# Patient Record
Sex: FEMALE | Race: WHITE | NOT HISPANIC OR LATINO | Employment: OTHER | URBAN - METROPOLITAN AREA
[De-identification: names, ages, dates, MRNs, and addresses within clinical notes are randomized per-mention and may not be internally consistent; named-entity substitution may affect disease eponyms.]

---

## 2017-01-13 ENCOUNTER — ALLSCRIPTS OFFICE VISIT (OUTPATIENT)
Dept: OTHER | Facility: OTHER | Age: 77
End: 2017-01-13

## 2017-01-23 DIAGNOSIS — E55.9 VITAMIN D DEFICIENCY: ICD-10-CM

## 2017-01-23 DIAGNOSIS — N18.30 CHRONIC KIDNEY DISEASE, STAGE III (MODERATE) (HCC): ICD-10-CM

## 2017-01-23 DIAGNOSIS — I12.9 HYPERTENSIVE CHRONIC KIDNEY DISEASE WITH STAGE 1 THROUGH STAGE 4 CHRONIC KIDNEY DISEASE, OR UNSPECIFIED CHRONIC KIDNEY DISEASE: ICD-10-CM

## 2017-01-23 DIAGNOSIS — E78.00 PURE HYPERCHOLESTEROLEMIA: ICD-10-CM

## 2017-01-23 DIAGNOSIS — E87.1 HYPO-OSMOLALITY AND HYPONATREMIA: ICD-10-CM

## 2017-01-23 DIAGNOSIS — D64.9 ANEMIA: ICD-10-CM

## 2017-01-31 ENCOUNTER — TRANSCRIBE ORDERS (OUTPATIENT)
Dept: LAB | Facility: CLINIC | Age: 77
End: 2017-01-31

## 2017-01-31 ENCOUNTER — APPOINTMENT (OUTPATIENT)
Dept: LAB | Facility: CLINIC | Age: 77
End: 2017-01-31
Payer: MEDICARE

## 2017-01-31 DIAGNOSIS — N18.30 CHRONIC KIDNEY DISEASE, STAGE III (MODERATE) (HCC): ICD-10-CM

## 2017-01-31 DIAGNOSIS — E55.9 VITAMIN D DEFICIENCY: ICD-10-CM

## 2017-01-31 DIAGNOSIS — I12.9 HYPERTENSIVE CHRONIC KIDNEY DISEASE WITH STAGE 1 THROUGH STAGE 4 CHRONIC KIDNEY DISEASE, OR UNSPECIFIED CHRONIC KIDNEY DISEASE: ICD-10-CM

## 2017-01-31 DIAGNOSIS — E78.00 PURE HYPERCHOLESTEROLEMIA: ICD-10-CM

## 2017-01-31 DIAGNOSIS — E87.1 HYPO-OSMOLALITY AND HYPONATREMIA: ICD-10-CM

## 2017-01-31 DIAGNOSIS — D64.9 ANEMIA: ICD-10-CM

## 2017-01-31 LAB
ANION GAP SERPL CALCULATED.3IONS-SCNC: 9 MMOL/L (ref 4–13)
BUN SERPL-MCNC: 25 MG/DL (ref 5–25)
CALCIUM SERPL-MCNC: 8.7 MG/DL (ref 8.3–10.1)
CHLORIDE SERPL-SCNC: 103 MMOL/L (ref 100–108)
CO2 SERPL-SCNC: 29 MMOL/L (ref 21–32)
CREAT SERPL-MCNC: 1.78 MG/DL (ref 0.6–1.3)
GFR SERPL CREATININE-BSD FRML MDRD: 27.6 ML/MIN/1.73SQ M
GLUCOSE SERPL-MCNC: 110 MG/DL (ref 65–140)
POTASSIUM SERPL-SCNC: 4 MMOL/L (ref 3.5–5.3)
SODIUM SERPL-SCNC: 141 MMOL/L (ref 136–145)

## 2017-01-31 PROCEDURE — 80048 BASIC METABOLIC PNL TOTAL CA: CPT

## 2017-01-31 PROCEDURE — 36415 COLL VENOUS BLD VENIPUNCTURE: CPT

## 2017-03-21 ENCOUNTER — TRANSCRIBE ORDERS (OUTPATIENT)
Dept: ADMINISTRATIVE | Facility: HOSPITAL | Age: 77
End: 2017-03-21

## 2017-03-21 DIAGNOSIS — M81.0 OSTEOPOROSIS, UNSPECIFIED: ICD-10-CM

## 2017-03-21 DIAGNOSIS — Z12.31 SCREENING MAMMOGRAM FOR HIGH-RISK PATIENT: Primary | ICD-10-CM

## 2017-03-29 ENCOUNTER — GENERIC CONVERSION - ENCOUNTER (OUTPATIENT)
Dept: OTHER | Facility: OTHER | Age: 77
End: 2017-03-29

## 2017-03-29 ENCOUNTER — HOSPITAL ENCOUNTER (OUTPATIENT)
Dept: RADIOLOGY | Facility: HOSPITAL | Age: 77
Discharge: HOME | End: 2017-03-29
Payer: MEDICARE

## 2017-03-29 DIAGNOSIS — Z12.31 SCREENING MAMMOGRAM FOR HIGH-RISK PATIENT: ICD-10-CM

## 2017-03-29 DIAGNOSIS — M81.0 OSTEOPOROSIS, UNSPECIFIED: ICD-10-CM

## 2017-03-29 PROCEDURE — 77080 DXA BONE DENSITY AXIAL: CPT

## 2017-03-29 PROCEDURE — G0202 SCR MAMMO BI INCL CAD: HCPCS

## 2017-05-01 DIAGNOSIS — D64.9 ANEMIA: ICD-10-CM

## 2017-05-01 DIAGNOSIS — E87.1 HYPO-OSMOLALITY AND HYPONATREMIA: ICD-10-CM

## 2017-05-01 DIAGNOSIS — I12.9 HYPERTENSIVE CHRONIC KIDNEY DISEASE WITH STAGE 1 THROUGH STAGE 4 CHRONIC KIDNEY DISEASE, OR UNSPECIFIED CHRONIC KIDNEY DISEASE: ICD-10-CM

## 2017-05-01 DIAGNOSIS — N18.30 CHRONIC KIDNEY DISEASE, STAGE III (MODERATE) (HCC): ICD-10-CM

## 2017-05-01 DIAGNOSIS — E78.00 PURE HYPERCHOLESTEROLEMIA: ICD-10-CM

## 2017-05-01 DIAGNOSIS — E55.9 VITAMIN D DEFICIENCY: ICD-10-CM

## 2017-05-04 ENCOUNTER — TRANSCRIBE ORDERS (OUTPATIENT)
Dept: ADMINISTRATIVE | Facility: HOSPITAL | Age: 77
End: 2017-05-04

## 2017-05-04 ENCOUNTER — APPOINTMENT (OUTPATIENT)
Dept: LAB | Facility: HOSPITAL | Age: 77
End: 2017-05-04
Attending: INTERNAL MEDICINE
Payer: MEDICARE

## 2017-05-04 DIAGNOSIS — E87.1 HYPO-OSMOLALITY AND HYPONATREMIA: ICD-10-CM

## 2017-05-04 DIAGNOSIS — M06.09 RHEUMATOID ARTHRITIS OF MULTIPLE SITES WITHOUT RHEUMATOID FACTOR (HCC): Primary | ICD-10-CM

## 2017-05-04 DIAGNOSIS — Z79.899 ENCOUNTER FOR LONG-TERM (CURRENT) USE OF OTHER MEDICATIONS: ICD-10-CM

## 2017-05-04 DIAGNOSIS — I12.9 HYPERTENSIVE CHRONIC KIDNEY DISEASE WITH STAGE 1 THROUGH STAGE 4 CHRONIC KIDNEY DISEASE, OR UNSPECIFIED CHRONIC KIDNEY DISEASE: ICD-10-CM

## 2017-05-04 DIAGNOSIS — E78.00 PURE HYPERCHOLESTEROLEMIA: ICD-10-CM

## 2017-05-04 DIAGNOSIS — E55.9 VITAMIN D DEFICIENCY: ICD-10-CM

## 2017-05-04 DIAGNOSIS — N18.30 CHRONIC KIDNEY DISEASE, STAGE III (MODERATE) (HCC): ICD-10-CM

## 2017-05-04 DIAGNOSIS — D64.9 ANEMIA: ICD-10-CM

## 2017-05-04 DIAGNOSIS — M06.09 RHEUMATOID ARTHRITIS OF MULTIPLE SITES WITHOUT RHEUMATOID FACTOR (HCC): ICD-10-CM

## 2017-05-04 LAB
25(OH)D3 SERPL-MCNC: 32.8 NG/ML (ref 30–100)
ALBUMIN SERPL BCP-MCNC: 3.5 G/DL (ref 3.5–5)
ALP SERPL-CCNC: 69 U/L (ref 46–116)
ALT SERPL W P-5'-P-CCNC: 29 U/L (ref 12–78)
ANION GAP SERPL CALCULATED.3IONS-SCNC: 10 MMOL/L (ref 4–13)
AST SERPL W P-5'-P-CCNC: 25 U/L (ref 5–45)
BASOPHILS # BLD AUTO: 0 THOUSANDS/ΜL (ref 0–0.1)
BASOPHILS NFR BLD AUTO: 0 % (ref 0–1)
BILIRUB DIRECT SERPL-MCNC: 0.2 MG/DL (ref 0–0.2)
BILIRUB SERPL-MCNC: 0.6 MG/DL (ref 0.2–1)
BUN SERPL-MCNC: 17 MG/DL (ref 5–25)
CALCIUM SERPL-MCNC: 8.9 MG/DL (ref 8.3–10.1)
CHLORIDE SERPL-SCNC: 105 MMOL/L (ref 100–108)
CHOLEST SERPL-MCNC: 225 MG/DL (ref 50–200)
CO2 SERPL-SCNC: 27 MMOL/L (ref 21–32)
CREAT SERPL-MCNC: 1.48 MG/DL (ref 0.6–1.3)
CREAT UR-MCNC: 102 MG/DL
CRP SERPL QL: 7.2 MG/L
EOSINOPHIL # BLD AUTO: 0 THOUSAND/ΜL (ref 0–0.61)
EOSINOPHIL NFR BLD AUTO: 1 % (ref 0–6)
ERYTHROCYTE [DISTWIDTH] IN BLOOD BY AUTOMATED COUNT: 14.2 % (ref 11.6–15.1)
ERYTHROCYTE [SEDIMENTATION RATE] IN BLOOD: 16 MM/HOUR (ref 2–25)
GFR SERPL CREATININE-BSD FRML MDRD: 34.2 ML/MIN/1.73SQ M
GLUCOSE P FAST SERPL-MCNC: 93 MG/DL (ref 65–99)
HCT VFR BLD AUTO: 36.3 % (ref 37–47)
HDLC SERPL-MCNC: 49 MG/DL (ref 40–60)
HGB BLD-MCNC: 11.8 G/DL (ref 12–16)
LDLC SERPL CALC-MCNC: 141 MG/DL (ref 0–100)
LYMPHOCYTES # BLD AUTO: 1.1 THOUSANDS/ΜL (ref 0.6–4.47)
LYMPHOCYTES NFR BLD AUTO: 17 % (ref 14–44)
MAGNESIUM SERPL-MCNC: 2.4 MG/DL (ref 1.6–2.6)
MCH RBC QN AUTO: 26.6 PG (ref 27–31)
MCHC RBC AUTO-ENTMCNC: 32.4 G/DL (ref 31.4–37.4)
MCV RBC AUTO: 82 FL (ref 82–98)
MONOCYTES # BLD AUTO: 0.5 THOUSAND/ΜL (ref 0.17–1.22)
MONOCYTES NFR BLD AUTO: 8 % (ref 4–12)
NEUTROPHILS # BLD AUTO: 4.8 THOUSANDS/ΜL (ref 1.85–7.62)
NEUTS SEG NFR BLD AUTO: 75 % (ref 43–75)
NRBC BLD AUTO-RTO: 0 /100 WBCS
PHOSPHATE SERPL-MCNC: 3.1 MG/DL (ref 2.3–4.1)
PLATELET # BLD AUTO: 210 THOUSANDS/UL (ref 130–400)
PMV BLD AUTO: 8.6 FL (ref 8.9–12.7)
POTASSIUM SERPL-SCNC: 3.8 MMOL/L (ref 3.5–5.3)
PROT SERPL-MCNC: 6.7 G/DL (ref 6.4–8.2)
PROT UR-MCNC: 12 MG/DL
PROT/CREAT UR: 0.12 MG/G{CREAT} (ref 0–0.1)
PTH-INTACT SERPL-MCNC: 64.9 PG/ML (ref 14–72)
RBC # BLD AUTO: 4.43 MILLION/UL (ref 4.2–5.4)
SODIUM SERPL-SCNC: 142 MMOL/L (ref 136–145)
TRIGL SERPL-MCNC: 177 MG/DL
WBC # BLD AUTO: 6.4 THOUSAND/UL (ref 4.8–10.8)

## 2017-05-04 PROCEDURE — 83970 ASSAY OF PARATHORMONE: CPT

## 2017-05-04 PROCEDURE — 85025 COMPLETE CBC W/AUTO DIFF WBC: CPT | Performed by: INTERNAL MEDICINE

## 2017-05-04 PROCEDURE — 82306 VITAMIN D 25 HYDROXY: CPT

## 2017-05-04 PROCEDURE — 84156 ASSAY OF PROTEIN URINE: CPT

## 2017-05-04 PROCEDURE — 86140 C-REACTIVE PROTEIN: CPT

## 2017-05-04 PROCEDURE — 84100 ASSAY OF PHOSPHORUS: CPT

## 2017-05-04 PROCEDURE — 85652 RBC SED RATE AUTOMATED: CPT

## 2017-05-04 PROCEDURE — 36415 COLL VENOUS BLD VENIPUNCTURE: CPT | Performed by: INTERNAL MEDICINE

## 2017-05-04 PROCEDURE — 80053 COMPREHEN METABOLIC PANEL: CPT

## 2017-05-04 PROCEDURE — 80061 LIPID PANEL: CPT

## 2017-05-04 PROCEDURE — 82570 ASSAY OF URINE CREATININE: CPT

## 2017-05-04 PROCEDURE — 83735 ASSAY OF MAGNESIUM: CPT

## 2017-05-04 PROCEDURE — 82248 BILIRUBIN DIRECT: CPT

## 2017-05-17 ENCOUNTER — ALLSCRIPTS OFFICE VISIT (OUTPATIENT)
Dept: OTHER | Facility: OTHER | Age: 77
End: 2017-05-17

## 2017-06-15 ENCOUNTER — TRANSCRIBE ORDERS (OUTPATIENT)
Dept: LAB | Facility: CLINIC | Age: 77
End: 2017-06-15

## 2017-06-15 ENCOUNTER — APPOINTMENT (OUTPATIENT)
Dept: LAB | Facility: CLINIC | Age: 77
End: 2017-06-15
Payer: MEDICARE

## 2017-06-15 DIAGNOSIS — E04.2 NONTOXIC MULTINODULAR GOITER: Primary | ICD-10-CM

## 2017-06-15 LAB — TSH SERPL DL<=0.05 MIU/L-ACNC: 2.29 UIU/ML (ref 0.36–3.74)

## 2017-06-15 PROCEDURE — 84443 ASSAY THYROID STIM HORMONE: CPT

## 2017-06-15 PROCEDURE — 36415 COLL VENOUS BLD VENIPUNCTURE: CPT

## 2017-07-30 ENCOUNTER — HOSPITAL ENCOUNTER (EMERGENCY)
Facility: HOSPITAL | Age: 77
Discharge: ANOTHER HOSPITAL | End: 2017-07-30
Attending: EMERGENCY MEDICINE | Admitting: EMERGENCY MEDICINE
Payer: MEDICARE

## 2017-07-30 ENCOUNTER — HOSPITAL ENCOUNTER (INPATIENT)
Facility: HOSPITAL | Age: 77
LOS: 3 days | Discharge: HOME | DRG: 246 | End: 2017-08-02
Attending: INTERNAL MEDICINE | Admitting: INTERNAL MEDICINE
Payer: MEDICARE

## 2017-07-30 ENCOUNTER — HOSPITAL ENCOUNTER (OUTPATIENT)
Dept: NON INVASIVE DIAGNOSTICS | Facility: HOSPITAL | Age: 77
Discharge: HOME | End: 2017-07-30
Attending: INTERNAL MEDICINE

## 2017-07-30 ENCOUNTER — APPOINTMENT (EMERGENCY)
Dept: RADIOLOGY | Facility: HOSPITAL | Age: 77
End: 2017-07-30
Payer: MEDICARE

## 2017-07-30 VITALS
BODY MASS INDEX: 28.77 KG/M2 | RESPIRATION RATE: 24 BRPM | OXYGEN SATURATION: 100 % | DIASTOLIC BLOOD PRESSURE: 55 MMHG | WEIGHT: 165 LBS | SYSTOLIC BLOOD PRESSURE: 96 MMHG | TEMPERATURE: 97.4 F | HEART RATE: 80 BPM

## 2017-07-30 DIAGNOSIS — I21.3 ST ELEVATION MYOCARDIAL INFARCTION (STEMI), UNSPECIFIED ARTERY (HCC): ICD-10-CM

## 2017-07-30 DIAGNOSIS — I50.43 HEART FAILURE, ACUTE ON CHRONIC, SYSTOLIC AND DIASTOLIC (HCC): Primary | ICD-10-CM

## 2017-07-30 DIAGNOSIS — I21.19 ACUTE MI, INFEROPOSTERIOR WALL (HCC): Primary | ICD-10-CM

## 2017-07-30 PROBLEM — Z95.1 S/P CABG X 4: Status: ACTIVE | Noted: 2017-07-30

## 2017-07-30 PROBLEM — I10 HYPERTENSION: Status: ACTIVE | Noted: 2017-07-30

## 2017-07-30 PROBLEM — N18.9 CHRONIC KIDNEY DISEASE (CKD): Status: ACTIVE | Noted: 2017-07-30

## 2017-07-30 PROBLEM — E78.5 HYPERLIPIDEMIA: Status: ACTIVE | Noted: 2017-07-30

## 2017-07-30 PROBLEM — I21.29 ACUTE MI, TRUE POSTERIOR WALL (HCC): Status: ACTIVE | Noted: 2017-07-30

## 2017-07-30 LAB
ALBUMIN SERPL BCP-MCNC: 3.6 G/DL (ref 3.5–5)
ALP SERPL-CCNC: 84 U/L (ref 46–116)
ALT SERPL W P-5'-P-CCNC: 31 U/L (ref 12–78)
ANION GAP SERPL CALCULATED.3IONS-SCNC: 14 MMOL/L (ref 4–13)
APTT PPP: 23 SECONDS (ref 23–35)
AST SERPL W P-5'-P-CCNC: 36 U/L (ref 5–45)
BASOPHILS # BLD AUTO: 0 THOUSANDS/ΜL (ref 0–0.1)
BASOPHILS NFR BLD AUTO: 0 % (ref 0–1)
BILIRUB SERPL-MCNC: 0.6 MG/DL (ref 0.2–1)
BUN SERPL-MCNC: 25 MG/DL (ref 5–25)
CALCIUM SERPL-MCNC: 9.2 MG/DL (ref 8.3–10.1)
CHLORIDE SERPL-SCNC: 102 MMOL/L (ref 100–108)
CO2 SERPL-SCNC: 24 MMOL/L (ref 21–32)
CREAT SERPL-MCNC: 1.84 MG/DL (ref 0.6–1.3)
EOSINOPHIL # BLD AUTO: 0 THOUSAND/ΜL (ref 0–0.61)
EOSINOPHIL NFR BLD AUTO: 0 % (ref 0–6)
ERYTHROCYTE [DISTWIDTH] IN BLOOD BY AUTOMATED COUNT: 13.9 % (ref 11.6–15.1)
GFR SERPL CREATININE-BSD FRML MDRD: 26 ML/MIN/1.73SQ M
GLUCOSE SERPL-MCNC: 153 MG/DL (ref 65–140)
HCT VFR BLD AUTO: 38.8 % (ref 37–47)
HGB BLD-MCNC: 12.6 G/DL (ref 12–16)
LIPASE SERPL-CCNC: 299 U/L (ref 73–393)
LYMPHOCYTES # BLD AUTO: 2.5 THOUSANDS/ΜL (ref 0.6–4.47)
LYMPHOCYTES NFR BLD AUTO: 23 % (ref 14–44)
MAGNESIUM SERPL-MCNC: 2.3 MG/DL (ref 1.6–2.6)
MCH RBC QN AUTO: 26.4 PG (ref 27–31)
MCHC RBC AUTO-ENTMCNC: 32.5 G/DL (ref 31.4–37.4)
MCV RBC AUTO: 81 FL (ref 82–98)
MONOCYTES # BLD AUTO: 0.8 THOUSAND/ΜL (ref 0.17–1.22)
MONOCYTES NFR BLD AUTO: 8 % (ref 4–12)
NEUTROPHILS # BLD AUTO: 7.6 THOUSANDS/ΜL (ref 1.85–7.62)
NEUTS SEG NFR BLD AUTO: 69 % (ref 43–75)
NRBC BLD AUTO-RTO: 0 /100 WBCS
NT-PROBNP SERPL-MCNC: 555 PG/ML
PHOSPHATE SERPL-MCNC: 2.8 MG/DL (ref 2.3–4.1)
PLATELET # BLD AUTO: 247 THOUSANDS/UL (ref 130–400)
PMV BLD AUTO: 8.3 FL (ref 8.9–12.7)
POTASSIUM SERPL-SCNC: 4.2 MMOL/L (ref 3.5–5.3)
PROT SERPL-MCNC: 7 G/DL (ref 6.4–8.2)
RBC # BLD AUTO: 4.77 MILLION/UL (ref 4.2–5.4)
SODIUM SERPL-SCNC: 140 MMOL/L (ref 136–145)
TROPONIN I SERPL-MCNC: <0.02 NG/ML
WBC # BLD AUTO: 11 THOUSAND/UL (ref 4.8–10.8)

## 2017-07-30 PROCEDURE — 4A023N7 MEASUREMENT OF CARDIAC SAMPLING AND PRESSURE, LEFT HEART, PERCUTANEOUS APPROACH: ICD-10-PCS | Performed by: INTERNAL MEDICINE

## 2017-07-30 PROCEDURE — C1884 EMBOLIZATION PROTECT SYST: HCPCS | Performed by: INTERNAL MEDICINE

## 2017-07-30 PROCEDURE — B2111ZZ FLUOROSCOPY OF MULTIPLE CORONARY ARTERIES USING LOW OSMOLAR CONTRAST: ICD-10-PCS | Performed by: INTERNAL MEDICINE

## 2017-07-30 PROCEDURE — C1894 INTRO/SHEATH, NON-LASER: HCPCS | Performed by: INTERNAL MEDICINE

## 2017-07-30 PROCEDURE — 027034Z DILATION OF CORONARY ARTERY, ONE ARTERY WITH DRUG-ELUTING INTRALUMINAL DEVICE, PERCUTANEOUS APPROACH: ICD-10-PCS | Performed by: INTERNAL MEDICINE

## 2017-07-30 PROCEDURE — 36415 COLL VENOUS BLD VENIPUNCTURE: CPT | Performed by: EMERGENCY MEDICINE

## 2017-07-30 PROCEDURE — B2131ZZ FLUOROSCOPY OF MULTIPLE CORONARY ARTERY BYPASS GRAFTS USING LOW OSMOLAR CONTRAST: ICD-10-PCS | Performed by: INTERNAL MEDICINE

## 2017-07-30 PROCEDURE — 83690 ASSAY OF LIPASE: CPT | Performed by: EMERGENCY MEDICINE

## 2017-07-30 PROCEDURE — 83735 ASSAY OF MAGNESIUM: CPT | Performed by: EMERGENCY MEDICINE

## 2017-07-30 PROCEDURE — 92973 PRQ TRLUML C MCHN ASP THRMBC: CPT | Performed by: INTERNAL MEDICINE

## 2017-07-30 PROCEDURE — 84484 ASSAY OF TROPONIN QUANT: CPT | Performed by: EMERGENCY MEDICINE

## 2017-07-30 PROCEDURE — 85025 COMPLETE CBC W/AUTO DIFF WBC: CPT | Performed by: EMERGENCY MEDICINE

## 2017-07-30 PROCEDURE — 93005 ELECTROCARDIOGRAM TRACING: CPT

## 2017-07-30 PROCEDURE — 84100 ASSAY OF PHOSPHORUS: CPT | Performed by: EMERGENCY MEDICINE

## 2017-07-30 PROCEDURE — 93459 L HRT ART/GRFT ANGIO: CPT | Performed by: INTERNAL MEDICINE

## 2017-07-30 PROCEDURE — C9606 PERC D-E COR REVASC W AMI S: HCPCS | Performed by: INTERNAL MEDICINE

## 2017-07-30 PROCEDURE — 71010 HB CHEST X-RAY 1 VIEW FRONTAL (PORTABLE): CPT

## 2017-07-30 PROCEDURE — C1760 CLOSURE DEV, VASC: HCPCS | Performed by: INTERNAL MEDICINE

## 2017-07-30 PROCEDURE — 99291 CRITICAL CARE FIRST HOUR: CPT

## 2017-07-30 PROCEDURE — 96375 TX/PRO/DX INJ NEW DRUG ADDON: CPT

## 2017-07-30 PROCEDURE — B2181ZZ FLUOROSCOPY OF LEFT INTERNAL MAMMARY BYPASS GRAFT USING LOW OSMOLAR CONTRAST: ICD-10-PCS | Performed by: INTERNAL MEDICINE

## 2017-07-30 PROCEDURE — 99152 MOD SED SAME PHYS/QHP 5/>YRS: CPT | Performed by: INTERNAL MEDICINE

## 2017-07-30 PROCEDURE — C1725 CATH, TRANSLUMIN NON-LASER: HCPCS | Performed by: INTERNAL MEDICINE

## 2017-07-30 PROCEDURE — C1757 CATH, THROMBECTOMY/EMBOLECT: HCPCS | Performed by: INTERNAL MEDICINE

## 2017-07-30 PROCEDURE — 96374 THER/PROPH/DIAG INJ IV PUSH: CPT

## 2017-07-30 PROCEDURE — 80053 COMPREHEN METABOLIC PANEL: CPT | Performed by: EMERGENCY MEDICINE

## 2017-07-30 PROCEDURE — C1769 GUIDE WIRE: HCPCS | Performed by: INTERNAL MEDICINE

## 2017-07-30 PROCEDURE — 83880 ASSAY OF NATRIURETIC PEPTIDE: CPT | Performed by: EMERGENCY MEDICINE

## 2017-07-30 PROCEDURE — 99153 MOD SED SAME PHYS/QHP EA: CPT | Performed by: INTERNAL MEDICINE

## 2017-07-30 PROCEDURE — C1887 CATHETER, GUIDING: HCPCS | Performed by: INTERNAL MEDICINE

## 2017-07-30 PROCEDURE — 85730 THROMBOPLASTIN TIME PARTIAL: CPT | Performed by: EMERGENCY MEDICINE

## 2017-07-30 PROCEDURE — 96361 HYDRATE IV INFUSION ADD-ON: CPT

## 2017-07-30 RX ORDER — EZETIMIBE 10 MG/1
10 TABLET ORAL
Status: DISCONTINUED | OUTPATIENT
Start: 2017-07-30 | End: 2017-08-02 | Stop reason: HOSPADM

## 2017-07-30 RX ORDER — HEPARIN SODIUM 10000 [USP'U]/100ML
3-20 INJECTION, SOLUTION INTRAVENOUS
Status: DISCONTINUED | OUTPATIENT
Start: 2017-07-30 | End: 2017-07-30 | Stop reason: HOSPADM

## 2017-07-30 RX ORDER — ASPIRIN 81 MG/1
81 TABLET, CHEWABLE ORAL DAILY
Status: DISCONTINUED | OUTPATIENT
Start: 2017-07-31 | End: 2017-07-31

## 2017-07-30 RX ORDER — SODIUM CHLORIDE 9 MG/ML
100 INJECTION, SOLUTION INTRAVENOUS CONTINUOUS
Status: CANCELLED | OUTPATIENT
Start: 2017-07-30 | End: 2017-07-31

## 2017-07-30 RX ORDER — FENTANYL CITRATE 50 UG/ML
50 INJECTION, SOLUTION INTRAMUSCULAR; INTRAVENOUS ONCE
Status: COMPLETED | OUTPATIENT
Start: 2017-07-30 | End: 2017-07-30

## 2017-07-30 RX ORDER — HEPARIN SODIUM 1000 [USP'U]/ML
2000 INJECTION, SOLUTION INTRAVENOUS; SUBCUTANEOUS AS NEEDED
Status: DISCONTINUED | OUTPATIENT
Start: 2017-07-30 | End: 2017-07-30 | Stop reason: HOSPADM

## 2017-07-30 RX ORDER — FAMOTIDINE 20 MG
2000 TABLET ORAL DAILY
COMMUNITY
End: 2020-07-23 | Stop reason: HOSPADM

## 2017-07-30 RX ORDER — MIDAZOLAM HYDROCHLORIDE 1 MG/ML
INJECTION INTRAMUSCULAR; INTRAVENOUS CODE/TRAUMA/SEDATION MEDICATION
Status: COMPLETED | OUTPATIENT
Start: 2017-07-30 | End: 2017-07-30

## 2017-07-30 RX ORDER — ASPIRIN 81 MG/1
81 TABLET ORAL DAILY
Status: DISCONTINUED | OUTPATIENT
Start: 2017-07-30 | End: 2017-08-02 | Stop reason: HOSPADM

## 2017-07-30 RX ORDER — NITROGLYCERIN 400 UG/1
1 SPRAY ORAL
COMMUNITY
End: 2020-05-13 | Stop reason: SDUPTHER

## 2017-07-30 RX ORDER — HEPARIN SODIUM 1000 [USP'U]/ML
4000 INJECTION, SOLUTION INTRAVENOUS; SUBCUTANEOUS ONCE
Status: COMPLETED | OUTPATIENT
Start: 2017-07-30 | End: 2017-07-30

## 2017-07-30 RX ORDER — MULTIVIT WITH MINERALS/LUTEIN
1000 TABLET ORAL DAILY
COMMUNITY
End: 2020-07-23 | Stop reason: HOSPADM

## 2017-07-30 RX ORDER — SODIUM CHLORIDE 9 MG/ML
100 INJECTION, SOLUTION INTRAVENOUS CONTINUOUS
Status: DISCONTINUED | OUTPATIENT
Start: 2017-07-30 | End: 2017-07-31

## 2017-07-30 RX ORDER — SOLIFENACIN SUCCINATE 10 MG/1
5 TABLET, FILM COATED ORAL DAILY
COMMUNITY
End: 2018-02-05 | Stop reason: HOSPADM

## 2017-07-30 RX ORDER — EZETIMIBE 10 MG/1
10 TABLET ORAL
Status: CANCELLED | OUTPATIENT
Start: 2017-07-30

## 2017-07-30 RX ORDER — ONDANSETRON 2 MG/ML
4 INJECTION INTRAMUSCULAR; INTRAVENOUS ONCE
Status: DISCONTINUED | OUTPATIENT
Start: 2017-07-30 | End: 2017-08-02 | Stop reason: HOSPADM

## 2017-07-30 RX ORDER — HYDROXYCHLOROQUINE SULFATE 200 MG/1
200 TABLET, FILM COATED ORAL DAILY
Status: DISCONTINUED | OUTPATIENT
Start: 2017-07-30 | End: 2017-08-02 | Stop reason: HOSPADM

## 2017-07-30 RX ORDER — UBIDECARENONE 10 MG
10 CAPSULE ORAL DAILY
COMMUNITY
End: 2020-07-23 | Stop reason: HOSPADM

## 2017-07-30 RX ORDER — LEVOTHYROXINE SODIUM 0.05 MG/1
50 TABLET ORAL DAILY
Status: DISCONTINUED | OUTPATIENT
Start: 2017-07-30 | End: 2017-08-02 | Stop reason: HOSPADM

## 2017-07-30 RX ORDER — SODIUM NITROPRUSSIDE 25 MG/ML
INJECTION INTRAVENOUS CODE/TRAUMA/SEDATION MEDICATION
Status: COMPLETED | OUTPATIENT
Start: 2017-07-30 | End: 2017-07-30

## 2017-07-30 RX ORDER — ASPIRIN 325 MG
TABLET, DELAYED RELEASE (ENTERIC COATED) ORAL CODE/TRAUMA/SEDATION MEDICATION
Status: COMPLETED | OUTPATIENT
Start: 2017-07-30 | End: 2017-07-30

## 2017-07-30 RX ORDER — ONDANSETRON 2 MG/ML
4 INJECTION INTRAMUSCULAR; INTRAVENOUS ONCE
Status: COMPLETED | OUTPATIENT
Start: 2017-07-30 | End: 2017-07-30

## 2017-07-30 RX ORDER — ONDANSETRON 2 MG/ML
INJECTION INTRAMUSCULAR; INTRAVENOUS CODE/TRAUMA/SEDATION MEDICATION
Status: COMPLETED | OUTPATIENT
Start: 2017-07-30 | End: 2017-07-30

## 2017-07-30 RX ORDER — HEPARIN SODIUM 1000 [USP'U]/ML
4000 INJECTION, SOLUTION INTRAVENOUS; SUBCUTANEOUS AS NEEDED
Status: DISCONTINUED | OUTPATIENT
Start: 2017-07-30 | End: 2017-07-30 | Stop reason: HOSPADM

## 2017-07-30 RX ORDER — METOPROLOL TARTRATE 50 MG/1
25 TABLET, FILM COATED ORAL EVERY 12 HOURS SCHEDULED
Status: CANCELLED | OUTPATIENT
Start: 2017-07-30

## 2017-07-30 RX ORDER — LIDOCAINE HYDROCHLORIDE 10 MG/ML
INJECTION, SOLUTION INFILTRATION; PERINEURAL CODE/TRAUMA/SEDATION MEDICATION
Status: COMPLETED | OUTPATIENT
Start: 2017-07-30 | End: 2017-07-30

## 2017-07-30 RX ORDER — FENTANYL CITRATE 50 UG/ML
INJECTION, SOLUTION INTRAMUSCULAR; INTRAVENOUS CODE/TRAUMA/SEDATION MEDICATION
Status: COMPLETED | OUTPATIENT
Start: 2017-07-30 | End: 2017-07-30

## 2017-07-30 RX ORDER — SODIUM CHLORIDE 9 MG/ML
150 INJECTION, SOLUTION INTRAVENOUS CONTINUOUS
Status: DISCONTINUED | OUTPATIENT
Start: 2017-07-30 | End: 2017-07-30 | Stop reason: HOSPADM

## 2017-07-30 RX ORDER — RALOXIFENE HYDROCHLORIDE 60 MG/1
60 TABLET, FILM COATED ORAL DAILY
Status: DISCONTINUED | OUTPATIENT
Start: 2017-07-30 | End: 2017-07-31

## 2017-07-30 RX ORDER — MORPHINE SULFATE 4 MG/ML
4 INJECTION, SOLUTION INTRAMUSCULAR; INTRAVENOUS ONCE
Status: COMPLETED | OUTPATIENT
Start: 2017-07-30 | End: 2017-07-30

## 2017-07-30 RX ORDER — ASPIRIN 81 MG/1
81 TABLET, CHEWABLE ORAL DAILY
Status: CANCELLED | OUTPATIENT
Start: 2017-07-30

## 2017-07-30 RX ADMIN — Medication 30 MG: at 16:13

## 2017-07-30 RX ADMIN — ONDANSETRON 4 MG: 2 INJECTION INTRAMUSCULAR; INTRAVENOUS at 13:05

## 2017-07-30 RX ADMIN — MORPHINE SULFATE 4 MG: 4 INJECTION, SOLUTION INTRAMUSCULAR; INTRAVENOUS at 13:11

## 2017-07-30 RX ADMIN — SODIUM NITROPRUSSIDE 100 MCG: 50 INJECTION, SOLUTION, CONCENTRATE INTRAVENOUS at 14:58

## 2017-07-30 RX ADMIN — SODIUM CHLORIDE 150 ML/HR: 0.9 INJECTION, SOLUTION INTRAVENOUS at 13:45

## 2017-07-30 RX ADMIN — LIDOCAINE HYDROCHLORIDE 10 ML: 10 INJECTION, SOLUTION INFILTRATION; PERINEURAL at 14:33

## 2017-07-30 RX ADMIN — TICAGRELOR 180 MG: 90 TABLET ORAL at 13:51

## 2017-07-30 RX ADMIN — HEPARIN SODIUM 4000 UNITS: 1000 INJECTION, SOLUTION INTRAVENOUS; SUBCUTANEOUS at 13:46

## 2017-07-30 RX ADMIN — METOPROLOL TARTRATE 25 MG: 25 TABLET ORAL at 21:15

## 2017-07-30 RX ADMIN — SODIUM CHLORIDE 500 ML: 0.9 INJECTION, SOLUTION INTRAVENOUS at 12:45

## 2017-07-30 RX ADMIN — TICAGRELOR 90 MG: 90 TABLET ORAL at 17:33

## 2017-07-30 RX ADMIN — FENTANYL CITRATE 50 MCG: 50 INJECTION, SOLUTION INTRAMUSCULAR; INTRAVENOUS at 14:32

## 2017-07-30 RX ADMIN — BIVALIRUDIN 1.54 MG/KG/HR: 250 INJECTION, POWDER, LYOPHILIZED, FOR SOLUTION INTRAVENOUS at 14:44

## 2017-07-30 RX ADMIN — FENTANYL CITRATE 50 MCG: 50 INJECTION INTRAMUSCULAR; INTRAVENOUS at 13:50

## 2017-07-30 RX ADMIN — EZETIMIBE 10 MG: 10 TABLET ORAL at 21:15

## 2017-07-30 RX ADMIN — MIDAZOLAM 2 MG: 1 INJECTION INTRAMUSCULAR; INTRAVENOUS at 14:32

## 2017-07-30 RX ADMIN — HEPARIN SODIUM AND DEXTROSE 12 UNITS/KG/HR: 10000; 5 INJECTION INTRAVENOUS at 13:47

## 2017-07-30 RX ADMIN — ASPIRIN 81 MG: 81 TABLET, COATED ORAL at 16:13

## 2017-07-30 RX ADMIN — SODIUM CHLORIDE 100 ML/HR: 0.9 INJECTION, SOLUTION INTRAVENOUS at 17:36

## 2017-07-30 RX ADMIN — ONDANSETRON 4 MG: 2 INJECTION INTRAMUSCULAR; INTRAVENOUS at 14:25

## 2017-07-30 RX ADMIN — ASPIRIN 325 MG: 325 TABLET, DELAYED RELEASE ORAL at 14:32

## 2017-07-30 RX ADMIN — IOHEXOL 150 ML: 350 INJECTION, SOLUTION INTRAVENOUS at 15:04

## 2017-07-31 ENCOUNTER — APPOINTMENT (INPATIENT)
Dept: NON INVASIVE DIAGNOSTICS | Facility: HOSPITAL | Age: 77
DRG: 246 | End: 2017-07-31
Attending: INTERNAL MEDICINE
Payer: MEDICARE

## 2017-07-31 ENCOUNTER — APPOINTMENT (INPATIENT)
Dept: RADIOLOGY | Facility: HOSPITAL | Age: 77
DRG: 246 | End: 2017-07-31
Attending: INTERNAL MEDICINE
Payer: MEDICARE

## 2017-07-31 PROBLEM — E03.9 HYPOTHYROID: Status: ACTIVE | Noted: 2017-07-31

## 2017-07-31 LAB
ANION GAP SERPL CALCULATED.3IONS-SCNC: 10 MMOL/L (ref 4–13)
BUN SERPL-MCNC: 23 MG/DL (ref 5–25)
CALCIUM SERPL-MCNC: 8.3 MG/DL (ref 8.3–10.1)
CHLORIDE SERPL-SCNC: 104 MMOL/L (ref 100–108)
CHOLEST SERPL-MCNC: 223 MG/DL (ref 50–200)
CO2 SERPL-SCNC: 23 MMOL/L (ref 21–32)
CREAT SERPL-MCNC: 1.25 MG/DL (ref 0.6–1.3)
ERYTHROCYTE [DISTWIDTH] IN BLOOD BY AUTOMATED COUNT: 13.8 % (ref 11.6–15.1)
GFR SERPL CREATININE-BSD FRML MDRD: 41 ML/MIN/1.73SQ M
GLUCOSE SERPL-MCNC: 120 MG/DL (ref 65–140)
HCT VFR BLD AUTO: 37.6 % (ref 34.8–46.1)
HDLC SERPL-MCNC: 53 MG/DL (ref 40–60)
HGB BLD-MCNC: 12.1 G/DL (ref 11.5–15.4)
LDLC SERPL CALC-MCNC: 138 MG/DL (ref 0–100)
MCH RBC QN AUTO: 26.2 PG (ref 26.8–34.3)
MCHC RBC AUTO-ENTMCNC: 32.2 G/DL (ref 31.4–37.4)
MCV RBC AUTO: 82 FL (ref 82–98)
PLATELET # BLD AUTO: 216 THOUSANDS/UL (ref 149–390)
PMV BLD AUTO: 10.5 FL (ref 8.9–12.7)
POTASSIUM SERPL-SCNC: 3.6 MMOL/L (ref 3.5–5.3)
RBC # BLD AUTO: 4.61 MILLION/UL (ref 3.81–5.12)
SODIUM SERPL-SCNC: 137 MMOL/L (ref 136–145)
TRIGL SERPL-MCNC: 159 MG/DL
TROPONIN I SERPL-MCNC: >40 NG/ML
WBC # BLD AUTO: 15.33 THOUSAND/UL (ref 4.31–10.16)

## 2017-07-31 PROCEDURE — 80048 BASIC METABOLIC PNL TOTAL CA: CPT | Performed by: INTERNAL MEDICINE

## 2017-07-31 PROCEDURE — 84484 ASSAY OF TROPONIN QUANT: CPT | Performed by: INTERNAL MEDICINE

## 2017-07-31 PROCEDURE — 80061 LIPID PANEL: CPT | Performed by: INTERNAL MEDICINE

## 2017-07-31 PROCEDURE — 85027 COMPLETE CBC AUTOMATED: CPT | Performed by: INTERNAL MEDICINE

## 2017-07-31 PROCEDURE — 71010 HB CHEST X-RAY 1 VIEW FRONTAL: CPT

## 2017-07-31 PROCEDURE — 93306 TTE W/DOPPLER COMPLETE: CPT

## 2017-07-31 RX ORDER — ONDANSETRON 4 MG/1
4 TABLET, ORALLY DISINTEGRATING ORAL EVERY 6 HOURS PRN
Status: DISCONTINUED | OUTPATIENT
Start: 2017-07-31 | End: 2017-08-02 | Stop reason: HOSPADM

## 2017-07-31 RX ORDER — TORSEMIDE 20 MG/1
10 TABLET ORAL DAILY
Status: DISCONTINUED | OUTPATIENT
Start: 2017-07-31 | End: 2017-08-02 | Stop reason: HOSPADM

## 2017-07-31 RX ORDER — RANOLAZINE 500 MG/1
500 TABLET, EXTENDED RELEASE ORAL EVERY 12 HOURS SCHEDULED
Status: DISCONTINUED | OUTPATIENT
Start: 2017-07-31 | End: 2017-08-02 | Stop reason: HOSPADM

## 2017-07-31 RX ORDER — SPIRONOLACTONE 25 MG/1
25 TABLET ORAL
Status: DISCONTINUED | OUTPATIENT
Start: 2017-07-31 | End: 2017-08-02 | Stop reason: HOSPADM

## 2017-07-31 RX ORDER — METOPROLOL SUCCINATE 100 MG/1
100 TABLET, EXTENDED RELEASE ORAL EVERY 24 HOURS
Status: DISCONTINUED | OUTPATIENT
Start: 2017-07-31 | End: 2017-08-02 | Stop reason: HOSPADM

## 2017-07-31 RX ORDER — FUROSEMIDE 10 MG/ML
20 INJECTION INTRAMUSCULAR; INTRAVENOUS ONCE
Status: COMPLETED | OUTPATIENT
Start: 2017-07-31 | End: 2017-07-31

## 2017-07-31 RX ORDER — POTASSIUM CHLORIDE 20 MEQ/1
20 TABLET, EXTENDED RELEASE ORAL ONCE
Status: COMPLETED | OUTPATIENT
Start: 2017-07-31 | End: 2017-07-31

## 2017-07-31 RX ORDER — METOPROLOL SUCCINATE 50 MG/1
50 TABLET, EXTENDED RELEASE ORAL EVERY 24 HOURS
Status: DISCONTINUED | OUTPATIENT
Start: 2017-07-31 | End: 2017-08-02 | Stop reason: HOSPADM

## 2017-07-31 RX ORDER — AMLODIPINE BESYLATE 2.5 MG/1
2.5 TABLET ORAL DAILY
Status: DISCONTINUED | OUTPATIENT
Start: 2017-07-31 | End: 2017-08-02 | Stop reason: HOSPADM

## 2017-07-31 RX ORDER — RALOXIFENE HYDROCHLORIDE 60 MG/1
60 TABLET, FILM COATED ORAL DAILY
Status: DISCONTINUED | OUTPATIENT
Start: 2017-07-31 | End: 2017-08-02 | Stop reason: HOSPADM

## 2017-07-31 RX ADMIN — METOPROLOL SUCCINATE 50 MG: 50 TABLET, FILM COATED, EXTENDED RELEASE ORAL at 09:22

## 2017-07-31 RX ADMIN — AMLODIPINE BESYLATE 2.5 MG: 2.5 TABLET ORAL at 21:02

## 2017-07-31 RX ADMIN — TORSEMIDE 10 MG: 20 TABLET ORAL at 09:23

## 2017-07-31 RX ADMIN — METOPROLOL SUCCINATE 100 MG: 100 TABLET, EXTENDED RELEASE ORAL at 21:04

## 2017-07-31 RX ADMIN — Medication 30 MG: at 09:33

## 2017-07-31 RX ADMIN — HYDROXYCHLOROQUINE SULFATE 200 MG: 200 TABLET, FILM COATED ORAL at 09:25

## 2017-07-31 RX ADMIN — ASPIRIN 81 MG: 81 TABLET, COATED ORAL at 09:06

## 2017-07-31 RX ADMIN — ONDANSETRON 4 MG: 4 TABLET, ORALLY DISINTEGRATING ORAL at 01:53

## 2017-07-31 RX ADMIN — FUROSEMIDE 20 MG: 10 INJECTION, SOLUTION INTRAMUSCULAR; INTRAVENOUS at 13:58

## 2017-07-31 RX ADMIN — POTASSIUM CHLORIDE 20 MEQ: 1500 TABLET, EXTENDED RELEASE ORAL at 09:23

## 2017-07-31 RX ADMIN — RANOLAZINE 500 MG: 500 TABLET, FILM COATED, EXTENDED RELEASE ORAL at 09:22

## 2017-07-31 RX ADMIN — TICAGRELOR 90 MG: 90 TABLET ORAL at 17:13

## 2017-07-31 RX ADMIN — ONDANSETRON 4 MG: 4 TABLET, ORALLY DISINTEGRATING ORAL at 09:38

## 2017-07-31 RX ADMIN — TICAGRELOR 90 MG: 90 TABLET ORAL at 09:24

## 2017-07-31 RX ADMIN — RANOLAZINE 500 MG: 500 TABLET, FILM COATED, EXTENDED RELEASE ORAL at 21:02

## 2017-07-31 RX ADMIN — SPIRONOLACTONE 25 MG: 25 TABLET, FILM COATED ORAL at 09:23

## 2017-07-31 RX ADMIN — EZETIMIBE 10 MG: 10 TABLET ORAL at 21:02

## 2017-07-31 RX ADMIN — LEVOTHYROXINE SODIUM 50 MCG: 50 TABLET ORAL at 09:06

## 2017-07-31 RX ADMIN — RALOXIFENE HYDROCHLORIDE 60 MG: 60 TABLET, FILM COATED ORAL at 17:13

## 2017-08-01 LAB
ANION GAP SERPL CALCULATED.3IONS-SCNC: 9 MMOL/L (ref 4–13)
ATRIAL RATE: 64 BPM
ATRIAL RATE: 74 BPM
ATRIAL RATE: 78 BPM
ATRIAL RATE: 82 BPM
BUN SERPL-MCNC: 21 MG/DL (ref 5–25)
CALCIUM SERPL-MCNC: 8.6 MG/DL (ref 8.3–10.1)
CHLORIDE SERPL-SCNC: 103 MMOL/L (ref 100–108)
CO2 SERPL-SCNC: 25 MMOL/L (ref 21–32)
CREAT SERPL-MCNC: 1.31 MG/DL (ref 0.6–1.3)
ERYTHROCYTE [DISTWIDTH] IN BLOOD BY AUTOMATED COUNT: 14 % (ref 11.6–15.1)
GFR SERPL CREATININE-BSD FRML MDRD: 39 ML/MIN/1.73SQ M
GLUCOSE SERPL-MCNC: 118 MG/DL (ref 65–140)
GLUCOSE SERPL-MCNC: 143 MG/DL (ref 65–140)
HCT VFR BLD AUTO: 37.1 % (ref 34.8–46.1)
HGB BLD-MCNC: 12.7 G/DL (ref 11.5–15.4)
MCH RBC QN AUTO: 27.3 PG (ref 26.8–34.3)
MCHC RBC AUTO-ENTMCNC: 34.2 G/DL (ref 31.4–37.4)
MCV RBC AUTO: 80 FL (ref 82–98)
P AXIS: 57 DEGREES
P AXIS: 62 DEGREES
P AXIS: 62 DEGREES
P AXIS: 65 DEGREES
PLATELET # BLD AUTO: 196 THOUSANDS/UL (ref 149–390)
PMV BLD AUTO: 11 FL (ref 8.9–12.7)
POTASSIUM SERPL-SCNC: 3.9 MMOL/L (ref 3.5–5.3)
PR INTERVAL: 240 MS
PR INTERVAL: 242 MS
PR INTERVAL: 244 MS
PR INTERVAL: 267 MS
QRS AXIS: 74 DEGREES
QRS AXIS: 76 DEGREES
QRS AXIS: 78 DEGREES
QRS AXIS: 87 DEGREES
QRSD INTERVAL: 108 MS
QRSD INTERVAL: 110 MS
QRSD INTERVAL: 114 MS
QRSD INTERVAL: 118 MS
QT INTERVAL: 392 MS
QT INTERVAL: 416 MS
QT INTERVAL: 428 MS
QT INTERVAL: 479 MS
QTC INTERVAL: 457 MS
QTC INTERVAL: 474 MS
QTC INTERVAL: 475 MS
QTC INTERVAL: 494 MS
RBC # BLD AUTO: 4.65 MILLION/UL (ref 3.81–5.12)
SODIUM SERPL-SCNC: 137 MMOL/L (ref 136–145)
T WAVE AXIS: 63 DEGREES
T WAVE AXIS: 81 DEGREES
T WAVE AXIS: 86 DEGREES
T WAVE AXIS: 97 DEGREES
VENTRICULAR RATE: 64 BPM
VENTRICULAR RATE: 74 BPM
VENTRICULAR RATE: 78 BPM
VENTRICULAR RATE: 82 BPM
WBC # BLD AUTO: 17.2 THOUSAND/UL (ref 4.31–10.16)

## 2017-08-01 PROCEDURE — 82948 REAGENT STRIP/BLOOD GLUCOSE: CPT

## 2017-08-01 PROCEDURE — 85027 COMPLETE CBC AUTOMATED: CPT | Performed by: INTERNAL MEDICINE

## 2017-08-01 PROCEDURE — 80048 BASIC METABOLIC PNL TOTAL CA: CPT | Performed by: INTERNAL MEDICINE

## 2017-08-01 RX ORDER — FUROSEMIDE 10 MG/ML
40 INJECTION INTRAMUSCULAR; INTRAVENOUS ONCE
Status: COMPLETED | OUTPATIENT
Start: 2017-08-01 | End: 2017-08-01

## 2017-08-01 RX ADMIN — LEVOTHYROXINE SODIUM 50 MCG: 50 TABLET ORAL at 08:46

## 2017-08-01 RX ADMIN — METOPROLOL SUCCINATE 100 MG: 100 TABLET, EXTENDED RELEASE ORAL at 23:06

## 2017-08-01 RX ADMIN — HYDROXYCHLOROQUINE SULFATE 200 MG: 200 TABLET, FILM COATED ORAL at 08:49

## 2017-08-01 RX ADMIN — RANOLAZINE 500 MG: 500 TABLET, FILM COATED, EXTENDED RELEASE ORAL at 23:06

## 2017-08-01 RX ADMIN — ASPIRIN 81 MG: 81 TABLET, COATED ORAL at 08:48

## 2017-08-01 RX ADMIN — EZETIMIBE 10 MG: 10 TABLET ORAL at 23:07

## 2017-08-01 RX ADMIN — TICAGRELOR 90 MG: 90 TABLET ORAL at 17:41

## 2017-08-01 RX ADMIN — AMLODIPINE BESYLATE 2.5 MG: 2.5 TABLET ORAL at 23:06

## 2017-08-01 RX ADMIN — FUROSEMIDE 40 MG: 10 INJECTION, SOLUTION INTRAMUSCULAR; INTRAVENOUS at 09:42

## 2017-08-01 RX ADMIN — RALOXIFENE HYDROCHLORIDE 60 MG: 60 TABLET, FILM COATED ORAL at 08:49

## 2017-08-01 RX ADMIN — RANOLAZINE 500 MG: 500 TABLET, FILM COATED, EXTENDED RELEASE ORAL at 08:47

## 2017-08-01 RX ADMIN — TICAGRELOR 90 MG: 90 TABLET ORAL at 08:47

## 2017-08-01 RX ADMIN — Medication 30 MG: at 08:47

## 2017-08-01 RX ADMIN — METOPROLOL SUCCINATE 50 MG: 50 TABLET, FILM COATED, EXTENDED RELEASE ORAL at 08:50

## 2017-08-01 RX ADMIN — TORSEMIDE 10 MG: 20 TABLET ORAL at 08:47

## 2017-08-02 VITALS
SYSTOLIC BLOOD PRESSURE: 116 MMHG | RESPIRATION RATE: 17 BRPM | OXYGEN SATURATION: 90 % | DIASTOLIC BLOOD PRESSURE: 73 MMHG | HEIGHT: 64 IN | TEMPERATURE: 98 F | BODY MASS INDEX: 28.53 KG/M2 | HEART RATE: 80 BPM | WEIGHT: 167.11 LBS

## 2017-08-02 PROBLEM — I50.43 HEART FAILURE, ACUTE ON CHRONIC, SYSTOLIC AND DIASTOLIC (HCC): Status: ACTIVE | Noted: 2017-08-02

## 2017-08-02 LAB
ANION GAP SERPL CALCULATED.3IONS-SCNC: 6 MMOL/L (ref 4–13)
BUN SERPL-MCNC: 22 MG/DL (ref 5–25)
CALCIUM SERPL-MCNC: 8.3 MG/DL (ref 8.3–10.1)
CHLORIDE SERPL-SCNC: 100 MMOL/L (ref 100–108)
CO2 SERPL-SCNC: 28 MMOL/L (ref 21–32)
CREAT SERPL-MCNC: 1.32 MG/DL (ref 0.6–1.3)
GFR SERPL CREATININE-BSD FRML MDRD: 39 ML/MIN/1.73SQ M
GLUCOSE SERPL-MCNC: 108 MG/DL (ref 65–140)
POTASSIUM SERPL-SCNC: 3.2 MMOL/L (ref 3.5–5.3)
SODIUM SERPL-SCNC: 134 MMOL/L (ref 136–145)

## 2017-08-02 PROCEDURE — 80048 BASIC METABOLIC PNL TOTAL CA: CPT | Performed by: INTERNAL MEDICINE

## 2017-08-02 RX ORDER — AMLODIPINE BESYLATE 2.5 MG/1
2.5 TABLET ORAL DAILY
Qty: 30 TABLET | Refills: 0 | Status: SHIPPED | OUTPATIENT
Start: 2017-08-02 | End: 2018-07-23 | Stop reason: SDUPTHER

## 2017-08-02 RX ORDER — METOPROLOL SUCCINATE 100 MG/1
100 TABLET, EXTENDED RELEASE ORAL EVERY 24 HOURS
Qty: 30 TABLET | Refills: 0 | Status: SHIPPED | OUTPATIENT
Start: 2017-08-02 | End: 2018-07-23 | Stop reason: SDUPTHER

## 2017-08-02 RX ORDER — METOPROLOL SUCCINATE 50 MG/1
50 TABLET, EXTENDED RELEASE ORAL DAILY
Qty: 30 TABLET | Refills: 0 | Status: SHIPPED | OUTPATIENT
Start: 2017-08-02 | End: 2019-03-01

## 2017-08-02 RX ORDER — POTASSIUM CHLORIDE 14.9 MG/ML
20 INJECTION INTRAVENOUS ONCE
Status: DISCONTINUED | OUTPATIENT
Start: 2017-08-02 | End: 2017-08-02

## 2017-08-02 RX ORDER — POTASSIUM CHLORIDE 20 MEQ/1
40 TABLET, EXTENDED RELEASE ORAL ONCE
Status: COMPLETED | OUTPATIENT
Start: 2017-08-02 | End: 2017-08-02

## 2017-08-02 RX ORDER — EZETIMIBE 10 MG/1
10 TABLET ORAL
Qty: 30 TABLET | Refills: 0 | Status: SHIPPED | OUTPATIENT
Start: 2017-08-02 | End: 2018-01-27

## 2017-08-02 RX ADMIN — HYDROXYCHLOROQUINE SULFATE 200 MG: 200 TABLET, FILM COATED ORAL at 07:55

## 2017-08-02 RX ADMIN — LEVOTHYROXINE SODIUM 50 MCG: 50 TABLET ORAL at 07:51

## 2017-08-02 RX ADMIN — TORSEMIDE 10 MG: 20 TABLET ORAL at 07:51

## 2017-08-02 RX ADMIN — TICAGRELOR 90 MG: 90 TABLET ORAL at 07:53

## 2017-08-02 RX ADMIN — SPIRONOLACTONE 25 MG: 25 TABLET, FILM COATED ORAL at 09:11

## 2017-08-02 RX ADMIN — Medication 30 MG: at 07:51

## 2017-08-02 RX ADMIN — RALOXIFENE HYDROCHLORIDE 60 MG: 60 TABLET, FILM COATED ORAL at 08:50

## 2017-08-02 RX ADMIN — ASPIRIN 81 MG: 81 TABLET, COATED ORAL at 07:54

## 2017-08-02 RX ADMIN — RANOLAZINE 500 MG: 500 TABLET, FILM COATED, EXTENDED RELEASE ORAL at 07:53

## 2017-08-02 RX ADMIN — POTASSIUM CHLORIDE 40 MEQ: 1500 TABLET, EXTENDED RELEASE ORAL at 09:12

## 2017-08-02 RX ADMIN — METOPROLOL SUCCINATE 50 MG: 50 TABLET, FILM COATED, EXTENDED RELEASE ORAL at 11:23

## 2017-08-14 ENCOUNTER — HOSPITAL ENCOUNTER (OUTPATIENT)
Dept: RADIOLOGY | Facility: HOSPITAL | Age: 77
Discharge: HOME | End: 2017-08-14
Attending: INTERNAL MEDICINE
Payer: MEDICARE

## 2017-08-14 ENCOUNTER — APPOINTMENT (OUTPATIENT)
Dept: LAB | Facility: HOSPITAL | Age: 77
End: 2017-08-14
Payer: MEDICARE

## 2017-08-14 ENCOUNTER — TRANSCRIBE ORDERS (OUTPATIENT)
Dept: ADMINISTRATIVE | Facility: HOSPITAL | Age: 77
End: 2017-08-14

## 2017-08-14 DIAGNOSIS — I50.43 HEART FAILURE, ACUTE ON CHRONIC, SYSTOLIC AND DIASTOLIC (HCC): ICD-10-CM

## 2017-08-14 DIAGNOSIS — N18.9 CKD (CHRONIC KIDNEY DISEASE), UNSPECIFIED STAGE: Primary | ICD-10-CM

## 2017-08-14 DIAGNOSIS — N18.9 CKD (CHRONIC KIDNEY DISEASE), UNSPECIFIED STAGE: ICD-10-CM

## 2017-08-14 LAB
ANION GAP SERPL CALCULATED.3IONS-SCNC: 9 MMOL/L (ref 4–13)
BUN SERPL-MCNC: 28 MG/DL (ref 5–25)
CALCIUM SERPL-MCNC: 8.9 MG/DL (ref 8.3–10.1)
CHLORIDE SERPL-SCNC: 101 MMOL/L (ref 100–108)
CO2 SERPL-SCNC: 28 MMOL/L (ref 21–32)
CREAT SERPL-MCNC: 1.8 MG/DL (ref 0.6–1.3)
ERYTHROCYTE [DISTWIDTH] IN BLOOD BY AUTOMATED COUNT: 14.2 % (ref 11.6–15.1)
GFR SERPL CREATININE-BSD FRML MDRD: 27 ML/MIN/1.73SQ M
GLUCOSE SERPL-MCNC: 135 MG/DL (ref 65–140)
HCT VFR BLD AUTO: 38.5 % (ref 37–47)
HGB BLD-MCNC: 12.4 G/DL (ref 12–16)
MCH RBC QN AUTO: 26.3 PG (ref 27–31)
MCHC RBC AUTO-ENTMCNC: 32.1 G/DL (ref 31.4–37.4)
MCV RBC AUTO: 82 FL (ref 82–98)
PLATELET # BLD AUTO: 279 THOUSANDS/UL (ref 130–400)
PMV BLD AUTO: 8.3 FL (ref 8.9–12.7)
POTASSIUM SERPL-SCNC: 3.9 MMOL/L (ref 3.5–5.3)
RBC # BLD AUTO: 4.71 MILLION/UL (ref 4.2–5.4)
SODIUM SERPL-SCNC: 138 MMOL/L (ref 136–145)
WBC # BLD AUTO: 7.7 THOUSAND/UL (ref 4.8–10.8)

## 2017-08-14 PROCEDURE — 71020 HB CHEST X-RAY 2VW FRONTAL&LATL: CPT

## 2017-08-14 PROCEDURE — 85027 COMPLETE CBC AUTOMATED: CPT

## 2017-08-14 PROCEDURE — 80048 BASIC METABOLIC PNL TOTAL CA: CPT

## 2017-08-14 PROCEDURE — 36415 COLL VENOUS BLD VENIPUNCTURE: CPT

## 2017-08-25 ENCOUNTER — ALLSCRIPTS OFFICE VISIT (OUTPATIENT)
Dept: OTHER | Facility: OTHER | Age: 77
End: 2017-08-25

## 2017-08-25 DIAGNOSIS — D64.9 ANEMIA: ICD-10-CM

## 2017-09-01 DIAGNOSIS — I12.9 HYPERTENSIVE CHRONIC KIDNEY DISEASE WITH STAGE 1 THROUGH STAGE 4 CHRONIC KIDNEY DISEASE, OR UNSPECIFIED CHRONIC KIDNEY DISEASE: ICD-10-CM

## 2017-09-01 DIAGNOSIS — E78.00 PURE HYPERCHOLESTEROLEMIA: ICD-10-CM

## 2017-09-01 DIAGNOSIS — E55.9 VITAMIN D DEFICIENCY: ICD-10-CM

## 2017-09-01 DIAGNOSIS — E83.51 HYPOCALCEMIA: ICD-10-CM

## 2017-09-01 DIAGNOSIS — D64.9 ANEMIA: ICD-10-CM

## 2017-09-01 DIAGNOSIS — N18.30 CHRONIC KIDNEY DISEASE, STAGE III (MODERATE) (HCC): ICD-10-CM

## 2017-09-01 DIAGNOSIS — E87.1 HYPO-OSMOLALITY AND HYPONATREMIA: ICD-10-CM

## 2017-09-07 ENCOUNTER — APPOINTMENT (OUTPATIENT)
Dept: CARDIAC REHAB | Facility: CLINIC | Age: 77
End: 2017-09-07
Payer: MEDICARE

## 2017-09-07 DIAGNOSIS — Z98.61 POSTSURGICAL PERCUTANEOUS TRANSLUMINAL CORONARY ANGIOPLASTY STATUS: ICD-10-CM

## 2017-09-07 PROCEDURE — 93797 PHYS/QHP OP CAR RHAB WO ECG: CPT

## 2017-09-11 ENCOUNTER — APPOINTMENT (OUTPATIENT)
Dept: CARDIAC REHAB | Facility: CLINIC | Age: 77
End: 2017-09-11
Payer: MEDICARE

## 2017-09-11 DIAGNOSIS — Z98.61 POSTSURGICAL PERCUTANEOUS TRANSLUMINAL CORONARY ANGIOPLASTY STATUS: ICD-10-CM

## 2017-09-11 PROCEDURE — 93798 PHYS/QHP OP CAR RHAB W/ECG: CPT

## 2017-09-13 ENCOUNTER — APPOINTMENT (OUTPATIENT)
Dept: CARDIAC REHAB | Facility: CLINIC | Age: 77
End: 2017-09-13
Payer: MEDICARE

## 2017-09-13 DIAGNOSIS — Z98.61 POSTSURGICAL PERCUTANEOUS TRANSLUMINAL CORONARY ANGIOPLASTY STATUS: ICD-10-CM

## 2017-09-13 PROCEDURE — 93798 PHYS/QHP OP CAR RHAB W/ECG: CPT

## 2017-09-15 ENCOUNTER — APPOINTMENT (OUTPATIENT)
Dept: CARDIAC REHAB | Facility: CLINIC | Age: 77
End: 2017-09-15
Payer: MEDICARE

## 2017-09-15 DIAGNOSIS — Z98.61 POSTSURGICAL PERCUTANEOUS TRANSLUMINAL CORONARY ANGIOPLASTY STATUS: ICD-10-CM

## 2017-09-15 PROCEDURE — 93798 PHYS/QHP OP CAR RHAB W/ECG: CPT

## 2017-09-18 ENCOUNTER — APPOINTMENT (OUTPATIENT)
Dept: CARDIAC REHAB | Facility: CLINIC | Age: 77
End: 2017-09-18
Payer: MEDICARE

## 2017-09-18 DIAGNOSIS — Z98.61 POSTSURGICAL PERCUTANEOUS TRANSLUMINAL CORONARY ANGIOPLASTY STATUS: ICD-10-CM

## 2017-09-18 PROCEDURE — 93798 PHYS/QHP OP CAR RHAB W/ECG: CPT

## 2017-09-20 ENCOUNTER — APPOINTMENT (OUTPATIENT)
Dept: CARDIAC REHAB | Facility: CLINIC | Age: 77
End: 2017-09-20
Payer: MEDICARE

## 2017-09-20 DIAGNOSIS — Z98.61 PERCUTANEOUS TRANSLUMINAL CORONARY ANGIOPLASTY STATUS: ICD-10-CM

## 2017-09-20 PROCEDURE — 93798 PHYS/QHP OP CAR RHAB W/ECG: CPT

## 2017-09-22 ENCOUNTER — APPOINTMENT (OUTPATIENT)
Dept: CARDIAC REHAB | Facility: CLINIC | Age: 77
End: 2017-09-22
Payer: MEDICARE

## 2017-09-22 DIAGNOSIS — Z98.61 POSTSURGICAL PERCUTANEOUS TRANSLUMINAL CORONARY ANGIOPLASTY STATUS: ICD-10-CM

## 2017-09-22 PROCEDURE — 93798 PHYS/QHP OP CAR RHAB W/ECG: CPT

## 2017-09-25 ENCOUNTER — APPOINTMENT (OUTPATIENT)
Dept: CARDIAC REHAB | Facility: CLINIC | Age: 77
End: 2017-09-25
Payer: MEDICARE

## 2017-09-25 DIAGNOSIS — Z98.61 POSTSURGICAL PERCUTANEOUS TRANSLUMINAL CORONARY ANGIOPLASTY STATUS: ICD-10-CM

## 2017-09-25 PROCEDURE — 93798 PHYS/QHP OP CAR RHAB W/ECG: CPT

## 2017-09-27 ENCOUNTER — APPOINTMENT (OUTPATIENT)
Dept: CARDIAC REHAB | Facility: CLINIC | Age: 77
End: 2017-09-27
Payer: MEDICARE

## 2017-09-27 DIAGNOSIS — Z98.61 POSTSURGICAL PERCUTANEOUS TRANSLUMINAL CORONARY ANGIOPLASTY STATUS: ICD-10-CM

## 2017-09-27 PROCEDURE — 93798 PHYS/QHP OP CAR RHAB W/ECG: CPT

## 2017-09-29 ENCOUNTER — APPOINTMENT (OUTPATIENT)
Dept: CARDIAC REHAB | Facility: CLINIC | Age: 77
End: 2017-09-29
Payer: MEDICARE

## 2017-10-02 ENCOUNTER — APPOINTMENT (OUTPATIENT)
Dept: CARDIAC REHAB | Facility: CLINIC | Age: 77
End: 2017-10-02
Payer: MEDICARE

## 2017-10-02 DIAGNOSIS — Z98.61 POSTSURGICAL PERCUTANEOUS TRANSLUMINAL CORONARY ANGIOPLASTY STATUS: ICD-10-CM

## 2017-10-02 PROCEDURE — 93798 PHYS/QHP OP CAR RHAB W/ECG: CPT

## 2017-10-04 ENCOUNTER — APPOINTMENT (OUTPATIENT)
Dept: CARDIAC REHAB | Facility: CLINIC | Age: 77
End: 2017-10-04
Payer: MEDICARE

## 2017-10-04 DIAGNOSIS — Z98.61 POSTSURGICAL PERCUTANEOUS TRANSLUMINAL CORONARY ANGIOPLASTY STATUS: ICD-10-CM

## 2017-10-04 PROCEDURE — 93798 PHYS/QHP OP CAR RHAB W/ECG: CPT

## 2017-10-06 ENCOUNTER — APPOINTMENT (OUTPATIENT)
Dept: CARDIAC REHAB | Facility: CLINIC | Age: 77
End: 2017-10-06
Payer: MEDICARE

## 2017-10-06 DIAGNOSIS — Z98.61 POSTSURGICAL PERCUTANEOUS TRANSLUMINAL CORONARY ANGIOPLASTY STATUS: ICD-10-CM

## 2017-10-06 PROCEDURE — 93798 PHYS/QHP OP CAR RHAB W/ECG: CPT

## 2017-10-09 ENCOUNTER — APPOINTMENT (OUTPATIENT)
Dept: LAB | Facility: CLINIC | Age: 77
End: 2017-10-09
Payer: MEDICARE

## 2017-10-09 ENCOUNTER — APPOINTMENT (OUTPATIENT)
Dept: CARDIAC REHAB | Facility: CLINIC | Age: 77
End: 2017-10-09
Payer: MEDICARE

## 2017-10-09 DIAGNOSIS — E83.51 HYPOCALCEMIA: ICD-10-CM

## 2017-10-09 DIAGNOSIS — I12.9 HYPERTENSIVE CHRONIC KIDNEY DISEASE WITH STAGE 1 THROUGH STAGE 4 CHRONIC KIDNEY DISEASE, OR UNSPECIFIED CHRONIC KIDNEY DISEASE: ICD-10-CM

## 2017-10-09 DIAGNOSIS — E55.9 VITAMIN D DEFICIENCY: ICD-10-CM

## 2017-10-09 DIAGNOSIS — E87.1 HYPO-OSMOLALITY AND HYPONATREMIA: ICD-10-CM

## 2017-10-09 DIAGNOSIS — D64.9 ANEMIA: ICD-10-CM

## 2017-10-09 DIAGNOSIS — Z98.61 POSTSURGICAL PERCUTANEOUS TRANSLUMINAL CORONARY ANGIOPLASTY STATUS: ICD-10-CM

## 2017-10-09 DIAGNOSIS — N18.30 CHRONIC KIDNEY DISEASE, STAGE III (MODERATE) (HCC): ICD-10-CM

## 2017-10-09 DIAGNOSIS — E78.00 PURE HYPERCHOLESTEROLEMIA: ICD-10-CM

## 2017-10-09 LAB
ALBUMIN SERPL BCP-MCNC: 3.6 G/DL (ref 3.5–5)
ALP SERPL-CCNC: 65 U/L (ref 46–116)
ALT SERPL W P-5'-P-CCNC: 26 U/L (ref 12–78)
ANION GAP SERPL CALCULATED.3IONS-SCNC: 9 MMOL/L (ref 4–13)
AST SERPL W P-5'-P-CCNC: 25 U/L (ref 5–45)
BASOPHILS # BLD AUTO: 0.02 THOUSANDS/ΜL (ref 0–0.1)
BASOPHILS NFR BLD AUTO: 0 % (ref 0–1)
BILIRUB SERPL-MCNC: 0.58 MG/DL (ref 0.2–1)
BUN SERPL-MCNC: 21 MG/DL (ref 5–25)
CALCIUM SERPL-MCNC: 8.8 MG/DL (ref 8.3–10.1)
CHLORIDE SERPL-SCNC: 104 MMOL/L (ref 100–108)
CHOLEST SERPL-MCNC: 182 MG/DL (ref 50–200)
CO2 SERPL-SCNC: 27 MMOL/L (ref 21–32)
CREAT SERPL-MCNC: 1.54 MG/DL (ref 0.6–1.3)
CREAT UR-MCNC: 36.9 MG/DL
EOSINOPHIL # BLD AUTO: 0.1 THOUSAND/ΜL (ref 0–0.61)
EOSINOPHIL NFR BLD AUTO: 1 % (ref 0–6)
ERYTHROCYTE [DISTWIDTH] IN BLOOD BY AUTOMATED COUNT: 14.4 % (ref 11.6–15.1)
FERRITIN SERPL-MCNC: 209 NG/ML (ref 8–388)
GFR SERPL CREATININE-BSD FRML MDRD: 32 ML/MIN/1.73SQ M
GLUCOSE P FAST SERPL-MCNC: 97 MG/DL (ref 65–99)
HCT VFR BLD AUTO: 35.3 % (ref 34.8–46.1)
HDLC SERPL-MCNC: 55 MG/DL (ref 40–60)
HGB BLD-MCNC: 11.4 G/DL (ref 11.5–15.4)
IRON SATN MFR SERPL: 22 %
IRON SERPL-MCNC: 84 UG/DL (ref 50–170)
LDLC SERPL CALC-MCNC: 92 MG/DL (ref 0–100)
LYMPHOCYTES # BLD AUTO: 1.57 THOUSANDS/ΜL (ref 0.6–4.47)
LYMPHOCYTES NFR BLD AUTO: 20 % (ref 14–44)
MAGNESIUM SERPL-MCNC: 2.4 MG/DL (ref 1.6–2.6)
MCH RBC QN AUTO: 26.8 PG (ref 26.8–34.3)
MCHC RBC AUTO-ENTMCNC: 32.3 G/DL (ref 31.4–37.4)
MCV RBC AUTO: 83 FL (ref 82–98)
MONOCYTES # BLD AUTO: 0.57 THOUSAND/ΜL (ref 0.17–1.22)
MONOCYTES NFR BLD AUTO: 7 % (ref 4–12)
NEUTROPHILS # BLD AUTO: 5.74 THOUSANDS/ΜL (ref 1.85–7.62)
NEUTS SEG NFR BLD AUTO: 72 % (ref 43–75)
NRBC BLD AUTO-RTO: 0 /100 WBCS
PHOSPHATE SERPL-MCNC: 3.2 MG/DL (ref 2.3–4.1)
PLATELET # BLD AUTO: 232 THOUSANDS/UL (ref 149–390)
PMV BLD AUTO: 10.8 FL (ref 8.9–12.7)
POTASSIUM SERPL-SCNC: 3.7 MMOL/L (ref 3.5–5.3)
PROT SERPL-MCNC: 7.2 G/DL (ref 6.4–8.2)
PROT UR-MCNC: <6 MG/DL
PROT/CREAT UR: <0.16 MG/G{CREAT} (ref 0–0.1)
PTH-INTACT SERPL-MCNC: 109 PG/ML (ref 14–72)
RBC # BLD AUTO: 4.26 MILLION/UL (ref 3.81–5.12)
SODIUM SERPL-SCNC: 140 MMOL/L (ref 136–145)
TIBC SERPL-MCNC: 379 UG/DL (ref 250–450)
TRIGL SERPL-MCNC: 177 MG/DL
WBC # BLD AUTO: 8.03 THOUSAND/UL (ref 4.31–10.16)

## 2017-10-09 PROCEDURE — 82728 ASSAY OF FERRITIN: CPT

## 2017-10-09 PROCEDURE — 83540 ASSAY OF IRON: CPT

## 2017-10-09 PROCEDURE — 93798 PHYS/QHP OP CAR RHAB W/ECG: CPT

## 2017-10-09 PROCEDURE — 80061 LIPID PANEL: CPT

## 2017-10-09 PROCEDURE — 83550 IRON BINDING TEST: CPT

## 2017-10-09 PROCEDURE — 83970 ASSAY OF PARATHORMONE: CPT

## 2017-10-09 PROCEDURE — 85025 COMPLETE CBC W/AUTO DIFF WBC: CPT

## 2017-10-09 PROCEDURE — 84156 ASSAY OF PROTEIN URINE: CPT

## 2017-10-09 PROCEDURE — 82570 ASSAY OF URINE CREATININE: CPT

## 2017-10-09 PROCEDURE — 84100 ASSAY OF PHOSPHORUS: CPT

## 2017-10-09 PROCEDURE — 80053 COMPREHEN METABOLIC PANEL: CPT

## 2017-10-09 PROCEDURE — 36415 COLL VENOUS BLD VENIPUNCTURE: CPT

## 2017-10-09 PROCEDURE — 83735 ASSAY OF MAGNESIUM: CPT

## 2017-10-11 ENCOUNTER — APPOINTMENT (OUTPATIENT)
Dept: CARDIAC REHAB | Facility: CLINIC | Age: 77
End: 2017-10-11
Payer: MEDICARE

## 2017-10-11 DIAGNOSIS — Z98.61 POSTSURGICAL PERCUTANEOUS TRANSLUMINAL CORONARY ANGIOPLASTY STATUS: ICD-10-CM

## 2017-10-11 PROCEDURE — 93798 PHYS/QHP OP CAR RHAB W/ECG: CPT

## 2017-10-13 ENCOUNTER — APPOINTMENT (OUTPATIENT)
Dept: CARDIAC REHAB | Facility: CLINIC | Age: 77
End: 2017-10-13
Payer: MEDICARE

## 2017-10-16 ENCOUNTER — APPOINTMENT (OUTPATIENT)
Dept: CARDIAC REHAB | Facility: CLINIC | Age: 77
End: 2017-10-16
Payer: MEDICARE

## 2017-10-16 DIAGNOSIS — Z98.61 POSTSURGICAL PERCUTANEOUS TRANSLUMINAL CORONARY ANGIOPLASTY STATUS: ICD-10-CM

## 2017-10-16 PROCEDURE — 93798 PHYS/QHP OP CAR RHAB W/ECG: CPT

## 2017-10-18 ENCOUNTER — APPOINTMENT (OUTPATIENT)
Dept: CARDIAC REHAB | Facility: CLINIC | Age: 77
End: 2017-10-18
Payer: MEDICARE

## 2017-10-18 DIAGNOSIS — Z98.61 POSTSURGICAL PERCUTANEOUS TRANSLUMINAL CORONARY ANGIOPLASTY STATUS: ICD-10-CM

## 2017-10-18 PROCEDURE — 93798 PHYS/QHP OP CAR RHAB W/ECG: CPT

## 2017-10-20 ENCOUNTER — APPOINTMENT (OUTPATIENT)
Dept: CARDIAC REHAB | Facility: CLINIC | Age: 77
End: 2017-10-20
Payer: MEDICARE

## 2017-10-20 DIAGNOSIS — Z98.61 POSTSURGICAL PERCUTANEOUS TRANSLUMINAL CORONARY ANGIOPLASTY STATUS: ICD-10-CM

## 2017-10-20 PROCEDURE — 93798 PHYS/QHP OP CAR RHAB W/ECG: CPT

## 2017-10-23 ENCOUNTER — APPOINTMENT (OUTPATIENT)
Dept: CARDIAC REHAB | Facility: CLINIC | Age: 77
End: 2017-10-23
Payer: MEDICARE

## 2017-10-23 DIAGNOSIS — Z98.61 POSTSURGICAL PERCUTANEOUS TRANSLUMINAL CORONARY ANGIOPLASTY STATUS: ICD-10-CM

## 2017-10-23 PROCEDURE — 93798 PHYS/QHP OP CAR RHAB W/ECG: CPT

## 2017-10-24 NOTE — PROGRESS NOTES
Assessment  1  Chronic kidney disease, stage 3 (585 3) (N18 3)   2  Hypertension (401 9) (I10)   3  Hyponatremia (276 1) (E87 1)   4  Anemia (285 9) (D64 9)   5  Hypercholesterolemia (272 0) (E78 00)    Plan  Anemia    · (1) BASIC METABOLIC PROFILE; Status:Active; Requested SHA:[id number];    Perform:[facility]; Due:99Nuk5199; Ordered; Xena Rioszer; Ordered By:Elizabeth Funez;   · (1) CBC/ PLT (NO DIFF); Status:Active; Requested SFR:[id number];    Perform:[facility]; Due:90Aeu8509; Ordered; Xena Bolaños; Ordered By:Deven Funez;   · (1) FERRITIN; Status:Active; Requested [id number];    Perform:[facility]; Due:79Naa4332; Last Updated By:Ramesh Saleem; 2017 9:14:17 AM;Ordered; Xena Bolaños; Ordered By:Elizabeth Funez;   · (1) IRON SATURATION %, TIBC; Status:Active; Requested TCW:[id number];    Perform:[facility]; Due:88Adu9510; Ordered; Xena Rioszer; Ordered By:Deven Funez;  Hypertension    · Ezetimibe 10 MG Oral Tablet; TAKE 1 TABLET AT BEDTIME   Rx By: Cony So; Dispense: 90 Days ; #:90 Tablet; Refill: 0;For: Hypertension; TOBY = N; Verified Transmission to [address] [place] [address]; Last Updated By: System, [employer]; 2017 8:44:06 PM   · From  Metoprolol Succinate ER 50 MG Oral Tablet Extended Release 24 Hour  Take one tablet in the am and two tablets in the pm To Metoprolol Succinate ER  50 MG Oral Tablet Extended Release 24 Hour take two tablets in the AM and one tablet in  the PM   Rx By: Cony So; Dispense: 90 Days ; #:270 Tablet Extended Release 24 Hour; Refill: 0;For: Hypertension; TOBY = N; Record      1  Current medications: Amlodipine 2 5 mg daily, metoprolol 100 mg in the a m  and 50 mg in the p m , Lisinopril 5 mg daily, Aldactone three days a week, torsemide 10 mg daily  2   Please call if any new symptoms such as dizziness upon standing  3   Please have BMP done/blood work in approximately 3-4 weeks for follow-up  4    Please call if blood pressure declines any further  You will have frequent monitoring during cardiac rehabilitation  Discussion/Summary    #1  Chronic kidney disease stage III  Baseline creatinine has been between 1 5-1 8 this year  In August creatinine was up to 1 8 this was following hospitalization for acute MI which she had on July 30  Following that she was hospitalized at [facility] for acute CHF and creatinine was 1 4 on discharge August 5, 2017to closely monitor renal functionlabs in mid September and before next appointment in November  Hypertension  The pressure well controlled  Several medications have been changed following recent hospitalization/discharge  Dr Anca Lala also closely monitoring patient's cardiorenal statuschange in medications at this time: On amlodipine 2 5 mg daily, Lisinopril 5 mg daily, Metoprolol /50, Aldactone 25 mg every Monday, Wednesday, Friday  Torsemide 10 mg daily? recently reduced from 20 mg dailyto call if she has any new symptoms will be closely monitored at cardiac rehabilitation which she will be starting in a few weeks  Anemia : Microcytic we will check ferritin and iron saturation with next labs  Volume status: Euvolemic  Electrolytes: Check labs in two weeks  Bone mineral disease: Check PTH, phosphorus before next appointment in November  CAD/recent MI/CABG: Recent placement of XOCHILT, PCIstress test  Patient cleared to start cardiac rehabilitation next month  CHF: Ejection fraction 02% grade 2 diastolic dysfunction  Compensated  Continue diuretics  Hyperlipidemia: Continue statin  Hypothyroidism, rheumatoid arthritis, osteoporosis, recent biopsy of pancreatic mass?benign lesion, mild hyponatremia? resolved  Labs next month and before next appointment with Dr Sherita Gabriel in November      The patient, patient's family was counseled regarding diagnostic results,-- instructions for management,-- risk factor reductions,-- prognosis,-- patient and family education,-- impressions,-- importance of compliance with treatment  total time of encounter was 40 minutes-- and-- 15 minutes was spent counseling  The patient has the current Goals: Maintained stable renal function  Patient is able to Self-Care  Possible side effects of new medications were reviewed with the patient/guardian today  The treatment plan was reviewed with the patient/guardian  The patient/guardian understands and agrees with the treatment plan   CKD Teaching includes hypertension management, Avoid nephrotoxic medication, Lipid Management, sodium restriction and fluid management  Current Patient Status: no worsening of renal function  Reason For Visit  Follow-up following hospitalization      History of Present Illness  The patient is a very does not 65-year-old woman with a history of CKD, hypertension and coronary artery disease with prior MI and CABG  She also has a history of hypothyroidism and pancreatic cysts  She recently sustained myocardial infarction on July 30  It was noted that she had occlusion of vein graft to the obtuse marginal, and she is now status post thrombectomy/drug-eluting stent to OM1  LVEDP was normal on cardiac cath  Following hospital discharge she was readmitted to [facility] within several days for acute CHF  She recently followed up with her cardiologist Dr Rose Care  She had a stress test last Wednesday and is now cleared to start cardiac rehabilitation on September 6 presents today with her   She is doing well  She is beginning to resume activities of daily living  She has no chest pain or unusual shortness of breath  Significant lower extremity edema  No nausea or vomiting  No fevers or chills      Review of Systems    Constitutional: fatigue, but-- no fever,-- no chills,-- as noted in HPI-- and-- no anorexia  Integumentary: No complaints of skin rash  Gastrointestinal: No complains of abdominal pain, no constipation or diarrhea, no nausea or vomiting     Respiratory: No complaints of shortness of breath, no cough, no productive sputum  Cardiovascular: no orthopnea,-- no chest pain,-- no palpitations-- and-- no lower extremity edema  Musculoskeletal: No complaints of joint pain or swelling  Neurological: No complaints of headache, no lightheadedness or dizziness  Genitourinary: No dysuria, no hematuria, no nocturia, no urinary frequency, no incomplete emptying of bladder, no foamy urine  Eyes: No complaints of eyesight problems or dryness of eyes  ENT: no complaints of hearing loss, no nasal discharge  Psychiatric: Not suicidal, no sleep disturbance, no anxiety or depression, no change in personality, no emotional problems  Past Medical History    The active problems and past medical history were reviewed and updated today  Surgical History    The surgical history was reviewed and updated today  Family History    The family history was reviewed and updated today  Social History  The social history was reviewed and updated today  The social history was reviewed and is unchanged  Current Meds   1  Acidophilus Oral Tablet; 1/2 tablet daily; Therapy: 58MRZ0111 to Recorded   2  AmLODIPine Besylate 5 MG Oral Tablet; TAKE 1 TAB IN THE AM, AND 1TAB IN THE PM;   Therapy: 24WGH8162 to (Evaluate:03Jan2018)  Requested for: 99RZC8408; Last   Rx:11Kio2421 Ordered   3  Co Q 10 100 MG Oral Capsule; Take 1 tablet daily; Therapy: 55GBJ0550 to Recorded   4  Ecotrin Low Strength 81 MG Oral Tablet Delayed Release; Therapy: (Lenor Duverney) to Recorded   5  Evista 60 MG Oral Tablet; TAKE 1 TABLET DAILY AS DIRECTED; Therapy: (Lenor Duverney) to Recorded   6  Hydroxychloroquine Sulfate 200 MG Oral Tablet; TAKE 1 TABLET DAILY; Therapy: (Recorded:13Jan2015) to Recorded   7  Isosorbide Mononitrate ER 60 MG Oral Tablet Extended Release 24 Hour; TAKE 1   TABLET DAILY AS DIRECTED; Therapy: (Lenor Duverney) to Recorded   8   Levoxyl 50 MCG Oral Tablet; TAKE 1 TABLET DAILY AS DIRECTED; Therapy: (Tarun Cantu) to Recorded   9  Metoprolol Succinate ER 50 MG Oral Tablet Extended Release 24 Hour; Take one tablet   in the am and two tablets in the pm;   Therapy: (Tarun Cantu) to Recorded   10  Multi-Vitamin TABS; TAKE 1 TABLET DAILY; Therapy: (Recorded:13Jan2015) to Recorded   11  Nitrostat 0 4 MG Sublingual Tablet Sublingual; DISSOLVE 1 TABLET UNDER THE    TONGUE AS NEEDED FOR CHEST PAIN;    Therapy: 21JTF5432 to Recorded   12  Ranexa 500 MG Oral Tablet Extended Release 12 Hour; take one tablet once daily; Therapy: (Tarun Cantu) to Recorded   13  Red Yeast Rice 600 MG Oral Tablet; Take 1 tablet twice daily; Therapy: 95LUU9456 to (Evaluate:37Gju3542); Last Rx:13Jan2017 Ordered   14  Red Yeast Rice 600 MG Oral Tablet; Take 1 tablet twice daily; Therapy: 03PNK3642 to (Evaluate:16Jun2017); Last Rx:07Ejn0964 Ordered   15  Spironolactone 25 MG Oral Tablet; TAKE 1 TABLET  EVERY OTHER DAY; Therapy: 64TFJ6117 to (Evaluate:52Tpe7372)  Requested for: 72OYO4887; Last    Rx:43Dly8014 Ordered   16  Torsemide 10 MG Oral Tablet; TAKE 1 TABLET DAILY; Therapy: 95Gbw5297 to (Evaluate:75Qlz5293)  Requested for: 16Lwo2741; Last    Rx:55Ory6115 Ordered   17  Vitamin C 100 MG Oral Tablet; TAKE 1 TABLET DAILY AS DIRECTED; Therapy: 15PNU5750 to Recorded   18  Vitamin D3 2000 UNIT Oral Tablet; Take 2 tablets daily; Therapy: (Recorded:15Jan2014) to Recorded    The medication list was reviewed and updated today  Allergies  1  Boniva TABS   2  Lipitor TABS   3  Codeine Derivatives    Vitals  Vital Signs    ** Printed in Appendix #1 below  Physical Exam    Constitutional: General appearance: No acute distress, well appearing and well nourished  ENT: External ears and nose appear normal      Eyes: Anicteric sclerae  Neck: No bruit heard over either carotid  JVD:  No JVD present     Pulmonary: Respiratory effort: No increased work of breathing or signs of respiratory distress  -- Auscultation of lungs: Clear to auscultation  Cardiovascular: Auscultation of heart: Normal rate and rhythm, normal S1 and S2, without murmurs  Abdomen: Non-tender, no masses  Extremities: No cyanosis, clubbing or edema  Pulses: Dorsalis Pedis and Posterior Tibial pulses normal    Rash: No rash present  Neurologic: Non Focal      Psychiatric: Orientation to person, place, and time: Normal  -- and-- Mood and affect: Normal     Back: No CVA tenderness  Results/Data  (1) CBC/ PLT (NO DIFF) 05ADT4577 11:05AM EPIC, Provider   Test ordered by: [employer]     Test Name Result Flag Reference   HEMATOCRIT 38 5 %  37 0-47 0   HEMOGLOBIN 12 4 g/dL  12 0-16 0   MCHC 32 1 g/dL  31 4-37 4   MCH 26 3 pg L 27 0-31 0   MCV 82 fL  82-98   PLATELET COUNT 007 Thousands/uL  130-400   RBC COUNT 4 71 Million/uL  4 20-5  40   RDW 14 2 %  11 6-15 1   WBC COUNT 7 70 Thousand/uL  4 80-10 80   MPV 8 3 fL L 8 9-12 7     (1) BASIC METABOLIC PROFILE 75DYF8389 11:05AM EPIC, Provider   Test ordered by: [employer]     Test Name Result Flag Reference   GLUCOSE,RANDM 135 mg/dL     If the patient is fasting, the ADA then defines impaired fasting glucose as > 100 mg/dL and diabetes as > or equal to 123 mg/dL  Specimen collection should occur prior to Sulfasalazine administration due to the potential for falsely depressed results  Specimen collection should occur prior to Sulfapyridine administration due to the potential for falsely elevated results     SODIUM 138 mmol/L  136-145   POTASSIUM 3 9 mmol/L  3 5-5 3   CHLORIDE 101 mmol/L  100-108   CARBON DIOXIDE 28 mmol/L  21-32   ANION GAP (CALC) 9 mmol/L  4-13   BLOOD UREA NITROGEN 28 mg/dL H 5-25   CREATININE 1 80 mg/dL H 0 60-1 30   Standardized to IDMS reference method   CALCIUM 8 9 mg/dL  8 3-10 1   eGFR 27 ml/min/1 73sq m     National Kidney Disease Education Program recommendations are as follows:  GFR calculation is accurate only with a steady state creatinine  Chronic Kidney disease less than 60 ml/min/1 73 sq  meters  Kidney failure less than 15 ml/min/1 73 sq  meters  * XR CHEST PA & LATERAL [id number] 11:04AM Jesusita Rincon     Test Name Result Flag Reference   XR CHEST PA & LATERAL (Report)     CHEST      INDICATION: Chronic kidney disease  COMPARISON: 2017     VIEWS: Frontal and lateral projections     IMAGES: 2     FINDINGS:        Cardiomediastinal silhouette appears unremarkable  A median sternotomy has been performed  The lungs are clear  No pneumothorax or pleural effusion  No evidence of heart failure  Visualized osseous structures appear within normal limits for the patient's age  IMPRESSION:     No active pulmonary disease  Previously seen heart failure has cleared  Workstation performed: [id number]     Signed by:   Stephanie Ochoa MD   8/15/17       Future Appointments    Date/Time Provider Specialty Site   2017 03:00 PM RAMANA Hamilton   Nephrology [facility] [address]     Signatures   Electronically signed by : Curtis Shi NPAug 25 2017  8:54PM EST                       (Author)    Electronically signed by : RAMANA Dickens ; Aug 28 2017  4:01PM EST                          Appendix #1     Vital Signs   Patient: Kathryn West ; : 1940; MRN: [id number]      Recorded: 06Bor1121 08:54AM Recorded: 91Jyu3355 08:50AM Recorded: 44Rsj7166 08:42AM   Heart Rate  60, Apical     Pulse Quality  Regular, Apical     Systolic 769, LUE, Standing 128, LUE, Sitting 120, RUE, Sitting    Diastolic 64, LUE, Standing 64, LUE, Sitting 68, RUE, Sitting    Height    5 ft 4 in   Weight    160 lb    BMI Calculated    27 46   BSA Calculated    1 78

## 2017-10-25 ENCOUNTER — APPOINTMENT (OUTPATIENT)
Dept: CARDIAC REHAB | Facility: CLINIC | Age: 77
End: 2017-10-25
Payer: MEDICARE

## 2017-10-25 DIAGNOSIS — Z98.61 POSTSURGICAL PERCUTANEOUS TRANSLUMINAL CORONARY ANGIOPLASTY STATUS: ICD-10-CM

## 2017-10-25 PROCEDURE — 93798 PHYS/QHP OP CAR RHAB W/ECG: CPT

## 2017-10-27 ENCOUNTER — APPOINTMENT (OUTPATIENT)
Dept: CARDIAC REHAB | Facility: CLINIC | Age: 77
End: 2017-10-27
Payer: MEDICARE

## 2017-10-27 DIAGNOSIS — Z98.61 POSTSURGICAL PERCUTANEOUS TRANSLUMINAL CORONARY ANGIOPLASTY STATUS: ICD-10-CM

## 2017-10-27 PROCEDURE — 93798 PHYS/QHP OP CAR RHAB W/ECG: CPT

## 2017-10-30 ENCOUNTER — APPOINTMENT (OUTPATIENT)
Dept: CARDIAC REHAB | Facility: CLINIC | Age: 77
End: 2017-10-30
Payer: MEDICARE

## 2017-10-30 DIAGNOSIS — Z98.61 POSTSURGICAL PERCUTANEOUS TRANSLUMINAL CORONARY ANGIOPLASTY STATUS: ICD-10-CM

## 2017-10-30 PROCEDURE — 93798 PHYS/QHP OP CAR RHAB W/ECG: CPT

## 2017-11-01 ENCOUNTER — APPOINTMENT (OUTPATIENT)
Dept: CARDIAC REHAB | Facility: CLINIC | Age: 77
End: 2017-11-01
Payer: MEDICARE

## 2017-11-01 DIAGNOSIS — Z98.61 POSTSURGICAL PERCUTANEOUS TRANSLUMINAL CORONARY ANGIOPLASTY STATUS: ICD-10-CM

## 2017-11-01 PROCEDURE — 93798 PHYS/QHP OP CAR RHAB W/ECG: CPT

## 2017-11-03 ENCOUNTER — APPOINTMENT (OUTPATIENT)
Dept: CARDIAC REHAB | Facility: CLINIC | Age: 77
End: 2017-11-03
Payer: MEDICARE

## 2017-11-03 DIAGNOSIS — Z98.61 POSTSURGICAL PERCUTANEOUS TRANSLUMINAL CORONARY ANGIOPLASTY STATUS: ICD-10-CM

## 2017-11-03 PROCEDURE — 93798 PHYS/QHP OP CAR RHAB W/ECG: CPT

## 2017-11-06 ENCOUNTER — APPOINTMENT (OUTPATIENT)
Dept: CARDIAC REHAB | Facility: CLINIC | Age: 77
End: 2017-11-06
Payer: MEDICARE

## 2017-11-06 DIAGNOSIS — Z98.61 POSTSURGICAL PERCUTANEOUS TRANSLUMINAL CORONARY ANGIOPLASTY STATUS: ICD-10-CM

## 2017-11-06 PROCEDURE — 93798 PHYS/QHP OP CAR RHAB W/ECG: CPT

## 2017-11-08 ENCOUNTER — APPOINTMENT (OUTPATIENT)
Dept: CARDIAC REHAB | Facility: CLINIC | Age: 77
End: 2017-11-08
Payer: MEDICARE

## 2017-11-08 ENCOUNTER — ALLSCRIPTS OFFICE VISIT (OUTPATIENT)
Dept: OTHER | Facility: OTHER | Age: 77
End: 2017-11-08

## 2017-11-08 DIAGNOSIS — Z98.61 POSTSURGICAL PERCUTANEOUS TRANSLUMINAL CORONARY ANGIOPLASTY STATUS: ICD-10-CM

## 2017-11-08 PROCEDURE — 93798 PHYS/QHP OP CAR RHAB W/ECG: CPT

## 2017-11-10 ENCOUNTER — APPOINTMENT (OUTPATIENT)
Dept: CARDIAC REHAB | Facility: CLINIC | Age: 77
End: 2017-11-10
Payer: MEDICARE

## 2017-11-10 DIAGNOSIS — Z98.61 POSTSURGICAL PERCUTANEOUS TRANSLUMINAL CORONARY ANGIOPLASTY STATUS: ICD-10-CM

## 2017-11-10 PROCEDURE — 93798 PHYS/QHP OP CAR RHAB W/ECG: CPT

## 2017-11-10 NOTE — PROGRESS NOTES
Assessment    1  Chronic kidney disease, stage 3 (585 3) (N18 3)   2  Benign hypertension with chronic kidney disease, stage III (403 10,585 3) (I12 9,N18 3)   3  Anemia (285 9) (D64 9)   4  Hyponatremia (276 1) (E87 1)   5  Hypercholesterolemia (272 0) (E78 00)   6  Vitamin D deficiency (268 9) (E55 9)    Plan  Anemia, Benign hypertension with chronic kidney disease, stage III, Chronic kidneydisease, stage 3, Hypercholesterolemia, Hyponatremia, Vitamin D deficiency    · (1) CBC/PLT/DIFF; Status:Active; Requested ALICIA:[id number]; Perform:[facility]; BZM:[id number];PKRWDWP; For:Anemia, Benign hypertension with chronic kidney disease, stage III, Chronic kidney disease, stage 3, Hypercholesterolemia, Hyponatremia, Vitamin D deficiency; Ordered By:Amira Marquez;   · (1) COMPREHENSIVE METABOLIC PANEL; Status:Active; Requested TFI:[id number]; Perform:[facility]; XIR:[id number];PCNVPJG; For:Anemia, Benign hypertension with chronic kidney disease, stage III, Chronic kidney disease, stage 3, Hypercholesterolemia, Hyponatremia, Vitamin D deficiency; Ordered By:Amira Marquez;   · (1) FERRITIN; Status:Active; Requested NJU:[id number]; Perform:[facility]; YXV:[id number];MWSUPHT; For:Anemia, Benign hypertension with chronic kidney disease, stage III, Chronic kidney disease, stage 3, Hypercholesterolemia, Hyponatremia, Vitamin D deficiency; Ordered By:Rob Marquez;   · (1) IRON SATURATION %, TIBC; Status:Active; Requested KRISTA:[id number]; Perform:[facility]; MVV:[id number];FMKFOZQ;JIBJLK hypertension with chronic kidney disease, stage III, Chronic kidney disease, stage 3, Hypercholesterolemia, Hyponatremia, Vitamin D deficiency; Ordered By:Rob Marquez;   · (1) LIPID PANEL FASTING W DIRECT LDL REFLEX; Status:Active; Requestedfor:01Mar2018; Perform:[facility]; PZS:[id number];HKEQT; For:Anemia, Benign hypertension with chronic kidney disease, stage III, Chronic kidney disease, stage 3, Hypercholesterolemia, Hyponatremia, Vitamin D deficiency; Ordered By:Jacobs, Satira Schaumann;   · (1) MAGNESIUM; Status:Active; Requested NBW:[id number]; Perform:[facility]; HWY:[id number];KURBPTF; For:Anemia, Benign hypertension with chronic kidney disease, stage III, Chronic kidney disease, stage 3, Hypercholesterolemia, Hyponatremia, Vitamin D deficiency; Ordered By:Jacobs, Satira Schaumann;   · (1) PHOSPHORUS; Status:Active; Requested USN:[id number]; Perform:[facility]; BML:[id number];HDFJJPZ; For:Anemia, Benign hypertension with chronic kidney disease, stage III, Chronic kidney disease, stage 3, Hypercholesterolemia, Hyponatremia, Vitamin D deficiency; Ordered By:Rob Marquez;   · (1) PTH N-TERMINAL (INTACT); Status:Active; Requested BWC:[id number]; Perform:[facility]; DKH:40PXF1293;XVLDYEP; For:Anemia, Benign hypertension with chronic kidney disease, stage III, Chronic kidney disease, stage 3, Hypercholesterolemia, Hyponatremia, Vitamin D deficiency; Ordered By:Rob Marquez;   · (1) URINE PROTEIN CREATININE RATIO; Status:Active; Requested VKL:[id number]; Perform:[facility]; GWH:[id number];HGJPMST; For:Anemia, Benign hypertension with chronic kidney disease, stage III, Chronic kidney disease, stage 3, Hypercholesterolemia, Hyponatremia, Vitamin D deficiency; Ordered By:Jacobs, Satira Schaumann;   · (1) VITAMIN D 25-HYDROXY; Status:Active; Requested PQI:[id number]; Perform:[facility]; MYT:23KAK8523;NDTSEDI;YJMXWC hypertension with chronic kidney disease, stage III, Chronic kidney disease, stage 3, Hypercholesterolemia, Hyponatremia, Vitamin D deficiency; Ordered By:Jacobs, Satira Schaumann;   · Follow-up visit in 4 Months Evaluation and Treatment  Follow-up  Status: Hold For -Scheduling  Requested for: 13MHL5103   Ordered; Anemia, Benign hypertension with chronic kidney disease, stage III, Chronic kidney disease, stage 3, Hypercholesterolemia, Hyponatremia, Vitamin D deficiency; Ordered By: Ab Bennett Performed:  Due: 44STL4315  1  No medication changes today  2   Follow up in 4 months with the lab work is ordered  3   At your next appointment bring in 1 week a blood pressure readings morning and evening, sitting and standing  Also bring in your blood pressure machine for correlation with the office machine  4   General recommendations: -avoid salt -avoid medications such as Motrin, Naprosyn, Aleve or Advil or ibuprofen  You can use Tylenol as needed  -please call if he developed pending worsening leg swelling or shortness of Breath, or dizziness or lightheadedness   Discussion/Summary    #1  Chronic kidney disease stage III  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  Renal function remains stable despite the intervention at 1 54 mg/dL; she has insignificant proteinuria  The mainstay of therapy remains good hypertensive control /good dyslipidemic control  Hypertension: Blood pressure averages at home are doing well  She slightly low normal today but tolerating it  Given severe cardiac disease it would appear she requires all of her medications  I will therefore not make any adjustments  Continue to monitor at home  Electrolytes  Potassium good at 3  7  Volume status  Euvolemic on current torsemide  Anemia  Stable at 11 4, along with adequate iron studies   Bone mineral disease  Acceptable  Dyslipidemia  Intolerant of statins and WelChol because of constipation  Her lipid profile is actually markedly better  Continue diet  CAD status post CABG/MI Ã2: With recent intervention as outlined above  On medical therapy per Dr Osmar Cheek  Other issues:status post partial thyroidectomy for benign lesionsymptoms per rheumatologycyst follow closely by GI  The patient was counseled regarding diagnostic results,-- instructions for management,-- risk factor reductions,-- impressions,-- importance of compliance with treatment     The patient has the current Goals: Monitor blood pressure at homesalt and stay active  Patient is able to Self-Care  Educational resources provided:   Possible side effects of new medications were reviewed with the patient/guardian today  The treatment plan was reviewed with the patient/guardian  The patient/guardian understands and agrees with the treatment plan   CKD Teaching includes hypertension management and Avoid nephrotoxic medication  Reason For Visit  Follow-up following hospitalization      History of Present Illness  Patient had a acute MI true posterior wall associated with congestive heart failure  She did have a cardiac catheterization status post thrombectomy/XOCHILT to OM 1  Creatinine upon discharge July 31st was 1 25 mg/dL  She apparently then was admitted to [facility] with congestive heart failure and her medications were adjusted had a pancreatic the lesion but apparently she had a negative endoscopic evaluation and biopsy performed by Dr Yelitza Vazquez group  Apparently this will be observed for now  fevers chills cough or colds  She has lost about 24 lb in over 5 months  Her weight is stabilizing  She does have a good appetite at this time  No nausea vomiting diarrhea or abdominal painactive urinary symptoms including foamy urinecardiovascular symptoms  headaches dizziness or lightheadedness  Occasional falling possibly losing balance if she turns too quickly  pressure medicationssuccinate 50 mg twice a day25 mg every other day10 mg daily60 mg dailypressure at home:123/71 without orthostatic [id number]/70s      Review of Systems  Please see HPI, otherwise review of systems is completely reviewed with patient were negative      ROS reviewed  Past Medical History    The active problems and past medical history were reviewed and updated today  Surgical History    The surgical history was reviewed and updated today  Family History    The family history was reviewed and updated today  Social History  The social history was reviewed and updated today  The social history was reviewed and is unchanged  Current Meds   1  Acidophilus Oral Tablet; 1/2 tablet daily; Therapy: [id number] to Recorded   2  Co Q 10 100 MG Oral Capsule; Take 1 tablet daily; Therapy: 64UYR4216 to Recorded   3  Ecotrin Low Strength 81 MG Oral Tablet Delayed Release; Therapy: (Agnes Tamara) to Recorded   4  Evista 60 MG Oral Tablet; TAKE 1 TABLET DAILY AS DIRECTED; Therapy: (Agnes Tamara) to Recorded   5  Ezetimibe 10 MG Oral Tablet; TAKE 1 TABLET AT BEDTIME; Therapy: 93Awf5069 to (Yoli Islas)  Requested for: 88Vfx4109; Last Rx:[id number] Ordered   6  Hydroxychloroquine Sulfate 200 MG Oral Tablet; TAKE 1 TABLET DAILY; Therapy: (Recorded:13Jan2015) to Recorded   7  Isosorbide Mononitrate ER 60 MG Oral Tablet Extended Release 24 Hour; TAKE 1 TABLET DAILY AS DIRECTED; Therapy: (Agnes Tamara) to Recorded   8  Levoxyl 50 MCG Oral Tablet; TAKE 1 TABLET DAILY AS DIRECTED; Therapy: (Agnes Tamara) to Recorded   9  Metoprolol Succinate ER 50 MG Oral Tablet Extended Release 24 Hour; take two tablets in the AM and one tablet in the PM; Last Rx:30Lyi3999 Ordered   10  Multi-Vitamin TABS; TAKE 1 TABLET DAILY; Therapy: (Recorded:13Jan2015) to Recorded   11  Nitrostat 0 4 MG Sublingual Tablet Sublingual; DISSOLVE 1 TABLET UNDER THE  TONGUE AS NEEDED FOR CHEST PAIN;  Therapy: 94DGT9898 to Recorded   12  Ranexa 500 MG Oral Tablet Extended Release 12 Hour; take one tablet once daily; Therapy: (Agnes Tamara) to Recorded   13  Spironolactone 25 MG Oral Tablet; TAKE 1 TABLET  EVERY OTHER DAY; Therapy: 41RBN2737 to (Evaluate:06Jun2018)  Requested for: 65CGB8359 Recorded   14  Torsemide 10 MG Oral Tablet; TAKE 1 TABLET DAILY; Therapy: 68Akk7442 to (Evaluate:22Jqe8688)  Requested for: 39Lsv6996; Last  Rx:28Ayi1831 Ordered   15  Vitamin C 100 MG Oral Tablet; TAKE 1 TABLET DAILY AS DIRECTED;   Therapy: 76YNP2857 to Recorded   16  Vitamin D3 2000 UNIT Oral Tablet; Take 2 tablets daily; Therapy: (Recorded:85Zha2653) to Recorded    The medication list was reviewed and updated today  Allergies  1  Boniva TABS   2  Lipitor TABS   3  Codeine Derivatives    Vitals  Vital Signs    Recorded: 67PKB3898 03:31PM Recorded: 12RAV2649 03:11PM   Heart Rate 72    Pulse Quality Regular    Systolic 719, LUE, Sitting    Diastolic 64, LUE, Sitting    BP Comments Blood pressure standin/60 hr 72    Height  5 ft 4 in   Weight  164 lb 4 oz   BMI Calculated  28 19   BSA Calculated  1 8       Physical Exam   Constitutional: General appearance: No acute distress, well appearing and well nourished  ENT: External ears and nose appear normal     Eyes: Anicteric sclerae  Neck: No bruit heard over either carotid  JVD:  No JVD present  Pulmonary: Respiratory effort: No increased work of breathing or signs of respiratory distress  -- Auscultation of lungs: Clear to auscultation  Cardiovascular: Auscultation of heart: Abnormal  -- Grade 1/6 systolic ejection type murmur heard best in the right upper sternal border no rub or gallops  Abdomen: Non-tender, no masses  -- no masses normal bowel sounds, soft and nontender  Extremities: No cyanosis, clubbing or edema  -- no edema  Rash: No rash present  Neurologic: Non Focal     Psychiatric: Orientation to person, place, and time: Normal  -- and-- Mood and affect: Normal    Back: No CVA tenderness  Results/Data  (1) COMPREHENSIVE METABOLIC PANEL 49KIR2685 81:57MP [facility] Order Number: [id number]     Test Name Result Flag Reference   POTASSIUM 3 7 mmol/L  3 5-5 3       Thank you very much for allowing me to participate in the care of this patient  If you have any questions, please do not hesitate to contact me        Signatures   Electronically signed by : RAMANA Ortiz ; 2017  3:47PM EST                       (Author)

## 2017-11-13 ENCOUNTER — APPOINTMENT (OUTPATIENT)
Dept: CARDIAC REHAB | Facility: CLINIC | Age: 77
End: 2017-11-13
Payer: MEDICARE

## 2017-11-13 DIAGNOSIS — Z98.61 POSTSURGICAL PERCUTANEOUS TRANSLUMINAL CORONARY ANGIOPLASTY STATUS: ICD-10-CM

## 2017-11-13 PROCEDURE — 93798 PHYS/QHP OP CAR RHAB W/ECG: CPT

## 2017-11-15 ENCOUNTER — APPOINTMENT (OUTPATIENT)
Dept: CARDIAC REHAB | Facility: CLINIC | Age: 77
End: 2017-11-15
Payer: MEDICARE

## 2017-11-17 ENCOUNTER — APPOINTMENT (OUTPATIENT)
Dept: CARDIAC REHAB | Facility: CLINIC | Age: 77
End: 2017-11-17
Payer: MEDICARE

## 2017-11-17 DIAGNOSIS — Z98.61 POSTSURGICAL PERCUTANEOUS TRANSLUMINAL CORONARY ANGIOPLASTY STATUS: ICD-10-CM

## 2017-11-17 PROCEDURE — 93798 PHYS/QHP OP CAR RHAB W/ECG: CPT

## 2017-11-20 ENCOUNTER — APPOINTMENT (OUTPATIENT)
Dept: CARDIAC REHAB | Facility: CLINIC | Age: 77
End: 2017-11-20
Payer: MEDICARE

## 2017-11-20 DIAGNOSIS — Z98.61 POSTSURGICAL PERCUTANEOUS TRANSLUMINAL CORONARY ANGIOPLASTY STATUS: ICD-10-CM

## 2017-11-20 PROCEDURE — 93798 PHYS/QHP OP CAR RHAB W/ECG: CPT

## 2017-11-22 ENCOUNTER — APPOINTMENT (OUTPATIENT)
Dept: CARDIAC REHAB | Facility: CLINIC | Age: 77
End: 2017-11-22
Payer: MEDICARE

## 2017-11-24 ENCOUNTER — APPOINTMENT (OUTPATIENT)
Dept: CARDIAC REHAB | Facility: CLINIC | Age: 77
End: 2017-11-24
Payer: MEDICARE

## 2017-11-27 ENCOUNTER — APPOINTMENT (OUTPATIENT)
Dept: CARDIAC REHAB | Facility: CLINIC | Age: 77
End: 2017-11-27
Payer: MEDICARE

## 2017-11-27 DIAGNOSIS — Z98.61 POSTSURGICAL PERCUTANEOUS TRANSLUMINAL CORONARY ANGIOPLASTY STATUS: ICD-10-CM

## 2017-11-27 PROCEDURE — 93798 PHYS/QHP OP CAR RHAB W/ECG: CPT

## 2017-11-29 ENCOUNTER — APPOINTMENT (OUTPATIENT)
Dept: CARDIAC REHAB | Facility: CLINIC | Age: 77
End: 2017-11-29
Payer: MEDICARE

## 2017-11-29 DIAGNOSIS — Z98.61 POSTSURGICAL PERCUTANEOUS TRANSLUMINAL CORONARY ANGIOPLASTY STATUS: ICD-10-CM

## 2017-11-29 PROCEDURE — 93798 PHYS/QHP OP CAR RHAB W/ECG: CPT

## 2017-12-01 ENCOUNTER — APPOINTMENT (OUTPATIENT)
Dept: CARDIAC REHAB | Facility: CLINIC | Age: 77
End: 2017-12-01
Payer: MEDICARE

## 2017-12-01 DIAGNOSIS — Z98.61 POSTSURGICAL PERCUTANEOUS TRANSLUMINAL CORONARY ANGIOPLASTY STATUS: ICD-10-CM

## 2017-12-01 PROCEDURE — 93798 PHYS/QHP OP CAR RHAB W/ECG: CPT

## 2017-12-04 ENCOUNTER — APPOINTMENT (OUTPATIENT)
Dept: CARDIAC REHAB | Facility: CLINIC | Age: 77
End: 2017-12-04
Payer: MEDICARE

## 2017-12-04 DIAGNOSIS — Z98.61 PERCUTANEOUS TRANSLUMINAL CORONARY ANGIOPLASTY STATUS: ICD-10-CM

## 2017-12-04 PROCEDURE — 93798 PHYS/QHP OP CAR RHAB W/ECG: CPT

## 2017-12-06 ENCOUNTER — APPOINTMENT (OUTPATIENT)
Dept: CARDIAC REHAB | Facility: CLINIC | Age: 77
End: 2017-12-06
Payer: MEDICARE

## 2017-12-06 DIAGNOSIS — Z98.61 PERCUTANEOUS TRANSLUMINAL CORONARY ANGIOPLASTY STATUS: ICD-10-CM

## 2017-12-06 PROCEDURE — 93798 PHYS/QHP OP CAR RHAB W/ECG: CPT

## 2017-12-08 ENCOUNTER — APPOINTMENT (OUTPATIENT)
Dept: CARDIAC REHAB | Facility: CLINIC | Age: 77
End: 2017-12-08
Payer: MEDICARE

## 2017-12-08 DIAGNOSIS — Z98.61 PERCUTANEOUS TRANSLUMINAL CORONARY ANGIOPLASTY STATUS: ICD-10-CM

## 2017-12-08 PROCEDURE — 93798 PHYS/QHP OP CAR RHAB W/ECG: CPT

## 2017-12-11 ENCOUNTER — APPOINTMENT (OUTPATIENT)
Dept: CARDIAC REHAB | Facility: CLINIC | Age: 77
End: 2017-12-11
Payer: MEDICARE

## 2017-12-11 DIAGNOSIS — Z98.61 POSTSURGICAL PERCUTANEOUS TRANSLUMINAL CORONARY ANGIOPLASTY STATUS: ICD-10-CM

## 2017-12-11 PROCEDURE — 93798 PHYS/QHP OP CAR RHAB W/ECG: CPT

## 2018-01-13 VITALS
WEIGHT: 164.25 LBS | SYSTOLIC BLOOD PRESSURE: 120 MMHG | HEIGHT: 64 IN | DIASTOLIC BLOOD PRESSURE: 64 MMHG | HEART RATE: 72 BPM | BODY MASS INDEX: 28.04 KG/M2

## 2018-01-13 VITALS
WEIGHT: 160 LBS | BODY MASS INDEX: 27.31 KG/M2 | HEIGHT: 64 IN | SYSTOLIC BLOOD PRESSURE: 116 MMHG | DIASTOLIC BLOOD PRESSURE: 64 MMHG | HEART RATE: 60 BPM

## 2018-01-14 VITALS
WEIGHT: 172.25 LBS | HEART RATE: 72 BPM | DIASTOLIC BLOOD PRESSURE: 80 MMHG | HEIGHT: 64 IN | BODY MASS INDEX: 29.41 KG/M2 | SYSTOLIC BLOOD PRESSURE: 170 MMHG

## 2018-01-14 VITALS
HEART RATE: 64 BPM | BODY MASS INDEX: 28.85 KG/M2 | HEIGHT: 64 IN | SYSTOLIC BLOOD PRESSURE: 122 MMHG | DIASTOLIC BLOOD PRESSURE: 72 MMHG | WEIGHT: 169 LBS

## 2018-01-15 NOTE — MISCELLANEOUS
Message   Recorded as Task   Date: 01/20/2016 11:01 AM, Created By: Bashir Mi   Task Name: Follow Up   Assigned To: Bashir Mi   Regarding Patient: Eros Nieves, Status: Active   Comment:    Bashir Mi - 20 Jan 2016 11:01 AM     TASK CREATED  got a notice from pt's insurance that they will only cover benicar quantity of 30 per 30 days  pt is supposed to take it 3 times a day which goves over allowed amount  can pt go to once a day or can we prescribe another alternative? Bashir Mi - 20 Jan 2016 11:02 AM     TASK REASSIGNED: Previously Assigned To Kevin Russo - 20 Jan 2016 4:31 PM     TASK REPLIED TO: Previously Assigned To Esvin Cabral  have her get 10mg tabs and take 1+1/2 a day for 15mg a day   Shavonne Kong - 20 Jan 2016 4:32 PM     TASK REPLIED TO: Previously Assigned To Bashir Mi  only comes in 5mg, 20mg, and 40mg   Rob Marquez - 20 Jan 2016 4:42 PM     TASK REPLIED TO: Previously Assigned To Esvin Cabral  place on 1/2 of a 20mg tablet twice a day  and do a bmp in 1 week and send in 1 week of bp readings per protocol in 2-3 weeks   Shavonne Kong - 20 Jan 2016 4:54 PM     TASK REPLIED TO: Previously Assigned To Mahamed SPOKE TO PT  PT STATES SHE THINKS SHE HAS REACTION  [place]  SHE STATES SHE HAS BEEN HAVING GASTRO PROBLEMS FOR A WHILE NOW  SHE STATES THAT SHE FORGOT TO TAKE BENICAR MONDAY AND TUESDAY AND SHE WAS FINE WITH NO GASTRO PROBLEMS  THEN TODAY SHE TOOK BENICAR AGAIN AND SHE HAD GASTROINTESTINAL PROBLEMS AGAIN  Esvin Cabral - 21 Jan 2016 5:48 PM     TASK REPLIED TO: Previously Assigned To Rob Marquez  switch to losartan 25mg bid     check a bmp 1 week later, she needs a f/u appt soon and check bps'   SPOKE TO PT AND INFORMED ABOUT THE ABOVE  MEDICATION SENT TO PHARMACY  LAB SLIP MAILED TO PT  APPT WITH [facility] FOR BP CHECK SCHEDULED ON 2/10 AND WITH DR Ervin Morton ON 4/6  PL      Active Problems    1  Anemia (285 9) (D64 9)   2  Benign hypertension with chronic kidney disease, stage III (403 10,585 3) (I12 9,N18 3)   3  Chronic kidney disease, stage 3 (585 3) (N18 3)   4  Hypercholesterolemia (272 0) (E78 0)   5  Hypertension (401 9) (I10)   6  Hypocalcemia (275 41) (E83 51)   7  Hyponatremia (276 1) (E87 1)   8  Thyroid disorder (246 9) (E07 9)   9  Vitamin D deficiency (268 9) (E55 9)    Current Meds   1  AmLODIPine Besylate 5 MG Oral Tablet; TAKE ONE TABLET IN AM, 1/2 TABLET IN PM;   Therapy: 43Zqf0870 to (Evaluate:24Apr2016)  Requested for: 17GCI8018; Last   Rx:27Oct2015 Ordered   2  Co Q 10 100 MG Oral Capsule; Take 1 tablet daily; Therapy: 31KLZ4753 to Recorded   3  Ecotrin Low Strength 81 MG Oral Tablet Delayed Release; Therapy: (Young Safer) to Recorded   4  Evista 60 MG Oral Tablet (Raloxifene HCl); TAKE 1 TABLET DAILY AS DIRECTED; Therapy: (Young Safer) to Recorded   5  Hydroxychloroquine Sulfate 200 MG Oral Tablet; TAKE 1 TABLET DAILY; Therapy: (Recorded:13Jan2015) to Recorded   6  Isosorbide Mononitrate ER 60 MG Oral Tablet Extended Release 24 Hour; TAKE 1   TABLET DAILY AS DIRECTED; Therapy: (Young Safer) to Recorded   7  Levoxyl 50 MCG Oral Tablet; TAKE 1 TABLET DAILY AS DIRECTED; Therapy: (Young Safer) to Recorded   8  Metoprolol Succinate ER 50 MG Oral Tablet Extended Release 24 Hour; Take one tablet   in the am and two tablets in the pm;   Therapy: (Young Safer) to Recorded   9  Multi-Vitamin TABS; TAKE 1 TABLET DAILY; Therapy: (Recorded:13Jan2015) to Recorded   10  Nitrostat 0 4 MG Sublingual Tablet Sublingual; DISSOLVE 1 TABLET UNDER THE    TONGUE AS NEEDED FOR CHEST PAIN;    Therapy: 72GGX3795 to Recorded   11  Ranexa 500 MG Oral Tablet Extended Release 12 Hour; take one tablet once daily; Therapy: (Young Safer) to Recorded   12  Spironolactone 25 MG Oral Tablet (Aldactone); TAKE 1 TABLET  EVERY OTHER DAY;     Therapy: 23TKK5293 to (Evaluate:46Fbe0516)  Requested for: 85DSG4943; Last    Rx:11Jan2016 Ordered   13  Torsemide 10 MG Oral Tablet; TAKE 1 TABLET DAILY; Therapy: 73Crz8330 to (Raymundo Patricio)  Requested for: 46FHZ6395; Last    Rx:01Akj1254 Ordered   14  VESIcare 5 MG Oral Tablet; TAKE 1 TABLET DAILY; Therapy: (Emelia Lloyd) to Recorded   15  Vitamin C 100 MG Oral Tablet; TAKE 1 TABLET DAILY AS DIRECTED; Therapy: 05TSH8227 to Recorded   16  Vitamin D3 2000 UNIT Oral Tablet; Take 2 tablets daily; Therapy: (Recorded:15Jan2014) to Recorded    Allergies    1  Boniva TABS   2  Lipitor TABS   3  Codeine Derivatives    Plan  Benign hypertension with chronic kidney disease, stage III    · (1) BASIC METABOLIC PROFILE; Status:Active - Retrospective By Protocol  Authorization;  Requested EVJ:46NPX6335;   Benign hypertension with chronic kidney disease, stage III, Hypertension    · Losartan Potassium 25 MG Oral Tablet; TAKE 1 TABLET TWICE DAILY    Signatures   Electronically signed by : RAMANA Mcclellan ; Jan 22 2016  3:10PM EST

## 2018-01-27 ENCOUNTER — APPOINTMENT (EMERGENCY)
Dept: RADIOLOGY | Facility: HOSPITAL | Age: 78
End: 2018-01-27
Payer: MEDICARE

## 2018-01-27 ENCOUNTER — HOSPITAL ENCOUNTER (EMERGENCY)
Facility: HOSPITAL | Age: 78
Discharge: HOME | End: 2018-01-27
Attending: EMERGENCY MEDICINE
Payer: MEDICARE

## 2018-01-27 VITALS
HEART RATE: 62 BPM | WEIGHT: 152 LBS | SYSTOLIC BLOOD PRESSURE: 170 MMHG | BODY MASS INDEX: 26.09 KG/M2 | RESPIRATION RATE: 18 BRPM | DIASTOLIC BLOOD PRESSURE: 78 MMHG | TEMPERATURE: 97.1 F | OXYGEN SATURATION: 96 %

## 2018-01-27 DIAGNOSIS — M25.551 RIGHT HIP PAIN: Primary | ICD-10-CM
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
## 2018-01-27 PROCEDURE — 73700 CT LOWER EXTREMITY W/O DYE: CPT

## 2018-01-27 PROCEDURE — 99284 EMERGENCY DEPT VISIT MOD MDM: CPT

## 2018-01-27 PROCEDURE — 73502 X-RAY EXAM HIP UNI 2-3 VIEWS: CPT

## 2018-01-27 RX ORDER — METHOCARBAMOL 500 MG/1
500 TABLET, FILM COATED ORAL ONCE
Status: COMPLETED | OUTPATIENT
Start: 2018-01-27 | End: 2018-01-27

## 2018-01-27 RX ORDER — METHOCARBAMOL 500 MG/1
500 TABLET, FILM COATED ORAL 4 TIMES DAILY
Qty: 30 TABLET | Refills: 0 | Status: SHIPPED | OUTPATIENT
Start: 2018-01-27 | End: 2018-02-05 | Stop reason: HOSPADM

## 2018-01-27 RX ORDER — IBUPROFEN 600 MG/1
600 TABLET ORAL ONCE
Status: COMPLETED | OUTPATIENT
Start: 2018-01-27 | End: 2018-01-27

## 2018-01-27 RX ORDER — ACETAMINOPHEN 325 MG/1
650 TABLET ORAL EVERY 6 HOURS PRN
Status: DISCONTINUED | OUTPATIENT
Start: 2018-01-27 | End: 2018-01-27 | Stop reason: HOSPADM

## 2018-01-27 RX ADMIN — METHOCARBAMOL 500 MG: 500 TABLET ORAL at 15:54

## 2018-01-27 RX ADMIN — ACETAMINOPHEN 650 MG: 325 TABLET, FILM COATED ORAL at 15:54

## 2018-01-27 RX ADMIN — IBUPROFEN 600 MG: 600 TABLET, FILM COATED ORAL at 15:57

## 2018-01-27 NOTE — ED PROVIDER NOTES
History  Chief Complaint   Patient presents with    Fall     misstepped today and fell C/O R groin pain       History provided by:  Patient  Hip Pain   Location:  Right hip pain after fall   Severity:  Moderate  Onset quality:  Gradual  Timing:  Intermittent  Progression:  Waxing and waning  Chronicity:  New  Context:  Worse with movement and abulation   Worsened by: Movement  Associated symptoms: no abdominal pain, no chest pain, no cough, no diarrhea, no fever, no headaches, no myalgias, no nausea, no rash, no rhinorrhea, no shortness of breath, no sore throat and no wheezing    Associated symptoms comment:  Patient was able to bear weight after the initial incident but then sent down for some point time and had stiffness and pain after getting up  Prior to Admission Medications   Prescriptions Last Dose Informant Patient Reported? Taking? Ascorbic Acid (VITAMIN C) 1000 MG tablet   Yes No   Sig: Take 1,000 mg by mouth daily   Coenzyme Q10 10 MG capsule   Yes No   Sig: Take 10 mg by mouth daily   Multiple Vitamins-Minerals (CENTRUM SILVER PO)   Yes No   Sig: Take 1 tablet by mouth daily   Vitamin D, Cholecalciferol, 1000 units CAPS   Yes No   Sig: Take 2,000 Units by mouth 2 (two) times a day   amLODIPine (NORVASC) 2 5 mg tablet   No No   Sig: Take 1 tablet by mouth daily   aspirin (ECOTRIN LOW STRENGTH) 81 mg EC tablet   Yes No   Sig: Take 81 mg by mouth daily Indications: Heart Attack  hydroxychloroquine (PLAQUENIL) 200 mg tablet   Yes No   Sig: Take 200 mg by mouth daily     levothyroxine 50 mcg tablet   Yes No   Sig: Take 50 mcg by mouth daily     metoprolol succinate (TOPROL-XL) 100 mg 24 hr tablet   No No   Sig: Take 1 tablet by mouth every 24 hours   metoprolol succinate (TOPROL-XL) 50 mg 24 hr tablet   No No   Sig: Take 1 tablet by mouth daily   nitroglycerin (NITROLINGUAL) 0 4 mg/spray spray   Yes No   Sig: Place 1 spray under the tongue every 5 (five) minutes as needed for chest pain raloxifene (EVISTA) 60 mg tablet   Yes No   Sig: Take 60 mg by mouth daily   ranolazine (RANEXA) 500 mg 12 hr tablet   Yes No   Sig: Take 500 mg by mouth daily   solifenacin (VESICARE) 10 MG tablet   Yes No   Sig: Take 5 mg by mouth daily   spironolactone (ALDACTONE) 25 mg tablet   Yes No   Sig: Take 25 mg by mouth every other day   ticagrelor (BRILINTA) 90 MG   No No   Sig: Take 1 tablet by mouth 2 (two) times a day   torsemide (DEMADEX) 10 mg tablet   Yes No   Sig: Take 10 mg by mouth daily      Facility-Administered Medications: None       Past Medical History:   Diagnosis Date    Bladder calculi     Cardiac disease     CKD (chronic kidney disease), stage III     Disease of thyroid gland     Hypertension     Uterine prolapse        Past Surgical History:   Procedure Laterality Date    BLADDER SURGERY      CARDIAC SURGERY      valve replacement       History reviewed  No pertinent family history  I have reviewed and agree with the history as documented  Social History   Substance Use Topics    Smoking status: Never Smoker    Smokeless tobacco: Never Used    Alcohol use No        Review of Systems   Constitutional: Negative for chills and fever  HENT: Negative for rhinorrhea, sore throat and trouble swallowing  Eyes: Negative for pain  Respiratory: Negative for cough, shortness of breath, wheezing and stridor  Cardiovascular: Negative for chest pain and leg swelling  Gastrointestinal: Negative for abdominal pain, diarrhea and nausea  Endocrine: Negative for polyuria  Genitourinary: Negative for dysuria, flank pain and urgency  Musculoskeletal: Negative for joint swelling, myalgias and neck stiffness  Skin: Negative for rash  Allergic/Immunologic: Negative for immunocompromised state  Neurological: Negative for dizziness, syncope, weakness, numbness and headaches  Psychiatric/Behavioral: Negative for confusion and suicidal ideas     All other systems reviewed and are negative  Physical Exam  ED Triage Vitals [01/27/18 1313]   Temperature Pulse Respirations Blood Pressure SpO2   (!) 97 1 °F (36 2 °C) 60 20 159/70 95 %      Temp Source Heart Rate Source Patient Position - Orthostatic VS BP Location FiO2 (%)   Tympanic Monitor Sitting Right arm --      Pain Score       5           Orthostatic Vital Signs  Vitals:    01/27/18 1313 01/27/18 1630   BP: 159/70 170/78   Pulse: 60 62   Patient Position - Orthostatic VS: Sitting Lying       Physical Exam   Constitutional: She is oriented to person, place, and time  She appears well-developed and well-nourished  HENT:   Head: Normocephalic and atraumatic  Eyes: EOM are normal  Pupils are equal, round, and reactive to light  Neck: Normal range of motion  Neck supple  Cardiovascular: Normal rate and regular rhythm  Exam reveals no friction rub  No murmur heard  Pulmonary/Chest: Breath sounds normal  No respiratory distress  She has no wheezes  She has no rales  Abdominal: Soft  Bowel sounds are normal  She exhibits no distension  There is no tenderness  Musculoskeletal: Normal range of motion  She exhibits tenderness  She exhibits no edema  Right hip tenderness, decreased rom noted with pain   NV intact      Neurological: She is alert and oriented to person, place, and time  Skin: Skin is warm  No rash noted  Psychiatric: She has a normal mood and affect  Nursing note and vitals reviewed  ED Medications  Medications   ibuprofen (MOTRIN) tablet 600 mg (600 mg Oral Given 1/27/18 1557)   methocarbamol (ROBAXIN) tablet 500 mg (500 mg Oral Given 1/27/18 1554)       Diagnostic Studies  Results Reviewed     None                 XR hip/pelv 2-3 vws right   Final Result by Nirav Mendez MD (01/28 1116)      No acute osseous abnormality  Degenerative arthritic changes           Workstation performed: [id number]         CT hip right without contrast   Final Result by Bharat Pacheco MD (01/27 1528)   Mild degenerative arthritis right hip  No evidence of hip fracture      Consistent with x-rays               Workstation performed: [id number]                    Procedures  Procedures       Phone Contacts  ED Phone Contact    ED Course  ED Course                                MDM  Number of Diagnoses or Management Options  Fall, initial encounter: new and requires workup  Right hip pain: new and requires workup  Diagnosis management comments: 79-year-old female presents emergency department after a fall  The patient had tenderness palpation as well as stiffness afterwards  A CT scan as well as x-rays performed in the emergency department shows significant arthritis of the right hip but no evidence of acute fracture  Patient is able to ambulate in the emergency department with pain medication muscle relaxers  Suspect possible groin sprain or strain  Patient on no blood thinners do not suspect internal injury or other bleeding issues at this time  No evidence of hypotension  Patient denies striking head no evidence of other traumatic injury  Pt re-examined and evaluated after testing and treatment  Spoke with the patient and feeling improved and sxs have resolved  Will discharge home with close f/u with pcp and instructed to return to the ED if sxs worsen or continue  Pt agrees with the plan for discharge and feels comfortable to go home with proper f/u  Advised to return for worsening or additional problems  Diagnostic tests were reviewed and questions answered  Diagnosis, care plan and treatment options were discussed  The patient understand instructions and will follow up as directed           Amount and/or Complexity of Data Reviewed  Tests in the radiology section of CPT®: ordered and reviewed      CritCare Time    Disposition  Final diagnoses:   Right hip pain   Fall, initial encounter     Time reflects when diagnosis was documented in both MDM as applicable and the Disposition within this note Time User Action Codes Description Comment    1/27/2018  3:43 PM Ami Brock Add [B87 [id number]] Right hip pain     1/27/2018  3:43 PM Ami Brock Add [U94  Sheela Murray, initial encounter       ED Disposition     ED Disposition Condition Comment    Discharge  Zoila Alicea discharge to home/self care  Condition at discharge: Stable        Follow-up Information     Follow up With Specialties Details Why   Sharad Taylor MD Family Medicine Call in 2 days If symptoms worsen [address] [place]  [phone]          Discharge Medication List as of 1/27/2018  3:43 PM      CONTINUE these medications which have NOT CHANGED    Details   amLODIPine (NORVASC) 2 5 mg tablet Take 1 tablet by mouth daily, Starting Wed 8/2/2017, Normal      Ascorbic Acid (VITAMIN C) 1000 MG tablet Take 1,000 mg by mouth daily, Historical Med      aspirin (ECOTRIN LOW STRENGTH) 81 mg EC tablet Take 81 mg by mouth daily Indications: Heart Attack  , Until Discontinued, Historical Med      Coenzyme Q10 10 MG capsule Take 10 mg by mouth daily, Historical Med      hydroxychloroquine (PLAQUENIL) 200 mg tablet Take 200 mg by mouth daily  , Historical Med      levothyroxine 50 mcg tablet Take 50 mcg by mouth daily  , Until Discontinued, Historical Med      !! metoprolol succinate (TOPROL-XL) 100 mg 24 hr tablet Take 1 tablet by mouth every 24 hours, Starting Wed 8/2/2017, Normal      !! metoprolol succinate (TOPROL-XL) 50 mg 24 hr tablet Take 1 tablet by mouth daily, Starting Wed 8/2/2017, Normal      Multiple Vitamins-Minerals (CENTRUM SILVER PO) Take 1 tablet by mouth daily, Historical Med      nitroglycerin (NITROLINGUAL) 0 4 mg/spray spray Place 1 spray under the tongue every 5 (five) minutes as needed for chest pain, Historical Med      raloxifene (EVISTA) 60 mg tablet Take 60 mg by mouth daily, Until Discontinued, Historical Med      ranolazine (RANEXA) 500 mg 12 hr tablet Take 500 mg by mouth daily, Until Discontinued, Historical Med      solifenacin (VESICARE) 10 MG tablet Take 5 mg by mouth daily, Historical Med      spironolactone (ALDACTONE) 25 mg tablet Take 25 mg by mouth every other day, Until Discontinued, Historical Med      ticagrelor (BRILINTA) 90 MG Take 1 tablet by mouth 2 (two) times a day, Starting Wed 8/2/2017, Print      torsemide (DEMADEX) 10 mg tablet Take 10 mg by mouth daily, Until Discontinued, Historical Med      Vitamin D, Cholecalciferol, 1000 units CAPS Take 2,000 Units by mouth 2 (two) times a day, Historical Med       !! - Potential duplicate medications found  Please discuss with provider  No discharge procedures on file      ED Provider  Electronically Signed by           Prakash Posadas DO  01/28/18 4931

## 2018-01-27 NOTE — DISCHARGE INSTRUCTIONS
Arthralgia   WHAT YOU NEED TO KNOW:   Arthralgia is pain in one or more joints, with no inflammation  It may be short-term and get better within 6 to 8 weeks  Arthralgia can be an early sign of arthritis  Arthralgia may be caused by a medical condition, such as a hormone disorder or a tumor  It may also be caused by an infection or injury  DISCHARGE INSTRUCTIONS:   Medicines: The following medicines may  be ordered for you:  · Acetaminophen  decreases pain  Ask how much to take and how often to take it  Follow directions  Acetaminophen can cause liver damage if not taken correctly  · NSAIDs  decrease pain and prevent swelling  Ask your healthcare provider which medicine is right for you  Ask how much to take and when to take it  Take as directed  NSAIDs can cause stomach bleeding and kidney problems if not taken correctly  · Pain relief cream  decreases pain  Use this cream as directed  · Take your medicine as directed  Contact your healthcare provider if you think your medicine is not helping or if you have side effects  Tell him of her if you are allergic to any medicine  Keep a list of the medicines, vitamins, and herbs you take  Include the amounts, and when and why you take them  Bring the list or the pill bottles to follow-up visits  Carry your medicine list with you in case of an emergency  Follow up with your healthcare provider or specialist as directed:  Write down your questions so you remember to ask them during your visits  Self-care:   · Apply heat  to help decrease pain  Use a heating pad or heat wrap  Apply heat for 20 to 30 minutes every 2 hours for as many days as directed  · Rest  as much as possible  Avoid activities that cause joint pain  · Apply ice  to help decrease swelling and pain  Ice may also help prevent tissue damage  Use an ice pack, or put crushed ice in a plastic bag   Cover it with a towel and place it on your painful joint for 15 to 20 minutes every hour or as directed  · Support  the joint with a brace or elastic wrap as directed  · Elevate  your joint above the level of your heart as often as you can to help decrease swelling and pain  Prop your painful joint on pillows or blankets to keep it elevated comfortably  · Lose weight  if you are overweight  Extra weight can put pressure on your joints and cause more pain  Ask your healthcare provider how much you should weigh  Ask him to help you create a weight loss plan  · Exercise  regularly to help improve joint movement and to decrease pain  Ask about the best exercise plan for you  Low-impact exercises can help take the pressure off your joints  Examples are walking, swimming, and water aerobics  Physical therapy:  A physical therapist teaches you exercises to help improve movement and strength, and to decrease pain  Ask your healthcare provider if physical therapy is right for you  Contact your healthcare provider or specialist if:   · You have a fever  · You continue to have joint pain that cannot be relieved with heat, ice, or medicine  · You have pain and inflammation around your joint  · You have questions or concerns about your condition or care  Return to the emergency department if:   · You have sudden, severe pain when you move your joint  · You have a fever and shaking chills  · You cannot move your joint  · You lose feeling on the side of your body where you have the painful joint  © 2017 [facility] Information is for End User's use only and may not be sold, redistributed or otherwise used for commercial purposes  All illustrations and images included in CareNotes® are the copyrighted property of [employer]  or [employer]ise [employer]  The above information is an  only  It is not intended as medical advice for individual conditions or treatments   Talk to your doctor, nurse or pharmacist before following any medical regimen to see if it is safe and effective for you  Hip Pain   WHAT YOU NEED TO KNOW:   Hip pain can be caused by a number of conditions, such as bursitis, arthritis, or muscle or tendon strain  X-rays do not show broken bones  You may have swelling in the fluid-filled sacs that protect your muscles and tendons  Hip pain can also be caused by a lower back problem  Hip pain may be caused by trauma, playing sports, or running  Your pain may start in your hip and go to your thigh, buttock, or groin  DISCHARGE INSTRUCTIONS:   Medicines:   · NSAIDs , such as ibuprofen, help decrease swelling, pain, and fever  This medicine is available with or without a doctor's order  NSAIDs can cause stomach bleeding or kidney problems in certain people  If you take blood thinner medicine, always ask your healthcare provider if NSAIDs are safe for you  Always read the medicine label and follow directions  · Take your medicine as directed  Contact your healthcare provider if you think your medicine is not helping or if you have side effects  Tell him of her if you are allergic to any medicine  Keep a list of the medicines, vitamins, and herbs you take  Include the amounts, and when and why you take them  Bring the list or the pill bottles to follow-up visits  Carry your medicine list with you in case of an emergency  Return to the emergency department if:   · Your pain gets worse  · You have numbness in your leg or toes  · You cannot put any weight on or move your hip  Contact your healthcare provider if:   · You have a fever  · Your pain does not decrease, even after treatment  · You have questions or concerns about your condition or care  Follow up with your healthcare provider as directed: You may need physical therapy, an injection, or more testing  You may need to see an orthopedic specialist  Write down your questions so you remember to ask them during your visits    Manage your hip pain:   · Rest  your injured hip so that it can heal  You may need to avoid putting any weight on your hip for at least 48 hours  Return to normal activities as directed  · Ice  the injury for 20 minutes every 4 hours, or as directed  Use an ice pack, or put crushed ice in a plastic bag  Cover it with a towel to protect your skin  Ice helps prevent tissue damage and decreases swelling and pain  · Elevate  your injured hip above the level of your heart as often as you can  This will help decrease swelling and pain  If possible, prop your hip and leg on pillows or blankets to keep the area elevated comfortably  · Maintain a healthy weight  Extra body weight can cause pressure and pain in your hip, knee, and ankle joints  Ask your healthcare provider how much you should weigh  Ask him to help you create a weight loss plan if you are overweight  · Use assistive devices as directed  You may need to use a cane or crutches  Assistive devices help decrease pain and pressure on your hip when you walk  Ask your healthcare provider for more information about assistive devices and how to use them correctly  © 2017 [facility] Information is for End User's use only and may not be sold, redistributed or otherwise used for commercial purposes  All illustrations and images included in CareNotes® are the copyrighted property of A D A M [employer] Inc  or [employer]ise Austin  The above information is an  only  It is not intended as medical advice for individual conditions or treatments  Talk to your doctor, nurse or pharmacist before following any medical regimen to see if it is safe and effective for you

## 2018-01-27 NOTE — ED NOTES
Patient states that she only has pain upon movement and is worried when she tries to walk that the pain will come back  As per Dr Fanny Badillo we will monitor the patient until she feels a little better  Patient is currently sitting on the side of the bed dangling her legs  States she will call for assistance when she is ready to try and stand  Will continue to monitor        Yuki Carranza RN  01/27/18 7647

## 2018-01-30 ENCOUNTER — HOSPITAL ENCOUNTER (INPATIENT)
Facility: HOSPITAL | Age: 78
LOS: 5 days | Discharge: NURSING, REHAB OR LONG-TERM CARE | DRG: 470 | End: 2018-02-05
Attending: EMERGENCY MEDICINE | Admitting: FAMILY MEDICINE
Payer: MEDICARE

## 2018-01-30 DIAGNOSIS — M25.559 HIP PAIN: Primary | ICD-10-CM

## 2018-01-30 DIAGNOSIS — I21.29: ICD-10-CM

## 2018-01-30 DIAGNOSIS — I10 HYPERTENSION, UNSPECIFIED TYPE: ICD-10-CM

## 2018-01-30 DIAGNOSIS — E03.9 HYPOTHYROIDISM, UNSPECIFIED TYPE: ICD-10-CM

## 2018-01-30 DIAGNOSIS — R33.9 URINARY RETENTION: ICD-10-CM

## 2018-01-30 DIAGNOSIS — F32.A DEPRESSION: ICD-10-CM

## 2018-01-30 DIAGNOSIS — R63.4 WEIGHT LOSS: Chronic | ICD-10-CM

## 2018-01-30 DIAGNOSIS — S72.001A CLOSED DISPLACED FRACTURE OF RIGHT FEMORAL NECK (HCC): ICD-10-CM

## 2018-01-30 DIAGNOSIS — K59.00 CONSTIPATION: ICD-10-CM

## 2018-01-30 DIAGNOSIS — M25.551 RIGHT HIP PAIN: ICD-10-CM

## 2018-01-30 DIAGNOSIS — Z95.1 S/P CABG X 4: ICD-10-CM

## 2018-01-30 DIAGNOSIS — I50.43 HEART FAILURE, ACUTE ON CHRONIC, SYSTOLIC AND DIASTOLIC (HCC): ICD-10-CM

## 2018-01-30 PROBLEM — I25.10 CAD (CORONARY ARTERY DISEASE): Status: ACTIVE | Noted: 2018-01-30

## 2018-01-30 PROBLEM — I50.9 CHRONIC CONGESTIVE HEART FAILURE (HCC): Status: ACTIVE | Noted: 2018-01-30

## 2018-01-30 PROBLEM — I16.0 HYPERTENSIVE URGENCY: Status: ACTIVE | Noted: 2018-01-30

## 2018-01-30 LAB
ALBUMIN SERPL BCP-MCNC: 3.7 G/DL (ref 3.5–5)
ALP SERPL-CCNC: 68 U/L (ref 46–116)
ALT SERPL W P-5'-P-CCNC: 27 U/L (ref 12–78)
ANION GAP SERPL CALCULATED.3IONS-SCNC: 14 MMOL/L (ref 4–13)
AST SERPL W P-5'-P-CCNC: 24 U/L (ref 5–45)
BASOPHILS # BLD AUTO: 0.1 THOUSANDS/ΜL (ref 0–0.1)
BASOPHILS NFR BLD AUTO: 1 % (ref 0–1)
BILIRUB SERPL-MCNC: 0.6 MG/DL (ref 0.2–1)
BUN SERPL-MCNC: 31 MG/DL (ref 5–25)
CALCIUM SERPL-MCNC: 9.2 MG/DL (ref 8.3–10.1)
CHLORIDE SERPL-SCNC: 102 MMOL/L (ref 100–108)
CO2 SERPL-SCNC: 24 MMOL/L (ref 21–32)
CREAT SERPL-MCNC: 1.69 MG/DL (ref 0.6–1.3)
EOSINOPHIL # BLD AUTO: 0.1 THOUSAND/ΜL (ref 0–0.61)
EOSINOPHIL NFR BLD AUTO: 1 % (ref 0–6)
ERYTHROCYTE [DISTWIDTH] IN BLOOD BY AUTOMATED COUNT: 14.8 % (ref 11.6–15.1)
GFR SERPL CREATININE-BSD FRML MDRD: 29 ML/MIN/1.73SQ M
GLUCOSE SERPL-MCNC: 127 MG/DL (ref 65–140)
HCT VFR BLD AUTO: 37.8 % (ref 37–47)
HGB BLD-MCNC: 12.2 G/DL (ref 12–16)
LYMPHOCYTES # BLD AUTO: 1.3 THOUSANDS/ΜL (ref 0.6–4.47)
LYMPHOCYTES NFR BLD AUTO: 12 % (ref 14–44)
MCH RBC QN AUTO: 26.8 PG (ref 27–31)
MCHC RBC AUTO-ENTMCNC: 32.4 G/DL (ref 31.4–37.4)
MCV RBC AUTO: 83 FL (ref 82–98)
MONOCYTES # BLD AUTO: 1 THOUSAND/ΜL (ref 0.17–1.22)
MONOCYTES NFR BLD AUTO: 9 % (ref 4–12)
NEUTROPHILS # BLD AUTO: 8.3 THOUSANDS/ΜL (ref 1.85–7.62)
NEUTS SEG NFR BLD AUTO: 77 % (ref 43–75)
NRBC BLD AUTO-RTO: 0 /100 WBCS
PLATELET # BLD AUTO: 215 THOUSANDS/UL (ref 130–400)
PMV BLD AUTO: 8.6 FL (ref 8.9–12.7)
POTASSIUM SERPL-SCNC: 3.4 MMOL/L (ref 3.5–5.3)
PROT SERPL-MCNC: 7.2 G/DL (ref 6.4–8.2)
RBC # BLD AUTO: 4.57 MILLION/UL (ref 4.2–5.4)
SODIUM SERPL-SCNC: 140 MMOL/L (ref 136–145)
WBC # BLD AUTO: 10.7 THOUSAND/UL (ref 4.8–10.8)

## 2018-01-30 PROCEDURE — 80053 COMPREHEN METABOLIC PANEL: CPT | Performed by: EMERGENCY MEDICINE

## 2018-01-30 PROCEDURE — 96375 TX/PRO/DX INJ NEW DRUG ADDON: CPT

## 2018-01-30 PROCEDURE — 99220 PR INITIAL OBSERVATION CARE/DAY 70 MINUTES: CPT | Performed by: NURSE PRACTITIONER

## 2018-01-30 PROCEDURE — 99284 EMERGENCY DEPT VISIT MOD MDM: CPT

## 2018-01-30 PROCEDURE — 36415 COLL VENOUS BLD VENIPUNCTURE: CPT | Performed by: EMERGENCY MEDICINE

## 2018-01-30 PROCEDURE — 85025 COMPLETE CBC W/AUTO DIFF WBC: CPT | Performed by: EMERGENCY MEDICINE

## 2018-01-30 PROCEDURE — 87081 CULTURE SCREEN ONLY: CPT | Performed by: INTERNAL MEDICINE

## 2018-01-30 PROCEDURE — 96374 THER/PROPH/DIAG INJ IV PUSH: CPT

## 2018-01-30 RX ORDER — MELATONIN
2000 DAILY
Status: DISCONTINUED | OUTPATIENT
Start: 2018-01-31 | End: 2018-02-05 | Stop reason: HOSPADM

## 2018-01-30 RX ORDER — ACETAMINOPHEN 325 MG/1
650 TABLET ORAL EVERY 6 HOURS PRN
Status: DISCONTINUED | OUTPATIENT
Start: 2018-01-30 | End: 2018-02-03

## 2018-01-30 RX ORDER — AMLODIPINE BESYLATE 2.5 MG/1
2.5 TABLET ORAL DAILY
Status: DISCONTINUED | OUTPATIENT
Start: 2018-01-31 | End: 2018-02-05 | Stop reason: HOSPADM

## 2018-01-30 RX ORDER — TOLTERODINE 2 MG/1
2 CAPSULE, EXTENDED RELEASE ORAL DAILY
Status: DISCONTINUED | OUTPATIENT
Start: 2018-01-31 | End: 2018-02-03

## 2018-01-30 RX ORDER — SPIRONOLACTONE 25 MG/1
25 TABLET ORAL EVERY OTHER DAY
Status: DISCONTINUED | OUTPATIENT
Start: 2018-01-31 | End: 2018-01-31

## 2018-01-30 RX ORDER — LEVOTHYROXINE SODIUM 0.05 MG/1
50 TABLET ORAL
Status: DISCONTINUED | OUTPATIENT
Start: 2018-01-31 | End: 2018-02-05 | Stop reason: HOSPADM

## 2018-01-30 RX ORDER — TORSEMIDE 10 MG/1
10 TABLET ORAL DAILY
Status: DISCONTINUED | OUTPATIENT
Start: 2018-01-31 | End: 2018-01-31

## 2018-01-30 RX ORDER — ACETAMINOPHEN 325 MG/1
650 TABLET ORAL EVERY 6 HOURS PRN
Status: DISCONTINUED | OUTPATIENT
Start: 2018-01-30 | End: 2018-01-30

## 2018-01-30 RX ORDER — METOPROLOL SUCCINATE 100 MG/1
100 TABLET, EXTENDED RELEASE ORAL EVERY MORNING
Status: DISCONTINUED | OUTPATIENT
Start: 2018-01-31 | End: 2018-02-05 | Stop reason: HOSPADM

## 2018-01-30 RX ORDER — ONDANSETRON 2 MG/ML
4 INJECTION INTRAMUSCULAR; INTRAVENOUS ONCE
Status: COMPLETED | OUTPATIENT
Start: 2018-01-30 | End: 2018-01-30

## 2018-01-30 RX ORDER — RALOXIFENE HYDROCHLORIDE 60 MG/1
60 TABLET, FILM COATED ORAL DAILY
Status: DISCONTINUED | OUTPATIENT
Start: 2018-01-31 | End: 2018-02-05 | Stop reason: HOSPADM

## 2018-01-30 RX ORDER — METHOCARBAMOL 500 MG/1
500 TABLET, FILM COATED ORAL 4 TIMES DAILY
Status: DISCONTINUED | OUTPATIENT
Start: 2018-01-30 | End: 2018-02-03

## 2018-01-30 RX ORDER — IBUPROFEN 200 MG
TABLET ORAL EVERY 6 HOURS PRN
COMMUNITY
End: 2018-02-05 | Stop reason: HOSPADM

## 2018-01-30 RX ORDER — RANOLAZINE 500 MG/1
500 TABLET, EXTENDED RELEASE ORAL DAILY
Status: DISCONTINUED | OUTPATIENT
Start: 2018-01-31 | End: 2018-01-30

## 2018-01-30 RX ORDER — NITROGLYCERIN 0.4 MG/1
0.4 TABLET SUBLINGUAL
Status: DISCONTINUED | OUTPATIENT
Start: 2018-01-30 | End: 2018-02-05 | Stop reason: HOSPADM

## 2018-01-30 RX ORDER — METOPROLOL SUCCINATE 50 MG/1
50 TABLET, EXTENDED RELEASE ORAL EVERY EVENING
Status: DISCONTINUED | OUTPATIENT
Start: 2018-01-30 | End: 2018-02-05 | Stop reason: HOSPADM

## 2018-01-30 RX ORDER — RANOLAZINE 500 MG/1
500 TABLET, EXTENDED RELEASE ORAL EVERY 12 HOURS SCHEDULED
Status: DISCONTINUED | OUTPATIENT
Start: 2018-01-30 | End: 2018-02-05 | Stop reason: HOSPADM

## 2018-01-30 RX ORDER — TRAMADOL HYDROCHLORIDE 50 MG/1
50 TABLET ORAL EVERY 6 HOURS PRN
Status: DISCONTINUED | OUTPATIENT
Start: 2018-01-30 | End: 2018-02-02

## 2018-01-30 RX ORDER — ASCORBIC ACID 500 MG
1000 TABLET ORAL DAILY
Status: DISCONTINUED | OUTPATIENT
Start: 2018-01-31 | End: 2018-02-05 | Stop reason: HOSPADM

## 2018-01-30 RX ORDER — DOCUSATE SODIUM 100 MG/1
100 CAPSULE, LIQUID FILLED ORAL 2 TIMES DAILY
Status: DISCONTINUED | OUTPATIENT
Start: 2018-01-30 | End: 2018-02-05 | Stop reason: HOSPADM

## 2018-01-30 RX ORDER — ASPIRIN 81 MG/1
81 TABLET ORAL DAILY
Status: DISCONTINUED | OUTPATIENT
Start: 2018-01-31 | End: 2018-01-31

## 2018-01-30 RX ORDER — HYDROXYCHLOROQUINE SULFATE 200 MG/1
200 TABLET, FILM COATED ORAL DAILY
Status: DISCONTINUED | OUTPATIENT
Start: 2018-01-31 | End: 2018-02-05 | Stop reason: HOSPADM

## 2018-01-30 RX ORDER — MELATONIN
2000 2 TIMES DAILY
Status: DISCONTINUED | OUTPATIENT
Start: 2018-01-30 | End: 2018-01-30

## 2018-01-30 RX ORDER — EZETIMIBE 10 MG/1
10 TABLET ORAL
Status: DISCONTINUED | OUTPATIENT
Start: 2018-01-30 | End: 2018-02-05 | Stop reason: HOSPADM

## 2018-01-30 RX ADMIN — METOPROLOL SUCCINATE 50 MG: 50 TABLET, FILM COATED, EXTENDED RELEASE ORAL at 20:30

## 2018-01-30 RX ADMIN — ONDANSETRON 4 MG: 2 INJECTION INTRAMUSCULAR; INTRAVENOUS at 16:55

## 2018-01-30 RX ADMIN — HYDROMORPHONE HYDROCHLORIDE 0.5 MG: 1 INJECTION, SOLUTION INTRAMUSCULAR; INTRAVENOUS; SUBCUTANEOUS at 16:55

## 2018-01-30 RX ADMIN — METHOCARBAMOL 500 MG: 500 TABLET ORAL at 21:46

## 2018-01-30 RX ADMIN — TICAGRELOR 90 MG: 90 TABLET ORAL at 20:30

## 2018-01-30 RX ADMIN — EZETIMIBE 10 MG: 10 TABLET ORAL at 21:46

## 2018-01-30 RX ADMIN — DOCUSATE SODIUM 100 MG: 100 CAPSULE, LIQUID FILLED ORAL at 21:06

## 2018-01-30 RX ADMIN — RANOLAZINE 500 MG: 500 TABLET, FILM COATED, EXTENDED RELEASE ORAL at 21:05

## 2018-01-30 NOTE — ASSESSMENT & PLAN NOTE
· She is intolerant to statins    Continue Zetia and Co Q10  · 10/9/2017 lipid panel showed cholesterol 187, triglycerides 137, HDL 55 and LDL 92

## 2018-01-30 NOTE — ASSESSMENT & PLAN NOTE
· CKD stage 3 with baseline creatinine 1 5-1 6  · Current creatinine 1 69  · She follows with outpatient nephrologist, Dr Dallin Rivera

## 2018-01-30 NOTE — ED PROVIDER NOTES
History  Chief Complaint   Patient presents with    Groin Pain     Patient states that she was here on saturday for a fall and she had a CT scan which was negative  She states that she has had worsening groin pain that "won't stop spasming today " Pt repots that pain radiates down her R leg      62-year-old female presents with a right-sided groin pain and bruising ongoing for few days  2 days ago patient became in after a fall with groin pain seen in the ED had negative hip x-ray and a negative hip CT for any fracture  She persists with the pain and has difficulty ambulating  Denies any neurological complaints leg numbness weakness paresthesias  No radiation of the pain  No abdominal pain lightheadedness syncope or any other symptoms  She is on anticoagulation at this time  No new trauma  History provided by:  Patient   used: No        Prior to Admission Medications   Prescriptions Last Dose Informant Patient Reported? Taking? Ascorbic Acid (VITAMIN C) 1000 MG tablet 1/29/2018 at Unknown time  Yes Yes   Sig: Take 1,000 mg by mouth daily   Coenzyme Q10 10 MG capsule 1/30/2018 at Unknown time  Yes Yes   Sig: Take 10 mg by mouth daily   Multiple Vitamins-Minerals (CENTRUM SILVER PO) 1/30/2018 at Unknown time  Yes Yes   Sig: Take 1 tablet by mouth daily   UNKNOWN TO PATIENT 1/30/2018 at 1500  Yes Yes   Vitamin D, Cholecalciferol, 1000 units CAPS 1/29/2018 at Unknown time  Yes Yes   Sig: Take 2,000 Units by mouth 2 (two) times a day   amLODIPine (NORVASC) 2 5 mg tablet 1/30/2018 at Unknown time  No Yes   Sig: Take 1 tablet by mouth daily   aspirin (ECOTRIN LOW STRENGTH) 81 mg EC tablet 1/30/2018 at Unknown time  Yes Yes   Sig: Take 81 mg by mouth daily Indications: Heart Attack     hydroxychloroquine (PLAQUENIL) 200 mg tablet 1/30/2018 at Unknown time  Yes Yes   Sig: Take 200 mg by mouth daily     ibuprofen (MOTRIN) 200 mg tablet 1/30/2018 at 1230  Yes Yes   Sig: Take by mouth every 6 (six) hours as needed for mild pain   levothyroxine 50 mcg tablet 1/30/2018 at Unknown time  Yes Yes   Sig: Take 50 mcg by mouth daily     methocarbamol (ROBAXIN) 500 mg tablet 1/30/2018 at Unknown time  No Yes   Sig: Take 1 tablet (500 mg total) by mouth 4 (four) times a day   metoprolol succinate (TOPROL-XL) 100 mg 24 hr tablet 1/30/2018 at Unknown time  No Yes   Sig: Take 1 tablet by mouth every 24 hours   Patient taking differently: Take 100 mg by mouth every 24 hours In morning    metoprolol succinate (TOPROL-XL) 50 mg 24 hr tablet 1/29/2018 at Unknown time  No Yes   Sig: Take 1 tablet by mouth daily   Patient taking differently: Take 50 mg by mouth daily In evening    nitroglycerin (NITROLINGUAL) 0 4 mg/spray spray   Yes No   Sig: Place 1 spray under the tongue every 5 (five) minutes as needed for chest pain   raloxifene (EVISTA) 60 mg tablet 1/29/2018 at Unknown time  Yes Yes   Sig: Take 60 mg by mouth daily   ranolazine (RANEXA) 500 mg 12 hr tablet 1/30/2018 at Unknown time  Yes Yes   Sig: Take 500 mg by mouth daily   solifenacin (VESICARE) 10 MG tablet 1/30/2018 at Unknown time  Yes Yes   Sig: Take 5 mg by mouth daily   spironolactone (ALDACTONE) 25 mg tablet 1/29/2018 at Unknown time  Yes Yes   Sig: Take 25 mg by mouth every other day   ticagrelor (BRILINTA) 90 MG 1/30/2018 at Unknown time  No Yes   Sig: Take 1 tablet by mouth 2 (two) times a day   torsemide (DEMADEX) 10 mg tablet 1/30/2018 at Unknown time  Yes Yes   Sig: Take 10 mg by mouth daily      Facility-Administered Medications: None       Past Medical History:   Diagnosis Date    Anemia     Arthritis     Bladder calculi     CAD (coronary artery disease)     Status post CABG and PTCA to OM1    [facility]est[facility] failure[facility]     CKD (chronic kidney disease) stage 3, GFR 30-59 ml/min     CKD (chronic kidney disease), stage III     Hypertension     Hypothyroidism     Uterine prolapse        Past Surgical History:   Procedure Laterality Date    BACK SURGERY      BLADDER SURGERY      CARDIAC SURGERY      valve replacement    CORONARY ANGIOPLASTY WITH STENT PLACEMENT      CORONARY ARTERY BYPASS GRAFT      OOPHORECTOMY Left        History reviewed  No pertinent family history  I have reviewed and agree with the history as documented  Social History   Substance Use Topics    Smoking status: Never Smoker    Smokeless tobacco: Never Used    Alcohol use No        Review of Systems   All other systems reviewed and are negative  Physical Exam  ED Triage Vitals [01/30/18 1636]   Temperature Pulse Respirations Blood Pressure SpO2   98 2 °F (36 8 °C) 78 22 (!) 221/102 100 %      Temp Source Heart Rate Source Patient Position - Orthostatic VS BP Location FiO2 (%)   Oral Monitor Lying Left arm --      Pain Score       Worst Possible Pain           Orthostatic Vital Signs  Vitals:    01/30/18 1730 01/30/18 1745 01/30/18 1800 01/30/18 2022   BP: (!) 201/88 (!) 176/81 (!) 171/71 153/70   Pulse: 66 68 64 70   Patient Position - Orthostatic VS:    Lying       Physical Exam   Constitutional: She is oriented to person, place, and time  She appears well-developed and well-nourished  HENT:   Head: Normocephalic and atraumatic  Eyes: EOM are normal  Pupils are equal, round, and reactive to light  Neck: Normal range of motion  Neck supple  Cardiovascular: Normal rate and regular rhythm  Pulmonary/Chest: Effort normal and breath sounds normal    Abdominal: Soft  Bowel sounds are normal    Musculoskeletal: Normal range of motion  Right-sided hip and groin tenderness noted with minor bruising  Range of motion restricted because of pain  No abdominal tenderness noted  Positive femoral and popliteal pulses intact  Neurological: She is alert and oriented to person, place, and time  Skin: Skin is warm and dry  Psychiatric: She has a normal mood and affect  Nursing note and vitals reviewed        ED Medications  Medications amLODIPine (NORVASC) tablet 2 5 mg (not administered)   ascorbic acid (VITAMIN C) tablet 1,000 mg (not administered)   aspirin (ECOTRIN LOW STRENGTH) EC tablet 81 mg (not administered)   co-enzyme Q-10 capsule 30 mg (not administered)   hydroxychloroquine (PLAQUENIL) tablet 200 mg (not administered)   levothyroxine tablet 50 mcg (not administered)   methocarbamol (ROBAXIN) tablet 500 mg (not administered)   metoprolol succinate (TOPROL-XL) 24 hr tablet 100 mg (not administered)   metoprolol succinate (TOPROL-XL) 24 hr tablet 50 mg (not administered)   nitroglycerin (NITROSTAT) SL tablet 0 4 mg (not administered)   raloxifene (EVISTA) tablet 60 mg (not administered)   tolterodine (DETROL LA) 24 hr capsule 2 mg (not administered)   spironolactone (ALDACTONE) tablet 25 mg (not administered)   ticagrelor (BRILINTA) tablet 90 mg (not administered)   torsemide (DEMADEX) tablet 10 mg (not administered)   docusate sodium (COLACE) capsule 100 mg (not administered)   cholecalciferol (VITAMIN D3) tablet 2,000 Units (not administered)   ranolazine (RANEXA) 12 hr tablet 500 mg (not administered)   ezetimibe (ZETIA) tablet 10 mg (not administered)   acetaminophen (TYLENOL) tablet 650 mg (not administered)   traMADol (ULTRAM) tablet 50 mg (not administered)   HYDROmorphone (DILAUDID) injection 0 5 mg (not administered)   enoxaparin (LOVENOX) subcutaneous injection 30 mg (not administered)   HYDROmorphone (DILAUDID) injection 0 5 mg (0 5 mg Intravenous Given 1/30/18 1655)   ondansetron (ZOFRAN) injection 4 mg (4 mg Intravenous Given 1/30/18 1655)       Diagnostic Studies  Results Reviewed     Procedure Component Value Units Date/Time    Comprehensive metabolic panel [[id number]]  (Abnormal) Collected:  01/30/18 1700    Lab Status:  Final result Specimen:  Blood from Arm, Left Updated:  01/30/18 1722     Sodium 140 mmol/L      Potassium 3 4 (L) mmol/L      Chloride 102 mmol/L      CO2 24 mmol/L      Anion Gap 14 (H) mmol/L      BUN 31 (H) mg/dL      Creatinine 1 69 (H) mg/dL      Glucose 127 mg/dL      Calcium 9 2 mg/dL      AST 24 U/L      ALT 27 U/L      Alkaline Phosphatase 68 U/L      Total Protein 7 2 g/dL      Albumin 3 7 g/dL      Total Bilirubin 0 60 mg/dL      eGFR 29 ml/min/1 73sq m     Narrative:         National Kid[employer] Disease Education Program recommendations are as follows:  GFR calculation is accurate only with a steady state creatinine  Chronic Kidney disease less than 60 ml/min/1 73 sq  meters  Kidney failure less than 15 ml/min/1 73 sq  meters  CBC and differential [[id number]]  (Abnormal) Collected:  01/30/18 1700    Lab Status:  Final result Specimen:  Blood from Arm, Left Updated:  01/30/18 1704     WBC 10 70 Thousand/uL      RBC 4 57 Million/uL      Hemoglobin 12 2 g/dL      Hematocrit 37 8 %      MCV 83 fL      MCH 26 8 (L) pg      MCHC 32 4 g/dL      RDW 14 8 %      MPV 8 6 (L) fL      Platelets 528 Thousands/uL      nRBC 0 /100 WBCs      Neutrophils Relative 77 (H) %      Lymphocytes Relative 12 (L) %      Monocytes Relative 9 %      Eosinophils Relative 1 %      Basophils Relative 1 %      Neutrophils Absolute 8 30 (H) Thousands/µL      Lymphocytes Absolute 1 30 Thousands/µL      Monocytes Absolute 1 00 Thousand/µL      Eosinophils Absolute 0 10 Thousand/µL      Basophils Absolute 0 10 Thousands/µL                  No orders to display              Procedures  Procedures       Phone Contacts  ED Phone Contact    ED Course  ED Course                                MDM  Number of Diagnoses or Management Options  Hip pain:   Diagnosis management comments: Patient given pain medications in the ED she improved with the pain however had difficulty ambulating  So admitted to the hospitalist for further evaluation and management         Amount and/or Complexity of Data Reviewed  Clinical lab tests: ordered and reviewed  Tests in the medicine section of CPT®: ordered and reviewed    Patient Progress  Patient progress: stable    CritCare Time    Disposition  Final diagnoses:   Hip pain     Time reflects when diagnosis was documented in both MDM as applicable and the Disposition within this note     Time User Action Codes Description Comment    1/30/2018  6:12 PM Licia Kehr Add [M25 559] Hip pain     1/30/2018  7:07 PM [place] BOLES Add [M25 551] Right hip pain     1/30/2018  7:07 PM Ricardo Tidwell Modify [M25 551] Right hip pain       ED Disposition     ED Disposition Condition Comment    Admit           Follow-up Information    None       Current Discharge Medication List      CONTINUE these medications which have NOT CHANGED    Details   amLODIPine (NORVASC) 2 5 mg tablet Take 1 tablet by mouth daily  Qty: 30 tablet, Refills: 0      Ascorbic Acid (VITAMIN C) 1000 MG tablet Take 1,000 mg by mouth daily      aspirin (ECOTRIN LOW STRENGTH) 81 mg EC tablet Take 81 mg by mouth daily Indications: Heart Attack  Coenzyme Q10 10 MG capsule Take 10 mg by mouth daily      hydroxychloroquine (PLAQUENIL) 200 mg tablet Take 200 mg by mouth daily        ibuprofen (MOTRIN) 200 mg tablet Take by mouth every 6 (six) hours as needed for mild pain      levothyroxine 50 mcg tablet Take 50 mcg by mouth daily        methocarbamol (ROBAXIN) 500 mg tablet Take 1 tablet (500 mg total) by mouth 4 (four) times a day  Qty: 30 tablet, Refills: 0    Associated Diagnoses: Right hip pain; Fall, initial encounter      !! metoprolol succinate (TOPROL-XL) 100 mg 24 hr tablet Take 1 tablet by mouth every 24 hours  Qty: 30 tablet, Refills: 0      !! metoprolol succinate (TOPROL-XL) 50 mg 24 hr tablet Take 1 tablet by mouth daily  Qty: 30 tablet, Refills: 0      Multiple Vitamins-Minerals (CENTRUM SILVER PO) Take 1 tablet by mouth daily      raloxifene (EVISTA) 60 mg tablet Take 60 mg by mouth daily      ranolazine (RANEXA) 500 mg 12 hr tablet Take 500 mg by mouth daily      solifenacin (VESICARE) 10 MG tablet Take 5 mg by mouth daily spironolactone (ALDACTONE) 25 mg tablet Take 25 mg by mouth every other day      ticagrelor (BRILINTA) 90 MG Take 1 tablet by mouth 2 (two) times a day  Qty: 60 tablet, Refills: 6      torsemide (DEMADEX) 10 mg tablet Take 10 mg by mouth daily      UNKNOWN TO PATIENT       Vitamin D, Cholecalciferol, 1000 units CAPS Take 2,000 Units by mouth 2 (two) times a day      nitroglycerin (NITROLINGUAL) 0 4 mg/spray spray Place 1 spray under the tongue every 5 (five) minutes as needed for chest pain       !! - Potential duplicate medications found  Please discuss with provider  No discharge procedures on file      ED Provider  Electronically Signed by           Lindy Alcazar DO  01/30/18 9908

## 2018-01-30 NOTE — ASSESSMENT & PLAN NOTE
· She states she fell on Saturday due to uneven ground  Denies LOC  She c/o of right groin pain that radiates into right thigh  · 1/27/2018  Right hip x-ray, No acute osseous abnormality  Degenerative arthritic changes  · 1/27/2018 CT scan of right hip, Mild degenerative arthritis right hip  No evidence of hip fracture  · Consult orthopedics    Can consider MRI of right hip  · Consult PT/OT after orthopedics evaluation  · Continue Robaxin 500mg PO 4 times daily  · Tyenol, prn for mild pain  · Tramadol, prn moderate pain  · Dilaudid IV, prn severe pain

## 2018-01-31 ENCOUNTER — APPOINTMENT (OUTPATIENT)
Dept: RADIOLOGY | Facility: HOSPITAL | Age: 78
DRG: 470 | End: 2018-01-31
Payer: MEDICARE

## 2018-01-31 ENCOUNTER — APPOINTMENT (INPATIENT)
Dept: NON INVASIVE DIAGNOSTICS | Facility: HOSPITAL | Age: 78
DRG: 470 | End: 2018-01-31
Payer: MEDICARE

## 2018-01-31 PROBLEM — S72.001A CLOSED DISPLACED FRACTURE OF RIGHT FEMORAL NECK (HCC): Status: ACTIVE | Noted: 2018-01-31

## 2018-01-31 PROBLEM — M16.9 OSTEOARTHRITIS OF HIP: Status: ACTIVE | Noted: 2018-01-31

## 2018-01-31 PROBLEM — I50.22 CHRONIC SYSTOLIC CONGESTIVE HEART FAILURE (HCC): Status: ACTIVE | Noted: 2018-01-30

## 2018-01-31 PROBLEM — R63.4 WEIGHT LOSS: Status: ACTIVE | Noted: 2018-01-31

## 2018-01-31 LAB
ABO GROUP BLD: NORMAL
ABO GROUP BLD: NORMAL
ANION GAP SERPL CALCULATED.3IONS-SCNC: 7 MMOL/L (ref 4–13)
ATRIAL RATE: 76 BPM
BLD GP AB SCN SERPL QL: NEGATIVE
BUN SERPL-MCNC: 23 MG/DL (ref 5–25)
CALCIUM SERPL-MCNC: 8.3 MG/DL (ref 8.3–10.1)
CHLORIDE SERPL-SCNC: 109 MMOL/L (ref 100–108)
CO2 SERPL-SCNC: 28 MMOL/L (ref 21–32)
CREAT SERPL-MCNC: 1.21 MG/DL (ref 0.6–1.3)
ERYTHROCYTE [DISTWIDTH] IN BLOOD BY AUTOMATED COUNT: 15 % (ref 11.6–15.1)
GFR SERPL CREATININE-BSD FRML MDRD: 43 ML/MIN/1.73SQ M
GLUCOSE SERPL-MCNC: 103 MG/DL (ref 65–140)
HCT VFR BLD AUTO: 31.6 % (ref 37–47)
HGB BLD-MCNC: 10.2 G/DL (ref 12–16)
MAGNESIUM SERPL-MCNC: 2.2 MG/DL (ref 1.6–2.6)
MCH RBC QN AUTO: 26.6 PG (ref 27–31)
MCHC RBC AUTO-ENTMCNC: 32.3 G/DL (ref 31.4–37.4)
MCV RBC AUTO: 83 FL (ref 82–98)
P AXIS: 52 DEGREES
PHOSPHATE SERPL-MCNC: 3.4 MG/DL (ref 2.3–4.1)
PLATELET # BLD AUTO: 171 THOUSANDS/UL (ref 130–400)
PMV BLD AUTO: 8.1 FL (ref 8.9–12.7)
POTASSIUM SERPL-SCNC: 3.6 MMOL/L (ref 3.5–5.3)
PR INTERVAL: 206 MS
QRS AXIS: 68 DEGREES
QRSD INTERVAL: 102 MS
QT INTERVAL: 426 MS
QTC INTERVAL: 479 MS
RBC # BLD AUTO: 3.82 MILLION/UL (ref 4.2–5.4)
RH BLD: POSITIVE
RH BLD: POSITIVE
SODIUM SERPL-SCNC: 144 MMOL/L (ref 136–145)
SPECIMEN EXPIRATION DATE: NORMAL
T WAVE AXIS: 36 DEGREES
VENTRICULAR RATE: 76 BPM
WBC # BLD AUTO: 7.2 THOUSAND/UL (ref 4.8–10.8)

## 2018-01-31 PROCEDURE — 99232 SBSQ HOSP IP/OBS MODERATE 35: CPT | Performed by: NURSE PRACTITIONER

## 2018-01-31 PROCEDURE — 0T9B70Z DRAINAGE OF BLADDER WITH DRAINAGE DEVICE, VIA NATURAL OR ARTIFICIAL OPENING: ICD-10-PCS | Performed by: FAMILY MEDICINE

## 2018-01-31 PROCEDURE — 93005 ELECTROCARDIOGRAM TRACING: CPT

## 2018-01-31 PROCEDURE — 99221 1ST HOSP IP/OBS SF/LOW 40: CPT | Performed by: INTERNAL MEDICINE

## 2018-01-31 PROCEDURE — 83735 ASSAY OF MAGNESIUM: CPT | Performed by: NURSE PRACTITIONER

## 2018-01-31 PROCEDURE — 93306 TTE W/DOPPLER COMPLETE: CPT

## 2018-01-31 PROCEDURE — 86920 COMPATIBILITY TEST SPIN: CPT

## 2018-01-31 PROCEDURE — 86850 RBC ANTIBODY SCREEN: CPT | Performed by: ORTHOPAEDIC SURGERY

## 2018-01-31 PROCEDURE — 99222 1ST HOSP IP/OBS MODERATE 55: CPT | Performed by: ORTHOPAEDIC SURGERY

## 2018-01-31 PROCEDURE — 80048 BASIC METABOLIC PNL TOTAL CA: CPT | Performed by: NURSE PRACTITIONER

## 2018-01-31 PROCEDURE — 73502 X-RAY EXAM HIP UNI 2-3 VIEWS: CPT

## 2018-01-31 PROCEDURE — 93306 TTE W/DOPPLER COMPLETE: CPT | Performed by: INTERNAL MEDICINE

## 2018-01-31 PROCEDURE — 84100 ASSAY OF PHOSPHORUS: CPT | Performed by: NURSE PRACTITIONER

## 2018-01-31 PROCEDURE — 85027 COMPLETE CBC AUTOMATED: CPT | Performed by: NURSE PRACTITIONER

## 2018-01-31 PROCEDURE — 86901 BLOOD TYPING SEROLOGIC RH(D): CPT | Performed by: ORTHOPAEDIC SURGERY

## 2018-01-31 PROCEDURE — 86900 BLOOD TYPING SEROLOGIC ABO: CPT | Performed by: ORTHOPAEDIC SURGERY

## 2018-01-31 PROCEDURE — 93010 ELECTROCARDIOGRAM REPORT: CPT | Performed by: INTERNAL MEDICINE

## 2018-01-31 RX ORDER — POTASSIUM CHLORIDE 20 MEQ/1
40 TABLET, EXTENDED RELEASE ORAL ONCE
Status: COMPLETED | OUTPATIENT
Start: 2018-01-31 | End: 2018-01-31

## 2018-01-31 RX ADMIN — Medication 30 MG: at 08:41

## 2018-01-31 RX ADMIN — METOPROLOL SUCCINATE 100 MG: 100 TABLET, FILM COATED, EXTENDED RELEASE ORAL at 08:41

## 2018-01-31 RX ADMIN — AMLODIPINE BESYLATE 2.5 MG: 2.5 TABLET ORAL at 08:41

## 2018-01-31 RX ADMIN — METHOCARBAMOL 500 MG: 500 TABLET ORAL at 08:41

## 2018-01-31 RX ADMIN — RANOLAZINE 500 MG: 500 TABLET, FILM COATED, EXTENDED RELEASE ORAL at 08:41

## 2018-01-31 RX ADMIN — HYDROMORPHONE HYDROCHLORIDE 0.5 MG: 1 INJECTION, SOLUTION INTRAMUSCULAR; INTRAVENOUS; SUBCUTANEOUS at 10:47

## 2018-01-31 RX ADMIN — HYDROMORPHONE HYDROCHLORIDE 0.5 MG: 1 INJECTION, SOLUTION INTRAMUSCULAR; INTRAVENOUS; SUBCUTANEOUS at 00:01

## 2018-01-31 RX ADMIN — TRAMADOL HYDROCHLORIDE 50 MG: 50 TABLET, FILM COATED ORAL at 15:16

## 2018-01-31 RX ADMIN — EZETIMIBE 10 MG: 10 TABLET ORAL at 22:06

## 2018-01-31 RX ADMIN — SPIRONOLACTONE 25 MG: 25 TABLET, FILM COATED ORAL at 08:41

## 2018-01-31 RX ADMIN — METHOCARBAMOL 500 MG: 500 TABLET ORAL at 11:40

## 2018-01-31 RX ADMIN — OXYCODONE HYDROCHLORIDE AND ACETAMINOPHEN 1000 MG: 500 TABLET ORAL at 08:41

## 2018-01-31 RX ADMIN — LEVOTHYROXINE SODIUM 50 MCG: 50 TABLET ORAL at 06:10

## 2018-01-31 RX ADMIN — POTASSIUM CHLORIDE 40 MEQ: 1500 TABLET, EXTENDED RELEASE ORAL at 08:55

## 2018-01-31 RX ADMIN — TOLTERODINE TARTRATE 2 MG: 2 CAPSULE, EXTENDED RELEASE ORAL at 08:41

## 2018-01-31 RX ADMIN — ENOXAPARIN SODIUM 30 MG: 30 INJECTION SUBCUTANEOUS at 08:42

## 2018-01-31 RX ADMIN — TICAGRELOR 90 MG: 90 TABLET ORAL at 08:42

## 2018-01-31 RX ADMIN — METHOCARBAMOL 500 MG: 500 TABLET ORAL at 17:23

## 2018-01-31 RX ADMIN — TRAMADOL HYDROCHLORIDE 50 MG: 50 TABLET, FILM COATED ORAL at 08:49

## 2018-01-31 RX ADMIN — HYDROXYCHLOROQUINE SULFATE 200 MG: 200 TABLET, FILM COATED ORAL at 08:42

## 2018-01-31 RX ADMIN — ASPIRIN 81 MG: 81 TABLET, COATED ORAL at 08:41

## 2018-01-31 RX ADMIN — METOPROLOL SUCCINATE 50 MG: 50 TABLET, FILM COATED, EXTENDED RELEASE ORAL at 17:23

## 2018-01-31 RX ADMIN — TORSEMIDE 10 MG: 10 TABLET ORAL at 08:41

## 2018-01-31 RX ADMIN — METHOCARBAMOL 500 MG: 500 TABLET ORAL at 22:06

## 2018-01-31 RX ADMIN — CHOLECALCIFEROL TAB 25 MCG (1000 UNIT) 2000 UNITS: 25 TAB at 08:41

## 2018-01-31 RX ADMIN — RANOLAZINE 500 MG: 500 TABLET, FILM COATED, EXTENDED RELEASE ORAL at 22:06

## 2018-01-31 RX ADMIN — DOCUSATE SODIUM 100 MG: 100 CAPSULE, LIQUID FILLED ORAL at 08:41

## 2018-01-31 RX ADMIN — DOCUSATE SODIUM 100 MG: 100 CAPSULE, LIQUID FILLED ORAL at 17:23

## 2018-01-31 NOTE — ASSESSMENT & PLAN NOTE
· Current blood pressure 171/71  · Continue home medications of Norvasc, metoprolol, Spirolactone and torsemide

## 2018-01-31 NOTE — ASSESSMENT & PLAN NOTE
Seen on imaging  Orthopedics following  Planned for hemiarthroplasty tomorrow am   Pain control: Tylenol, Tramadol, and Dilaudid IV prn plus Robaxin QID and ice packs

## 2018-01-31 NOTE — ASSESSMENT & PLAN NOTE
· Appears euvolemic  · Continue metoprolol  · Will hold Torsemide and Spirolactone tomorrow am for anticipated OR procedure   · Pending cardiac consultation   · Monitor on continuous telemetry monitoring  · Ensure electrolytes are stable

## 2018-01-31 NOTE — PROGRESS NOTES
Progress Note - Marv Ground 1940, 65 y o  female MRN: [id number]    Unit/Bed#: [address] [place] [address] Encounter: [id number]    Primary Care Provider: David Fox MD   Date and time admitted to hospital: 1/30/2018  4:31 PM        * Right hip pain   Assessment & Plan    · She states she fell on Saturday due to uneven ground  Denies LOC  She c/o of right groin pain that radiates into right thigh  · 1/27/2018  Right hip x-ray, No acute osseous abnormality  Degenerative arthritic changes  · 1/27/2018 CT scan of right hip, Mild degenerative arthritis right hip  No evidence of hip fracture  · Consult orthopedics  Can consider MRI of right hip  · Consult PT/OT after orthopedics evaluation  · Continue Robaxin 500mg PO 4 times daily  · Tyenol, prn for mild pain  · Tramadol, prn moderate pain  · Dilaudid IV, prn severe pain        Hypertensive urgency   Assessment & Plan    · Current /71 could be compounded 2/2 to her pain  · Will give evening HTN medications  · Ordered hydralazine, prn for a SBP>180mmHg        CAD (coronary artery disease)   Assessment & Plan    · With prior CABg with some occluded grafts  · Continue aspirin, Brilinta, metoprolol, zetia, and Co Q10        Chronic congestive heart failure ([address] )   Assessment & Plan    · She looks euvolemic  · Continue metoprolol, torsemide and Spirolactone        Heart failure, acute on chronic, systolic and diastolic (HCC)   Assessment & Plan    · She looks euvolemic  · Continue torsemide and spironolactone        Hypothyroid   Assessment & Plan    · S/p partial thyroidectomy  · 6/5/2017 TSH 2 290  · Continue levothyroxine         Hypertension   Assessment & Plan    · Current blood pressure 171/71  · Continue home medications of Norvasc, metoprolol, Spirolactone and torsemide        Hyperlipidemia   Assessment & Plan    · She is intolerant to statins    Continue Zetia and Co Q10  · 10/9/2017 lipid panel showed cholesterol 187, triglycerides 137, HDL 55 and LDL 92        Chronic kidney disease (CKD)   Assessment & Plan    · CKD stage 3 with baseline creatinine 1 5-1 6  · Current creatinine 1 69  · She follows with outpatient nephrologist, Dr Jarad Soto        Acute MI, true posterior wall [facility])   Assessment & Plan    · 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  S/p thrombectomy with PCI, XOCHILT to OM          VTE Prophylaxis: Enoxaparin (Lovenox)  / sequential compression device   Code Status: full  POLST: POLST form is not discussed and not completed at this time  Discussion with family: spoke with patient and family member at bedside    Anticipated Length of Stay:  Patient will be admitted on an Observation basis with an anticipated length of stay of  <2 midnights  Justification for Hospital Stay:  Right hip/groin pain requiring further investigation    Total Time for Visit, including Counseling / Coordination of Care: 30 minutes  Greater than 50% of this total time spent on direct patient counseling and coordination of care  Chief Complaint:   Right hip/groin pain    History of Present Illness:    Anastasia Quiroga is a 65 y o  female with a PMH of systolic/diastolic CHF, CAD with prior CABG with some occluded grafts, posterior MI, hypothyroidism s/p partial thyroidectomy, CKD stage 3, hypertension, anemia, HLD, vitamin-D deficiency, history of pancreatic lesion that had a negative endoscopy who presents with right hip/groin pain s/p fall on Saturday  She states she fell on Saturday due to uneven ground and landed on her right side  She denies LOC, dizziness or lightheadedness  She states she was driven to [facility] on Saturday where she had a negative x-ray of the right hip and a negative CT scan of the right hip  Today she states she was able to get up and shower and do some morning chores with minimal to mild pain but as the day progressed the pain increased and she was unable to ambulate      Review of Systems:    Review of Systems   Constitutional: Positive for activity change  Negative for appetite change, chills, diaphoresis, fatigue and fever  HENT: Negative for congestion, drooling, facial swelling, hearing loss, mouth sores, postnasal drip, rhinorrhea, sinus pain, sinus pressure, sore throat, trouble swallowing and voice change  Respiratory: Negative for cough, choking, chest tightness, shortness of breath and wheezing  Cardiovascular: Negative for chest pain, palpitations and leg swelling  Gastrointestinal: Positive for constipation  Negative for abdominal distention, abdominal pain, diarrhea, nausea and vomiting  Genitourinary: Positive for frequency  Negative for dysuria, flank pain, hematuria and urgency  Musculoskeletal: Positive for arthralgias and gait problem  Negative for neck pain and neck stiffness  Skin: Negative for color change, pallor, rash and wound  Neurological: Negative for dizziness, tremors, facial asymmetry, weakness, light-headedness and headaches  Psychiatric/Behavioral: Negative for agitation, behavioral problems, sleep disturbance and suicidal ideas  Past Medical and Surgical History:     Past Medical History:   Diagnosis Date    Anemia     Bladder calculi     CAD (coronary artery disease)     Status post CABG and PTCA to OM1    CH[facility]congestive heart failure) [facility])     CKD (chronic kidney disease), stage III     Hypertension     Hypothyroidism     Uterine prolapse        Past Surgical History:   Procedure Laterality Date    BLADDER SURGERY      CARDIAC SURGERY      valve replacement       Meds/Allergies:    Prior to Admission medications    Medication Sig Start Date End Date Taking?  Authorizing Provider   ibuprofen (MOTRIN) 200 mg tablet Take by mouth every 6 (six) hours as needed for mild pain   Yes Historical Provider, MD   UNKNOWN TO PATIENT    Yes Historical Provider, MD   amLODIPine (NORVASC) 2 5 mg tablet Take 1 tablet by mouth daily 8/2/17 MAYUR Christensen   Ascorbic Acid (VITAMIN C) 1000 MG tablet Take 1,000 mg by mouth daily    Historical Provider, MD   aspirin (ECOTRIN LOW STRENGTH) 81 mg EC tablet Take 81 mg by mouth daily Indications: Heart Attack  Historical Provider, MD   Coenzyme Q10 10 MG capsule Take 10 mg by mouth daily    Historical Provider, MD   hydroxychloroquine (PLAQUENIL) 200 mg tablet Take 200 mg by mouth daily      Historical Provider, MD   levothyroxine 50 mcg tablet Take 50 mcg by mouth daily  Historical Provider, MD   methocarbamol (ROBAXIN) 500 mg tablet Take 1 tablet (500 mg total) by mouth 4 (four) times a day 1/27/18   Samara Ferrer,    metoprolol succinate (TOPROL-XL) 100 mg 24 hr tablet Take 1 tablet by mouth every 24 hours 8/2/17   MAYUR Christensen   metoprolol succinate (TOPROL-XL) 50 mg 24 hr tablet Take 1 tablet by mouth daily 8/2/17   MAYUR Christensen   Multiple Vitamins-Minerals (CENTRUM SILVER PO) Take 1 tablet by mouth daily    Historical Provider, MD   nitroglycerin (NITROLINGUAL) 0 4 mg/spray spray Place 1 spray under the tongue every 5 (five) minutes as needed for chest pain    Historical Provider, MD   raloxifene (EVISTA) 60 mg tablet Take 60 mg by mouth daily    Historical Provider, MD   ranolazine (RANEXA) 500 mg 12 hr tablet Take 500 mg by mouth daily    Historical Provider, MD   solifenacin (VESICARE) 10 MG tablet Take 5 mg by mouth daily    Historical Provider, MD   spironolactone (ALDACTONE) 25 mg tablet Take 25 mg by mouth every other day    Historical Provider, MD   ticagrelor (BRILINTA) 90 MG Take 1 tablet by mouth 2 (two) times a day 8/2/17   MAYUR Christensen   torsemide (DEMADEX) 10 mg tablet Take 10 mg by mouth daily    Historical Provider, MD   Vitamin D, Cholecalciferol, 1000 units CAPS Take 2,000 Units by mouth 2 (two) times a day    Historical Provider, MD LADD have reviewed home medications with patient personally  Allergies:    Allergies   Allergen Reactions    Atorvastatin Hives    Boniva [Ibandronic Acid] Hives    Codeine      codeine derivatives     Statins Hives       Social History:     Marital Status: /Civil Union   Occupation:  Retired  Patient Pre-hospital Living Situation:  Lives alone  Patient Pre-hospital Level of Mobility:  Was ambulatory before fall now uses walker  Patient Pre-hospital Diet Restrictions:  None  Substance Use History:   History   Alcohol Use No     History   Smoking Status    Never Smoker   Smokeless Tobacco    Never Used     History   Drug Use No       Family History:    History reviewed  No pertinent family history  Physical Exam:     Vitals:   Blood Pressure: (!) 171/71 (01/30/18 1800)  Pulse: 64 (01/30/18 1800)  Temperature: 98 2 °F (36 8 °C) (01/30/18 1636)  Temp Source: Oral (01/30/18 1636)  Respirations: 22 (01/30/18 1636)  Height: 5' 4" (162 6 cm) (01/30/18 1636)  Weight - Scale: 68 9 kg (152 lb) (01/30/18 1636)  SpO2: 100 % (01/30/18 1800)    Physical Exam   Constitutional: She is oriented to person, place, and time  She appears well-developed and well-nourished  No distress  HENT:   Head: Normocephalic and atraumatic  Neck: Normal range of motion  Neck supple  Cardiovascular: Normal rate and regular rhythm  Exam reveals no gallop and no friction rub  Murmur heard  Pulmonary/Chest: Effort normal and breath sounds normal  No respiratory distress  She has no wheezes  She has no rales  She exhibits no tenderness  Abdominal: Soft  Bowel sounds are normal  She exhibits no distension and no mass  There is no tenderness  There is no rebound and no guarding  Musculoskeletal: She exhibits tenderness  She exhibits no edema or deformity  Tenderness noted in right groin that radiates into right thigh with any right leg movement  Neurological: She is alert and oriented to person, place, and time  Skin: Skin is warm and dry  No rash noted  She is not diaphoretic  No erythema  No pallor     Ecchymotic areas noted on right thigh/ right buttock   Psychiatric: She has a normal mood and affect  Her behavior is normal  Judgment and thought content normal          Additional Data:     Lab Results: I have personally reviewed pertinent reports  Results from last 7 days  Lab Units 01/30/18  1700   WBC Thousand/uL 10 70   HEMOGLOBIN g/dL 12 2   HEMATOCRIT % 37 8   PLATELETS Thousands/uL 215   NEUTROS PCT % 77*   LYMPHS PCT % 12*   MONOS PCT % 9   EOS PCT % 1       Results from last 7 days  Lab Units 01/30/18  1700   SODIUM mmol/L 140   POTASSIUM mmol/L 3 4*   CHLORIDE mmol/L 102   CO2 mmol/L 24   BUN mg/dL 31*   CREATININE mg/dL 1 69*   CALCIUM mg/dL 9 2   TOTAL PROTEIN g/dL 7 2   BILIRUBIN TOTAL mg/dL 0 60   ALK PHOS U/L 68   ALT U/L 27   AST U/L 24   GLUCOSE RANDOM mg/dL 127           Imaging: I have personally reviewed pertinent reports  No orders to display       EKG, Pathology, and Other Studies Reviewed on Admission:   · EKG: na    Allscripts / Epic Records Reviewed: Yes     ** Please Note: This note has been constructed using a voice recognition system   **

## 2018-01-31 NOTE — ASSESSMENT & PLAN NOTE
· She states she fell on Saturday due to uneven ground  · Repeat right hip xray reveals an impacted subcapital right femoral neck fracture of right hip  · Consult PT/OT after orthopedics evaluation  · Continue Robaxin 500mg PO 4 times daily  · Tyenol, prn for mild pain  · Tramadol, prn moderate pain  · Dilaudid IV, prn severe pain

## 2018-01-31 NOTE — ASSESSMENT & PLAN NOTE
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

## 2018-01-31 NOTE — ASSESSMENT & PLAN NOTE
Suspect this is due to acute right hip pain, improving  · Continue pain control   · Continue Norvasc, Toprol  mg in am and 50 mg in pm  · Ordered hydralazine, prn for a SBP>180mmHg

## 2018-01-31 NOTE — CONSULTS
Consultation - Deepthi Kaylah 65 y o  female MRN: [id number]  Unit/Bed#: 2 [place] [address] Encounter: [id number]      Assessment/Plan     Assessment:  1) Right Groin Pain - s/p fall, ecchymosis present, mild anemia, no overt fracture on CT or x-ray, pain not well controlled with movement and weightbearing activity, neurovascularly in tact, pain controlled at rest   2) Unexpected weight loss - 172 to 156, some increase in activity due to cardiac rehab but otherwise no intent to lose weight or dietary changes, PCP is aware, colonoscopy was performed approximately 4 years ago was not found have any colonic abnormalities, many years ago left oophorectomy took place in order to remove some sort of lesion/growth per patient, had an MRI of abdomen because they thought something was on her pancreas approximately 1 year ago but found nothing conclusive or poor in on MRI, gets repeated mammograms last 1 was March of last year and was found to be BI-RADS 2 negative for any high risk malignancy, patient denies any dysuria or hematuria, patient denies any bright red blood per rectum with bowel movements, patient denies any abnormalities that she notes on her scan  Plan:  1)   - no acute surgical intervention  - no hip fracture based on CT or x-ray imaging,  - in light of the fact that she has no palpable bony tenderness anywhere around the hip with the exception of the inferior pubic rami would not suspect fracture  - if there were fracture noted on any other imaging in the future it would likely be non operative will not change the management course currently  - physical therapy  - activity as tolerated  - ice to the area of tenderness  - p r n   Analgesia  - discussed possibility of skilled nursing facility temporarily in order to get more adequate physical therapy and have adequate social structure rather than going straight to home, patient does not pose patient is not for either at this point would like to talk with family members  2)   - must follow up with Family Practice for routine screening to rule out malignancy  - likely should have repeat colonoscopy since uncertain of exact time frame if any polyps had been found with positive suspicious pathology should be following up within 5 years  - repeat mammogram, almost coming up on 1 year repeat any way  - possible need for cancer marker CEA,  CA 19 9, and  as an outpatient  - management per slim     History of Present Illness   Physician Requesting Consult: Christ Simpson DO  Reason for Consult / Principal Problem: right hip pain    HPI: Anthony Montelongo is a 65y o  year old female past medical history pertinent for hypertension, CKD, CHF, coronary artery disease, bladder surgery, oophorectomy, status post fall Saturday 1/27/18 presenting to the orthopedic surgery service with right hip pain  Patient reports that on Saturday she was headed up to [facility] with her daughter in order to get some dental work done for her daughter  When patient went to exit the car her foot was on the curb but there must have been some grass and/or foliage growing out of the sidewalk which caused her to trip towards her right-sided and land on her right hip  Patient reports that shortly after fall she was able to get up and ambulate and despite some soreness she had felt relatively fine  Patient went to bed on Saturday and Sunday and was able to sleep through the night without much pain  Abelardo morning into Monday when the pain started did exacerbate  Patient tried remedies with hot pad but with no relief and, in fact patient feels that with hot pad her pain began to worsen  Patient reports that since Sunday afternoon she has been experiencing a sharp, stabbing, severe, radiating pain in her right groin that radiates down the anterior thigh to the superior portion of her knee when he gets very severe    Patient denies being able to ambulate or flex her hip secondary to the pain  Patient is noted prior has tried hot pad and p r n  analgesic medications with no success the only thing that makes her hip pain resolved his rest   Patient otherwise denies hitting her head, losing consciousness, seizure-like activity, syncopal like episode pertaining to her fall  Patient denies any neurovascular status changes such as numbness, tingling, lack of motor movement, lack of sensation from her knee down  Patient as of this point also reports that she has had a tough year with her  passing and heart problems she currently experience with need for CABG in July  Patient reports that she has lost almost 30 lb over the course of the year which was unintended weight loss  Patient reports normal mammogram last year, normal colonoscopy approximately 4 years ago, an MRI of her abdomen which showed negative for any pancreas issues this last year, and denies any other history of cancer  Inpatient consult to Orthopedic Surgery  Consult performed by: Mulugeta Myles ordered by: Ruth Bagley          Review of Systems   Constitutional: Positive for activity change (little increase with cardiac rehab) and unexpected weight change  Negative for appetite change, chills and fever  Respiratory: Negative for apnea, chest tightness, shortness of breath and wheezing  Cardiovascular: Negative for chest pain and palpitations  Gastrointestinal: Negative for abdominal distention, abdominal pain, blood in stool, diarrhea, nausea, rectal pain and vomiting  Genitourinary: Negative for difficulty urinating, dysuria, flank pain and hematuria  Musculoskeletal: Positive for arthralgias and gait problem  Skin: Positive for color change (ecchymosis)  Neurological: Negative for seizures, syncope, weakness and numbness         Historical Information   Past Medical History:   Diagnosis Date    Anemia     Arthritis     Bladder calculi     CAD (coronary artery disease)     Status post CABG and PTCA to OM1    CHF (congestive heart failure) (HCC)     CKD (chronic kidney disease) stage 3, GFR 30-59 ml/min     CKD (chronic kidney disease), stage III     Hypertension     Hypothyroidism     Uterine prolapse      Past Surgical History:   Procedure Laterality Date    BACK SURGERY      BLADDER SURGERY      CARDIAC SURGERY      valve replacement    CORONARY ANGIOPLASTY WITH STENT PLACEMENT      CORONARY ARTERY BYPASS GRAFT      OOPHORECTOMY Left      Social History   History   Alcohol Use No     History   Drug Use No     History   Smoking Status    Never Smoker   Smokeless Tobacco    Never Used     Family History: non-contributory    Meds/Allergies   all current active meds have been reviewed and current meds:   Current Facility-Administered Medications   Medication Dose Route Frequency    acetaminophen (TYLENOL) tablet 650 mg  650 mg Oral Q6H PRN    amLODIPine (NORVASC) tablet 2 5 mg  2 5 mg Oral Daily    ascorbic acid (VITAMIN C) tablet 1,000 mg  1,000 mg Oral Daily    aspirin (ECOTRIN LOW STRENGTH) EC tablet 81 mg  81 mg Oral Daily    cholecalciferol (VITAMIN D3) tablet 2,000 Units  2,000 Units Oral Daily    co-enzyme Q-10 capsule 30 mg  30 mg Oral Daily    docusate sodium (COLACE) capsule 100 mg  100 mg Oral BID    enoxaparin (LOVENOX) subcutaneous injection 30 mg  30 mg Subcutaneous Daily    ezetimibe (ZETIA) tablet 10 mg  10 mg Oral HS    HYDROmorphone (DILAUDID) injection 0 5 mg  0 5 mg Intravenous Q6H PRN    hydroxychloroquine (PLAQUENIL) tablet 200 mg  200 mg Oral Daily    levothyroxine tablet 50 mcg  50 mcg Oral Early Morning    methocarbamol (ROBAXIN) tablet 500 mg  500 mg Oral 4x Daily    metoprolol succinate (TOPROL-XL) 24 hr tablet 100 mg  100 mg Oral QAM    metoprolol succinate (TOPROL-XL) 24 hr tablet 50 mg  50 mg Oral QPM    nitroglycerin (NITROSTAT) SL tablet 0 4 mg  0 4 mg Sublingual Q5 Min PRN    raloxifene (EVISTA) tablet 60 mg  60 mg Oral Daily    ranolazine (RANEXA) 12 hr tablet 500 mg  500 mg Oral Q12H [address]    spironolactone (ALDACTONE) tablet 25 mg  25 mg Oral Every Other Day    ticagrelor (BRILINTA) tablet 90 mg  90 mg Oral BID    tolterodine (DETROL LA) 24 hr capsule 2 mg  2 mg Oral Daily    torsemide (DEMADEX) tablet 10 mg  10 mg Oral Daily    traMADol (ULTRAM) tablet 50 mg  50 mg Oral Q6H PRN     Allergies   Allergen Reactions    Atorvastatin Hives    Boniva [Ibandronic Acid] Hives    Codeine      codeine derivatives     Statins Hives       Objective   Vitals: Blood pressure 154/78, pulse 68, temperature 98 2 °F (36 8 °C), temperature source Oral, resp  rate 16, height 5' 4" (1 626 m), weight 68 9 kg (152 lb), SpO2 94 %, not currently breastfeeding  ,Body mass index is 26 09 kg/m²  Intake/Output Summary (Last 24 hours) at 01/31/18 0950  Last data filed at 01/31/18 0709   Gross per 24 hour   Intake              360 ml   Output              200 ml   Net              160 ml     I/O last 24 hours: In: 360 [P O :360]  Out: 200 [Urine:200]    Invasive Devices     Peripheral Intravenous Line            Peripheral IV 01/30/18 Left Antecubital less than 1 day                Physical Exam   Constitutional: She is oriented to person, place, and time  Vital signs are normal  She appears well-developed and well-nourished  She is cooperative  She does not have a sickly appearance  She does not appear ill  No distress  She is not intubated  HENT:   Head: Atraumatic  Right Ear: Hearing and external ear normal    Left Ear: Hearing and external ear normal    Nose: Nose normal    Cardiovascular: Normal rate  Pulses:       Radial pulses are 2+ on the right side, and 2+ on the left side  Dorsalis pedis pulses are 2+ on the right side, and 2+ on the left side  Pulmonary/Chest: Effort normal  No accessory muscle usage  No apnea, no tachypnea and no bradypnea  She is not intubated  No respiratory distress     Musculoskeletal:        Right knee: She exhibits no effusion  Left knee: She exhibits no effusion  Neurological: She is alert and oriented to person, place, and time  She has normal strength  No sensory deficit  Right Ankle Exam   Right ankle exam is normal   Swelling: none    Tenderness   The patient is experiencing no tenderness  Range of Motion   The patient has normal right ankle ROM  Muscle Strength   The patient has normal right ankle strength  Other   Erythema: absent  Sensation: normal  Pulse: present       Left Ankle Exam   Left ankle exam is normal   Swelling: none    Tenderness   The patient is experiencing no tenderness  Range of Motion   The patient has normal left ankle ROM  Muscle Strength   The patient has normal left ankle strength  Other   Erythema: absent  Sensation: normal  Pulse: present      Right Knee Exam     Tenderness   The patient is experiencing no tenderness  Range of Motion   Extension: abnormal Right knee extension: limited due to tenderness in hip  Flexion: abnormal     Other   Erythema: absent  Sensation: normal  Swelling: none  Other tests: no effusion present      Left Knee Exam   Left knee exam is normal     Range of Motion   The patient has normal left knee ROM  Muscle Strength     The patient has normal left knee strength  Other   Erythema: absent  Sensation: normal  Swelling: none  Effusion: no effusion present      Right Hip Exam     Tenderness   Right hip tenderness location: tenderness in right groin by inferior pubic rami  Range of Motion   Extension: abnormal   Flexion: abnormal   Internal Rotation: abnormal   External Rotation: abnormal   Abduction: abnormal Right hip abduction: abnormal secondary to pain  Adduction: abnormal     Muscle Strength   Right hip normal muscle strength: deferred due pain    Abduction: 0/5   Adduction: 0/5   Flexion: 0/5     Other   Erythema: absent  Sensation: normal    Comments:  - log roll test, externally rotated but not shortened  - no palpable bone tenderness   - ecchymosis present in groin and gluteal regions      Left Hip Exam   Left hip exam is normal     Tenderness   The patient is experiencing no tenderness  Range of Motion   The patient has normal left hip ROM  Muscle Strength   The patient has normal left hip strength  Other   Erythema: absent  Sensation: normal            Lab Results:   I have personally reviewed pertinent lab results  CBC:   Lab Results   Component Value Date    WBC 7 20 01/31/2018    HGB 10 2 (L) 01/31/2018    HCT 31 6 (L) 01/31/2018    MCV 83 01/31/2018     01/31/2018    MCH 26 6 (L) 01/31/2018    MCHC 32 3 01/31/2018    RDW 15 0 01/31/2018    MPV 8 1 (L) 01/31/2018    NRBC 0 01/30/2018     CMP:   Lab Results   Component Value Date     01/31/2018     (H) 01/31/2018    CO2 28 01/31/2018    ANIONGAP 7 01/31/2018    BUN 23 01/31/2018    CREATININE 1 21 01/31/2018    GLUCOSE 103 01/31/2018    CALCIUM 8 3 01/31/2018    AST 24 01/30/2018    ALT 27 01/30/2018    ALKPHOS 68 01/30/2018    PROT 7 2 01/30/2018    BILITOT 0 60 01/30/2018    EGFR 43 01/31/2018     Imaging Studies: I have personally reviewed pertinent reports  EKG, Pathology, and Other Studies: I have personally reviewed pertinent reports  VTE Prophylaxis: Enoxaparin (Lovenox)    Code Status: Level 1 - Full Code    Counseling / Coordination of Care  Total floor / unit time spent today 45 minutes  Greater than 50% of total time was spent with the patient and / or family counseling and / or coordination of care  A description of the counseling / coordination of care:  Reviewing chart, reviewing imaging, coordinating with SLIM, developing plan, reviewing with attending, patient education, evaluating patient

## 2018-01-31 NOTE — PROGRESS NOTES
Progress Note - Laray Route 1940, 65 y o  female MRN: [id number]    Unit/Bed#: [facility] Encounter: [id number]    Primary Care Provider: Dang Cesar MD   Date and time admitted to hospital: 1/30/2018  4:31 PM        * Closed displaced fracture of right femoral neck [facility])   Assessment & Plan    Patient presented to ED on 1/27 s/p fall resulting in right hip injury and pain   Workup in the ED included right hip xray and CT which were negative for any acute osseous injury and patient sent home   However, patient's pain and ambulatory dysfunction worsened at home and she presented back in the ED on 1/30   · Repeat x-rays of the right hip revealed an impacted subcapital fracture of the right hip  · Orthopedics following, appreciate input  · Plan for right hip hemiarthroplasty on 2/1/18 by Dr Alix Potts   · Discontinued Aspirin  · Holding Lovenox   · Heller catheter inserted  · NPO after midnight   · Cardiology consultation for preoperative clearance in the setting of prior quadruple bypass surgery, hypertension, CAD, and recent stent placement in August of 2017  · Type and screen  · PT/OT eval and treat  · Pain control with Tylenol, Tramadol, and Dilaudid IV plus Robaxin         Osteoarthritis of hip   Assessment & Plan    Seen on imaging  Orthopedics following  Planned for hemiarthroplasty tomorrow am   Pain control: Tylenol, Tramadol, and Dilaudid IV prn plus Robaxin QID and ice packs         Right hip pain   Assessment & Plan    · She states she fell on Saturday due to uneven ground  · Repeat right hip xray reveals an impacted subcapital right femoral neck fracture of right hip  · Consult PT/OT after orthopedics evaluation  · Continue Robaxin 500mg PO 4 times daily  · Tyenol, prn for mild pain  · Tramadol, prn moderate pain  · Dilaudid IV, prn severe pain        Hypertensive urgency   Assessment & Plan    Suspect this is due to acute right hip pain, improving  · Continue pain control   · Continue Norvasc, Toprol  mg in am and 50 mg in pm  · Ordered hydralazine, prn for a SBP>180mmHg        CAD (coronary artery disease)   Assessment & Plan    With prior CABG with some occluded grafts  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  S/p thrombectomy with PCI, XOCHILT to OM  · Continue aspirin, Brilinta, metoprolol, zetia, and Co Q10  · Pending cardiac consultation for preoperative clearance         Chronic systolic congestive heart failure (HCC)   Assessment & Plan    · Appears euvolemic  · Continue metoprolol  · Will hold Torsemide and Spirolactone tomorrow am for anticipated OR procedure   · Pending cardiac consultation   · Monitor on continuous telemetry monitoring  · Ensure electrolytes are stable         Chronic kidney disease (CKD)   Assessment & Plan    · CKD stage 3 with baseline creatinine 1 5-1 6  · Creatinine improving, 1 69 -> 1 21  · She follows with outpatient nephrologist, Dr Javed Thorne    · S/p partial thyroidectomy  · 6/5/2017 TSH 2 290  · Continue levothyroxine         Hyperlipidemia   Assessment & Plan    · She is intolerant to statins  Continue Zetia and Co Q10  · 10/9/2017 lipid panel showed cholesterol 187, triglycerides 137, HDL 55 and LDL 92            VTE Pharmacologic Prophylaxis:   Pharmacologic: Enoxaparin (Lovenox) - given today  Holding for tomorrow in anticipation of OR   Mechanical VTE Prophylaxis in Place: Yes    Patient Centered Rounds: I have performed bedside rounds with nursing staff today  Discussions with Specialists or Other Care Team Provider: Nursing, CM, orthopedics note appreciated     Education and Discussions with Family / Patient: I have answered all questions to the best of my ability  Family at bedside  Time Spent for Care: 20 minutes  More than 50% of total time spent on counseling and coordination of care as described above      Current Length of Stay: 0 day(s)    Current Patient Status: Inpatient   Certification Statement: The patient will continue to require additional inpatient hospital stay due to displaced right femoral neck fracture     Discharge Plan: Patient is not medically stable for discharge today due to OR in am      Code Status: Level 1 - Full Code      Subjective:   Continues with severe right hip pain, worse with movement  Very limited ROM  Unable to bear weight  Right groin bruising  Appetite is fair  Denies HA, CP, SOB, abd pain, N/V/D  Objective:     Vitals:   Temp (24hrs), Av 3 °F (32 9 °C), Min:60 8 °F (16 °C), Max:99 8 °F (37 7 °C)    HR:  [64-70] 67  Resp:  [16-18] 18  BP: (149-171)/(68-78) 154/70  SpO2:  [93 %-100 %] 93 %  Body mass index is 26 09 kg/m²  Input and Output Summary (last 24 hours): Intake/Output Summary (Last 24 hours) at 18 1756  Last data filed at 18 1627   Gross per 24 hour   Intake              360 ml   Output             3700 ml   Net            -3340 ml       Physical Exam:     Physical Exam   Constitutional: She is oriented to person, place, and time  She appears well-developed  No distress  HENT:   Head: Normocephalic  Neck: Normal range of motion  Cardiovascular: Normal rate and regular rhythm  Pulmonary/Chest: Effort normal and breath sounds normal  No respiratory distress  She has no wheezes  She has no rhonchi  She has no rales  Abdominal: Soft  Bowel sounds are normal  She exhibits no distension  There is no tenderness  Musculoskeletal: She exhibits tenderness (severe pain with right leg ROM, unable to perform straight leg raise due to pain )  She exhibits no edema  Neurological: She is alert and oriented to person, place, and time  Skin: Skin is warm and dry  No rash noted  She is not diaphoretic  Right groin bruising   Psychiatric: She has a normal mood and affect  Judgment normal    Nursing note and vitals reviewed        Additional Data:     Labs:      Results from last 7 days  Lab Units 01/31/18  0444 01/30/18  1700   WBC Thousand/uL 7 20 10 70   HEMOGLOBIN g/dL 10 2* 12 2   HEMATOCRIT % 31 6* 37 8   PLATELETS Thousands/uL 171 215   NEUTROS PCT %  --  77*   LYMPHS PCT %  --  12*   MONOS PCT %  --  9   EOS PCT %  --  1       Results from last 7 days  Lab Units 01/31/18  0444 01/30/18  1700   SODIUM mmol/L 144 140   POTASSIUM mmol/L 3 6 3 4*   CHLORIDE mmol/L 109* 102   CO2 mmol/L 28 24   BUN mg/dL 23 31*   CREATININE mg/dL 1 21 1 69*   CALCIUM mg/dL 8 3 9 2   TOTAL PROTEIN g/dL  --  7 2   BILIRUBIN TOTAL mg/dL  --  0 60   ALK PHOS U/L  --  68   ALT U/L  --  27   AST U/L  --  24   GLUCOSE RANDOM mg/dL 103 127           * I Have Reviewed All Lab Data Listed Above  * Additional Pertinent Lab Tests Reviewed:  All Labs Within Last 24 Hours Reviewed    Imaging:    Imaging Reports Reviewed Today Include: Right hip xray  Imaging Personally Reviewed by Myself Includes:  None    Recent Cultures (last 7 days):           Last 24 Hours Medication List:     Current Facility-Administered Medications:  acetaminophen 650 mg Oral Q6H PRN Ardell Everts, CRNP   amLODIPine 2 5 mg Oral Daily Ardell Everts, CRNP   vitamin C 1,000 mg Oral Daily Ardell Everts, CRNP   cholecalciferol 2,000 Units Oral Daily Ardell Everts, CRNP   co-enzyme Q-10 30 mg Oral Daily [facility], CRNP   docusate sodium 100 mg Oral BID Ardell Everts, CRNP   ezetimibe 10 mg Oral HS Ardell Everts, CRNP   HYDROmorphone 0 5 mg Intravenous Q6H PRN Ardell Everts, CRNP   hydroxychloroquine 200 mg Oral Daily Ardell Everts, CRNP   levothyroxine 50 mcg Oral Early Morning Ardell Everts, CRNP   methocarbamol 500 mg Oral 4x Daily [facility], CRNP   metoprolol succinate 100 mg Oral QAM Ardell Everts, CRNP   metoprolol succinate 50 mg Oral QPM Ardell Everts, CRNP   nitroglycerin 0 4 mg Sublingual Q5 Min PRN Ardell Everts, CRNP   raloxifene 60 mg Oral Daily MAYUR Taylor ranolazine 500 mg Oral Q12H [facility] MAYUR Jane   spironolactone 25 mg Oral Every Other Day MAYUR Jane   tolterodine 2 mg Oral Daily MAYUR Jane   torsemide 10 mg Oral Daily Ute Rayo, MAYUR   traMADol 50 mg Oral Q6H PRN MAYUR Jane        Today, Patient Was Seen By: MAYUR Garrett    ** Please Note: Dictation voice to text software may have been used in the creation of this document   **

## 2018-01-31 NOTE — ASSESSMENT & PLAN NOTE
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

## 2018-01-31 NOTE — CONSULTS
Consultation - Lisa Job 65 y o  female MRN: [id number]  Unit/Bed#: [address] [place] [address] Encounter: [id number]      Assessment/Plan     Assessment:  1) closed displaced subcapital femoral neck fracture right hip   2) moderate- severe osteoarthritis right hip    Plan:  1) initial imaging studies of the right hip in the form of x-ray and CT did not demonstrate any acute osseous injury  However the patient's pain on examination today and posturing of the right lower extremity and inability to straight leg raise and intolerance of passive range of motion testing could suggest interval displacement of a occult hip fracture since her initial evaluation  Repeat x-rays of the right hip at bedside today on 1/31/2018 revealed a displaced subcapital right femoral neck fracture of the right hip  I suspect that there was an occult fracture undetected by initial imaging studies that progressively propagated over the next 3 days with continued weight-bearing until finally fracture became complete and ultimately resulted in a displaced fracture of the right femoral neck  This was discussed with the patient and was recommended that the patient undergo surgical intervention  Patient's last p o  intake was 1:30 p m  this afternoon with lunch  Patient will be evaluated by our cardiovascular service for preoperative clearance in preparation for surgical intervention in the form of right hip hemiarthroplasty on 2/1/18 by my partner Dr Sarah Jones  Surgical plan discussed with patient and patient's daughter at bedside today  Aspirin discontinued and Lovenox on hold after today's dose    Do not administer Lovenox tomorrow in preparation for tomorrow's surgery-orders adjusted accordingly   Bed rest  Heller insertion  NPO after midnight  Preoperative labs ordered  Cardiology consultation for preoperative clearance in the setting of prior quadruple bypass surgery, hypertension, CAD, and recent stent placement in August of 2017   Type and screen  Consent to be obtained by surgeon  Plan for right hip hemiarthroplasty on 2/1/18 with Dr Lilibeth Boggs    2) patient with moderate to severe osteoarthritis of the right hip  Based off the patient's age medical comorbidities overall activity level, I would recommend that she undergo right hip hemiarthroplasty to address her displaced subcapital femoral neck fracture  We did briefly discuss total hip arthroplasty as an option however based off of her advanced age, relatively decreased activity level, and significant medical comorbidities mostly in the cardiovascular realm,  I recommended against this as a right hip hemiarthroplasty carries much less risk both intraoperatively and postoperatively for the patient while still allowing a timely return to ambulatory function  Pt and the patient's daughter both in agreement with the intended surgical plan  History of Present Illness   Physician Requesting Consult: Darlin Otero DO  Reason for Consult / Principal Problem: right hip pain  HPI: Ema Clemens is a 65y o  year old female who presents with hypertension, CKD, CHF, coronary artery disease, bladder surgery, oophorectomy, status post fall Saturday 1/27/18 presents with progressively worsening right hip pain  Patient sustained a ground level fall while getting out of her car on 7/20/2018 after which she had significant right hip pain and was brought to emergency department for evaluation  Initial imaging studies the form of x-ray and CT scan on 1/27/18 did not reveal any acute hip fracture  She states that over the next 3 days her right hip and groin pain became progressively worse  The pain was associated with weight-bearing  Ambulation became very difficult for her due to the progressively worsening right groin pain  She states that the pain did not really relieve itself at rest and even sitting in a chair was uncomfortable for her    She does know that she has arthritis in her hip but states that this feels slightly different  She quantifies the pain as 9/10 on the pain scale  She denies any paresthesias down her right lower extremity  She states that intermittently the pain does radiate down the inner thigh and radiate to the medial portion of her knee  She denies any head contact or loss of consciousness during her initial fall  She denies any other extremity complaints of pain other than her right groin pain  Consults    Review of Systems   Constitutional: Positive for unexpected weight change  HENT: Negative  Eyes: Negative  Respiratory: Negative  Gastrointestinal: Negative  Musculoskeletal: Positive for arthralgias and gait problem  Right hip pain   Skin: Negative  Allergic/Immunologic:        Medication allergies appreciated   Psychiatric/Behavioral: Negative          Historical Information   Past Medical History:   Diagnosis Date    Anemia     Arthritis     Bladder calculi     CAD (coronary artery disease)     Status post CABG and PTCA to OM1    [facility]     CKD (chronic kidney disease) stage 3, GFR 30-59 ml/min     CKD (chronic kidney disease), stage III     Hypertension     Hypothyroidism     Uterine prolapse      Past Surgical History:   Procedure Laterality Date    BACK SURGERY      BLADDER SURGERY      CARDIAC SURGERY      valve replacement    CORONARY ANGIOPLASTY WITH STENT PLACEMENT      CORONARY ARTERY BYPASS GRAFT      OOPHORECTOMY Left      Social History   History   Alcohol Use No     History   Drug Use No     History   Smoking Status    Never Smoker   Smokeless Tobacco    Never Used     Family History: non-contributory    Meds/Allergies   all current active meds have been reviewed  Allergies   Allergen Reactions    Atorvastatin Hives    Boniva [Ibandronic Acid] Hives    Codeine      codeine derivatives     Statins Hives       Objective   Vitals: Blood pressure 154/70, pulse 67, temperature 99 8 °F (37 7 °C), temperature source Tympanic, resp  rate 18, height 5' 4" (1 626 m), weight 68 9 kg (152 lb), SpO2 93 %, not currently breastfeeding  ,Body mass index is 26 09 kg/m²  Intake/Output Summary (Last 24 hours) at 01/31/18 1356  Last data filed at 01/31/18 1346   Gross per 24 hour   Intake              360 ml   Output             1000 ml   Net             -640 ml     I/O last 24 hours: In: 360 [P O :360]  Out: 1000 [Urine:1000]    Invasive Devices     Peripheral Intravenous Line            Peripheral IV 01/30/18 Left Antecubital less than 1 day                Physical Exam   Constitutional: She is oriented to person, place, and time  She appears well-developed and well-nourished  HENT:   Head: Normocephalic and atraumatic  Eyes: EOM are normal    Neck: Neck supple  Cardiovascular: Normal rate  Pulmonary/Chest: Effort normal    Abdominal: Soft  Musculoskeletal:        Right knee: She exhibits no effusion  See ortho exam   Neurological: She is alert and oriented to person, place, and time  Skin: Skin is warm and dry  Psychiatric: She has a normal mood and affect  Right Knee Exam     Other   Other tests: no effusion present      Right Hip Exam     Tenderness   The patient is experiencing tenderness in the anterior, greater trochanter and lateral     Range of Motion   Extension: abnormal   Flexion: abnormal   Internal Rotation: abnormal   External Rotation: abnormal   Abduction: abnormal   Adduction: abnormal     Muscle Strength   Right hip normal muscle strength: Cannot straight leg raise, cannot participate muscle strength testing  Other   Erythema: absent  Scars: absent  Sensation: normal  Pulse: present    Comments:  Secondary survey:  All other extremities negative for acute signs of trauma and patient participates in active and passive range of motionOf all other extremities without pain   There are scattered ecchymotic areas on the right upper extremity that are in various stages of resolution  Right lower extremity:  Skin diffusely intact, there is ecchymosis in the proximal portion of the medial thigh  Patient cannot straight leg raise, right lower extremity approximately 1 cm shorter than left lower extremity with mild external rotation, + logroll, no tenderness over anterior pubic rim, mild tenderness palpation lateral hip and anterior hip  Attempted passive range of motion of the right hip with significant pain to groin, neurovascular intact, all compartments soft and compressible  Lab Results:   CBC:   Lab Results   Component Value Date    WBC 7 20 01/31/2018    HGB 10 2 (L) 01/31/2018    HCT 31 6 (L) 01/31/2018    MCV 83 01/31/2018     01/31/2018    MCH 26 6 (L) 01/31/2018    MCHC 32 3 01/31/2018    RDW 15 0 01/31/2018    MPV 8 1 (L) 01/31/2018    NRBC 0 01/30/2018     CMP:   Lab Results   Component Value Date     01/31/2018     (H) 01/31/2018    CO2 28 01/31/2018    ANIONGAP 7 01/31/2018    BUN 23 01/31/2018    CREATININE 1 21 01/31/2018    GLUCOSE 103 01/31/2018    CALCIUM 8 3 01/31/2018    AST 24 01/30/2018    ALT 27 01/30/2018    ALKPHOS 68 01/30/2018    PROT 7 2 01/30/2018    BILITOT 0 60 01/30/2018    EGFR 43 01/31/2018     PT/INR: No results found for: PT, INR  Imaging Studies: I have personally reviewed pertinent films in PACS x-ray and CT scan of the right hip and pelvis from 1/27/2018 reviewed by me  They demonstrate significant degenerative change with peripheral osteophyte formation and joint space narrowing  There is subchondral cyst appreciated the femoral head  X-ray and CT scan do not identify any acute fracture of the femoral head or neck or intertrochanteric region  The bony pelvis is also intact without sign of acute fracture  Impression:  X-ray of the right hip and pelvis and CT scan demonstrate no acute osseous injury of the right hip or pelvis      Repeat x-ray of the right hip and pelvis at bedside on 1/31/2018 reviewed by me  It demonstrates a displaced subcapital right femoral neck fracture  VTE Prophylaxis: Enoxaparin (Lovenox)    Code Status: Level 1 - Full Code  Advance Directive and Living Will:      Power of :    POLST:      Counseling / Coordination of Care  Total floor / unit time spent today 20 minutes  Greater than 50% of total time was spent with the patient and / or family counseling and / or coordination of care  A description of the counseling / coordination of care:  Chart review, history, physical, review of imaging, delineation of plan of care    Esther THOMPSON    Division of Adult Reconstruction  Department of [facility]

## 2018-01-31 NOTE — PROGRESS NOTES
Called Dr Priti Emerson to report that pt takes Brilinta at home and that she was also prescribes lovenox for DVT prophylaxis  MD stated that the two medications worked differently and that it was safe to give the two together  Tawny Thapa

## 2018-01-31 NOTE — SOCIAL WORK
DASH discussion completed  Discussed goals of making sure pt's  needs are met upon discharge, pt's preferences are taken into account, pt understands health condition, medications and symptoms to watch for after returning home and pt is aware of any follow up appointments recommended by hospital physician  PT LIVES ALONE INDEPENDENTLY, HAS SUPPORTIVE FAMILY AND FRIENDS, HAS A WALKER, USES [facility] FOR RX NEEDS  PT DCP IS HOME WITH SERVICES VS STR PENDING PROGRESS

## 2018-01-31 NOTE — ASSESSMENT & PLAN NOTE
· CKD stage 3 with baseline creatinine 1 5-1 6  · Current creatinine 1 69  · She follows with outpatient nephrologist, Dr Dequan Barrera

## 2018-01-31 NOTE — PLAN OF CARE
Problem: DISCHARGE PLANNING - CARE MANAGEMENT  Goal: Discharge to post-acute care or home with appropriate resources  INTERVENTIONS:  - Conduct assessment to determine patient/family and health care team treatment goals, and need for post-acute services based on payer coverage, community resources, and patient preferences, and barriers to discharge  - Address psychosocial, clinical, and financial barriers to discharge as identified in assessment in conjunction with the patient/family and health care team  - Arrange appropriate level of post-acute services according to patient's   needs and preference and payer coverage in collaboration with the physician and health care team  - Communicate with and update the patient/family, physician, and health care team regarding progress on the discharge plan  - Arrange appropriate transportation to post-acute venues  dcp is to home vs str pending progress  Outcome: Progressing

## 2018-01-31 NOTE — ASSESSMENT & PLAN NOTE
· Current /71 could be compounded 2/2 to her pain  · Will give evening HTN medications  · Ordered hydralazine, prn for a SBP>180mmHg

## 2018-01-31 NOTE — ASSESSMENT & PLAN NOTE
· CKD stage 3 with baseline creatinine 1 5-1 6  · Creatinine improving, 1 69 -> 1 21  · She follows with outpatient nephrologist, Dr Rachana Monroe

## 2018-01-31 NOTE — ASSESSMENT & PLAN NOTE
Reports a 16-pound weight loss over the past 7-8 months which she contributes to cardiac rehab and recent death of her   - Follow-up FOBT  - Outpatient follow-up with PCP  Can consider Psychiatry consult

## 2018-01-31 NOTE — H&P
H&P- Cleo Profit 1940, 65 y o  female MRN: [id number]    Unit/Bed#: [address] [facility], [facility]  Encounter: [id number]    Primary Care Provider: Can Weller MD   Date and time admitted to hospital: 1/30/2018  4:31 PM        Right hip pain   Assessment & Plan    · She states she fell on Saturday due to uneven ground  Denies LOC  She c/o of right groin pain that radiates into right thigh  · 1/27/2018  Right hip x-ray, No acute osseous abnormality  Degenerative arthritic changes  · 1/27/2018 CT scan of right hip, Mild degenerative arthritis right hip  No evidence of hip fracture  · Consult orthopedics  Can consider MRI of right hip  · Consult PT/OT after orthopedics evaluation  · Continue Robaxin 500mg PO 4 times daily  · Tyenol, prn for mild pain  · Tramadol, prn moderate pain  · Dilaudid IV, prn severe pain        Hypertensive urgency   Assessment & Plan    · Current /71 could be compounded 2/2 to her pain  · Will give evening HTN medications  · Ordered hydralazine, prn for a SBP>180mmHg        CAD (coronary artery disease)   Assessment & Plan    · With prior CABg with some occluded grafts  · Continue aspirin, Brilinta, metoprolol, zetia, and Co Q10        Chronic congestive heart failure ([address] )   Assessment & Plan    · She looks euvolemic  · Continue metoprolol, torsemide and Spirolactone        Heart failure, acute on chronic, systolic and diastolic (HCC)   Assessment & Plan    · She looks euvolemic  · Continue torsemide and spironolactone        Hypothyroid   Assessment & Plan    · S/p partial thyroidectomy  · 6/5/2017 TSH 2 290  · Continue levothyroxine         Hypertension   Assessment & Plan    · Current blood pressure 171/71  · Continue home medications of Norvasc, metoprolol, Spirolactone and torsemide        Hyperlipidemia   Assessment & Plan    · She is intolerant to statins    Continue Zetia and Co Q10  · 10/9/2017 lipid panel showed cholesterol 187, triglycerides 137, HDL 55 and LDL 92 Chronic kidney disease (CKD)   Assessment & Plan    · CKD stage 3 with baseline creatinine 1 5-1 6  · Current creatinine 1 69  · She follows with outpatient nephrologist, Dr Tejal Mcclure        Acute MI, true posterior wall [facility])   Assessment & Plan    · 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  S/p thrombectomy with PCI, XOCHILT to OM          VTE Prophylaxis: Enoxaparin (Lovenox)  / sequential compression device   Code Status: full  POLST: POLST form is not discussed and not completed at this time  Discussion with family: spoke with patient and family member at bedside     Anticipated Length of Stay:  Patient will be admitted on an Observation basis with an anticipated length of stay of  <2 midnights  Justification for Hospital Stay:  Right hip/groin pain requiring further investigation     Total Time for Visit, including Counseling / Coordination of Care: 30 minutes  Greater than 50% of this total time spent on direct patient counseling and coordination of care      Chief Complaint:   Right hip/groin pain     History of Present Illness:     Susie Bai is a 65 y o  female with a PMH of systolic/diastolic CHF, CAD with prior CABG with some occluded grafts, posterior MI, hypothyroidism s/p partial thyroidectomy, CKD stage 3, hypertension, anemia, HLD, vitamin-D deficiency, history of pancreatic lesion that had a negative endoscopy who presents with right hip/groin pain s/p fall on Saturday  She states she fell on Saturday due to uneven ground and landed on her right side  She denies LOC, dizziness or lightheadedness  She states she was driven to [facility] on Saturday where she had a negative x-ray of the right hip and a negative CT scan of the right hip    Today she states she was able to get up and shower and do some morning chores with minimal to mild pain but as the day progressed the pain increased and she was unable to ambulate      Review of Systems:     Review of Systems   Constitutional: Positive for activity change  Negative for appetite change, chills, diaphoresis, fatigue and fever  HENT: Negative for congestion, drooling, facial swelling, hearing loss, mouth sores, postnasal drip, rhinorrhea, sinus pain, sinus pressure, sore throat, trouble swallowing and voice change  Respiratory: Negative for cough, choking, chest tightness, shortness of breath and wheezing  Cardiovascular: Negative for chest pain, palpitations and leg swelling  Gastrointestinal: Positive for constipation  Negative for abdominal distention, abdominal pain, diarrhea, nausea and vomiting  Genitourinary: Positive for frequency  Negative for dysuria, flank pain, hematuria and urgency  Musculoskeletal: Positive for arthralgias and gait problem  Negative for neck pain and neck stiffness  Skin: Negative for color change, pallor, rash and wound  Neurological: Negative for dizziness, tremors, facial asymmetry, weakness, light-headedness and headaches  Psychiatric/Behavioral: Negative for agitation, behavioral problems, sleep disturbance and suicidal ideas          Past Medical and Surgical History:      Medical History        Past Medical History:   Diagnosis Date    Anemia      Bladder calculi      CAD (coronary artery disease)       Status post CABG and PTCA to OM1    CHF (congestive heart failure) (HCC)      CKD (chronic kidney disease), stage III      Hypertension      Hypothyroidism      Uterine prolapse              Surgical History         Past Surgical History:   Procedure Laterality Date    BLADDER SURGERY        CARDIAC SURGERY         valve replacement            Meds/Allergies:             Prior to Admission medications    Medication Sig Start Date End Date Taking?  Authorizing Provider   ibuprofen (MOTRIN) 200 mg tablet Take by mouth every 6 (six) hours as needed for mild pain     Yes Historical Provider, MD   UNKNOWN TO PATIENT       Yes Historical Provider, MD   amLODIPine (NORVASC) 2 5 mg tablet Take 1 tablet by mouth daily 8/2/17     MAYUR Galeana   Ascorbic Acid (VITAMIN C) 1000 MG tablet Take 1,000 mg by mouth daily       Historical Provider, MD   aspirin (ECOTRIN LOW STRENGTH) 81 mg EC tablet Take 81 mg by mouth daily Indications: Heart Attack        Historical Provider, MD   Coenzyme Q10 10 MG capsule Take 10 mg by mouth daily       Historical Provider, MD   hydroxychloroquine (PLAQUENIL) 200 mg tablet Take 200 mg by mouth daily         Historical Provider, MD   levothyroxine 50 mcg tablet Take 50 mcg by mouth daily        Historical Provider, MD   methocarbamol (ROBAXIN) 500 mg tablet Take 1 tablet (500 mg total) by mouth 4 (four) times a day 1/27/18     Mary Carmen Kohler DO   metoprolol succinate (TOPROL-XL) 100 mg 24 hr tablet Take 1 tablet by mouth every 24 hours 8/2/17     MAYUR Mosqueda   metoprolol succinate (TOPROL-XL) 50 mg 24 hr tablet Take 1 tablet by mouth daily 8/2/17     MAYUR Mosqueda   Multiple Vitamins-Minerals (CENTRUM SILVER PO) Take 1 tablet by mouth daily       Historical Provider, MD   nitroglycerin (NITROLINGUAL) 0 4 mg/spray spray Place 1 spray under the tongue every 5 (five) minutes as needed for chest pain       Historical Provider, MD   raloxifene (EVISTA) 60 mg tablet Take 60 mg by mouth daily       Historical Provider, MD   ranolazine (RANEXA) 500 mg 12 hr tablet Take 500 mg by mouth daily       Historical Provider, MD   solifenacin (VESICARE) 10 MG tablet Take 5 mg by mouth daily       Historical Provider, MD   spironolactone (ALDACTONE) 25 mg tablet Take 25 mg by mouth every other day       Historical Provider, MD   ticagrelor (BRILINTA) 90 MG Take 1 tablet by mouth 2 (two) times a day 8/2/17     MAYUR Galeana   torsemide (DEMADEX) 10 mg tablet Take 10 mg by mouth daily       Historical Provider, MD   Vitamin D, Cholecalciferol, 1000 units CAPS Take 2,000 Units by mouth 2 (two) times a day       Historical Provider, MD      I have reviewed home medications with patient personally      Allergies: Allergies   Allergen Reactions    Atorvastatin Hives    Boniva [Ibandronic Acid] Hives    Codeine         codeine derivatives     Statins Hives         Social History:     Marital Status: /Civil Union   Occupation:  Retired  Patient Pre-hospital Living Situation:  Lives alone  Patient Pre-hospital Level of Mobility:  Was ambulatory before fall now uses walker  Patient Pre-hospital Diet Restrictions:  None  Substance Use History:       History   Alcohol Use No          History   Smoking Status    Never Smoker   Smokeless Tobacco    Never Used          History   Drug Use No         Family History:     History reviewed  No pertinent family history      Physical Exam:      Vitals:   Blood Pressure: (!) 171/71 (01/30/18 1800)  Pulse: 64 (01/30/18 1800)  Temperature: 98 2 °F (36 8 °C) (01/30/18 1636)  Temp Source: Oral (01/30/18 1636)  Respirations: 22 (01/30/18 1636)  Height: 5' 4" (162 6 cm) (01/30/18 1636)  Weight - Scale: 68 9 kg (152 lb) (01/30/18 1636)  SpO2: 100 % (01/30/18 1800)     Physical Exam   Constitutional: She is oriented to person, place, and time  She appears well-developed and well-nourished  No distress  HENT:   Head: Normocephalic and atraumatic  Neck: Normal range of motion  Neck supple  Cardiovascular: Normal rate and regular rhythm  Exam reveals no gallop and no friction rub  Murmur heard  Pulmonary/Chest: Effort normal and breath sounds normal  No respiratory distress  She has no wheezes  She has no rales  She exhibits no tenderness  Abdominal: Soft  Bowel sounds are normal  She exhibits no distension and no mass  There is no tenderness  There is no rebound and no guarding  Musculoskeletal: She exhibits tenderness  She exhibits no edema or deformity  Tenderness noted in right groin that radiates into right thigh with any right leg movement     Neurological: She is alert and oriented to person, place, and time  Skin: Skin is warm and dry  No rash noted  She is not diaphoretic  No erythema  No pallor  Ecchymotic areas noted on right thigh/ right buttock   Psychiatric: She has a normal mood and affect  Her behavior is normal  Judgment and thought content normal             Additional Data:      Lab Results: I have personally reviewed pertinent reports           Results from last 7 days  Lab Units 01/30/18  1700   WBC Thousand/uL 10 70   HEMOGLOBIN g/dL 12 2   HEMATOCRIT % 37 8   PLATELETS Thousands/uL 215   NEUTROS PCT % 77*   LYMPHS PCT % 12*   MONOS PCT % 9   EOS PCT % 1         Results from last 7 days  Lab Units 01/30/18  1700   SODIUM mmol/L 140   POTASSIUM mmol/L 3 4*   CHLORIDE mmol/L 102   CO2 mmol/L 24   BUN mg/dL 31*   CREATININE mg/dL 1 69*   CALCIUM mg/dL 9 2   TOTAL PROTEIN g/dL 7 2   BILIRUBIN TOTAL mg/dL 0 60   ALK PHOS U/L 68   ALT U/L 27   AST U/L 24   GLUCOSE RANDOM mg/dL 127             Imaging: I have personally reviewed pertinent reports        No orders to display         EKG, Pathology, and Other Studies Reviewed on Admission:   · EKG: na     Allscripts / Epic Records Reviewed: Yes      ** Please Note: This note has been constructed using a voice recognition system   **

## 2018-01-31 NOTE — CONSULTS
Consultation - Cardiology   Wiliam Hoyt 65 y o  female MRN: [id number]  Unit/Bed#: 2 [place] [address] Encounter: [id number]    Assessment & Plan   1  Preoperative clearance for right hip surgery  2  History of coronary artery disease status post CABG x4 in 1998 status post lost of 2 grafts in 1999 and had recent in July of 2017 MI involving SVG vein graft to obtuse marginal status post successful stenting  No active signs of angina clinically  3  History of previous infarction and ischemic cardiomyopathy EF around 40-45 percent in last echo  Will repeat echo  4  History of chronic systolic and diastolic heart failure currently compensated     Patient on Aldactone and diuretics and beta-blockers  5  Hypertension  Blood pressure is well controlled  6  Dyslipidemia on statin  7  Chronic stable angina on Ranexa  Patient known to have 100% occluded RCA and LAD and circumflex with RCA area supplied by collaterals from left side  8  History of severe DJD  9  Recently finished cardiac rehabilitation with good baseline activity level  10  Patient on dual platelet therapy since she had angioplasty in July of 2017  Need to stop it and watch for bleeding    Summary of Recommendations:        Monitor on tele  Continue cardiac medications except for aspirin and Brilinta  Echo Doppler  There is no clinical evidence of any heart failure or active ischemia  However will get12 lead EKG  I believe patient is in optimal condition for the procedure as planned and she is at intermediate to high risk for the surgery due to multiple comorbidities, underlying ischemic cardiomyopathy, and relatively emergent nature of the surgery  This was discussed with patient's family  Avoid fluid overload  Can use intraoperative beta-blockers if needed  Discussed with Orthopedics, and patient's family      History of Present Illness    Physician Requesting Consult: Jessica Jewell DO    Reason for Consult / Principal Problem:  Preop clearance    Inpatient consult to Cardiology  Consult performed by: Jaden Su ordered by: Jovany Kwong        HPI: Victor Hugo Ayala is a 65y o  year old female who presents with right hip pain  Patient has a fall a week ago and was in the ED few days ago at that time no fracture was found  Yesterday she has another episode of intense pain and patient came to the hospital and found to have right hip fracture  She does have extensive cardiac history including history of coronary artery disease status post CABG x4 in 1998 and she lost 2 grafts in 1999 and had recent cardiac catheterization in July of 2017 when she had posterior wall MI and she had successful stenting done of her SVG to obtuse marginal   She has ischemic cardiomyopathy EF last was around 40-45%  She has no evidence of any AVR as mentioned in the chart  She just finished cardiac rehab she was very active before the surgery  But she does have history of congestive heart failure in July of 2017  She denies any fever or any chills any PND any orthopnea  She was very active she could easily climb 2-3 flights of stair  She has no nausea no vomiting no fever no other significant complaint  Review of Systems   Cardiovascular:        Extensive cardiac history and ischemic cardiomyopathy as mentioned above  History of heart failure  History of old infarction   Musculoskeletal: Positive for arthralgias and gait problem  Psychiatric/Behavioral: The patient is nervous/anxious          Historical Information   Past Medical History:   Diagnosis Date    Anemia     Arthritis     Bladder calculi     CAD (coronary artery disease)     Status post CABG and PTCA to OM1    [facility]estive [facility]     CKD (chronic kidney disease) stage 3, GFR 30-59 ml/min     CKD (chronic kidney disease), stage III     Hypertension     Hypothyroidism     Uterine prolapse      Past Surgical History:   Procedure Laterality Date    BACK SURGERY      BLADDER SURGERY      CARDIAC SURGERY      valve replacement    CORONARY ANGIOPLASTY WITH STENT PLACEMENT      CORONARY ARTERY BYPASS GRAFT      OOPHORECTOMY Left      History   Alcohol Use No     History   Drug Use No     History   Smoking Status    Never Smoker   Smokeless Tobacco    Never Used     Family History: History reviewed  No pertinent family history      Meds/Allergies    PTA meds:    Prescriptions Prior to Admission   Medication    amLODIPine (NORVASC) 2 5 mg tablet    Ascorbic Acid (VITAMIN C) 1000 MG tablet    aspirin (ECOTRIN LOW STRENGTH) 81 mg EC tablet    Coenzyme Q10 10 MG capsule    hydroxychloroquine (PLAQUENIL) 200 mg tablet    ibuprofen (MOTRIN) 200 mg tablet    levothyroxine 50 mcg tablet    methocarbamol (ROBAXIN) 500 mg tablet    metoprolol succinate (TOPROL-XL) 100 mg 24 hr tablet    metoprolol succinate (TOPROL-XL) 50 mg 24 hr tablet    Multiple Vitamins-Minerals (CENTRUM SILVER PO)    raloxifene (EVISTA) 60 mg tablet    ranolazine (RANEXA) 500 mg 12 hr tablet    solifenacin (VESICARE) 10 MG tablet    spironolactone (ALDACTONE) 25 mg tablet    ticagrelor (BRILINTA) 90 MG    torsemide (DEMADEX) 10 mg tablet    UNKNOWN TO PATIENT    Vitamin D, Cholecalciferol, 1000 units CAPS    nitroglycerin (NITROLINGUAL) 0 4 mg/spray spray      Allergies   Allergen Reactions    Atorvastatin Hives    Boniva [Ibandronic Acid] Hives    Codeine      codeine derivatives     Statins Hives       Current Facility-Administered Medications:     acetaminophen (TYLENOL) tablet 650 mg, 650 mg, Oral, Q6H PRN, Bernice M Rey, CRNP    amLODIPine (NORVASC) tablet 2 5 mg, 2 5 mg, Oral, Daily, Bernice M Rey, CRNP, 2 5 mg at 01/31/18 2212    ascorbic acid (VITAMIN C) tablet 1,000 mg, 1,000 mg, Oral, Daily, Bernice M Rey, CRNP, 1,000 mg at 01/31/18 0841    aspirin (ECOTRIN LOW STRENGTH) EC tablet 81 mg, 81 mg, Oral, Daily, Bernice M Rey, CRNP, 81 mg at 01/31/18 0841    cholecalciferol (VITAMIN D3) tablet 2,000 Units, 2,000 Units, Oral, Daily, MAYUR Gorman, 2,000 Units at 01/31/18 0841    co-enzyme Q-10 capsule 30 mg, 30 mg, Oral, Daily, Bernice Rey, DAVIDNP, 30 mg at 01/31/18 0841    docusate sodium (COLACE) capsule 100 mg, 100 mg, Oral, BID, DAVID MorrisNP, 100 mg at 01/31/18 0841    enoxaparin (LOVENOX) subcutaneous injection 30 mg, 30 mg, Subcutaneous, Daily, MAYUR Morris, 30 mg at 01/31/18 5430    ezetimibe (ZETIA) tablet 10 mg, 10 mg, Oral, HS, Cory Goddard, MAYUR, 10 mg at 01/30/18 2146    HYDROmorphone (DILAUDID) injection 0 5 mg, 0 5 mg, Intravenous, Q6H PRN, MAYUR Gorman, 0 5 mg at 01/31/18 1047    hydroxychloroquine (PLAQUENIL) tablet 200 mg, 200 mg, Oral, Daily, MAYUR Morris, 200 mg at 01/31/18 7525    levothyroxine tablet 50 mcg, 50 mcg, Oral, Early Morning, MAYUR Gorman, 50 mcg at 01/31/18 0610    methocarbamol (ROBAXIN) tablet 500 mg, 500 mg, Oral, 4x Daily, MAYUR Morris, 500 mg at 01/31/18 1140    metoprolol succinate (TOPROL-XL) 24 hr tablet 100 mg, 100 mg, Oral, QAM, MAYUR Morris, 100 mg at 01/31/18 0841    metoprolol succinate (TOPROL-XL) 24 hr tablet 50 mg, 50 mg, Oral, QPM, MAYUR Morris, 50 mg at 01/30/18 2030    nitroglycerin (NITROSTAT) SL tablet 0 4 mg, 0 4 mg, Sublingual, Q5 Min PRN, MAYUR Morris    raloxifene (EVISTA) tablet 60 mg, 60 mg, Oral, Daily, MAYUR Morris    ranolazine (RANEXA) 12 hr tablet 500 mg, 500 mg, Oral, Q12H [address], MAYUR Gorman, 500 mg at 01/31/18 8194    spironolactone (ALDACTONE) tablet 25 mg, 25 mg, Oral, Every Other Day, MAYUR Morris, 25 mg at 01/31/18 0841    ticagrelor (BRILINTA) tablet 90 mg, 90 mg, Oral, BID, MAYUR Gorman, 90 mg at 01/31/18 8596    tolterodine (DETROL LA) 24 hr capsule 2 mg, 2 mg, Oral, Daily, May Bates Mary Jane Barragan, 2 mg at 01/31/18 0841    torsemide (DEMADEX) tablet 10 mg, 10 mg, Oral, Daily, MAYUR Haynes, 10 mg at 01/31/18 0841    traMADol (ULTRAM) tablet 50 mg, 50 mg, Oral, Q6H PRN, MAYUR Haynes, 50 mg at 01/31/18 0849    VTE Pharmacologic Prophylaxis:   Lovenox    Objective:   Vitals: Blood pressure 154/70, pulse 67, temperature 99 8 °F (37 7 °C), temperature source Tympanic, resp  rate 18, height 5' 4" (1 626 m), weight 68 9 kg (152 lb), SpO2 93 %, not currently breastfeeding  Orthostatic Blood Pressures    Flowsheet Row Most Recent Value   Blood Pressure  154/70 filed at 01/31/2018 1300   Patient Position - Orthostatic VS  Lying filed at 01/31/2018 0709          Intake/Output Summary (Last 24 hours) at 01/31/18 1438  Last data filed at 01/31/18 1346   Gross per 24 hour   Intake              360 ml   Output             1000 ml   Net             -640 ml     Body mass index is 26 09 kg/m²  Invasive Devices     Peripheral Intravenous Line            Peripheral IV 01/30/18 Left Antecubital less than 1 day                  Physical Exam:   Physical Exam    Neurologic:  Alert & oriented x 3, no new focal deficits, complaining about right groin pain  Constitutional:  Well developed, well nourished, non-toxic appearance   Eyes:  Pupil equal and reacting to light, conjunctiva normal   HENT:  Atraumatic, oropharynx moist, Neck- normal range of motion, no tenderness, supple   Respiratory:  Bilateral air entry, mostly clear to auscultation  Cardiovascular: S1-S2 regular with a 2/6 ejection systolic murmur and  GI:  Soft, nondistended, normal bowel sounds, nontender, no hepatosplenomegaly appreciated  Musculoskeletal:  No edema, no tenderness, no deformities     Skin:  Well hydrated, no rash   Lymphatic:  No lymphadenopathy noted   Extremities:  No edema and distal pulses are present    Labs:   Troponins:       CBC with diff:   Results from last 7 days  Lab Units 01/31/18  9595 18  1700   WBC Thousand/uL 7 20 10 70   HEMOGLOBIN g/dL 10 2* 12 2   HEMATOCRIT % 31 6* 37 8   MCV fL 83 83   PLATELETS Thousands/uL 171 215   MCH pg 26 6* 26 8*   MCHC g/dL 32 3 32 4   RDW % 15 0 14 8   MPV fL 8 1* 8 6*   NRBC AUTO /100 WBCs  --  0       CMP:   Results from last 7 days  Lab Units 18  0444 18  1700   SODIUM mmol/L 144 140   POTASSIUM mmol/L 3 6 3 4*   CHLORIDE mmol/L 109* 102   CO2 mmol/L 28 24   ANION GAP mmol/L 7 14*   BUN mg/dL 23 31*   CREATININE mg/dL 1 21 1 69*   GLUCOSE RANDOM mg/dL 103 127   CALCIUM mg/dL 8 3 9 2   AST U/L  --  24   ALT U/L  --  27   ALK PHOS U/L  --  68   TOTAL PROTEIN g/dL  --  7 2   BILIRUBIN TOTAL mg/dL  --  0 60   EGFR ml/min/1 73sq m 43 29       Magnesium:   Results from last 7 days  Lab Units 18  0444   MAGNESIUM mg/dL 2 2         Imaging & Testing   Cardiac testing:   Cardiac catheterization done 2017 shows  Proximal %  Proximal circumflex 100%  Proximal %  Graft to obtuse marginal 1 was occluded with a 99% stenosis and patient underwent successful stenting with a LEE 2 flow  LIMA to LAD was patent, and RPDA had collaterals coming from the left  Results for orders placed during the hospital encounter of 17   Echo Complete with Contrast if Indicated    Narrative [address]  [address] [facility], [address] [facility]  [phone]    Transthoracic Echocardiogram  2D, M-mode, Doppler, and Color Doppler    Study date:  2017    Patient: Ally Garcia  MR number: [id number]  Account number: [de-identified]  : 1940  Age: 65 years  Gender: Female  Status: Inpatient  Location: Bedside  Height: 64 in  Weight: 167 lb  BP: 119/ 63 mmHg    Indications: Acute myocardial infarction      Diagnoses: I25 83 - Coronary atherosclerosis due to lipid rich plaque    Sonographer:  Yelitza Castillo RDCS  Primary Physician:  Gerson Otero MD  Referring Physician:  Raiza Barajas DO  Group: [id number]  Interpreting Physician:  Roberto Johnson MD    SUMMARY    LEFT VENTRICLE:  Systolic function was moderately reduced  Ejection fraction was estimated to be 43 %  There was akinesis of the basal-mid inferoseptal, entire inferior, and basal-mid inferolateral wall(s)  Features were consistent with a pseudonormal left ventricular filling pattern, with concomitant abnormal relaxation and increased filling pressure (grade 2 diastolic dysfunction)  LEFT ATRIUM:  The atrium was mildly dilated  MITRAL VALVE:  There was mild to moderate regurgitation  TRICUSPID VALVE:  There was mild to moderate regurgitation  Estimated peak PA pressure was 48 mmHg  The findings suggest mild to moderate pulmonary hypertension  PULMONIC VALVE:  There was trace regurgitation  HISTORY: PRIOR HISTORY: Chronic kidney disease, Coronary artery disease s/p CABG, Hypertension, Acute myocardial infarction  PROCEDURE: The procedure was performed at the bedside  This was a routine study  The transthoracic approach was used  The study included complete 2D imaging, M-mode, complete spectral Doppler, and color Doppler  The heart rate was 76 bpm,  at the start of the study  Images were obtained from the parasternal, apical, subcostal, and suprasternal notch acoustic windows  Echocardiographic views were limited due to lung interference  Image quality was adequate  LEFT VENTRICLE: Size was normal  Systolic function was moderately reduced  Ejection fraction was estimated to be 43 %  There was akinesis of the basal-mid inferoseptal, entire inferior, and basal-mid inferolateral wall(s)  Wall thickness  was normal  There was no evidence of concentric hypertrophy  DOPPLER: Features were consistent with a pseudonormal left ventricular filling pattern, with concomitant abnormal relaxation and increased filling pressure (grade 2 diastolic  dysfunction)      RIGHT VENTRICLE: The size was normal  Systolic function was normal  Wall thickness was normal     LEFT ATRIUM: The atrium was mildly dilated  RIGHT ATRIUM: Size was normal     MITRAL VALVE: Valve structure was normal  There was normal leaflet separation  DOPPLER: The transmitral velocity was within the normal range  There was no evidence for stenosis  There was mild to moderate regurgitation  AORTIC VALVE: The valve was trileaflet  Leaflets exhibited normal cuspal separation and sclerosis  DOPPLER: Transaortic velocity was within the normal range  There was no evidence for stenosis  There was no regurgitation  TRICUSPID VALVE: The valve structure was normal  There was normal leaflet separation  DOPPLER: The transtricuspid velocity was within the normal range  There was no evidence for stenosis  There was mild to moderate regurgitation  Estimated  peak PA pressure was 48 mmHg  The findings suggest mild to moderate pulmonary hypertension  PULMONIC VALVE: Leaflets exhibited normal thickness, no calcification, and normal cuspal separation  DOPPLER: The transpulmonic velocity was within the normal range  There was trace regurgitation  PERICARDIUM: There was no pericardial effusion  The pericardium was normal in appearance  AORTA: The root exhibited normal size  LLV MOD Diam; Recent value; End Diastole (A4C): 4 71 cm  LV MOD Diam; Recent value; End Diastole (A4C): 4 6 cm  LV M  Left Ventricle diastolic major axis; Most recent value chosen; Method of Disks, Single Plane; 2D mode; Apical four chamber;: 7 09 cm  Left Ventricle systolic major axis; Most recent value chosen; Method of Disks, Single Plane; 2D mode; Apical four chamber;: 5 99 cm  Left Ventricular Diastolic Area;  Most recent value chosen; Method of Disks,     Tissue Doppler Imaging  LV Peak Early Dueñas Tissue Jonah; Medial MA (TDI): 114 mm/s  Left Ventricular Peak Early Diastolic Tissue Velocity; Mean; Mean value c    [facility] Accredited Echocardiography Laboratory    Prepared and electronically signed by    Courtenay Gowers, MD  Signed 31-Jul-2017 09:31:45           Imaging: I have personally reviewed pertinent reports  Xr Hip/pelv 2-3 Vws Right If Performed    Result Date: 1/31/2018  Narrative: RIGHT HIP INDICATION:  Right hip pain  COMPARISON: None VIEWS: AP pelvis and 2 coned down views of the hip IMAGES:  4 FINDINGS: Impacted subcapital fracture of the right hip  Degenerative changes in both hip joints  No lytic or blastic lesions are seen  Soft tissues are unremarkable  Impression: Impacted subcapital fracture of the right hip  Findings discussed with Dr Brendan Johnston at [phone] hours on 01/31/2018  Workstation performed: [id number]     Xr Hip/pelv 2-3 Vws Right    Result Date: 1/28/2018  Narrative: RIGHT HIP INDICATION:  Right hip pain  COMPARISON: Right hip series of 2/25/2015 VIEWS: AP pelvis and 2 coned down views of the hip IMAGES:  3 FINDINGS: There is no acute fracture or dislocation  Mild narrowing noted of the bilateral hip joints with prominent marginal osteophyte formation similar to the prior exam   Bones appear demineralized  No lytic or blastic lesions are seen  Dense vascular calcifications are seen  There are surgical clips in the left groin  Impression: No acute osseous abnormality  Degenerative arthritic changes  Workstation performed: [id number]     Ct Hip Right Without Contrast    Result Date: 1/27/2018  Narrative: CT right hip without IV contrast INDICATION: 19-year-old female, post fall right hip pain COMPARISON: 1/27/2018 x-rays TECHNIQUE: CT examination of the right hip was performed  This examination, like all CT scans performed in the [facility], was performed utilizing techniques to minimize radiation dose exposure, including the use of iterative reconstruction and automated exposure control software  Sagittal and coronal two dimensional reconstructed images were also submitted for interpretation   IV Contrast: IV contrast was not given  Rad dose  383 44 mGy-cm FINDINGS: Mild degenerative arthritis right hip OSSEOUS STRUCTURES:  No fracture, dislocation or destructive osseous lesion  VISUALIZED MUSCULATURE:  Unremarkable  SOFT TISSUES:  Unremarkable  OTHER PERTINENT FINDINGS:  None  Impression: Mild degenerative arthritis right hip  No evidence of hip fracture Consistent with x-rays    Workstation performed: [id number]     EKG/ Monitor: Personally reviewed  Twelve lead EKG done July of 2017 shows normal sinus rhythm  First-degree AV block  Nonspecific ST changes  No recent EKG available  It will be requested     Code Status: Level 1 - Full Code  Advance Directive and Living Will:      POLST:      Dr Rica Stein MD [facility]      "This note has been constructed using a voice recognition system  Therefore there may be syntax, spelling, and/or grammatical errors   Please call if you have any questions  "

## 2018-01-31 NOTE — CASE MANAGEMENT
Initial Clinical Review    Admission: Date/Time/Statement: 1/30/18 1811    Orders Placed This Encounter   Procedures    Place in Observation (expected length of stay for this patient is less than two midnights)     Standing Status:   Standing     Number of Occurrences:   1     Order Specific Question:   Admitting Physician     Answer:   Vargas Deng     Order Specific Question:   Level of Care     Answer:   Med Surg [16]         ED: Date/Time/Mode of Arrival:   ED Arrival Information     Expected Arrival Acuity Means of Arrival Escorted By Service Admission Type    - 1/30/2018 16:30 [facility] General Medicine Urgent    Arrival Complaint    groin pain          Chief Complaint:   Chief Complaint   Patient presents with    Groin Pain     Patient states that she was here on saturday for a fall and she had a CT scan which was negative  She states that she has had worsening groin pain that "won't stop spasming today " Pt repots that pain radiates down her R leg  History of Illness: Teresa Jaquez is a 65 y o  female with a PMH of systolic/diastolic CHF, CAD with prior CABG with some occluded grafts, posterior MI, hypothyroidism s/p partial thyroidectomy, CKD stage 3, hypertension, anemia, HLD, vitamin-D deficiency, history of pancreatic lesion that had a negative endoscopy who presents with right hip/groin pain s/p fall on Saturday  She states she fell on Saturday due to uneven ground and landed on her right side  She denies LOC, dizziness or lightheadedness  She states she was driven to [facility] on Saturday where she had a negative x-ray of the right hip and a negative CT scan of the right hip  Today she states she was able to get up and shower and do some morning chores with minimal to mild pain but as the day progressed the pain increased and she was unable to ambulate  Right-sided hip and groin tenderness noted with minor bruising    Range of motion restricted because of pain  No abdominal tenderness noted  Positive femoral and popliteal pulses intact  ED Vital Signs:   ED Triage Vitals [01/30/18 1636]   Temperature Pulse Respirations Blood Pressure SpO2   98 2 °F (36 8 °C) 78 22 (!) 221/102 100 %      Temp Source Heart Rate Source Patient Position - Orthostatic VS BP Location FiO2 (%)   Oral Monitor Lying Left arm --      Pain Score       Worst Possible Pain        Wt Readings from Last 1 Encounters:   01/30/18 68 9 kg (152 lb)        Abnormal Labs/Diagnostic Test Results:   Potassium 3 4 (L) mmol/L     Anion Gap 14 (H) mmol/L           BUN 31 (H) mg/dL         Creatinine 1 69 (H) mg/dL        MCH 26 8 (L) pg           ED Treatment:   Medication Administration from 01/30/2018 1630 to 01/30/2018 1927       Date/Time Order Dose Route Action Action by Comments     01/30/2018 1655 HYDROmorphone (DILAUDID) injection 0 5 mg 0 5 mg Intravenous Given Benjamin Avery RN      01/30/2018 1655 ondansetron (ZOFRAN) injection 4 mg 4 mg Intravenous Given Benjamin Avery RN           Past Medical/Surgical History:    Active Ambulatory Problems     Diagnosis Date Noted    Acute MI, true posterior wall ([facility] [address] ) 07/30/2017    Chronic kidney disease (CKD) 07/30/2017    S/P CABG x 4 07/30/2017    Hyperlipidemia 07/30/2017    Hypertension 07/30/2017    Hypothyroid 07/31/2017    Heart failure, acute on chronic, systolic and diastolic ([facility] [address] ) 45/27/7129     Resolved Ambulatory Problems     Diagnosis Date Noted    No Resolved Ambulatory Problems     Past Medical History:   Diagnosis Date    Anemia     Arthritis     Bladder calculi     CAD (coronary artery disease)     [facility]     CKD (chronic kidney disease) stage 3, GFR 30-59 ml/min     CKD (chronic kidney disease), stage III     Hypertension     Hypothyroidism     Uterine prolapse        Admitting Diagnosis: Hip pain [M25 559]  Groin pain [R10 30]  Right hip pain [M25 551]    Age/Sex: 65 y o  female    Assessment/Plan:   Right hip pain   Assessment & Plan     · She states she fell on Saturday due to uneven ground  Denies LOC  She c/o of right groin pain that radiates into right thigh  · 1/27/2018  Right hip x-ray, No acute osseous abnormality  Degenerative arthritic changes  · 1/27/2018 CT scan of right hip, Mild degenerative arthritis right hip  No evidence of hip fracture  · Consult orthopedics  Can consider MRI of right hip  · Consult PT/OT after orthopedics evaluation  · Continue Robaxin 500mg PO 4 times daily  · Tyenol, prn for mild pain  · Tramadol, prn moderate pain  · Dilaudid IV, prn severe pain          Hypertensive urgency   Assessment & Plan     · Current /71 could be compounded 2/2 to her pain  · Will give evening HTN medications  · Ordered hydralazine, prn for a SBP>180mmHg          CAD (coronary artery disease)   Assessment & Plan     · With prior CABg with some occluded grafts  · Continue aspirin, Brilinta, metoprolol, zetia, and Co Q10          Chronic congestive heart failure (HCC)   Assessment & Plan     · She looks euvolemic  · Continue metoprolol, torsemide and Spirolactone          Heart failure, acute on chronic, systolic and diastolic (HCC)   Assessment & Plan     · She looks euvolemic  · Continue torsemide and spironolactone          Hypothyroid   Assessment & Plan     · S/p partial thyroidectomy  · 6/5/2017 TSH 2 290  · Continue levothyroxine           Hypertension   Assessment & Plan     · Current blood pressure 171/71  · Continue home medications of Norvasc, metoprolol, Spirolactone and torsemide          Hyperlipidemia   Assessment & Plan     · She is intolerant to statins    Continue Zetia and Co Q10  · 10/9/2017 lipid panel showed cholesterol 187, triglycerides 137, HDL 55 and LDL 92          Chronic kidney disease (CKD)   Assessment & Plan     · CKD stage 3 with baseline creatinine 1 5-1 6  · Current creatinine 1 69  · She follows with outpatient nephrologist, Dr Yasir Gaming          Acute MI, true posterior wall [facility])   Assessment & Plan     · 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  S/p thrombectomy with PCI, XOCHILT to OM             VTE Prophylaxis: Enoxaparin (Lovenox)  / sequential compression device   Code Status: full  POLST: POLST form is not discussed and not completed at this time  Discussion with family: spoke with patient and family member at bedside  Anticipated Length of Stay:  Patient will be admitted on an Observation basis with an anticipated length of stay of  <2 midnights     Justification for Hospital Stay:  Right hip/groin pain requiring further investigation     1/31/18    ORTHO CONSULT  Assessment:  1) Right Groin Pain - s/p fall, ecchymosis present, mild anemia, no overt fracture on CT or x-ray, pain not well controlled with movement and weightbearing activity, neurovascularly in tact, pain controlled at rest   2) Unexpected weight loss - 172 to 156, some increase in activity due to cardiac rehab but otherwise no intent to lose weight or dietary changes, PCP is aware, colonoscopy was performed approximately 4 years ago was not found have any colonic abnormalities, many years ago left oophorectomy took place in order to remove some sort of lesion/growth per patient, had an MRI of abdomen because they thought something was on her pancreas approximately 1 year ago but found nothing conclusive or poor in on MRI, gets repeated mammograms last 1 was March of last year and was found to be BI-RADS 2 negative for any high risk malignancy, patient denies any dysuria or hematuria, patient denies any bright red blood per rectum with bowel movements, patient denies any abnormalities that she notes on her scan  Plan:  1)   - no acute surgical intervention  - no hip fracture based on CT or x-ray imaging,  - in light of the fact that she has no palpable bony tenderness anywhere around the hip with the exception of the inferior pubic rami would not suspect fracture  - if there were fracture noted on any other imaging in the future it would likely be non operative will not change the management course currently  - physical therapy  - activity as tolerated  - ice to the area of tenderness  - p r n  Analgesia  - discussed possibility of skilled nursing facility temporarily in order to get more adequate physical therapy and have adequate social structure rather than going straight to home, patient does not pose patient is not for either at this point would like to talk with family members  2)   - must follow up with Family Practice for routine screening to rule out malignancy  - likely should have repeat colonoscopy since uncertain of exact time frame if any polyps had been found with positive suspicious pathology should be following up within 5 years  - repeat mammogram, almost coming up on 1 year repeat any way  - possible need for cancer marker CEA,  CA 19 9, and  as an outpatient  - management per slim        XRAY HIP Impacted subcapital fracture of the right hip    Findings discussed with Dr Bang Fox at 1415 hours on 01/31/2018         Admission Orders:    CONSULT ORTHOPEDICS  PTOT  Scheduled Meds:   Current Facility-Administered Medications:  acetaminophen 650 mg Oral Q6H PRN Lea Caprice, CRNP   amLODIPine 2 5 mg Oral Daily Lea Caprice, CRNP   vitamin C 1,000 mg Oral Daily Bernice RAMANA Rey, CRNP   aspirin 81 mg Oral Daily Lea Caprice, CRNP   cholecalciferol 2,000 Units Oral Daily Lea Caprice, CRNP   co-enzyme Q-10 30 mg Oral Daily Bernice M Krissy, CRNP   docusate sodium 100 mg Oral BID Lea Caprice, CRNP   enoxaparin 30 mg Subcutaneous Daily Bernice Rey, CRNP   ezetimibe 10 mg Oral HS Lea Caprice, CRNP   HYDROmorphone 0 5 mg Intravenous Q6H PRN Lea Caprice, CRNP   hydroxychloroquine 200 mg Oral Daily Bernice RAMANA Rey, CRNP   levothyroxine 50 mcg Oral Early Morning Teja Decker, MAYUR   methocarbamol 500 mg Oral 4x Daily Teja Decker, DAVIDNP   metoprolol succinate 100 mg Oral QAM Teja Decker, DAVIDNP   metoprolol succinate 50 mg Oral QPM Teja Decker, MAYUR   nitroglycerin 0 4 mg Sublingual Q5 Min PRN Teja Decker, DAVIDNP   raloxifene 60 mg Oral Daily Teja Decker, DAVIDNP   ranolazine 500 mg Oral Q12H [facility] Teja Decker, MAYUR   spironolactone 25 mg Oral Every Other Day Teja Decker, DAVIDNP   ticagrelor 90 mg Oral BID Teja Decker, DAVIDNP   tolterodine 2 mg Oral Daily Teja Decker, DAVIDNP   torsemide 10 mg Oral Daily Teja Decker, DAVIDNP   traMADol 50 mg Oral Q6H PRN Teja Decker, MAYUR     Continuous Infusions:    PRN Meds:   acetaminophen    HYDROmorphone    nitroglycerin    traMADol

## 2018-01-31 NOTE — ASSESSMENT & PLAN NOTE
· With prior CABg with some occluded grafts  · Continue aspirin, Brilinta, metoprolol, zetia, and Co Q10

## 2018-02-01 ENCOUNTER — ANESTHESIA (INPATIENT)
Dept: PERIOP | Facility: HOSPITAL | Age: 78
DRG: 470 | End: 2018-02-01
Payer: MEDICARE

## 2018-02-01 ENCOUNTER — ANESTHESIA EVENT (INPATIENT)
Dept: PERIOP | Facility: HOSPITAL | Age: 78
DRG: 470 | End: 2018-02-01
Payer: MEDICARE

## 2018-02-01 ENCOUNTER — APPOINTMENT (INPATIENT)
Dept: RADIOLOGY | Facility: HOSPITAL | Age: 78
DRG: 470 | End: 2018-02-01
Payer: MEDICARE

## 2018-02-01 LAB
ANION GAP SERPL CALCULATED.3IONS-SCNC: 8 MMOL/L (ref 4–13)
APTT PPP: 26 SECONDS (ref 24–33)
BASE EXCESS BLDA CALC-SCNC: -7.5 MMOL/L
BASOPHILS # BLD AUTO: 0 THOUSANDS/ΜL (ref 0–0.1)
BASOPHILS NFR BLD AUTO: 0 % (ref 0–1)
BODY TEMPERATURE: 99.2 DEGREES FEHRENHEIT
BUN SERPL-MCNC: 23 MG/DL (ref 5–25)
CALCIUM SERPL-MCNC: 8.5 MG/DL (ref 8.3–10.1)
CHLORIDE SERPL-SCNC: 106 MMOL/L (ref 100–108)
CO2 SERPL-SCNC: 26 MMOL/L (ref 21–32)
CREAT SERPL-MCNC: 1.15 MG/DL (ref 0.6–1.3)
EOSINOPHIL # BLD AUTO: 0.1 THOUSAND/ΜL (ref 0–0.61)
EOSINOPHIL NFR BLD AUTO: 1 % (ref 0–6)
ERYTHROCYTE [DISTWIDTH] IN BLOOD BY AUTOMATED COUNT: 14.8 % (ref 11.6–15.1)
GFR SERPL CREATININE-BSD FRML MDRD: 46 ML/MIN/1.73SQ M
GLUCOSE SERPL-MCNC: 107 MG/DL (ref 65–140)
HCO3 BLDA-SCNC: 19.8 MMOL/L (ref 22–28)
HCT VFR BLD AUTO: 33.5 % (ref 37–47)
HCT VFR BLD AUTO: 36.7 % (ref 37–47)
HGB BLD-MCNC: 10.9 G/DL (ref 12–16)
HGB BLD-MCNC: 11.8 G/DL (ref 12–16)
INR PPP: 0.95 (ref 0.86–1.16)
LYMPHOCYTES # BLD AUTO: 1.2 THOUSANDS/ΜL (ref 0.6–4.47)
LYMPHOCYTES NFR BLD AUTO: 13 % (ref 14–44)
MAGNESIUM SERPL-MCNC: 2.2 MG/DL (ref 1.6–2.6)
MCH RBC QN AUTO: 27 PG (ref 27–31)
MCHC RBC AUTO-ENTMCNC: 32.5 G/DL (ref 31.4–37.4)
MCV RBC AUTO: 83 FL (ref 82–98)
MONOCYTES # BLD AUTO: 1 THOUSAND/ΜL (ref 0.17–1.22)
MONOCYTES NFR BLD AUTO: 11 % (ref 4–12)
MRSA NOSE QL CULT: NORMAL
NEUTROPHILS # BLD AUTO: 7 THOUSANDS/ΜL (ref 1.85–7.62)
NEUTS SEG NFR BLD AUTO: 75 % (ref 43–75)
NRBC BLD AUTO-RTO: 0 /100 WBCS
O2 CT BLDA-SCNC: 16.4 ML/DL (ref 16–23)
OXYHGB MFR BLDA: 92.8 % (ref 94–97)
PCO2 BLDA: 47.3 MM HG (ref 36–44)
PCO2 TEMP ADJ BLDA: 47.9 MM HG (ref 36–44)
PH BLD: 7.24 [PH] (ref 7.35–7.45)
PH BLDA: 7.24 [PH] (ref 7.35–7.45)
PHOSPHATE SERPL-MCNC: 3.6 MG/DL (ref 2.3–4.1)
PLATELET # BLD AUTO: 178 THOUSANDS/UL (ref 130–400)
PMV BLD AUTO: 8.1 FL (ref 8.9–12.7)
PO2 BLD: 84 MM HG (ref 75–129)
PO2 BLDA: 82.4 MM HG (ref 75–129)
POTASSIUM SERPL-SCNC: 4.1 MMOL/L (ref 3.5–5.3)
PROTHROMBIN TIME: 10 SECONDS (ref 9.4–11.7)
RBC # BLD AUTO: 4.05 MILLION/UL (ref 4.2–5.4)
SODIUM SERPL-SCNC: 140 MMOL/L (ref 136–145)
SPECIMEN SOURCE: ABNORMAL
WBC # BLD AUTO: 9.4 THOUSAND/UL (ref 4.8–10.8)

## 2018-02-01 PROCEDURE — P9016 RBC LEUKOCYTES REDUCED: HCPCS

## 2018-02-01 PROCEDURE — C1776 JOINT DEVICE (IMPLANTABLE): HCPCS | Performed by: ORTHOPAEDIC SURGERY

## 2018-02-01 PROCEDURE — 0SRR0J9 REPLACEMENT OF RIGHT HIP JOINT, FEMORAL SURFACE WITH SYNTHETIC SUBSTITUTE, CEMENTED, OPEN APPROACH: ICD-10-PCS | Performed by: ORTHOPAEDIC SURGERY

## 2018-02-01 PROCEDURE — 27236 TREAT THIGH FRACTURE: CPT | Performed by: PHYSICIAN ASSISTANT

## 2018-02-01 PROCEDURE — 30233N1 TRANSFUSION OF NONAUTOLOGOUS RED BLOOD CELLS INTO PERIPHERAL VEIN, PERCUTANEOUS APPROACH: ICD-10-PCS | Performed by: FAMILY MEDICINE

## 2018-02-01 PROCEDURE — 85025 COMPLETE CBC W/AUTO DIFF WBC: CPT | Performed by: ORTHOPAEDIC SURGERY

## 2018-02-01 PROCEDURE — 84100 ASSAY OF PHOSPHORUS: CPT | Performed by: NURSE PRACTITIONER

## 2018-02-01 PROCEDURE — P9021 RED BLOOD CELLS UNIT: HCPCS

## 2018-02-01 PROCEDURE — 99233 SBSQ HOSP IP/OBS HIGH 50: CPT | Performed by: PHYSICIAN ASSISTANT

## 2018-02-01 PROCEDURE — 73502 X-RAY EXAM HIP UNI 2-3 VIEWS: CPT

## 2018-02-01 PROCEDURE — 27236 TREAT THIGH FRACTURE: CPT | Performed by: ORTHOPAEDIC SURGERY

## 2018-02-01 PROCEDURE — 93306 TTE W/DOPPLER COMPLETE: CPT | Performed by: INTERNAL MEDICINE

## 2018-02-01 PROCEDURE — 85014 HEMATOCRIT: CPT | Performed by: PHYSICIAN ASSISTANT

## 2018-02-01 PROCEDURE — 85018 HEMOGLOBIN: CPT | Performed by: PHYSICIAN ASSISTANT

## 2018-02-01 PROCEDURE — 82805 BLOOD GASES W/O2 SATURATION: CPT | Performed by: PHYSICIAN ASSISTANT

## 2018-02-01 PROCEDURE — 85610 PROTHROMBIN TIME: CPT | Performed by: ORTHOPAEDIC SURGERY

## 2018-02-01 PROCEDURE — 83735 ASSAY OF MAGNESIUM: CPT | Performed by: NURSE PRACTITIONER

## 2018-02-01 PROCEDURE — 80048 BASIC METABOLIC PNL TOTAL CA: CPT | Performed by: ORTHOPAEDIC SURGERY

## 2018-02-01 PROCEDURE — 99232 SBSQ HOSP IP/OBS MODERATE 35: CPT | Performed by: FAMILY MEDICINE

## 2018-02-01 PROCEDURE — 85730 THROMBOPLASTIN TIME PARTIAL: CPT | Performed by: ORTHOPAEDIC SURGERY

## 2018-02-01 DEVICE — ARTICUL/EZE FEMORAL HEAD DIAMETER 28MM +1.5 12/14 TAPER
Type: IMPLANTABLE DEVICE | Site: HIP | Status: FUNCTIONAL
Brand: ARTICUL/EZE

## 2018-02-01 DEVICE — CORAIL HIP SYSTEM CEMENTLESS FEMORAL STEM 12/14 AMT 135 DEGREES KA SIZE 15 HA COATED STANDARD COLLAR
Type: IMPLANTABLE DEVICE | Site: HIP | Status: FUNCTIONAL
Brand: CORAIL

## 2018-02-01 DEVICE — SELF CENTERING BI-POLAR HEAD 28MM ID 50MM OD
Type: IMPLANTABLE DEVICE | Site: HIP | Status: FUNCTIONAL
Brand: SELF CENTERING

## 2018-02-01 RX ORDER — ONDANSETRON 2 MG/ML
4 INJECTION INTRAMUSCULAR; INTRAVENOUS ONCE
Status: DISCONTINUED | OUTPATIENT
Start: 2018-02-01 | End: 2018-02-02 | Stop reason: HOSPADM

## 2018-02-01 RX ORDER — OXYCODONE HYDROCHLORIDE 5 MG/1
5 TABLET ORAL EVERY 4 HOURS PRN
Status: DISCONTINUED | OUTPATIENT
Start: 2018-02-01 | End: 2018-02-03

## 2018-02-01 RX ORDER — PROPOFOL 10 MG/ML
INJECTION, EMULSION INTRAVENOUS AS NEEDED
Status: DISCONTINUED | OUTPATIENT
Start: 2018-02-01 | End: 2018-02-01 | Stop reason: SURG

## 2018-02-01 RX ORDER — HYDROMORPHONE HYDROCHLORIDE 2 MG/ML
INJECTION, SOLUTION INTRAMUSCULAR; INTRAVENOUS; SUBCUTANEOUS AS NEEDED
Status: DISCONTINUED | OUTPATIENT
Start: 2018-02-01 | End: 2018-02-01 | Stop reason: SURG

## 2018-02-01 RX ORDER — MIDAZOLAM HYDROCHLORIDE 1 MG/ML
INJECTION INTRAMUSCULAR; INTRAVENOUS AS NEEDED
Status: DISCONTINUED | OUTPATIENT
Start: 2018-02-01 | End: 2018-02-01 | Stop reason: SURG

## 2018-02-01 RX ORDER — SODIUM CHLORIDE, SODIUM LACTATE, POTASSIUM CHLORIDE, CALCIUM CHLORIDE 600; 310; 30; 20 MG/100ML; MG/100ML; MG/100ML; MG/100ML
100 INJECTION, SOLUTION INTRAVENOUS CONTINUOUS
Status: DISCONTINUED | OUTPATIENT
Start: 2018-02-01 | End: 2018-02-02

## 2018-02-01 RX ORDER — FENTANYL CITRATE/PF 50 MCG/ML
25 SYRINGE (ML) INJECTION AS NEEDED
Status: DISCONTINUED | OUTPATIENT
Start: 2018-02-01 | End: 2018-02-02 | Stop reason: HOSPADM

## 2018-02-01 RX ORDER — GLYCOPYRROLATE 0.2 MG/ML
0.2 INJECTION INTRAMUSCULAR; INTRAVENOUS ONCE
Status: DISCONTINUED | OUTPATIENT
Start: 2018-02-01 | End: 2018-02-02

## 2018-02-01 RX ORDER — HYDROMORPHONE HCL 110MG/55ML
0.2 PATIENT CONTROLLED ANALGESIA SYRINGE INTRAVENOUS
Status: DISCONTINUED | OUTPATIENT
Start: 2018-02-01 | End: 2018-02-02

## 2018-02-01 RX ORDER — ROCURONIUM BROMIDE 10 MG/ML
INJECTION, SOLUTION INTRAVENOUS AS NEEDED
Status: DISCONTINUED | OUTPATIENT
Start: 2018-02-01 | End: 2018-02-01 | Stop reason: SURG

## 2018-02-01 RX ORDER — FENTANYL CITRATE 50 UG/ML
INJECTION, SOLUTION INTRAMUSCULAR; INTRAVENOUS AS NEEDED
Status: DISCONTINUED | OUTPATIENT
Start: 2018-02-01 | End: 2018-02-01 | Stop reason: SURG

## 2018-02-01 RX ORDER — HYDROMORPHONE HCL 110MG/55ML
0.5 PATIENT CONTROLLED ANALGESIA SYRINGE INTRAVENOUS
Status: DISCONTINUED | OUTPATIENT
Start: 2018-02-01 | End: 2018-02-02 | Stop reason: HOSPADM

## 2018-02-01 RX ORDER — HYDROMORPHONE HCL 110MG/55ML
0.2 PATIENT CONTROLLED ANALGESIA SYRINGE INTRAVENOUS EVERY 6 HOURS PRN
Status: DISCONTINUED | OUTPATIENT
Start: 2018-02-01 | End: 2018-02-02

## 2018-02-01 RX ORDER — CEFAZOLIN SODIUM 1 G/3ML
INJECTION, POWDER, FOR SOLUTION INTRAMUSCULAR; INTRAVENOUS AS NEEDED
Status: DISCONTINUED | OUTPATIENT
Start: 2018-02-01 | End: 2018-02-01 | Stop reason: SURG

## 2018-02-01 RX ORDER — EPHEDRINE SULFATE 50 MG/ML
INJECTION, SOLUTION INTRAVENOUS AS NEEDED
Status: DISCONTINUED | OUTPATIENT
Start: 2018-02-01 | End: 2018-02-01 | Stop reason: SURG

## 2018-02-01 RX ORDER — SODIUM CHLORIDE 9 MG/ML
100 INJECTION, SOLUTION INTRAVENOUS CONTINUOUS
Status: DISCONTINUED | OUTPATIENT
Start: 2018-02-02 | End: 2018-02-02

## 2018-02-01 RX ORDER — SODIUM CHLORIDE, SODIUM LACTATE, POTASSIUM CHLORIDE, CALCIUM CHLORIDE 600; 310; 30; 20 MG/100ML; MG/100ML; MG/100ML; MG/100ML
INJECTION, SOLUTION INTRAVENOUS CONTINUOUS PRN
Status: DISCONTINUED | OUTPATIENT
Start: 2018-02-01 | End: 2018-02-01 | Stop reason: SURG

## 2018-02-01 RX ORDER — BUPIVACAINE HYDROCHLORIDE AND EPINEPHRINE 5; 5 MG/ML; UG/ML
INJECTION, SOLUTION PERINEURAL AS NEEDED
Status: DISCONTINUED | OUTPATIENT
Start: 2018-02-01 | End: 2018-02-01 | Stop reason: HOSPADM

## 2018-02-01 RX ORDER — SODIUM CHLORIDE 9 MG/ML
125 INJECTION, SOLUTION INTRAVENOUS CONTINUOUS
Status: DISCONTINUED | OUTPATIENT
Start: 2018-02-01 | End: 2018-02-02

## 2018-02-01 RX ORDER — ONDANSETRON 2 MG/ML
4 INJECTION INTRAMUSCULAR; INTRAVENOUS EVERY 6 HOURS PRN
Status: DISCONTINUED | OUTPATIENT
Start: 2018-02-01 | End: 2018-02-05 | Stop reason: HOSPADM

## 2018-02-01 RX ORDER — MAGNESIUM HYDROXIDE 1200 MG/15ML
LIQUID ORAL AS NEEDED
Status: DISCONTINUED | OUTPATIENT
Start: 2018-02-01 | End: 2018-02-01 | Stop reason: HOSPADM

## 2018-02-01 RX ORDER — LIDOCAINE HYDROCHLORIDE 10 MG/ML
INJECTION, SOLUTION INFILTRATION; PERINEURAL AS NEEDED
Status: DISCONTINUED | OUTPATIENT
Start: 2018-02-01 | End: 2018-02-01 | Stop reason: SURG

## 2018-02-01 RX ADMIN — HYDROMORPHONE HYDROCHLORIDE 0.5 MG: 2 INJECTION, SOLUTION INTRAMUSCULAR; INTRAVENOUS; SUBCUTANEOUS at 20:16

## 2018-02-01 RX ADMIN — OXYCODONE HYDROCHLORIDE AND ACETAMINOPHEN 1000 MG: 500 TABLET ORAL at 08:53

## 2018-02-01 RX ADMIN — MIDAZOLAM HYDROCHLORIDE 1 MG: 1 INJECTION, SOLUTION INTRAMUSCULAR; INTRAVENOUS at 19:12

## 2018-02-01 RX ADMIN — RANOLAZINE 500 MG: 500 TABLET, FILM COATED, EXTENDED RELEASE ORAL at 08:52

## 2018-02-01 RX ADMIN — SODIUM CHLORIDE, SODIUM LACTATE, POTASSIUM CHLORIDE, AND CALCIUM CHLORIDE: .6; .31; .03; .02 INJECTION, SOLUTION INTRAVENOUS at 19:09

## 2018-02-01 RX ADMIN — DOCUSATE SODIUM 100 MG: 100 CAPSULE, LIQUID FILLED ORAL at 17:31

## 2018-02-01 RX ADMIN — SODIUM CHLORIDE: 0.9 INJECTION, SOLUTION INTRAVENOUS at 19:10

## 2018-02-01 RX ADMIN — METHOCARBAMOL 500 MG: 500 TABLET ORAL at 08:52

## 2018-02-01 RX ADMIN — HYDROMORPHONE HYDROCHLORIDE 0.5 MG: 2 INJECTION, SOLUTION INTRAMUSCULAR; INTRAVENOUS; SUBCUTANEOUS at 19:39

## 2018-02-01 RX ADMIN — Medication 30 MG: at 08:52

## 2018-02-01 RX ADMIN — HYDROMORPHONE HYDROCHLORIDE 0.5 MG: 2 INJECTION, SOLUTION INTRAMUSCULAR; INTRAVENOUS; SUBCUTANEOUS at 19:49

## 2018-02-01 RX ADMIN — HYDROMORPHONE HYDROCHLORIDE 0.5 MG: 2 INJECTION, SOLUTION INTRAMUSCULAR; INTRAVENOUS; SUBCUTANEOUS at 19:23

## 2018-02-01 RX ADMIN — SODIUM CHLORIDE: 0.9 INJECTION, SOLUTION INTRAVENOUS at 20:17

## 2018-02-01 RX ADMIN — AMLODIPINE BESYLATE 2.5 MG: 2.5 TABLET ORAL at 08:53

## 2018-02-01 RX ADMIN — HYDROMORPHONE HYDROCHLORIDE 0.5 MG: 1 INJECTION, SOLUTION INTRAMUSCULAR; INTRAVENOUS; SUBCUTANEOUS at 09:49

## 2018-02-01 RX ADMIN — ROCURONIUM BROMIDE 30 MG: 10 INJECTION INTRAVENOUS at 19:20

## 2018-02-01 RX ADMIN — SODIUM CHLORIDE, SODIUM LACTATE, POTASSIUM CHLORIDE, AND CALCIUM CHLORIDE: .6; .31; .03; .02 INJECTION, SOLUTION INTRAVENOUS at 20:16

## 2018-02-01 RX ADMIN — EPHEDRINE SULFATE 10 MG: 50 INJECTION, SOLUTION INTRAMUSCULAR; INTRAVENOUS; SUBCUTANEOUS at 21:13

## 2018-02-01 RX ADMIN — HYDROMORPHONE HYDROCHLORIDE 0.5 MG: 1 INJECTION, SOLUTION INTRAMUSCULAR; INTRAVENOUS; SUBCUTANEOUS at 15:20

## 2018-02-01 RX ADMIN — METHOCARBAMOL 500 MG: 500 TABLET ORAL at 17:31

## 2018-02-01 RX ADMIN — HYDROMORPHONE HYDROCHLORIDE 0.5 MG: 2 INJECTION, SOLUTION INTRAMUSCULAR; INTRAVENOUS; SUBCUTANEOUS at 19:57

## 2018-02-01 RX ADMIN — EPHEDRINE SULFATE 10 MG: 50 INJECTION, SOLUTION INTRAMUSCULAR; INTRAVENOUS; SUBCUTANEOUS at 20:45

## 2018-02-01 RX ADMIN — CHOLECALCIFEROL TAB 25 MCG (1000 UNIT) 2000 UNITS: 25 TAB at 08:53

## 2018-02-01 RX ADMIN — EPHEDRINE SULFATE 10 MG: 50 INJECTION, SOLUTION INTRAMUSCULAR; INTRAVENOUS; SUBCUTANEOUS at 21:09

## 2018-02-01 RX ADMIN — CEFAZOLIN 2000 MG: 1 INJECTION, POWDER, FOR SOLUTION INTRAVENOUS at 19:14

## 2018-02-01 RX ADMIN — METOPROLOL SUCCINATE 50 MG: 50 TABLET, FILM COATED, EXTENDED RELEASE ORAL at 17:31

## 2018-02-01 RX ADMIN — DOCUSATE SODIUM 100 MG: 100 CAPSULE, LIQUID FILLED ORAL at 08:52

## 2018-02-01 RX ADMIN — METHOCARBAMOL 500 MG: 500 TABLET ORAL at 12:05

## 2018-02-01 RX ADMIN — MIDAZOLAM HYDROCHLORIDE 1 MG: 1 INJECTION, SOLUTION INTRAMUSCULAR; INTRAVENOUS at 19:10

## 2018-02-01 RX ADMIN — FENTANYL CITRATE 50 MCG: 50 INJECTION, SOLUTION INTRAMUSCULAR; INTRAVENOUS at 19:15

## 2018-02-01 RX ADMIN — TOLTERODINE TARTRATE 2 MG: 2 CAPSULE, EXTENDED RELEASE ORAL at 08:52

## 2018-02-01 RX ADMIN — PROPOFOL 100 MG: 10 INJECTION, EMULSION INTRAVENOUS at 19:20

## 2018-02-01 RX ADMIN — LEVOTHYROXINE SODIUM 50 MCG: 50 TABLET ORAL at 05:15

## 2018-02-01 RX ADMIN — HYDROXYCHLOROQUINE SULFATE 200 MG: 200 TABLET, FILM COATED ORAL at 08:53

## 2018-02-01 RX ADMIN — HYDROMORPHONE HYDROCHLORIDE 0.5 MG: 2 INJECTION, SOLUTION INTRAMUSCULAR; INTRAVENOUS; SUBCUTANEOUS at 19:30

## 2018-02-01 RX ADMIN — LIDOCAINE HYDROCHLORIDE 40 MG: 10 INJECTION, SOLUTION INFILTRATION; PERINEURAL at 19:19

## 2018-02-01 RX ADMIN — HYDROMORPHONE HYDROCHLORIDE 0.5 MG: 2 INJECTION, SOLUTION INTRAMUSCULAR; INTRAVENOUS; SUBCUTANEOUS at 20:03

## 2018-02-01 RX ADMIN — FENTANYL CITRATE 50 MCG: 50 INJECTION, SOLUTION INTRAMUSCULAR; INTRAVENOUS at 19:17

## 2018-02-01 RX ADMIN — METOPROLOL SUCCINATE 100 MG: 100 TABLET, FILM COATED, EXTENDED RELEASE ORAL at 08:52

## 2018-02-01 RX ADMIN — HYDROMORPHONE HYDROCHLORIDE 0.5 MG: 2 INJECTION, SOLUTION INTRAMUSCULAR; INTRAVENOUS; SUBCUTANEOUS at 20:22

## 2018-02-01 NOTE — ASSESSMENT & PLAN NOTE
Due to imacted subcapital right femoral neck fracture of right hip requiring hemiarthroplasty by orthopedic surgery today   · PT/OT following  · Continue Robaxin 500mg PO 4 times daily, Tyenol prn for mild pain, Tramadol prn moderate pain, and Dilaudid IV prn severe pain

## 2018-02-01 NOTE — PROGRESS NOTES
Progress Note - Cardiology   F[place] Chivo 65 y o  female MRN: [id number]  Unit/Bed#: 2 [place] [address] Encounter: [id number]    Assessment and Plan:   1  Preoperative clearance for right hip surgery  Repeat echo shows normal LV systolic function  2  History of coronary artery disease status post CABG x4 in 1998 status post lost of 2 grafts in 1999 and had recent in July of 2017 MI involving SVG vein graft to obtuse marginal status post successful stenting  No active signs of angina clinically  3  History of previous infarction and ischemic cardiomyopathy EF around 40-45 percent in last echo  Will repeat echo  4  History of chronic systolic and diastolic heart failure currently compensated     Patient on Aldactone and diuretics and beta-blockers  5  Hypertension  Blood pressure is well controlled  6  Dyslipidemia on statin  7  Chronic stable angina on Ranexa  Patient known to have 100% occluded RCA and LAD and circumflex with RCA area supplied by collaterals from left side  8  History of severe DJD  9  Recently finished cardiac rehabilitation with good baseline activity level  10  Patient on dual platelet therapy since she had angioplasty in July of 2017  Need to stop it and watch for bleeding  11  Mild-to-moderate aortic stenosis on recent echo with with EF around 45-50%  Plan  There is no clinical evidence of any heart failure or active ischemia  I believe patient is in optimal condition for the procedure as planned and she is at intermediate to high risk for the surgery due to multiple comorbidities  Patient's echo reviewed  This was discussed with patient's family  Avoid fluid overload  Can use intraoperative beta-blockers if needed  Discussed with Orthopedics, and patient's family  Monitor electrolytes    Subjective / Objective:   Patient seen and evaluated  No new complaints  Lying comfortably in the bed  Denies any chest pain or any shortness of breath  Echo was reviewed    No PND no orthopnea no other significant complaint  Vitals:    02/01/18 0705   BP: 140/63   Pulse: 70   Resp: 16   Temp: 98 1 °F (36 7 °C)   SpO2: 94%     Vitals:    01/30/18 1636   Weight: 68 9 kg (152 lb)     Orthostatic Blood Pressures    Flowsheet Row Most Recent Value   Blood Pressure  140/63 filed at 02/01/2018 0705   Patient Position - Orthostatic VS  Lying filed at 02/01/2018 0705        I/O       01/30 0701 - 01/31 0700 01/31 0701 - 02/01 0700 02/01 0701 - 02/02 0700    P  O  120 240     Total Intake(mL/kg) 120 (1 7) 240 (3 5)     Urine (mL/kg/hr) 200 3750 (2 3)     Total Output 200 3750      Net -80 -3510                 Invasive Devices     Peripheral Intravenous Line            Peripheral IV 01/30/18 Left Antecubital 1 day          Drain            Urethral Catheter 18 Fr  less than 1 day              Body mass index is 26 09 kg/m²  Physical Exam:   Physical Exam    Neurologic:  Alert & oriented x 3,  no focal deficits noted   Constitutional:  Well developed, well nourished,  With no acute distress  Eyes:  PERRL, conjunctiva normal   HENT:  Atraumatic, external ears normal, nose normal,   NECK: Normal range of motion, no tenderness, neck is supple , No JVP  Respiratory:  Bilateral air entry mostly clear to auscultation  Cardiovascular: S1-S2 regular with a 3/6 ejection systolic murmur  GI:  Soft, nondistended, normal bowel sounds, nontender, no hepatosplenomegaly appreciated  Musculoskeletal:  No tenderness, no deformities      Extremities:  No edema and distal pulses are present  Psychiatric:  Speech and behavior appropriate             Medications/ Allergies:     Current Facility-Administered Medications:  acetaminophen 650 mg Oral Q6H PRN Ashlyn Chancy, CRNP   amLODIPine 2 5 mg Oral Daily Ashlyn Chancy, CRNP   vitamin C 1,000 mg Oral Daily Ashlyn Chancy, CRNP   cholecalciferol 2,000 Units Oral Daily Ashlyn Chancy, CRNP   co-enzyme Q-10 30 mg Oral Daily Ashlyn Chancy, CRNP docusate sodium 100 mg Oral BID Twyla Garter, CRNP   ezetimibe 10 mg Oral HS Twyla Garter, CRNP   HYDROmorphone 0 5 mg Intravenous Q6H PRN Twyla Garter, CRNP   hydroxychloroquine 200 mg Oral Daily Twyla Garter, CRNP   levothyroxine 50 mcg Oral Early Morning Twyla Garter, CRNP   methocarbamol 500 mg Oral 4x Daily Twyla Garter, CRNP   metoprolol succinate 100 mg Oral QAM Twyla Garter, CRNP   metoprolol succinate 50 mg Oral QPM Twyla Garter, CRNP   nitroglycerin 0 4 mg Sublingual Q5 Min PRN Twyla Garter, CRNP   raloxifene 60 mg Oral Daily Twyla Garter, CRNP   ranolazine 500 mg Oral Q12H [address] Twyla Garter, CRNP   tolterodine 2 mg Oral Daily Twyla Garter, CRNP   traMADol 50 mg Oral Q6H PRN Twyla Garter, CRNP       acetaminophen 650 mg Q6H PRN   HYDROmorphone 0 5 mg Q6H PRN   nitroglycerin 0 4 mg Q5 Min PRN   traMADol 50 mg Q6H PRN     Allergies   Allergen Reactions    Atorvastatin Hives    Boniva [Ibandronic Acid] Hives    Codeine      codeine derivatives     Statins Hives       Labs:   Troponins:      CBC with diff:  Results from last 7 days  Lab Units 02/01/18  0514 01/31/18  0444 01/30/18  1700   WBC Thousand/uL 9 40 7 20 10 70   HEMOGLOBIN g/dL 10 9* 10 2* 12 2   HEMATOCRIT % 33 5* 31 6* 37 8   MCV fL 83 83 83   PLATELETS Thousands/uL 178 171 215   MCH pg 27 0 26 6* 26 8*   MCHC g/dL 32 5 32 3 32 4   RDW % 14 8 15 0 14 8   MPV fL 8 1* 8 1* 8 6*   NRBC AUTO /100 WBCs 0  --  0       CMP:  Results from last 7 days  Lab Units 02/01/18  0514 01/31/18  0444 01/30/18  1700   SODIUM mmol/L 140 144 140   POTASSIUM mmol/L 4 1 3 6 3 4*   CHLORIDE mmol/L 106 109* 102   CO2 mmol/L 26 28 24   ANION GAP mmol/L 8 7 14*   BUN mg/dL 23 23 31*   CREATININE mg/dL 1 15 1 21 1 69*   GLUCOSE RANDOM mg/dL 107 103 127   CALCIUM mg/dL 8 5 8 3 9 2   AST U/L  --   --  24   ALT U/L  --   --  27   ALK PHOS U/L  --   --  68   TOTAL PROTEIN g/dL  --   --  7 2 BILIRUBIN TOTAL mg/dL  --   --  0 60   EGFR ml/min/1 73sq m 46 43 29       Magnesium:  Results from last 7 days  Lab Units 02/01/18  0514 01/31/18  0444   MAGNESIUM mg/dL 2 2 2 2     Coags:  Results from last 7 days  Lab Units 02/01/18  0514   PTT seconds 26   INR  0 95       Imaging & Testing   I have personally reviewed pertinent reports  Xr Hip/pelv 2-3 Vws Right If Performed    Result Date: 1/31/2018  Narrative: RIGHT HIP INDICATION:  Right hip pain  COMPARISON: None VIEWS: AP pelvis and 2 coned down views of the hip IMAGES:  4 FINDINGS: Impacted subcapital fracture of the right hip  Degenerative changes in both hip joints  No lytic or blastic lesions are seen  Soft tissues are unremarkable  Impression: Impacted subcapital fracture of the right hip  Findings discussed with Dr Claribel Dickens at [phone] hours on 01/31/2018  Workstation performed: [id number]     Xr Hip/pelv 2-3 Vws Right    Result Date: 1/28/2018  Narrative: RIGHT HIP INDICATION:  Right hip pain  COMPARISON: Right hip series of 2/25/2015 VIEWS: AP pelvis and 2 coned down views of the hip IMAGES:  3 FINDINGS: There is no acute fracture or dislocation  Mild narrowing noted of the bilateral hip joints with prominent marginal osteophyte formation similar to the prior exam   Bones appear demineralized  No lytic or blastic lesions are seen  Dense vascular calcifications are seen  There are surgical clips in the left groin  Impression: No acute osseous abnormality  Degenerative arthritic changes  Workstation performed: [id number]     Ct Hip Right Without Contrast    Result Date: 1/27/2018  Narrative: CT right hip without IV contrast INDICATION: 71-year-old female, post fall right hip pain COMPARISON: 1/27/2018 x-rays TECHNIQUE: CT examination of the right hip was performed    This examination, like all CT scans performed in the [facility], was performed utilizing techniques to minimize radiation dose exposure, including the use of iterative reconstruction and automated exposure control software  Sagittal and coronal two dimensional reconstructed images were also submitted for interpretation  IV Contrast: IV contrast was not given  Rad dose  383 44 mGy-cm FINDINGS: Mild degenerative arthritis right hip OSSEOUS STRUCTURES:  No fracture, dislocation or destructive osseous lesion  VISUALIZED MUSCULATURE:  Unremarkable  SOFT TISSUES:  Unremarkable  OTHER PERTINENT FINDINGS:  None  Impression: Mild degenerative arthritis right hip  No evidence of hip fracture Consistent with x-rays    Workstation performed: [id number]        EKG / Monitor: Personally reviewed  Monitor shows no evidence of any significant tachy-marcial arrhythmias  Initial EKG shows normal sinus rhythm with first-degree block and nonspecific ST changes  Incomplete RBBB  Repeat EKG no change    Cardiac testing:   Results for orders placed during the hospital encounter of 18   Echo complete with contrast if indicated    Narrative Laura [address]  [address] [facility] [address]  [phone]    Transthoracic Echocardiogram  2D, M-mode, Doppler, and Color Doppler    Study date:  2018    Patient: Lolly Ramos  MR number: [id number]  Account number: [de-identified]  : 1940  Age: 65 years  Gender: Female  Status: Routine  Location: Bedside  Height: 61 in  Weight: 151 6 lb  BP: 132/ 80 mmHg    Indications: CAD    Diagnoses: I25 83 - Coronary atherosclerosis due to lipid rich plaque    Sonographer:  OCTAVIO Alba  Primary Physician:  Leslie López MD  Referring Physician:  Judith Page MD  Group[facility]  [facility]  Interpreting Physician:  uJdith Page MD    SUMMARY    LEFT VENTRICLE:  Systolic function was mildly reduced  Ejection fraction was estimated in the range of 45 % to 50 % to be 50 %  There was mild diffuse hypokinesis    Wall thickness was at the upper limits of normal   Doppler parameters were consistent with abnormal left ventricular relaxation (grade 1 diastolic dysfunction)  LEFT ATRIUM:  The atrium was mildly to moderately dilated  MITRAL VALVE:  There was mild annular calcification  There was mild regurgitation  AORTIC VALVE:  There was mild to moderate stenosis  ANTONIO 1 4 cm2, peak velocity 2 5 m/se, peak gradient 25 and mean 11 mm of hg     TRICUSPID VALVE:  There was mild regurgitation  Estimated peak PA pressure was 35 mmHg  PULMONIC VALVE:  There was no significant regurgitation  HISTORY: PRIOR HISTORY: HTN, CAD, CABG, CHF, CKD, Hypothyroidism    PROCEDURE: The procedure was performed at the bedside  This was a routine study  The transthoracic approach was used  The study included complete 2D imaging, M-mode, complete spectral Doppler, and color Doppler  The heart rate was 72 bpm,  at the start of the study  Image quality was adequate  LEFT VENTRICLE: Size was normal  Systolic function was mildly reduced  Ejection fraction was estimated in the range of 45 % to 50 % to be 50 %  There was mild diffuse hypokinesis  Wall thickness was at the upper limits of normal  DOPPLER:  Doppler parameters were consistent with abnormal left ventricular relaxation (grade 1 diastolic dysfunction)  RIGHT VENTRICLE: The size was normal  Systolic function was normal     LEFT ATRIUM: The atrium was mildly to moderately dilated  RIGHT ATRIUM: Size was at the upper limits of normal     MITRAL VALVE: There was mild annular calcification  There was mild thickening  DOPPLER: There was no evidence for stenosis  There was mild regurgitation  AORTIC VALVE: The valve was trileaflet  Leaflets exhibited mildly to moderately increased thickness and mild calcification  DOPPLER: There was mild to moderate stenosis  ANTONIO 1 4 cm2, peak velocity 2 5 m/se, peak gradient 25 and mean 11 mm  of hg  There was no significant regurgitation  TRICUSPID VALVE: DOPPLER: There was mild regurgitation   Estimated peak PA pressure was 35 mmHg  PULMONIC VALVE: DOPPLER: There was no significant regurgitation  PERICARDIUM: There was no thickening or calcification  There was no pericardial effusion  AORTA: The root exhibited normal size  SYSTEMIC VEINS: IVC: The inferior vena cava was normal in size  Respirophasic changes were normal     SYSTEM MEASUREMENT TABLES    2D mode  AoR Diam 2D: 3 cm  LA Diam (2D): 3 9 cm  LA/Ao (2D): 1 3  FS (2D Teich): 22 9 %  IVSd (2D): 1 15 cm  LVDEV: 115 cm³  LVESV: 62 3 cm³  LVIDd(2D): 4 94 cm  LVISd (2D): 3 81 cm  LVOT Area 2D: 2 84 cm squared  LVPWd (2D): 1 08 cm  SV (Teich): 52 7 cm³    Apical four chamber  LVEF A4C: 47 %    Unspecified Scan Mode  ANTONIO Cont Eq (Peak Jonah): 1 28 cm squared  ANTONIO Cont Eq (VTI): 1 37 cm squared  LVOT (VTI): 24 cm  LVOT Diam : 1 9 cm  LVOT Vmax: 1130 mm/s  LVOT Vmax; Mean: 1130 mm/s  Peak Grad ; Mean: 5 mm[Hg]  SV (LVOT): 68 cm³  VTI;Mean: 3 mm[Hg]  MV Peak A Jonah: 918 mm/s  MV Peak E Jonah  Mean: 681 mm/s  MVA (PHT): 3 93 cm squared  PHT: 56 ms  Max P mm[Hg]  V Max: 2790 mm/s  Vmax: 2790 mm/s  RA Area: 14 3 cm squared  RA Volume: 30 5 cm³  TAPSE: 1 9 cm    [facility]    Prepared and electronically signed by    Danae Arellano MD  Signed 2018 90:41:59         Dr Danae Arellano MD [facility]      "This note has been constructed using a voice recognition system  Therefore there may be syntax, spelling, and/or grammatical errors   Please call if you have any questions  "

## 2018-02-01 NOTE — ASSESSMENT & PLAN NOTE
· CKD stage 3 with baseline creatinine 1 5-1 6  · Creatinine improving, 1 19  · She follows with outpatient nephrologist, Dr Elma Roy

## 2018-02-01 NOTE — ASSESSMENT & PLAN NOTE
Patient presented to ED on 1/27 s/p fall resulting in right hip injury and pain   Workup in the ED included right hip xray and CT which were negative for any acute osseous injury and patient sent home  Presented back in the ED on 1/30 with continued severe pain and ambulatory dysfunction   · Repeat x-rays of the right hip revealed an impacted subcapital fracture of the right hip  · Orthopedics following, appreciate input  · POD #2 right hip hemiarthroplasty  by Dr Deepthi Harmon  Ambulation with assistance  Weightbearing as tolerated  Posterior hip precautions    Continue Mepilex until postoperative day 7  · PT/OT  · Hold Aspirin and Brilinta   · Continue Lovenox   · Reinserted Heller catheter 2/2 to urinary retention  · PT/OT eval and treat  · Pain control with Tylenol, Tramadol, and Dilaudid IV plus Robaxin

## 2018-02-01 NOTE — OCCUPATIONAL THERAPY NOTE
Occupational Therapy    OT/PT evaluations held this morning due to pt scheduled for right hip hemiarthroplasty later today  Will resume after surgery when deemed appropriate per MD orders

## 2018-02-01 NOTE — ASSESSMENT & PLAN NOTE
Seen on imaging  Orthopedics following  Planned for hemiarthroplasty today  Pain control: Tylenol, Tramadol, and Dilaudid IV prn plus Robaxin QID and ice packs

## 2018-02-01 NOTE — PLAN OF CARE
Problem: DISCHARGE PLANNING - CARE MANAGEMENT  Goal: Discharge to post-acute care or home with appropriate resources  INTERVENTIONS:  - Conduct assessment to determine patient/family and health care team treatment goals, and need for post-acute services based on payer coverage, community resources, and patient preferences, and barriers to discharge  - Address psychosocial, clinical, and financial barriers to discharge as identified in assessment in conjunction with the patient/family and health care team  - Arrange appropriate level of post-acute services according to patient's   needs and preference and payer coverage in collaboration with the physician and health care team  - Communicate with and update the patient/family, physician, and health care team regarding progress on the discharge plan  - Arrange appropriate transportation to post-acute venues  dcp is to home vs str pending progress     2/1 Arrange short term rehab placement   Outcome: Progressing  STR placement anticipated  Referral will be made to [facility] post op

## 2018-02-01 NOTE — ASSESSMENT & PLAN NOTE
Suspect this is due to acute right hip pain, improved  · Continue pain control as above   · Continue Norvasc, Toprol  mg in am and 50 mg in pm  · Ordered hydralazine, prn for a SBP>180mmHg

## 2018-02-01 NOTE — ASSESSMENT & PLAN NOTE
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

## 2018-02-01 NOTE — ANESTHESIA PREPROCEDURE EVALUATION
Acute MI, true posterior wall (HCC)   Chronic kidney disease (CKD)   S/P CABG x 4   Hyperlipidemia   Hypertension   Hypothyroid   Heart failure, acute on chronic, systolic and diastolic (HCC)   Right hip pain   Hypertensive urgency   Chronic systolic congestive heart failure (HCC)   CAD (coronary artery disease)   Closed displaced fracture of right femoral neck (HCC)   Osteoarthritis of hip   Weight loss       Review of Systems/Medical History  Patient summary reviewed  Chart reviewed      Cardiovascular  Hyperlipidemia, Hypertension , Past MI , CAD, , CHF ,   Comment: Echo Complete with Contrast if Indicated   Status: Final result  PACS Images     Show images for Echo Complete with Contrast if Indicated  Order Report      Order Details   Study Result     [address]  [facility], [address] [facility]  [phone]     Transthoracic Echocardiogram  2D, M-mode, Doppler, and Color Doppler     Study date:  2017     Patient: Anne Kothari  MR number: [id number]  Account number: [de-identified]  : 1940  Age: 65 years  Gender: Female  Status: Inpatient  Location: Bedside  Height: 64 in  Weight: 167 lb  BP: 119/ 63 mmHg     Indications: Acute myocardial infarction      Diagnoses: I25 83 - Coronary atherosclerosis due to lipid rich plaque     Sonographer:  William Perez RDCS  Primary Physician:  Radha Mueller MD  Referring Physician:  Bobbi Briseno DO  Group:  [facility]  Interpreting Physician:  Kaitlyn Gómez MD     SUMMARY     LEFT VENTRICLE:  Systolic function was moderately reduced  Ejection fraction was estimated to be 43 %  There was akinesis of the basal-mid inferoseptal, entire inferior, and basal-mid inferolateral wall(s)    Features were consistent with a pseudonormal left ventricular filling pattern, with concomitant abnormal relaxation and increased filling pressure (grade 2 diastolic dysfunction)      LEFT ATRIUM:  The atrium was mildly dilated      MITRAL VALVE:  There was mild to moderate regurgitation      TRICUSPID VALVE:  There was mild to moderate regurgitation  Estimated peak PA pressure was 48 mmHg  The findings suggest mild to moderate pulmonary hypertension      PULMONIC VALVE:  There was trace regurgitation      HISTORY: PRIOR HISTORY: Chronic kidney disease, Coronary artery disease s/p CABG, Hypertension, Acute myocardial infarction      PROCEDURE: The procedure was performed at the bedside  This was a routine study  The transthoracic approach was used  The study included complete 2D imaging, M-mode, complete spectral Doppler, and color Doppler  The heart rate was 76 bpm,  at the start of the study  Images were obtained from the parasternal, apical, subcostal, and suprasternal notch acoustic windows  Echocardiographic views were limited due to lung interference  Image quality was adequate      LEFT VENTRICLE: Size was normal  Systolic function was moderately reduced  Ejection fraction was estimated to be 43 %  There was akinesis of the basal-mid inferoseptal, entire inferior, and basal-mid inferolateral wall(s)  Wall thickness  was normal  There was no evidence of concentric hypertrophy  DOPPLER: Features were consistent with a pseudonormal left ventricular filling pattern, with concomitant abnormal relaxation and increased filling pressure (grade 2 diastolic  dysfunction)      RIGHT VENTRICLE: The size was normal  Systolic function was normal  Wall thickness was normal      LEFT ATRIUM: The atrium was mildly dilated      RIGHT ATRIUM: Size was normal      MITRAL VALVE: Valve structure was normal  There was normal leaflet separation  DOPPLER: The transmitral velocity was within the normal range  There was no evidence for stenosis  There was mild to moderate regurgitation      AORTIC VALVE: The valve was trileaflet  Leaflets exhibited normal cuspal separation and sclerosis   DOPPLER: Transaortic velocity was within the normal range  There was no evidence for stenosis  There was no regurgitation      TRICUSPID VALVE: The valve structure was normal  There was normal leaflet separation  DOPPLER: The transtricuspid velocity was within the normal range  There was no evidence for stenosis  There was mild to moderate regurgitation  Estimated  peak PA pressure was 48 mmHg  The findings suggest mild to moderate pulmonary hypertension      PULMONIC VALVE: Leaflets exhibited normal thickness, no calcification, and normal cuspal separation  DOPPLER: The transpulmonic velocity was within the normal range  There was trace regurgitation      PERICARDIUM: There was no pericardial effusion  The pericardium was normal in appearance      AORTA: The root exhibited normal size      Cardiac catheterization   Status: Final result  PACS Images     Show images for Cardiac catheterization  Order Report      Order Details   Study Result     [facility]  [address]  [phone]     [facility]     Invasive Cardiovascular Lab Complete Report     Patient: Elen Suazo  MR number: [id number]  Account number: [de-identified]  Study date: 2017  Gender: Female  : 1940  Height:  Weight: 163 7 lb  BSA:     Allergies: ATORVASTATIN, IBANDRONIC ACID, CODEINE, STATINS     Diagnostic Cardiologist:  Macie Velazco DO  Interventional Cardiologist:  Macie Velazco DO  Primary Physician:  Gosia Brown MD     SUMMARY     CORONARY CIRCULATION:  Proximal LAD: There was a 100 % stenosis  This lesion is a chronic total occlusion  Ostial circumflex: There was a 100 % stenosis  This lesion is a chronic total occlusion  Proximal RCA: There was a 100 % stenosis  This lesion is a chronic total occlusion  Graft to the 1st obtuse marginal: There was a 99 % stenosis at the proximal anastomosis   There was a large filling defect consistent with thrombus which spans the entire graft and LEE grade 2 flow through the graft (partial perfusion)  This is a likely culprit for the patient's clinical presentation  It appears amenable to percutaneous intervention  Graft to the RPDA: There was a 100 % stenosis at the proximal anastomosis      1ST LESION INTERVENTIONS:  A successful balloon angioplasty with thrombectomy and stent procedure was performed on the 99 % lesion in the proximal anastomosis of the saphenous vein graft from the aorta to the 1st obtuse marginal  Following intervention there was a 0  % residual stenosis  A Xience Alpine Rx 3 5 x 18mm drug-eluting stent at a maximum inflation pressure of 14 ludivina      INDICATIONS:  --  Possible CAD: unstable angina, probable posterior MI     PROCEDURES PERFORMED     --  Left heart catheterization without ventriculogram   --  Left coronary angiography  --  Right coronary angiography  --  LIMA graft angiography  --  Saphenous vein graft angiography  --  Saphenous vein graft angiography  --  Mod Sedation Same Physician Initial 15min  --  Mod Sedation Same Physician Add 15min  --  Coronary Catheterization (w/ LHC, w/ Grafts)  --  Mod Sedation Same Physician Add 15min  --  AMI PCI (XOCHILT, PTCRA, PTCA) Single  --  Intervention on SVG (proximal anastomosis) from the aorta to OM1: balloon angioplasty, thrombectomy, stent      PROCEDURE: The risks and alternatives of the procedures and conscious sedation were explained to the patient and informed consent was obtained  The patient was brought to the cath lab and placed on the table  The planned puncture sites  were prepped and draped in the usual sterile fashion      --  Right femoral artery access  The puncture site was infiltrated with local anesthetic   The vessel was accessed using the modified Seldinger technique, a wire was advanced into the vessel, and a sheath was advanced over the wire into the  vessel      --  Left heart catheterization without ventriculogram  A catheter was advanced over a guidewire into the ascending aorta  After recording ascending aortic pressure, the catheter was advanced across the aortic valve and left ventricular  pressure was recorded  The catheter was pulled back across the aortic valve and into the ascending aorta and pullback pressures were obtained      --  Left coronary artery angiography  A catheter was advanced over a guidewire into the aorta and positioned in the left coronary artery ostium under fluoroscopic guidance  Angiography was performed      --  Right coronary artery angiography  A catheter was advanced over a guidewire into the aorta and positioned in the right coronary artery ostium under fluoroscopic guidance  Angiography was performed      --  Left internal mammary graft angiography  A catheter was advanced to the aorta and positioned in the left subclavian artery over a guidewire under fluoroscopic guidance  Angiography was performed      --  Saphenous vein graft angiography  A catheter was advanced to the aorta and positioned at the aortic anastomosis of the graft over a guidewire under fluoroscopic guidance  Angiography was performed      --  Saphenous vein graft angiography  A catheter was advanced to the aorta and positioned at the aortic anastomosis of the graft over a guidewire under fluoroscopic guidance  Angiography was performed      --  Mod Sedation Same Physician Initial 15min      --  Mod Sedation Same Physician Add 15min      --  Coronary Catheterization (w/ LHC, w/ Grafts)      --  Mod Sedation Same Physician Add 15min      LESION INTERVENTION: A successful balloon angioplasty with thrombectomy and stent procedure was performed on the 99 % lesion in the proximal anastomosis of the saphenous vein graft from the aorta to the 1st obtuse marginal  Following  intervention there was a 0 % residual stenosis  This was an ACC/AHA type C "high risk" lesion for intervention  The residual lesion demonstrated no evidence of thrombus   There was LEE 2 flow before the procedure and LEE 3 flow after the  procedure      --  Vessel setup was performed  A 6Fr  Launcher Lcb guiding catheter was used to cannulate the vessel      --  Vessel setup was performed  A Filterwire EZ 2 25-3 5 x 190cm wire was used to cross the lesion      --  , using a Trek Rx 2 5 x 15mm balloon, with 1 inflations and a maximum inflation pressure of 12 ludivina      --  Mechanical ( Angiojet Spiroflex Rx 4f x 135cm), with max duration 30 sec and 2 attempt(s)      --  A Xience Alpine Rx 3 5 x 18mm drug-eluting stent at a maximum inflation pressure of 14 ludivina      --  , with 1 inflations and a maximum inflation pressure of 16 ludivina      INTERVENTIONS:  --  AMI PCI (XOCHILT, PTCRA, PTCA) Single      PROCEDURE COMPLETION: The patient tolerated the procedure well and was discharged from the cath lab  TIMING: Test started at 14:31  Test concluded at 15:15  HEMOSTASIS: The sheath was removed  The access site was closed using the Perclose  suture device  Hemostasis was obtained  MEDICATIONS GIVEN: Nitroprusside, 100 mcg, intracoronary, at 14:58  Zofran (Ondansetron), 4 mg, IV, at 14:24  Fentanyl (1OOmcg/2 ml), 50 mcg, IV, at 14:30  Versed (2mg/2ml), 2 mg, IV, at 14:30  Aspirin 325 mg, 325 mg, PO, at 14:31  1% Lidocaine, 10 ml, subcutaneously, at 14:31  Angiomax Bolus(250mg/50ml), 11 3 ml, IV, at 14:43  Angiomax Drip (250mg/50ml), infusion rate of 26 ml/hr, IV, at 14:43  CONTRAST GIVEN: 150 ml Omnipaque  (350 mg I /ml)  RADIATION EXPOSURE: Fluoroscopy time: 13 78 min      HEMODYNAMICS: Hemodynamic assessment demonstrated normal LVEDP      CORONARY VESSELS:   --  The coronary circulation is right dominant  --  Left main: Angiography showed moderate atherosclerosis  --  Proximal LAD: There was a 100 % stenosis  This lesion is a chronic total occlusion  --  Ostial circumflex: There was a 100 % stenosis  This lesion is a chronic total occlusion  --  Proximal RCA: There was a 100 % stenosis   This lesion is a chronic total occlusion  --  Right PDA: The distal vessel was supplied by collaterals from septal branches of LAD  --  Right posterolateral segment: The distal vessel was supplied by collaterals from the proximal circumflex  --  Graft to the mid LAD: The graft was a LIMA  Graft angiography showed no evidence of disease  --  Graft to the 1st obtuse marginal: The graft was a saphenous vein graft from the aorta  There was a 99 % stenosis at the proximal anastomosis  There was a large filling defect consistent with thrombus which spans the entire graft and  LEE grade 2 flow through the graft (partial perfusion)  This is a likely culprit for the patient's clinical presentation  It appears amenable to percutaneous intervention  --  Graft to the RPDA: The graft was a saphenous vein graft from the aorta  There was a 100 % stenosis at the proximal anastomosis      IMPRESSIONS:  PCI SVG to OM branch, culprit for presentation and posterior MI  Successful export thrombectomy and placement of proximal XOCHILT in SVG to OM  Patent LIMA to LAD  Remaining 2 grafts (h/o cabg x 4) known to be occluded and remain occluded  RCA  is collateralized via left to right collaterals      RECOMMENDATIONS  dapt x 1 year minimum, asa indefinitely     Prepared and signed by  Naheed Johnson DO  Signed 07/30/2017 15:44:20     Study diagram     Angiographic findings  Native coronary lesions:  ·Proximal LAD: Lesion 1: 100 % stenosis  ·Ostial circumflex: Lesion 1: 100 % stenosis  ·Proximal RCA: Lesion 1: 100 % stenosis      Coronary graft lesions:  ·Graft to OM1: SVG  · 99 % stenosis at proximal anastomosis, large thrombus, culprit lesion      ·Graft to RPDA: SVG  · 100 % stenosis at proximal anastomosis      Intervention results  Coronary graft lesions:  ·Successful balloon angioplasty, thrombectomy, and stent of the 99 % stenosis in SVG (proximal anastomosis) from the aorta to OM1  0 % residual stenosis   Stent: Xience Alpine Rx 3 5 x 18mm drug-eluting      Hemodynamic tables     Pressures:  Baseline  Pressures:  - HR: 86  Pressures:  - Rhythm:  Pressures:  -- Aortic Pressure (S/D/M): 107/46/56  Pressures:  -- Left Ventricle (s/edp): 107/19/--     Outputs:  Baseline  Outputs:  -- CALCULATIONS: [age]  Outputs:  -- CALCULATIONS: Sex: Female  Outputs:  -- CALCULATIONS: Weight in k 40     Linked Documents     View Image  Imaging     Cardiac catheterization (Order #[id number]) on 2017 - Imaging Information   Result History     Cardiac catheterization (Order #[id number]) on 2017 - Order Result History Report  Signed by     Signed Date/Time  Phone Pager  Jenna Nixonchandler 2017 15:47 [phone]       ,  Pulmonary       GI/Hepatic            Endo/Other  History of thyroid disease , hypothyroidism,      GYN       Hematology  Anemia ,     Musculoskeletal    Arthritis     Neurology   Psychology                Anesthesia Plan  ASA Score- 4     Anesthesia Type- general with ASA Monitors  Additional Monitors:   Airway Plan:     Comment: I discuss with the patienrt, Dr Lazarus Riis and the ICU physician the need for ICU monitoring on this pt post op  The pt have extensive heart disease with chronic angina, CABGx4, MI last summer due to 100% occlusion of one of the graft, s/p PTCA with stent, anticoagulated, chronic CHF, angina, advance age, potential for post op hematoma secondary to anticoagulation  My recommendation is that pt is monitored in the ICU for at least 24 hours  The plan is to extubate the patient post op        Plan Factors-    Induction- intravenous  Postoperative Plan- Plan for postoperative opioid use  Planned trial extubation    Informed Consent- Anesthetic plan and risks discussed with patient

## 2018-02-01 NOTE — SOCIAL WORK
SW referral received to assist with DCP  STR placement need is anticipated following surgery for fracture  Surgery planned for this afternoon  SW met with pt and daughters prior to surgery to discuss option of short term rehab placement for discharge  Discussed enrollment in Medicare Bundle Program   Provided Bundle Program Notification Letter  Provided information on Medicare Bundle Program, shared list of preferred rehab providers and discussed ELOS  Pt and daughters know that [facility] is their main facility preference  Family will consider additional choices from list   Referral will be made to [facility] after surgery  SW will continue to follow to monitor needs and assist with planning when appropriate

## 2018-02-01 NOTE — PROGRESS NOTES
Progress Note - Orthopedics   Toribio Feldman 65 y o  female MRN: [id number]  Unit/Bed#: OR POOL Encounter: [id number]    Assessment:  1) Right displaced femoral neck fracture - displaced, patient stable currently, hgb 10 8, neurovascularly in tact, medically/cardiac cleared, pain controlled at rest    Plan:  1) Right displaced femoral neck fracture -  - type and screen  - contacted blood bank X3 and have 2 units on hold  - EKG, CXR, ECHO performed and cardiology cleared  - ortho plans for procedure tonight 2/1/18 Right hemiarthroplasty  - hold antiplatelet  - NPO  - PRN analgesia    Weight bearing: Non weight bearing on lower right extremity    VTE Pharmacologic Prophylaxis: Enoxaparin (Lovenox)  VTE Mechanical Prophylaxis: sequential compression device    Subjective:  Patient was seen examined at bedside  Patient denies any acute events overnight  Patient denies any numbness, tingling, lack of bone removed, like station distal lower extremity  Patient's pain is well controlled at rest   Patient has not been a up and out of bed and ambulating as directed  Patient said she spoke with Cardiology and she has been medically cleared/cardiac cleared for this procedure  Vitals: Blood pressure 162/70, pulse 71, temperature 100 5 °F (38 1 °C), temperature source Tympanic, resp  rate 18, height 5' 4" (1 626 m), weight 68 9 kg (152 lb), SpO2 96 %, not currently breastfeeding  ,Body mass index is 26 09 kg/m²        Intake/Output Summary (Last 24 hours) at 02/01/18 1828  Last data filed at 02/01/18 6894   Gross per 24 hour   Intake                0 ml   Output              250 ml   Net             -250 ml       Invasive Devices     Peripheral Intravenous Line            Peripheral IV 01/30/18 Left Antecubital 2 days          Drain            Urethral Catheter 18 Fr  1 day                Physical Exam: /70   Pulse 71   Temp 100 5 °F (38 1 °C) (Tympanic)   Resp 18   Ht 5' 4" (1 626 m)   Wt 68 9 kg (152 lb) SpO2 96%   Breastfeeding? No   BMI 26 09 kg/m²   General appearance: alert and oriented, in no acute distress  Head: Normocephalic, without obvious abnormality, atraumatic  Skin: Ecchymosis at groin and posterior aspect of gluteus  Ortho Exam:   Knee: deferred due to pain  Hip: deferred due to pain   right Lower Extremity: Thigh and calf compartments are soft and nontender to palpation  + L3-S1 SILT  + DF/PF + EHL  No pain with passive stretch/extension of toes  DP 2+, capillary refill less than 2 seconds, and foot is warm B/L  Lab, Imaging and other studies:   I have personally reviewed pertinent lab results    CBC:   Lab Results   Component Value Date    WBC 9 40 02/01/2018    HGB 10 9 (L) 02/01/2018    HCT 33 5 (L) 02/01/2018    MCV 83 02/01/2018     02/01/2018    MCH 27 0 02/01/2018    MCHC 32 5 02/01/2018    RDW 14 8 02/01/2018    MPV 8 1 (L) 02/01/2018    NRBC 0 02/01/2018     CMP:   Lab Results   Component Value Date     02/01/2018     02/01/2018    CO2 26 02/01/2018    ANIONGAP 8 02/01/2018    BUN 23 02/01/2018    CREATININE 1 15 02/01/2018    GLUCOSE 107 02/01/2018    CALCIUM 8 5 02/01/2018    EGFR 46 02/01/2018

## 2018-02-01 NOTE — ASSESSMENT & PLAN NOTE
Appears euvolemic  · Continue metoprolol  · Continue Torsemide and Spirolactone   · Monitor on telemetry  · Ensure electrolytes are stable

## 2018-02-01 NOTE — PROGRESS NOTES
Progress Note - Anthony Montelongo 1940, 65 y o  female MRN: [id number]    Unit/Bed#: Jesus Encounter: [id number]    Primary Care Provider: Emmanuel López MD   Date and time admitted to hospital: 1/30/2018  4:31 PM        * Closed displaced fracture of right femoral neck [facility])   Assessment & Plan    Patient presented to ED on 1/27 s/p fall resulting in right hip injury and pain   Workup in the ED included right hip xray and CT which were negative for any acute osseous injury and patient sent home     Presented back in the ED on 1/30 with continued severe pain and ambulatory dysfunction   · Repeat x-rays of the right hip revealed an impacted subcapital fracture of the right hip  · Orthopedics following, appreciate input  · Plan for right hip hemiarthroplasty today by Dr Serrano Salvage   · Cardiac deemed her medium risk for surgery  · Discontinued Aspirin and Brilinta   · Holding Lovenox   · Heller catheter in place  · NPO   · PT/OT eval and treat  · Pain control with Tylenol, Tramadol, and Dilaudid IV plus Robaxin         Osteoarthritis of hip   Assessment & Plan    Seen on imaging  Orthopedics following  Planned for hemiarthroplasty today  Pain control: Tylenol, Tramadol, and Dilaudid IV prn plus Robaxin QID and ice packs         Right hip pain   Assessment & Plan    Due to imacted subcapital right femoral neck fracture of right hip requiring hemiarthroplasty by orthopedic surgery today   · PT/OT following  · Continue Robaxin 500mg PO 4 times daily, Tyenol prn for mild pain, Tramadol prn moderate pain, and Dilaudid IV prn severe pain        Hypertensive urgency   Assessment & Plan    Suspect this is due to acute right hip pain, improved  · Continue pain control as above   · Continue Norvasc, Toprol  mg in am and 50 mg in pm  · Ordered hydralazine, prn for a SBP>180mmHg        Weight loss   Assessment & Plan    Reports a 16-pound weight loss over the past 7-8 months which she contributes to cardiac rehab and recent death of her   - Follow-up FOBT  - Outpatient follow-up with PCP        CAD (coronary artery disease)   Assessment & Plan    With prior CABG with some occluded grafts  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  S/p thrombectomy with PCI, XOCHILT to OM  · Continue metoprolol, zetia, and Co Q10  · Holding ASA and Brilinta in prep for OR        Chronic systolic congestive heart failure (HCC)   Assessment & Plan    Appears euvolemic  · Continue metoprolol  · Will hold Torsemide and Spirolactone in anticipation of OR procedure   · Monitor on continuous telemetry monitoring  · Ensure electrolytes are stable         Chronic kidney disease (CKD)   Assessment & Plan    · CKD stage 3 with baseline creatinine 1 5-1 6  · Creatinine improving, 1 69 -> 1 21-> 1 15  · Heller catheter in place in prep for OR today  · She follows with outpatient nephrologist, Dr Ruba Joyce    · S/p partial thyroidectomy  · 6/5/2017 TSH 2 290  · Continue levothyroxine         Hyperlipidemia   Assessment & Plan    · She is intolerant to statins  Continue Zetia and Co Q10  · 10/9/2017 lipid panel showed cholesterol 187, triglycerides 137, HDL 55 and LDL 92            VTE Pharmacologic Prophylaxis:   Pharmacologic: Enoxaparin (Lovenox) Holding for tomorrow in anticipation of OR   Mechanical VTE Prophylaxis in Place: Yes    Patient Centered Rounds: I have performed bedside rounds with nursing staff today  Discussions with Specialists or Other Care Team Provider: Nursing, CM, orthopedic and cardiology note appreciated     Education and Discussions with Family / Patient: I have answered all questions to the best of my ability  Family at bedside  Time Spent for Care: 20 minutes  More than 50% of total time spent on counseling and coordination of care as described above      Current Length of Stay: 1 day(s)    Current Patient Status: Inpatient   Certification Statement: The patient will continue to require additional inpatient hospital stay due to displaced right femoral neck fracture     Discharge Plan: Patient is not medically stable for discharge today due to OR today for displaced right femoral fracture  Code Status: Level 1 - Full Code      Subjective:   Observed resting in bed  Anticipating OR today  Right hip pain controlled at rest but severe with movement  Tolerating blanchard catheter  Denies HA, lightheadedness, CP, SOB  Objective:     Vitals:   Temp (24hrs), Av 7 °F (33 2 °C), Min:60 8 °F (16 °C), Max:100 5 °F (38 1 °C)    HR:  [70-77] 71  Resp:  [16-18] 18  BP: (121-162)/(61-70) 162/70  SpO2:  [93 %-96 %] 96 %  Body mass index is 26 09 kg/m²  Input and Output Summary (last 24 hours): Intake/Output Summary (Last 24 hours) at 18 1520  Last data filed at 18 4224   Gross per 24 hour   Intake                0 ml   Output             2950 ml   Net            -2950 ml       Physical Exam:     Physical Exam   Constitutional: She is oriented to person, place, and time  She appears well-developed  No distress  HENT:   Head: Normocephalic  Neck: Normal range of motion  Cardiovascular: Normal rate and regular rhythm  Pulmonary/Chest: Effort normal and breath sounds normal  No respiratory distress  She has no wheezes  She has no rhonchi  She has no rales  Abdominal: Soft  Bowel sounds are normal  She exhibits no distension  There is no tenderness  Musculoskeletal: She exhibits tenderness (right hip) and deformity (right leg shortened and externally rotated )  She exhibits no edema  Neurological: She is alert and oriented to person, place, and time  Skin: Skin is warm and dry  No rash noted  She is not diaphoretic  Bruising to right groin   Psychiatric: She has a normal mood and affect  Her behavior is normal  Judgment normal    Nursing note and vitals reviewed        Additional Data:     Labs:      Results from last 7 days  Lab Units 02/01/18  0514   WBC Thousand/uL 9 40   HEMOGLOBIN g/dL 10 9*   HEMATOCRIT % 33 5*   PLATELETS Thousands/uL 178   NEUTROS PCT % 75   LYMPHS PCT % 13*   MONOS PCT % 11   EOS PCT % 1       Results from last 7 days  Lab Units 02/01/18  0514  01/30/18  1700   SODIUM mmol/L 140  < > 140   POTASSIUM mmol/L 4 1  < > 3 4*   CHLORIDE mmol/L 106  < > 102   CO2 mmol/L 26  < > 24   BUN mg/dL 23  < > 31*   CREATININE mg/dL 1 15  < > 1 69*   CALCIUM mg/dL 8 5  < > 9 2   TOTAL PROTEIN g/dL  --   --  7 2   BILIRUBIN TOTAL mg/dL  --   --  0 60   ALK PHOS U/L  --   --  68   ALT U/L  --   --  27   AST U/L  --   --  24   GLUCOSE RANDOM mg/dL 107  < > 127   < > = values in this interval not displayed  Results from last 7 days  Lab Units 02/01/18  0514   INR  0 95       * I Have Reviewed All Lab Data Listed Above  * Additional Pertinent Lab Tests Reviewed:  All Labs Within Last 24 Hours Reviewed    Imaging:    Imaging Reports Reviewed Today Include: Right hip xray  Imaging Personally Reviewed by Myself Includes:  None    Recent Cultures (last 7 days):           Last 24 Hours Medication List:     Current Facility-Administered Medications:  acetaminophen 650 mg Oral Q6H PRN MAYUR Harding   amLODIPine 2 5 mg Oral Daily MAYUR Harding   vitamin C 1,000 mg Oral Daily Lore Courtney, MAYUR   cholecalciferol 2,000 Units Oral Daily MAYUR Harding   co-enzyme Q-10 30 mg Oral Daily Bernice Rey, MAYUR   docusate sodium 100 mg Oral BID Lore Courtney, MAYUR   ezetimibe 10 mg Oral HS MAYUR Harding   HYDROmorphone 0 5 mg Intravenous Q6H PRN MAYUR Harding   hydroxychloroquine 200 mg Oral Daily MAYUR Harding   levothyroxine 50 mcg Oral Early Morning MAYUR Harding   methocarbamol 500 mg Oral 4x Daily Bernicetony Rey, MAYUR   metoprolol succinate 100 mg Oral QAM Lore Courtney, MAYUR   metoprolol succinate 50 mg Oral QPM MAYUR Harding nitroglycerin 0 4 mg Sublingual Q5 Min PRN MAYUR Loaiza   raloxifene 60 mg Oral Daily MAYUR Loaiza   ranolazine 500 mg Oral Q12H [address] MAYUR Loaiza   sodium chloride 125 mL/hr Intravenous Continuous Sylwia Sanford MD   tolterodine 2 mg Oral Daily MAYUR Loaiza   traMADol 50 mg Oral Q6H PRN MAYUR Loaiza        Today, Patient Was Seen By: MAYUR Mansfield    ** Please Note: Dictation voice to text software may have been used in the creation of this document   **

## 2018-02-02 PROBLEM — M25.551 RIGHT HIP PAIN: Status: RESOLVED | Noted: 2018-01-30 | Resolved: 2018-02-02

## 2018-02-02 PROBLEM — I16.0 HYPERTENSIVE URGENCY: Status: RESOLVED | Noted: 2018-01-30 | Resolved: 2018-02-02

## 2018-02-02 PROBLEM — M16.9 OSTEOARTHRITIS OF HIP: Status: RESOLVED | Noted: 2018-01-31 | Resolved: 2018-02-02

## 2018-02-02 LAB
ABO GROUP BLD BPU: NORMAL
ABO GROUP BLD BPU: NORMAL
ANION GAP SERPL CALCULATED.3IONS-SCNC: 9 MMOL/L (ref 4–13)
BPU ID: NORMAL
BPU ID: NORMAL
BUN SERPL-MCNC: 19 MG/DL (ref 5–25)
CALCIUM SERPL-MCNC: 7.4 MG/DL (ref 8.3–10.1)
CHLORIDE SERPL-SCNC: 111 MMOL/L (ref 100–108)
CO2 SERPL-SCNC: 21 MMOL/L (ref 21–32)
CREAT SERPL-MCNC: 0.77 MG/DL (ref 0.6–1.3)
CROSSMATCH: NORMAL
CROSSMATCH: NORMAL
ERYTHROCYTE [DISTWIDTH] IN BLOOD BY AUTOMATED COUNT: 14.4 % (ref 11.6–15.1)
GFR SERPL CREATININE-BSD FRML MDRD: 74 ML/MIN/1.73SQ M
GLUCOSE SERPL-MCNC: 127 MG/DL (ref 65–140)
HCT VFR BLD AUTO: 32.2 % (ref 37–47)
HGB BLD-MCNC: 10.5 G/DL (ref 12–16)
LYMPHOCYTES # BLD AUTO: 0.39 THOUSAND/UL (ref 0.6–4.47)
LYMPHOCYTES # BLD AUTO: 3 %
MAGNESIUM SERPL-MCNC: 1.8 MG/DL (ref 1.6–2.6)
MCH RBC QN AUTO: 27.2 PG (ref 27–31)
MCHC RBC AUTO-ENTMCNC: 32.6 G/DL (ref 31.4–37.4)
MCV RBC AUTO: 84 FL (ref 82–98)
MONOCYTES # BLD AUTO: 0.66 THOUSAND/UL (ref 0–1.22)
MONOCYTES NFR BLD AUTO: 5 % (ref 4–12)
NEUTS BAND NFR BLD MANUAL: 12 % (ref 0–8)
NEUTS SEG # BLD: 12.05 THOUSAND/UL (ref 1.81–6.82)
NEUTS SEG NFR BLD AUTO: 80 %
NRBC BLD AUTO-RTO: 0 /100 WBCS
PLATELET # BLD AUTO: 152 THOUSANDS/UL (ref 130–400)
PLATELET BLD QL SMEAR: ADEQUATE
PMV BLD AUTO: 7.5 FL (ref 8.9–12.7)
POTASSIUM SERPL-SCNC: 3.9 MMOL/L (ref 3.5–5.3)
RBC # BLD AUTO: 3.85 MILLION/UL (ref 4.2–5.4)
SODIUM SERPL-SCNC: 141 MMOL/L (ref 136–145)
TOTAL CELLS COUNTED SPEC: 100
UNIT DISPENSE STATUS: NORMAL
UNIT DISPENSE STATUS: NORMAL
UNIT PRODUCT CODE: NORMAL
UNIT PRODUCT CODE: NORMAL
UNIT RH: NORMAL
UNIT RH: NORMAL
WBC # BLD AUTO: 13.1 THOUSAND/UL (ref 4.8–10.8)

## 2018-02-02 PROCEDURE — G8978 MOBILITY CURRENT STATUS: HCPCS

## 2018-02-02 PROCEDURE — G8979 MOBILITY GOAL STATUS: HCPCS

## 2018-02-02 PROCEDURE — 85027 COMPLETE CBC AUTOMATED: CPT | Performed by: PHYSICIAN ASSISTANT

## 2018-02-02 PROCEDURE — 83735 ASSAY OF MAGNESIUM: CPT | Performed by: PHYSICIAN ASSISTANT

## 2018-02-02 PROCEDURE — G8987 SELF CARE CURRENT STATUS: HCPCS

## 2018-02-02 PROCEDURE — 85007 BL SMEAR W/DIFF WBC COUNT: CPT | Performed by: PHYSICIAN ASSISTANT

## 2018-02-02 PROCEDURE — 99232 SBSQ HOSP IP/OBS MODERATE 35: CPT | Performed by: INTERNAL MEDICINE

## 2018-02-02 PROCEDURE — G8988 SELF CARE GOAL STATUS: HCPCS

## 2018-02-02 PROCEDURE — 80048 BASIC METABOLIC PNL TOTAL CA: CPT | Performed by: PHYSICIAN ASSISTANT

## 2018-02-02 PROCEDURE — 99233 SBSQ HOSP IP/OBS HIGH 50: CPT | Performed by: ANESTHESIOLOGY

## 2018-02-02 PROCEDURE — 97163 PT EVAL HIGH COMPLEX 45 MIN: CPT

## 2018-02-02 PROCEDURE — 97167 OT EVAL HIGH COMPLEX 60 MIN: CPT

## 2018-02-02 PROCEDURE — 99024 POSTOP FOLLOW-UP VISIT: CPT | Performed by: PHYSICIAN ASSISTANT

## 2018-02-02 PROCEDURE — 94760 N-INVAS EAR/PLS OXIMETRY 1: CPT

## 2018-02-02 RX ORDER — TORSEMIDE 10 MG/1
10 TABLET ORAL DAILY
Status: DISCONTINUED | OUTPATIENT
Start: 2018-02-02 | End: 2018-02-02

## 2018-02-02 RX ORDER — FUROSEMIDE 10 MG/ML
40 INJECTION INTRAMUSCULAR; INTRAVENOUS ONCE
Status: COMPLETED | OUTPATIENT
Start: 2018-02-02 | End: 2018-02-02

## 2018-02-02 RX ORDER — POTASSIUM CHLORIDE 20 MEQ/1
20 TABLET, EXTENDED RELEASE ORAL ONCE
Status: COMPLETED | OUTPATIENT
Start: 2018-02-02 | End: 2018-02-02

## 2018-02-02 RX ORDER — TRAMADOL HYDROCHLORIDE 50 MG/1
50 TABLET ORAL EVERY 6 HOURS PRN
Status: DISCONTINUED | OUTPATIENT
Start: 2018-02-02 | End: 2018-02-02

## 2018-02-02 RX ORDER — TORSEMIDE 10 MG/1
10 TABLET ORAL DAILY
Status: DISCONTINUED | OUTPATIENT
Start: 2018-02-03 | End: 2018-02-05 | Stop reason: HOSPADM

## 2018-02-02 RX ORDER — MAGNESIUM SULFATE HEPTAHYDRATE 40 MG/ML
2 INJECTION, SOLUTION INTRAVENOUS ONCE
Status: COMPLETED | OUTPATIENT
Start: 2018-02-02 | End: 2018-02-02

## 2018-02-02 RX ORDER — HYDROMORPHONE HCL 110MG/55ML
0.2 PATIENT CONTROLLED ANALGESIA SYRINGE INTRAVENOUS
Status: DISCONTINUED | OUTPATIENT
Start: 2018-02-02 | End: 2018-02-03

## 2018-02-02 RX ORDER — SENNOSIDES 8.6 MG
1 TABLET ORAL
Status: DISCONTINUED | OUTPATIENT
Start: 2018-02-02 | End: 2018-02-05 | Stop reason: HOSPADM

## 2018-02-02 RX ADMIN — DOCUSATE SODIUM 100 MG: 100 CAPSULE, LIQUID FILLED ORAL at 17:10

## 2018-02-02 RX ADMIN — ONDANSETRON 4 MG: 2 INJECTION INTRAMUSCULAR; INTRAVENOUS at 07:07

## 2018-02-02 RX ADMIN — METHOCARBAMOL 500 MG: 500 TABLET ORAL at 22:15

## 2018-02-02 RX ADMIN — SENNOSIDES 8.6 MG: 8.6 TABLET, FILM COATED ORAL at 22:16

## 2018-02-02 RX ADMIN — METOPROLOL SUCCINATE 50 MG: 50 TABLET, FILM COATED, EXTENDED RELEASE ORAL at 17:10

## 2018-02-02 RX ADMIN — METOPROLOL SUCCINATE 100 MG: 100 TABLET, FILM COATED, EXTENDED RELEASE ORAL at 08:41

## 2018-02-02 RX ADMIN — POTASSIUM CHLORIDE 20 MEQ: 1500 TABLET, EXTENDED RELEASE ORAL at 10:16

## 2018-02-02 RX ADMIN — OXYCODONE HYDROCHLORIDE 5 MG: 5 TABLET ORAL at 08:44

## 2018-02-02 RX ADMIN — CHOLECALCIFEROL TAB 25 MCG (1000 UNIT) 2000 UNITS: 25 TAB at 08:26

## 2018-02-02 RX ADMIN — TOLTERODINE TARTRATE 2 MG: 2 CAPSULE, EXTENDED RELEASE ORAL at 08:22

## 2018-02-02 RX ADMIN — METHOCARBAMOL 500 MG: 500 TABLET ORAL at 12:18

## 2018-02-02 RX ADMIN — CEFAZOLIN SODIUM 2000 MG: 2 SOLUTION INTRAVENOUS at 12:18

## 2018-02-02 RX ADMIN — FUROSEMIDE 40 MG: 10 INJECTION, SOLUTION INTRAMUSCULAR; INTRAVENOUS at 06:09

## 2018-02-02 RX ADMIN — OXYCODONE HYDROCHLORIDE 5 MG: 5 TABLET ORAL at 13:21

## 2018-02-02 RX ADMIN — HYDROMORPHONE HYDROCHLORIDE 0.5 MG: 1 INJECTION, SOLUTION INTRAMUSCULAR; INTRAVENOUS; SUBCUTANEOUS at 08:53

## 2018-02-02 RX ADMIN — AMLODIPINE BESYLATE 2.5 MG: 2.5 TABLET ORAL at 08:22

## 2018-02-02 RX ADMIN — LEVOTHYROXINE SODIUM 50 MCG: 50 TABLET ORAL at 05:36

## 2018-02-02 RX ADMIN — HYDROXYCHLOROQUINE SULFATE 200 MG: 200 TABLET, FILM COATED ORAL at 11:00

## 2018-02-02 RX ADMIN — CEFAZOLIN SODIUM 2000 MG: 2 SOLUTION INTRAVENOUS at 22:10

## 2018-02-02 RX ADMIN — MAGNESIUM SULFATE HEPTAHYDRATE 2 G: 40 INJECTION, SOLUTION INTRAVENOUS at 10:21

## 2018-02-02 RX ADMIN — METHOCARBAMOL 500 MG: 500 TABLET ORAL at 08:22

## 2018-02-02 RX ADMIN — HYDROMORPHONE HYDROCHLORIDE 0.2 MG: 2 INJECTION, SOLUTION INTRAMUSCULAR; INTRAVENOUS; SUBCUTANEOUS at 22:26

## 2018-02-02 RX ADMIN — RANOLAZINE 500 MG: 500 TABLET, FILM COATED, EXTENDED RELEASE ORAL at 08:22

## 2018-02-02 RX ADMIN — SODIUM CHLORIDE 1000 ML: 0.9 INJECTION, SOLUTION INTRAVENOUS at 00:49

## 2018-02-02 RX ADMIN — ENOXAPARIN SODIUM 40 MG: 40 INJECTION SUBCUTANEOUS at 08:21

## 2018-02-02 RX ADMIN — RANOLAZINE 500 MG: 500 TABLET, FILM COATED, EXTENDED RELEASE ORAL at 22:15

## 2018-02-02 RX ADMIN — RALOXIFENE HYDROCHLORIDE 60 MG: 60 TABLET, FILM COATED ORAL at 16:00

## 2018-02-02 RX ADMIN — SODIUM CHLORIDE 100 ML/HR: 0.9 INJECTION, SOLUTION INTRAVENOUS at 00:35

## 2018-02-02 RX ADMIN — METHOCARBAMOL 500 MG: 500 TABLET ORAL at 17:10

## 2018-02-02 RX ADMIN — OXYCODONE HYDROCHLORIDE AND ACETAMINOPHEN 1000 MG: 500 TABLET ORAL at 08:21

## 2018-02-02 RX ADMIN — Medication 30 MG: at 11:00

## 2018-02-02 RX ADMIN — DOCUSATE SODIUM 100 MG: 100 CAPSULE, LIQUID FILLED ORAL at 08:21

## 2018-02-02 NOTE — SOCIAL WORK
SW following to assist with DCP  STR placement is planned  FLORIDALMA met with pt and daughters yesterday to discuss plans and facility options  STR referral made to [facility] today  Family considering additional options  SW will continue to follow to monitor progress and assist with planning/transfer when ready

## 2018-02-02 NOTE — PLAN OF CARE
Problem: Potential for Falls  Goal: Patient will remain free of falls  INTERVENTIONS:  - Assess patient frequently for physical needs  -  Identify cognitive and physical deficits and behaviors that affect risk of falls  -  [place] fall precautions as indicated by assessment   - Educate patient/family on patient safety including physical limitations  - Instruct patient to call for assistance with activity based on assessment  - Modify environment to reduce risk of injury  - Consider OT/PT consult to assist with strengthening/mobility    Outcome: Progressing      Problem: Prexisting or High Potential for Compromised Skin Integrity  Goal: Skin integrity is maintained or improved  INTERVENTIONS:  - Identify patients at risk for skin breakdown  - Assess and monitor skin integrity  - Assess and monitor nutrition and hydration status  - Monitor labs (i e  albumin)  - Assess for incontinence   - Turn and reposition patient  - Assist with mobility/ambulation  - Relieve pressure over bony prominences  - Avoid friction and shearing  - Provide appropriate hygiene as needed including keeping skin clean and dry  - Evaluate need for skin moisturizer/barrier cream  - Collaborate with interdisciplinary team (i e  Nutrition, Rehabilitation, etc )   - Patient/family teaching   Outcome: Progressing      Problem: Nutrition/Hydration-ADULT  Goal: Nutrient/Hydration intake appropriate for improving, restoring or maintaining nutritional needs  Monitor and assess patient's nutrition/hydration status for malnutrition (ex- brittle hair, bruises, dry skin, pale skin and conjunctiva, muscle wasting, smooth red tongue, and disorientation)  Collaborate with interdisciplinary team and initiate plan and interventions as ordered  Monitor patient's weight and dietary intake as ordered or per policy  Utilize nutrition screening tool and intervene per policy   Determine patient's food preferences and provide high-protein, high-caloric foods as appropriate       INTERVENTIONS:  - Monitor oral intake, urinary output, labs, and treatment plans  - Assess nutrition and hydration status and recommend course of action  - Evaluate amount of meals eaten  - Assist patient with eating if necessary   - Allow adequate time for meals  - Recommend/ encourage appropriate diets, oral nutritional supplements, and vitamin/mineral supplements  - Order, calculate, and assess calorie counts as needed  - Assess need for intravenous fluids  - Provide specific nutrition/hydration education as appropriate  - Include patient/family/caregiver in decisions related to nutrition    Outcome: Progressing      Problem: PAIN - ADULT  Goal: Verbalizes/displays adequate comfort level or baseline comfort level  Interventions:  - Encourage patient to monitor pain and request assistance  - Assess pain using appropriate pain scale  - Administer analgesics based on type and severity of pain and evaluate response  - Implement non-pharmacological measures as appropriate and evaluate response  - Consider cultural and social influences on pain and pain management  - Notify physician/advanced practitioner if interventions unsuccessful or patient reports new pain   Outcome: Progressing      Problem: INFECTION - ADULT  Goal: Absence or prevention of progression during hospitalization  INTERVENTIONS:  - Assess and monitor for signs and symptoms of infection  - Monitor lab/diagnostic results  - Monitor all insertion sites, i e  indwelling lines, tubes, and drains  - [place] appropriate cooling/warming therapies per order  - Administer medications as ordered  - Instruct and encourage patient and family to use good hand hygiene technique  - Identify and instruct in appropriate isolation precautions for identified infection/condition   Outcome: Progressing    Goal: Absence of fever/infection during neutropenic period  INTERVENTIONS:  - Monitor WBC  - Implement neutropenic guidelines   Outcome: Completed Date Met: 02/02/18      Problem: SAFETY ADULT  Goal: Maintain or return to baseline ADL function  INTERVENTIONS:  -  Assess patient's ability to carry out ADLs; assess patient's baseline for ADL function and identify physical deficits which impact ability to perform ADLs (bathing, care of mouth/teeth, toileting, grooming, dressing, etc )  - Assess/evaluate cause of self-care deficits   - Assess range of motion  - Assess patient's mobility; develop plan if impaired  - Assess patient's need for assistive devices and provide as appropriate  - Encourage maximum independence but intervene and supervise when necessary  ¯ Involve family in performance of ADLs  ¯ Assess for home care needs following discharge   ¯ Request OT consult to assist with ADL evaluation and planning for discharge  ¯ Provide patient education as appropriate   Outcome: Progressing    Goal: Maintain or return mobility status to optimal level  INTERVENTIONS:  - Assess patient's baseline mobility status (ambulation, transfers, stairs, etc )    - Identify cognitive and physical deficits and behaviors that affect mobility  - Identify mobility aids required to assist with transfers and/or ambulation (gait belt, sit-to-stand, lift, walker, cane, etc )  - [place] fall precautions as indicated by assessment  - Record patient progress and toleration of activity level on Mobility SBAR; progress patient to next Phase/Stage  - Instruct patient to call for assistance with activity based on assessment  - Request Rehabilitation consult to assist with strengthening/weightbearing, etc    Outcome: Progressing    Goal: Patient will remain free of falls  INTERVENTIONS:  - Assess patient frequently for physical needs  -  Identify cognitive and physical deficits and behaviors that affect risk of falls    -  [place] fall precautions as indicated by assessment   - Educate patient/family on patient safety including physical limitations  - Instruct patient to call for assistance with activity based on assessment  - Modify environment to reduce risk of injury  - Consider OT/PT consult to assist with strengthening/mobility    Outcome: Progressing      Problem: DISCHARGE PLANNING  Goal: Discharge to home or other facility with appropriate resources  INTERVENTIONS:  - Identify barriers to discharge w/patient and caregiver  - Arrange for needed discharge resources and transportation as appropriate  - Identify discharge learning needs (meds, wound care, etc )  - Arrange for interpretive services to assist at discharge as needed  - Refer to Case Management Department for coordinating discharge planning if the patient needs post-hospital services based on physician/advanced practitioner order or complex needs related to functional status, cognitive ability, or social support system   Outcome: Progressing      Problem: Knowledge Deficit  Goal: Patient/family/caregiver demonstrates understanding of disease process, treatment plan, medications, and discharge instructions  Complete learning assessment and assess knowledge base    Interventions:  - Provide teaching at level of understanding  - Provide teaching via preferred learning methods   Outcome: Progressing      Problem: DISCHARGE PLANNING - CARE MANAGEMENT  Goal: Discharge to post-acute care or home with appropriate resources  INTERVENTIONS:  - Conduct assessment to determine patient/family and health care team treatment goals, and need for post-acute services based on payer coverage, community resources, and patient preferences, and barriers to discharge  - Address psychosocial, clinical, and financial barriers to discharge as identified in assessment in conjunction with the patient/family and health care team  - Arrange appropriate level of post-acute services according to patient's   needs and preference and payer coverage in collaboration with the physician and health care team  - Communicate with and update the patient/family, physician, and health care team regarding progress on the discharge plan  - Arrange appropriate transportation to post-acute venues  dcp is to home vs str pending progress     2/1 Arrange short term rehab placement   Outcome: Progressing

## 2018-02-02 NOTE — PROGRESS NOTES
Transfer Note - ICU/Stepdown Transfer to [facility]/MS zoe Quiroga 65 y o  female MRN: [id number]  [facility]   Unit/Bed#: ICU 03 Encounter: [id number]    Code Status: Level 1 - Full Code    Reason for ICU/Stepdown admission: Closed displaced fracture of right femoral neck ([facility] [address] )    Active problems: Principal Problem:    Closed displaced fracture of right femoral neck (HCC)  Active Problems:    Chronic kidney disease (CKD)    S/P CABG x 4    Hyperlipidemia    Hypertension    Hypothyroid    Chronic systolic congestive heart failure (HCC)    CAD (coronary artery disease)    Weight loss  Resolved Problems:    Right hip pain    Hypertensive urgency    Osteoarthritis of hip      CAD (coronary artery disease)   Assessment & Plan    -continue aspirin  -continue Brilinta resume on 02/02        Ch[facility] )   Assessment & Plan    -continue diuretics  -continue beta-blocker  -continue antihypertensives        Hypothyroid   Assessment & Plan    -resume Synthroid        Hypertension   Assessment & Plan    -resume home antihypertensives        Hyperlipidemia   Assessment & Plan    -resume home anti-lipid therapy        S/P CABG x 4   Assessment & Plan    -continue daily Brilinta  -continue daily aspirin        Chronic kidney disease (CKD)   Assessment & Plan    Restart home torsemide on 02/02        * Closed displaced fracture of right femoral neck (HCC)   Assessment & Plan    -status post right hemiarthroplasty on 02/01            Consultants:   · Orthopedic: Dr Juvencio Marrero    History of Present Illness/Summary of clinical course: This is a 66 yo female POD#1 s/p right hemiarthroplasty doing well  Pt was transferred to ICU for stepdown care with close monitoring after surgery  Pt tolerated procedure well, she was medicated with narcotics and was drowsy in the PACU, she was placed on NC O2 and weaned to 2L, plan to wean as tolerated   Pt slept comfortably through the night with no complaints  This morning pt denied pain at operative site, noted right knee discomfort, but tolerable  PT/OT saw pt, she was out of bed to chair and remained hemodynamically stable  Pt's blanchard and a-line was discontinued  She resumed a cardiac diet this morning  Pt's vitals remained hemodynamically stable, she was OOB to chair, tolerating PO diet, continues on NC O2 plan to wean as tolerated  Please refer to today's progress note for further clinical details  Recent or scheduled procedures: POD #1 s/p right hemiarthroplasty    Outstanding/pending diagnostics: none       Mobilization Plan: Ambulation with assistance, weight-bearing as tolerated, posterior hip precautions, PT/OT  Ambulation with assistance, weight-bearing as tolerated, posterior hip precautions, PT/OT     Nutrition Plan: cardiac diet    Discharge Plan: per PT/OT recs STR       [  ] Family aware of transfer out of critical care: yes       Spoke with Dr Emeka Parada regarding transfer @ [phone]  Patient accepted to their service      Varney Scheuermann, MD

## 2018-02-02 NOTE — OP NOTE
OPERATIVE REPORT  PATIENT NAME: Jenifer Hinton    :  1940  MRN: [id number]  Pt Location: [facility] OR ROOM 03    SURGERY DATE: 2018    Surgeon(s) and Role:     * Maddy Briones MD - Primary     * Vince Gibson PA-C - Assisting necessary for the procedure for assistance with retraction of vital structures as well as assistance in preparation and placement of the implant    Preop Diagnosis:  Closed displaced fracture of right femoral neck ([address] ) [S72 001A]    Post-Op Diagnosis Codes:     * Closed displaced fracture of right femoral neck ([address] ) [S72 001A]    Procedure(s) (LRB):  HEMIARTHROPLASTY HIP (BIPOLAR) (RIGHT) (Right) using eyefactive corail cement was femoral stem size 15 standard collar and a self-centering bipolar head size 28 mm inner diameter and 50 mm outer diameter with the size 28 by +1 5 by 12/14 neck taper  Specimen(s):  * No specimens in log *    Estimated Blood Loss:   900 mL    Drains:  Urethral Catheter 18 Fr  (Active)   Amt returned on insertion(mL) 1350 mL 2018  4:27 PM   Reasons to continue Urinary Catheter  Post-operative urological requirements 2018  5:32 PM   Site Assessment Clean;Skin intact 2018  4:27 PM   Collection Container Standard drainage bag 2018  4:27 PM   Securement Method Leg strap 2018  4:27 PM   Output (mL) 250 mL 2018  6:35 AM   Number of days: 1       Anesthesia Type:   General    Operative Indications:  Closed displaced fracture of right femoral neck ([address] ) Ruma Goldberg is a 70-year-old female who suffered a fall and initially was thought to not have a hip fracture however upon repeat evaluation yesterday was found to have a femoral neck displaced fracture  She was having increasing pain although initially she was able to ambulate  I did discuss with her and her family at great length the risks and benefits of a right hip hemiarthroplasty and she wished to go ahead    The risks are inclusive of but not limited to infection, stiffness, fracture of the femur, nerve injury causing numbness pain and weakness particular the sciatic nerve, blood clots, dislocation, persistent pain, failure to walk appropriately, medical problems perioperatively, death, and need for further surgery  Operative Findings:  Right hip with a severely displaced femoral neck fracture  We did however her very stable appearing prosthesis at the end of the operation  We did have her in the position of sleep and she internally rotated easily to 75°  She was quite stable and her leg lengths were approximately equal at the end of the procedure  She did bleed more than usual however she has been on anticoagulation because of her considerable heart issue  We did however transfuse her 2 units of blood intraoperatively and she remained very stable throughout the procedure  We did have good control of bleeding by the end of the operation as we felt that we were pleased with our field at the end of the operation  Complications:   None    Procedure and Technique:  Antonio Kong was taken to the operating room and placed supine on the OR table  This was after general anesthesia was induced on the hospital bed  We then took her into the lateral decubitus position with the right hip up  She was well padded with an axillary roll  The down leg was well padded including the peroneal nerve  We then prepped and draped the right lower extremity in the usual sterile fashion  A surgical time-out was taken  A 10 blade was utilized to create a posterior lateral approach to the right hip  We did utilize cautery extensively throughout our dissection to limit bleeding  She did slowly loose throughout the procedure however due to the anticoagulation  We did control however any significant bleeding  We then came through the subcutaneous adipose and down to the fascia which was incised along with its fibers    We did readily exposed the short external rotators and took them down along with the capsule  We did tag the capsule with Ethibond suture to later repair that back through drill holes in the trochanter  We did expose the femoral neck very nicely and did make our saw cut in appropriate fashion  We then removed the femoral head  It measured a 50 mm head  We did trialed that and found it to be nicely stable  We then went to preparation of the proximal femur  We used a femoral neck retractor  We were able to place the  followed by the rasp to lateralize the prosthesis  We did begin our broaching and did broach up to a size 15  We did have a bit too much anteversion at 1st and thus we did broach to be close to her natural anteversion of 20°  This made it a much better fit  We were then able to place our trial femoral head which was a bipolar  We had excellent stability in the position of sleep and extension  Internal rotation was easily to 75° in the position of sleep  We felt that our leg lengths were approximately equal  We then removed the broach and trial   We irrigated very thoroughly  We then placed our real prosthesis  We placed and impacted the real femoral head which was a bipolar as listed above     We did reduce it and took this through range of motion  It was very readily stable  We did test the stability of the stem as well to make certain that we were happy  We were quite pleased at the end of the operation  We did dislocated and relocated the hip and felt that it was very stable in the femoral canal   We did not observe any obvious signs of fracture  We irrigated very thoroughly once again and did close the short external rotators through drill holes in the greater trochanter  We also closed the fascia with interrupted 1  Vicryl sutures  2-0 Vicryl and strata fix were utilized for skin as well as skin glue  Dry sterile dressings were applied after local anesthetic was injected   She tolerated the procedure well and transferred to recovery room in stable condition  She will have posterior hip precautions  We will monitor her closely medically as she does have a significant cardiac condition  She will have posterior hip precautions  She will remain on her anticoagulation per Cardiology  We will need to monitor her hemoglobin to see if she requires further transfusion     I was present for the entire procedure and A qualified resident physician was not available    Patient Disposition:  PACU     SIGNATURE: Yu Coppola MD  DATE: February 1, 2018  TIME: 10:11 PM

## 2018-02-02 NOTE — OCCUPATIONAL THERAPY NOTE
OT EVALUATION     02/02/18 0915   Restrictions/Precautions   RLE Weight Bearing Per Order WBAT   Other Precautions Chair Alarm; Bed Alarm; Fall Risk;Multiple lines;THR  (posterior hip precautions)   Pain Assessment   Pain Assessment 0-10   Pain Score Worst Possible Pain   Pain Location Hip   Pain Orientation Right   Home Living   Type of [address] [place] [address] One level  (7 steps to enter from outside, 7 steps on inside )   Bathroom Shower/Tub Walk-in shower   Bathroom Equipment Grab bars in [facility]   Additional Comments patient s/p fall and went to ER, pt sent home with walker    Patient now s/p right hip hemiarthroplasty with hip precautions   Prior Function   Level of [place] Independent with ADLs and functional mobility   Lives With Alone   Receives Help From Family   ADL Assistance Independent   IADLs Independent   Falls in the last 6 months 1 to 4   ADL   Eating Assistance 5  Supervision/Setup   Grooming Assistance 4  Minimal Assistance   UB Bathing Assistance 3  Moderate Assistance   LB Bathing Assistance 2  Maximal [address] 3  Moderate Assistance   LB Dressing Assistance 2  Maximal [address]  2  Maximal Assistance   Bed Mobility   Supine to Sit 2  Maximal assistance   Additional items Assist x 2   Transfers   Sit to Stand 2  Maximal assistance   Additional items Assist x 2   Stand to Sit 2  Maximal assistance   Additional items Assist x 2   Functional Mobility   Functional Mobility 2  Maximal assistance   Additional Comments assist of 2, few steps bed to chair with RW and verbal cues; +right knee buckling   Additional items Rolling walker   Balance   Static Sitting Fair -   Dynamic Sitting Poor   Static Standing Poor   Dynamic Standing Poor   Activity Tolerance   Activity Tolerance Patient limited by fatigue;Patient limited by pain  (pt is very anxious )   Nurse Made Aware yes   RUE Assessment   RUE Assessment WFL  (4-/5)   KEKE Assessment   LUE Assessment WFL  (4-/5)   Cognition   Overall Cognitive Status WFL   Arousal/Participation Cooperative   Attention Attends with cues to redirect   Orientation Level Oriented to person;Oriented to place;Oriented to situation   Following Commands Follows one step commands with increased time or repetition   Comments pt is very anxious   Assessment   Limitation Decreased ADL status; Decreased UE strength;Decreased Safe judgement during ADL;Decreased endurance;Decreased self-care trans;Decreased high-level ADLs  (decreased balance and mobility )   Prognosis Good   Assessment Patient evaluated by Occupational Therapy  Patient admitted with Closed displaced fracture of right femoral neck ([facility] [address] )  Patient is now s/p right hip hemiarthroplasty with hip precautions  The patients occupational profile, medical and therapy history includes a extensive additional review of physical, cognitive, or psychosocial history related to current functional performance  Comorbidities affecting functional mobility and ADLS include: anemia, arthritis, CAD, CHF, CKD and hypertension  Prior to admission, patient was independent with functional mobility without assistive device, independent with ADLS and independent with IADLS  The evaluation identifies the following performance deficits: weakness, decreased ROM, impaired balance, decreased endurance, increased fall risk, new onset of impairment of functional mobility, decreased ADLS, decreased IADLS, pain, decreased activity tolerance, decreased safety awareness, impaired judgement and decreased strength and orthopedic restrictions, that result in activity limitations and/or participation restrictions  This evaluation requires clinical decision making of high complexity, because the patient presents with comorbidites that affect occupational performance and required significant modification of tasks or assistance with consideration of multiple treatment options    The Barthel Index was used as a functional outcome tool presenting with a score of 25, indicating marked limitations of functional mobility and ADLS  Patient will benefit from skilled Occupational Therapy services to address above deficits and facilitate a safe return to prior level of function  Goals   Patient Goals decrease pain, be independent    STG Time Frame (1-7 days)   Short Term Goal  Patient will increase standing tolerance to 3 minutes during functional activity; Patient will increase bed mobility to mod assist x 2; Patient will increase functional mobility to and from bathroom with rolling walker with mod assist of 2 to increase performance with ADLS; Patient will tolerate 8 minutes of UE ROM/strengthening to increase general activity tolerance and performance in ADLS/IADLS; Patient will improve functional activity tolerance to 10 minutes of sustained functional tasks to increase participation in basic self-care and decrease assistance level  LTG Time Frame (8-14 days)   Long Term Goal Patient will increase standing tolerance to 6 minutes during functional activity; Patient will increase bed mobility to mod assist; Patient will increase functional mobility to and from bathroom with rolling walker with mod assist to increase performance with ADLS; Patient will tolerate 12 minutes of UE ROM/strengthening to increase general activity tolerance and performance in ADLS/IADLS; Patient will improve functional activity tolerance to 20 minutes of sustained functional tasks to increase participation in basic self-care and decrease assistance level     Functional Transfer Goals   Pt Will Perform All Functional Transfers (STG mod assist of 2 LTG mod assist )   ADL Goals   Pt Will Perform Eating (STG independent )   Pt Will Perform Grooming (STG supervision LTG independent )   Pt Will Perform Bathing (STG mod assist LTG min assist )   Pt Will Perform UE Dressing (STG min assist LTG supervision )   Pt Will Perform LE Dressing (STG mod assist LTG min assist )   Pt Will Perform Toileting (STG mod assist LTG min assist )   Plan   Treatment Interventions ADL retraining;UE strengthening/ROM; Functional transfer training; Endurance training;Patient/family training;Equipment evaluation/education; Activityengagement; Energy conservation   OT Frequency 3-5x/wk   Recommendation   OT Discharge Recommendation Short Term Rehab   Barthel Index   Feeding 5   Bathing 0   Grooming Score 0   Dressing Score 0   Bladder Score 0   Bowels Score 10   Toilet Use Score 5   Transfers (Bed/Chair) Score 5   Mobility (Level Surface) Score 0   Stairs Score 0   Barthel Index Score 25

## 2018-02-02 NOTE — CONSULTS
[id number] [address]ir 65 y o  female MRN: [id number]  Unit/Bed#: OR [place] Encounter: [id number]      Physician Requesting Consult: Isamar Rosales MD    Reason for Consult / Principal Problem: Closed displaced fracture of right femoral neck [facility])    HPI: Anastasia Quiroga is a 65 y o  female who presents to ICU status post hemiarthroplasty of the right hip  Patient was initially admitted to the hospital on 01/30 status post fall on 01/27 with the patient was evaluated for fall and right hip contusion  This admission the patient reports that she had progressive pain that progressed into ambulatory dysfunction  This admission the patient was found to have progression of an occult hip fracture into a displaced subcapital right femoral neck fracture for which the patient was sent to the operating room today on 02/01  Pre operatively the patient was evaluated for cardiac clearance and determined to be moderate to high risk for the procedure given a significant past medical history for acute myocardial infarction and history of CABG for remotely with coronary artery disease  Notably the patient also had a history of chronic systolic and diastolic CHF in the past with an EF of 40% and grade 2 diastolic dysfunction, however, the patient's cardiac status has improved this admission to an ejection fraction of approximately 50% with lessening diastolic dysfunction  Past medical history also includes HTN hyperlipidemia, hypothyroidism and osteoarthritis of the right hip, and chronic kidney disease  Intraoperatively the patient had approximately 900 mL EBL with some periods of hypotension throughout the case  However, the case was successfully completed and the patient was extubated and recovered in PACU  Urine output for the case was approximately 130 mL for 2 hours    In PACU the patient was slightly somnolent from narcotics given intraoperatively    History obtained from chart review and the patient  PMH:   Past Medical History:   Diagnosis Date    Anemia     Arthritis     Bladder calculi     CAD (coronary artery disease)     Status post CABG and PTCA to OM1    CHF (congestive heart failure) (HCC)     CKD (chronic kidney disease) stage 3, GFR 30-59 ml/min     CKD (chronic kidney disease), stage III     Hypertension     Hypothyroidism     Uterine prolapse        PSH:   Past Surgical History:   Procedure Laterality Date    BACK SURGERY      BLADDER SURGERY      CORONARY ANGIOPLASTY WITH STENT PLACEMENT      CORONARY ARTERY BYPASS GRAFT      OOPHORECTOMY Left        Family History: History reviewed  No pertinent family history  Social History:   History   Smoking Status    Never Smoker   Smokeless Tobacco    Never Used    History   Alcohol Use No      Marital Status: /Civil Union    ROS: 14 point ROS is negative and/or as stated in the HPI  Allergies: Allergies   Allergen Reactions    Atorvastatin Hives    Boniva [Ibandronic Acid] Hives    Codeine      codeine derivatives     Statins Hives       Home Medications:   Prior to Admission medications    Medication Sig Start Date End Date Taking? Authorizing Provider   amLODIPine (NORVASC) 2 5 mg tablet Take 1 tablet by mouth daily 8/2/17  Yes MAYUR Galeana   Ascorbic Acid (VITAMIN C) 1000 MG tablet Take 1,000 mg by mouth daily   Yes Historical Provider, MD   aspirin (ECOTRIN LOW STRENGTH) 81 mg EC tablet Take 81 mg by mouth daily Indications: Heart Attack  Yes Historical Provider, MD   Coenzyme Q10 10 MG capsule Take 10 mg by mouth daily   Yes Historical Provider, MD   hydroxychloroquine (PLAQUENIL) 200 mg tablet Take 200 mg by mouth daily     Yes Historical Provider, MD   ibuprofen (MOTRIN) 200 mg tablet Take by mouth every 6 (six) hours as needed for mild pain   Yes Historical Provider, MD   levothyroxine 50 mcg tablet Take 50 mcg by mouth daily     Yes Historical Provider, MD   methocarbamol (ROBAXIN) 500 mg tablet Take 1 tablet (500 mg total) by mouth 4 (four) times a day 1/27/18  Yes Mary Carmen Kohler DO   metoprolol succinate (TOPROL-XL) 100 mg 24 hr tablet Take 1 tablet by mouth every 24 hours  Patient taking differently: Take 100 mg by mouth every 24 hours In morning  8/2/17  Yes MAYUR Mosqueda   metoprolol succinate (TOPROL-XL) 50 mg 24 hr tablet Take 1 tablet by mouth daily  Patient taking differently: Take 50 mg by mouth daily In evening  8/2/17  Yes MAYUR Mosqueda   Multiple Vitamins-Minerals (CENTRUM SILVER PO) Take 1 tablet by mouth daily   Yes Historical Provider, MD   raloxifene (EVISTA) 60 mg tablet Take 60 mg by mouth daily   Yes Historical Provider, MD   ranolazine (RANEXA) 500 mg 12 hr tablet Take 500 mg by mouth daily   Yes Historical Provider, MD   solifenacin (VESICARE) 10 MG tablet Take 5 mg by mouth daily   Yes Historical Provider, MD   spironolactone (ALDACTONE) 25 mg tablet Take 25 mg by mouth every other day   Yes Historical Provider, MD   ticagrelor (BRILINTA) 90 MG Take 1 tablet by mouth 2 (two) times a day 8/2/17  Yes MAYUR Mosqueda   torsemide (DEMADEX) 10 mg tablet Take 10 mg by mouth daily   Yes Historical Provider, MD   UNKNOWN TO PATIENT    Yes Historical Provider, MD   Vitamin D, Cholecalciferol, 1000 units CAPS Take 2,000 Units by mouth 2 (two) times a day   Yes Historical Provider, MD   nitroglycerin (NITROLINGUAL) 0 4 mg/spray spray Place 1 spray under the tongue every 5 (five) minutes as needed for chest pain    Historical Provider, MD       Inpatient Medications:  Scheduled Meds:  Current Facility-Administered Medications:  [MAR Hold] acetaminophen 650 mg Oral Q6H PRN MAYUR Lubin    [facility] Hold] amLODIPine 2 5 mg Oral Daily MAYUR Lubin    [facility] Hold] vitamin C 1,000 mg Oral Daily MAYUR Lubin    cefazolin 2,000 mg Intravenous Once Luis Gandara PA-C    [facility] Hold] cholecalciferol 2,000 Units Oral Daily MAYRU Lubin [MAR Hold] co-enzyme Q-10 30 mg Oral Daily MAYUR Glover    [facility] Hold] docusate sodium 100 mg Oral BID MAYUR Glover    enoxaparin 40 mg Subcutaneous BID Duong Slipper, [facility] Hold] ezetimibe 10 mg Oral HS MAYUR Glover    fentaNYL 25 mcg Intravenous PRN Adan Hoffmann MD    glycopyrrolate 0 2 mg Intravenous Once Neymar Joy PA-C    HYDROmorphone 0 2 mg Intravenous Q3H PRN Duong Slipper, ARCADIO    HYDROmorphone 0 2 mg Intravenous Q6H PRN Duong Slipper, [facility] Hold] HYDROmorphone 0 5 mg Intravenous Q6H PRN MAYUR Glover    HYDROmorphone 0 5 mg Intravenous Q5 Min PRN Adan Hoffmann MD    [facility] Hold] hydroxychloroquine 200 mg Oral Daily MAYUR Glover    lactated ringers 100 mL/hr Intravenous Continuous Duong Slipper, [facility] Hold] levothyroxine 50 mcg Oral Early Morning MAYUR Glover    [facility] Hold] methocarbamol 500 mg Oral 4x Daily MAYUR Glover    [facility] Hold] metoprolol succinate 100 mg Oral QAM MAYUR Glover    [facility] Hold] metoprolol succinate 50 mg Oral QPM MAYUR Glover    neostigmine 2 5 mg Intravenous Once Neymar Joy PA-ARMAAN    [facility] Hold] nitroglycerin 0 4 mg Sublingual Q5 Min PRN MAYUR Glover    ondansetron 4 mg Intravenous Once Adan Hoffmann MD    ondansetron 4 mg Intravenous Q6H PRN Duong Slipper, ARCADIO    oxyCODONE 5 mg Oral Q4H PRN Duong Slipper, [facility] Hold] raloxifene 60 mg Oral Daily MAYUR Glover    [facility] Hold] ranolazine 500 mg Oral Q12H [address] MAYUR Glover    [START ON 2/2/2018] sodium chloride 1,000 mL Intravenous Once Neymar Joy PA-C    sodium chloride 125 mL/hr Intravenous Continuous Adan Hoffmann MD Last Rate: Stopped (02/01/18 2139)   [START ON 2/2/2018] sodium chloride 100 mL/hr Intravenous Continuous Neymar Joy PA-C    [facility] Hold] tolterodine 2 mg Oral Daily MAYUR Glover    [facility] Hold] traMADol 50 mg Oral Q6H PRN Emily Chalet, CRNP        VTE Pharmacologic Prophylaxis: Enoxaparin (Lovenox)  VTE Mechanical Prophylaxis: sequential compression device    Continuous Infusions:  lactated ringers 100 mL/hr    sodium chloride 125 mL/hr Last Rate: Stopped (18)   [START ON 2018] sodium chloride 100 mL/hr      PRN Meds:    [MAR Hold] acetaminophen 650 mg Q6H PRN   fentaNYL 25 mcg PRN   HYDROmorphone 0 2 mg Q3H PRN   HYDROmorphone 0 2 mg Q6H PRN   [MAR Hold] HYDROmorphone 0 5 mg Q6H PRN   HYDROmorphone 0 5 mg Q5 Min PRN   [MAR Hold] nitroglycerin 0 4 mg Q5 Min PRN   ondansetron 4 mg Q6H PRN   oxyCODONE 5 mg Q4H PRN   [MAR Hold] traMADol 50 mg Q6H PRN       Vitals:   Vitals:    18 2230 18 2245 18 2300 18 2314   BP: (!) 172/95 158/78 (!) 151/47 136/70   BP Location:       Pulse: 87 76 78 72   Resp: 16 16 16 16   Temp:       TempSrc:       SpO2: 100% 98% 95% 97%   Weight:       Height:         Arterial Line BP: 136/52       Tele Rhythm: NSR This was personally reviewed by myself  Temperature: Temp (24hrs), Av 3 °F (37 4 °C), Min:98 1 °F (36 7 °C), Max:100 5 °F (38 1 °C)  Current: Temperature: 99 2 °F (37 3 °C)    Weights: IBW: 54 7 kg  Body mass index is 26 09 kg/m²  Hemodynamic Monitoring:  PAP:  , PAP mean:       Respiratory:  O2 Flow Rate (L/min): 3 L/min    Physical Exam:  General Appearance:  Elderly, normally developed, no acute distress    HENT: Normocephalic, without obvious abnormality, atraumatic    Eyes: No icterus, PERRL, EOMI    Cardiac: RRR 0 MRG    Pulmonary: CTAB 0 WRR / no secretions    Gastrointestinal: Soft, NT/ND + BS / steffi PO /     : Heller present: yes            Musculoskeletal: No edema bilaterally  +  DP and RAD pulses bilaterally   Dressing CDI on R hip    Neuro/Psych:  normal without focal findings, mental status, speech normal, alert and oriented x3 and GINI    Skin: no rashes or jaundice    Labs:     Results from last 7 days  Lab Units 02/01/18  2328 02/01/18  0514 01/31/18  0444 01/30/18  1700   WBC Thousand/uL  --  9 40 7 20 10 70   HEMOGLOBIN g/dL 11 8* 10 9* 10 2* 12 2   HEMATOCRIT % 36 7* 33 5* 31 6* 37 8   PLATELETS Thousands/uL  --  178 171 215   NEUTROS PCT %  --  75  --  77*   MONOS PCT %  --  11  --  9       Results from last 7 days  Lab Units 02/01/18  0514 01/31/18  0444 01/30/18  1700   SODIUM mmol/L 140 144 140   POTASSIUM mmol/L 4 1 3 6 3 4*   CHLORIDE mmol/L 106 109* 102   CO2 mmol/L 26 28 24   BUN mg/dL 23 23 31*   CREATININE mg/dL 1 15 1 21 1 69*   CALCIUM mg/dL 8 5 8 3 9 2   TOTAL PROTEIN g/dL  --   --  7 2   BILIRUBIN TOTAL mg/dL  --   --  0 60   ALK PHOS U/L  --   --  68   ALT U/L  --   --  27   AST U/L  --   --  24   GLUCOSE RANDOM mg/dL 107 103 127       Results from last 7 days  Lab Units 02/01/18  0514 01/31/18  0444   MAGNESIUM mg/dL 2 2 2 2       Results from last 7 days  Lab Units 02/01/18  0514 01/31/18  0444   PHOSPHORUS mg/dL 3 6 3 4        Results from last 7 days  Lab Units 02/01/18  0514   INR  0 95   PTT seconds 26               Results from last 7 days  Lab Units 02/01/18  2327   PH ART  7 239*   PCO2 ART mm Hg 47 3*   PO2 ART mm Hg 82 4   HCO3 ART mmol/L 19 8*   BASE EXC ART mmol/L -7 5   ABG SOURCE  Line, Arterial         Micro:  Blood Culture: No results found for: BLOODCX  Urine Culture:   Lab Results   Component Value Date    URINECX 10,000-19,000 cfu/ml Mixed Contaminants X3 06/01/2016     Sputum Culture: No components found for: SPUTUMCX  Wound Culture: No results found for: WOUNDCULT    Results from last 7 days  Lab Units 01/30/18  2100   MRSA CULTURE ONLY  No Methicillin Resistant Staphlyococcus aureus (MRSA) isolated       Impression:  Principal Problem:    Closed displaced fracture of right femoral neck ([address] )  Active Problems:    Chronic kidney disease (CKD)    Hyperlipidemia    Hypothyroid    Right hip pain    Hypertensive urgency    Chronic systolic congestive heart failure ([address] ) CAD (coronary artery disease)    Osteoarthritis of hip    Weight loss    CAD (coronary artery disease)   Assessment & Plan    -continue aspirin  -continue Brilinta resume on 02/02        Chronic systolic congestive heart failure (HCC)   Assessment & Plan    -continue diuretics  -continue beta-blocker  -continue antihypertensives        Hypothyroid   Assessment & Plan    -resume Synthroid        Hypertension   Assessment & Plan    -resume home antihypertensives        Hyperlipidemia   Assessment & Plan    -resume home anti-lipid therapy        S/P CABG x 4   Assessment & Plan    -continue daily Brilinta  -continue daily aspirin        Chronic kidney disease (CKD)   Assessment & Plan    Restart home torsemide on 02/02        * Closed displaced fracture of right femoral neck ([facility] [address] )   Assessment & Plan    -status post right hemiarthroplasty on 02/01              Assessment / Plan :  Neuro:  -CAM ICU neg:  Delirium Precautions  -Pain /Sedation:  Prn dilaudid postop > transition to PO when able    -Neuro Checks:  Routine  CV/Heme/VTE PPx:  -Hemoglobin :  11 9 /  Platelets:  Stable  / Coagulation:  Stable  - Chemical VTE PPX :  Lovenox  / SCDs  Pulm:  -I/S v   Flutter Valve / Pulm Toilet    GI/Endo:    -Glycemic Control:  Accuchecks :  SSI Alg  If  needed   -Bowel Reg:  Senna  / PRN Laxatives if required  -Stress Ulcer PPx :  None required  -Nutrition:  Advance diet as tolerated on 02/02   /FEN:  -IVF :  Net Bal:  +3 / Goal :  +1  -Electrolytes:  goal K/M/PH :  4, 2, 3 :  Replete as necessary   -Heller:  Yes /  day 1  ID:  -Trend Fever and WBC curve  -Current role for abx:   Perioperative antibiotics prophylaxis /  Abx Day 1  /  -Cultures:  None  MSK/Mobility/ PT OT/ Lines:  -MSK:  Routine Turning, Offloading, Skin care  -OOB / Ambulatory  as early as possible  -PTOT / CM and Rehab Assessment   -Access:  PIV:  Yes /  Central Access:  No /  day 0  /  arterial catheter:  Day 0  Consultants:  Ortho, cardiology, nutrition  Dispo/Family:   ICU care / Patient and Family Updated:  Patient family updated at bedside in PACU    Disposition: Given critical illness, patient length of stay will require greater than two midnights  Code Status: Level 1 - Full Code    Invasive lines and devices: Invasive Devices     Peripheral Intravenous Line            Peripheral IV 01/30/18 Left Antecubital 2 days    Peripheral IV 02/01/18 Left Hand less than 1 day          Arterial Line            Arterial Line 02/01/18 Right Radial less than 1 day          Drain            Urethral Catheter 18 Fr  1 day                Counseling / Coordination of Care  Total Critical Care time spent 30 minutes excluding procedures, teaching and family updates  Portions of the record may have been created with voice recognition software  Occasional wrong word or "sound a like" substitutions may have occurred due to the inherent limitations of voice recognition software  Read the chart carefully and recognize, using context, where substitutions have occurred      SIGNATURE: Jigar Neil PA-C  DATE: February 1, 2018  TIME: 11:59 PM

## 2018-02-02 NOTE — PLAN OF CARE
Problem: DISCHARGE PLANNING - CARE MANAGEMENT  Goal: Discharge to post-acute care or home with appropriate resources  INTERVENTIONS:  - Conduct assessment to determine patient/family and health care team treatment goals, and need for post-acute services based on payer coverage, community resources, and patient preferences, and barriers to discharge  - Address psychosocial, clinical, and financial barriers to discharge as identified in assessment in conjunction with the patient/family and health care team  - Arrange appropriate level of post-acute services according to patient's   needs and preference and payer coverage in collaboration with the physician and health care team  - Communicate with and update the patient/family, physician, and health care team regarding progress on the discharge plan  - Arrange appropriate transportation to post-acute venues  dcp is to home vs str pending progress     2/1 Arrange short term rehab placement   Outcome: Progressing   STR referral made to [address] today  Family considering additional options

## 2018-02-02 NOTE — PLAN OF CARE
Problem: GENITOURINARY - ADULT  Goal: Maintains or returns to baseline urinary function  INTERVENTIONS:  - Assess urinary function  - Encourage oral fluids to ensure adequate hydration  - Administer IV fluids as ordered to ensure adequate hydration  - Administer ordered medications as needed  - Offer frequent toileting  - Follow urinary retention protocol if ordered  Outcome: Progressing    Goal: Urinary catheter remains patent  INTERVENTIONS:  - Assess patency of urinary catheter  - If patient has a chronic blanchard, consider changing catheter if non-functioning  - Follow guidelines for intermittent irrigation of non-functioning urinary catheter  Outcome: Progressing      Problem: HEMATOLOGIC - ADULT  Goal: Maintains hematologic stability  INTERVENTIONS  - Assess for signs and symptoms of bleeding or hemorrhage  - Monitor labs  - Administer supportive blood products/factors as ordered and appropriate  Outcome: Progressing

## 2018-02-02 NOTE — ANESTHESIA PROCEDURE NOTES
Arterial Line Insertion  Date/Time: 2/1/2018 6:00 PM  Performed by: Bimal Mcelroy  Authorized by: Bimal Mcelroy   Consent: Verbal consent obtained  Written consent obtained  Risks and benefits: risks, benefits and alternatives were discussed  Consent given by: patient  Patient understanding: patient states understanding of the procedure being performed  Patient consent: the patient's understanding of the procedure matches consent given  Procedure consent: procedure consent matches procedure scheduled  Relevant documents: relevant documents present and verified  Test results: test results available and properly labeled  Site marked: the operative site was marked  Imaging studies: imaging studies available  Required items: required blood products, implants, devices, and special equipment available  Patient identity confirmed: verbally with patient and arm band  Time out: Immediately prior to procedure a "time out" was called to verify the correct patient, procedure, equipment, support staff and site/side marked as required  Preparation: Patient was prepped and draped in the usual sterile fashion    Indications: hemodynamic monitoring  Orientation:  RightLocation: radial artery  Anesthesia: local infiltration    Anesthesia:  Local Anesthetic: lidocaine 1% without epinephrine  Anesthetic total: 2 mL    Sedation:  Patient sedated: yes  Sedation type: anxiolysis  Sedatives: midazolam  Sedation start date/time: 2/1/2018 6:00 PM  Sedation end date/time: 2/1/2018 6:14 PM  Poncho's test normal: yes  Needle gauge: 20  Seldinger technique: Seldinger technique used  Number of attempts: 4  Post-procedure: chlorhexidine patch applied  Patient tolerance: Patient tolerated the procedure well with no immediate complications

## 2018-02-02 NOTE — POST OP PROGRESS NOTES
Progress Note - Orthopedics   Wiliam Hoyt 65 y o  female MRN: [id number]  Unit/Bed#: ICU 03 Encounter: [id number]    Assessment:  POD #1 s/p right hemiarthroplasty - stable, neurovascularly intact, hemoglobin 10 5 today after 2 units, pain well controlled at rest    Plan:  POD #1 s/p right hemiarthroplasty -  - Antibiotics:  2 more doses of Ancef q 8h our 2 g then perioperative antibiotics will been completed  - Anticoagulation:  Currently Lovenox on hold until at least 12 hours after procedure, SCDs, Lovenox 40 mg subcutaneous once daily to ensue 2/2/18 approximately 4 o'clock in the afternoon then once daily can reinstitute anti-platelet therapy tomorrow if hemoglobin is stable  - Activity:  Ambulation with assistance, weight-bearing as tolerated, posterior hip precautions, PT/OT    - AM lab draw:  Repeat CBC, BMP, Mag, phos with a m  lab draw  - Analgesia: Continue p r n  medication  - Dressing: keep clean, dry, and in tact, observe for drainage, minimal drainage noted, continue Mepilex until postoperative day 7  - Disposition:  Patient is doing well and appears to be stable clinically, if critical care team agrees patient is likely ready for transfer to [facility] under [facility] service  Orthopedic will remain as consult and continue to monitor throughout the weekend  Aggressive PT/OT is necessary  Approval for skilled nursing facility from case management we will need to be discussed has been year discharge in the future        Weight bearing: WBAT with posterior hip precaution    VTE Pharmacologic Prophylaxis: Enoxaparin (Lovenox)  VTE Mechanical Prophylaxis: sequential compression device    Subjective:  Patient was seen examined at bedside  Patient denies any acute events overnight  Patient reports that she feels pretty good and denies any pain at rest   Patient does report some discomfort with knee and abductor pillow but otherwise has no hip pain    Patient denies any numbness, tingling, lack of motor movement, lack of sensation in her distal lower right extremity  Patient denies any symptoms of anemia from acute blood loss such as tachycardia, palpitations, shortness of breath, chest tightness, dizziness, fatigue, lightheadedness  Vitals: Blood pressure 140/63, pulse 84, temperature 100 °F (37 8 °C), temperature source Temporal, resp  rate (!) 28, height 5' 4" (1 626 m), weight 73 kg (160 lb 15 oz), SpO2 96 %, not currently breastfeeding  ,Body mass index is 27 62 kg/m²  Intake/Output Summary (Last 24 hours) at 02/02/18 1201  Last data filed at 02/02/18 0901   Gross per 24 hour   Intake          5243 33 ml   Output             3030 ml   Net          2213 33 ml       Invasive Devices     Peripheral Intravenous Line            Peripheral IV 01/30/18 Left Antecubital 2 days    Peripheral IV 02/01/18 Left Hand 1 day          Arterial Line            Arterial Line 02/01/18 Right Radial less than 1 day          Drain            Urethral Catheter 18 Fr  1 day                Physical Exam: /63   Pulse 84   Temp 100 °F (37 8 °C) (Temporal)   Resp (!) 28   Ht 5' 4" (1 626 m)   Wt 73 kg (160 lb 15 oz)   SpO2 96%   Breastfeeding? No   BMI 27 62 kg/m²   General appearance: alert and oriented, in no acute distress  Head: Normocephalic, without obvious abnormality, atraumatic  Skin: Skin color, texture, turgor normal  No rashes or lesions or Minimal ecchymoses around groin and posterior gluteal region  Ortho Exam: right Lower Extremity: Thigh and calf compartments are soft and nontender to palpation  + L3-S1 SILT  + DF/PF + EHL  No pain with passive stretch/extension of toes  DP 1+, capillary refill less than 2 seconds, and foot is warm B/L  Incision is c/d/i    Lab, Imaging and other studies:   I have personally reviewed pertinent lab results    CBC:   Lab Results   Component Value Date    WBC 13 10 (H) 02/02/2018    HGB 10 5 (L) 02/02/2018    HCT 32 2 (L) 02/02/2018    MCV 84 02/02/2018     02/02/2018    MCH 27 2 02/02/2018    MCHC 32 6 02/02/2018    RDW 14 4 02/02/2018    MPV 7 5 (L) 02/02/2018    NRBC 0 02/02/2018     CMP:   Lab Results   Component Value Date     02/02/2018     (H) 02/02/2018    CO2 21 02/02/2018    ANIONGAP 9 02/02/2018    BUN 19 02/02/2018    CREATININE 0 77 02/02/2018    GLUCOSE 127 02/02/2018    CALCIUM 7 4 (L) 02/02/2018    EGFR 74 02/02/2018

## 2018-02-02 NOTE — PROGRESS NOTES
Post op anesthesia note:    S/P hip arthroplasty day 1  Patient seen Ralph Campos in bed comfortably  on NC @3l/min  No complaints  Pain well comtrolled  No N/V  Vitals reviewed/stable  Labs reviewed

## 2018-02-03 ENCOUNTER — APPOINTMENT (INPATIENT)
Dept: RADIOLOGY | Facility: HOSPITAL | Age: 78
DRG: 470 | End: 2018-02-03
Payer: MEDICARE

## 2018-02-03 PROBLEM — N18.30 CHRONIC KIDNEY DISEASE, STAGE 3 (HCC): Chronic | Status: ACTIVE | Noted: 2017-07-30

## 2018-02-03 PROBLEM — E78.5 HYPERLIPIDEMIA: Chronic | Status: ACTIVE | Noted: 2017-07-30

## 2018-02-03 PROBLEM — N18.30 BENIGN HYPERTENSION WITH CHRONIC KIDNEY DISEASE, STAGE III (HCC): Status: ACTIVE | Noted: 2017-07-30

## 2018-02-03 PROBLEM — I50.22 CHRONIC SYSTOLIC CONGESTIVE HEART FAILURE (HCC): Chronic | Status: ACTIVE | Noted: 2018-01-30

## 2018-02-03 PROBLEM — R63.4 WEIGHT LOSS: Chronic | Status: ACTIVE | Noted: 2018-01-31

## 2018-02-03 PROBLEM — I25.10 CAD (CORONARY ARTERY DISEASE): Chronic | Status: ACTIVE | Noted: 2018-01-30

## 2018-02-03 PROBLEM — N18.30 BENIGN HYPERTENSION WITH CHRONIC KIDNEY DISEASE, STAGE III (HCC): Chronic | Status: ACTIVE | Noted: 2017-07-30

## 2018-02-03 PROBLEM — R33.9 URINARY RETENTION: Status: ACTIVE | Noted: 2018-02-03

## 2018-02-03 PROBLEM — I12.9 BENIGN HYPERTENSION WITH CHRONIC KIDNEY DISEASE, STAGE III (HCC): Status: ACTIVE | Noted: 2017-07-30

## 2018-02-03 PROBLEM — Z95.1 S/P CABG X 4: Chronic | Status: ACTIVE | Noted: 2017-07-30

## 2018-02-03 PROBLEM — E03.9 HYPOTHYROID: Chronic | Status: ACTIVE | Noted: 2017-07-31

## 2018-02-03 PROBLEM — D72.829 LEUKOCYTOSIS: Status: ACTIVE | Noted: 2018-02-03

## 2018-02-03 PROBLEM — D62 ACUTE BLOOD LOSS ANEMIA: Status: ACTIVE | Noted: 2018-02-03

## 2018-02-03 PROBLEM — N18.30 CHRONIC KIDNEY DISEASE, STAGE 3 (HCC): Status: ACTIVE | Noted: 2017-07-30

## 2018-02-03 PROBLEM — I12.9 BENIGN HYPERTENSION WITH CHRONIC KIDNEY DISEASE, STAGE III (HCC): Chronic | Status: ACTIVE | Noted: 2017-07-30

## 2018-02-03 LAB
ANION GAP SERPL CALCULATED.3IONS-SCNC: 8 MMOL/L (ref 4–13)
BACTERIA UR QL AUTO: ABNORMAL /HPF
BASOPHILS # BLD AUTO: 0.1 THOUSANDS/ΜL (ref 0–0.1)
BASOPHILS NFR BLD AUTO: 1 % (ref 0–1)
BILIRUB UR QL STRIP: NEGATIVE
BUN SERPL-MCNC: 27 MG/DL (ref 5–25)
CALCIUM SERPL-MCNC: 7.6 MG/DL (ref 8.3–10.1)
CHLORIDE SERPL-SCNC: 101 MMOL/L (ref 100–108)
CLARITY UR: CLEAR
CO2 SERPL-SCNC: 24 MMOL/L (ref 21–32)
COLOR UR: YELLOW
CREAT SERPL-MCNC: 1.19 MG/DL (ref 0.6–1.3)
EOSINOPHIL # BLD AUTO: 0.1 THOUSAND/ΜL (ref 0–0.61)
EOSINOPHIL NFR BLD AUTO: 1 % (ref 0–6)
ERYTHROCYTE [DISTWIDTH] IN BLOOD BY AUTOMATED COUNT: 13.7 % (ref 11.6–15.1)
GFR SERPL CREATININE-BSD FRML MDRD: 44 ML/MIN/1.73SQ M
GLUCOSE SERPL-MCNC: 117 MG/DL (ref 65–140)
GLUCOSE UR STRIP-MCNC: NEGATIVE MG/DL
HCT VFR BLD AUTO: 27 % (ref 37–47)
HGB BLD-MCNC: 8.9 G/DL (ref 12–16)
HGB UR QL STRIP.AUTO: ABNORMAL
HYALINE CASTS #/AREA URNS LPF: ABNORMAL /LPF
KETONES UR STRIP-MCNC: NEGATIVE MG/DL
LEUKOCYTE ESTERASE UR QL STRIP: ABNORMAL
LG PLATELETS BLD QL SMEAR: PRESENT
LYMPHOCYTES # BLD AUTO: 0.7 THOUSANDS/ΜL (ref 0.6–4.47)
LYMPHOCYTES NFR BLD AUTO: 5 % (ref 14–44)
MAGNESIUM SERPL-MCNC: 2.5 MG/DL (ref 1.6–2.6)
MCH RBC QN AUTO: 27.3 PG (ref 27–31)
MCHC RBC AUTO-ENTMCNC: 33.1 G/DL (ref 31.4–37.4)
MCV RBC AUTO: 82 FL (ref 82–98)
MONOCYTES # BLD AUTO: 1.3 THOUSAND/ΜL (ref 0.17–1.22)
MONOCYTES NFR BLD AUTO: 10 % (ref 4–12)
NEUTROPHILS # BLD AUTO: 10.8 THOUSANDS/ΜL (ref 1.85–7.62)
NEUTS SEG NFR BLD AUTO: 83 % (ref 43–75)
NITRITE UR QL STRIP: NEGATIVE
NON-SQ EPI CELLS URNS QL MICRO: ABNORMAL /HPF
PH UR STRIP.AUTO: 5.5 [PH] (ref 5–9)
PHOSPHATE SERPL-MCNC: 2.2 MG/DL (ref 2.3–4.1)
PLATELET # BLD AUTO: 156 THOUSANDS/UL (ref 130–400)
PLATELET BLD QL SMEAR: ADEQUATE
PMV BLD AUTO: 8.1 FL (ref 8.9–12.7)
POTASSIUM SERPL-SCNC: 3.7 MMOL/L (ref 3.5–5.3)
PROT UR STRIP-MCNC: NEGATIVE MG/DL
RBC # BLD AUTO: 3.27 MILLION/UL (ref 4.2–5.4)
RBC #/AREA URNS AUTO: ABNORMAL /HPF
SODIUM SERPL-SCNC: 133 MMOL/L (ref 136–145)
SP GR UR STRIP.AUTO: 1.02 (ref 1–1.03)
URATE CRY URNS QL MICRO: ABNORMAL /HPF
UROBILINOGEN UR QL STRIP.AUTO: 0.2 E.U./DL
WBC # BLD AUTO: 13 THOUSAND/UL (ref 4.8–10.8)
WBC #/AREA URNS AUTO: ABNORMAL /HPF

## 2018-02-03 PROCEDURE — 81001 URINALYSIS AUTO W/SCOPE: CPT | Performed by: NURSE PRACTITIONER

## 2018-02-03 PROCEDURE — 80048 BASIC METABOLIC PNL TOTAL CA: CPT | Performed by: FAMILY MEDICINE

## 2018-02-03 PROCEDURE — 97116 GAIT TRAINING THERAPY: CPT

## 2018-02-03 PROCEDURE — 99232 SBSQ HOSP IP/OBS MODERATE 35: CPT | Performed by: FAMILY MEDICINE

## 2018-02-03 PROCEDURE — 87086 URINE CULTURE/COLONY COUNT: CPT | Performed by: NURSE PRACTITIONER

## 2018-02-03 PROCEDURE — 85025 COMPLETE CBC W/AUTO DIFF WBC: CPT | Performed by: FAMILY MEDICINE

## 2018-02-03 PROCEDURE — 97110 THERAPEUTIC EXERCISES: CPT

## 2018-02-03 PROCEDURE — 83735 ASSAY OF MAGNESIUM: CPT | Performed by: FAMILY MEDICINE

## 2018-02-03 PROCEDURE — 71045 X-RAY EXAM CHEST 1 VIEW: CPT

## 2018-02-03 PROCEDURE — 84100 ASSAY OF PHOSPHORUS: CPT | Performed by: FAMILY MEDICINE

## 2018-02-03 RX ORDER — ACETAMINOPHEN 325 MG/1
650 TABLET ORAL EVERY 6 HOURS PRN
Status: DISCONTINUED | OUTPATIENT
Start: 2018-02-03 | End: 2018-02-05 | Stop reason: HOSPADM

## 2018-02-03 RX ORDER — HYDROMORPHONE HCL 110MG/55ML
0.2 PATIENT CONTROLLED ANALGESIA SYRINGE INTRAVENOUS
Status: DISCONTINUED | OUTPATIENT
Start: 2018-02-03 | End: 2018-02-03

## 2018-02-03 RX ORDER — SPIRONOLACTONE 25 MG/1
25 TABLET ORAL EVERY OTHER DAY
Status: DISCONTINUED | OUTPATIENT
Start: 2018-02-04 | End: 2018-02-05 | Stop reason: HOSPADM

## 2018-02-03 RX ORDER — POLYETHYLENE GLYCOL 3350 17 G/17G
17 POWDER, FOR SOLUTION ORAL DAILY
Status: DISCONTINUED | OUTPATIENT
Start: 2018-02-03 | End: 2018-02-05 | Stop reason: HOSPADM

## 2018-02-03 RX ORDER — OXYCODONE HYDROCHLORIDE 5 MG/1
5 TABLET ORAL EVERY 4 HOURS PRN
Status: DISCONTINUED | OUTPATIENT
Start: 2018-02-03 | End: 2018-02-05 | Stop reason: HOSPADM

## 2018-02-03 RX ORDER — BISACODYL 10 MG
10 SUPPOSITORY, RECTAL RECTAL DAILY PRN
Status: DISCONTINUED | OUTPATIENT
Start: 2018-02-03 | End: 2018-02-05 | Stop reason: HOSPADM

## 2018-02-03 RX ORDER — ACETAMINOPHEN 325 MG/1
650 TABLET ORAL 4 TIMES DAILY
Status: DISCONTINUED | OUTPATIENT
Start: 2018-02-03 | End: 2018-02-03

## 2018-02-03 RX ORDER — OXYCODONE HYDROCHLORIDE 5 MG/1
2.5 TABLET ORAL EVERY 4 HOURS PRN
Status: DISCONTINUED | OUTPATIENT
Start: 2018-02-03 | End: 2018-02-05 | Stop reason: HOSPADM

## 2018-02-03 RX ADMIN — TOLTERODINE TARTRATE 2 MG: 2 CAPSULE, EXTENDED RELEASE ORAL at 09:42

## 2018-02-03 RX ADMIN — ENOXAPARIN SODIUM 40 MG: 40 INJECTION SUBCUTANEOUS at 09:40

## 2018-02-03 RX ADMIN — METHOCARBAMOL 500 MG: 500 TABLET ORAL at 09:41

## 2018-02-03 RX ADMIN — METOPROLOL SUCCINATE 100 MG: 100 TABLET, FILM COATED, EXTENDED RELEASE ORAL at 09:42

## 2018-02-03 RX ADMIN — ACETAMINOPHEN 650 MG: 325 TABLET, FILM COATED ORAL at 12:12

## 2018-02-03 RX ADMIN — DOCUSATE SODIUM 100 MG: 100 CAPSULE, LIQUID FILLED ORAL at 09:40

## 2018-02-03 RX ADMIN — METHOCARBAMOL 500 MG: 500 TABLET ORAL at 12:12

## 2018-02-03 RX ADMIN — DIBASIC SODIUM PHOSPHATE, MONOBASIC POTASSIUM PHOSPHATE AND MONOBASIC SODIUM PHOSPHATE 2 TABLET: 852; 155; 130 TABLET ORAL at 21:07

## 2018-02-03 RX ADMIN — CHOLECALCIFEROL TAB 25 MCG (1000 UNIT) 2000 UNITS: 25 TAB at 09:41

## 2018-02-03 RX ADMIN — SENNOSIDES 8.6 MG: 8.6 TABLET, FILM COATED ORAL at 21:08

## 2018-02-03 RX ADMIN — RANOLAZINE 500 MG: 500 TABLET, FILM COATED, EXTENDED RELEASE ORAL at 09:41

## 2018-02-03 RX ADMIN — RALOXIFENE HYDROCHLORIDE 60 MG: 60 TABLET, FILM COATED ORAL at 09:43

## 2018-02-03 RX ADMIN — EZETIMIBE 10 MG: 10 TABLET ORAL at 00:16

## 2018-02-03 RX ADMIN — POLYETHYLENE GLYCOL 3350 17 G: 17 POWDER, FOR SOLUTION ORAL at 15:45

## 2018-02-03 RX ADMIN — RANOLAZINE 500 MG: 500 TABLET, FILM COATED, EXTENDED RELEASE ORAL at 21:08

## 2018-02-03 RX ADMIN — HYDROXYCHLOROQUINE SULFATE 200 MG: 200 TABLET, FILM COATED ORAL at 09:45

## 2018-02-03 RX ADMIN — DOCUSATE SODIUM 100 MG: 100 CAPSULE, LIQUID FILLED ORAL at 17:28

## 2018-02-03 RX ADMIN — AMLODIPINE BESYLATE 2.5 MG: 2.5 TABLET ORAL at 09:43

## 2018-02-03 RX ADMIN — EZETIMIBE 10 MG: 10 TABLET ORAL at 21:08

## 2018-02-03 RX ADMIN — METOPROLOL SUCCINATE 50 MG: 50 TABLET, FILM COATED, EXTENDED RELEASE ORAL at 17:28

## 2018-02-03 RX ADMIN — OXYCODONE HYDROCHLORIDE 5 MG: 5 TABLET ORAL at 14:28

## 2018-02-03 RX ADMIN — LEVOTHYROXINE SODIUM 50 MCG: 50 TABLET ORAL at 07:39

## 2018-02-03 RX ADMIN — TORSEMIDE 10 MG: 10 TABLET ORAL at 09:41

## 2018-02-03 RX ADMIN — Medication 30 MG: at 09:44

## 2018-02-03 RX ADMIN — OXYCODONE HYDROCHLORIDE 5 MG: 5 TABLET ORAL at 10:15

## 2018-02-03 RX ADMIN — OXYCODONE HYDROCHLORIDE AND ACETAMINOPHEN 1000 MG: 500 TABLET ORAL at 09:42

## 2018-02-03 NOTE — PLAN OF CARE
Problem: OCCUPATIONAL THERAPY ADULT  Goal: Performs self-care activities at highest level of function for planned discharge setting  See evaluation for individualized goals  Outcome: Progressing  Limitation: Decreased ADL status, Decreased UE strength, Decreased Safe judgement during ADL, Decreased endurance, Decreased self-care trans, Decreased high-level ADLs (decreased balance and mobility )  Prognosis: Good  Assessment: Patient is confused but cooperative  Patient requires verbal cues to answer orientation questions appropriately  Patient is generally weak and deconditioned and requires max assist of 2 for transfers  Patients aide present for session         OT Discharge Recommendation: Short Term Rehab

## 2018-02-03 NOTE — ASSESSMENT & PLAN NOTE
Continues with urinary retention which was present before initial blanchard was inserted, reinsert Blanchard catheter    Musa Singh Cera, Urology

## 2018-02-03 NOTE — OCCUPATIONAL THERAPY NOTE
OT TREATMENT       02/03/18 0912   Restrictions/Precautions   RLE Weight Bearing Per Order WBAT   Other Precautions Chair Alarm; Bed Alarm;Cognitive; Fall Risk  (posterior hip precautions)   Pain Assessment   Pain Assessment 0-10   Pain Score 5   Pain Location Hip   Pain Orientation Right   Bed Mobility   Supine to Sit 2  Maximal assistance   Additional items Assist x 2   Sit to Supine 2  Maximal assistance   Additional items Assist x 2   Additional Comments sitting balance on edge of bed poor + static   Transfers   Sit to Stand 2  Maximal assistance   Additional items Assist x 2   Stand to Sit 2  Maximal assistance   Additional items Assist x 2   Stand pivot 2  Maximal assistance  (bed to chair with RW +right leg buckling)   Additional items Assist x 2;Verbal cues; Increased time required   ROM- Right Upper Extremities   R Shoulder AROM; Flexion; Horizontal ABduction  (chest press)   R Elbow AROM;Elbow flexion;Elbow extension   R Hand AROM; Thumb; Index finger; Long finger;Ring finger;Little finger   R Weight/Reps/Sets 10 times each    ROM - Left Upper Extremities    L Shoulder AROM; Flexion; Horizontal ABduction  (chest press)   L Elbow AROM;Elbow flexion;Elbow extension   L Hand AROM; Thumb; Index finger; Long finger;Ring finger;Little finger   L Weight/Reps/Sets 10 times each    Additional Activities   Additional Activities Comments Reviewed posterior hip precautions  Patient reports understanding but unable to state herself  Assessment   Assessment Patient is confused but cooperative  Patient requires verbal cues to answer orientation questions appropriately  Patient is generally weak and deconditioned and requires max assist of 2 for transfers  Patients aide present for session  Plan   Treatment Interventions ADL retraining;Functional transfer training;UE strengthening/ROM; Endurance training;Patient/family training;Equipment evaluation/education; Activityengagement; Energy conservation   Recommendation   OT Discharge Recommendation Short Term Rehab

## 2018-02-03 NOTE — ASSESSMENT & PLAN NOTE
· Current blood pressure 129/53  · Continue home medications of Norvasc, metoprolol, Spirolactone and torsemide

## 2018-02-03 NOTE — PHYSICAL THERAPY NOTE
PT TREATMENT     02/03/18 0925   Pain Assessment   Pain Assessment 0-10   Pain Score 5   Pain Location Hip   Pain Orientation Right   Precautions   Total Hip Replacement ADduction; Internal rotation; Flexion   Restrictions/Precautions   RLE Weight Bearing Per Order WBAT   Other Precautions Chair Alarm; Bed Alarm; Fall Risk  (posterior hip prec )   General   Chart Reviewed Yes   Family/Caregiver Present No   Cognition   Overall Cognitive Status Impaired  (confused: needed verbal cues to orient)   Arousal/Participation Cooperative   Following Commands Follows one step commands with increased time or repetition   Bed Mobility   Supine to Sit 2  Maximal assistance   Additional items Assist x 2   Transfers   Sit to Stand 2  Maximal assistance   Additional items Assist x 2; Increased time required;Verbal cues  (bed raised slightly)   Stand to Sit 2  Maximal assistance   Additional items Assist x 2;Verbal cues   Stand pivot 2  Maximal assistance   Additional items Assist x 2;Verbal cues; Increased time required  (right knee antonia when moving LLE)   Ambulation/Elevation   Gait pattern (Right knee antonia when advancing LLE)   Gait Assistance 2  Maximal assist   Additional items Assist x 2;Verbal cues   Assistive Device Rolling walker  (assist of 3rd person to move RW while pt  held to step)   Distance few steps to chair from bed   Balance   Ambulatory Poor -   Activity Tolerance   Activity Tolerance Patient limited by pain; Patient limited by fatigue   Nurse Made Aware CNA: on transfers with Max A of 2; turn chair for strong leg pivot   Exercises   Quad Sets Sitting;10 reps;Right   Hip Abduction Sitting;10 reps;PROM; Right   Knee AROM Long Arc Quad Sitting;10 reps;Right   Ankle Pumps Sitting;15 reps;Bilateral   Equipment Use   Comments Instructed in use of [employer]  kayece  and completed 10 r eps   Assessment   Prognosis Good   Problem List Decreased strength;Decreased endurance; Impaired balance;Decreased mobility; Decreased coordination;Decreased safety awareness;Pain;Orthopedic restrictions   Assessment Pt  is Max A for mobility; in wgt bearing, pt needs cues to use UEs to assist with wgt bearing when advancing LLE; right knee antonia today when attempting to walk  Pt  in chair with all needs in reach and seat alarm on at end of session  CNA present  to assist with breakfast tray  Goals   Patient Goals To get better   Plan   Treatment/Interventions Functional transfer training;LE strengthening/ROM; Therapeutic exercise; Endurance training;Patient/family training;Equipment eval/education; Bed mobility;Gait training   Progress Progressing toward goals   Recommendation   Recommendation (STR)

## 2018-02-03 NOTE — PROGRESS NOTES
Progress Note - Wma Flow 1940, 65 y o  female MRN: [id number]    Unit/Bed#: [address]  Encounter: [id number]    Primary Care Provider: Eliecer Velazco MD   Date and time admitted to hospital: 1/30/2018  4:31 PM        * Closed displaced fracture of right femoral neck [facility])   Assessment & Plan    Patient presented to ED on 1/27 s/p fall resulting in right hip injury and pain   Workup in the ED included right hip xray and CT which were negative for any acute osseous injury and patient sent home  Presented back in the ED on 1/30 with continued severe pain and ambulatory dysfunction   · Repeat x-rays of the right hip revealed an impacted subcapital fracture of the right hip  · Orthopedics following, appreciate input  · POD #2 right hip hemiarthroplasty  by Dr Shahid Mac  Ambulation with assistance  Weightbearing as tolerated  Posterior hip precautions  Continue Mepilex until postoperative day 7  · PT/OT  · Hold Aspirin and Brilinta   · Continue Lovenox   · Reinserted Blanchard catheter 2/2 to urinary retention  · PT/OT eval and treat  · Pain control with Tylenol, Tramadol, and Dilaudid IV plus Robaxin         Right hip painresolved as of 2/2/2018   Assessment & Plan    Due to imacted subcapital right femoral neck fracture of right hip requiring hemiarthroplasty by orthopedic surgery today   · PT/OT following  · Continue Robaxin 500mg PO 4 times daily, Tyenol prn for mild pain, Tramadol prn moderate pain, and Dilaudid IV prn severe pain        Leukocytosis   Assessment & Plan    · May be secondary to trauma of surgery  · Tmax 100 0  · Ordered PCXR  · Urinalysis, showing small leukocytes and moderate blood  Urine culture ordered        Urinary retention   Assessment & Plan    Continues with urinary retention which was present before initial blanchard was inserted, reinsert Blanchard catheter    Estephania Harden, Urology          Acute blood loss anemia   Assessment & Plan    · EBL during surgery was 900ml, she received 2 PRBC  · Hemoglobin 10 5-->8 9  · Denies any active bleeding  · Ordered stool occult x3  · Will continue to hold aspirin and Brilinta        Hypertensive urgencyresolved as of 2/2/2018   Assessment & Plan    Suspect this is due to acute right hip pain, improved  · Continue pain control as above   · Continue Norvasc, Toprol  mg in am and 50 mg in pm  · Ordered hydralazine, prn for a SBP>180mmHg        Weight loss   Assessment & Plan    Reports a 16-pound weight loss over the past 7-8 months which she contributes to cardiac rehab and recent death of her   - Follow-up FOBT  - Outpatient follow-up with PCP  Can consider Psychiatry consult        CAD (coronary artery disease)   Assessment & Plan    With prior CABG with some occluded grafts  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  S/p thrombectomy with PCI, XOCHILT to OM  · Continue metoprolol, zetia, and Co Q10  · Holding ASA and Brilinta         Chronic systolic congestive heart failure (HCC)   Assessment & Plan    Appears euvolemic  · Continue metoprolol  · Continue Torsemide and Spirolactone   · Monitor on telemetry  · Ensure electrolytes are stable         Hypothyroid   Assessment & Plan    · S/p partial thyroidectomy  · 6/5/2017 TSH 2 290  · Continue levothyroxine         Benign hypertension with chronic kidney disease, stage III   Assessment & Plan    · Current blood pressure 129/53  · Continue home medications of Norvasc, metoprolol, Spirolactone and torsemide        Hyperlipidemia   Assessment & Plan    · She is intolerant to statins   Continue Zetia and Co Q10  · 10/9/2017 lipid panel showed cholesterol 187, triglycerides 137, HDL 55 and LDL 92        Chronic kidney disease, stage 3   Assessment & Plan    · CKD stage 3 with baseline creatinine 1 5-1 6  · Creatinine improving, 1 19  · She follows with outpatient nephrologist, Dr Barb Levin of hipresolved as of 2018   Assessment & Plan    Seen on imaging  Orthopedics following  Planned for hemiarthroplasty today  Pain control: Tylenol, Tramadol, and Dilaudid IV prn plus Robaxin QID and ice packs             VTE Pharmacologic Prophylaxis:   Pharmacologic: Enoxaparin (Lovenox)  Mechanical VTE Prophylaxis in Place: Yes    Patient Centered Rounds: I have performed bedside rounds with nursing staff today  Discussions with Specialists or Other Care Team Provider:  Reviewed Orthopedics, Cardiology, and Critical Care progress notes  Education and Discussions with Family / Patient:  Spoke with patient and patient's daughters concerning plan of care    Time Spent for Care: 30 minutes  More than 50% of total time spent on counseling and coordination of care as described above  Current Length of Stay: 3 day(s)    Current Patient Status: Inpatient   Certification Statement: The patient will continue to require additional inpatient hospital stay due to Continue to monitor hemoglobin and adjusting medications  Discharge Plan:  Not anticipated today but will be discharged to a STR     Code Status: Level 1 - Full Code      Subjective:   She is observed lying in bed offering no complaints of pain or discomfort at present  She states she ate a good breakfast and an "ok" lunch  Denies chest pain, abdominal pain, or headache  Denies nausea, vomiting, or diarrhea  Denies shortness of breath or dizziness  Objective:     Vitals:   Temp (24hrs), Av °F (37 2 °C), Min:98 3 °F (36 8 °C), Max:99 6 °F (37 6 °C)    HR:  [69-81] 73  Resp:  [18-30] 18  BP: ()/(46-65) 110/55  SpO2:  [94 %-100 %] 100 %  Body mass index is 28 91 kg/m²  Input and Output Summary (last 24 hours):        Intake/Output Summary (Last 24 hours) at 18 1722  Last data filed at 18 1401   Gross per 24 hour   Intake              250 ml   Output             1075 ml   Net             -825 ml       Physical Exam:     Physical Exam Constitutional: She is oriented to person, place, and time  She appears well-developed and well-nourished  No distress  HENT:   Head: Normocephalic and atraumatic  Neck: Normal range of motion  Neck supple  Cardiovascular: Normal rate and regular rhythm  Exam reveals friction rub  Exam reveals no gallop  Murmur heard  Pulmonary/Chest: Effort normal and breath sounds normal  No respiratory distress  She has no wheezes  She has no rales  She exhibits no tenderness  Abdominal: Soft  Bowel sounds are normal  She exhibits distension  She exhibits no mass  There is no tenderness  There is no rebound and no guarding  Musculoskeletal: Normal range of motion  She exhibits edema and tenderness  Neurological: She is alert and oriented to person, place, and time  Skin: Skin is warm and dry  No rash noted  She is not diaphoretic  No erythema  No pallor  Psychiatric: She has a normal mood and affect  Her behavior is normal  Judgment and thought content normal        Additional Data:     Labs:      Results from last 7 days  Lab Units 02/03/18  0458   WBC Thousand/uL 13 00*   HEMOGLOBIN g/dL 8 9*   HEMATOCRIT % 27 0*   PLATELETS Thousands/uL 156   NEUTROS PCT % 83*   LYMPHS PCT % 5*   MONOS PCT % 10   EOS PCT % 1       Results from last 7 days  Lab Units 02/03/18  0459  01/30/18  1700   SODIUM mmol/L 133*  < > 140   POTASSIUM mmol/L 3 7  < > 3 4*   CHLORIDE mmol/L 101  < > 102   CO2 mmol/L 24  < > 24   BUN mg/dL 27*  < > 31*   CREATININE mg/dL 1 19  < > 1 69*   CALCIUM mg/dL 7 6*  < > 9 2   TOTAL PROTEIN g/dL  --   --  7 2   BILIRUBIN TOTAL mg/dL  --   --  0 60   ALK PHOS U/L  --   --  68   ALT U/L  --   --  27   AST U/L  --   --  24   GLUCOSE RANDOM mg/dL 117  < > 127   < > = values in this interval not displayed  Results from last 7 days  Lab Units 02/01/18  0514   INR  0 95       * I Have Reviewed All Lab Data Listed Above  * Additional Pertinent Lab Tests Reviewed:  All Labs Within Last 24 Hours Reviewed    Imaging:    Imaging Reports Reviewed Today Include: none  Imaging Personally Reviewed by Myself Includes:  none    Recent Cultures (last 7 days):           Last 24 Hours Medication List:     Current Facility-Administered Medications:  acetaminophen 650 mg Oral Q6H PRN Tamsen Beecham, CRNP   amLODIPine 2 5 mg Oral Daily Tamsen Beecham, CRNP   vitamin C 1,000 mg Oral Daily Bernice M [place], CRNP   bisacodyl 10 mg Rectal Daily PRN Tamsen Beecham, CRNP   cholecalciferol 2,000 Units Oral Daily Tamsen Beecham, CRNP   co-enzyme Q-10 30 mg Oral Daily Bernice M [place], CRNP   docusate sodium 100 mg Oral BID Bernice [facility], MAYUR   enoxaparin 40 mg Subcutaneous Daily Chino Levine PA-C   ezetimibe 10 mg Oral HS Tamsen Beecham, CRVINNY   HYDROmorphone 0 2 mg Intravenous Q3H PRN Dajuan Kyle, CRNP   hydroxychloroquine 200 mg Oral Daily Tamsen Beecham, CRNP   levothyroxine 50 mcg Oral Early Morning Tamsen Beecham, CRNP   metoprolol succinate 100 mg Oral QAM Tamsen Beecham, CRNP   metoprolol succinate 50 mg Oral QPM Tamsen Beecham, CRVINNY   nitroglycerin 0 4 mg Sublingual Q5 Min PRN Tamsen Beecham, MAYUR   ondansetron 4 mg Intravenous Q6H PRN Marva Hayden PA-C   oxyCODONE 2 5 mg Oral Q4H PRN Dajuan Bi, CRNP   oxyCODONE 5 mg Oral Q4H PRN Dajuan Kyle, CRVINNY   polyethylene glycol 17 g Oral Daily Tamsen Beecham, CRNP   raloxifene 60 mg Oral Daily Bernice M [place], CRNP   ranolazine 500 mg Oral Q12H [address] Tamsen Beecham, MAYUR   senna 1 tablet Oral HS Chino Levine PA-C   [START ON 2/4/2018] spironolactone 25 mg Oral Every Other Day Tamsen Beejameem, MAYUR   torsemide 10 mg Oral Daily Lyndon Ogden MD        Today, Patient Was Seen By: MAYUR Carrington    ** Please Note: Dictation voice to text software may have been used in the creation of this document   **

## 2018-02-03 NOTE — ASSESSMENT & PLAN NOTE
· EBL during surgery was 900ml, she received 2 PRBC  · Hemoglobin 10 5-->8 9  · Denies any active bleeding  · Ordered stool occult x3  · Will continue to hold aspirin and Brilinta

## 2018-02-03 NOTE — ASSESSMENT & PLAN NOTE
· May be secondary to trauma of surgery  · Tmax 100 0  · Ordered PCXR  · Urinalysis, showing small leukocytes and moderate blood    Urine culture ordered

## 2018-02-03 NOTE — PROGRESS NOTES
Patient transferred to 2 S via bed, patient awake alert oriented x 3, report given to [address] [place] [address] 2 S  Patient endorsed, not in respiratory distress on room air, family at bedside aware of transfer

## 2018-02-04 PROBLEM — K59.00 CONSTIPATION: Status: ACTIVE | Noted: 2018-02-04

## 2018-02-04 LAB
ANION GAP SERPL CALCULATED.3IONS-SCNC: 9 MMOL/L (ref 4–13)
BACTERIA UR CULT: NORMAL
BUN SERPL-MCNC: 24 MG/DL (ref 5–25)
CALCIUM SERPL-MCNC: 7.9 MG/DL (ref 8.3–10.1)
CHLORIDE SERPL-SCNC: 100 MMOL/L (ref 100–108)
CO2 SERPL-SCNC: 24 MMOL/L (ref 21–32)
CREAT SERPL-MCNC: 1.1 MG/DL (ref 0.6–1.3)
ERYTHROCYTE [DISTWIDTH] IN BLOOD BY AUTOMATED COUNT: 14.6 % (ref 11.6–15.1)
GFR SERPL CREATININE-BSD FRML MDRD: 48 ML/MIN/1.73SQ M
GLUCOSE SERPL-MCNC: 121 MG/DL (ref 65–140)
HCT VFR BLD AUTO: 25.9 % (ref 37–47)
HGB BLD-MCNC: 8.6 G/DL (ref 12–16)
MCH RBC QN AUTO: 27.3 PG (ref 27–31)
MCHC RBC AUTO-ENTMCNC: 33 G/DL (ref 31.4–37.4)
MCV RBC AUTO: 83 FL (ref 82–98)
PHOSPHATE SERPL-MCNC: 2.5 MG/DL (ref 2.3–4.1)
PLATELET # BLD AUTO: 154 THOUSANDS/UL (ref 130–400)
PMV BLD AUTO: 8.5 FL (ref 8.9–12.7)
POTASSIUM SERPL-SCNC: 3.5 MMOL/L (ref 3.5–5.3)
RBC # BLD AUTO: 3.13 MILLION/UL (ref 4.2–5.4)
SODIUM SERPL-SCNC: 133 MMOL/L (ref 136–145)
WBC # BLD AUTO: 11.7 THOUSAND/UL (ref 4.8–10.8)

## 2018-02-04 PROCEDURE — 99232 SBSQ HOSP IP/OBS MODERATE 35: CPT | Performed by: FAMILY MEDICINE

## 2018-02-04 PROCEDURE — 80048 BASIC METABOLIC PNL TOTAL CA: CPT | Performed by: NURSE PRACTITIONER

## 2018-02-04 PROCEDURE — 84100 ASSAY OF PHOSPHORUS: CPT | Performed by: FAMILY MEDICINE

## 2018-02-04 PROCEDURE — 85027 COMPLETE CBC AUTOMATED: CPT | Performed by: NURSE PRACTITIONER

## 2018-02-04 RX ORDER — POTASSIUM CHLORIDE 20 MEQ/1
20 TABLET, EXTENDED RELEASE ORAL ONCE
Status: COMPLETED | OUTPATIENT
Start: 2018-02-04 | End: 2018-02-04

## 2018-02-04 RX ORDER — TAMSULOSIN HYDROCHLORIDE 0.4 MG/1
0.4 CAPSULE ORAL
Status: DISCONTINUED | OUTPATIENT
Start: 2018-02-04 | End: 2018-02-05 | Stop reason: HOSPADM

## 2018-02-04 RX ORDER — ASPIRIN 81 MG/1
81 TABLET ORAL DAILY
Status: DISCONTINUED | OUTPATIENT
Start: 2018-02-04 | End: 2018-02-05 | Stop reason: HOSPADM

## 2018-02-04 RX ADMIN — OXYCODONE HYDROCHLORIDE 5 MG: 5 TABLET ORAL at 21:46

## 2018-02-04 RX ADMIN — BISACODYL 10 MG: 10 SUPPOSITORY RECTAL at 17:09

## 2018-02-04 RX ADMIN — RANOLAZINE 500 MG: 500 TABLET, FILM COATED, EXTENDED RELEASE ORAL at 09:25

## 2018-02-04 RX ADMIN — SENNOSIDES 8.6 MG: 8.6 TABLET, FILM COATED ORAL at 21:44

## 2018-02-04 RX ADMIN — EZETIMIBE 10 MG: 10 TABLET ORAL at 21:44

## 2018-02-04 RX ADMIN — RANOLAZINE 500 MG: 500 TABLET, FILM COATED, EXTENDED RELEASE ORAL at 21:44

## 2018-02-04 RX ADMIN — POLYETHYLENE GLYCOL 3350 17 G: 17 POWDER, FOR SOLUTION ORAL at 09:25

## 2018-02-04 RX ADMIN — AMLODIPINE BESYLATE 2.5 MG: 2.5 TABLET ORAL at 09:28

## 2018-02-04 RX ADMIN — POTASSIUM CHLORIDE 20 MEQ: 1500 TABLET, EXTENDED RELEASE ORAL at 09:25

## 2018-02-04 RX ADMIN — TORSEMIDE 10 MG: 10 TABLET ORAL at 09:25

## 2018-02-04 RX ADMIN — Medication 30 MG: at 11:29

## 2018-02-04 RX ADMIN — SPIRONOLACTONE 25 MG: 25 TABLET ORAL at 09:59

## 2018-02-04 RX ADMIN — OXYCODONE HYDROCHLORIDE AND ACETAMINOPHEN 1000 MG: 500 TABLET ORAL at 09:25

## 2018-02-04 RX ADMIN — LEVOTHYROXINE SODIUM 50 MCG: 50 TABLET ORAL at 05:56

## 2018-02-04 RX ADMIN — ASPIRIN 81 MG: 81 TABLET, COATED ORAL at 14:34

## 2018-02-04 RX ADMIN — HYDROXYCHLOROQUINE SULFATE 200 MG: 200 TABLET, FILM COATED ORAL at 11:28

## 2018-02-04 RX ADMIN — OXYCODONE HYDROCHLORIDE 5 MG: 5 TABLET ORAL at 11:28

## 2018-02-04 RX ADMIN — METOPROLOL SUCCINATE 100 MG: 100 TABLET, FILM COATED, EXTENDED RELEASE ORAL at 09:24

## 2018-02-04 RX ADMIN — ENOXAPARIN SODIUM 40 MG: 40 INJECTION SUBCUTANEOUS at 09:25

## 2018-02-04 RX ADMIN — MAGNESIUM HYDROXIDE 30 ML: 400 SUSPENSION ORAL at 14:33

## 2018-02-04 RX ADMIN — METOPROLOL SUCCINATE 50 MG: 50 TABLET, FILM COATED, EXTENDED RELEASE ORAL at 17:09

## 2018-02-04 RX ADMIN — RALOXIFENE HYDROCHLORIDE 60 MG: 60 TABLET, FILM COATED ORAL at 09:26

## 2018-02-04 RX ADMIN — DOCUSATE SODIUM 100 MG: 100 CAPSULE, LIQUID FILLED ORAL at 17:09

## 2018-02-04 RX ADMIN — CHOLECALCIFEROL TAB 25 MCG (1000 UNIT) 2000 UNITS: 25 TAB at 09:24

## 2018-02-04 RX ADMIN — TICAGRELOR 90 MG: 90 TABLET ORAL at 17:18

## 2018-02-04 RX ADMIN — TAMSULOSIN HYDROCHLORIDE 0.4 MG: 0.4 CAPSULE ORAL at 17:09

## 2018-02-04 RX ADMIN — DOCUSATE SODIUM 100 MG: 100 CAPSULE, LIQUID FILLED ORAL at 09:25

## 2018-02-04 NOTE — ASSESSMENT & PLAN NOTE
Reports a 16-pound weight loss over the past 7-8 months which she contributes to cardiac rehab and recent death of her   - Follow-up FOBT  - Outpatient follow-up with PCP  -Psychiatry consult

## 2018-02-04 NOTE — CONSULTS
H&P Exam - Urology       Patient: Toribio Feldman   : 1940 Sex: female   MRN: [id number]     CSN: [id number]      History of Present Illness   HPI:  Torbiio Feldman is a 65 y o  female who presents with right hip fracture status post right martín arthroplasty Thursday night transferred to the floor from ICU yesterday patient underwent voiding trial failed had Heller catheter reinserted  patient has been constipated for the last 6 days bed ridden and does on analgesics patient has a long extensive urologic history seeing another urologist back in  for stress incontinence sling insertion removal and sling reinsertion 3 separate surgeries within the same year patient states since that time she has had worsening frequency going 12 times a day with some intermittency and apparently now has a bladder stone        Review of Systems:   Constitutional:  Negative for activity change, fever, chills and diaphoresis  HENT: Negative for hearing loss and trouble swallowing  Eyes: Negative for itching and visual disturbance  Respiratory: Negative for chest tightness and shortness of breath  Cardiovascular: Negative for chest pain, edema  Gastrointestinal: Negative for abdominal distention, na abdominal pain, constipation, diarrhea, Nausea and vomiting  Genitourinary: Negative for decreased urine volume, difficulty urinating, dysuria, enuresis, frequency, hematuria and urgency  Musculoskeletal: Negative for gait problem and myalgias  Neurological: Negative for dizziness and headaches  Hematological: Does not bruise/bleed easily         Historical Information   Past Medical History:   Diagnosis Date    Anemia     Arthritis     Bladder calculi     CAD (coronary artery disease)     Status post CABG and PTCA to OM1    [facility]est[facility]     CKD (chronic kidney disease) stage 3, GFR 30-59 ml/min     CKD (chronic kidney disease), stage III     Hypertension     Hypothyroidism     Uterine prolapse      Past Surgical History:   Procedure Laterality Date    BACK SURGERY      BLADDER SURGERY      CORONARY ANGIOPLASTY WITH STENT PLACEMENT      CORONARY ARTERY BYPASS GRAFT      OOPHORECTOMY Left     SC PARTIAL HIP REPLACEMENT Right 2/1/2018    Procedure: HEMIARTHROPLASTY HIP (BIPOLAR) (RIGHT); Surgeon: Farideh Pollock MD;  Location: [facility];  Service: Orthopedics     Social History   History   Alcohol Use No     History   Drug Use No     History   Smoking Status    Never Smoker   Smokeless Tobacco    Never Used     Family History: History reviewed  No pertinent family history  Meds/Allergies   Prescriptions Prior to Admission   Medication    amLODIPine (NORVASC) 2 5 mg tablet    Ascorbic Acid (VITAMIN C) 1000 MG tablet    aspirin (ECOTRIN LOW STRENGTH) 81 mg EC tablet    Coenzyme Q10 10 MG capsule    hydroxychloroquine (PLAQUENIL) 200 mg tablet    ibuprofen (MOTRIN) 200 mg tablet    levothyroxine 50 mcg tablet    methocarbamol (ROBAXIN) 500 mg tablet    metoprolol succinate (TOPROL-XL) 100 mg 24 hr tablet    metoprolol succinate (TOPROL-XL) 50 mg 24 hr tablet    Multiple Vitamins-Minerals (CENTRUM SILVER PO)    raloxifene (EVISTA) 60 mg tablet    ranolazine (RANEXA) 500 mg 12 hr tablet    solifenacin (VESICARE) 10 MG tablet    spironolactone (ALDACTONE) 25 mg tablet    ticagrelor (BRILINTA) 90 MG    torsemide (DEMADEX) 10 mg tablet    UNKNOWN TO PATIENT    Vitamin D, Cholecalciferol, 1000 units CAPS    nitroglycerin (NITROLINGUAL) 0 4 mg/spray spray     Allergies   Allergen Reactions    Atorvastatin Hives    Boniva [Ibandronic Acid] Hives    Codeine      codeine derivatives - tolerates IV Dilaudid    Statins Hives       Objective   Vitals: /62 (BP Location: Right arm)   Pulse 77   Temp 98 5 °F (36 9 °C) (Tympanic)   Resp 18   Ht 5' 4" (1 626 m)   Wt 76 4 kg (168 lb 6 9 oz)   SpO2 95%   Breastfeeding?  No   BMI 28 91 kg/m²     Physical Exam:  General Alert awake   Normocephalic atraumatic PERRLA  Lungs clear bilaterally  Cardiac normal S1 normal S2  Abdomen soft, no flank pain  Bladder not distended indwelling Heller catheter draining clear urine  Extremities no edema    I/O last 24 hours:   In: 480 [P O :480]  Out: 1975 [Urine:1975]    Invasive Devices     Peripheral Intravenous Line            Peripheral IV 02/01/18 Left Hand 3 days          Drain            Urethral Catheter Non-latex 16 Fr  less than 1 day                    Lab Results: CBC:   Lab Results   Component Value Date    WBC 11 70 (H) 02/04/2018    HGB 8 6 (L) 02/04/2018    HCT 25 9 (L) 02/04/2018    MCV 83 02/04/2018     02/04/2018    ADJUSTEDWBC 6 60 06/01/2016    MCH 27 3 02/04/2018    MCHC 33 0 02/04/2018    RDW 14 6 02/04/2018    MPV 8 5 (L) 02/04/2018    NRBC 0 02/02/2018     CMP:   Lab Results   Component Value Date     (L) 02/04/2018     02/04/2018    CO2 24 02/04/2018    ANIONGAP 9 02/04/2018    BUN 24 02/04/2018    CREATININE 1 10 02/04/2018    GLUCOSE 121 02/04/2018    CALCIUM 7 9 (L) 02/04/2018    AST 24 01/30/2018    ALT 27 01/30/2018    ALKPHOS 68 01/30/2018    PROT 7 2 01/30/2018    BILITOT 0 60 01/30/2018    EGFR 48 02/04/2018     Urinalysis:   Lab Results   Component Value Date    COLORU Yellow 02/03/2018    CLARITYU Clear 02/03/2018    SPECGRAV 1 020 02/03/2018    PHUR 5 5 02/03/2018    LEUKOCYTESUR Small (A) 02/03/2018    NITRITE Negative 02/03/2018    PROTEINUA Negative 02/03/2018    GLUCOSEU Negative 02/03/2018    KETONESU Negative 02/03/2018    BILIRUBINUR Negative 02/03/2018    BLOODU Moderate (A) 02/03/2018     Urine Culture:   Lab Results   Component Value Date    URINECX 10,000-19,000 cfu/ml Mixed Contaminants X3 06/01/2016     PSA: No results found for: PSA        Assessment/ Plan:  Acute urinary retention secondary to constipation, pain meds, and inability to ambulate out of bed status post hip surgery as well as longstanding history of incomplete emptying bladder stones and possible sling obstruction  Plan start tamsulosin 0 4 mg  Patient to continue with indwelling Heller catheter till ambulating appropriately can be discharged to short-term rehab voiding trial at short-term rehab  Milk of magnesia for constipation do not remove Heller until patient has daily bowel movements and ambulating  Patient will need formal workup for sling obstruction causing underlying bladder instability high residual urines and bladder stones once discharged from short-term rehab and will be seen in my office for follow-up in a few weeks    Solomon Palacios MD

## 2018-02-04 NOTE — ASSESSMENT & PLAN NOTE
Patient presented to ED on 1/27 s/p fall resulting in right hip injury and pain   Workup in the ED included right hip xray and CT which were negative for any acute osseous injury and patient sent home  Presented back in the ED on 1/30 with continued severe pain and ambulatory dysfunction   · Repeat x-rays of the right hip revealed an impacted subcapital fracture of the right hip  · Orthopedics following, appreciate input  · POD #3 right hip hemiarthroplasty  by Dr Balbir Maldonado  Ambulation with assistance  Weightbearing as tolerated  Posterior hip precautions  Continue Mepilex until postoperative day 7  · PT/OT  · Restarted Aspirin and Brilinta   · Continue Lovenox   · Continue Heller catheter 2/2 to urinary retention  · PT/OT eval and treat  · Pain control with Tylenol, Tramadol, and Dilaudid IV  Discontinue Robaxin   · If hemoglobin remains stable, she may be discharged to [facility]

## 2018-02-04 NOTE — ASSESSMENT & PLAN NOTE
Continues with urinary retention which was present before initial blanchard was inserted, reinsert Blanchard catheter  No VESIcare upon discharge   Dr Az Denise, Urology following and thinks urinary retention is 2/2 to constipation, pain medications, and inability to ambulate s/p hip surgery, as well as longstanding history of incomplete emptying bladder stones and possible sling obstruction  Ordered Flomax 0 4 mg p o  Daily  Continue with indwelling Blanchard catheter until ambulating, can be discharged to short-term rehab with voiding trial taking place there

## 2018-02-04 NOTE — ASSESSMENT & PLAN NOTE
· Current blood pressure 132/62  · Continue home medications of Norvasc, metoprolol, Spirolactone and torsemide

## 2018-02-04 NOTE — ASSESSMENT & PLAN NOTE
· CKD stage 3 with baseline creatinine 1 5-1 6  · Creatinine improving, 1 19  · She follows with outpatient nephrologist, Dr Lino Huffman

## 2018-02-04 NOTE — ASSESSMENT & PLAN NOTE
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

## 2018-02-04 NOTE — ASSESSMENT & PLAN NOTE
· Likely secondary to trauma of surgery  · Tmax 99 8  · PCXR, not active pulmonary disease  · Urinalysis, showing small leukocytes and moderate blood    Urine culture ordered

## 2018-02-04 NOTE — ASSESSMENT & PLAN NOTE
· EBL during surgery was 900ml, she received 2 PRBC  · Hemoglobin 10 5-->8 9-->8 6  · Denies any active bleeding  · Ordered stool occult x3  · Restarted  aspirin and Brilinta   Continue to Monitor

## 2018-02-04 NOTE — PROGRESS NOTES
Progress Note - Freida Kumar 1940, 65 y o  female MRN: [id number]    Unit/Bed#: [address]  Encounter: [id number]    Primary Care Provider: Gerson Otero MD   Date and time admitted to hospital: 1/30/2018  4:31 PM        * Closed displaced fracture of right femoral neck [facility])   Assessment & Plan    Patient presented to ED on 1/27 s/p fall resulting in right hip injury and pain   Workup in the ED included right hip xray and CT which were negative for any acute osseous injury and patient sent home  Presented back in the ED on 1/30 with continued severe pain and ambulatory dysfunction   · Repeat x-rays of the right hip revealed an impacted subcapital fracture of the right hip  · Orthopedics following, appreciate input  · POD #3 right hip hemiarthroplasty  by Dr Mcclellan Coffee  Ambulation with assistance  Weightbearing as tolerated  Posterior hip precautions  Continue Mepilex until postoperative day 7  · PT/OT  · Restarted Aspirin and Brilinta   · Continue Lovenox   · Continue Blanchard catheter 2/2 to urinary retention  · PT/OT eval and treat  · Pain control with Tylenol, Tramadol, and Dilaudid IV  Discontinue Robaxin   · If hemoglobin remains stable, she may be discharged to [address] [facility]   Leukocytosis   Assessment & Plan    · Likely secondary to trauma of surgery  · Tmax 99 8  · PCXR, not active pulmonary disease  · Urinalysis, showing small leukocytes and moderate blood  Urine culture ordered        Urinary retention   Assessment & Plan    Continues with urinary retention which was present before initial blanchard was inserted, reinsert Blanchard catheter  No VESIcare upon discharge   Dr Liliane Barros, Urology following and thinks urinary retention is 2/2 to constipation, pain medications, and inability to ambulate s/p hip surgery, as well as longstanding history of incomplete emptying bladder stones and possible sling obstruction  Ordered Flomax 0 4 mg p o   Daily  Continue with indwelling Blanchard catheter until ambulating, can be discharged to short-term rehab with voiding trial taking place there  Acute blood loss anemia   Assessment & Plan    · EBL during surgery was 900ml, she received 2 PRBC  · Hemoglobin 10 5-->8 9-->8 6  · Denies any active bleeding  · Ordered stool occult x3  · Restarted  aspirin and Brilinta  Continue to Monitor        Constipation   Assessment & Plan    · Continue Colace and MiraLax  · Ordered MOM x1 dose        Weight loss   Assessment & Plan    Reports a 16-pound weight loss over the past 7-8 months which she contributes to cardiac rehab and recent death of her   - Follow-up FOBT  - Outpatient follow-up with PCP  -Psychiatry consult        CAD (coronary artery disease)   Assessment & Plan    With prior CABG with some occluded grafts  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  S/p thrombectomy with PCI, XOCHILT to OM  · Continue metoprolol, zetia, and Co Q10  · Restarted ASA and Brilinta, Hgb remained stable  Continue to monitor        Chronic systolic congestive heart failure (HCC)   Assessment & Plan    Appears euvolemic  · Continue metoprolol  · Continue Torsemide and Spirolactone   · Monitor on telemetry  · Ensure electrolytes are stable         Hypothyroid   Assessment & Plan    · S/p partial thyroidectomy  · 6/5/2017 TSH 2 290  · Continue levothyroxine         Benign hypertension with chronic kidney disease, stage III   Assessment & Plan    · Current blood pressure 132/62  · Continue home medications of Norvasc, metoprolol, Spirolactone and torsemide        Hyperlipidemia   Assessment & Plan    · She is intolerant to statins   Continue Zetia and Co Q10  · 10/9/2017 lipid panel showed cholesterol 187, triglycerides 137, HDL 55 and LDL 92        Chronic kidney disease, stage 3   Assessment & Plan    · CKD stage 3 with baseline creatinine 1 5-1 6  · Creatinine improving, 1 19  · She follows with outpatient nephrologist, Dr Davide Lund VTE Pharmacologic Prophylaxis:   Pharmacologic: Enoxaparin (Lovenox)  Mechanical VTE Prophylaxis in Place: Yes    Patient Centered Rounds: I have performed bedside rounds with nursing staff today  Discussions with Specialists or Other Care Team Provider: spoke with Dr Johan Pierce concerning plan of care    Education and Discussions with Family / Patient:  Spoke with patient concerning plan of care    Time Spent for Care: 30 minutes  More than 50% of total time spent on counseling and coordination of care as described above  Current Length of Stay: 4 day(s)    Current Patient Status: Inpatient   Certification Statement: The patient will continue to require additional inpatient hospital stay due to Monitoring overnight after adding back antiplatelets medication    Discharge Plan:  Not anticipated today but potentially tomorrow if remains medically stable to the [facility]    Code Status: Level 1 - Full Code      Subjective:   She is observed lying in bed conversing with ease to friend at bedside  She offers no complaints of pain or discomfort at present  Spoke in great detail with her about her weight loss and the sadness that she is feeling due to loss of her  and she is willing to speak to Psychiatry  Denies chest pain, abdominal pain, or headache  Denies nausea, vomiting, or diarrhea  Objective:     Vitals:   Temp (24hrs), Av 8 °F (37 1 °C), Min:98 3 °F (36 8 °C), Max:99 8 °F (37 7 °C)    HR:  [73-83] 83  Resp:  [18] 18  BP: (110-141)/(54-65) 141/65  SpO2:  [95 %-100 %] 95 %  Body mass index is 28 91 kg/m²  Input and Output Summary (last 24 hours): Intake/Output Summary (Last 24 hours) at 18 1425  Last data filed at 18 1309   Gross per 24 hour   Intake              240 ml   Output             2600 ml   Net            -2360 ml       Physical Exam:     Physical Exam   Constitutional: She is oriented to person, place, and time   She appears well-developed and well-nourished  No distress  HENT:   Head: Normocephalic and atraumatic  Neck: Normal range of motion  Neck supple  Cardiovascular: Normal rate and regular rhythm  Exam reveals no gallop and no friction rub  Murmur heard  Pulmonary/Chest: Effort normal and breath sounds normal  No respiratory distress  She has no wheezes  She has no rales  She exhibits no tenderness  Abdominal: Soft  Bowel sounds are normal  She exhibits no distension and no mass  There is no tenderness  There is no rebound and no guarding  Musculoskeletal: Normal range of motion  She exhibits edema and tenderness  She exhibits no deformity  Trace RLE edema  Right hip dressing C/D/I  +2 Pedal pulses   Neurological: She is alert and oriented to person, place, and time  Skin: Skin is warm and dry  No rash noted  She is not diaphoretic  No erythema  No pallor  Psychiatric: She has a normal mood and affect  Her behavior is normal  Judgment and thought content normal        Additional Data:     Labs:      Results from last 7 days  Lab Units 02/04/18  0500 02/03/18  0458   WBC Thousand/uL 11 70* 13 00*   HEMOGLOBIN g/dL 8 6* 8 9*   HEMATOCRIT % 25 9* 27 0*   PLATELETS Thousands/uL 154 156   NEUTROS PCT %  --  83*   LYMPHS PCT %  --  5*   MONOS PCT %  --  10   EOS PCT %  --  1       Results from last 7 days  Lab Units 02/04/18  0500  01/30/18  1700   SODIUM mmol/L 133*  < > 140   POTASSIUM mmol/L 3 5  < > 3 4*   CHLORIDE mmol/L 100  < > 102   CO2 mmol/L 24  < > 24   BUN mg/dL 24  < > 31*   CREATININE mg/dL 1 10  < > 1 69*   CALCIUM mg/dL 7 9*  < > 9 2   TOTAL PROTEIN g/dL  --   --  7 2   BILIRUBIN TOTAL mg/dL  --   --  0 60   ALK PHOS U/L  --   --  68   ALT U/L  --   --  27   AST U/L  --   --  24   GLUCOSE RANDOM mg/dL 121  < > 127   < > = values in this interval not displayed  Results from last 7 days  Lab Units 02/01/18  0514   INR  0 95       * I Have Reviewed All Lab Data Listed Above    * Additional Pertinent Lab Tests Reviewed:  All Labs Within Last 24 Hours Reviewed    Imaging:    Imaging Reports Reviewed Today Include: PCXR  Imaging Personally Reviewed by Myself Includes:  none    Recent Cultures (last 7 days):           Last 24 Hours Medication List:     Current Facility-Administered Medications:  acetaminophen 650 mg Oral Q6H PRN Bethany Fonder, CRNP   amLODIPine 2 5 mg Oral Daily Bethany Fonder, CRNP   vitamin C 1,000 mg Oral Daily Bern[facility], CRNP   aspirin 81 mg Oral Daily Bern[facility], CRNP   bisacodyl 10 mg Rectal Daily PRN Bethany Fonder, CRNP   cholecalciferol 2,000 Units Oral Daily Bethany Fonder, CRNP   co-enzyme Q-10 30 mg Oral Daily [facility], CRNP   docusate sodium 100 mg Oral BID [facility], CRNP   enoxaparin 40 mg Subcutaneous Daily Jaci End, ARCADIO   ezetimibe 10 mg Oral HS Bethany Fonder, CRNP   HYDROmorphone 0 2 mg Intravenous Q3H PRN Sara Needles, CRNP   hydroxychloroquine 200 mg Oral Daily Bethany Fonder, CRNP   levothyroxine 50 mcg Oral Early Morning Bethany Fonder, CRNP   magnesium hydroxide 30 mL Oral Once Bethany Fonder, CRNP   metoprolol succinate 100 mg Oral QAM Bethany Fonder, CRNP   metoprolol succinate 50 mg Oral QPM Bethany Fonder, CRNP   nitroglycerin 0 4 mg Sublingual Q5 Min PRN Bethany Fonder, CRNP   ondansetron 4 mg Intravenous Q6H PRN Daylin Ramos PA-C   oxyCODONE 2 5 mg Oral Q4H PRN Sara Needles, CRNP   oxyCODONE 5 mg Oral Q4H PRN Sara Needles, CRNP   polyethylene glycol 17 g Oral Daily Bethany Fonder, CRNP   raloxifene 60 mg Oral Daily [facility], CRNP   ranolazine 500 mg Oral Q12H [facility] Bethany Fonder, CRNP   senna 1 tablet Oral HS Jaci End, ARCADIO   spironolactone 25 mg Oral Every Other Day Bethany Fonder, CRNP   tamsulosin 0 4 mg Oral Daily With Prabha Andrade MD   ticagrelor 90 mg Oral BID Bethany Fonder, CRNP   torsemide 10 mg Oral Daily Gael Mendieta MD Today, Patient Was Seen By: MAYUR Taylor    ** Please Note: Dictation voice to text software may have been used in the creation of this document   **

## 2018-02-05 VITALS
HEART RATE: 53 BPM | BODY MASS INDEX: 28.19 KG/M2 | SYSTOLIC BLOOD PRESSURE: 160 MMHG | DIASTOLIC BLOOD PRESSURE: 70 MMHG | WEIGHT: 165.12 LBS | OXYGEN SATURATION: 97 % | RESPIRATION RATE: 18 BRPM | TEMPERATURE: 96.4 F | HEIGHT: 64 IN

## 2018-02-05 PROBLEM — K59.00 CONSTIPATION: Status: RESOLVED | Noted: 2018-02-04 | Resolved: 2018-02-05

## 2018-02-05 PROBLEM — S72.001A CLOSED DISPLACED FRACTURE OF RIGHT FEMORAL NECK (HCC): Status: RESOLVED | Noted: 2018-01-31 | Resolved: 2018-02-05

## 2018-02-05 PROBLEM — D62 ACUTE BLOOD LOSS ANEMIA: Status: RESOLVED | Noted: 2018-02-03 | Resolved: 2018-02-05

## 2018-02-05 LAB
ANION GAP SERPL CALCULATED.3IONS-SCNC: 7 MMOL/L (ref 4–13)
BUN SERPL-MCNC: 19 MG/DL (ref 5–25)
CALCIUM SERPL-MCNC: 8.1 MG/DL (ref 8.3–10.1)
CHLORIDE SERPL-SCNC: 101 MMOL/L (ref 100–108)
CO2 SERPL-SCNC: 26 MMOL/L (ref 21–32)
CREAT SERPL-MCNC: 0.91 MG/DL (ref 0.6–1.3)
ERYTHROCYTE [DISTWIDTH] IN BLOOD BY AUTOMATED COUNT: 14.4 % (ref 11.6–15.1)
GFR SERPL CREATININE-BSD FRML MDRD: 61 ML/MIN/1.73SQ M
GLUCOSE SERPL-MCNC: 124 MG/DL (ref 65–140)
HCT VFR BLD AUTO: 25.8 % (ref 37–47)
HGB BLD-MCNC: 8.5 G/DL (ref 12–16)
MCH RBC QN AUTO: 27.2 PG (ref 27–31)
MCHC RBC AUTO-ENTMCNC: 33 G/DL (ref 31.4–37.4)
MCV RBC AUTO: 83 FL (ref 82–98)
PLATELET # BLD AUTO: 203 THOUSANDS/UL (ref 130–400)
PMV BLD AUTO: 7.9 FL (ref 8.9–12.7)
POTASSIUM SERPL-SCNC: 3.5 MMOL/L (ref 3.5–5.3)
RBC # BLD AUTO: 3.13 MILLION/UL (ref 4.2–5.4)
SODIUM SERPL-SCNC: 134 MMOL/L (ref 136–145)
WBC # BLD AUTO: 11.4 THOUSAND/UL (ref 4.8–10.8)

## 2018-02-05 PROCEDURE — 85027 COMPLETE CBC AUTOMATED: CPT | Performed by: NURSE PRACTITIONER

## 2018-02-05 PROCEDURE — 80048 BASIC METABOLIC PNL TOTAL CA: CPT | Performed by: NURSE PRACTITIONER

## 2018-02-05 PROCEDURE — 97535 SELF CARE MNGMENT TRAINING: CPT

## 2018-02-05 PROCEDURE — 97110 THERAPEUTIC EXERCISES: CPT

## 2018-02-05 PROCEDURE — 99239 HOSP IP/OBS DSCHRG MGMT >30: CPT | Performed by: FAMILY MEDICINE

## 2018-02-05 PROCEDURE — 99024 POSTOP FOLLOW-UP VISIT: CPT | Performed by: ORTHOPAEDIC SURGERY

## 2018-02-05 RX ORDER — SENNOSIDES 8.6 MG
1 TABLET ORAL
Qty: 120 EACH | Refills: 0
Start: 2018-02-05 | End: 2019-03-14 | Stop reason: CLARIF

## 2018-02-05 RX ORDER — OXYCODONE HYDROCHLORIDE 5 MG/1
5 TABLET ORAL EVERY 6 HOURS PRN
Qty: 10 TABLET | Refills: 0 | Status: SHIPPED | OUTPATIENT
Start: 2018-02-05 | End: 2018-07-23 | Stop reason: ALTCHOICE

## 2018-02-05 RX ORDER — POLYETHYLENE GLYCOL 3350 17 G/17G
17 POWDER, FOR SOLUTION ORAL DAILY
Qty: 14 EACH | Refills: 0
Start: 2018-02-06 | End: 2019-03-14 | Stop reason: CLARIF

## 2018-02-05 RX ORDER — OXYCODONE HYDROCHLORIDE AND ACETAMINOPHEN 5; 325 MG/1; MG/1
1 TABLET ORAL EVERY 6 HOURS PRN
Qty: 20 TABLET | Refills: 0 | Status: SHIPPED | OUTPATIENT
Start: 2018-02-05 | End: 2018-07-23 | Stop reason: ALTCHOICE

## 2018-02-05 RX ORDER — TAMSULOSIN HYDROCHLORIDE 0.4 MG/1
0.4 CAPSULE ORAL
Qty: 30 CAPSULE | Refills: 0
Start: 2018-02-05 | End: 2018-07-23 | Stop reason: ALTCHOICE

## 2018-02-05 RX ORDER — MIRTAZAPINE 15 MG/1
7.5 TABLET, FILM COATED ORAL
Status: DISCONTINUED | OUTPATIENT
Start: 2018-02-05 | End: 2018-02-05 | Stop reason: HOSPADM

## 2018-02-05 RX ORDER — EZETIMIBE 10 MG/1
10 TABLET ORAL
Qty: 30 TABLET | Refills: 0
Start: 2018-02-05 | End: 2018-08-12 | Stop reason: SDUPTHER

## 2018-02-05 RX ORDER — ACETAMINOPHEN 325 MG/1
650 TABLET ORAL EVERY 6 HOURS PRN
Qty: 30 TABLET | Refills: 0
Start: 2018-02-05 | End: 2019-03-14 | Stop reason: CLARIF

## 2018-02-05 RX ORDER — DOCUSATE SODIUM 100 MG/1
100 CAPSULE, LIQUID FILLED ORAL 2 TIMES DAILY
Qty: 60 CAPSULE | Refills: 0
Start: 2018-02-05 | End: 2019-03-14 | Stop reason: CLARIF

## 2018-02-05 RX ORDER — BISACODYL 10 MG
10 SUPPOSITORY, RECTAL RECTAL DAILY PRN
Qty: 12 SUPPOSITORY | Refills: 0
Start: 2018-02-05 | End: 2019-03-14 | Stop reason: CLARIF

## 2018-02-05 RX ORDER — POTASSIUM CHLORIDE 20 MEQ/1
40 TABLET, EXTENDED RELEASE ORAL ONCE
Status: COMPLETED | OUTPATIENT
Start: 2018-02-05 | End: 2018-02-05

## 2018-02-05 RX ADMIN — OXYCODONE HYDROCHLORIDE AND ACETAMINOPHEN 1000 MG: 500 TABLET ORAL at 08:13

## 2018-02-05 RX ADMIN — CHOLECALCIFEROL TAB 25 MCG (1000 UNIT) 2000 UNITS: 25 TAB at 08:14

## 2018-02-05 RX ADMIN — AMLODIPINE BESYLATE 2.5 MG: 2.5 TABLET ORAL at 08:14

## 2018-02-05 RX ADMIN — ENOXAPARIN SODIUM 40 MG: 40 INJECTION SUBCUTANEOUS at 08:14

## 2018-02-05 RX ADMIN — POTASSIUM CHLORIDE 40 MEQ: 1500 TABLET, EXTENDED RELEASE ORAL at 10:08

## 2018-02-05 RX ADMIN — ASPIRIN 81 MG: 81 TABLET, COATED ORAL at 08:14

## 2018-02-05 RX ADMIN — POLYETHYLENE GLYCOL 3350 17 G: 17 POWDER, FOR SOLUTION ORAL at 08:14

## 2018-02-05 RX ADMIN — RANOLAZINE 500 MG: 500 TABLET, FILM COATED, EXTENDED RELEASE ORAL at 08:14

## 2018-02-05 RX ADMIN — METOPROLOL SUCCINATE 100 MG: 100 TABLET, FILM COATED, EXTENDED RELEASE ORAL at 08:14

## 2018-02-05 RX ADMIN — LEVOTHYROXINE SODIUM 50 MCG: 50 TABLET ORAL at 06:20

## 2018-02-05 RX ADMIN — DOCUSATE SODIUM 100 MG: 100 CAPSULE, LIQUID FILLED ORAL at 08:14

## 2018-02-05 RX ADMIN — Medication 30 MG: at 08:15

## 2018-02-05 RX ADMIN — TICAGRELOR 90 MG: 90 TABLET ORAL at 08:15

## 2018-02-05 RX ADMIN — HYDROXYCHLOROQUINE SULFATE 200 MG: 200 TABLET, FILM COATED ORAL at 08:15

## 2018-02-05 RX ADMIN — TORSEMIDE 10 MG: 10 TABLET ORAL at 08:14

## 2018-02-05 RX ADMIN — OXYCODONE HYDROCHLORIDE 5 MG: 5 TABLET ORAL at 16:01

## 2018-02-05 RX ADMIN — RALOXIFENE HYDROCHLORIDE 60 MG: 60 TABLET, FILM COATED ORAL at 08:16

## 2018-02-05 RX ADMIN — OXYCODONE HYDROCHLORIDE 5 MG: 5 TABLET ORAL at 06:21

## 2018-02-05 NOTE — ASSESSMENT & PLAN NOTE
· Likely secondary to trauma of surgery, trending downwards   · Afebrile   · PCXR, not active pulmonary disease  · Urinalysis, showing small leukocytes and moderate blood    Urine culture showed no growth

## 2018-02-05 NOTE — ASSESSMENT & PLAN NOTE
Reports a 16-pound weight loss over the past 7-8 months which she contributes to cardiac rehab and recent death of her   - Outpatient follow-up with PCP  - Psychiatry consult

## 2018-02-05 NOTE — OCCUPATIONAL THERAPY NOTE
OT TREATMENT       02/05/18 1130   Restrictions/Precautions   Weight Bearing Precautions Per Order Yes   RLE Weight Bearing Per Order WBAT   Other Precautions THR; Fall Risk;Bed Alarm; Chair Alarm   Pain Assessment   Pain Assessment 0-10   Pain Score 6   Pain Type Acute pain   Pain Location Hip;Leg   Pain Orientation Right   ADL   Toileting Assistance  2  Maximal Assistance   Toileting Deficit Bedside commode;Supervison/safety; Clothing management up;Perineal hygiene   Functional Standing Tolerance   Time 2 minutes   Activity static standing during toileting hygiene   Transfers   Sit to Stand (Mod/Max A of 1)   Additional items Verbal cues   Stand to Sit 2  Maximal assistance   Additional items Assist x 1   Stand pivot 3  Moderate assistance   Additional items Assist x 2   Toilet Transfers   Toilet Transfer From (chair)   Toilet Transfer Type To and from   Toilet Transfer to Standard bedside commode   Toilet Transfer Technique Stand pivot   Toilet Transfers Moderate assistance  (of 2 people with verbal and physical cues)   Therapeutic Excerise-Strength   UE Strength Yes   Right Upper Extremity- Strength   R Shoulder Flexion; Extension;ABduction; External rotation; Internal rotation   R Elbow Elbow flexion;Elbow extension   R Wrist Wrist flexion;Wrist extension   R Position Seated   Equipment Dowel   R Weight/Reps/Sets 10 reps with 2#   Left Upper Extremity-Strength   L Shoulder Flexion; Extension;ABduction; External rotation; Internal rotation   L Elbow Elbow flexion;Elbow extension   L Wrist Wrist flexion;Wrist extension   L Position Seated   Equipment Dowel   L Weights/Reps/Sets 10 reps with 2#   Cognition   Arousal/Participation Alert; Cooperative   Attention Attends with cues to redirect   Orientation Level Oriented X4   Memory Decreased recall of precautions   Following Commands Follows multistep commands with increased time or repetition   Additional Activities   Additional Activities Comments Reviewed posterior hip precautions  Patient reports understanding but only able to state 1/3 herself  Activity Tolerance   Activity Tolerance Patient limited by fatigue;Patient limited by pain   Assessment   Assessment Pt demonstrating improved strength, balance and functional activity tolerance allowing for increased active participation with toileting and transfers than previously  Repeated cues needed to improve decrease flexed posture when standing and cues for correct limb placement during transfers  Good rehab candidate  Cont OT per POC  Plan   Treatment Interventions ADL retraining;UE strengthening/ROM; Functional transfer training; Endurance training;Patient/family training;Equipment evaluation/education; Activityengagement; Energy conservation   OT Frequency 3-5x/wk   Recommendation   OT Discharge Recommendation Short Term Rehab     Patient left OOB in chair with all needs within reach, tab alarm in place

## 2018-02-05 NOTE — ASSESSMENT & PLAN NOTE
· She is intolerant to statins     · Continue Zetia and Co Q10  · 10/9/2017 lipid panel showed cholesterol 187, triglycerides 137, HDL 55 and LDL 92

## 2018-02-05 NOTE — ASSESSMENT & PLAN NOTE
· EBL during surgery was 900ml, she received 2 PRBC  · Hemoglobin 10 5-->8 9-->8 6->8 5  · Denies any active bleeding  · Restarted Aspirin and Brilinta  · Check CBC in 1 week

## 2018-02-05 NOTE — DISCHARGE SUMMARY
Discharge- Zen Hall 1940, 65 y o  female MRN: [id number]    Unit/Bed#: [address] [place] [address] Encounter: [id number]    Primary Care Provider: Radha Mueller MD   Date and time admitted to hospital: 1/30/2018  4:31 PM        * Closed displaced fracture of right femoral neck [facility])   Assessment & Plan    Patient presented to ED on 1/27 s/p fall resulting in right hip injury and pain  Workup in the ED included right hip xray and CT which were negative for any acute osseous injury and patient sent home  She resented back in the ED on 1/30 with continued severe pain and ambulatory dysfunction   · Repeat x-rays of the right hip revealed an impacted subcapital fracture of the right hip  · Orthopedics following, appreciate input  · POD #4 right hip hemiarthroplasty by Dr Sarah Jones  HGB stable  · Encourage ambulation with assistance  · Weightbearing as tolerated  · Posterior hip precautions  · Continue Mepilex until postoperative day 7  · Follow-up with Dr Sarah Jones in 1 week   · PT/OT   · Restarted Aspirin and Brilinta   · Continue Lovenox   · Continue Blanchard catheter 2/2 to urinary retention and attempt voiding trial in STR once having daily BMs and increased mobility   · Pain control with Tylenol, Tramadol, and Oxycodone prn    Discontinue Robaxin         Urinary retention   Assessment & Plan    Acute urinary retention secondary to constipation, pain meds, and inability to ambulate out of bed status post hip surgery as well as longstanding history of incomplete emptying bladder stones and possible sling obstruction - Dr Dilcia Judd following   · Started tamsulosin 0 4 mg daily  · Continue with indwelling blanchard catheter till ambulating appropriately   · May be discharged to short-term rehab with a voiding trial at short-term rehab  · Maintain blanchard until patient has daily bowel movements and ambulating  · Will need formal workup for sling obstruction causing underlying bladder instability high residual urines and bladder stones once discharged from short-term rehab   · Follow-up with urologist, Dr Zan Phoenix    · Continue scheduled Colace, MiraLax, and Senokot   · Dulcolax supp as needed  · MOM x1 yesterday with small BM today         Leukocytosis   Assessment & Plan    · Likely secondary to trauma of surgery, trending downwards   · Afebrile   · PCXR, not active pulmonary disease  · Urinalysis, showing small leukocytes and moderate blood  Urine culture showed no growth         Acute blood loss anemia   Assessment & Plan    · EBL during surgery was 900ml, she received 2 PRBC  · Hemoglobin 10 5-->8 9-->8 6->8 5  · Denies any active bleeding  · Restarted Aspirin and Brilinta  · Check CBC in 1 week         Benign hypertension with chronic kidney disease, stage III   Assessment & Plan    · BP stable  · Continue medications, Norvasc, Metoprolol, Spirolactone and Torsemide        Acute MI, true posterior wall (HCC)   Assessment & Plan    · 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  S/p thrombectomy with PCI, XOCHILT to OM        Weight loss   Assessment & Plan    Reports a 16-pound weight loss over the past 7-8 months which she contributes to cardiac rehab and recent death of her   - Outpatient follow-up with PCP  - Psychiatry consult        CAD (coronary artery disease)   Assessment & Plan    With prior CABG with some occluded grafts  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  S/p thrombectomy with PCI, XOCHILT to OM  · Continue metoprolol, zetia, and Co Q10  · Restarted ASA and Brilinta, Hgb remained stable  Monitor CBC in 1 week           Chronic systolic congestive heart failure (HCC)   Assessment & Plan    Appears euvolemic  · Continue metoprolol, Torsemide and Spirolactone with potassium supplementation         Chronic kidney disease, stage 3   Assessment & Plan    · Creatinine stable with baseline creatinine 1 5-1 6  · She follows with outpatient nephrologist, Dr Aldridge Earing  · Outpatient follow-up as needed         Hypothyroid   Assessment & Plan    · S/p partial thyroidectomy  · 6/5/2017 TSH 2 290  · Continue levothyroxine         Hyperlipidemia   Assessment & Plan    · She is intolerant to statins  · Continue Zetia and Co Q10  · 10/9/2017 lipid panel showed cholesterol 187, triglycerides 137, HDL 55 and LDL 92            Consultations During Hospital Stay:  · Urology - Dr Duran Flores  · Kristina Smiling - Dr Hafsa Chao  · Cardiology - Dr Brooke Ferrer     Procedures Performed:     · Hemiarthroplasty of right hip on 2/1/18   · Xray right hip: Impacted subcapital fracture of the right hip  · Xray right hip/pelvis: Right hip prothesis in good position   · CXR: No active pulmonary disease     Significant Findings / Test Results:     · Impacted subcapital fracture of the right hip   · Acute urinary retention     Incidental Findings:   · 16-pound weight loss over 7 months    Test Results Pending at Discharge (will require follow up): · None     Outpatient Tests Requested:  · CBC in 1 week to monitor HGB    Complications:  None    Reason for Admission: Right hip pain and ambulatory dysfunction     Hospital Course:     Ema Clemens is a 65 y o  female patient who originally presented to the hospital on 1/30/2018 due to persistent right hip pain  Patient presented to [address] [place] [address] Emergency Department on 1/27/2018 after a fall resulting in right hip pain  Workup in the ED included a right hip x-ray and right hip CT scan which were negative for any acute osseous injury, therefore, the patient was sent home  She presented in the emergency department again on 1/30/2018 due to persistent severe right hip pain and ambulatory dysfunction  She was admitted for observation and an orthopedic evaluation was obtained  Patient's physical exam revealed a shortened right leg with external rotation    A repeat x-ray of the right hip revealed an impacted subcapital fracture of the right hip  Orthopedic surgeon, Dr Deepthi Harmon, performed a right hip hemiarthroplasty on 2/1/2018  Patient tolerated procedure well  She received 2 units of PRBCs postoperatively for acute blood loss anemia  Her hemoglobin has remained stable and she was resumed on Aspirin and Brilinta  She will be discharged to [facility]  for short-term rehabilitation  She is to ambulate with assistance, weightbear as tolerated, maintain posterior hip precautions, and follow up with Dr Deepthi Harmon in 1 week  Postoperatively, patient developed acute urinary retention secondary to constipation, pain medication, and reduced ambulation  Urologist, Dr Will Gamboa, was consulted, and an indwelling blanchard catheter was inserted  Patient is to maintain blanchard catheter upon discharge to short-term rehab and attempt a voiding trial once ambulation improves and  having daily bowel movements  She will follow up with Dr Will Gamboa in 1 month for further evaluation  Please see above list of diagnoses and related plan for additional information  Condition at Discharge: stable     Discharge Day Visit / Exam:     Subjective:  OOB in chair  Tolerated breakfast  Denies HA, CP, SOB, abdominal pain, N/V/D  Right hip pain controlled at rest, 6/10 with movement for which pain medication relieves pain  Small BM this morning  Tolerating blanchard catheter without any pain or discomfort  Vitals: Blood Pressure: 148/70 (02/05/18 0811)  Pulse: 88 (02/05/18 0811)  Temperature: 98 °F (36 7 °C) (02/05/18 0811)  Temp Source: Tympanic (02/05/18 0811)  Respirations: 18 (02/05/18 0811)  Height: 5' 4" (162 6 cm) (01/30/18 1636)  Weight - Scale: 74 9 kg (165 lb 2 oz) (02/05/18 0357)  SpO2: 96 % (02/05/18 0811)  Exam:   Physical Exam   Constitutional: She is oriented to person, place, and time  She appears well-developed  No distress  HENT:   Head: Normocephalic  Neck: Normal range of motion     Cardiovascular: Normal rate and regular rhythm  Pulmonary/Chest: Effort normal  No respiratory distress  She has decreased breath sounds in the right lower field and the left lower field  She has no wheezes  She has no rhonchi  She has no rales  Abdominal: Soft  Bowel sounds are normal  She exhibits no distension  There is no tenderness  Genitourinary:   Genitourinary Comments: Indwelling urinary catheter draining clear, keke urine     Musculoskeletal: She exhibits tenderness (right hip)  She exhibits no edema  Neurological: She is alert and oriented to person, place, and time  Skin: Skin is warm and dry  No rash noted  She is not diaphoretic  Bruising to right groin and right hip  Post operative dressing on right hip C, D, and I     Psychiatric: She has a normal mood and affect  Her behavior is normal  Judgment normal    Nursing note and vitals reviewed  Discussion with Family: daughter    Discharge instructions/Information to patient and family:   See after visit summary for information provided to patient and family  Provisions for Follow-Up Care:  See after visit summary for information related to follow-up care and any pertinent home health orders  Disposition:     Carlos [id number] (see below) - [address] [place] [address] to [facility]:   · Not Applicable to this Patient - Not Applicable to this Patient    Planned Readmission: None     Discharge Statement:  I spent 40 minutes discharging the patient  This time was spent on the day of discharge  I had direct contact with the patient on the day of discharge  Greater than 50% of the total time was spent examining patient, answering all patient questions, arranging and discussing plan of care with patient as well as directly providing post-discharge instructions  Additional time then spent on discharge activities  Appreciate orthopedic surgery input       Discharge Medications:  See after visit summary for reconciled discharge medications provided to patient and family        ** Please Note: This note has been constructed using a voice recognition system **

## 2018-02-05 NOTE — ASSESSMENT & PLAN NOTE
Acute urinary retention secondary to constipation, pain meds, and inability to ambulate out of bed status post hip surgery as well as longstanding history of incomplete emptying bladder stones and possible sling obstruction - Dr Delgado Stands following   · Started tamsulosin 0 4 mg daily  · Continue with indwelling blanchard catheter till ambulating appropriately   · May be discharged to short-term rehab with a voiding trial at short-term rehab  · Maintain blanchard until patient has daily bowel movements and ambulating  · Will need formal workup for sling obstruction causing underlying bladder instability high residual urines and bladder stones once discharged from short-term rehab   · Follow-up with urologist, Dr Delgado Stands

## 2018-02-05 NOTE — DISCHARGE INSTRUCTIONS
Right hip hemiarthroplasty:   STR for physical therapy  Encourage ambulation  Continue posterior hip precautions  Maintain initial dressing until 2/9  Continue blanchard catheter until having daily bowel movements and ambulation increases, likely 1 week  Follow-up with Dr Jolynn Christensen in 1 week for wound check and repeat x-ray    Acute urinary retention: secondary to constipation, pain meds, and inability to ambulate out of bed status post hip surgery as well as longstanding history of incomplete emptying bladder stones and possible sling obstruction  Continue tamsulosin 0 4 mg  Aggressive bowel regimen   Continue blanchard catheter until ambulating appropriately and having daily bowel movements then may attempt a voiding trial   Patient will need formal workup for sling obstruction causing underlying bladder instability high residual urines and bladder stones once discharged from short-term rehab and will be seen in Dr Clark Ledezma office for follow-up in a few weeks           Hip Abduction Pillow   WHAT YOU NEED TO KNOW:   A hip abduction pillow is a device used to prevent your hip from moving out of your joint  The pillow is placed between your thighs and attached to your legs with straps  DISCHARGE INSTRUCTIONS:   Call 911 for any of the following:   · You feel lightheaded, short of breath, and have chest pain  · You cough up blood  · You have trouble breathing  Seek care immediately if:   · Your leg becomes larger than usual and painful  · You have severe pain  · You cannot move your leg  · Your hip or knee is twisted in or out  · Your foot, ankle, or leg is numb  Contact your healthcare provider if:   · You have a fever or chills  · The area of your skin that is touching the pillow is red, swollen, or painful       · The skin on your buttocks or tailbone is red, swollen, or painful  · You have pain that does not go away after you take pain medicine       · You have questions or concerns about your condition or care  How to place a hip abduction pillow properly:  Your healthcare provider will teach you how to place a hip abduction pillow  He may give you the following instructions:  · Lie flat on the bed  · Place the pillow between your thighs  The pillow should touch each leg from your thigh to your ankles  Your legs will fit inside spaces on the sides of the foam pillow  · You will need a caregiver to attach the pillow to your thighs and ankles  A caregiver can be a family member or a friend that helps with your care  He will need to follow these steps:     ¨ Wrap the straps around your thighs and ankles and attach to the pillow  ¨ Tighten the straps to prevent your legs from moving outward  He should be able to fit one finger between your legs and the straps  Self care: Your healthcare provider will instruct you and your caregiver on how to do the following:  · Care for your skin as directed  Your healthcare provider will show how to loosen the straps every 2 to 4 hours and check your skin for sore or red areas  Your skin can be washed around the pillow with soap and water  He may prescribe powders or lotions that will prevent skin damage  · Do exercises as directed  Your healthcare provider may instruct you to remove your pillow for exercise  He may instruct you to wiggle your toes once every hour  This will help prevent blood clots and improve blood circulation in your legs  Exercise may prevent you from losing movement and strength in your hip  · Change your position every 2 hours  Your healthcare provider will show you how to safely roll from side to side  This may prevent you from getting a bed sore on your buttocks or tailbone  It may also help you heal by moving blood through your legs  Before you move, check that your pillow is strapped to your thighs and ankles  · Wear your hip abduction pillow while in bed as directed    Your healthcare provider will tell you when it is safe to remove your hip abduction pillow  Other self care tips:   · Drink more liquids  Liquids help keep your body hydrated and prevent a blood clot in your leg  Ask how much liquid you should drink and which liquids are best for you  · Avoid sitting upright  with your legs out in front of you  If you sit upright, you may place too much stress on your hip or cause your hip to dislocate  · Do not cross your legs or ankles  You may move your hip out of its joint  Follow up with your healthcare provider as directed:  Write down your questions so you remember to ask them during your visits  © 2017 [employer] Information is for End User's use only and may not be sold, redistributed or otherwise used for commercial purposes  All illustrations and images included in CareNotes® are the copyrighted property of A [employer] Inc  or Moise Austin  The above information is an  only  It is not intended as medical advice for individual conditions or treatments  Talk to your doctor, nurse or pharmacist before following any medical regimen to see if it is safe and effective for you

## 2018-02-05 NOTE — ASSESSMENT & PLAN NOTE
· Creatinine stable with baseline creatinine 1 5-1 6  · She follows with outpatient nephrologist, Dr Hope Romero  · Outpatient follow-up as needed

## 2018-02-05 NOTE — ASSESSMENT & PLAN NOTE
Patient presented to ED on 1/27 s/p fall resulting in right hip injury and pain  Workup in the ED included right hip xray and CT which were negative for any acute osseous injury and patient sent home  She resented back in the ED on 1/30 with continued severe pain and ambulatory dysfunction   · Repeat x-rays of the right hip revealed an impacted subcapital fracture of the right hip  · Orthopedics following, appreciate input  · POD #4 right hip hemiarthroplasty by Dr Omkar Lainez  HGB stable  · Encourage ambulation with assistance  · Weightbearing as tolerated  · Posterior hip precautions  · Continue Mepilex until postoperative day 7  · Follow-up with Dr Omkar Lainez in 1 week   · PT/OT   · Restarted Aspirin and Brilinta   · Continue Lovenox   · Continue Heller catheter 2/2 to urinary retention and attempt voiding trial in STR once having daily BMs and increased mobility   · Pain control with Tylenol, Tramadol, and Oxycodone prn    Discontinue Robaxin

## 2018-02-05 NOTE — CONSULTS
Consultation - Isaiah Pace 65 y o  female MRN: [id number]    Unit/Bed#: [address] [place] [address] Encounter: [id number]      Identifying Data:  65years old white female admitted at [facility] on 2018 with complaining of right hip pain status post fall on Saturday  Psychiatric consultation is asked for depression and weight loss  Patient examined spoke with the nurse history physical medications labs reviewed and noted  Patient is cooperative she reports that her   about 6 weeks ago and now she lives alone they were  for 47 years she has trouble in sleeping she lost appetite she lost weight she cannot relax she feels depressed  After the discussion she agreed to take antidepressant medication Remeron which helped her with the depression insomnia anxiety and hopefully will help her to improve her appetite to regain her weight  Patient is able to give out basic background information  Patient is suffering from CHF coronary artery disease history of CABG status post MI hypothyroidism thyroid surgery CKD stage 3 hypertension anemia high cholesterol vitamin-D deficiency history of pancreatic lesion  Now patient lives by herself she drives locally and takes care of herself  Patient denies smoking denies abusing alcohol or drugs she denies history of alcohol and drug abuse  Patient has 12th grade education she was able keep   She is retired  Patient reports that she [facility] and talks to Marely More for psychotherapy but she is not under psychiatric care or she has not seen a professional therapist     Chief Complaints:  Right hip pain    Family History: History reviewed  No pertinent family history  Patient denies  Legal History:  Patient denies      Mental Status Exam:  65years old white female is sitting in a chair alert awake cooperative happy to talk to me communicates her feelings well patient admits being depressed anxious said missed her  they were  for 47 years poor appetite insomnia and she lost some weight  Memory intact  Patient denies auditory or visual hallucinations  Patient denies suicidal or homicidal ideations  No paranoia and no delusion elucidated  Poor concentration  Insight and judgments are adequate  History of Present Illness     HPI: Zoila Og is a 65y o  year old female who presents with right hip pain    Consults      Historical Information   Past Psychiatric History:  Patient denies history of depression she never seen a psychiatrist or therapist she is not under psychiatric care and she is not on psychotropic medications  But after her   she goes to [facility] and talks to Marely More for therapy and she is going through the grieving process of her bereavement  Otherwise patient has no psych history  Patient has no psychiatric admissions no suicide attempts in the past she does not remember taking any depression medications in the past   Patient has no history of drug and alcohol abuse no legal problems in the past   Patient is willing to take antidepressant medication Remeron after the discussion  Past Medical History:   Diagnosis Date    Anemia     Arthritis     Bladder calculi     CAD (coronary artery disease)     Status post CABG and PTCA to OM1    CHF (congestive heart failure) (HCC)     CKD (chronic kidney disease) stage 3, GFR 30-59 ml/min     CKD (chronic kidney disease), stage III     Hypertension     Hypothyroidism     Uterine prolapse      Past Surgical History:   Procedure Laterality Date    BACK SURGERY      BLADDER SURGERY      CORONARY ANGIOPLASTY WITH STENT PLACEMENT      CORONARY ARTERY BYPASS GRAFT      OOPHORECTOMY Left     SC PARTIAL HIP REPLACEMENT Right 2018    Procedure: HEMIARTHROPLASTY HIP (BIPOLAR) (RIGHT);   Surgeon: Mervin Gonzalez MD;  Location: [facility];  Service: Orthopedics     Social History   History   Alcohol Use No     History   Drug Use No     History   Smoking Status    Never Smoker   Smokeless Tobacco    Never Used       Meds/Allergies   current meds:   Current Facility-Administered Medications   Medication Dose Route Frequency    acetaminophen (TYLENOL) tablet 650 mg  650 mg Oral Q6H PRN    amLODIPine (NORVASC) tablet 2 5 mg  2 5 mg Oral Daily    ascorbic acid (VITAMIN C) tablet 1,000 mg  1,000 mg Oral Daily    aspirin (ECOTRIN LOW STRENGTH) EC tablet 81 mg  81 mg Oral Daily    bisacodyl (DULCOLAX) rectal suppository 10 mg  10 mg Rectal Daily PRN    cholecalciferol (VITAMIN D3) tablet 2,000 Units  2,000 Units Oral Daily    co-enzyme Q-10 capsule 30 mg  30 mg Oral Daily    docusate sodium (COLACE) capsule 100 mg  100 mg Oral BID    enoxaparin (LOVENOX) subcutaneous injection 40 mg  40 mg Subcutaneous Daily    ezetimibe (ZETIA) tablet 10 mg  10 mg Oral HS    HYDROmorphone (DILAUDID) injection 0 2 mg  0 2 mg Intravenous Q3H PRN    hydroxychloroquine (PLAQUENIL) tablet 200 mg  200 mg Oral Daily    levothyroxine tablet 50 mcg  50 mcg Oral Early Morning    metoprolol succinate (TOPROL-XL) 24 hr tablet 100 mg  100 mg Oral QAM    metoprolol succinate (TOPROL-XL) 24 hr tablet 50 mg  50 mg Oral QPM    nitroglycerin (NITROSTAT) SL tablet 0 4 mg  0 4 mg Sublingual Q5 Min PRN    ondansetron (ZOFRAN) injection 4 mg  4 mg Intravenous Q6H PRN    oxyCODONE (ROXICODONE) IR tablet 2 5 mg  2 5 mg Oral Q4H PRN    oxyCODONE (ROXICODONE) IR tablet 5 mg  5 mg Oral Q4H PRN    polyethylene glycol (MIRALAX) packet 17 g  17 g Oral Daily    raloxifene (EVISTA) tablet 60 mg  60 mg Oral Daily    ranolazine (RANEXA) 12 hr tablet 500 mg  500 mg Oral Q12H ANDREW    senna (SENOKOT) tablet 8 6 mg  1 tablet Oral HS    spironolactone (ALDACTONE) tablet 25 mg  25 mg Oral Every Other Day    tamsulosin (FLOMAX) capsule 0 4 mg  0 4 mg Oral Daily With Dinner    ticagrelor (BRILINTA) tablet 90 mg  90 mg Oral BID    torsemide (DEMADEX) tablet 10 mg  10 mg Oral Daily    and PTA meds:    Prescriptions Prior to Admission   Medication    amLODIPine (NORVASC) 2 5 mg tablet    Ascorbic Acid (VITAMIN C) 1000 MG tablet    aspirin (ECOTRIN LOW STRENGTH) 81 mg EC tablet    Coenzyme Q10 10 MG capsule    hydroxychloroquine (PLAQUENIL) 200 mg tablet    ibuprofen (MOTRIN) 200 mg tablet    levothyroxine 50 mcg tablet    methocarbamol (ROBAXIN) 500 mg tablet    metoprolol succinate (TOPROL-XL) 100 mg 24 hr tablet    metoprolol succinate (TOPROL-XL) 50 mg 24 hr tablet    Multiple Vitamins-Minerals (CENTRUM SILVER PO)    raloxifene (EVISTA) 60 mg tablet    ranolazine (RANEXA) 500 mg 12 hr tablet    solifenacin (VESICARE) 10 MG tablet    spironolactone (ALDACTONE) 25 mg tablet    ticagrelor (BRILINTA) 90 MG    torsemide (DEMADEX) 10 mg tablet    UNKNOWN TO PATIENT    Vitamin D, Cholecalciferol, 1000 units CAPS    nitroglycerin (NITROLINGUAL) 0 4 mg/spray spray     Allergies   Allergen Reactions    Atorvastatin Hives    Boniva [Ibandronic Acid] Hives    Codeine      codeine derivatives - tolerates IV Dilaudid    Statins Hives       Objective   Vitals: Blood pressure 148/70, pulse 88, temperature 98 °F (36 7 °C), temperature source Tympanic, resp  rate 18, height 5' 4" (1 626 m), weight 74 9 kg (165 lb 2 oz), SpO2 96 %, not currently breastfeeding  Routine Lab Results:   Admission on 2018   No results displayed because visit has over 200 results  Diagnosis:  Major depression single episode  Anxiety  Bereavement   6 weeks ago  Insomnia  Plan:  Remeron 7 5 mg HS  Psychotherapy  Psychiatric follow-up recommended after the discharge  Patient is advised to [facility] and talking to the  for therapy  Use the family support from the children  Patient has 1 son and 2 daughters  I will follow up during the hospital stay      Michelet Vo MD

## 2018-02-05 NOTE — ASSESSMENT & PLAN NOTE
· Continue scheduled Colace, MiraLax, and Senokot   · Dulcolax supp as needed  · MOM x1 yesterday with small BM today

## 2018-02-05 NOTE — PROGRESS NOTES
Progress Note - Orthopedics   Kim Beebe 65 y o  female MRN: [id number]  Unit/Bed#: [address] [place] [address] Encounter: [id number]    Assessment:  Status post right hip hemiarthroplasty postoperative day 4  Plan:  I would like to have her get up with physical therapy  It is important for her to ambulate  We can hopefully get the Heller catheter out as well soon  I believe she will do well with mobilization  She will continue to have posterior hip precautions  I will see her back in 1 week for wound check and repeat x-ray  The initial dressing stays on for 1 week postoperatively  Weight bearing:  As tolerated    VTE Pharmacologic Prophylaxis: Sequential compression device (Venodyne)   VTE Mechanical Prophylaxis: sequential compression device    Subjective:  Nicko Berg states that she has a mild discomfort into the hip that this is significantly better than preoperatively  She did transfer from the ICU on Saturday but unfortunately was not able to do therapy then and no therapy yesterday as she missed therapy on Saturday due to the ICU transfer  Vitals: Blood pressure 143/64, pulse 91, temperature 98 8 °F (37 1 °C), temperature source Oral, resp  rate 18, height 5' 4" (1 626 m), weight 74 9 kg (165 lb 2 oz), SpO2 96 %, not currently breastfeeding  ,Body mass index is 28 34 kg/m²  Intake/Output Summary (Last 24 hours) at 02/05/18 0738  Last data filed at 02/04/18 1900   Gross per 24 hour   Intake              240 ml   Output             1901 ml   Net            -1661 ml       Invasive Devices     Peripheral Intravenous Line            Peripheral IV 02/01/18 Left Hand 4 days          Drain            Urethral Catheter Non-latex 16 Fr  1 day                Physical Exam:   Ortho Exam: Hip  negatives: no tenderness the incision on the right side is clean and dry and intact with decreasing swelling into the thigh and sensation and motor function are intact in the right lower extremity as per preoperatively    She does have decreased sensation that is stable from preop into the right foot  She has excellent strength however into the right foot and ankle region      Lab, Imaging and other studies:   CBC:   Lab Results   Component Value Date    WBC 11 40 (H) 02/05/2018    HGB 8 5 (L) 02/05/2018    HCT 25 8 (L) 02/05/2018    MCV 83 02/05/2018     02/05/2018    MCH 27 2 02/05/2018    MCHC 33 0 02/05/2018    RDW 14 4 02/05/2018    MPV 7 9 (L) 02/05/2018

## 2018-02-05 NOTE — ASSESSMENT & PLAN NOTE
With prior CABG with some occluded grafts  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  S/p thrombectomy with PCI, XOCHILT to OM  · Continue metoprolol, zetia, and Co Q10  · Restarted ASA and Brilinta, Hgb remained stable  Monitor CBC in 1 week

## 2018-02-05 NOTE — [place] UNIVERSAL TRANSFER FORM
[place] UNIVERSAL TRANSFER FORM  (ALL ITEMS MUST BE COMPLETED)    1  [facility]      [facility]    2  DATE OF TRANSFER: 2/5/2018                        TIME OF TRANSFER: 1300    3  PATIENT NAME: DUSTY Muñoz      YOB: 1940                             GENDER: female    4  LANGUAGE:   English    5  PHYSICIAN NAME:  Harry Herbert DO                   PHONE: [phone]    6  CODE STATUS: Level 1 - Full Code        Out of Hospital DNR Attached: No    7  :                                      :  Extended Emergency Contact Information  Primary Emergency Contact: Kimi Phone: [phone]  Relation: Spouse  Secondary Emergency Contact: Lorie Clark   [place]  Mobile Phone: [phone]  Relation: Daughter           David Calixto Representative/Proxy:  No           Legal Guardian:  No             NAME OF:           HEALTH CARE REPRESENTATIVE/PROXY:                                         OR           LEGAL GUARDIAN, IF NOT :                                               PHONE:  (Day)           (Night)                        (Cell)    8  REASON FOR TRANSFER: (Must include brief medical history and recent changes in physical function or cognition ) STR s/p fall at home with Fx and s/p R hip arthoplasty             V/S: /70 (BP Location: Right arm)   Pulse 88   Temp 98 °F (36 7 °C) (Tympanic)   Resp 18   Ht 5' 4" (1 626 m)   Wt 74 9 kg (165 lb 2 oz)   SpO2 96%   Breastfeeding? No   BMI 28 34 kg/m²           PAIN: None    9  PRIMARY DIAGNOSIS: Closed displaced fracture of right femoral neck (HCC)      Secondary Diagnosis:         Pacemaker: No      Internal Defib: No          Mental Health Diagnosis (if Applicable):    10  RESTRAINTS: No     11  RESPIRATORY NEEDS: None    12  ISOLATION/PRECAUTION: None    13   ALLERGY: Atorvastatin; Boniva [ibandronic acid]; Codeine; and Statins    14  SENSORY:       Vision Glasses    15  SKIN CONDITION: Yes:  Surgical R hip with silver dressing    16  DIET: Special (describe) Cardiac    17  IV ACCESS: None    18  PERSONAL ITEMS SENT WITH PATIENT: Glasses and Walker    23  ATTACHED DOCUMENTS: MUST ATTACH CURRENT MEDICATION INFORMATION Face Sheet, MAR, Medication Reconciliation, TAR, POS, Diagnostic Studies, Labs, Operative Report, Respiratory Care, Discharge Summary, PT Note, OT Note, ST Note and HX/PE    20  AT RISK ALERTS:Falls and Pressure Ulcer        HARM TO: N/A    21  WEIGHT BEARING STATUS:         Left Leg: Full        Right Leg: Full    22  MENTAL STATUS:Alert, Oriented and Forgetful    23  FUNCTION:        Walk: With Help        Transfer: With Help        Toilet: With Help        Feed: Self    24  IMMUNIZATIONS/SCREENING:     There is no immunization history on file for this patient  25  BOWEL: Incontinent  and Date Last BM 2/5/18    26  BLADDER: Heller Catheter and Comments placed 2/3/18 for retention    27   SENDING FACILITY CONTACT: Alicia Chan RN                   Title: RN        [facility]        Phone: [phone] [address] (if known):        Title:        Unit:         Phone:         FORM PREFILLED BY (if applicable)       Title:       Unit:        Phone:         FORM COMPLETED BY Alicia Chan RN      Title: WILLIAM      Phone: [phone]

## 2018-02-05 NOTE — PHYSICAL THERAPY NOTE
PT TREATMENT     02/05/18 1110   Pain Assessment   Pain Assessment 0-10   Pain Score 6   Pain Type Acute pain;Surgical pain   Pain Location Hip;Leg   Pain Orientation Right   Restrictions/Precautions   RLE Weight Bearing Per Order WBAT   Other Precautions Fall Risk;Bed Alarm; Chair Alarm;THR;Pain  (reviewed THP with pt;pt able to recall 1/3)   General   Chart Reviewed Yes   Family/Caregiver Present No   Cognition   Arousal/Participation Cooperative   Subjective   Subjective "I have to use the bathroom"   Transfers   Sit to Stand (mod/max A + 1)   Additional items Verbal cues  (repeated for hand placement)   Stand to Sit 3  Moderate assistance   Additional items Verbal cues  (repeated for hand placement)   Ambulation/Elevation   Gait pattern Forward Flexion;R Knee Dia;Decreased R stance   Gait Assistance 3  Moderate assist   Additional items Assist x 2;Verbal cues; Tactile cues  (repeated for hand placement)   Assistive Device Rolling walker   Distance Pt took a 4-5 steps b/s chair to commode and return with PT/OT  Pt stood a 2nd time with PT x 1 minute  Balance   Static Sitting Good   Static Standing Fair -  (with RW)   Dynamic Standing Poor +  (with RW)   Ambulatory Poor +  (with RW)   Activity Tolerance   Activity Tolerance Patient limited by fatigue;Patient limited by pain   Exercises   Knee AROM Long Arc Quad Bilateral;10 reps;AAROM; Right   Ankle Pumps Bilateral;10 reps   Assessment   Assessment Pt is making progress with trans, amb and mobility with the RW needing less assist than previous  Pt will cont to benefit from skilled PT services  Plan   Treatment/Interventions ADL retraining;Functional transfer training;LE strengthening/ROM; Therapeutic exercise; Endurance training;Patient/family training;Bed mobility;Gait training   PT Frequency Once a day   Recommendation   Recommendation (STR)   Equipment Recommended (cont use of RW)

## 2018-02-05 NOTE — ANESTHESIA POSTPROCEDURE EVALUATION
Post-Op Assessment Note      CV Status:  Stable    Mental Status:  Alert and awake    Hydration Status:  Euvolemic    PONV Controlled:  Controlled    Airway Patency:  Patent    Post Op Vitals Reviewed: Yes          Staff: Anesthesiologist           BP      Temp      Pulse     Resp      SpO2      Pt appear comfortable  Off the O2 per nasal cannula  Past few days even noted  No further complications or problems

## 2018-02-05 NOTE — PLAN OF CARE
DISCHARGE PLANNING     Discharge to home or other facility with appropriate resources Progressing        DISCHARGE PLANNING - CARE MANAGEMENT     Discharge to post-acute care or home with appropriate resources Progressing        GENITOURINARY - ADULT     Maintains or returns to baseline urinary function Progressing     Urinary catheter remains patent Progressing        HEMATOLOGIC - ADULT     Maintains hematologic stability Progressing        INFECTION - ADULT     Absence or prevention of progression during hospitalization Progressing        Knowledge Deficit     Patient/family/caregiver demonstrates understanding of disease process, treatment plan, medications, and discharge instructions Progressing        Nutrition/Hydration-ADULT     Nutrient/Hydration intake appropriate for improving, restoring or maintaining nutritional needs Progressing        PAIN - ADULT     Verbalizes/displays adequate comfort level or baseline comfort level Progressing        Potential for Falls     Patient will remain free of falls Progressing        Prexisting or High Potential for Compromised Skin Integrity     Skin integrity is maintained or improved Progressing        SAFETY ADULT     Maintain or return to baseline ADL function Progressing     Maintain or return mobility status to optimal level Progressing     Patient will remain free of falls Progressing

## 2018-02-05 NOTE — SOCIAL WORK
Discharge ordered  Pt will be transferred to [facility]  for skilled rehab  Reviewed transportation options with pt's daughter  Daughter agreeable with wheelchair [facility] transport and is aware of charges associated with service  [place] scheduled to transport as wheelchair [facility]  Unit, [facility] and pt/daughter Rodrick Rodríguez) aware of plan

## 2018-02-06 NOTE — PROGRESS NOTES
[facility] to add: Lovenox 40 mg SQ daily for 4 weeks s/p right hip hemiarthroplasty after confirming with orthopedic surgery (ok to give with ASA and Brilinta) and Remeron 7 5 mg at HS per psychiatry

## 2018-02-14 ENCOUNTER — APPOINTMENT (OUTPATIENT)
Dept: RADIOLOGY | Facility: CLINIC | Age: 78
End: 2018-02-14
Payer: COMMERCIAL

## 2018-02-14 ENCOUNTER — OFFICE VISIT (OUTPATIENT)
Dept: OBGYN CLINIC | Facility: CLINIC | Age: 78
End: 2018-02-14

## 2018-02-14 VITALS
HEART RATE: 78 BPM | DIASTOLIC BLOOD PRESSURE: 65 MMHG | WEIGHT: 153.8 LBS | BODY MASS INDEX: 26.26 KG/M2 | HEIGHT: 64 IN | SYSTOLIC BLOOD PRESSURE: 121 MMHG

## 2018-02-14 DIAGNOSIS — Z87.81 S/P RIGHT HIP FRACTURE: ICD-10-CM

## 2018-02-14 DIAGNOSIS — Z87.81 S/P LEFT HIP FRACTURE: Primary | ICD-10-CM

## 2018-02-14 PROCEDURE — 99024 POSTOP FOLLOW-UP VISIT: CPT | Performed by: ORTHOPAEDIC SURGERY

## 2018-02-14 PROCEDURE — 73502 X-RAY EXAM HIP UNI 2-3 VIEWS: CPT

## 2018-02-14 NOTE — PROGRESS NOTES
Assessment/Plan:  1  S/p left hip fracture  XR hip/pelv 2-3 vws right if performed    CANCELED: XR hip/pelv 2-3 vws left if performed     Pt will benefit from continued therapy for right hip stiffness and strengthening post-op from hemiarthroplasty  Pt will go home on Friday from her rehab facility and will continue home therapy  Pt should continue to WBAT  Call if any changes or injuries occur  Return in 6 weeks  Subjective:   Alejo Angelucci is a 65 y o  female who presents today for postoperative f/u of her left hip  She had an initial hip fracture in late January and had operative repair on 2/1/18  Pt is now 2 weeks postoperative and reports doing well  She was living at [facility] center these two weeks and participating in rehab twice daily 6 days a week  She is progressing well, able to walk with her walker for 200 feet, and able to go home on Friday  Pt has not needed pain medication and feels she is only having stiffness  Pt's granddaughter who is an OT is present and reports she is set up with home therapy for 3 times a week  Review of Systems   Constitutional: Negative for chills, fever and unexpected weight change  HENT: Negative for hearing loss, nosebleeds and sore throat  Eyes: Negative for pain, redness and visual disturbance  Respiratory: Negative for cough, shortness of breath and wheezing  Cardiovascular: Positive for leg swelling  Negative for chest pain and palpitations  Gastrointestinal: Negative for abdominal distention, nausea and vomiting  Endocrine: Negative for polydipsia and polyuria  Genitourinary: Negative for dysuria and hematuria  Skin: Negative for rash and wound  Neurological: Negative for dizziness, numbness and headaches  Psychiatric/Behavioral: Negative for decreased concentration and suicidal ideas  The patient is not nervous/anxious        Past Medical History:   Diagnosis Date    Anemia     Arthritis     Bladder calculi     CAD (coronary artery disease)     Status post CABG and PTCA to OM1    CHF (congestive heart failure) (HCC)     CKD (chronic kidney disease) stage 3, GFR 30-59 ml/min     CKD (chronic kidney disease), stage III     Hypertension     Hypothyroidism     Uterine prolapse      Past Surgical History:   Procedure Laterality Date    BACK SURGERY      BLADDER SURGERY      CORONARY ANGIOPLASTY WITH STENT PLACEMENT      CORONARY ARTERY BYPASS GRAFT      OOPHORECTOMY Left     PA PARTIAL HIP REPLACEMENT Right 2/1/2018    Procedure: HEMIARTHROPLASTY HIP (BIPOLAR) (RIGHT); Surgeon: Modesta Graham MD;  Location: [address] [place] [address];  Service: Orthopedics     History reviewed  No pertinent family history  Social History     Occupational History    Not on file  Social History Main Topics    Smoking status: Never Smoker    Smokeless tobacco: Never Used    Alcohol use No    Drug use: No    Sexual activity: No       Current Outpatient Prescriptions:     acetaminophen (TYLENOL) 325 mg tablet, Take 2 tablets (650 mg total) by mouth every 6 (six) hours as needed for mild pain, headaches or fever, Disp: 30 tablet, Rfl: 0    amLODIPine (NORVASC) 2 5 mg tablet, Take 1 tablet by mouth daily, Disp: 30 tablet, Rfl: 0    Ascorbic Acid (VITAMIN C) 1000 MG tablet, Take 1,000 mg by mouth daily, Disp: , Rfl:     aspirin (ECOTRIN LOW STRENGTH) 81 mg EC tablet, Take 81 mg by mouth daily Indications: Heart Attack  , Disp: , Rfl:     bisacodyl (DULCOLAX) 10 mg suppository, Insert 1 suppository (10 mg total) into the rectum daily as needed for constipation, Disp: 12 suppository, Rfl: 0    Coenzyme Q10 10 MG capsule, Take 10 mg by mouth daily, Disp: , Rfl:     docusate sodium (COLACE) 100 mg capsule, Take 1 capsule (100 mg total) by mouth 2 (two) times a day, Disp: 60 capsule, Rfl: 0    ezetimibe (ZETIA) 10 mg tablet, Take 1 tablet (10 mg total) by mouth daily at bedtime, Disp: 30 tablet, Rfl: 0    hydroxychloroquine (PLAQUENIL) 200 mg tablet, Take 200 mg by mouth daily  , Disp: , Rfl:     levothyroxine 50 mcg tablet, Take 50 mcg by mouth daily  , Disp: , Rfl:     metoprolol succinate (TOPROL-XL) 100 mg 24 hr tablet, Take 1 tablet by mouth every 24 hours (Patient taking differently: Take 100 mg by mouth every 24 hours In morning ), Disp: 30 tablet, Rfl: 0    metoprolol succinate (TOPROL-XL) 50 mg 24 hr tablet, Take 1 tablet by mouth daily (Patient taking differently: Take 50 mg by mouth daily In evening ), Disp: 30 tablet, Rfl: 0    Multiple Vitamins-Minerals (CENTRUM SILVER PO), Take 1 tablet by mouth daily, Disp: , Rfl:     nitroglycerin (NITROLINGUAL) 0 4 mg/spray spray, Place 1 spray under the tongue every 5 (five) minutes as needed for chest pain, Disp: , Rfl:     oxyCODONE (ROXICODONE) 5 mg immediate release tablet, Take 1 tablet (5 mg total) by mouth every 6 (six) hours as needed for severe pain for up to 10 doses Max Daily Amount: 20 mg, Disp: 10 tablet, Rfl: 0    oxyCODONE-acetaminophen (PERCOCET) 5-325 mg per tablet, Take 1 tablet by mouth every 6 (six) hours as needed for moderate pain or severe pain for up to 20 doses Max Daily Amount: 4 tablets, Disp: 20 tablet, Rfl: 0    polyethylene glycol (MIRALAX) 17 g packet, Take 17 g by mouth daily, Disp: 14 each, Rfl: 0    raloxifene (EVISTA) 60 mg tablet, Take 60 mg by mouth daily, Disp: , Rfl:     ranolazine (RANEXA) 500 mg 12 hr tablet, Take 500 mg by mouth daily, Disp: , Rfl:     senna (SENOKOT) 8 6 mg, Take 1 tablet (8 6 mg total) by mouth daily at bedtime, Disp: 120 each, Rfl: 0    spironolactone (ALDACTONE) 25 mg tablet, Take 25 mg by mouth every other day, Disp: , Rfl:     tamsulosin (FLOMAX) 0 4 mg, Take 1 capsule (0 4 mg total) by mouth daily with dinner, Disp: 30 capsule, Rfl: 0    ticagrelor (BRILINTA) 90 MG, Take 1 tablet by mouth 2 (two) times a day, Disp: 60 tablet, Rfl: 6    torsemide (DEMADEX) 10 mg tablet, Take 10 mg by mouth daily, Disp: , Rfl:     Vitamin D, Cholecalciferol, 1000 units CAPS, Take 2,000 Units by mouth 2 (two) times a day, Disp: , Rfl:     Allergies   Allergen Reactions    Atorvastatin Hives    Boniva [Ibandronic Acid] Hives    Codeine      codeine derivatives - tolerates IV Dilaudid    Statins Hives     Objective:  Vitals:    02/14/18 1046   BP: 121/65   Pulse: 78     Right Hip Exam     Tenderness   The patient is experiencing no tenderness  Range of Motion   The patient has normal right hip ROM  Other   Erythema: absent  Scars: present  Sensation: normal  Pulse: present    Comments:  WBAT, using walker well to hold body up  Pt was gait tested and did very well  Physical Exam   Constitutional: She is oriented to person, place, and time  She appears well-developed and well-nourished  No distress  HENT:   Head: Normocephalic and atraumatic  Nose: Nose normal    Eyes: Right eye exhibits no discharge  Left eye exhibits no discharge  No scleral icterus  Neck: Normal range of motion  Neck supple  Cardiovascular: Normal rate and intact distal pulses  Pulmonary/Chest: Effort normal and breath sounds normal  No stridor  Abdominal: Soft  She exhibits no distension  Musculoskeletal: Normal range of motion  She exhibits no edema, tenderness or deformity  Neurological: She is alert and oriented to person, place, and time  Skin: Skin is warm and dry  No rash noted  She is not diaphoretic  No erythema  No pallor  Well healed right hip wound, with minimal purple glue present  Psychiatric: She has a normal mood and affect  Her behavior is normal  Judgment and thought content normal    Vitals reviewed  I have personally reviewed pertinent films in PACS and my interpretation is as follows: Well healing right hip hemiarthoplasty

## 2018-03-01 DIAGNOSIS — E87.1 HYPO-OSMOLALITY AND HYPONATREMIA (CODE): ICD-10-CM

## 2018-03-01 DIAGNOSIS — E78.00 PURE HYPERCHOLESTEROLEMIA: ICD-10-CM

## 2018-03-01 DIAGNOSIS — I12.9 HYPERTENSIVE CHRONIC KIDNEY DISEASE WITH STAGE 1 THROUGH STAGE 4 CHRONIC KIDNEY DISEASE, OR UNSPECIFIED CHRONIC KIDNEY DISEASE: ICD-10-CM

## 2018-03-01 DIAGNOSIS — D64.9 ANEMIA: ICD-10-CM

## 2018-03-01 DIAGNOSIS — N18.30 CHRONIC KIDNEY DISEASE, STAGE III (MODERATE) (HCC): ICD-10-CM

## 2018-03-01 DIAGNOSIS — E55.9 VITAMIN D DEFICIENCY: ICD-10-CM

## 2018-03-28 ENCOUNTER — OFFICE VISIT (OUTPATIENT)
Dept: OBGYN CLINIC | Facility: CLINIC | Age: 78
End: 2018-03-28

## 2018-03-28 VITALS
HEIGHT: 64 IN | WEIGHT: 151.2 LBS | DIASTOLIC BLOOD PRESSURE: 81 MMHG | HEART RATE: 65 BPM | BODY MASS INDEX: 25.81 KG/M2 | SYSTOLIC BLOOD PRESSURE: 159 MMHG

## 2018-03-28 DIAGNOSIS — Z87.81 S/P RIGHT HIP FRACTURE: Primary | ICD-10-CM

## 2018-03-28 PROCEDURE — 99024 POSTOP FOLLOW-UP VISIT: CPT | Performed by: ORTHOPAEDIC SURGERY

## 2018-03-28 RX ORDER — AMLODIPINE BESYLATE 5 MG/1
5 TABLET ORAL DAILY
COMMUNITY
End: 2018-10-26

## 2018-03-28 NOTE — PROGRESS NOTES
Denise Ricketts is status post right hip hemiarthroplasty  She is doing well and having a good result  She denies any pain  She is doing therapy at home  Physical examination demonstrates excellent balance and ability to walk  She is not demonstrate any obvious signs of a limp and barely use of the cane  Assessment right hip hemiarthroplasty    Plan Denise Ricketts is doing very well and will follow up with me as needed and can be activities as tolerated but is reminded to use her cane for balance

## 2018-04-23 DIAGNOSIS — I12.9 BENIGN HYPERTENSION WITH CHRONIC KIDNEY DISEASE, STAGE III (HCC): Primary | ICD-10-CM

## 2018-04-23 DIAGNOSIS — N18.30 BENIGN HYPERTENSION WITH CHRONIC KIDNEY DISEASE, STAGE III (HCC): Primary | ICD-10-CM

## 2018-04-24 RX ORDER — SPIRONOLACTONE 25 MG/1
25 TABLET ORAL EVERY OTHER DAY
Qty: 60 TABLET | Refills: 2 | Status: SHIPPED | OUTPATIENT
Start: 2018-04-24 | End: 2019-06-17

## 2018-05-22 DIAGNOSIS — N18.30 CKD (CHRONIC KIDNEY DISEASE), STAGE III (HCC): Primary | ICD-10-CM

## 2018-05-22 RX ORDER — TORSEMIDE 10 MG/1
10 TABLET ORAL DAILY
Qty: 30 TABLET | Refills: 5 | Status: SHIPPED | OUTPATIENT
Start: 2018-05-22 | End: 2018-07-23 | Stop reason: SDUPTHER

## 2018-06-18 ENCOUNTER — OFFICE VISIT (OUTPATIENT)
Dept: NEPHROLOGY | Facility: CLINIC | Age: 78
End: 2018-06-18
Payer: MEDICARE

## 2018-06-18 VITALS — BODY MASS INDEX: 26.95 KG/M2 | WEIGHT: 157 LBS

## 2018-06-18 DIAGNOSIS — E78.00 PURE HYPERCHOLESTEROLEMIA: Chronic | ICD-10-CM

## 2018-06-18 DIAGNOSIS — I12.9 BENIGN HYPERTENSION WITH CHRONIC KIDNEY DISEASE, STAGE III (HCC): Chronic | ICD-10-CM

## 2018-06-18 DIAGNOSIS — N18.30 BENIGN HYPERTENSION WITH CHRONIC KIDNEY DISEASE, STAGE III (HCC): Chronic | ICD-10-CM

## 2018-06-18 DIAGNOSIS — N18.30 ANEMIA OF CHRONIC RENAL FAILURE, STAGE 3 (MODERATE) (HCC): ICD-10-CM

## 2018-06-18 DIAGNOSIS — I12.9 HYPERTENSIVE CHRONIC KIDNEY DISEASE WITH STAGE 1 THROUGH STAGE 4 CHRONIC KIDNEY DISEASE, OR UNSPECIFIED CHRONIC KIDNEY DISEASE: ICD-10-CM

## 2018-06-18 DIAGNOSIS — I12.9 PARENCHYMAL RENAL HYPERTENSION, STAGE 1 THROUGH STAGE 4 OR UNSPECIFIED CHRONIC KIDNEY DISEASE: Primary | ICD-10-CM

## 2018-06-18 DIAGNOSIS — E55.9 VITAMIN D DEFICIENCY: ICD-10-CM

## 2018-06-18 DIAGNOSIS — E78.5 DYSLIPIDEMIA: ICD-10-CM

## 2018-06-18 DIAGNOSIS — D63.1 ANEMIA OF CHRONIC RENAL FAILURE, STAGE 3 (MODERATE) (HCC): ICD-10-CM

## 2018-06-18 DIAGNOSIS — I50.22 CHRONIC SYSTOLIC CONGESTIVE HEART FAILURE (HCC): Chronic | ICD-10-CM

## 2018-06-18 DIAGNOSIS — N18.30 CHRONIC KIDNEY DISEASE, STAGE III (MODERATE) (HCC): ICD-10-CM

## 2018-06-18 PROCEDURE — 99214 OFFICE O/P EST MOD 30 MIN: CPT | Performed by: INTERNAL MEDICINE

## 2018-06-18 RX ORDER — LOSARTAN POTASSIUM 25 MG/1
25 TABLET ORAL DAILY
Qty: 30 TABLET | Refills: 5 | Status: SHIPPED | OUTPATIENT
Start: 2018-06-18 | End: 2018-10-26 | Stop reason: SDUPTHER

## 2018-06-18 NOTE — LETTER
June 18, 2018     Leonor Wyatt MD  [address] [place] [address]    Patient: Jessica Avendano   YOB: 1940   Date of Visit: 6/18/2018       Dear Dr Gianfranco Sow:    Thank you for referring Andrea Vincent to me for evaluation  Below are my notes for this consultation  If you have questions, please do not hesitate to call me  I look forward to following your patient along with you  Sincerely,        Emir Villarreal MD        CC: MD Nitish Hampton MD Dario Richard, MD Evonnie Rei, MD  6/18/2018  9:07 AM  Sign at close encounter  RENAL FOLLOW UP NOTE: td    ASSESSMENT AND PLAN:  1   CKD stage 3:  Secondary to hypertensive nephrosclerosis and arteriolar nephrosclerosis  Baseline creatinine is approximately 1 5 mg/dL  Creatinine as of 3/12/18:  1 20 mg/dL  Recommendations:  -obtain labs now  2  Volume:  She remains euvolemic  3   Hypertension:  Elevated today  She is under lot of stress as her son-in-law is sick in the hospital   Her readings at home average 152/90  Also 155/76  There is some mild orthostatic changes upon standing  Recommendations:  -initiate losartan 25 mg daily  -recheck blood pressures in about 2-3 weeks, and check blood pressure machine with mono my advanced practitioners  -recheck labs 1 week later  4   Electrolytes: Will be Re checked in 1 week  5  Mineral bone disorder:  Will be Re checked in 1 week  6   Anemia:  Status post blood loss  Check iron studies at this time and repeat CBC  7   Dyslipidemia:  Intolerant to statins, and intolerant to Welchol because of constipation  Continue with diet  Other issues:  -status post CABG/MI with recent intervention followed by Dr Cook Plants  -the the hypothyroidism status post partial thyroidectomy for benign lesion  -arthritic symptoms followed by Rheumatology  -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    PATIENT INSTRUCTIONS:    Patient Instructions   1    New medication:  -Please start losartan/Cozaar 25 mg a day in the morning  -take torsemide in the morning  2  Please wait 1 full week after making the above medication changes, and please go for morning lab work does not have to be fasting  3  Please wait 2 weeks after making the medication changes, and take 1 week a blood pressure readings morning and evening, sitting and standing  Please bring in those readings along with your blood pressure machine to see 1 of my advanced practitioner's in about 3-4 weeks  4   Follow-up in 4 months:  -please bring in 1 week a blood pressure readings morning and evening, sitting and standing  -please go for fasting lab work prior to your appointment  5  General instructions:  -avoid salt  -avoid medications such as Motrin, Naprosyn, ibuprofen, Aleve or Advil or Celebrex as they can affect your kidney function; you can use Tylenol as needed for pain or fevers if you have no liver problems  -avoid medications such as Sudafed or other medications with decongestants as they can raise your blood pressure  -try to exercise at least 30 minutes at least 3 days a week with an ultimate goal of 5 days a week            Subjective:   Status post the fall resulting right hip fracture status post the right hip hemiarthroplasty 1/5  Patient then readmitted because of pain  Complicated by acute urinary retention secondary constipation and pain meds  Requiring a Heller catheter  Follows with Urology  Dr Qiana Avendano  Patient also had anemia for which she received 2 units of packed red blood cells  She had lost weight but from a lot of stress over the last year status post MI, hip surgery and loss of her   She is feeling much better with good appetite and energy  Walking well now  No fevers chills cough or colds    No urinary symptoms including foamy urine or blood in the urine  No gastrointestinal symptoms  No cardiovascular symptoms including swelling of the legs  No headaches, dizziness or lightheadedness  Blood pressure medications:  -amlodipine 5 mg in the morning and 2 5 mg in the evening  -Toprol  mg the morning and 50 mg in the evening  -torsemide 10 mg daily  -spironolactone 25 mg 3 times a week  ROS:  See HPI, otherwise review of systems as completely reviewed with the patient are negative    Past Medical History:   Diagnosis Date    Anemia     Arthritis     Bladder calculi     CAD (coronary artery disease)     Status post CABG and PTCA to OM1    CHF (congestive heart failure) (HCC)     CKD (chronic kidney disease) stage 3, GFR 30-59 ml/min     CKD (chronic kidney disease), stage III     Hypertension     Hypothyroidism     Uterine prolapse      Past Surgical History:   Procedure Laterality Date    BACK SURGERY      BLADDER SURGERY      CORONARY ANGIOPLASTY WITH STENT PLACEMENT      CORONARY ARTERY BYPASS GRAFT      OOPHORECTOMY Left     [place] PARTIAL HIP REPLACEMENT Right 2018    Procedure: HEMIARTHROPLASTY HIP (BIPOLAR) (RIGHT); Surgeon: Betsey Block MD;  Location: [address] [place] [address];  Service: Orthopedics      reports that she has never smoked  She has never used smokeless tobacco  She reports that she does not drink alcohol or use drugs  I COMPLETELY REVIEWED THE PAST MEDICAL HISTORY/PAST SURGICAL HISTORY/SOCIAL HISTORY/FAMILY HISTORY/AND MEDICATIONS  AND UPDATED ALL    Objective:     Vitals:   BP on left sittin/80 with a heart rate of 64 regular  BP standing on left:    Weight (last 2 days)     Date/Time   Weight    18 0841  71 2 (157)            Body mass index is 26 95 kg/m²      Physical Exam: General:  No acute distress  Skin:  No acute rash  Eyes:  No scleral icterus, noninjected  ENT:  Moist mucous membranes  Neck:  Supple, no jugular venous distention  Back   No CVAT  Chest:  Clear to auscultation and percussion  CVS:  Regular rate and rhythm without a rub, murmurs or gallops  Abdomen:  Soft and nontender with normal bowel sounds  Extremities:  No cyanosis and no edema  Neuro:  Grossly intact  Psych:  Alert, oriented x3 and appropriate      Medications:    Current Outpatient Prescriptions:     acetaminophen (TYLENOL) 325 mg tablet, Take 2 tablets (650 mg total) by mouth every 6 (six) hours as needed for mild pain, headaches or fever, Disp: 30 tablet, Rfl: 0    amLODIPine (NORVASC) 2 5 mg tablet, Take 1 tablet by mouth daily, Disp: 30 tablet, Rfl: 0    amLODIPine (NORVASC) 5 mg tablet, Take 5 mg by mouth daily, Disp: , Rfl:     Ascorbic Acid (VITAMIN C) 1000 MG tablet, Take 1,000 mg by mouth daily, Disp: , Rfl:     aspirin (ECOTRIN LOW STRENGTH) 81 mg EC tablet, Take 81 mg by mouth daily Indications: Heart Attack  , Disp: , Rfl:     Coenzyme Q10 10 MG capsule, Take 10 mg by mouth daily, Disp: , Rfl:     ezetimibe (ZETIA) 10 mg tablet, Take 1 tablet (10 mg total) by mouth daily at bedtime, Disp: 30 tablet, Rfl: 0    hydroxychloroquine (PLAQUENIL) 200 mg tablet, Take 200 mg by mouth daily  , Disp: , Rfl:     levothyroxine 50 mcg tablet, Take 50 mcg by mouth daily  , Disp: , Rfl:     metoprolol succinate (TOPROL-XL) 100 mg 24 hr tablet, Take 1 tablet by mouth every 24 hours (Patient taking differently: Take 100 mg by mouth every 24 hours In morning ), Disp: 30 tablet, Rfl: 0    metoprolol succinate (TOPROL-XL) 50 mg 24 hr tablet, Take 1 tablet by mouth daily (Patient taking differently: Take 100 mg by mouth 2 (two) times a day In evening ), Disp: 30 tablet, Rfl: 0    Multiple Vitamins-Minerals (CENTRUM SILVER PO), Take 1 tablet by mouth daily, Disp: , Rfl:     nitroglycerin (NITROLINGUAL) 0 4 mg/spray spray, Place 1 spray under the tongue every 5 (five) minutes as needed for chest pain, Disp: , Rfl:     ranolazine (RANEXA) 500 mg 12 hr tablet, Take 500 mg by mouth daily, Disp: , Rfl:     spironolactone (ALDACTONE) 25 mg tablet, Take 1 tablet (25 mg total) by mouth every other day (Patient taking differently: Take 25 mg by mouth 3 (three) times a week  ), Disp: 60 tablet, Rfl: 2    ticagrelor (BRILINTA) 90 MG, Take 1 tablet by mouth 2 (two) times a day, Disp: 60 tablet, Rfl: 6    torsemide (DEMADEX) 10 mg tablet, Take 1 tablet (10 mg total) by mouth daily, Disp: 30 tablet, Rfl: 5    Vitamin D, Cholecalciferol, 1000 units CAPS, Take 2,000 Units by mouth 2 (two) times a day, Disp: , Rfl:     bisacodyl (DULCOLAX) 10 mg suppository, Insert 1 suppository (10 mg total) into the rectum daily as needed for constipation, Disp: 12 suppository, Rfl: 0    docusate sodium (COLACE) 100 mg capsule, Take 1 capsule (100 mg total) by mouth 2 (two) times a day, Disp: 60 capsule, Rfl: 0    losartan (COZAAR) 25 mg tablet, Take 1 tablet (25 mg total) by mouth daily, Disp: 30 tablet, Rfl: 5    oxyCODONE (ROXICODONE) 5 mg immediate release tablet, Take 1 tablet (5 mg total) by mouth every 6 (six) hours as needed for severe pain for up to 10 doses Max Daily Amount: 20 mg, Disp: 10 tablet, Rfl: 0    oxyCODONE-acetaminophen (PERCOCET) 5-325 mg per tablet, Take 1 tablet by mouth every 6 (six) hours as needed for moderate pain or severe pain for up to 20 doses Max Daily Amount: 4 tablets, Disp: 20 tablet, Rfl: 0    polyethylene glycol (MIRALAX) 17 g packet, Take 17 g by mouth daily, Disp: 14 each, Rfl: 0    raloxifene (EVISTA) 60 mg tablet, Take 60 mg by mouth daily, Disp: , Rfl:     senna (SENOKOT) 8 6 mg, Take 1 tablet (8 6 mg total) by mouth daily at bedtime, Disp: 120 each, Rfl: 0    tamsulosin (FLOMAX) 0 4 mg, Take 1 capsule (0 4 mg total) by mouth daily with dinner, Disp: 30 capsule, Rfl: 0    torsemide (DEMADEX) 10 mg tablet, Take 10 mg by mouth daily, Disp: , Rfl:     Lab, Imaging and other studies: I have personally reviewed pertinent labs    Laboratory Results:  Results for orders placed or performed during the hospital encounter of 01/30/18   MRSA culture   Result Value Ref Range    MRSA Culture Only       No Methicillin Resistant Staphlyococcus aureus (MRSA) isolated   Urine culture   Result Value Ref Range    Urine Culture No Growth <1000 cfu/mL    CBC and differential   Result Value Ref Range    WBC 10 70 4 80 - 10 80 Thousand/uL    RBC 4 57 4 20 - 5 40 Million/uL    Hemoglobin 12 2 12 0 - 16 0 g/dL    Hematocrit 37 8 37 0 - 47 0 %    MCV 83 82 - 98 fL    MCH 26 8 (L) 27 0 - 31 0 pg    MCHC 32 4 31 4 - 37 4 g/dL    RDW 14 8 11 6 - 15 1 %    MPV 8 6 (L) 8 9 - 12 7 fL    Platelets 096 792 - 684 Thousands/uL    nRBC 0 /100 WBCs    Neutrophils Relative 77 (H) 43 - 75 %    Lymphocytes Relative 12 (L) 14 - 44 %    Monocytes Relative 9 4 - 12 %    Eosinophils Relative 1 0 - 6 %    Basophils Relative 1 0 - 1 %    Neutrophils Absolute 8 30 (H) 1 85 - 7 62 Thousands/µL    Lymphocytes Absolute 1 30 0 60 - 4 47 Thousands/µL    Monocytes Absolute 1 00 0 17 - 1 22 Thousand/µL    Eosinophils Absolute 0 10 0 00 - 0 61 Thousand/µL    Basophils Absolute 0 10 0 00 - 0 10 Thousands/µL   Comprehensive metabolic panel   Result Value Ref Range    Sodium 140 136 - 145 mmol/L    Potassium 3 4 (L) 3 5 - 5 3 mmol/L    Chloride 102 100 - 108 mmol/L    CO2 24 21 - 32 mmol/L    Anion Gap 14 (H) 4 - 13 mmol/L    BUN 31 (H) 5 - 25 mg/dL    Creatinine 1 69 (H) 0 60 - 1 30 mg/dL    Glucose 127 65 - 140 mg/dL    Calcium 9 2 8 3 - 10 1 mg/dL    AST 24 5 - 45 U/L    ALT 27 12 - 78 U/L    Alkaline Phosphatase 68 46 - 116 U/L    Total Protein 7 2 6 4 - 8 2 g/dL    Albumin 3 7 3 5 - 5 0 g/dL    Total Bilirubin 0 60 0 20 - 1 00 mg/dL    eGFR 29 ml/min/1 73sq m   Basic metabolic panel   Result Value Ref Range    Sodium 144 136 - 145 mmol/L    Potassium 3 6 3 5 - 5 3 mmol/L    Chloride 109 (H) 100 - 108 mmol/L    CO2 28 21 - 32 mmol/L    Anion Gap 7 4 - 13 mmol/L    BUN 23 5 - 25 mg/dL    Creatinine 1 21 0 60 - 1 30 mg/dL    Glucose 103 65 - 140 mg/dL    Calcium 8 3 8 3 - 10 1 mg/dL    eGFR 43 ml/min/1 73sq m   Magnesium   Result Value Ref Range    Magnesium 2 2 1 6 - 2 6 mg/dL   Phosphorus   Result Value Ref Range Phosphorus 3 4 2 3 - 4 1 mg/dL   CBC (With Platelets)   Result Value Ref Range    WBC 7 20 4 80 - 10 80 Thousand/uL    RBC 3 82 (L) 4 20 - 5 40 Million/uL    Hemoglobin 10 2 (L) 12 0 - 16 0 g/dL    Hematocrit 31 6 (L) 37 0 - 47 0 %    MCV 83 82 - 98 fL    MCH 26 6 (L) 27 0 - 31 0 pg    MCHC 32 3 31 4 - 37 4 g/dL    RDW 15 0 11 6 - 15 1 %    Platelets 836 059 - 547 Thousands/uL    MPV 8 1 (L) 8 9 - 12 7 fL   Protime-INR   Result Value Ref Range    Protime 10 0 9 4 - 11 7 seconds    INR 0 95 0 86 - 1 16   APTT   Result Value Ref Range    PTT 26 24 - 33 seconds   CBC and differential   Result Value Ref Range    WBC 9 40 4 80 - 10 80 Thousand/uL    RBC 4 05 (L) 4 20 - 5 40 Million/uL    Hemoglobin 10 9 (L) 12 0 - 16 0 g/dL    Hematocrit 33 5 (L) 37 0 - 47 0 %    MCV 83 82 - 98 fL    MCH 27 0 27 0 - 31 0 pg    MCHC 32 5 31 4 - 37 4 g/dL    RDW 14 8 11 6 - 15 1 %    MPV 8 1 (L) 8 9 - 12 7 fL    Platelets 595 735 - 778 Thousands/uL    nRBC 0 /100 WBCs    Neutrophils Relative 75 43 - 75 %    Lymphocytes Relative 13 (L) 14 - 44 %    Monocytes Relative 11 4 - 12 %    Eosinophils Relative 1 0 - 6 %    Basophils Relative 0 0 - 1 %    Neutrophils Absolute 7 00 1 85 - 7 62 Thousands/µL    Lymphocytes Absolute 1 20 0 60 - 4 47 Thousands/µL    Monocytes Absolute 1 00 0 17 - 1 22 Thousand/µL    Eosinophils Absolute 0 10 0 00 - 0 61 Thousand/µL    Basophils Absolute 0 00 0 00 - 0 10 Thousands/µL   Basic metabolic panel   Result Value Ref Range    Sodium 140 136 - 145 mmol/L    Potassium 4 1 3 5 - 5 3 mmol/L    Chloride 106 100 - 108 mmol/L    CO2 26 21 - 32 mmol/L    Anion Gap 8 4 - 13 mmol/L    BUN 23 5 - 25 mg/dL    Creatinine 1 15 0 60 - 1 30 mg/dL    Glucose 107 65 - 140 mg/dL    Calcium 8 5 8 3 - 10 1 mg/dL    eGFR 46 ml/min/1 73sq m   Magnesium   Result Value Ref Range    Magnesium 2 2 1 6 - 2 6 mg/dL   Phosphorus   Result Value Ref Range    Phosphorus 3 6 2 3 - 4 1 mg/dL   Hemoglobin and hematocrit, blood   Result Value Ref Range    Hemoglobin 11 8 (L) 12 0 - 16 0 g/dL    Hematocrit 36 7 (L) 37 0 - 47 0 %   Blood gas, arterial   Result Value Ref Range    pH, Arterial 7 239 (LL) 7 350 - 7 450    PH ART TC 7 235 (LL) 7 350 - 7 450    pCO2, Arterial 47 3 (H) 36 0 - 44 0 mm Hg    PCO2 (TC) Arterial 47 9 (H) 36 0 - 44 0 mm Hg    pO2, Arterial 82 4 75 0 - 129 0 mm Hg    PO2 (TC) Arterial 84 0 75 0 - 129 0 mm Hg    HCO3, Arterial 19 8 (L) 22 0 - 28 0 mmol/L    Base Excess, Arterial -7 5 mmol/L    O2 Content, Arterial 16 4 16 0 - 23 0 mL/dL    O2 HGB,Arterial  92 8 (L) 94 0 - 97 0 %    SOURCE Line, Arterial     Temperature 99 2 Degrees Fehrenheit   CBC and differential   Result Value Ref Range    WBC 13 10 (H) 4 80 - 10 80 Thousand/uL    RBC 3 85 (L) 4 20 - 5 40 Million/uL    Hemoglobin 10 5 (L) 12 0 - 16 0 g/dL    Hematocrit 32 2 (L) 37 0 - 47 0 %    MCV 84 82 - 98 fL    MCH 27 2 27 0 - 31 0 pg    MCHC 32 6 31 4 - 37 4 g/dL    RDW 14 4 11 6 - 15 1 %    MPV 7 5 (L) 8 9 - 12 7 fL    Platelets 693 178 - 699 Thousands/uL    nRBC 0 /100 WBCs   Magnesium   Result Value Ref Range    Magnesium 1 8 1 6 - 2 6 mg/dL   Basic metabolic panel   Result Value Ref Range    Sodium 141 136 - 145 mmol/L    Potassium 3 9 3 5 - 5 3 mmol/L    Chloride 111 (H) 100 - 108 mmol/L    CO2 21 21 - 32 mmol/L    Anion Gap 9 4 - 13 mmol/L    BUN 19 5 - 25 mg/dL    Creatinine 0 77 0 60 - 1 30 mg/dL    Glucose 127 65 - 140 mg/dL    Calcium 7 4 (L) 8 3 - 10 1 mg/dL    eGFR 74 ml/min/1 73sq m   Basic metabolic panel   Result Value Ref Range    Sodium 133 (L) 136 - 145 mmol/L    Potassium 3 7 3 5 - 5 3 mmol/L    Chloride 101 100 - 108 mmol/L    CO2 24 21 - 32 mmol/L    Anion Gap 8 4 - 13 mmol/L    BUN 27 (H) 5 - 25 mg/dL    Creatinine 1 19 0 60 - 1 30 mg/dL    Glucose 117 65 - 140 mg/dL    Calcium 7 6 (L) 8 3 - 10 1 mg/dL    eGFR 44 ml/min/1 73sq m   CBC and differential   Result Value Ref Range    WBC 13 00 (H) 4 80 - 10 80 Thousand/uL    RBC 3 27 (L) 4 20 - 5 40 Million/uL Hemoglobin 8 9 (L) 12 0 - 16 0 g/dL    Hematocrit 27 0 (L) 37 0 - 47 0 %    MCV 82 82 - 98 fL    MCH 27 3 27 0 - 31 0 pg    MCHC 33 1 31 4 - 37 4 g/dL    RDW 13 7 11 6 - 15 1 %    MPV 8 1 (L) 8 9 - 12 7 fL    Platelets 380 233 - 728 Thousands/uL    Neutrophils Relative 83 (H) 43 - 75 %    Lymphocytes Relative 5 (L) 14 - 44 %    Monocytes Relative 10 4 - 12 %    Eosinophils Relative 1 0 - 6 %    Basophils Relative 1 0 - 1 %    Neutrophils Absolute 10 80 (H) 1 85 - 7 62 Thousands/µL    Lymphocytes Absolute 0 70 0 60 - 4 47 Thousands/µL    Monocytes Absolute 1 30 (H) 0 17 - 1 22 Thousand/µL    Eosinophils Absolute 0 10 0 00 - 0 61 Thousand/µL    Basophils Absolute 0 10 0 00 - 0 10 Thousands/µL   Magnesium   Result Value Ref Range    Magnesium 2 5 1 6 - 2 6 mg/dL   Phosphorus   Result Value Ref Range    Phosphorus 2 2 (L) 2 3 - 4 1 mg/dL   UA w Reflex to Microscopic w Reflex to Culture   Result Value Ref Range    Color, UA Yellow     Clarity, UA Clear     Specific Gravity, UA 1 020 1 000 - 1 030    pH, UA 5 5 5 0 - 9 0    Leukocytes, UA Small (A) Negative    Nitrite, UA Negative Negative    Protein, UA Negative Negative mg/dl    Glucose, UA Negative Negative mg/dl    Ketones, UA Negative Negative mg/dl    Urobilinogen, UA 0 2 0 2, 1 0 E U /dl E U /dl    Bilirubin, UA Negative Negative    Blood, UA Moderate (A) Negative   Urine Microscopic   Result Value Ref Range    RBC, UA 0-1 (A) None Seen, 0-5 /hpf    WBC, UA 4-10 (A) None Seen, 0-5, 5-55, 5-65 /hpf    Epithelial Cells Occasional None Seen, Occasional /hpf    Bacteria, UA Occasional None Seen, Occasional /hpf    Hyaline Casts, UA 4-10 (A) (none) /lpf    Uric Acid [place], UA Occasional /hpf   CBC   Result Value Ref Range    WBC 11 70 (H) 4 80 - 10 80 Thousand/uL    RBC 3 13 (L) 4 20 - 5 40 Million/uL    Hemoglobin 8 6 (L) 12 0 - 16 0 g/dL    Hematocrit 25 9 (L) 37 0 - 47 0 %    MCV 83 82 - 98 fL    MCH 27 3 27 0 - 31 0 pg    MCHC 33 0 31 4 - 37 4 g/dL    RDW 14 6 11 6 - 15 1 %    Platelets 371 847 - 345 Thousands/uL    MPV 8 5 (L) 8 9 - 12 7 fL   Basic metabolic panel   Result Value Ref Range    Sodium 133 (L) 136 - 145 mmol/L    Potassium 3 5 3 5 - 5 3 mmol/L    Chloride 100 100 - 108 mmol/L    CO2 24 21 - 32 mmol/L    Anion Gap 9 4 - 13 mmol/L    BUN 24 5 - 25 mg/dL    Creatinine 1 10 0 60 - 1 30 mg/dL    Glucose 121 65 - 140 mg/dL    Calcium 7 9 (L) 8 3 - 10 1 mg/dL    eGFR 48 ml/min/1 73sq m   Phosphorus   Result Value Ref Range    Phosphorus 2 5 2 3 - 4 1 mg/dL   CBC   Result Value Ref Range    WBC 11 40 (H) 4 80 - 10 80 Thousand/uL    RBC 3 13 (L) 4 20 - 5 40 Million/uL    Hemoglobin 8 5 (L) 12 0 - 16 0 g/dL    Hematocrit 25 8 (L) 37 0 - 47 0 %    MCV 83 82 - 98 fL    MCH 27 2 27 0 - 31 0 pg    MCHC 33 0 31 4 - 37 4 g/dL    RDW 14 4 11 6 - 15 1 %    Platelets 289 939 - 506 Thousands/uL    MPV 7 9 (L) 8 9 - 12 7 fL   Basic metabolic panel   Result Value Ref Range    Sodium 134 (L) 136 - 145 mmol/L    Potassium 3 5 3 5 - 5 3 mmol/L    Chloride 101 100 - 108 mmol/L    CO2 26 21 - 32 mmol/L    Anion Gap 7 4 - 13 mmol/L    BUN 19 5 - 25 mg/dL    Creatinine 0 91 0 60 - 1 30 mg/dL    Glucose 124 65 - 140 mg/dL    Calcium 8 1 (L) 8 3 - 10 1 mg/dL    eGFR 61 ml/min/1 73sq m   ECG 12 lead   Result Value Ref Range    Ventricular Rate 76 BPM    Atrial Rate 76 BPM    MO Interval 206 ms    QRSD Interval 102 ms    QT Interval 426 ms    QTC Interval 479 ms    P Axis 52 degrees    QRS Axis 68 degrees    T Wave Axis 36 degrees   Type and screen   Result Value Ref Range    ABO Grouping B     Rh Factor Positive     Antibody Screen Negative     Specimen Expiration Date [id number]    ABO/Rh   Result Value Ref Range    ABO Grouping B     Rh Factor Positive    Prepare RBC:Has consent been obtained? Yes; Where is the Surgery Scheduled?  [facility], 1 Units   Result Value Ref Range    Unit Product Code V5853I42     Unit Number [id number]     Unit ABO B     Unit RH NEG     Crossmatch Compatible Unit Dispense Status Presumed Trans    Prepare RBC:Has consent been obtained? Yes; Date of Surgery: 2/1/2018; Where is the Surgery Scheduled? [facility], 1 Units   Result Value Ref Range    Unit Product Code J0326E67     Unit Number [id number]     Unit ABO B     Unit RH NEG     Crossmatch Compatible     Unit Dispense Status Presumed Trans    Manual Differential (Non Wam)   Result Value Ref Range    Segmented % 80 %    Bands % 12 (H) 0 - 8 %    Lymphocytes % 3 %    Monocytes % 5 4 - 12 %    Neutrophils Absolute 12 05 (H) 1 81 - 6 82 Thousand/uL    Lymphocytes Absolute 0 39 (L) 0 60 - 4 47 Thousand/uL    Monocytes Absolute 0 66 0 00 - 1 22 Thousand/uL    Total Counted 100     Platelet Estimate Adequate Adequate   Smear Review(Phlebs Do Not Order)   Result Value Ref Range    Platelet Estimate Adequate Adequate    Large Platelet Present                Radiology review:   chest X-ray    Ultrasound      Portions of the record may have been created with voice recognition software   Occasional wrong word or "sound a like" substitutions may have occurred due to the inherent limitations of voice recognition software   Read the chart carefully and recognize, using context, where substitutions have occurred

## 2018-06-18 NOTE — PATIENT INSTRUCTIONS
1   New medication:  -Please start losartan/Cozaar 25 mg a day in the morning  -take torsemide in the morning  2  Please wait 1 full week after making the above medication changes, and please go for morning lab work does not have to be fasting  3  Please wait 2 weeks after making the medication changes, and take 1 week a blood pressure readings morning and evening, sitting and standing  Please bring in those readings along with your blood pressure machine to see 1 of my advanced practitioner's in about 3-4 weeks  4   Follow-up in 4 months:  -please bring in 1 week a blood pressure readings morning and evening, sitting and standing  -please go for fasting lab work prior to your appointment  5  General instructions:  -avoid salt  -avoid medications such as Motrin, Naprosyn, ibuprofen, Aleve or Advil or Celebrex as they can affect your kidney function; you can use Tylenol as needed for pain or fevers if you have no liver problems  -avoid medications such as Sudafed or other medications with decongestants as they can raise your blood pressure  -try to exercise at least 30 minutes at least 3 days a week with an ultimate goal of 5 days a week

## 2018-06-18 NOTE — PROGRESS NOTES
RENAL FOLLOW UP NOTE: td    ASSESSMENT AND PLAN:  1   CKD stage 3:  Secondary to hypertensive nephrosclerosis and arteriolar nephrosclerosis  Baseline creatinine is approximately 1 5 mg/dL  Creatinine as of 3/12/18:  1 20 mg/dL  Recommendations:  -obtain labs now  2  Volume:  She remains euvolemic  3   Hypertension:  Elevated today  She is under lot of stress as her son-in-law is sick in the hospital   Her readings at home average 152/90  Also 155/76  There is some mild orthostatic changes upon standing  Recommendations:  -initiate losartan 25 mg daily  -recheck blood pressures in about 2-3 weeks, and check blood pressure machine with mono my advanced practitioners  -recheck labs 1 week later  4   Electrolytes: Will be Re checked in 1 week  5  Mineral bone disorder:  Will be Re checked in 1 week  6   Anemia:  Status post blood loss  Check iron studies at this time and repeat CBC  7   Dyslipidemia:  Intolerant to statins, and intolerant to Welchol because of constipation  Continue with diet  Other issues:  -status post CABG/MI with recent intervention followed by Dr Merlinda Ports  -the the hypothyroidism status post partial thyroidectomy for benign lesion  -arthritic symptoms followed by Rheumatology  -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    PATIENT INSTRUCTIONS:    Patient Instructions   1  New medication:  -Please start losartan/Cozaar 25 mg a day in the morning  -take torsemide in the morning  2  Please wait 1 full week after making the above medication changes, and please go for morning lab work does not have to be fasting  3  Please wait 2 weeks after making the medication changes, and take 1 week a blood pressure readings morning and evening, sitting and standing  Please bring in those readings along with your blood pressure machine to see 1 of my advanced practitioner's in about 3-4 weeks    4   Follow-up in 4 months:  -please bring in 1 week a blood pressure readings morning and evening, sitting and standing  -please go for fasting lab work prior to your appointment  5  General instructions:  -avoid salt  -avoid medications such as Motrin, Naprosyn, ibuprofen, Aleve or Advil or Celebrex as they can affect your kidney function; you can use Tylenol as needed for pain or fevers if you have no liver problems  -avoid medications such as Sudafed or other medications with decongestants as they can raise your blood pressure  -try to exercise at least 30 minutes at least 3 days a week with an ultimate goal of 5 days a week            Subjective:   Status post the fall resulting right hip fracture status post the right hip hemiarthroplasty 1/5  Patient then readmitted because of pain  Complicated by acute urinary retention secondary constipation and pain meds  Requiring a Heller catheter  Follows with Urology  Dr Jamaal Combs  Patient also had anemia for which she received 2 units of packed red blood cells  She had lost weight but from a lot of stress over the last year status post MI, hip surgery and loss of her   She is feeling much better with good appetite and energy  Walking well now  No fevers chills cough or colds    No urinary symptoms including foamy urine or blood in the urine  No gastrointestinal symptoms  No cardiovascular symptoms including swelling of the legs  No headaches, dizziness or lightheadedness  Blood pressure medications:  -amlodipine 5 mg in the morning and 2 5 mg in the evening  -Toprol  mg the morning and 50 mg in the evening  -torsemide 10 mg daily  -spironolactone 25 mg 3 times a week  ROS:  See HPI, otherwise review of systems as completely reviewed with the patient are negative    Past Medical History:   Diagnosis Date    Anemia     Arthritis     Bladder calculi     CAD (coronary artery disease)     Status post CABG and PTCA to OM1    CHF (congestive heart failure) (HCC)     CKD (chronic kidney disease) stage 3, GFR 30-59 ml/min     CKD (chronic kidney disease), stage III     Hypertension     Hypothyroidism     Uterine prolapse      Past Surgical History:   Procedure Laterality Date    BACK SURGERY      BLADDER SURGERY      CORONARY ANGIOPLASTY WITH STENT PLACEMENT      CORONARY ARTERY BYPASS GRAFT      OOPHORECTOMY Left     FL PARTIAL HIP REPLACEMENT Right 2018    Procedure: HEMIARTHROPLASTY HIP (BIPOLAR) (RIGHT); Surgeon: Andre Valentino MD;  Location: [address] [place] [address];  Service: Orthopedics      reports that she has never smoked  She has never used smokeless tobacco  She reports that she does not drink alcohol or use drugs  I COMPLETELY REVIEWED THE PAST MEDICAL HISTORY/PAST SURGICAL HISTORY/SOCIAL HISTORY/FAMILY HISTORY/AND MEDICATIONS  AND UPDATED ALL    Objective:     Vitals:   BP on left sittin/80 with a heart rate of 64 regular  BP standing on left:    Weight (last 2 days)     Date/Time   Weight    18 0841  71 2 (157)            Body mass index is 26 95 kg/m²      Physical Exam: General:  No acute distress  Skin:  No acute rash  Eyes:  No scleral icterus, noninjected  ENT:  Moist mucous membranes  Neck:  Supple, no jugular venous distention  Back   No CVAT  Chest:  Clear to auscultation and percussion  CVS:  Regular rate and rhythm without a rub, murmurs or gallops  Abdomen:  Soft and nontender with normal bowel sounds  Extremities:  No cyanosis and no edema  Neuro:  Grossly intact  Psych:  Alert, oriented x3 and appropriate      Medications:    Current Outpatient Prescriptions:     acetaminophen (TYLENOL) 325 mg tablet, Take 2 tablets (650 mg total) by mouth every 6 (six) hours as needed for mild pain, headaches or fever, Disp: 30 tablet, Rfl: 0    amLODIPine (NORVASC) 2 5 mg tablet, Take 1 tablet by mouth daily, Disp: 30 tablet, Rfl: 0    amLODIPine (NORVASC) 5 mg tablet, Take 5 mg by mouth daily, Disp: , Rfl:     Ascorbic Acid (VITAMIN C) 1000 MG tablet, Take 1,000 mg by mouth daily, Disp: , Rfl:     aspirin (ECOTRIN LOW STRENGTH) 81 mg EC tablet, Take 81 mg by mouth daily Indications: Heart Attack  , Disp: , Rfl:     Coenzyme Q10 10 MG capsule, Take 10 mg by mouth daily, Disp: , Rfl:     ezetimibe (ZETIA) 10 mg tablet, Take 1 tablet (10 mg total) by mouth daily at bedtime, Disp: 30 tablet, Rfl: 0    hydroxychloroquine (PLAQUENIL) 200 mg tablet, Take 200 mg by mouth daily  , Disp: , Rfl:     levothyroxine 50 mcg tablet, Take 50 mcg by mouth daily  , Disp: , Rfl:     metoprolol succinate (TOPROL-XL) 100 mg 24 hr tablet, Take 1 tablet by mouth every 24 hours (Patient taking differently: Take 100 mg by mouth every 24 hours In morning ), Disp: 30 tablet, Rfl: 0    metoprolol succinate (TOPROL-XL) 50 mg 24 hr tablet, Take 1 tablet by mouth daily (Patient taking differently: Take 100 mg by mouth 2 (two) times a day In evening ), Disp: 30 tablet, Rfl: 0    Multiple Vitamins-Minerals (CENTRUM SILVER PO), Take 1 tablet by mouth daily, Disp: , Rfl:     nitroglycerin (NITROLINGUAL) 0 4 mg/spray spray, Place 1 spray under the tongue every 5 (five) minutes as needed for chest pain, Disp: , Rfl:     ranolazine (RANEXA) 500 mg 12 hr tablet, Take 500 mg by mouth daily, Disp: , Rfl:     spironolactone (ALDACTONE) 25 mg tablet, Take 1 tablet (25 mg total) by mouth every other day (Patient taking differently: Take 25 mg by mouth 3 (three) times a week  ), Disp: 60 tablet, Rfl: 2    ticagrelor (BRILINTA) 90 MG, Take 1 tablet by mouth 2 (two) times a day, Disp: 60 tablet, Rfl: 6    torsemide (DEMADEX) 10 mg tablet, Take 1 tablet (10 mg total) by mouth daily, Disp: 30 tablet, Rfl: 5    Vitamin D, Cholecalciferol, 1000 units CAPS, Take 2,000 Units by mouth 2 (two) times a day, Disp: , Rfl:     bisacodyl (DULCOLAX) 10 mg suppository, Insert 1 suppository (10 mg total) into the rectum daily as needed for constipation, Disp: 12 suppository, Rfl: 0    docusate sodium (COLACE) 100 mg capsule, Take 1 capsule (100 mg total) by mouth 2 (two) times a day, Disp: 60 capsule, Rfl: 0    losartan (COZAAR) 25 mg tablet, Take 1 tablet (25 mg total) by mouth daily, Disp: 30 tablet, Rfl: 5    oxyCODONE (ROXICODONE) 5 mg immediate release tablet, Take 1 tablet (5 mg total) by mouth every 6 (six) hours as needed for severe pain for up to 10 doses Max Daily Amount: 20 mg, Disp: 10 tablet, Rfl: 0    oxyCODONE-acetaminophen (PERCOCET) 5-325 mg per tablet, Take 1 tablet by mouth every 6 (six) hours as needed for moderate pain or severe pain for up to 20 doses Max Daily Amount: 4 tablets, Disp: 20 tablet, Rfl: 0    polyethylene glycol (MIRALAX) 17 g packet, Take 17 g by mouth daily, Disp: 14 each, Rfl: 0    raloxifene (EVISTA) 60 mg tablet, Take 60 mg by mouth daily, Disp: , Rfl:     senna (SENOKOT) 8 6 mg, Take 1 tablet (8 6 mg total) by mouth daily at bedtime, Disp: 120 each, Rfl: 0    tamsulosin (FLOMAX) 0 4 mg, Take 1 capsule (0 4 mg total) by mouth daily with dinner, Disp: 30 capsule, Rfl: 0    torsemide (DEMADEX) 10 mg tablet, Take 10 mg by mouth daily, Disp: , Rfl:     Lab, Imaging and other studies: I have personally reviewed pertinent labs    Laboratory Results:  Results for orders placed or performed during the hospital encounter of 01/30/18   MRSA culture   Result Value Ref Range    MRSA Culture Only       No Methicillin Resistant Staphlyococcus aureus (MRSA) isolated   Urine culture   Result Value Ref Range    Urine Culture No Growth <1000 cfu/mL    CBC and differential   Result Value Ref Range    WBC 10 70 4 80 - 10 80 Thousand/uL    RBC 4 57 4 20 - 5 40 Million/uL    Hemoglobin 12 2 12 0 - 16 0 g/dL    Hematocrit 37 8 37 0 - 47 0 %    MCV 83 82 - 98 fL    MCH 26 8 (L) 27 0 - 31 0 pg    MCHC 32 4 31 4 - 37 4 g/dL    RDW 14 8 11 6 - 15 1 %    MPV 8 6 (L) 8 9 - 12 7 fL    Platelets 163 553 - 771 Thousands/uL    nRBC 0 /100 WBCs    Neutrophils Relative 77 (H) 43 - 75 %    Lymphocytes Relative 12 (L) 14 - 44 %    Monocytes Relative 9 4 - 12 %    Eosinophils Relative 1 0 - 6 % Basophils Relative 1 0 - 1 %    Neutrophils Absolute 8 30 (H) 1 85 - 7 62 Thousands/µL    Lymphocytes Absolute 1 30 0 60 - 4 47 Thousands/µL    Monocytes Absolute 1 00 0 17 - 1 22 Thousand/µL    Eosinophils Absolute 0 10 0 00 - 0 61 Thousand/µL    Basophils Absolute 0 10 0 00 - 0 10 Thousands/µL   Comprehensive metabolic panel   Result Value Ref Range    Sodium 140 136 - 145 mmol/L    Potassium 3 4 (L) 3 5 - 5 3 mmol/L    Chloride 102 100 - 108 mmol/L    CO2 24 21 - 32 mmol/L    Anion Gap 14 (H) 4 - 13 mmol/L    BUN 31 (H) 5 - 25 mg/dL    Creatinine 1 69 (H) 0 60 - 1 30 mg/dL    Glucose 127 65 - 140 mg/dL    Calcium 9 2 8 3 - 10 1 mg/dL    AST 24 5 - 45 U/L    ALT 27 12 - 78 U/L    Alkaline Phosphatase 68 46 - 116 U/L    Total Protein 7 2 6 4 - 8 2 g/dL    Albumin 3 7 3 5 - 5 0 g/dL    Total Bilirubin 0 60 0 20 - 1 00 mg/dL    eGFR 29 ml/min/1 73sq m   Basic metabolic panel   Result Value Ref Range    Sodium 144 136 - 145 mmol/L    Potassium 3 6 3 5 - 5 3 mmol/L    Chloride 109 (H) 100 - 108 mmol/L    CO2 28 21 - 32 mmol/L    Anion Gap 7 4 - 13 mmol/L    BUN 23 5 - 25 mg/dL    Creatinine 1 21 0 60 - 1 30 mg/dL    Glucose 103 65 - 140 mg/dL    Calcium 8 3 8 3 - 10 1 mg/dL    eGFR 43 ml/min/1 73sq m   Magnesium   Result Value Ref Range    Magnesium 2 2 1 6 - 2 6 mg/dL   Phosphorus   Result Value Ref Range    Phosphorus 3 4 2 3 - 4 1 mg/dL   CBC (With Platelets)   Result Value Ref Range    WBC 7 20 4 80 - 10 80 Thousand/uL    RBC 3 82 (L) 4 20 - 5 40 Million/uL    Hemoglobin 10 2 (L) 12 0 - 16 0 g/dL    Hematocrit 31 6 (L) 37 0 - 47 0 %    MCV 83 82 - 98 fL    MCH 26 6 (L) 27 0 - 31 0 pg    MCHC 32 3 31 4 - 37 4 g/dL    RDW 15 0 11 6 - 15 1 %    Platelets 412 601 - 640 Thousands/uL    MPV 8 1 (L) 8 9 - 12 7 fL   Protime-INR   Result Value Ref Range    Protime 10 0 9 4 - 11 7 seconds    INR 0 95 0 86 - 1 16   APTT   Result Value Ref Range    PTT 26 24 - 33 seconds   CBC and differential   Result Value Ref Range WBC 9 40 4 80 - 10 80 Thousand/uL    RBC 4 05 (L) 4 20 - 5 40 Million/uL    Hemoglobin 10 9 (L) 12 0 - 16 0 g/dL    Hematocrit 33 5 (L) 37 0 - 47 0 %    MCV 83 82 - 98 fL    MCH 27 0 27 0 - 31 0 pg    MCHC 32 5 31 4 - 37 4 g/dL    RDW 14 8 11 6 - 15 1 %    MPV 8 1 (L) 8 9 - 12 7 fL    Platelets 509 472 - 629 Thousands/uL    nRBC 0 /100 WBCs    Neutrophils Relative 75 43 - 75 %    Lymphocytes Relative 13 (L) 14 - 44 %    Monocytes Relative 11 4 - 12 %    Eosinophils Relative 1 0 - 6 %    Basophils Relative 0 0 - 1 %    Neutrophils Absolute 7 00 1 85 - 7 62 Thousands/µL    Lymphocytes Absolute 1 20 0 60 - 4 47 Thousands/µL    Monocytes Absolute 1 00 0 17 - 1 22 Thousand/µL    Eosinophils Absolute 0 10 0 00 - 0 61 Thousand/µL    Basophils Absolute 0 00 0 00 - 0 10 Thousands/µL   Basic metabolic panel   Result Value Ref Range    Sodium 140 136 - 145 mmol/L    Potassium 4 1 3 5 - 5 3 mmol/L    Chloride 106 100 - 108 mmol/L    CO2 26 21 - 32 mmol/L    Anion Gap 8 4 - 13 mmol/L    BUN 23 5 - 25 mg/dL    Creatinine 1 15 0 60 - 1 30 mg/dL    Glucose 107 65 - 140 mg/dL    Calcium 8 5 8 3 - 10 1 mg/dL    eGFR 46 ml/min/1 73sq m   Magnesium   Result Value Ref Range    Magnesium 2 2 1 6 - 2 6 mg/dL   Phosphorus   Result Value Ref Range    Phosphorus 3 6 2 3 - 4 1 mg/dL   Hemoglobin and hematocrit, blood   Result Value Ref Range    Hemoglobin 11 8 (L) 12 0 - 16 0 g/dL    Hematocrit 36 7 (L) 37 0 - 47 0 %   Blood gas, arterial   Result Value Ref Range    pH, Arterial 7 239 (LL) 7 350 - 7 450    PH ART TC 7 235 (LL) 7 350 - 7 450    pCO2, Arterial 47 3 (H) 36 0 - 44 0 mm Hg    PCO2 (TC) Arterial 47 9 (H) 36 0 - 44 0 mm Hg    pO2, Arterial 82 4 75 0 - 129 0 mm Hg    PO2 (TC) Arterial 84 0 75 0 - 129 0 mm Hg    HCO3, Arterial 19 8 (L) 22 0 - 28 0 mmol/L    Base Excess, Arterial -7 5 mmol/L    O2 Content, Arterial 16 4 16 0 - 23 0 mL/dL    O2 HGB,Arterial  92 8 (L) 94 0 - 97 0 %    SOURCE Line, Arterial     Temperature 99 2 Degrees Fehrenheit   CBC and differential   Result Value Ref Range    WBC 13 10 (H) 4 80 - 10 80 Thousand/uL    RBC 3 85 (L) 4 20 - 5 40 Million/uL    Hemoglobin 10 5 (L) 12 0 - 16 0 g/dL    Hematocrit 32 2 (L) 37 0 - 47 0 %    MCV 84 82 - 98 fL    MCH 27 2 27 0 - 31 0 pg    MCHC 32 6 31 4 - 37 4 g/dL    RDW 14 4 11 6 - 15 1 %    MPV 7 5 (L) 8 9 - 12 7 fL    Platelets 540 689 - 337 Thousands/uL    nRBC 0 /100 WBCs   Magnesium   Result Value Ref Range    Magnesium 1 8 1 6 - 2 6 mg/dL   Basic metabolic panel   Result Value Ref Range    Sodium 141 136 - 145 mmol/L    Potassium 3 9 3 5 - 5 3 mmol/L    Chloride 111 (H) 100 - 108 mmol/L    CO2 21 21 - 32 mmol/L    Anion Gap 9 4 - 13 mmol/L    BUN 19 5 - 25 mg/dL    Creatinine 0 77 0 60 - 1 30 mg/dL    Glucose 127 65 - 140 mg/dL    Calcium 7 4 (L) 8 3 - 10 1 mg/dL    eGFR 74 ml/min/1 73sq m   Basic metabolic panel   Result Value Ref Range    Sodium 133 (L) 136 - 145 mmol/L    Potassium 3 7 3 5 - 5 3 mmol/L    Chloride 101 100 - 108 mmol/L    CO2 24 21 - 32 mmol/L    Anion Gap 8 4 - 13 mmol/L    BUN 27 (H) 5 - 25 mg/dL    Creatinine 1 19 0 60 - 1 30 mg/dL    Glucose 117 65 - 140 mg/dL    Calcium 7 6 (L) 8 3 - 10 1 mg/dL    eGFR 44 ml/min/1 73sq m   CBC and differential   Result Value Ref Range    WBC 13 00 (H) 4 80 - 10 80 Thousand/uL    RBC 3 27 (L) 4 20 - 5 40 Million/uL    Hemoglobin 8 9 (L) 12 0 - 16 0 g/dL    Hematocrit 27 0 (L) 37 0 - 47 0 %    MCV 82 82 - 98 fL    MCH 27 3 27 0 - 31 0 pg    MCHC 33 1 31 4 - 37 4 g/dL    RDW 13 7 11 6 - 15 1 %    MPV 8 1 (L) 8 9 - 12 7 fL    Platelets 752 075 - 790 Thousands/uL    Neutrophils Relative 83 (H) 43 - 75 %    Lymphocytes Relative 5 (L) 14 - 44 %    Monocytes Relative 10 4 - 12 %    Eosinophils Relative 1 0 - 6 %    Basophils Relative 1 0 - 1 %    Neutrophils Absolute 10 80 (H) 1 85 - 7 62 Thousands/µL    Lymphocytes Absolute 0 70 0 60 - 4 47 Thousands/µL    Monocytes Absolute 1 30 (H) 0 17 - 1 22 Thousand/µL    Eosinophils Absolute 0 10 0 00 - 0 61 Thousand/µL    Basophils Absolute 0 10 0 00 - 0 10 Thousands/µL   Magnesium   Result Value Ref Range    Magnesium 2 5 1 6 - 2 6 mg/dL   Phosphorus   Result Value Ref Range    Phosphorus 2 2 (L) 2 3 - 4 1 mg/dL   UA w Reflex to Microscopic w Reflex to Culture   Result Value Ref Range    Color, UA Yellow     Clarity, UA Clear     Specific Gravity, UA 1 020 1 000 - 1 030    pH, UA 5 5 5 0 - 9 0    Leukocytes, UA Small (A) Negative    Nitrite, UA Negative Negative    Protein, UA Negative Negative mg/dl    Glucose, UA Negative Negative mg/dl    Ketones, UA Negative Negative mg/dl    Urobilinogen, UA 0 2 0 2, 1 0 E U /dl E U /dl    Bilirubin, UA Negative Negative    Blood, UA Moderate (A) Negative   Urine Microscopic   Result Value Ref Range    RBC, UA 0-1 (A) None Seen, 0-5 /hpf    WBC, UA 4-10 (A) None Seen, 0-5, 5-55, 5-65 /hpf    Epithelial Cells Occasional None Seen, Occasional /hpf    Bacteria, UA Occasional None Seen, Occasional /hpf    Hyaline Casts, UA 4-10 (A) (none) /lpf    Uric Acid [place], UA Occasional /hpf   CBC   Result Value Ref Range    WBC 11 70 (H) 4 80 - 10 80 Thousand/uL    RBC 3 13 (L) 4 20 - 5 40 Million/uL    Hemoglobin 8 6 (L) 12 0 - 16 0 g/dL    Hematocrit 25 9 (L) 37 0 - 47 0 %    MCV 83 82 - 98 fL    MCH 27 3 27 0 - 31 0 pg    MCHC 33 0 31 4 - 37 4 g/dL    RDW 14 6 11 6 - 15 1 %    Platelets 051 291 - 844 Thousands/uL    MPV 8 5 (L) 8 9 - 12 7 fL   Basic metabolic panel   Result Value Ref Range    Sodium 133 (L) 136 - 145 mmol/L    Potassium 3 5 3 5 - 5 3 mmol/L    Chloride 100 100 - 108 mmol/L    CO2 24 21 - 32 mmol/L    Anion Gap 9 4 - 13 mmol/L    BUN 24 5 - 25 mg/dL    Creatinine 1 10 0 60 - 1 30 mg/dL    Glucose 121 65 - 140 mg/dL    Calcium 7 9 (L) 8 3 - 10 1 mg/dL    eGFR 48 ml/min/1 73sq m   Phosphorus   Result Value Ref Range    Phosphorus 2 5 2 3 - 4 1 mg/dL   CBC   Result Value Ref Range    WBC 11 40 (H) 4 80 - 10 80 Thousand/uL    RBC 3 13 (L) 4 20 - 5 40 Million/uL    Hemoglobin 8 5 (L) 12 0 - 16 0 g/dL    Hematocrit 25 8 (L) 37 0 - 47 0 %    MCV 83 82 - 98 fL    MCH 27 2 27 0 - 31 0 pg    MCHC 33 0 31 4 - 37 4 g/dL    RDW 14 4 11 6 - 15 1 %    Platelets 683 759 - 558 Thousands/uL    MPV 7 9 (L) 8 9 - 12 7 fL   Basic metabolic panel   Result Value Ref Range    Sodium 134 (L) 136 - 145 mmol/L    Potassium 3 5 3 5 - 5 3 mmol/L    Chloride 101 100 - 108 mmol/L    CO2 26 21 - 32 mmol/L    Anion Gap 7 4 - 13 mmol/L    BUN 19 5 - 25 mg/dL    Creatinine 0 91 0 60 - 1 30 mg/dL    Glucose 124 65 - 140 mg/dL    Calcium 8 1 (L) 8 3 - 10 1 mg/dL    eGFR 61 ml/min/1 73sq m   ECG 12 lead   Result Value Ref Range    Ventricular Rate 76 BPM    Atrial Rate 76 BPM    OH Interval 206 ms    QRSD Interval 102 ms    QT Interval 426 ms    QTC Interval 479 ms    P Axis 52 degrees    QRS Axis 68 degrees    T Wave Axis 36 degrees   Type and screen   Result Value Ref Range    ABO Grouping B     Rh Factor Positive     Antibody Screen Negative     Specimen Expiration Date [id number]    ABO/Rh   Result Value Ref Range    ABO Grouping B     Rh Factor Positive    Prepare RBC:Has consent been obtained? Yes; Where is the Surgery Scheduled? [facility], 1 Units   Result Value Ref Range    Unit Product Code D6747R02     Unit Number [id number]     Unit ABO B     Unit RH NEG     Crossmatch Compatible     Unit Dispense Status Presumed Trans    Prepare RBC:Has consent been obtained? Yes; Date of Surgery: 2/1/2018; Where is the Surgery Scheduled?  [facility], 1 Units   Result Value Ref Range    Unit Product Code R5026N76     Unit Number [id number]     Unit ABO B     Unit RH NEG     Crossmatch Compatible     Unit Dispense Status Presumed Trans    Manual Differential (Non Wam)   Result Value Ref Range    Segmented % 80 %    Bands % 12 (H) 0 - 8 %    Lymphocytes % 3 %    Monocytes % 5 4 - 12 %    Neutrophils Absolute 12 05 (H) 1 81 - 6 82 Thousand/uL    Lymphocytes Absolute 0 39 (L) 0 60 - 4 47 Thousand/uL    Monocytes Absolute 0 66 0 00 - 1 22 Thousand/uL    Total Counted 100     Platelet Estimate Adequate Adequate   Smear Review(Phlebs Do Not Order)   Result Value Ref Range    Platelet Estimate Adequate Adequate    Large Platelet Present                Radiology review:   chest X-ray    Ultrasound      Portions of the record may have been created with voice recognition software   Occasional wrong word or "sound a like" substitutions may have occurred due to the inherent limitations of voice recognition software   Read the chart carefully and recognize, using context, where substitutions have occurred

## 2018-06-26 ENCOUNTER — APPOINTMENT (OUTPATIENT)
Dept: LAB | Facility: CLINIC | Age: 78
End: 2018-06-26
Payer: MEDICARE

## 2018-06-26 ENCOUNTER — TELEPHONE (OUTPATIENT)
Dept: NEPHROLOGY | Facility: CLINIC | Age: 78
End: 2018-06-26

## 2018-06-26 DIAGNOSIS — E78.5 DYSLIPIDEMIA: ICD-10-CM

## 2018-06-26 DIAGNOSIS — I50.22 CHRONIC SYSTOLIC CONGESTIVE HEART FAILURE (HCC): Chronic | ICD-10-CM

## 2018-06-26 DIAGNOSIS — I12.9 BENIGN HYPERTENSION WITH CHRONIC KIDNEY DISEASE, STAGE III (HCC): Chronic | ICD-10-CM

## 2018-06-26 DIAGNOSIS — I12.9 HYPERTENSIVE CHRONIC KIDNEY DISEASE WITH STAGE 1 THROUGH STAGE 4 CHRONIC KIDNEY DISEASE, OR UNSPECIFIED CHRONIC KIDNEY DISEASE: ICD-10-CM

## 2018-06-26 DIAGNOSIS — N18.30 BENIGN HYPERTENSION WITH CHRONIC KIDNEY DISEASE, STAGE III (HCC): Chronic | ICD-10-CM

## 2018-06-26 DIAGNOSIS — N18.30 CHRONIC KIDNEY DISEASE, STAGE III (MODERATE) (HCC): ICD-10-CM

## 2018-06-26 DIAGNOSIS — R79.89 ELEVATED SERUM CREATININE: Primary | ICD-10-CM

## 2018-06-26 DIAGNOSIS — E78.00 PURE HYPERCHOLESTEROLEMIA: Chronic | ICD-10-CM

## 2018-06-26 DIAGNOSIS — D63.1 ANEMIA OF CHRONIC RENAL FAILURE, STAGE 3 (MODERATE) (HCC): ICD-10-CM

## 2018-06-26 DIAGNOSIS — E55.9 VITAMIN D DEFICIENCY: ICD-10-CM

## 2018-06-26 DIAGNOSIS — N18.30 ANEMIA OF CHRONIC RENAL FAILURE, STAGE 3 (MODERATE) (HCC): ICD-10-CM

## 2018-06-26 DIAGNOSIS — I12.9 PARENCHYMAL RENAL HYPERTENSION, STAGE 1 THROUGH STAGE 4 OR UNSPECIFIED CHRONIC KIDNEY DISEASE: ICD-10-CM

## 2018-06-26 LAB
25(OH)D3 SERPL-MCNC: 36.2 NG/ML (ref 30–100)
ALBUMIN SERPL BCP-MCNC: 4.1 G/DL (ref 3.5–5)
ALP SERPL-CCNC: 95 U/L (ref 46–116)
ALT SERPL W P-5'-P-CCNC: 28 U/L (ref 12–78)
ANION GAP SERPL CALCULATED.3IONS-SCNC: 7 MMOL/L (ref 4–13)
AST SERPL W P-5'-P-CCNC: 22 U/L (ref 5–45)
BILIRUB SERPL-MCNC: 0.71 MG/DL (ref 0.2–1)
BUN SERPL-MCNC: 32 MG/DL (ref 5–25)
CALCIUM SERPL-MCNC: 9.3 MG/DL (ref 8.3–10.1)
CHLORIDE SERPL-SCNC: 103 MMOL/L (ref 100–108)
CO2 SERPL-SCNC: 27 MMOL/L (ref 21–32)
CREAT SERPL-MCNC: 1.49 MG/DL (ref 0.6–1.3)
CREAT UR-MCNC: 98 MG/DL
ERYTHROCYTE [DISTWIDTH] IN BLOOD BY AUTOMATED COUNT: 14.5 % (ref 11.6–15.1)
FERRITIN SERPL-MCNC: 173 NG/ML (ref 8–388)
GFR SERPL CREATININE-BSD FRML MDRD: 33 ML/MIN/1.73SQ M
GLUCOSE P FAST SERPL-MCNC: 100 MG/DL (ref 65–99)
HCT VFR BLD AUTO: 39.9 % (ref 34.8–46.1)
HGB BLD-MCNC: 12.5 G/DL (ref 11.5–15.4)
IRON SATN MFR SERPL: 21 %
IRON SERPL-MCNC: 88 UG/DL (ref 50–170)
MAGNESIUM SERPL-MCNC: 2.9 MG/DL (ref 1.6–2.6)
MCH RBC QN AUTO: 26.2 PG (ref 26.8–34.3)
MCHC RBC AUTO-ENTMCNC: 31.3 G/DL (ref 31.4–37.4)
MCV RBC AUTO: 84 FL (ref 82–98)
PHOSPHATE SERPL-MCNC: 3.6 MG/DL (ref 2.3–4.1)
PLATELET # BLD AUTO: 258 THOUSANDS/UL (ref 149–390)
PMV BLD AUTO: 10.4 FL (ref 8.9–12.7)
POTASSIUM SERPL-SCNC: 4 MMOL/L (ref 3.5–5.3)
PROT SERPL-MCNC: 7.8 G/DL (ref 6.4–8.2)
PROT UR-MCNC: 17 MG/DL
PROT/CREAT UR: 0.17 MG/G{CREAT} (ref 0–0.1)
PTH-INTACT SERPL-MCNC: 74.7 PG/ML (ref 18.4–80.1)
RBC # BLD AUTO: 4.78 MILLION/UL (ref 3.81–5.12)
SODIUM SERPL-SCNC: 137 MMOL/L (ref 136–145)
TIBC SERPL-MCNC: 417 UG/DL (ref 250–450)
WBC # BLD AUTO: 7.34 THOUSAND/UL (ref 4.31–10.16)

## 2018-06-26 PROCEDURE — 82570 ASSAY OF URINE CREATININE: CPT | Performed by: INTERNAL MEDICINE

## 2018-06-26 PROCEDURE — 80053 COMPREHEN METABOLIC PANEL: CPT

## 2018-06-26 PROCEDURE — 83540 ASSAY OF IRON: CPT

## 2018-06-26 PROCEDURE — 83970 ASSAY OF PARATHORMONE: CPT

## 2018-06-26 PROCEDURE — 82728 ASSAY OF FERRITIN: CPT

## 2018-06-26 PROCEDURE — 82306 VITAMIN D 25 HYDROXY: CPT

## 2018-06-26 PROCEDURE — 36415 COLL VENOUS BLD VENIPUNCTURE: CPT

## 2018-06-26 PROCEDURE — 85027 COMPLETE CBC AUTOMATED: CPT

## 2018-06-26 PROCEDURE — 83550 IRON BINDING TEST: CPT

## 2018-06-26 PROCEDURE — 84156 ASSAY OF PROTEIN URINE: CPT | Performed by: INTERNAL MEDICINE

## 2018-06-26 PROCEDURE — 83735 ASSAY OF MAGNESIUM: CPT

## 2018-06-26 PROCEDURE — 84100 ASSAY OF PHOSPHORUS: CPT

## 2018-06-26 NOTE — TELEPHONE ENCOUNTER
Called patient and let her know her creatinine has increased  She feels fine with no complaints  Patient will have repeat labs on 7 17 18  Labs ordered  ----- Message from Priya Mcgraw MD sent at 6/26/2018  3:35 PM EDT -----  Creatinine is slightly increased   repeat a bmp in 2-3 weeks for stability

## 2018-07-22 NOTE — PROGRESS NOTES
NEPHROLOGY OFFICE VISIT   Shauna Martin 77 y o  female MRN: [id number]  7/22/2018    Reason for Visit:  Follow-up, blood pressure Check, correlation of home device    Interval history, subjective:     I had the pleasure of seeing Adarsh Jamil today in the renal clinic for the continued management of CKD and HTN  She was last seen in the nephrology clinic by Dr Darcy Aguila on June 16, 2018  Yohanneskathy Grider She currently has no complaints at this time and is feeling well  She has had significant life stress orders including the death of her  and prolonged hospitalization after falling and fracturing her hip  She has lost 30 lb  Patient denies any chest pain, shortness of breath and swelling  The last blood work was done on 6/16/18, which we have reviewed together  ASSESSMENT and PLAN:  1  Chronic kidney disease, stage III:  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  Baseline creatinine ~1 5 mg/dL  More recently creatinine has been between 0 9-1 2  Decreasing creatinine may have been related to patient being off ACE-inhibitor  She was previously on lisinopril  She believes it was discontinued while she was hospitalized with fractured hip late January 2018  · Creatinine 1 49 on 06/16/2018  · Recent start of angiotensin 2 receptor blocker  · Protein excretion minimal- urine protein creatinine ratio 0 17 on 06/16/18  · Repeat labs expected on 07/17-patient did not receive the orders therefore I am giving her the request for the BMP today  2  Hypertension, volume status: At last office visit pressure was uncontrolled therefore losartan 25 mg was started  Chronic asymptomatic orthostasis  No edema/euvolemic  · Goal blood pressure less than 130/80 taking into consideration orthostatic changes will likely viviana to be higher  · 14 mm systolic decrease in blood pressure upon standing    No symptoms  · Home blood pressure readings show intermittent elevation of blood pressure- average readings AM sitting 137/74, standing 122/70  Only one PM reading recorded at home 153/94  · Correlation: -2/+6  · Current antihypertensive regime:  Amlodipine 5 mg in the AM, 2 5 mg HS,66 Toprol 50 mg daily, losartan 25 mg daily, Aldactone 25 mg every Monday, Wednesday, Friday, torsemide 10 mg daily   3  Anemia:  Iron saturation 21%, ferritin level 173,  last hemoglobin 12 5  · No longer on iron supplement  4  Bone mineral:  Phosphorus within normal limits, PTH acceptable 74 7  Vitamin-D level acceptable ~36  5  Dyslipidemia:  Intolerant to statins and Welchol  Continue dietary control  6  Hoarseness:  Status post partial thyroidectomy- Patient has follow-up appointment    Other:  CAD status post CABG/MI recent intervention by Dr Joey Reece, partial thyroidectomy for benign lesion-follow-up appointment coming up soon, 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, seen by Rheumatology for management of arthritis    Plan:  No change in current medications in light of low diastolic readings in orthostasis Follow-up in 3-4 months with Dr Kash Kruse  Repeat blood work was due 7/17-patient will have it done now  PATIENT INSTRUCTIONS:    There are no Patient Instructions on file for this visit  No orders of the defined types were placed in this encounter  OBJECTIVE:  Current Weight:    There were no vitals filed for this visit  There is no height or weight on file to calculate BMI        REVIEW OF SYSTEMS:    Review of Systems    PHYSICAL EXAM:      Physical Exam    Medications:    Current Outpatient Prescriptions:     acetaminophen (TYLENOL) 325 mg tablet, Take 2 tablets (650 mg total) by mouth every 6 (six) hours as needed for mild pain, headaches or fever, Disp: 30 tablet, Rfl: 0    amLODIPine (NORVASC) 2 5 mg tablet, Take 1 tablet by mouth daily, Disp: 30 tablet, Rfl: 0    amLODIPine (NORVASC) 5 mg tablet, Take 5 mg by mouth daily, Disp: , Rfl:     Ascorbic Acid (VITAMIN C) 1000 MG tablet, Take 1,000 mg by mouth daily, Disp: , Rfl:     aspirin (ECOTRIN LOW STRENGTH) 81 mg EC tablet, Take 81 mg by mouth daily Indications: Heart Attack  , Disp: , Rfl:     bisacodyl (DULCOLAX) 10 mg suppository, Insert 1 suppository (10 mg total) into the rectum daily as needed for constipation, Disp: 12 suppository, Rfl: 0    Coenzyme Q10 10 MG capsule, Take 10 mg by mouth daily, Disp: , Rfl:     docusate sodium (COLACE) 100 mg capsule, Take 1 capsule (100 mg total) by mouth 2 (two) times a day, Disp: 60 capsule, Rfl: 0    ezetimibe (ZETIA) 10 mg tablet, Take 1 tablet (10 mg total) by mouth daily at bedtime, Disp: 30 tablet, Rfl: 0    hydroxychloroquine (PLAQUENIL) 200 mg tablet, Take 200 mg by mouth daily  , Disp: , Rfl:     levothyroxine 50 mcg tablet, Take 50 mcg by mouth daily  , Disp: , Rfl:     losartan (COZAAR) 25 mg tablet, Take 1 tablet (25 mg total) by mouth daily, Disp: 30 tablet, Rfl: 5    metoprolol succinate (TOPROL-XL) 100 mg 24 hr tablet, Take 1 tablet by mouth every 24 hours (Patient taking differently: Take 100 mg by mouth every 24 hours In morning ), Disp: 30 tablet, Rfl: 0    metoprolol succinate (TOPROL-XL) 50 mg 24 hr tablet, Take 1 tablet by mouth daily (Patient taking differently: Take 100 mg by mouth 2 (two) times a day In evening ), Disp: 30 tablet, Rfl: 0    Multiple Vitamins-Minerals (CENTRUM SILVER PO), Take 1 tablet by mouth daily, Disp: , Rfl:     nitroglycerin (NITROLINGUAL) 0 4 mg/spray spray, Place 1 spray under the tongue every 5 (five) minutes as needed for chest pain, Disp: , Rfl:     oxyCODONE (ROXICODONE) 5 mg immediate release tablet, Take 1 tablet (5 mg total) by mouth every 6 (six) hours as needed for severe pain for up to 10 doses Max Daily Amount: 20 mg, Disp: 10 tablet, Rfl: 0    oxyCODONE-acetaminophen (PERCOCET) 5-325 mg per tablet, Take 1 tablet by mouth every 6 (six) hours as needed for moderate pain or severe pain for up to 20 doses Max Daily Amount: 4 tablets, Disp: 20 tablet, Rfl: 0    polyethylene glycol (MIRALAX) 17 g packet, Take 17 g by mouth daily, Disp: 14 each, Rfl: 0    raloxifene (EVISTA) 60 mg tablet, Take 60 mg by mouth daily, Disp: , Rfl:     ranolazine (RANEXA) 500 mg 12 hr tablet, Take 500 mg by mouth daily, Disp: , Rfl:     senna (SENOKOT) 8 6 mg, Take 1 tablet (8 6 mg total) by mouth daily at bedtime, Disp: 120 each, Rfl: 0    spironolactone (ALDACTONE) 25 mg tablet, Take 1 tablet (25 mg total) by mouth every other day (Patient taking differently: Take 25 mg by mouth 3 (three) times a week  ), Disp: 60 tablet, Rfl: 2    tamsulosin (FLOMAX) 0 4 mg, Take 1 capsule (0 4 mg total) by mouth daily with dinner, Disp: 30 capsule, Rfl: 0    ticagrelor (BRILINTA) 90 MG, Take 1 tablet by mouth 2 (two) times a day, Disp: 60 tablet, Rfl: 6    torsemide (DEMADEX) 10 mg tablet, Take 10 mg by mouth daily, Disp: , Rfl:     torsemide (DEMADEX) 10 mg tablet, Take 1 tablet (10 mg total) by mouth daily, Disp: 30 tablet, Rfl: 5    Vitamin D, Cholecalciferol, 1000 units CAPS, Take 2,000 Units by mouth 2 (two) times a day, Disp: , Rfl:     Laboratory Results:        Results for orders placed or performed in visit on 06/26/18   Comprehensive metabolic panel   Result Value Ref Range    Sodium 137 136 - 145 mmol/L    Potassium 4 0 3 5 - 5 3 mmol/L    Chloride 103 100 - 108 mmol/L    CO2 27 21 - 32 mmol/L    Anion Gap 7 4 - 13 mmol/L    BUN 32 (H) 5 - 25 mg/dL    Creatinine 1 49 (H) 0 60 - 1 30 mg/dL    Glucose, Fasting 100 (H) 65 - 99 mg/dL    Calcium 9 3 8 3 - 10 1 mg/dL    AST 22 5 - 45 U/L    ALT 28 12 - 78 U/L    Alkaline Phosphatase 95 46 - 116 U/L    Total Protein 7 8 6 4 - 8 2 g/dL    Albumin 4 1 3 5 - 5 0 g/dL    Total Bilirubin 0 71 0 20 - 1 00 mg/dL    eGFR 33 ml/min/1 73sq m   CBC   Result Value Ref Range    WBC 7 34 4 31 - 10 16 Thousand/uL    RBC 4 78 3 81 - 5 12 Million/uL    Hemoglobin 12 5 11 5 - 15 4 g/dL    Hematocrit 39 9 34 8 - 46 1 %    MCV 84 82 - 98 fL    MCH 26 2 (L) 26 8 - 34 3 pg    MCHC 31 3 (L) 31 4 - 37 4 g/dL    RDW 14 5 11 6 - 15 1 %    Platelets 772 691 - 261 Thousands/uL    MPV 10 4 8 9 - 12 7 fL   Ferritin   Result Value Ref Range    Ferritin 173 8 - 388 ng/mL   Iron Saturation %   Result Value Ref Range    Iron Saturation 21 %    TIBC 417 250 - 450 ug/dL    Iron 88 50 - 170 ug/dL   Magnesium   Result Value Ref Range    Magnesium 2 9 (H) 1 6 - 2 6 mg/dL   Phosphorus   Result Value Ref Range    Phosphorus 3 6 2 3 - 4 1 mg/dL   PTH, intact   Result Value Ref Range    PTH 74 7 18 4 - 80 1 pg/mL   Vitamin D 25 hydroxy   Result Value Ref Range    Vit D, 25-Hydroxy 36 2 30 0 - 100 0 ng/mL

## 2018-07-23 ENCOUNTER — OFFICE VISIT (OUTPATIENT)
Dept: NEPHROLOGY | Facility: CLINIC | Age: 78
End: 2018-07-23
Payer: MEDICARE

## 2018-07-23 VITALS
DIASTOLIC BLOOD PRESSURE: 62 MMHG | HEART RATE: 66 BPM | SYSTOLIC BLOOD PRESSURE: 128 MMHG | BODY MASS INDEX: 25.78 KG/M2 | WEIGHT: 151 LBS | HEIGHT: 64 IN

## 2018-07-23 DIAGNOSIS — N18.30 BENIGN HYPERTENSION WITH CHRONIC KIDNEY DISEASE, STAGE III (HCC): Primary | Chronic | ICD-10-CM

## 2018-07-23 DIAGNOSIS — I12.9 BENIGN HYPERTENSION WITH CHRONIC KIDNEY DISEASE, STAGE III (HCC): Primary | Chronic | ICD-10-CM

## 2018-07-23 PROCEDURE — 99213 OFFICE O/P EST LOW 20 MIN: CPT | Performed by: NURSE PRACTITIONER

## 2018-07-23 RX ORDER — SOLIFENACIN SUCCINATE 10 MG/1
10 TABLET, FILM COATED ORAL DAILY
COMMUNITY
End: 2019-03-14 | Stop reason: CLARIF

## 2018-07-23 RX ORDER — AMLODIPINE BESYLATE 2.5 MG/1
2.5 TABLET ORAL 2 TIMES DAILY
COMMUNITY
End: 2019-03-14 | Stop reason: CLARIF

## 2018-07-23 NOTE — PATIENT INSTRUCTIONS
1   Continue home blood pressure readings as discussed  2  No change in medications  3 , exercise as tolerated Continue to avoid salt  4  Repeat blood work  5  Follow up in October with Dr Rachna Cisneros  Chronic Hypertension   WHAT YOU NEED TO KNOW:   Hypertension is high blood pressure (BP)  Your BP is the force of your blood moving against the walls of your arteries  Normal BP is less than 120/80  Prehypertension is between 120/80 and 139/89  Hypertension is 140/90 or higher  Hypertension causes your BP to get so high that your heart has to work much harder than normal  This can damage your heart  Chronic hypertension is a long-term condition that you can control with a healthy lifestyle or medicines  A controlled blood pressure helps protect your organs, such as your heart, lungs, brain, and kidneys  DISCHARGE INSTRUCTIONS:   Call 911 for any of the following:   · You have discomfort in your chest that feels like squeezing, pressure, fullness, or pain  · You become confused or have difficulty speaking  · You suddenly feel lightheaded or have trouble breathing  · You have pain or discomfort in your back, neck, jaw, stomach, or arm  Return to the emergency department if:   · You have a severe headache or vision loss  · You have weakness in an arm or leg  Contact your healthcare provider if:   · You feel faint, dizzy, confused, or drowsy  · You have been taking your BP medicine and your BP is still higher than your healthcare provider says it should be  · You have questions or concerns about your condition or care  Medicines: You may need any of the following:  · Medicine  may be used to help lower your BP  You may need more than one type of medicine  Take the medicine exactly as directed  · Diuretics  help decrease extra fluid that collects in your body  This will help lower your BP  You may urinate more often while you take this medicine      · Cholesterol medicine  helps lower your cholesterol level  A low cholesterol level helps prevent heart disease and makes it easier to control your blood pressure  · Take your medicine as directed  Contact your healthcare provider if you think your medicine is not helping or if you have side effects  Tell him or her if you are allergic to any medicine  Keep a list of the medicines, vitamins, and herbs you take  Include the amounts, and when and why you take them  Bring the list or the pill bottles to follow-up visits  Carry your medicine list with you in case of an emergency  Follow up with your healthcare provider as directed: You will need to return to have your blood pressure checked and to have other lab tests done  Write down your questions so you remember to ask them during your visits  Manage chronic hypertension:  Talk with your healthcare provider about these and other ways to manage hypertension:  · Take your BP at home  Sit and rest for 5 minutes before you take your BP  Extend your arm and support it on a flat surface  Your arm should be at the same level as your heart  Follow the directions that came with your BP monitor  If possible, take at least 2 BP readings each time  Take your BP at least twice a day at the same times each day, such as morning and evening  Keep a record of your BP readings and bring it to your follow-up visits  Ask your healthcare provider what your blood pressure should be  · Limit sodium (salt) as directed  Too much sodium can affect your fluid balance  Check labels to find low-sodium or no-salt-added foods  Some low-sodium foods use potassium salts for flavor  Too much potassium can also cause health problems  Your healthcare provider will tell you how much sodium and potassium are safe for you to have in a day  He or she may recommend that you limit sodium to 2,300 mg a day  · Follow the meal plan recommended by your healthcare provider    A dietitian or your provider can give you more information on low-sodium plans or the DASH (Dietary Approaches to Stop Hypertension) eating plan  The DASH plan is low in sodium, unhealthy fats, and total fat  It is high in potassium, calcium, and fiber  · Exercise to maintain a healthy weight  Exercise at least 30 minutes per day, on most days of the week  This will help decrease your blood pressure  Ask about the best exercise plan for you  · Decrease stress  This may help lower your BP  Learn ways to relax, such as deep breathing or listening to music  · Limit alcohol  Women should limit alcohol to 1 drink a day  Men should limit alcohol to 2 drinks a day  A drink of alcohol is 12 ounces of beer, 5 ounces of wine, or 1½ ounces of liquor  · Do not smoke  Nicotine and other chemicals in cigarettes and cigars can increase your BP and also cause lung damage  Ask your healthcare provider for information if you currently smoke and need help to quit  E-cigarettes or smokeless tobacco still contain nicotine  Talk to your healthcare provider before you use these products  © 2017 [address] [facility] Information is for End User's use only and may not be sold, redistributed or otherwise used for commercial purposes  All illustrations and images included in CareNotes® are the copyrighted property of [employer] , [employer]  or [facility]  The above information is an  only  It is not intended as medical advice for individual conditions or treatments  Talk to your doctor, nurse or pharmacist before following any medical regimen to see if it is safe and effective for you

## 2018-07-23 NOTE — PROGRESS NOTES
Addendum to previous note:  NEPHROLOGY OFFICE VISIT   Irene Torres 77 y o  female MRN: [id number]  7/23/2018    Please see previous note which contains full assessment and plan and has been addended by Dr Dashawn Gonzalez  Elements of physical exam were previously omitted  PATIENT INSTRUCTIONS:    Patient Instructions     1  Continue home blood pressure readings as discussed  2  No change in medications  3 , exercise as tolerated Continue to avoid salt  4  Repeat blood work  5  Follow up in October with Dr Viktoria Chauhan  Chronic Hypertension   WHAT YOU NEED TO KNOW:   Hypertension is high blood pressure (BP)  Your BP is the force of your blood moving against the walls of your arteries  Normal BP is less than 120/80  Prehypertension is between 120/80 and 139/89  Hypertension is 140/90 or higher  Hypertension causes your BP to get so high that your heart has to work much harder than normal  This can damage your heart  Chronic hypertension is a long-term condition that you can control with a healthy lifestyle or medicines  A controlled blood pressure helps protect your organs, such as your heart, lungs, brain, and kidneys  DISCHARGE INSTRUCTIONS:   Call 911 for any of the following:   · You have discomfort in your chest that feels like squeezing, pressure, fullness, or pain  · You become confused or have difficulty speaking  · You suddenly feel lightheaded or have trouble breathing  · You have pain or discomfort in your back, neck, jaw, stomach, or arm  Return to the emergency department if:   · You have a severe headache or vision loss  · You have weakness in an arm or leg  Contact your healthcare provider if:   · You feel faint, dizzy, confused, or drowsy  · You have been taking your BP medicine and your BP is still higher than your healthcare provider says it should be  · You have questions or concerns about your condition or care  Medicines:   You may need any of the following:  · Medicine  may be used to help lower your BP  You may need more than one type of medicine  Take the medicine exactly as directed  · Diuretics  help decrease extra fluid that collects in your body  This will help lower your BP  You may urinate more often while you take this medicine  · Cholesterol medicine  helps lower your cholesterol level  A low cholesterol level helps prevent heart disease and makes it easier to control your blood pressure  · Take your medicine as directed  Contact your healthcare provider if you think your medicine is not helping or if you have side effects  Tell him or her if you are allergic to any medicine  Keep a list of the medicines, vitamins, and herbs you take  Include the amounts, and when and why you take them  Bring the list or the pill bottles to follow-up visits  Carry your medicine list with you in case of an emergency  Follow up with your healthcare provider as directed: You will need to return to have your blood pressure checked and to have other lab tests done  Write down your questions so you remember to ask them during your visits  Manage chronic hypertension:  Talk with your healthcare provider about these and other ways to manage hypertension:  · Take your BP at home  Sit and rest for 5 minutes before you take your BP  Extend your arm and support it on a flat surface  Your arm should be at the same level as your heart  Follow the directions that came with your BP monitor  If possible, take at least 2 BP readings each time  Take your BP at least twice a day at the same times each day, such as morning and evening  Keep a record of your BP readings and bring it to your follow-up visits  Ask your healthcare provider what your blood pressure should be  · Limit sodium (salt) as directed  Too much sodium can affect your fluid balance  Check labels to find low-sodium or no-salt-added foods  Some low-sodium foods use potassium salts for flavor   Too much potassium can also cause health problems  Your healthcare provider will tell you how much sodium and potassium are safe for you to have in a day  He or she may recommend that you limit sodium to 2,300 mg a day  · Follow the meal plan recommended by your healthcare provider  A dietitian or your provider can give you more information on low-sodium plans or the DASH (Dietary Approaches to Stop Hypertension) eating plan  The DASH plan is low in sodium, unhealthy fats, and total fat  It is high in potassium, calcium, and fiber  · Exercise to maintain a healthy weight  Exercise at least 30 minutes per day, on most days of the week  This will help decrease your blood pressure  Ask about the best exercise plan for you  · Decrease stress  This may help lower your BP  Learn ways to relax, such as deep breathing or listening to music  · Limit alcohol  Women should limit alcohol to 1 drink a day  Men should limit alcohol to 2 drinks a day  A drink of alcohol is 12 ounces of beer, 5 ounces of wine, or 1½ ounces of liquor  · Do not smoke  Nicotine and other chemicals in cigarettes and cigars can increase your BP and also cause lung damage  Ask your healthcare provider for information if you currently smoke and need help to quit  E-cigarettes or smokeless tobacco still contain nicotine  Talk to your healthcare provider before you use these products  © 2017 [address] [facility] Information is for End User's use only and may not be sold, redistributed or otherwise used for commercial purposes  All illustrations and images included in CareNotes® are the copyrighted property of A D A M , Inc  or Moise Austin  The above information is an  only  It is not intended as medical advice for individual conditions or treatments  Talk to your doctor, nurse or pharmacist before following any medical regimen to see if it is safe and effective for you            No orders of the defined types were placed in this encounter  OBJECTIVE:  Current Weight: Weight - Scale: 68 5 kg (151 lb)  Vitals:    07/23/18 1016 07/23/18 1036 07/23/18 1043 07/23/18 1044   BP:  142/64 152/70 128/62   BP Location:  Left arm Right arm Left arm   Patient Position:  Sitting Sitting Standing   Cuff Size:  Standard Standard    Pulse:  66     Weight: 68 5 kg (151 lb)      Height: 5' 4" (1 626 m)       Body mass index is 25 92 kg/m²  REVIEW OF SYSTEMS:    Review of Systems   Constitutional:        Patient has had a rough year emotionally and physically but is rebounding slowly  HENT: Negative  Respiratory: Negative  Cardiovascular: Negative  Gastrointestinal: Negative  Genitourinary: Negative  Musculoskeletal: Negative  Skin: Negative  Neurological: Negative  Psychiatric/Behavioral: Negative  PHYSICAL EXAM:      Physical Exam   Constitutional: She is oriented to person, place, and time  She appears well-developed and well-nourished  No distress  HENT:   Head: Normocephalic and atraumatic  Mouth/Throat: Oropharynx is clear and moist    Eyes: Conjunctivae are normal  Right eye exhibits no discharge  Left eye exhibits no discharge  No scleral icterus  Neck: Neck supple  No JVD present  No carotid bruit   Cardiovascular: Normal rate and regular rhythm  Exam reveals no gallop and no friction rub  Murmur (Systolic murmur noted) heard  Pulmonary/Chest: Effort normal and breath sounds normal  No respiratory distress  She has no wheezes  She has no rales  Abdominal: Soft  Bowel sounds are normal  She exhibits no distension  There is no tenderness  There is no rebound and no guarding  Musculoskeletal: She exhibits no edema  Neurological: She is alert and oriented to person, place, and time  Skin: Skin is warm and dry  No rash noted  No erythema  No pallor  Psychiatric: She has a normal mood and affect   Her behavior is normal  Judgment and thought content normal  Medications:    Current Outpatient Prescriptions:     acetaminophen (TYLENOL) 325 mg tablet, Take 2 tablets (650 mg total) by mouth every 6 (six) hours as needed for mild pain, headaches or fever, Disp: 30 tablet, Rfl: 0    amLODIPine (NORVASC) 2 5 mg tablet, Take 2 5 mg by mouth daily, Disp: , Rfl:     amLODIPine (NORVASC) 5 mg tablet, Take 5 mg by mouth daily, Disp: , Rfl:     Ascorbic Acid (VITAMIN C) 1000 MG tablet, Take 1,000 mg by mouth daily, Disp: , Rfl:     aspirin (ECOTRIN LOW STRENGTH) 81 mg EC tablet, Take 81 mg by mouth daily Indications: Heart Attack  , Disp: , Rfl:     bisacodyl (DULCOLAX) 10 mg suppository, Insert 1 suppository (10 mg total) into the rectum daily as needed for constipation, Disp: 12 suppository, Rfl: 0    Coenzyme Q10 10 MG capsule, Take 10 mg by mouth daily, Disp: , Rfl:     docusate sodium (COLACE) 100 mg capsule, Take 1 capsule (100 mg total) by mouth 2 (two) times a day, Disp: 60 capsule, Rfl: 0    ezetimibe (ZETIA) 10 mg tablet, Take 1 tablet (10 mg total) by mouth daily at bedtime, Disp: 30 tablet, Rfl: 0    hydroxychloroquine (PLAQUENIL) 200 mg tablet, Take 200 mg by mouth daily  , Disp: , Rfl:     levothyroxine 50 mcg tablet, Take 50 mcg by mouth daily  , Disp: , Rfl:     losartan (COZAAR) 25 mg tablet, Take 1 tablet (25 mg total) by mouth daily, Disp: 30 tablet, Rfl: 5    metoprolol succinate (TOPROL-XL) 50 mg 24 hr tablet, Take 1 tablet by mouth daily (Patient taking differently: Take 50 mg by mouth 3 (three) times a day In evening ), Disp: 30 tablet, Rfl: 0    Multiple Vitamins-Minerals (CENTRUM SILVER PO), Take 1 tablet by mouth daily, Disp: , Rfl:     nitroglycerin (NITROLINGUAL) 0 4 mg/spray spray, Place 1 spray under the tongue every 5 (five) minutes as needed for chest pain, Disp: , Rfl:     polyethylene glycol (MIRALAX) 17 g packet, Take 17 g by mouth daily, Disp: 14 each, Rfl: 0    ranolazine (RANEXA) 500 mg 12 hr tablet, Take 500 mg by mouth 2 (two) times a day  , Disp: , Rfl:     senna (SENOKOT) 8 6 mg, Take 1 tablet (8 6 mg total) by mouth daily at bedtime, Disp: 120 each, Rfl: 0    solifenacin (VESICARE) 10 MG tablet, Take 10 mg by mouth daily, Disp: , Rfl:     spironolactone (ALDACTONE) 25 mg tablet, Take 1 tablet (25 mg total) by mouth every other day (Patient taking differently: Take 25 mg by mouth 3 (three) times a week  ), Disp: 60 tablet, Rfl: 2    ticagrelor (BRILINTA) 90 MG, Take 1 tablet by mouth 2 (two) times a day, Disp: 60 tablet, Rfl: 6    torsemide (DEMADEX) 10 mg tablet, Take 10 mg by mouth daily, Disp: , Rfl:     Vitamin D, Cholecalciferol, 1000 units CAPS, Take 2,000 Units by mouth daily  , Disp: , Rfl:     Laboratory Results:        Results for orders placed or performed in visit on 06/26/18   Comprehensive metabolic panel   Result Value Ref Range    Sodium 137 136 - 145 mmol/L    Potassium 4 0 3 5 - 5 3 mmol/L    Chloride 103 100 - 108 mmol/L    CO2 27 21 - 32 mmol/L    Anion Gap 7 4 - 13 mmol/L    BUN 32 (H) 5 - 25 mg/dL    Creatinine 1 49 (H) 0 60 - 1 30 mg/dL    Glucose, Fasting 100 (H) 65 - 99 mg/dL    Calcium 9 3 8 3 - 10 1 mg/dL    AST 22 5 - 45 U/L    ALT 28 12 - 78 U/L    Alkaline Phosphatase 95 46 - 116 U/L    Total Protein 7 8 6 4 - 8 2 g/dL    Albumin 4 1 3 5 - 5 0 g/dL    Total Bilirubin 0 71 0 20 - 1 00 mg/dL    eGFR 33 ml/min/1 73sq m   CBC   Result Value Ref Range    WBC 7 34 4 31 - 10 16 Thousand/uL    RBC 4 78 3 81 - 5 12 Million/uL    Hemoglobin 12 5 11 5 - 15 4 g/dL    Hematocrit 39 9 34 8 - 46 1 %    MCV 84 82 - 98 fL    MCH 26 2 (L) 26 8 - 34 3 pg    MCHC 31 3 (L) 31 4 - 37 4 g/dL    RDW 14 5 11 6 - 15 1 %    Platelets 568 398 - 350 Thousands/uL    MPV 10 4 8 9 - 12 7 fL   Ferritin   Result Value Ref Range    Ferritin 173 8 - 388 ng/mL   Iron Saturation %   Result Value Ref Range    Iron Saturation 21 %    TIBC 417 250 - 450 ug/dL    Iron 88 50 - 170 ug/dL   Magnesium   Result Value Ref Range    Magnesium 2 9 (H) 1 6 - 2 6 mg/dL   Phosphorus   Result Value Ref Range    Phosphorus 3 6 2 3 - 4 1 mg/dL   PTH, intact   Result Value Ref Range    PTH 74 7 18 4 - 80 1 pg/mL   Vitamin D 25 hydroxy   Result Value Ref Range    Vit D, 25-Hydroxy 36 2 30 0 - 100 0 ng/mL

## 2018-07-26 ENCOUNTER — APPOINTMENT (OUTPATIENT)
Dept: LAB | Facility: CLINIC | Age: 78
End: 2018-07-26
Payer: MEDICARE

## 2018-07-26 DIAGNOSIS — R79.89 ELEVATED SERUM CREATININE: ICD-10-CM

## 2018-07-26 LAB
ANION GAP SERPL CALCULATED.3IONS-SCNC: 6 MMOL/L (ref 4–13)
BUN SERPL-MCNC: 27 MG/DL (ref 5–25)
CALCIUM SERPL-MCNC: 8.9 MG/DL (ref 8.3–10.1)
CHLORIDE SERPL-SCNC: 104 MMOL/L (ref 100–108)
CO2 SERPL-SCNC: 27 MMOL/L (ref 21–32)
CREAT SERPL-MCNC: 1.55 MG/DL (ref 0.6–1.3)
GFR SERPL CREATININE-BSD FRML MDRD: 32 ML/MIN/1.73SQ M
GLUCOSE P FAST SERPL-MCNC: 96 MG/DL (ref 65–99)
POTASSIUM SERPL-SCNC: 4 MMOL/L (ref 3.5–5.3)
SODIUM SERPL-SCNC: 137 MMOL/L (ref 136–145)

## 2018-07-26 PROCEDURE — 80048 BASIC METABOLIC PNL TOTAL CA: CPT

## 2018-07-26 PROCEDURE — 36415 COLL VENOUS BLD VENIPUNCTURE: CPT

## 2018-07-30 ENCOUNTER — TRANSCRIBE ORDERS (OUTPATIENT)
Dept: ADMINISTRATIVE | Facility: HOSPITAL | Age: 78
End: 2018-07-30

## 2018-07-30 DIAGNOSIS — E04.9 NON-TOXIC GOITER: Primary | ICD-10-CM

## 2018-07-31 ENCOUNTER — TRANSCRIBE ORDERS (OUTPATIENT)
Dept: ADMINISTRATIVE | Facility: HOSPITAL | Age: 78
End: 2018-07-31

## 2018-07-31 DIAGNOSIS — Z12.31 VISIT FOR SCREENING MAMMOGRAM: Primary | ICD-10-CM

## 2018-08-06 ENCOUNTER — HOSPITAL ENCOUNTER (OUTPATIENT)
Dept: RADIOLOGY | Facility: HOSPITAL | Age: 78
Discharge: HOME | End: 2018-08-06
Payer: MEDICARE

## 2018-08-06 DIAGNOSIS — E04.9 NON-TOXIC GOITER: ICD-10-CM

## 2018-08-06 PROCEDURE — 76536 US EXAM OF HEAD AND NECK: CPT

## 2018-08-07 ENCOUNTER — HOSPITAL ENCOUNTER (OUTPATIENT)
Dept: RADIOLOGY | Facility: HOSPITAL | Age: 78
Discharge: HOME | End: 2018-08-07
Payer: MEDICARE

## 2018-08-07 DIAGNOSIS — Z12.31 VISIT FOR SCREENING MAMMOGRAM: ICD-10-CM

## 2018-08-07 PROCEDURE — 77067 SCR MAMMO BI INCL CAD: CPT

## 2018-08-07 PROCEDURE — 77063 BREAST TOMOSYNTHESIS BI: CPT

## 2018-08-12 DIAGNOSIS — Z95.1 S/P CABG X 4: ICD-10-CM

## 2018-08-15 RX ORDER — EZETIMIBE 10 MG/1
TABLET ORAL
Qty: 90 TABLET | Refills: 0 | Status: SHIPPED | OUTPATIENT
Start: 2018-08-15 | End: 2019-03-14 | Stop reason: CLARIF

## 2018-10-17 ENCOUNTER — APPOINTMENT (OUTPATIENT)
Dept: LAB | Facility: CLINIC | Age: 78
End: 2018-10-17
Payer: MEDICARE

## 2018-10-17 DIAGNOSIS — D63.1 ANEMIA OF CHRONIC RENAL FAILURE, STAGE 3 (MODERATE) (HCC): ICD-10-CM

## 2018-10-17 DIAGNOSIS — N18.30 ANEMIA OF CHRONIC RENAL FAILURE, STAGE 3 (MODERATE) (HCC): ICD-10-CM

## 2018-10-17 DIAGNOSIS — E78.5 DYSLIPIDEMIA: ICD-10-CM

## 2018-10-17 DIAGNOSIS — D64.9 ANEMIA: ICD-10-CM

## 2018-10-17 DIAGNOSIS — I50.22 CHRONIC SYSTOLIC CONGESTIVE HEART FAILURE (HCC): Chronic | ICD-10-CM

## 2018-10-17 DIAGNOSIS — I12.9 BENIGN HYPERTENSION WITH CHRONIC KIDNEY DISEASE, STAGE III (HCC): Chronic | ICD-10-CM

## 2018-10-17 DIAGNOSIS — N18.30 CHRONIC KIDNEY DISEASE, STAGE III (MODERATE) (HCC): ICD-10-CM

## 2018-10-17 DIAGNOSIS — I12.9 HYPERTENSIVE CHRONIC KIDNEY DISEASE WITH STAGE 1 THROUGH STAGE 4 CHRONIC KIDNEY DISEASE, OR UNSPECIFIED CHRONIC KIDNEY DISEASE: ICD-10-CM

## 2018-10-17 DIAGNOSIS — E87.1 HYPO-OSMOLALITY AND HYPONATREMIA (CODE): ICD-10-CM

## 2018-10-17 DIAGNOSIS — E55.9 VITAMIN D DEFICIENCY: ICD-10-CM

## 2018-10-17 DIAGNOSIS — I12.9 PARENCHYMAL RENAL HYPERTENSION, STAGE 1 THROUGH STAGE 4 OR UNSPECIFIED CHRONIC KIDNEY DISEASE: ICD-10-CM

## 2018-10-17 DIAGNOSIS — N18.30 BENIGN HYPERTENSION WITH CHRONIC KIDNEY DISEASE, STAGE III (HCC): Chronic | ICD-10-CM

## 2018-10-17 DIAGNOSIS — E78.00 PURE HYPERCHOLESTEROLEMIA: ICD-10-CM

## 2018-10-17 LAB
25(OH)D3 SERPL-MCNC: 45.4 NG/ML (ref 30–100)
ALBUMIN SERPL BCP-MCNC: 3.5 G/DL (ref 3.5–5)
ALP SERPL-CCNC: 83 U/L (ref 46–116)
ALT SERPL W P-5'-P-CCNC: 34 U/L (ref 12–78)
ANION GAP SERPL CALCULATED.3IONS-SCNC: 5 MMOL/L (ref 4–13)
AST SERPL W P-5'-P-CCNC: 28 U/L (ref 5–45)
BASOPHILS # BLD AUTO: 0.09 THOUSANDS/ΜL (ref 0–0.1)
BASOPHILS NFR BLD AUTO: 1 % (ref 0–1)
BILIRUB SERPL-MCNC: 0.58 MG/DL (ref 0.2–1)
BUN SERPL-MCNC: 22 MG/DL (ref 5–25)
CALCIUM SERPL-MCNC: 8.8 MG/DL (ref 8.3–10.1)
CHLORIDE SERPL-SCNC: 105 MMOL/L (ref 100–108)
CHOLEST SERPL-MCNC: 171 MG/DL (ref 50–200)
CO2 SERPL-SCNC: 25 MMOL/L (ref 21–32)
CREAT SERPL-MCNC: 1.38 MG/DL (ref 0.6–1.3)
CREAT UR-MCNC: 89.2 MG/DL
EOSINOPHIL # BLD AUTO: 0.29 THOUSAND/ΜL (ref 0–0.61)
EOSINOPHIL NFR BLD AUTO: 4 % (ref 0–6)
ERYTHROCYTE [DISTWIDTH] IN BLOOD BY AUTOMATED COUNT: 13.5 % (ref 11.6–15.1)
FERRITIN SERPL-MCNC: 159 NG/ML (ref 8–388)
GFR SERPL CREATININE-BSD FRML MDRD: 36 ML/MIN/1.73SQ M
GLUCOSE P FAST SERPL-MCNC: 98 MG/DL (ref 65–99)
HCT VFR BLD AUTO: 39.1 % (ref 34.8–46.1)
HDLC SERPL-MCNC: 37 MG/DL (ref 40–60)
HGB BLD-MCNC: 12.3 G/DL (ref 11.5–15.4)
IMM GRANULOCYTES # BLD AUTO: 0.04 THOUSAND/UL (ref 0–0.2)
IMM GRANULOCYTES NFR BLD AUTO: 1 % (ref 0–2)
IRON SATN MFR SERPL: 14 %
IRON SERPL-MCNC: 47 UG/DL (ref 50–170)
LDLC SERPL CALC-MCNC: 102 MG/DL (ref 0–100)
LYMPHOCYTES # BLD AUTO: 2.58 THOUSANDS/ΜL (ref 0.6–4.47)
LYMPHOCYTES NFR BLD AUTO: 32 % (ref 14–44)
MAGNESIUM SERPL-MCNC: 2.5 MG/DL (ref 1.6–2.6)
MCH RBC QN AUTO: 26.1 PG (ref 26.8–34.3)
MCHC RBC AUTO-ENTMCNC: 31.5 G/DL (ref 31.4–37.4)
MCV RBC AUTO: 83 FL (ref 82–98)
MONOCYTES # BLD AUTO: 0.63 THOUSAND/ΜL (ref 0.17–1.22)
MONOCYTES NFR BLD AUTO: 8 % (ref 4–12)
NEUTROPHILS # BLD AUTO: 4.53 THOUSANDS/ΜL (ref 1.85–7.62)
NEUTS SEG NFR BLD AUTO: 54 % (ref 43–75)
NRBC BLD AUTO-RTO: 0 /100 WBCS
PHOSPHATE SERPL-MCNC: 3.4 MG/DL (ref 2.3–4.1)
PLATELET # BLD AUTO: 304 THOUSANDS/UL (ref 149–390)
PMV BLD AUTO: 10.1 FL (ref 8.9–12.7)
POTASSIUM SERPL-SCNC: 4 MMOL/L (ref 3.5–5.3)
PROT SERPL-MCNC: 7.4 G/DL (ref 6.4–8.2)
PROT UR-MCNC: 62 MG/DL
PROT/CREAT UR: 0.7 MG/G{CREAT} (ref 0–0.1)
PTH-INTACT SERPL-MCNC: 57.1 PG/ML (ref 18.4–80.1)
RBC # BLD AUTO: 4.71 MILLION/UL (ref 3.81–5.12)
SODIUM SERPL-SCNC: 135 MMOL/L (ref 136–145)
TIBC SERPL-MCNC: 338 UG/DL (ref 250–450)
TRIGL SERPL-MCNC: 159 MG/DL
WBC # BLD AUTO: 8.16 THOUSAND/UL (ref 4.31–10.16)

## 2018-10-17 PROCEDURE — 83970 ASSAY OF PARATHORMONE: CPT

## 2018-10-17 PROCEDURE — 36415 COLL VENOUS BLD VENIPUNCTURE: CPT

## 2018-10-17 PROCEDURE — 80061 LIPID PANEL: CPT

## 2018-10-17 PROCEDURE — 83735 ASSAY OF MAGNESIUM: CPT

## 2018-10-17 PROCEDURE — 84100 ASSAY OF PHOSPHORUS: CPT

## 2018-10-17 PROCEDURE — 82728 ASSAY OF FERRITIN: CPT

## 2018-10-17 PROCEDURE — 82570 ASSAY OF URINE CREATININE: CPT

## 2018-10-17 PROCEDURE — 84156 ASSAY OF PROTEIN URINE: CPT

## 2018-10-17 PROCEDURE — 85025 COMPLETE CBC W/AUTO DIFF WBC: CPT

## 2018-10-17 PROCEDURE — 80053 COMPREHEN METABOLIC PANEL: CPT

## 2018-10-17 PROCEDURE — 82306 VITAMIN D 25 HYDROXY: CPT

## 2018-10-17 PROCEDURE — 83540 ASSAY OF IRON: CPT

## 2018-10-17 PROCEDURE — 83550 IRON BINDING TEST: CPT

## 2018-10-26 ENCOUNTER — OFFICE VISIT (OUTPATIENT)
Dept: NEPHROLOGY | Facility: CLINIC | Age: 78
End: 2018-10-26
Payer: MEDICARE

## 2018-10-26 VITALS
HEART RATE: 58 BPM | WEIGHT: 148 LBS | SYSTOLIC BLOOD PRESSURE: 149 MMHG | HEIGHT: 64 IN | DIASTOLIC BLOOD PRESSURE: 71 MMHG | BODY MASS INDEX: 25.27 KG/M2

## 2018-10-26 DIAGNOSIS — I12.9 HYPERTENSIVE CHRONIC KIDNEY DISEASE WITH STAGE 1 THROUGH STAGE 4 CHRONIC KIDNEY DISEASE, OR UNSPECIFIED CHRONIC KIDNEY DISEASE: ICD-10-CM

## 2018-10-26 DIAGNOSIS — E55.9 VITAMIN D DEFICIENCY: ICD-10-CM

## 2018-10-26 DIAGNOSIS — N18.30 BENIGN HYPERTENSION WITH CHRONIC KIDNEY DISEASE, STAGE III (HCC): Primary | Chronic | ICD-10-CM

## 2018-10-26 DIAGNOSIS — D63.1 ANEMIA OF CHRONIC RENAL FAILURE, STAGE 3 (MODERATE) (HCC): ICD-10-CM

## 2018-10-26 DIAGNOSIS — I12.9 BENIGN HYPERTENSION WITH CHRONIC KIDNEY DISEASE, STAGE III (HCC): Primary | Chronic | ICD-10-CM

## 2018-10-26 DIAGNOSIS — N18.30 CHRONIC KIDNEY DISEASE, STAGE III (MODERATE) (HCC): Chronic | ICD-10-CM

## 2018-10-26 DIAGNOSIS — N18.30 ANEMIA OF CHRONIC RENAL FAILURE, STAGE 3 (MODERATE) (HCC): ICD-10-CM

## 2018-10-26 DIAGNOSIS — E61.1 IRON DEFICIENCY: ICD-10-CM

## 2018-10-26 DIAGNOSIS — E78.5 DYSLIPIDEMIA: ICD-10-CM

## 2018-10-26 DIAGNOSIS — I12.9 PARENCHYMAL RENAL HYPERTENSION, STAGE 1 THROUGH STAGE 4 OR UNSPECIFIED CHRONIC KIDNEY DISEASE: ICD-10-CM

## 2018-10-26 PROCEDURE — 99214 OFFICE O/P EST MOD 30 MIN: CPT | Performed by: INTERNAL MEDICINE

## 2018-10-26 RX ORDER — LOSARTAN POTASSIUM 25 MG/1
50 TABLET ORAL DAILY
Qty: 60 TABLET | Refills: 5 | Status: SHIPPED | OUTPATIENT
Start: 2018-10-26 | End: 2019-06-17

## 2018-10-26 RX ORDER — ISOSORBIDE MONONITRATE 60 MG/1
60 TABLET, EXTENDED RELEASE ORAL DAILY
COMMUNITY
End: 2020-05-06 | Stop reason: ALTCHOICE

## 2018-10-26 NOTE — PROGRESS NOTES
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  · Baseline creatinine:  1 5  · Current creatinine:  1 38 at baseline  · Urine protein creatinine ratio:  At goal at 0 7 g  Recommendations:  · Treat hypertension-please see below  · Treat dyslipidemia-please see below  · Maintain proteinuria less than 1 g or as low as possible  · Avoid nephrotoxic agents such as NSAIDs, patient counseled as such  2   Volume:  Euvolemic  3  Hypertension:       Current blood pressure averages:  AM:  133/71 without orthostatic changes  PM:  152/80 without orthostatic changes  Heart rate 60-70    · Goal blood pressure:  Less than 130/80 given CAD and closer to 120/80 if possible  Recommendations:  ·  Increase losartan to 50 mg in the morning and recheck labs  Next step would be to increase amlodipine to 5 mg in the morning  She will send in another week a readings in 2-3 weeks  4   Electrolytes: All acceptable including a sodium 135 which is stable  5  Mineral bone disorder:  · Calcium/magnesium/phosphorus:  All acceptable  · PTH intact:  Mild secondary hyperparathyroidism normal at 57 1  · Vitamin-D:  A goal of 45 4  6  Dyslipidemia:  Intolerant to statins, and intolerant to Welchol because of constipation  Just diet at this time  · Goal LDL:  Less than 70 given CAD  · Current lipid profile:  /triglycerides 159     Recommendations:  Continue diet as outlined above  7  Anemia:  Essentially normal 12 3, iron saturation slightly low at 14%, normal ferritin at 159  Take over-the-counter iron as tolerated  8   Other problems:  -status post CABG/MI with recent intervention followed by Dr Shae Rico  -the the hypothyroidism status post partial thyroidectomy for benign lesion  -arthritic symptoms followed by Rheumatology  -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          PATIENT INSTRUCTIONS:    Patient Instructions   1    Medication changes today:  -increase losartan/Cozaar to 50 mg in the morning which will be 2-25 mg tablets  2  Please wait 1 full week after making the medication adjustment and then go for nonfasting lab work any time of the day  3  Please wait 2 full weeks after making the medication adjustment and send in 1 week a blood pressure readings morning and evening just sitting  The morning readings before taking any medications  The evening readings are before bedtime  4  Follow-up in 4 months:  -please bring in 1 week a blood pressure readings morning and evening, sitting and standing  Please make sure to keep your arm supported at heart level and not dangling  -please go for fasting lab work prior  To your appointment  5  General instructions:  -avoid salt  -avoid medications such as Motrin, Naprosyn, ibuprofen, Aleve or Advil or Celebrex as they can affect your kidney function; you can use Tylenol as needed for pain or fevers if you have no liver problems  -avoid medications such as Sudafed or other medications with decongestants as they can raise your blood pressure  -try to keep active          Subjective: The patient overall is feeling well  No fevers chills cough or colds    Good appetite and fair energy  No urinary symptoms including foamy urine or blood in the urine  No gastrointestinal symptoms  Rare chest pain which is stable, rare dyspnea on exertion which is stable, no leg swelling  No headaches, dizziness or lightheadedness  Blood pressure medications:  -amlodipine 2 5 mg twice a day  -losartan 25 mg daily  -Toprol XL is 50 mg in the evening  -spironolactone 25 mg Monday Wednesday and Friday  -torsemide 10 mg daily    ROS:  See HPI, otherwise review of systems as completely reviewed with the patient are negative    Past Medical History:   Diagnosis Date    Anemia     Arthritis     Bladder calculi     CAD (coronary artery disease)     Status post CABG and PTCA to OM1    [facility]ive [facility] [address] [facility]     CKD (chronic kidney disease) stage 3, GFR 30-59 ml/min (HCC)     CKD [facility]ch[facility] III [facility])     Hypertension     Hypothyroidism     Uterine prolapse      Past Surgical History:   Procedure Laterality Date    BACK SURGERY      BLADDER SURGERY      CORONARY ANGIOPLASTY WITH STENT PLACEMENT      CORONARY ARTERY BYPASS GRAFT      OOPHORECTOMY Left     RI PARTIAL HIP REPLACEMENT Right 2/1/2018    Procedure: HEMIARTHROPLASTY HIP (BIPOLAR) (RIGHT); Surgeon: Bayron Portillo MD;  Location: [address] [place] [address];  Service: Orthopedics     History reviewed  No pertinent family history  reports that she has never smoked  She has never used smokeless tobacco  She reports that she does not drink alcohol or use drugs  I COMPLETELY REVIEWED THE PAST MEDICAL HISTORY/PAST SURGICAL HISTORY/SOCIAL HISTORY/FAMILY HISTORY/AND MEDICATIONS  AND UPDATED ALL    Objective:     Vitals:   Vitals:    10/26/18 1049   BP: 149/71   Pulse: 58    BP sitting on left:  174/80 with a heart rate of 64 and regular  BP standing on left:  160/70 with a heart rate of 64 and regular    Weight (last 2 days)     Date/Time   Weight    10/26/18 1049  67 1 (148)            Wt Readings from Last 3 Encounters:   10/26/18 67 1 kg (148 lb)   07/23/18 68 5 kg (151 lb)   06/18/18 71 2 kg (157 lb)     Body mass index is 25 4 kg/m²      Physical Exam: General:  No acute distress  Skin:  No acute rash  Eyes:  No scleral icterus, noninjected  ENT:  Moist mucous membranes  Neck:  Supple, no jugular venous distention  Back   No CVAT  Chest:  Clear to auscultation and percussion  CVS:  Regular rate and rhythm without a rub, with a grade 3/6 systolic ejection type murmur heard best at the right upper sternal border; no gallops appreciated  Abdomen:  Soft and nontender with normal bowel sounds  Extremities:  No cyanosis and no edema  Neuro:  Grossly intact  Psych:  Alert, oriented x3 and appropriate      Medications:    Current Outpatient Prescriptions:   acetaminophen (TYLENOL) 325 mg tablet, Take 2 tablets (650 mg total) by mouth every 6 (six) hours as needed for mild pain, headaches or fever, Disp: 30 tablet, Rfl: 0    amLODIPine (NORVASC) 2 5 mg tablet, Take 2 5 mg by mouth 2 (two) times a day , Disp: , Rfl:     Ascorbic Acid (VITAMIN C) 1000 MG tablet, Take 1,000 mg by mouth daily, Disp: , Rfl:     aspirin (ECOTRIN LOW STRENGTH) 81 mg EC tablet, Take 81 mg by mouth daily Indications: Heart Attack  , Disp: , Rfl:     bisacodyl (DULCOLAX) 10 mg suppository, Insert 1 suppository (10 mg total) into the rectum daily as needed for constipation, Disp: 12 suppository, Rfl: 0    Coenzyme Q10 10 MG capsule, Take 10 mg by mouth daily, Disp: , Rfl:     ezetimibe (ZETIA) 10 mg tablet, TAKE 1 TABLET AT BEDTIME , Disp: 90 tablet, Rfl: 0    hydroxychloroquine (PLAQUENIL) 200 mg tablet, Take 200 mg by mouth daily  , Disp: , Rfl:     isosorbide mononitrate (IMDUR) 60 mg 24 hr tablet, Take 60 mg by mouth daily, Disp: , Rfl:     levothyroxine 50 mcg tablet, Take 50 mcg by mouth daily  , Disp: , Rfl:     losartan (COZAAR) 25 mg tablet, Take 2 tablets (50 mg total) by mouth daily, Disp: 60 tablet, Rfl: 5    metoprolol succinate (TOPROL-XL) 50 mg 24 hr tablet, Take 1 tablet by mouth daily (Patient taking differently: Take 50 mg by mouth daily In evening ), Disp: 30 tablet, Rfl: 0    Multiple Vitamins-Minerals (CENTRUM SILVER PO), Take 1 tablet by mouth daily, Disp: , Rfl:     nitroglycerin (NITROLINGUAL) 0 4 mg/spray spray, Place 1 spray under the tongue every 5 (five) minutes as needed for chest pain, Disp: , Rfl:     ranolazine (RANEXA) 500 mg 12 hr tablet, Take 500 mg by mouth 2 (two) times a day  , Disp: , Rfl:     solifenacin (VESICARE) 10 MG tablet, Take 10 mg by mouth daily, Disp: , Rfl:     spironolactone (ALDACTONE) 25 mg tablet, Take 1 tablet (25 mg total) by mouth every other day (Patient taking differently: Take 25 mg by mouth 3 (three) times a week  ), Disp: 60 tablet, Rfl: 2    ticagrelor (BRILINTA) 90 MG, Take 1 tablet by mouth 2 (two) times a day, Disp: 60 tablet, Rfl: 6    torsemide (DEMADEX) 10 mg tablet, Take 10 mg by mouth daily, Disp: , Rfl:     Vitamin D, Cholecalciferol, 1000 units CAPS, Take 2,000 Units by mouth daily  , Disp: , Rfl:     docusate sodium (COLACE) 100 mg capsule, Take 1 capsule (100 mg total) by mouth 2 (two) times a day (Patient not taking: Reported on 10/26/2018 ), Disp: 60 capsule, Rfl: 0    polyethylene glycol (MIRALAX) 17 g packet, Take 17 g by mouth daily (Patient not taking: Reported on 10/26/2018 ), Disp: 14 each, Rfl: 0    senna (SENOKOT) 8 6 mg, Take 1 tablet (8 6 mg total) by mouth daily at bedtime (Patient not taking: Reported on 10/26/2018 ), Disp: 120 each, Rfl: 0    Lab, Imaging and other studies: I have personally reviewed pertinent labs    Laboratory Results:  Results for orders placed or performed in visit on 10/17/18   CBC and differential   Result Value Ref Range    WBC 8 16 4 31 - 10 16 Thousand/uL    RBC 4 71 3 81 - 5 12 Million/uL    Hemoglobin 12 3 11 5 - 15 4 g/dL    Hematocrit 39 1 34 8 - 46 1 %    MCV 83 82 - 98 fL    MCH 26 1 (L) 26 8 - 34 3 pg    MCHC 31 5 31 4 - 37 4 g/dL    RDW 13 5 11 6 - 15 1 %    MPV 10 1 8 9 - 12 7 fL    Platelets 846 510 - 480 Thousands/uL    nRBC 0 /100 WBCs    Neutrophils Relative 54 43 - 75 %    Immat GRANS % 1 0 - 2 %    Lymphocytes Relative 32 14 - 44 %    Monocytes Relative 8 4 - 12 %    Eosinophils Relative 4 0 - 6 %    Basophils Relative 1 0 - 1 %    Neutrophils Absolute 4 53 1 85 - 7 62 Thousands/µL    Immature Grans Absolute 0 04 0 00 - 0 20 Thousand/uL    Lymphocytes Absolute 2 58 0 60 - 4 47 Thousands/µL    Monocytes Absolute 0 63 0 17 - 1 22 Thousand/µL    Eosinophils Absolute 0 29 0 00 - 0 61 Thousand/µL    Basophils Absolute 0 09 0 00 - 0 10 Thousands/µL   Comprehensive metabolic panel   Result Value Ref Range    Sodium 135 (L) 136 - 145 mmol/L    Potassium 4 0 3 5 - 5 3 mmol/L    Chloride 105 100 - 108 mmol/L    CO2 25 21 - 32 mmol/L    ANION GAP 5 4 - 13 mmol/L    BUN 22 5 - 25 mg/dL    Creatinine 1 38 (H) 0 60 - 1 30 mg/dL    Glucose, Fasting 98 65 - 99 mg/dL    Calcium 8 8 8 3 - 10 1 mg/dL    AST 28 5 - 45 U/L    ALT 34 12 - 78 U/L    Alkaline Phosphatase 83 46 - 116 U/L    Total Protein 7 4 6 4 - 8 2 g/dL    Albumin 3 5 3 5 - 5 0 g/dL    Total Bilirubin 0 58 0 20 - 1 00 mg/dL    eGFR 36 ml/min/1 73sq m   Ferritin   Result Value Ref Range    Ferritin 159 8 - 388 ng/mL   Iron Saturation %   Result Value Ref Range    Iron Saturation 14 %    TIBC 338 250 - 450 ug/dL    Iron 47 (L) 50 - 170 ug/dL   Lipid Panel with Direct LDL reflex   Result Value Ref Range    Cholesterol 171 50 - 200 mg/dL    Triglycerides 159 (H) <=150 mg/dL    HDL, Direct 37 (L) 40 - 60 mg/dL    LDL Calculated 102 (H) 0 - 100 mg/dL   Magnesium   Result Value Ref Range    Magnesium 2 5 1 6 - 2 6 mg/dL   Phosphorus   Result Value Ref Range    Phosphorus 3 4 2 3 - 4 1 mg/dL   PTH, intact   Result Value Ref Range    PTH 57 1 18 4 - 80 1 pg/mL   Protein / creatinine ratio, urine   Result Value Ref Range    Creatinine, Ur 89 2 mg/dL    Protein Urine Random 62 mg/dL    Prot/Creat Ratio, Ur 0 70 (H) 0 00 - 0 10   Vitamin D 25 hydroxy   Result Value Ref Range    Vit D, 25-Hydroxy 45 4 30 0 - 100 0 ng/mL               Radiology review:   chest X-ray    Ultrasound      Portions of the record may have been created with voice recognition software   Occasional wrong word or "sound a like" substitutions may have occurred due to the inherent limitations of voice recognition software   Read the chart carefully and recognize, using context, where substitutions have occurred

## 2018-10-26 NOTE — LETTER
October 26, 2018     Gita Silva MD  [facility] [address]    Patient: Larita Severin   YOB: 1940   Date of Visit: 10/26/2018       Dear Dr Ana Maria Diop:    Thank you for referring Alvin Rushing to me for evaluation  Below are my notes for this consultation  If you have questions, please do not hesitate to call me  I look forward to following your patient along with you  Sincerely,        Beatris Hatchet, MD        CC: Bambi Millers, MD Vernadine Sayres, MD Brendia Linen, MD Beatris Hatchet, MD  10/26/2018 11:44 AM  Sign at close encounter  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  · Baseline creatinine:  1 5  · Current creatinine:  1 38 at baseline  · Urine protein creatinine ratio:  At goal at 0 7 g  Recommendations:  · Treat hypertension-please see below  · Treat dyslipidemia-please see below  · Maintain proteinuria less than 1 g or as low as possible  · Avoid nephrotoxic agents such as NSAIDs, patient counseled as such  2   Volume:  Euvolemic  3  Hypertension:       Current blood pressure averages:  AM:  133/71 without orthostatic changes  PM:  152/80 without orthostatic changes  Heart rate 60-70    · Goal blood pressure:  Less than 130/80 given CAD and closer to 120/80 if possible  Recommendations:  ·  Increase losartan to 50 mg in the morning and recheck labs  Next step would be to increase amlodipine to 5 mg in the morning  She will send in another week a readings in 2-3 weeks  4   Electrolytes: All acceptable including a sodium 135 which is stable  5  Mineral bone disorder:  · Calcium/magnesium/phosphorus:  All acceptable  · PTH intact:  Mild secondary hyperparathyroidism normal at 57 1  · Vitamin-D:  A goal of 45 4  6  Dyslipidemia:  Intolerant to statins, and intolerant to Welchol because of constipation  Just diet at this time    · Goal LDL:  Less than 70 given CAD  · Current lipid profile:  /triglycerides 159     Recommendations:  Continue diet as outlined above  7  Anemia:  Essentially normal 12 3, iron saturation slightly low at 14%, normal ferritin at 159  Take over-the-counter iron as tolerated  8   Other problems:  -status post CABG/MI with recent intervention followed by Dr Harsha Andersen  -the the hypothyroidism status post partial thyroidectomy for benign lesion  -arthritic symptoms followed by Rheumatology  -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          PATIENT INSTRUCTIONS:    Patient Instructions   1  Medication changes today:  -increase losartan/Cozaar to 50 mg in the morning which will be 2-25 mg tablets  2  Please wait 1 full week after making the medication adjustment and then go for nonfasting lab work any time of the day  3  Please wait 2 full weeks after making the medication adjustment and send in 1 week a blood pressure readings morning and evening just sitting  The morning readings before taking any medications  The evening readings are before bedtime  4  Follow-up in 4 months:  -please bring in 1 week a blood pressure readings morning and evening, sitting and standing  Please make sure to keep your arm supported at heart level and not dangling  -please go for fasting lab work prior  To your appointment  5  General instructions:  -avoid salt  -avoid medications such as Motrin, Naprosyn, ibuprofen, Aleve or Advil or Celebrex as they can affect your kidney function; you can use Tylenol as needed for pain or fevers if you have no liver problems  -avoid medications such as Sudafed or other medications with decongestants as they can raise your blood pressure  -try to keep active          Subjective: The patient overall is feeling well  No fevers chills cough or colds    Good appetite and fair energy  No urinary symptoms including foamy urine or blood in the urine  No gastrointestinal symptoms  Rare chest pain which is stable, rare dyspnea on exertion which is stable, no leg swelling  No headaches, dizziness or lightheadedness  Blood pressure medications:  -amlodipine 2 5 mg twice a day  -losartan 25 mg daily  -Toprol XL is 50 mg in the evening  -spironolactone 25 mg Monday Wednesday and Friday  -torsemide 10 mg daily    ROS:  See HPI, otherwise review of systems as completely reviewed with the patient are negative    Past Medical History:   Diagnosis Date    Anemia     Arthritis     Bladder calculi     CAD (coronary artery disease)     Status post CABG and PTCA to OM1    [facility]     CKD (chronic kidney disease) stage 3, GFR 30-59 ml/min ([facility])     CKD [facility]chronic kidney disease), [facility] III ([facility] [address] [facility]     Hypertension     Hypothyroidism     Uterine prolapse      Past Surgical History:   Procedure Laterality Date    BACK SURGERY      BLADDER SURGERY      CORONARY ANGIOPLASTY WITH STENT PLACEMENT      CORONARY ARTERY BYPASS GRAFT      OOPHORECTOMY Left     AR PARTIAL HIP REPLACEMENT Right 2/1/2018    Procedure: HEMIARTHROPLASTY HIP (BIPOLAR) (RIGHT); Surgeon: Farideh Pollock MD;  Location: [address] [place] [address];  Service: Orthopedics     History reviewed  No pertinent family history  reports that she has never smoked  She has never used smokeless tobacco  She reports that she does not drink alcohol or use drugs  I COMPLETELY REVIEWED THE PAST MEDICAL HISTORY/PAST SURGICAL HISTORY/SOCIAL HISTORY/FAMILY HISTORY/AND MEDICATIONS  AND UPDATED ALL    Objective:     Vitals:   Vitals:    10/26/18 1049   BP: 149/71   Pulse: 58    BP sitting on left:  174/80 with a heart rate of 64 and regular  BP standing on left:  160/70 with a heart rate of 64 and regular    Weight (last 2 days)     Date/Time   Weight    10/26/18 1049  67 1 (148)            Wt Readings from Last 3 Encounters:   10/26/18 67 1 kg (148 lb)   07/23/18 68 5 kg (151 lb)   06/18/18 71 2 kg (157 lb)     Body mass index is 25 4 kg/m²      Physical Exam: General:  No acute distress  Skin:  No acute rash  Eyes:  No scleral icterus, noninjected  ENT:  Moist mucous membranes  Neck:  Supple, no jugular venous distention  Back   No CVAT  Chest:  Clear to auscultation and percussion  CVS:  Regular rate and rhythm without a rub, with a grade 3/6 systolic ejection type murmur heard best at the right upper sternal border; no gallops appreciated  Abdomen:  Soft and nontender with normal bowel sounds  Extremities:  No cyanosis and no edema  Neuro:  Grossly intact  Psych:  Alert, oriented x3 and appropriate      Medications:    Current Outpatient Prescriptions:     acetaminophen (TYLENOL) 325 mg tablet, Take 2 tablets (650 mg total) by mouth every 6 (six) hours as needed for mild pain, headaches or fever, Disp: 30 tablet, Rfl: 0    amLODIPine (NORVASC) 2 5 mg tablet, Take 2 5 mg by mouth 2 (two) times a day , Disp: , Rfl:     Ascorbic Acid (VITAMIN C) 1000 MG tablet, Take 1,000 mg by mouth daily, Disp: , Rfl:     aspirin (ECOTRIN LOW STRENGTH) 81 mg EC tablet, Take 81 mg by mouth daily Indications: Heart Attack  , Disp: , Rfl:     bisacodyl (DULCOLAX) 10 mg suppository, Insert 1 suppository (10 mg total) into the rectum daily as needed for constipation, Disp: 12 suppository, Rfl: 0    Coenzyme Q10 10 MG capsule, Take 10 mg by mouth daily, Disp: , Rfl:     ezetimibe (ZETIA) 10 mg tablet, TAKE 1 TABLET AT BEDTIME , Disp: 90 tablet, Rfl: 0    hydroxychloroquine (PLAQUENIL) 200 mg tablet, Take 200 mg by mouth daily  , Disp: , Rfl:     isosorbide mononitrate (IMDUR) 60 mg 24 hr tablet, Take 60 mg by mouth daily, Disp: , Rfl:     levothyroxine 50 mcg tablet, Take 50 mcg by mouth daily  , Disp: , Rfl:     losartan (COZAAR) 25 mg tablet, Take 2 tablets (50 mg total) by mouth daily, Disp: 60 tablet, Rfl: 5    metoprolol succinate (TOPROL-XL) 50 mg 24 hr tablet, Take 1 tablet by mouth daily (Patient taking differently: Take 50 mg by mouth daily In evening ), Disp: 30 tablet, Rfl: 0    Multiple Vitamins-Minerals (CENTRUM SILVER PO), Take 1 tablet by mouth daily, Disp: , Rfl:     nitroglycerin (NITROLINGUAL) 0 4 mg/spray spray, Place 1 spray under the tongue every 5 (five) minutes as needed for chest pain, Disp: , Rfl:     ranolazine (RANEXA) 500 mg 12 hr tablet, Take 500 mg by mouth 2 (two) times a day  , Disp: , Rfl:     solifenacin (VESICARE) 10 MG tablet, Take 10 mg by mouth daily, Disp: , Rfl:     spironolactone (ALDACTONE) 25 mg tablet, Take 1 tablet (25 mg total) by mouth every other day (Patient taking differently: Take 25 mg by mouth 3 (three) times a week  ), Disp: 60 tablet, Rfl: 2    ticagrelor (BRILINTA) 90 MG, Take 1 tablet by mouth 2 (two) times a day, Disp: 60 tablet, Rfl: 6    torsemide (DEMADEX) 10 mg tablet, Take 10 mg by mouth daily, Disp: , Rfl:     Vitamin D, Cholecalciferol, 1000 units CAPS, Take 2,000 Units by mouth daily  , Disp: , Rfl:     docusate sodium (COLACE) 100 mg capsule, Take 1 capsule (100 mg total) by mouth 2 (two) times a day (Patient not taking: Reported on 10/26/2018 ), Disp: 60 capsule, Rfl: 0    polyethylene glycol (MIRALAX) 17 g packet, Take 17 g by mouth daily (Patient not taking: Reported on 10/26/2018 ), Disp: 14 each, Rfl: 0    senna (SENOKOT) 8 6 mg, Take 1 tablet (8 6 mg total) by mouth daily at bedtime (Patient not taking: Reported on 10/26/2018 ), Disp: 120 each, Rfl: 0    Lab, Imaging and other studies: I have personally reviewed pertinent labs    Laboratory Results:  Results for orders placed or performed in visit on 10/17/18   CBC and differential   Result Value Ref Range    WBC 8 16 4 31 - 10 16 Thousand/uL    RBC 4 71 3 81 - 5 12 Million/uL    Hemoglobin 12 3 11 5 - 15 4 g/dL    Hematocrit 39 1 34 8 - 46 1 %    MCV 83 82 - 98 fL    MCH 26 1 (L) 26 8 - 34 3 pg    MCHC 31 5 31 4 - 37 4 g/dL    RDW 13 5 11 6 - 15 1 %    MPV 10 1 8 9 - 12 7 fL    Platelets 584 719 - 593 Thousands/uL    nRBC 0 /100 WBCs    Neutrophils Relative 54 43 - 75 %    Immat GRANS % 1 0 - 2 %    Lymphocytes Relative 32 14 - 44 %    Monocytes Relative 8 4 - 12 %    Eosinophils Relative 4 0 - 6 %    Basophils Relative 1 0 - 1 %    Neutrophils Absolute 4 53 1 85 - 7 62 Thousands/µL    Immature Grans Absolute 0 04 0 00 - 0 20 Thousand/uL    Lymphocytes Absolute 2 58 0 60 - 4 47 Thousands/µL    Monocytes Absolute 0 63 0 17 - 1 22 Thousand/µL    Eosinophils Absolute 0 29 0 00 - 0 61 Thousand/µL    Basophils Absolute 0 09 0 00 - 0 10 Thousands/µL   Comprehensive metabolic panel   Result Value Ref Range    Sodium 135 (L) 136 - 145 mmol/L    Potassium 4 0 3 5 - 5 3 mmol/L    Chloride 105 100 - 108 mmol/L    CO2 25 21 - 32 mmol/L    ANION GAP 5 4 - 13 mmol/L    BUN 22 5 - 25 mg/dL    Creatinine 1 38 (H) 0 60 - 1 30 mg/dL    Glucose, Fasting 98 65 - 99 mg/dL    Calcium 8 8 8 3 - 10 1 mg/dL    AST 28 5 - 45 U/L    ALT 34 12 - 78 U/L    Alkaline Phosphatase 83 46 - 116 U/L    Total Protein 7 4 6 4 - 8 2 g/dL    Albumin 3 5 3 5 - 5 0 g/dL    Total Bilirubin 0 58 0 20 - 1 00 mg/dL    eGFR 36 ml/min/1 73sq m   Ferritin   Result Value Ref Range    Ferritin 159 8 - 388 ng/mL   Iron Saturation %   Result Value Ref Range    Iron Saturation 14 %    TIBC 338 250 - 450 ug/dL    Iron 47 (L) 50 - 170 ug/dL   Lipid Panel with Direct LDL reflex   Result Value Ref Range    Cholesterol 171 50 - 200 mg/dL    Triglycerides 159 (H) <=150 mg/dL    HDL, Direct 37 (L) 40 - 60 mg/dL    LDL Calculated 102 (H) 0 - 100 mg/dL   Magnesium   Result Value Ref Range    Magnesium 2 5 1 6 - 2 6 mg/dL   Phosphorus   Result Value Ref Range    Phosphorus 3 4 2 3 - 4 1 mg/dL   PTH, intact   Result Value Ref Range    PTH 57 1 18 4 - 80 1 pg/mL   Protein / creatinine ratio, urine   Result Value Ref Range    Creatinine, Ur 89 2 mg/dL    Protein Urine Random 62 mg/dL    Prot/Creat Ratio, Ur 0 70 (H) 0 00 - 0 10   Vitamin D 25 hydroxy   Result Value Ref Range    Vit D, 25-Hydroxy 45 4 30 0 - 100 0 ng/mL               Radiology review: chest X-ray    Ultrasound      Portions of the record may have been created with voice recognition software   Occasional wrong word or "sound a like" substitutions may have occurred due to the inherent limitations of voice recognition software   Read the chart carefully and recognize, using context, where substitutions have occurred

## 2018-10-26 NOTE — PATIENT INSTRUCTIONS
1   Medication changes today:  -increase losartan/Cozaar to 50 mg in the morning which will be 2-25 mg tablets  2  Please wait 1 full week after making the medication adjustment and then go for nonfasting lab work any time of the day  3  Please wait 2 full weeks after making the medication adjustment and send in 1 week a blood pressure readings morning and evening just sitting  The morning readings before taking any medications  The evening readings are before bedtime  4  Follow-up in 4 months:  -please bring in 1 week a blood pressure readings morning and evening, sitting and standing  Please make sure to keep your arm supported at heart level and not dangling  -please go for fasting lab work prior  To your appointment  5  General instructions:  -avoid salt  -avoid medications such as Motrin, Naprosyn, ibuprofen, Aleve or Advil or Celebrex as they can affect your kidney function; you can use Tylenol as needed for pain or fevers if you have no liver problems  -avoid medications such as Sudafed or other medications with decongestants as they can raise your blood pressure  -try to keep active

## 2018-12-11 ENCOUNTER — APPOINTMENT (EMERGENCY)
Dept: RADIOLOGY | Facility: HOSPITAL | Age: 78
End: 2018-12-11
Payer: MEDICARE

## 2018-12-11 ENCOUNTER — HOSPITAL ENCOUNTER (EMERGENCY)
Facility: HOSPITAL | Age: 78
Discharge: HOME | End: 2018-12-11
Attending: EMERGENCY MEDICINE | Admitting: EMERGENCY MEDICINE
Payer: MEDICARE

## 2018-12-11 VITALS
TEMPERATURE: 97.7 F | HEART RATE: 68 BPM | WEIGHT: 147.93 LBS | RESPIRATION RATE: 22 BRPM | DIASTOLIC BLOOD PRESSURE: 72 MMHG | OXYGEN SATURATION: 99 % | BODY MASS INDEX: 25.39 KG/M2 | SYSTOLIC BLOOD PRESSURE: 165 MMHG

## 2018-12-11 DIAGNOSIS — S09.90XA INJURY OF HEAD, INITIAL ENCOUNTER: ICD-10-CM

## 2018-12-11 DIAGNOSIS — E04.1 THYROID NODULE: ICD-10-CM

## 2018-12-11 DIAGNOSIS — N39.0 UTI (URINARY TRACT INFECTION): ICD-10-CM
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
## 2018-12-11 LAB
ALBUMIN SERPL BCP-MCNC: 3.8 G/DL (ref 3.5–5)
ALP SERPL-CCNC: 80 U/L (ref 46–116)
ALT SERPL W P-5'-P-CCNC: 29 U/L (ref 12–78)
ANION GAP SERPL CALCULATED.3IONS-SCNC: 11 MMOL/L (ref 4–13)
AST SERPL W P-5'-P-CCNC: 22 U/L (ref 5–45)
BACTERIA UR QL AUTO: ABNORMAL /HPF
BASOPHILS # BLD AUTO: 0.05 THOUSANDS/ΜL (ref 0–0.1)
BASOPHILS NFR BLD AUTO: 1 % (ref 0–1)
BILIRUB SERPL-MCNC: 0.5 MG/DL (ref 0.2–1)
BILIRUB UR QL STRIP: NEGATIVE
BUN SERPL-MCNC: 17 MG/DL (ref 5–25)
CALCIUM SERPL-MCNC: 9 MG/DL (ref 8.3–10.1)
CHLORIDE SERPL-SCNC: 102 MMOL/L (ref 100–108)
CLARITY UR: ABNORMAL
CO2 SERPL-SCNC: 24 MMOL/L (ref 21–32)
COLOR UR: YELLOW
CREAT SERPL-MCNC: 1.23 MG/DL (ref 0.6–1.3)
EOSINOPHIL # BLD AUTO: 0.14 THOUSAND/ΜL (ref 0–0.61)
EOSINOPHIL NFR BLD AUTO: 2 % (ref 0–6)
ERYTHROCYTE [DISTWIDTH] IN BLOOD BY AUTOMATED COUNT: 14.6 % (ref 11.6–15.1)
GFR SERPL CREATININE-BSD FRML MDRD: 42 ML/MIN/1.73SQ M
GLUCOSE SERPL-MCNC: 108 MG/DL (ref 65–140)
GLUCOSE UR STRIP-MCNC: NEGATIVE MG/DL
HCT VFR BLD AUTO: 37.6 % (ref 34.8–46.1)
HGB BLD-MCNC: 11.8 G/DL (ref 11.5–15.4)
HGB UR QL STRIP.AUTO: NEGATIVE
IMM GRANULOCYTES # BLD AUTO: 0.02 THOUSAND/UL (ref 0–0.2)
IMM GRANULOCYTES NFR BLD AUTO: 0 % (ref 0–2)
KETONES UR STRIP-MCNC: NEGATIVE MG/DL
LEUKOCYTE ESTERASE UR QL STRIP: ABNORMAL
LYMPHOCYTES # BLD AUTO: 1.75 THOUSANDS/ΜL (ref 0.6–4.47)
LYMPHOCYTES NFR BLD AUTO: 21 % (ref 14–44)
MCH RBC QN AUTO: 25.9 PG (ref 26.8–34.3)
MCHC RBC AUTO-ENTMCNC: 31.4 G/DL (ref 31.4–37.4)
MCV RBC AUTO: 83 FL (ref 82–98)
MONOCYTES # BLD AUTO: 0.81 THOUSAND/ΜL (ref 0.17–1.22)
MONOCYTES NFR BLD AUTO: 10 % (ref 4–12)
NEUTROPHILS # BLD AUTO: 5.69 THOUSANDS/ΜL (ref 1.85–7.62)
NEUTS SEG NFR BLD AUTO: 66 % (ref 43–75)
NITRITE UR QL STRIP: NEGATIVE
NON-SQ EPI CELLS URNS QL MICRO: ABNORMAL /HPF
NRBC BLD AUTO-RTO: 0 /100 WBCS
PH UR STRIP.AUTO: 7 [PH] (ref 5–9)
PLATELET # BLD AUTO: 242 THOUSANDS/UL (ref 149–390)
PMV BLD AUTO: 9.9 FL (ref 8.9–12.7)
POTASSIUM SERPL-SCNC: 3.6 MMOL/L (ref 3.5–5.3)
PROT SERPL-MCNC: 7.2 G/DL (ref 6.4–8.2)
PROT UR STRIP-MCNC: NEGATIVE MG/DL
RBC # BLD AUTO: 4.55 MILLION/UL (ref 3.81–5.12)
RBC #/AREA URNS AUTO: ABNORMAL /HPF
SODIUM SERPL-SCNC: 137 MMOL/L (ref 136–145)
SP GR UR STRIP.AUTO: 1.01 (ref 1–1.03)
UROBILINOGEN UR QL STRIP.AUTO: 0.2 E.U./DL
WBC # BLD AUTO: 8.46 THOUSAND/UL (ref 4.31–10.16)
WBC #/AREA URNS AUTO: ABNORMAL /HPF

## 2018-12-11 PROCEDURE — 81001 URINALYSIS AUTO W/SCOPE: CPT | Performed by: EMERGENCY MEDICINE

## 2018-12-11 PROCEDURE — 87086 URINE CULTURE/COLONY COUNT: CPT | Performed by: EMERGENCY MEDICINE

## 2018-12-11 PROCEDURE — 87186 SC STD MICRODIL/AGAR DIL: CPT | Performed by: EMERGENCY MEDICINE

## 2018-12-11 PROCEDURE — 70450 CT HEAD/BRAIN W/O DYE: CPT

## 2018-12-11 PROCEDURE — 80053 COMPREHEN METABOLIC PANEL: CPT | Performed by: EMERGENCY MEDICINE

## 2018-12-11 PROCEDURE — 87077 CULTURE AEROBIC IDENTIFY: CPT | Performed by: EMERGENCY MEDICINE

## 2018-12-11 PROCEDURE — 72125 CT NECK SPINE W/O DYE: CPT

## 2018-12-11 PROCEDURE — 93005 ELECTROCARDIOGRAM TRACING: CPT

## 2018-12-11 PROCEDURE — 85025 COMPLETE CBC W/AUTO DIFF WBC: CPT | Performed by: EMERGENCY MEDICINE

## 2018-12-11 PROCEDURE — 99284 EMERGENCY DEPT VISIT MOD MDM: CPT

## 2018-12-11 PROCEDURE — 36415 COLL VENOUS BLD VENIPUNCTURE: CPT | Performed by: EMERGENCY MEDICINE

## 2018-12-11 PROCEDURE — 71045 X-RAY EXAM CHEST 1 VIEW: CPT

## 2018-12-11 RX ORDER — CEPHALEXIN 500 MG/1
500 CAPSULE ORAL 2 TIMES DAILY
Qty: 14 CAPSULE | Refills: 0 | Status: SHIPPED | OUTPATIENT
Start: 2018-12-11 | End: 2018-12-18

## 2018-12-11 RX ORDER — CEPHALEXIN 500 MG/1
500 CAPSULE ORAL ONCE
Status: COMPLETED | OUTPATIENT
Start: 2018-12-11 | End: 2018-12-11

## 2018-12-11 RX ADMIN — CEPHALEXIN 500 MG: 500 CAPSULE ORAL at 15:58

## 2018-12-11 NOTE — ED PROVIDER NOTES
History  Chief Complaint   Patient presents with   [facility] Fall     pt presents to the ed for fall and head pain  pt fell on a concrete walkway, and has a injury to the right temple,  pt is unsure of how she fell     Head Injury     Patient presents for evaluation after a fall  Patient fell on concrete outside cardiologist office  States it took 30 minutes for someone to stop and hlep her up  States she did not have the strength to get up  Also called relative who arrived as a man with a cane helped her up  Complains of head pain  Denies any other pain  Denies LOC or syncope  History provided by:  Patient and relative   used: No    Fall   Associated symptoms: headaches        Prior to Admission Medications   Prescriptions Last Dose Informant Patient Reported? Taking? Ascorbic Acid (VITAMIN C) 1000 MG tablet  Self Yes No   Sig: Take 1,000 mg by mouth daily   Coenzyme Q10 10 MG capsule  Self Yes No   Sig: Take 10 mg by mouth daily   Multiple Vitamins-Minerals (CENTRUM SILVER PO)  Self Yes No   Sig: Take 1 tablet by mouth daily   Vitamin D, Cholecalciferol, 1000 units CAPS  Self Yes No   Sig: Take 2,000 Units by mouth daily     acetaminophen (TYLENOL) 325 mg tablet  Self No No   Sig: Take 2 tablets (650 mg total) by mouth every 6 (six) hours as needed for mild pain, headaches or fever   amLODIPine (NORVASC) 2 5 mg tablet  Self Yes No   Sig: Take 2 5 mg by mouth 2 (two) times a day    aspirin (ECOTRIN LOW STRENGTH) 81 mg EC tablet  Self Yes No   Sig: Take 81 mg by mouth daily Indications: Heart Attack  bisacodyl (DULCOLAX) 10 mg suppository  Self No No   Sig: Insert 1 suppository (10 mg total) into the rectum daily as needed for constipation   docusate sodium (COLACE) 100 mg capsule  Self No No   Sig: Take 1 capsule (100 mg total) by mouth 2 (two) times a day   Patient not taking: Reported on 10/26/2018    ezetimibe (ZETIA) 10 mg tablet   No No   Sig: TAKE 1 TABLET AT BEDTIME  hydroxychloroquine (PLAQUENIL) 200 mg tablet  Self Yes No   Sig: Take 200 mg by mouth daily     isosorbide mononitrate (IMDUR) 60 mg 24 hr tablet   Yes No   Sig: Take 60 mg by mouth daily   levothyroxine 50 mcg tablet  Self Yes No   Sig: Take 50 mcg by mouth daily     losartan (COZAAR) 25 mg tablet   No No   Sig: Take 2 tablets (50 mg total) by mouth daily   metoprolol succinate (TOPROL-XL) 50 mg 24 hr tablet  Self No No   Sig: Take 1 tablet by mouth daily   Patient taking differently: Take 50 mg by mouth daily In evening    nitroglycerin (NITROLINGUAL) 0 4 mg/spray spray  Self Yes No   Sig: Place 1 spray under the tongue every 5 (five) minutes as needed for chest pain   polyethylene glycol (MIRALAX) 17 g packet  Self No No   Sig: Take 17 g by mouth daily   Patient not taking: Reported on 10/26/2018    ranolazine (RANEXA) 500 mg 12 hr tablet  Self Yes No   Sig: Take 500 mg by mouth 2 (two) times a day     senna (SENOKOT) 8 6 mg  Self No No   Sig: Take 1 tablet (8 6 mg total) by mouth daily at bedtime   Patient not taking: Reported on 10/26/2018    solifenacin (VESICARE) 10 MG tablet  Self Yes No   Sig: Take 10 mg by mouth daily   spironolactone (ALDACTONE) 25 mg tablet  Self No No   Sig: Take 1 tablet (25 mg total) by mouth every other day   Patient taking differently: Take 25 mg by mouth 3 (three) times a week     ticagrelor (BRILINTA) 90 MG  Self No No   Sig: Take 1 tablet by mouth 2 (two) times a day   torsemide (DEMADEX) 10 mg tablet  Self Yes No   Sig: Take 10 mg by mouth daily      Facility-Administered Medications: None       Past Medical History:   Diagnosis Date    Anemia     Arthritis     Bladder calculi     CAD (coronary artery disease)     Status post CABG and PTCA to OM1    [facility]     CKD (chronic kidney disease) stage 3, GFR 30-59 ml/min ([facility])     CKD (chronic kidney disease), stage III ([facility])     Hypertension     Hypothyroidism     Uterine prolapse        Past Surgical History:   Procedure Laterality Date    BACK SURGERY      BLADDER SURGERY      CORONARY ANGIOPLASTY WITH STENT PLACEMENT      CORONARY ARTERY BYPASS GRAFT      OOPHORECTOMY Left     NM PARTIAL HIP REPLACEMENT Right 2/1/2018    Procedure: HEMIARTHROPLASTY HIP (BIPOLAR) (RIGHT); Surgeon: Marilyn Rodriguez MD;  Location: [address] [place] [address];  Service: Orthopedics       History reviewed  No pertinent family history  I have reviewed and agree with the history as documented  Social History   Substance Use Topics    Smoking status: Never Smoker    Smokeless tobacco: Never Used    Alcohol use No        Review of Systems   Neurological: Positive for headaches  All other systems reviewed and are negative  Physical Exam  Physical Exam   Constitutional: She is oriented to person, place, and time  No distress  HENT:   Head:       Mouth/Throat: Oropharynx is clear and moist    Eyes: Pupils are equal, round, and reactive to light  EOM are normal    Neck: Normal range of motion  Neck supple  No midline tenderness   Cardiovascular: Normal rate, regular rhythm and intact distal pulses  Pulmonary/Chest: Effort normal and breath sounds normal  No respiratory distress  Abdominal: Soft  There is no tenderness  Musculoskeletal: Normal range of motion  She exhibits no tenderness  Neurological: She is alert and oriented to person, place, and time  Skin: Capillary refill takes less than 2 seconds  She is not diaphoretic  Nursing note and vitals reviewed        Vital Signs  ED Triage Vitals [12/11/18 1403]   Temperature Pulse Respirations Blood Pressure SpO2   97 7 °F (36 5 °C) 75 18 164/75 97 %      Temp Source Heart Rate Source Patient Position - Orthostatic VS BP Location FiO2 (%)   Tympanic Monitor -- -- --      Pain Score       --           Vitals:    12/11/18 1403   BP: 164/75   Pulse: 75       Visual Acuity      ED Medications  Medications - No data to display    Diagnostic Studies  Results Reviewed     Procedure Component Value Units Date/Time    CBC and differential [[id number]]     Lab Status:  No result Specimen:  Blood     UA w Reflex to Microscopic [[id number]]     Lab Status:  No result Specimen:  Urine     Comprehensive metabolic panel [[id number]]     Lab Status:  No result Specimen:  Blood                  XR chest 1 view portable    (Results Pending)   CT head without contrast    (Results Pending)   CT cervical spine without contrast    (Results Pending)              Procedures  Procedures       Phone Contacts  ED Phone Contact    ED Course                               MDM  Number of Diagnoses or Management Options  Fall, initial encounter:   Injury of head, initial encounter:   Thyroid nodule:   UTI (urinary tract infection):   Diagnosis management comments: Pulse ox 97% on RA indicating adequate oxygenation  CXR: NAD as read by me    CT and lab results discussed with patient and family  Patient was aware of thyroid nodule and had previous work up  Amount and/or Complexity of Data Reviewed  Clinical lab tests: ordered and reviewed  Tests in the radiology section of CPT®: reviewed and ordered  Decide to obtain previous medical records or to obtain history from someone other than the patient: yes  Review and summarize past medical records: yes  Independent visualization of images, tracings, or specimens: yes    Patient Progress  Patient progress: stable    CritCare Time    Disposition  Final diagnoses:   None     ED Disposition     None      Follow-up Information    None         Patient's Medications   Discharge Prescriptions    No medications on file     No discharge procedures on file      ED Provider  Electronically Signed by           Shankar Bernard DO  12/11/18 2010

## 2018-12-11 NOTE — DISCHARGE INSTRUCTIONS
Urinary Tract Infection in Women   WHAT YOU NEED TO KNOW:   A urinary tract infection (UTI) is caused by bacteria that get inside your urinary tract  Most bacteria that enter your urinary tract come out when you urinate  If the bacteria stay in your urinary tract, you may get an infection  Your urinary tract includes your kidneys, ureters, bladder, and urethra  Urine is made in your kidneys, and it flows from the ureters to the bladder  Urine leaves the bladder through the urethra  A UTI is more common in your lower urinary tract, which includes your bladder and urethra  DISCHARGE INSTRUCTIONS:   Seek care immediately if:   · You are urinating very little or not at all  · You have a high fever with shaking chills  · You have side or back pain that gets worse  Contact your healthcare provider if:   · You have a fever  · You do not feel better after 2 days of taking antibiotics  · You are vomiting  · You have questions or concerns about your condition or care  Medicines:   · Antibiotics  help fight a bacterial infection  · Medicines  may be given to decrease pain and burning when you urinate  They will also help decrease the feeling that you need to urinate often  These medicines will make your urine orange or red  · Take your medicine as directed  Contact your healthcare provider if you think your medicine is not helping or if you have side effects  Tell him or her if you are allergic to any medicine  Keep a list of the medicines, vitamins, and herbs you take  Include the amounts, and when and why you take them  Bring the list or the pill bottles to follow-up visits  Carry your medicine list with you in case of an emergency  Follow up with your healthcare provider as directed:  Write down your questions so you remember to ask them during your visits  Prevent another UTI:   · Empty your bladder often  Urinate and empty your bladder as soon as you feel the need   Do not hold your urine for long periods of time  · Wipe from front to back after you urinate or have a bowel movement  This will help prevent germs from getting into your urinary tract through your urethra  · Drink liquids as directed  Ask how much liquid to drink each day and which liquids are best for you  You may need to drink more liquids than usual to help flush out the bacteria  Do not drink alcohol, caffeine, or citrus juices  These can irritate your bladder and increase your symptoms  Your healthcare provider may recommend cranberry juice to help prevent a UTI  · Urinate after you have sex  This can help flush out bacteria passed during sex  · Do not douche or use feminine deodorants  These can change the chemical balance in your vagina  · Change sanitary pads or tampons often  This will help prevent germs from getting into your urinary tract  · Do pelvic muscle exercises often  Pelvic muscle exercises may help you start and stop urinating  Strong pelvic muscles may help you empty your bladder easier  Squeeze these muscles tightly for 5 seconds like you are trying to hold back urine  Then relax for 5 seconds  Gradually work up to squeezing for 10 seconds  Do 3 sets of 15 repetitions a day, or as directed  © 2017 [employer] [employer] Information is for End User's use only and may not be sold, redistributed or otherwise used for commercial purposes  All illustrations and images included in CareNotes® are the copyrighted property of [employer]  or Moise Austin  The above information is an  only  It is not intended as medical advice for individual conditions or treatments  Talk to your doctor, nurse or pharmacist before following any medical regimen to see if it is safe and effective for you  Fall Prevention   WHAT YOU NEED TO KNOW:   Fall prevention includes ways to make your home and other areas safer   It also includes ways you can move more carefully to prevent a fall  Health conditions that cause changes in your blood pressure, vision, or muscle strength and coordination may increase your risk for falls  Medicines may also increase your risk for falls if they make you dizzy, weak, or sleepy  DISCHARGE INSTRUCTIONS:   Call 911 or have someone else call if:   · You have fallen and are unconscious  · You have fallen and cannot move part of your body  Contact your healthcare provider if:   · You have fallen and have pain or a headache  · You have questions or concerns about your condition or care  Fall prevention tips:   · Stand or sit up slowly  This may help you keep your balance and prevent falls  · Use assistive devices as directed  Your healthcare provider may suggest that you use a cane or walker to help you keep your balance  You may need to have grab bars put in your bathroom near the toilet or in the shower  · Wear shoes that fit well and have soles that   Wear shoes both inside and outside  Use slippers with good   Do not wear shoes with high heels  · Wear a personal alarm  This is a device that allows you to call 911 if you fall and need help  Ask your healthcare provider for more information  · Stay active  Exercise can help strengthen your muscles and improve your balance  Your healthcare provider may recommend water aerobics or walking  He or she may also recommend physical therapy to improve your coordination  Never start an exercise program without talking to your healthcare provider first      · Manage your medical conditions  Keep all appointments with your healthcare providers  Visit your eye doctor as directed  Home safety tips:   · Add items to prevent falls in the bathroom  Put nonslip strips on your bath or shower floor to prevent you from slipping  Use a bath mat if you do not have carpet in the bathroom  This will prevent you from falling when you step out of the bath or shower   Use a shower seat so you do not need to stand while you shower  Sit on the toilet or a chair in your bathroom to dry yourself and put on clothing  This will prevent you from losing your balance from drying or dressing yourself while you are standing  · Keep paths clear  Remove books, shoes, and other objects from walkways and stairs  Place cords for telephones and lamps out of the way so that you do not need to walk over them  Tape them down if you cannot move them  Remove small rugs  If you cannot remove a rug, secure it with double-sided tape  This will prevent you from tripping  · Install bright lights in your home  Use night lights to help light paths to the bathroom or kitchen  Always turn on the light before you start walking  · Keep items you use often on shelves within reach  Do not use a step stool to help you reach an item  · Paint or place reflective tape on the edges of your stairs  This will help you see the stairs better  Follow up with your healthcare provider as directed:  Write down your questions so you remember to ask them during your visits  © 2017 [address] [facility] Information is for End User's use only and may not be sold, redistributed or otherwise used for commercial purposes  All illustrations and images included in CareNotes® are the copyrighted property of [employer]  or Moise Austin  The above information is an  only  It is not intended as medical advice for individual conditions or treatments  Talk to your doctor, nurse or pharmacist before following any medical regimen to see if it is safe and effective for you  Head Injury   WHAT YOU NEED TO KNOW:   A head injury is most often caused by a blow to the head  This may occur from a fall, bicycle injury, sports injury, being struck in the head, or a motor vehicle accident     DISCHARGE INSTRUCTIONS:   Call 911 or have someone else call for any of the following:   · You cannot be woken     · You have a seizure  · You stop responding to others or you faint  · You have blurry or double vision  · Your speech becomes slurred or confused  · You have arm or leg weakness, loss of feeling, or new problems with coordination  · Your pupils are larger than usual or one pupil is a different size than the other  · You have blood or clear fluid coming out of your ears or nose  Seek care immediately if:   · You have repeated or forceful vomiting  · You feel confused  · Your headache gets worse or becomes severe  · You or someone caring for you notices that you are harder to wake than usual   Contact your healthcare provider if:   · Your symptoms last longer than 6 weeks after the injury  · You have questions or concerns about your condition or care  Medicines:   · Acetaminophen  decreases pain  Acetaminophen is available without a doctor's order  Ask how much to take and how often to take it  Follow directions  Acetaminophen can cause liver damage if not taken correctly  · Take your medicine as directed  Contact your healthcare provider if you think your medicine is not helping or if you have side effects  Tell him or her if you are allergic to any medicine  Keep a list of the medicines, vitamins, and herbs you take  Include the amounts, and when and why you take them  Bring the list or the pill bottles to follow-up visits  Carry your medicine list with you in case of an emergency  Self-care:   · Rest  or do quiet activities for 24 to 48 hours  Limit your time watching TV, using the computer, or doing tasks that require a lot of thinking  Slowly return to your normal activities as directed  Do not play sports or do activities that may cause you to get hit in the head  Ask your healthcare provider when you can return to sports  · Apply ice  on your head for 15 to 20 minutes every hour or as directed  Use an ice pack, or put crushed ice in a plastic bag   Cover it with a towel before you apply it to your skin  Ice helps prevent tissue damage and decreases swelling and pain  · Have someone stay with you for 24 hours  or as directed  This person can monitor you for complications and call [phone]  When you are awake the person should ask you a few questions to see if you are thinking clearly  An example would be to ask your name or your address  Prevent another head injury:   · Wear a helmet that fits properly  Do this when you play sports, or ride a bike, scooter, or skateboard  Helmets help decrease your risk of a serious head injury  Talk to your healthcare provider about other ways you can protect yourself if you play sports  · Wear your seat belt every time you are in a car  This helps to decrease your risk for a head injury if you are in a car accident  Follow up with your healthcare provider as directed:  Write down your questions so you remember to ask them during your visits  © 2017 [address] [facility] Information is for End User's use only and may not be sold, redistributed or otherwise used for commercial purposes  All illustrations and images included in CareNotes® are the copyrighted property of [employer]  or Moise Austin  The above information is an  only  It is not intended as medical advice for individual conditions or treatments  Talk to your doctor, nurse or pharmacist before following any medical regimen to see if it is safe and effective for you  Thyroid Nodules   WHAT YOU NEED TO KNOW:   Thyroid nodules are growths on your thyroid gland  Your thyroid makes hormones that help control your body temperature, heart rate, and growth  The hormones also control how fast your body uses food for energy  Some nodules are lumps of tissue, and others are filled with fluid  DISCHARGE INSTRUCTIONS:   Medicines:   · Thyroid medicine  is given to bring your thyroid hormone levels back to a normal range  · Radioactive iodine  is given to damage cells in your thyroid gland and decrease the size of your nodules  · Take your medicine as directed  Contact your healthcare provider if you think your medicine is not helping or if you have side effects  Tell him or her if you are allergic to any medicine  Keep a list of the medicines, vitamins, and herbs you take  Include the amounts, and when and why you take them  Bring the list or the pill bottles to follow-up visits  Carry your medicine list with you in case of an emergency  Follow up with your healthcare provider as directed: You may need frequent blood tests to check your thyroid hormone levels  You may also need tests such as an ultrasound to check if any nodules are growing or have returned  If you had surgery, follow directions about how to care for your wound  Write down your questions so you remember to ask them during your visits  Eat iodine-rich foods:  Examples include fish, seaweed, dairy products, eggs, beans, and lean meat  Iodized salt also contains iodine  You may need to use iodized table salt when you cook and season your food  Iodine may be added to bread or to your drinking water  Ask for a list of foods that contain iodine, and ask how much iodine you need each day  Contact your healthcare provider if:   · You have a new cough that does not improve  · You begin choking or have new or increased trouble swallowing  · Your voice becomes hoarse  · You are losing weight without trying  · You have questions or concerns about your condition or care  Seek immediate care or call 911 if:   · You have redness, swelling, or drainage at your surgery site  · You have sudden chest pain or trouble breathing  · Your symptoms worsen, even after you take your medicines  © 2017 [employer] Information is for End User's use only and may not be sold, redistributed or otherwise used for commercial purposes   All illustrations and images included in CareNotes® are the copyrighted property of [employer]  or Moise Austin  The above information is an  only  It is not intended as medical advice for individual conditions or treatments  Talk to your doctor, nurse or pharmacist before following any medical regimen to see if it is safe and effective for you

## 2018-12-12 NOTE — ED PROCEDURE NOTE
PROCEDURE  ECG 12 Lead Documentation  Date/Time: 12/11/2018 11:03 PM  Performed by: Alexander Cox by: Isi Shelton     ECG reviewed by me, the ED Provider: yes    Patient location:  ED  Interpretation:     Interpretation: abnormal    Rate:     ECG rate:  79    ECG rate assessment: normal    Rhythm:     Rhythm: sinus rhythm    Ectopy:     Ectopy: PVCs    Conduction:     Conduction: abnormal      Abnormal conduction: 1st degree    ST segments:     ST segments:  Normal  T waves:     T waves: normal    Other findings:     Other findings: prolonged qTc interval           Jannie Deleon DO  12/11/18 0232

## 2018-12-13 LAB
ATRIAL RATE: 79 BPM
BACTERIA UR CULT: ABNORMAL
P AXIS: 54 DEGREES
PR INTERVAL: 274 MS
QRS AXIS: 84 DEGREES
QRSD INTERVAL: 108 MS
QT INTERVAL: 428 MS
QTC INTERVAL: 490 MS
T WAVE AXIS: 44 DEGREES
VENTRICULAR RATE: 79 BPM

## 2018-12-13 PROCEDURE — 93010 ELECTROCARDIOGRAM REPORT: CPT | Performed by: INTERNAL MEDICINE

## 2018-12-19 ENCOUNTER — EVALUATION (OUTPATIENT)
Dept: PHYSICAL THERAPY | Facility: CLINIC | Age: 78
End: 2018-12-19
Payer: MEDICARE

## 2018-12-19 DIAGNOSIS — R63.4 WEIGHT LOSS: Chronic | ICD-10-CM

## 2018-12-19 DIAGNOSIS — R26.89 OTHER ABNORMALITIES OF GAIT AND MOBILITY: ICD-10-CM

## 2018-12-19 DIAGNOSIS — R29.6 MULTIPLE FALLS: Primary | ICD-10-CM

## 2018-12-19 DIAGNOSIS — R53.1 WEAKNESS GENERALIZED: ICD-10-CM

## 2018-12-19 PROCEDURE — 97163 PT EVAL HIGH COMPLEX 45 MIN: CPT

## 2018-12-19 PROCEDURE — G8978 MOBILITY CURRENT STATUS: HCPCS | Performed by: PHYSICAL THERAPIST

## 2018-12-19 PROCEDURE — G8979 MOBILITY GOAL STATUS: HCPCS | Performed by: PHYSICAL THERAPIST

## 2018-12-19 PROCEDURE — 97110 THERAPEUTIC EXERCISES: CPT | Performed by: PHYSICAL THERAPIST

## 2018-12-19 NOTE — PROGRESS NOTES
PT Evaluation     Today's date: 2018  Patient name: Lobo Spencer  : 1940  MRN: [id number]  Referring provider: Naye Caruso MD  Dx:   Encounter Diagnosis     ICD-10-CM    1  Multiple falls R29 6    2  Other abnormalities of gait and mobility R26 89    3  Weakness generalized R53 1                   Assessment  Assessment details: Patient is a 77 y o  female presenting to outpatient PT with generalized imbalance, unsteadiness, and gait abnormalities affecting her ability to exercise, ascend/descend stairs, and perform ADLs such as doing laundry  Her static balance scores are as follows: mCTSIB 30 seconds for EO/EC on firm surface and 5 seconds for EO/EC on foam  Her dynamic balance scores are as follows: TUG was 20 seconds, 14 DGI, and 2 36 ft/sec for 10 meter walk test indicating risk for falls  The patient was able to walk 825 feet for the 6 Minute Walk Test and completed the 5x STS in 22 seconds with the use of BUE indicating decreased endurance  All tests were performed without the use of and AD, however pt states she has one and will bring it in next session  During ambulation, pt displayed flexed posture occasionally leading to a festinating gait pattern, increased lateral sway, and decreased toe clearance  Results of MMT to BLE indicate generalized weakness, however with decreased strength in the RLE  The patient will benefit from skilled outpatient PT services to address noted impairments and functional limitation they are causing with overall goal to reduce risk for falls and improve safety with functional mobility  Impairments: abnormal gait, impaired balance, impaired physical strength and poor posture   Understanding of Dx/Px/POC: excellent   Prognosis: good    Goals  ST weeks  1  Patient will increase mCTSIB with EO and EC on foam by 5 seconds  2  Patient will improve 6 MWT from 825 feet to 1015 feet in order to meet MDC and increase her endurance to encourage walking    3  Patient will increase LE strength by a factor of 1 in order to improve gait mechanics and encourage stabilization when ambulating  4  Patient will improve her 10 meter walk test score from 2 36 ft/sec to 2 78 ft/sec to encourage increased safety when ambulating in the community  LT weeks  1  Patient will demonstrate improved balance in order to carry her laundry basket and perform ADLs  2  Patient will be able to reciprocally climb a flight of stairs independently using 1 HR in order to enter her home and access the second floor  3  Patient will be able to tolerate 20 minutes of walking in order to exercise  4  Patient will score low risk for falls for 3/3 fall risk measures  Plan  Patient would benefit from: skilled physical therapy  Planned therapy interventions: abdominal trunk stabilization, ADL retraining, manual therapy, joint mobilization, balance, postural training, patient education, neuromuscular re-education, strengthening, stretching, therapeutic activities, therapeutic exercise and home exercise program  Frequency: 3x week  Duration in weeks: 8  Plan of Care beginning date: 2018  Plan of Care expiration date: 2/15/2019  Treatment plan discussed with: patient        Subjective Evaluation    History of Present Illness  Mechanism of injury: Patient walks into PT today with a bruise around her R eye and without an AD, however notes that she has a quad cane that she has been using off and on since last Tuesday  She reports that she has fallen 5-6 times in the past year and broke her R hip in 2018  She was admitted to the hospital and was d/c from rehab at the end of March  Patient reports that when she fell last Tuesday she hit her R eye on the ground which lead to her bruise  She went to the ED where there performed a CT Scan and the results were negative  The patient's most recent fall was last night when she was walking out of a restaurant and reports no injuries from it  Patient notes that she "feels unstable" and when she falls it is always to her R side  Pain  No pain reported    Social Support  Steps to enter house: yes  Stairs in house: yes   Lives with: alone    Employment status: not working  Treatments  Current treatment: physical therapy  Patient Goals  Patient goals for therapy: improved balance, increased motion, increased strength and independence with ADLs/IADLs          Objective     Static Posture     Comments  Static Balance Testing:    MCTSIB:  Feet together Eyes open Firm surface: 30 seconds   Feet together Eyes closed Firm surface: 30 seconds  Feet together Eyes open Foam surface: 5 seconds  Feet together Eyes closed Foam surface: 5 seconds  Strength/Myotome Testing     Left Hip   Planes of Motion   Flexion: 4-  Extension: 4-  Abduction: 4-  Adduction: 4    Right Hip   Planes of Motion   Flexion: 3+  Extension: 4-  Abduction: 4  Adduction: 4    Left Knee   Flexion: 4+  Extension: 4    Right Knee   Flexion: 4+  Extension: 4-    Left Ankle/Foot   Dorsiflexion: 4+    Right Ankle/Foot   Dorsiflexion: 4+    Additional Strength Details  Sensation:   Normal denies any issues       Coordination  Heel to Shin Test: Normal       Ambulation     Comments   Dynamic Endurance testing    6 minute walk test:  825 feet     Dynamic Balance/Fall risk testing    TUG: 10 feet down and back   20 seconds    10 meter walk test:  14 seconds = 2 36 ft/sec     DGI: 14/24    Gait Deviations: decreased toe clearance, increased lateral sway, flexed posture when walking, decreased stride/step length      Functional Assessment     Comments  Endurance   5 time sit to stand test:   22 seconds with UE use              Precautions: Falls risk      Date/ visit  12/19, Visit 1         STS 10 reps         FTEC Firm/Foam         Standing hip  Flexion  Extension  Abduction  10 reps R/L         Tandem walking          Step taps          Step Ups

## 2018-12-21 ENCOUNTER — OFFICE VISIT (OUTPATIENT)
Dept: PHYSICAL THERAPY | Facility: CLINIC | Age: 78
End: 2018-12-21
Payer: MEDICARE

## 2018-12-21 DIAGNOSIS — R53.1 WEAKNESS GENERALIZED: ICD-10-CM

## 2018-12-21 DIAGNOSIS — R29.6 MULTIPLE FALLS: Primary | ICD-10-CM

## 2018-12-21 DIAGNOSIS — R63.4 WEIGHT LOSS: ICD-10-CM

## 2018-12-21 DIAGNOSIS — R26.89 OTHER ABNORMALITIES OF GAIT AND MOBILITY: ICD-10-CM

## 2018-12-21 PROCEDURE — 97110 THERAPEUTIC EXERCISES: CPT | Performed by: PHYSICAL THERAPIST

## 2018-12-21 PROCEDURE — 97112 NEUROMUSCULAR REEDUCATION: CPT | Performed by: PHYSICAL THERAPIST

## 2018-12-21 NOTE — PROGRESS NOTES
Daily Note     Today's date: 2018  Patient name: Alejo Angelucci  : 1940  MRN: [id number]  Referring provider: Cy Tran MD  Dx:   Encounter Diagnosis     ICD-10-CM    1  Multiple falls R29 6    2  Other abnormalities of gait and mobility R26 89    3  Weakness generalized R53 1    4  Weight loss R63 4                   Subjective: Denies any pain at this time from initial evaluation  Feels pretty good today  Objective: See treatment diary below  Precautions: Falls risk  Date/ visit  , Visit 1         STS 10 reps  2 sets, 10 reps        FTEC Firm/Foam  5 reps 30 sec  On foam       Standing hip  Flexion  Extension  Abduction  10 reps R/L  3 sec iso hold  2 sets, 10 reps        Tandem walking   2 cycles       Step taps   20 reps each  6" step       Step Ups   20 reps each  6" step       Toe raises  20 reps each  1 foot at a time       Heel raises  20 reps each  B/L feet at same time       HEP  Heel/toe raises, STS, Hip 3 way                                                                                                              Assessment:Patient was able to tolerate treatment well today with new exercises  Patient required use of BUE in order to perform STS indicating generalized LE weakness  She demonstrated increased lateral sway with FTEC on foam however with no LOB  Patient required use of 1 UE during tandem walking  Patient with good toe clearance during step taps and step ups indicating good DF strength, however fatigued quickly  She required frequent 1 minute rest breaks in between exercises  Patient will benefit from skilled outpatient PT services in order to improve her balance and decrease her risk for falls  Plan: Continue per plan of care

## 2019-01-02 ENCOUNTER — OFFICE VISIT (OUTPATIENT)
Dept: PHYSICAL THERAPY | Facility: CLINIC | Age: 79
End: 2019-01-02
Payer: MEDICARE

## 2019-01-02 DIAGNOSIS — R26.89 OTHER ABNORMALITIES OF GAIT AND MOBILITY: ICD-10-CM

## 2019-01-02 DIAGNOSIS — R29.6 MULTIPLE FALLS: Primary | ICD-10-CM

## 2019-01-02 DIAGNOSIS — R53.1 WEAKNESS GENERALIZED: ICD-10-CM

## 2019-01-02 PROCEDURE — 97110 THERAPEUTIC EXERCISES: CPT | Performed by: PHYSICAL THERAPIST

## 2019-01-02 PROCEDURE — 97112 NEUROMUSCULAR REEDUCATION: CPT | Performed by: PHYSICAL THERAPIST

## 2019-01-02 NOTE — PROGRESS NOTES
Daily Note     Today's date: 2019  Patient name: Leny Domingo  : 1940  MRN: [id number]  Referring provider: Jayashree Castellano MD  Dx:   Encounter Diagnosis     ICD-10-CM    1  Multiple falls R29 6    2  Other abnormalities of gait and mobility R26 89    3  Weakness generalized R53 1                   Subjective: Pt denies any falls, reports feeling pretty good  Reports she has started using quad cane all the time for ambulation  Reports her brother passed away recently so she has not been adhering to HEP  Objective: See treatment diary below  Precautions: Falls risk  Date/ visit  , Visit 1  19      STS 10 reps  2 sets, 10 reps  2 sets, 10 reps      FTEC Firm/Foam  5 reps 30 sec  On foam 5 reps   30 sec  On foam  LOB: 3      Standing hip  Flexion  Extension  Abduction  10 reps R/L  3 sec iso hold  2 sets, 10 reps  3 sec iso hold  2 sets, 10 reps      Tandem walking   2 cycles 1 UE  6 cycles      Step taps   20 reps each  6" step 8" step  1 UE  20 reps      Step Ups   20 reps each  6" step Fwd, Lat  8" step from foam   1 UE  20 reps each      Toe raises  20 reps each  1 foot at a time 20 reps   B/L feet same time      Heel raises  20 reps each  B/L feet at same time 20 reps   B/L feet same time      HEP  Heel/toe raises, STS, Hip 3 way Reviewed       Side-stepping   1 UE   6 cycles      [place]    1 UE  2 cycles      Gait with head turns    25 ft  2 cycles  H, V                                                                                   Assessment:  Pt tolerated treatment well today with addition of new exercises  Trial pt with STS holding physioball next session to work on increasing LE strength and decrease reliance on UE's  Pt able to perform standing TE with 1 UE support today  Added foam pad for step ups  Pt challenged with FTEC on foam as well as ambulation with head turns   Pt with slow gait speed and pauses when turning head, difficulty maintaining fluid gait pattern  Pt will benefit from skilled outpatient PT services in order to improve her balance and decrease her risk for falls  Plan: Continue per plan of care

## 2019-01-04 ENCOUNTER — OFFICE VISIT (OUTPATIENT)
Dept: PHYSICAL THERAPY | Facility: CLINIC | Age: 79
End: 2019-01-04
Payer: MEDICARE

## 2019-01-04 DIAGNOSIS — R63.4 WEIGHT LOSS: ICD-10-CM

## 2019-01-04 DIAGNOSIS — R26.89 OTHER ABNORMALITIES OF GAIT AND MOBILITY: ICD-10-CM

## 2019-01-04 DIAGNOSIS — R29.6 MULTIPLE FALLS: Primary | ICD-10-CM

## 2019-01-04 DIAGNOSIS — R53.1 WEAKNESS GENERALIZED: ICD-10-CM

## 2019-01-04 PROCEDURE — 97112 NEUROMUSCULAR REEDUCATION: CPT | Performed by: PHYSICAL THERAPIST

## 2019-01-04 PROCEDURE — 97110 THERAPEUTIC EXERCISES: CPT | Performed by: PHYSICAL THERAPIST

## 2019-01-04 NOTE — PROGRESS NOTES
Daily Note     Today's date: 2019  Patient name: Soto Waldron  : 1940  MRN: [id number]  Referring provider: Toñito Sosa MD  Dx:   Encounter Diagnosis     ICD-10-CM    1  Multiple falls R29 6    2  Other abnormalities of gait and mobility R26 89    3  Weakness generalized R53 1    4  Weight loss R63 4                   Subjective: Patient denies any falls and continues to report use of quad cane throughout the day  Objective: See treatment diary below  Precautions: Falls risk  Date/ visit  , Visit 1  12/21 1/2/19 1/3     STS 10 reps  2 sets, 10 reps  2 sets, 10 reps 25 reps     FTEC Firm/Foam  5 reps 30 sec  On foam 5 reps   30 sec  On foam  LOB: 3 5 reps  30 sec  On foam  LOB: 1     Standing hip  Flexion  Extension  Abduction  10 reps R/L  3 sec iso hold  2 sets, 10 reps  3 sec iso hold  2 sets, 10 reps 20 reps  3 sec hold  On foam  1 UE support     Tandem walking   2 cycles 1 UE  6 cycles 1 UE  3 cycles     Step taps   20 reps each  6" step 8" step  1 UE  20 reps      Step Ups   20 reps each  6" step Fwd, Lat  8" step from foam   1 UE  20 reps each 20 reps  8"step  From foam  FWD/LAT  1 UE support     Toe raises  20 reps each  1 foot at a time 20 reps   B/L feet same time      Heel raises  20 reps each  B/L feet at same time 20 reps   B/L feet same time      HEP  Heel/toe raises, STS, Hip 3 way Reviewed       Side-stepping   1 UE   6 cycles      [place]    1 UE  2 cycles 2 cycles  1 UE     Gait with head turns    25 ft  2 cycles  H, V       180 degree turns    20 reps each     Hurdles    4 cycles each  6"  FWD/LAT  1 UE support                                                               Assessment: Patient was able to tolerate treatment session well today with addition of new exercises  Patient with slow [place] during exercises emphasizing motor control through LE  Patient continues to rely on 1 UE support throughout exercises to maintain balance   During laura negotiation she demonstrated hip ER compensation to step over and required verbal cues to "keep her toes pointed straight " Patient with difficulty turning 180 degrees and was cued to increased her base of support and to avoiding "sliding her feet" to help prevent falls  Patient will continue to tolerate skilled outpatient PT services to decrease her risk for falls and maximize her function  Plan: Continue per plan of care

## 2019-01-08 ENCOUNTER — OFFICE VISIT (OUTPATIENT)
Dept: PHYSICAL THERAPY | Facility: CLINIC | Age: 79
End: 2019-01-08
Payer: MEDICARE

## 2019-01-08 DIAGNOSIS — R63.4 WEIGHT LOSS: ICD-10-CM

## 2019-01-08 DIAGNOSIS — R53.1 WEAKNESS GENERALIZED: ICD-10-CM

## 2019-01-08 DIAGNOSIS — R26.89 OTHER ABNORMALITIES OF GAIT AND MOBILITY: ICD-10-CM

## 2019-01-08 DIAGNOSIS — R29.6 MULTIPLE FALLS: Primary | ICD-10-CM

## 2019-01-08 PROCEDURE — 97112 NEUROMUSCULAR REEDUCATION: CPT | Performed by: PHYSICAL THERAPIST

## 2019-01-08 PROCEDURE — 97110 THERAPEUTIC EXERCISES: CPT | Performed by: PHYSICAL THERAPIST

## 2019-01-08 NOTE — PROGRESS NOTES
Daily Note     Today's date: 2019  Patient name: Uriah Guy  : 1940  MRN: [id number]  Referring provider: Mio Sheriff MD  Dx:   Encounter Diagnosis     ICD-10-CM    1  Multiple falls R29 6    2  Other abnormalities of gait and mobility R26 89    3  Weakness generalized R53 1    4  Weight loss R63 4        Start Time: 1115  Stop Time: 1215  Total time in clinic (min): 60 minutes    Subjective: Patient arrives to PT with quad cane today and denies any falls  Patient notes a pain in her stomach a few days ago, but went away and has not had it since  Patient states she has an appointment with her cardiologist next week  Objective: See treatment diary below  Precautions: Falls risk      Date/ visit  , Visit 1  12/21 1/2/19 1/3 1/8   STS 10 reps  2 sets, 10 reps  2 sets, 10 reps 25 reps 2 sets, 10 reps  Onto foam   FTEC Firm/Foam  5 reps 30 sec  On foam 5 reps   30 sec  On foam  LOB: 3 5 reps  30 sec  On foam  LOB: 1 5 reps  30 sec  On foam  LOB: 0   Standing hip  Flexion  Extension  Abduction  10 reps R/L  3 sec iso hold  2 sets, 10 reps  3 sec iso hold  2 sets, 10 reps 20 reps  3 sec hold  On foam  1 UE support 20 reps  3 sec hold  On foam  1 UE support  1# ankle weight   Tandem walking   2 cycles 1 UE  6 cycles 1 UE  3 cycles 4 cycles  1 UE   Step taps   20 reps each  6" step 8" step  1 UE  20 reps     Step Ups   20 reps each  6" step Fwd, Lat  8" step from foam   1 UE  20 reps each 20 reps  8"step  From foam  FWD/LAT  1 UE support 20 reps  8" steps  From foam  FWD/LAT  1 UE support  1# ankle weight   Toe raises  20 reps each  1 foot at a time 20 reps   B/L feet same time     Heel raises  20 reps each  B/L feet at same time 20 reps   B/L feet same time     HEP  Heel/toe raises, STS, Hip 3 way Reviewed      Side-stepping   1 UE   6 cycles     [place]    1 UE  2 cycles 2 cycles  1 UE    Gait with head turns    25 ft  2 cycles  H, V      180 degree turns    20 reps each 20 reps each Hurdles    4 cycles each  6"  FWD/LAT  1 UE support 4 cycles each  6" laura  FWD/LAT  1 UE support                                                       Assessment: Patient was able to tolerate treatment session well today with progression of exercises by increasing resistance with 1 lb ankle weights  Patient continues to rely on 1 UE support throughout exercises to maintain her balance  She continues to have difficulty with 180 degree turns and requires verbal cues to increase her base of support throughout  Patient demonstrated hip ER during lateral laura negotiation and was cues to "keep her toes pointed forward" to avoid compensation  Patient will continue to benefit from skilled outpatient PT services to decrease her risk for falls and maximize her function  Plan: Continue per plan of care

## 2019-01-10 ENCOUNTER — OFFICE VISIT (OUTPATIENT)
Dept: PHYSICAL THERAPY | Facility: CLINIC | Age: 79
End: 2019-01-10
Payer: MEDICARE

## 2019-01-10 ENCOUNTER — APPOINTMENT (OUTPATIENT)
Dept: LAB | Facility: CLINIC | Age: 79
End: 2019-01-10
Payer: MEDICARE

## 2019-01-10 DIAGNOSIS — R26.89 OTHER ABNORMALITIES OF GAIT AND MOBILITY: ICD-10-CM

## 2019-01-10 DIAGNOSIS — R53.1 WEAKNESS GENERALIZED: ICD-10-CM

## 2019-01-10 DIAGNOSIS — E78.5 DYSLIPIDEMIA: ICD-10-CM

## 2019-01-10 DIAGNOSIS — I12.9 PARENCHYMAL RENAL HYPERTENSION, STAGE 1 THROUGH STAGE 4 OR UNSPECIFIED CHRONIC KIDNEY DISEASE: ICD-10-CM

## 2019-01-10 DIAGNOSIS — I12.9 HYPERTENSIVE CHRONIC KIDNEY DISEASE WITH STAGE 1 THROUGH STAGE 4 CHRONIC KIDNEY DISEASE, OR UNSPECIFIED CHRONIC KIDNEY DISEASE: ICD-10-CM

## 2019-01-10 DIAGNOSIS — R63.4 WEIGHT LOSS: ICD-10-CM

## 2019-01-10 DIAGNOSIS — N18.30 ANEMIA OF CHRONIC RENAL FAILURE, STAGE 3 (MODERATE) (HCC): ICD-10-CM

## 2019-01-10 DIAGNOSIS — E55.9 VITAMIN D DEFICIENCY: ICD-10-CM

## 2019-01-10 DIAGNOSIS — N18.30 BENIGN HYPERTENSION WITH CHRONIC KIDNEY DISEASE, STAGE III (HCC): Chronic | ICD-10-CM

## 2019-01-10 DIAGNOSIS — D63.1 ANEMIA OF CHRONIC RENAL FAILURE, STAGE 3 (MODERATE) (HCC): ICD-10-CM

## 2019-01-10 DIAGNOSIS — I12.9 BENIGN HYPERTENSION WITH CHRONIC KIDNEY DISEASE, STAGE III (HCC): Chronic | ICD-10-CM

## 2019-01-10 DIAGNOSIS — R29.6 MULTIPLE FALLS: Primary | ICD-10-CM

## 2019-01-10 DIAGNOSIS — N18.30 CHRONIC KIDNEY DISEASE, STAGE III (MODERATE) (HCC): Chronic | ICD-10-CM

## 2019-01-10 DIAGNOSIS — E61.1 IRON DEFICIENCY: ICD-10-CM

## 2019-01-10 LAB
ANION GAP SERPL CALCULATED.3IONS-SCNC: 6 MMOL/L (ref 4–13)
BUN SERPL-MCNC: 17 MG/DL (ref 5–25)
CALCIUM SERPL-MCNC: 9.1 MG/DL (ref 8.3–10.1)
CHLORIDE SERPL-SCNC: 104 MMOL/L (ref 100–108)
CO2 SERPL-SCNC: 27 MMOL/L (ref 21–32)
CREAT SERPL-MCNC: 1.09 MG/DL (ref 0.6–1.3)
GFR SERPL CREATININE-BSD FRML MDRD: 48 ML/MIN/1.73SQ M
GLUCOSE SERPL-MCNC: 92 MG/DL (ref 65–140)
POTASSIUM SERPL-SCNC: 3.6 MMOL/L (ref 3.5–5.3)
SODIUM SERPL-SCNC: 137 MMOL/L (ref 136–145)

## 2019-01-10 PROCEDURE — 97110 THERAPEUTIC EXERCISES: CPT | Performed by: PHYSICAL THERAPIST

## 2019-01-10 PROCEDURE — 36415 COLL VENOUS BLD VENIPUNCTURE: CPT

## 2019-01-10 PROCEDURE — 97112 NEUROMUSCULAR REEDUCATION: CPT | Performed by: PHYSICAL THERAPIST

## 2019-01-10 PROCEDURE — 80048 BASIC METABOLIC PNL TOTAL CA: CPT

## 2019-01-10 NOTE — PROGRESS NOTES
Daily Note     Today's date: 1/10/2019  Patient name: Greta Mckeon  : 1940  MRN: [id number]  Referring provider: Raúl Loera MD  Dx:   Encounter Diagnosis     ICD-10-CM    1  Multiple falls R29 6    2  Other abnormalities of gait and mobility R26 89    3  Weakness generalized R53 1    4  Weight loss R63 4                   Subjective: Patient arrives to PT with quad cane today and denies any falls  Patient notes a pain in her back that "feels like a knot"  Patient notes that the wind bothered her this morning and "threw me a little off balance "      Objective: See treatment diary below  Precautions: Falls risk      Date/ visit  1/10 12/21 1/2/19 1/3 1/8   STS 2 sets, 10 reps  Onto foam 2 sets, 10 reps  2 sets, 10 reps 25 reps 2 sets, 10 reps  Onto foam   FTEC Firm/Foam 6 reps 30 sec  On foam  LOB: 8 5 reps 30 sec  On foam 5 reps   30 sec  On foam  LOB: 3 5 reps  30 sec  On foam  LOB: 1 5 reps  30 sec  On foam  LOB: 0   Standing hip  Flexion  Extension  Abduction  20 reps  3 sec hold  On foam  1 UE support  1# ankle weight 3 sec iso hold  2 sets, 10 reps  3 sec iso hold  2 sets, 10 reps 20 reps  3 sec hold  On foam  1 UE support 20 reps  3 sec hold  On foam  1 UE support  1# ankle weight   Tandem walking   2 cycles 1 UE  6 cycles 1 UE  3 cycles 4 cycles  1 UE   Step taps   20 reps each  6" step 8" step  1 UE  20 reps     Step Ups   20 reps each  6" step Fwd, Lat  8" step from foam   1 UE  20 reps each 20 reps  8"step  From foam  FWD/LAT  1 UE support 20 reps  8" steps  From foam  FWD/LAT  1 UE support  1# ankle weight   Toe raises  20 reps each  1 foot at a time 20 reps   B/L feet same time     Heel raises  20 reps each  B/L feet at same time 20 reps   B/L feet same time     HEP  Heel/toe raises, STS, Hip 3 way Reviewed      Side-stepping On foam  2 cycles  1 UE   6 cycles     [place]  3 cycles  1 UE  1 UE  2 cycles 2 cycles  1 UE    Gait with head turns    25 ft  2 cycles  H, V      180 degree turns 20 reps each 20 reps each   Hurdles 3 cycles each  6" laura  FWD/LAT/BWD  1 UE support   4 cycles each  6"  FWD/LAT  1 UE support 4 cycles each  6" laura  FWD/LAT  1 UE support                                                       Assessment: Patient was able to tolerate treatment session well today with progression of exercises  Patient demonstrated increased LOB during FTEC on foam with improvement following verbal cues to use a hip strategy  She required verbal cues to encourage hip extension with good carryover  Patient remains with difficulty coordinating [place] exercise, however with improvement when mirroring PT  Patient demonstrated improved self correction to avoid hip ER compensation when sidestepping over hurdles that carried over into sidestepping on the foam beam  Patient will continue to benefit from skilled outpatient PT services to decrease her risk for falls and maximize her function  Plan: Continue per plan of care

## 2019-01-11 ENCOUNTER — TELEPHONE (OUTPATIENT)
Dept: NEPHROLOGY | Facility: CLINIC | Age: 79
End: 2019-01-11

## 2019-01-11 DIAGNOSIS — E78.5 DYSLIPIDEMIA: ICD-10-CM

## 2019-01-11 DIAGNOSIS — N18.30 CKD (CHRONIC KIDNEY DISEASE), STAGE III (HCC): Primary | ICD-10-CM

## 2019-01-11 DIAGNOSIS — E61.1 IRON DEFICIENCY: ICD-10-CM

## 2019-01-11 NOTE — TELEPHONE ENCOUNTER
----- Message from Marleny Jane MD sent at 1/10/2019  4:53 PM EST -----  THE PATIENT'S LABS LOOK GREAT, POTASSIUM IS LOW NORMAL AT 3 6  RECOMMENDATIONS:  1   INCREASE POTASSIUM IN HER DIET INCLUDING FRUITS AND VEGETABLES  2  REPEAT A POTASSIUM IN ABOUT 3 WEEKS NONFASTING

## 2019-01-11 NOTE — TELEPHONE ENCOUNTER
I spoke to patient and made her aware that that potassium is at 3 6, per Dr webb she should increase her potassium in diet fruits, and vegetables  Potassium will be checked in 3 weeks slip mailed out

## 2019-01-16 ENCOUNTER — OFFICE VISIT (OUTPATIENT)
Dept: PHYSICAL THERAPY | Facility: CLINIC | Age: 79
End: 2019-01-16
Payer: MEDICARE

## 2019-01-16 DIAGNOSIS — R26.89 OTHER ABNORMALITIES OF GAIT AND MOBILITY: ICD-10-CM

## 2019-01-16 DIAGNOSIS — R63.4 WEIGHT LOSS: ICD-10-CM

## 2019-01-16 DIAGNOSIS — R29.6 MULTIPLE FALLS: Primary | ICD-10-CM

## 2019-01-16 DIAGNOSIS — R53.1 WEAKNESS GENERALIZED: ICD-10-CM

## 2019-01-16 PROCEDURE — 97112 NEUROMUSCULAR REEDUCATION: CPT

## 2019-01-16 PROCEDURE — 97110 THERAPEUTIC EXERCISES: CPT

## 2019-01-16 NOTE — PROGRESS NOTES
Daily Note     Today's date: 2019  Patient name: Kim Beebe  : 1940  MRN: [id number]  Referring provider: Drew Calero MD  Dx:   Encounter Diagnosis     ICD-10-CM    1  Multiple falls R29 6    2  Weakness generalized R53 1    3  Other abnormalities of gait and mobility R26 89    4  Weight loss R63 4        Start Time: 1005  Stop Time: 1100  Total time in clinic (min): 55 minutes    Subjective: No complaints since last visit  Objective: See treatment diary below  Precautions: Falls risk      Date/ visit  1/10 1/16/19 1/2/19 1/3 1/8   STS 2 sets, 10 reps  Onto foam   2 sets, 10 reps 25 reps 2 sets, 10 reps  Onto foam   FTEC Firm/Foam 6 reps 30 sec  On foam  LOB: 8 6 reps 30 sec  On foam  LOB:0 5 reps   30 sec  On foam  LOB: 3 5 reps  30 sec  On foam  LOB: 1 5 reps  30 sec  On foam  LOB: 0   Standing hip  Flexion  Extension  Abduction  20 reps  3 sec hold  On foam  1 UE support  1# ankle weight 3 sec iso hold  2 setOn foam  1 UE support  1# ankle weights, 10 reps  3 sec iso hold  2 sets, 10 reps 20 reps  3 sec hold  On foam  1 UE support 20 reps  3 sec hold  On foam  1 UE support  1# ankle weight   Tandem walking   4 cycles  1 UE  Forward and back  Foam BB 1 UE  6 cycles 1 UE  3 cycles 4 cycles  1 UE   Step taps    8" step  1 UE  20 reps     Step Ups     20 reps  8" steps  From foam  FWD/LAT  1 UE support  1# ankle weight Fwd, Lat  8" step from foam   1 UE  20 reps each 20 reps  8"step  From foam  FWD/LAT  1 UE support 20 reps  8" steps  From foam  FWD/LAT  1 UE support  1# ankle weight   Toe raises   20 reps   B/L feet same time     Heel raises   20 reps   B/L feet same time     HEP   Reviewed      Side-stepping On foam  2 cycles On foam  6cycles 1 UE   6 cycles     [place]  3 cycles  1 UE  1 UE  2 cycles 2 cycles  1 UE    Gait with head turns    25 ft  2 cycles  H, V      180 degree turns    20 reps each 20 reps each   Hurdles 3 cycles each  6" laura  FWD/LAT/BWD  1 UE support 6 cycles 12" laura  FWD/BWD  2 UE suppor  4 cycles each  6"  FWD/LAT  1 UE support 4 cycles each  6" [place]  FWD/LAT  1 UE support   Tandem Stance  2 x 30" UE support       Balance Board  20 x forward and back 20 x L & R                                             Assessment: Tolerated treatment well  Progressed hurdles to 12" which patient completed but required PT verbal cues to clear hurdles and requiring BL UE support  Progressed tandem walking to BB but patient tended to look down at her feet requiring PT cues to maintain upright posture  Added Balance board which patient completed requiring UE support and showed apprehension and fearfulness but by end of sets showed improve confidence  Patient would benefit from continued PT in order to continue to improve balance and LE strength impairments  Plan: Continue per plan of care

## 2019-01-18 ENCOUNTER — APPOINTMENT (OUTPATIENT)
Dept: PHYSICAL THERAPY | Facility: CLINIC | Age: 79
End: 2019-01-18
Payer: MEDICARE

## 2019-01-21 ENCOUNTER — APPOINTMENT (OUTPATIENT)
Dept: PHYSICAL THERAPY | Facility: CLINIC | Age: 79
End: 2019-01-21
Payer: MEDICARE

## 2019-01-23 ENCOUNTER — OFFICE VISIT (OUTPATIENT)
Dept: PHYSICAL THERAPY | Facility: CLINIC | Age: 79
End: 2019-01-23
Payer: MEDICARE

## 2019-01-23 DIAGNOSIS — R53.1 WEAKNESS GENERALIZED: ICD-10-CM

## 2019-01-23 DIAGNOSIS — R26.89 OTHER ABNORMALITIES OF GAIT AND MOBILITY: ICD-10-CM

## 2019-01-23 DIAGNOSIS — R29.6 MULTIPLE FALLS: Primary | ICD-10-CM

## 2019-01-23 DIAGNOSIS — R63.4 WEIGHT LOSS: ICD-10-CM

## 2019-01-23 PROCEDURE — 97110 THERAPEUTIC EXERCISES: CPT | Performed by: PHYSICAL THERAPIST

## 2019-01-23 PROCEDURE — 97112 NEUROMUSCULAR REEDUCATION: CPT | Performed by: PHYSICAL THERAPIST

## 2019-01-23 PROCEDURE — 97530 THERAPEUTIC ACTIVITIES: CPT | Performed by: PHYSICAL THERAPIST

## 2019-01-23 NOTE — PROGRESS NOTES
Progress Report     Today's date: 2019  Patient name: Greta Mckeon  : 1940  MRN: [id number]  Referring provider: Raúl Loera MD  Dx:   Encounter Diagnosis     ICD-10-CM    1  Multiple falls R29 6    2  Weakness generalized R53 1    3  Other abnormalities of gait and mobility R26 89    4  Weight loss R63 4                   Assessment  Assessment details: Patient is a 77 y o  female presenting to outpatient PT with generalized imbalance, unsteadiness, and gait abnormalities affecting her ability to exercise, ascend/descend stairs, and perform ADLs such as doing laundry  Her static balance scores are as follows: mCTSIB 30 seconds for EO/EC on firm surface and has improved to 30 seconds on foam with EO and 13 seconds for EC  Her dynamic balance scores are as follows: TUG was 10 36 seconds, 18/24 DGI, and 3 35 ft/sec for 10 meter walk test indicating decreased risk for falls  The patient was able to walk 975 feet for the 6 Minute Walk Test and completed the 5x STS in 21 seconds without the use of BUE indicating improved endurance  All tests were performed without the use of a quad cane, and patient notes that she primarily uses it outside  During ambulation, patient continues to display flexed posture occasionally leading to a festinating gait pattern and decreased toe clearance noted as she fatigues and remains with difficulty during turns  Results of MMT to BLE indicate improvement to overall score of 4/5  The patient will continue benefit from skilled outpatient PT services to address noted impairments and functional limitation they are causing with overall goal to reduce risk for falls and improve safety with functional mobility  Impairments: abnormal gait, impaired balance, impaired physical strength and poor posture   Understanding of Dx/Px/POC: excellent   Prognosis: good    Goals  ST weeks  1  Patient will increase mCTSIB with EO and EC on foam by 5 seconds  MET  2   Patient will improve 6 MWT from 825 feet to 1015 feet in order to meet MDC and increase her endurance to encourage walking  NOT MET  3  Patient will increase LE strength by a factor of 1 in order to improve gait mechanics and encourage stabilization when ambulating  MET  4  Patient will improve her 10 meter walk test score from 2 36 ft/sec to 2 78 ft/sec to encourage increased safety when ambulating in the community  MET    LT weeks  1  Patient will demonstrate improved balance in order to carry her laundry basket and perform ADLs  NOT MET  2  Patient will be able to reciprocally climb a flight of stairs independently using 1 HR in order to enter her home and access the second floor  NOT MET  3  Patient will be able to tolerate 20 minutes of walking in order to exercise  NOT MET  4  Patient will score low risk for falls for 3/3 fall risk measures  NOT MET    Plan  Patient would benefit from: skilled physical therapy  Planned therapy interventions: abdominal trunk stabilization, ADL retraining, manual therapy, joint mobilization, balance, postural training, patient education, neuromuscular re-education, strengthening, stretching, therapeutic activities, therapeutic exercise and home exercise program  Frequency: 3x week  Duration in weeks: 8  Plan of Care beginning date: 2018  Plan of Care expiration date: 2/15/2019  Treatment plan discussed with: patient        Subjective Evaluation    History of Present Illness  Mechanism of injury: Patient arrives to PT today using quad cane   She reports that she still feels "a little weary that I am going to fall to my right " She notes that she has been able to do more and "doesn't need the cane all of the time in the house "  Pain  No pain reported    Social Support  Steps to enter house: yes  Stairs in house: yes   Lives with: alone    Employment status: not working  Treatments  Current treatment: physical therapy  Patient Goals  Patient goals for therapy: improved balance, increased motion, increased strength and independence with ADLs/IADLs          Objective     Static Posture     Comments  Static Balance Testing:    MCTSIB:  Feet together Eyes open Firm surface: 30 seconds   Feet together Eyes closed Firm surface: 30 seconds  Feet together Eyes open Foam surface: 30 seconds  Feet together Eyes closed Foam surface: 13 seconds  Strength/Myotome Testing     Left Hip   Planes of Motion   Flexion: 4-  Extension: 4-  Abduction: 4  Adduction: 4    Right Hip   Planes of Motion   Flexion: 4-  Extension: 4-  Abduction: 4  Adduction: 4    Left Knee   Flexion: 4+  Extension: 4+    Right Knee   Flexion: 4+  Extension: 4+    Left Ankle/Foot   Dorsiflexion: 4+  Plantar flexion: 4    Right Ankle/Foot   Dorsiflexion: 4+  Plantar flexion: 4    Additional Strength Details  Sensation:   Normal denies any issues       Coordination  Heel to Shin Test: Normal       Ambulation     Comments   Dynamic Endurance testing    6 minute walk test:  975 feet    Dynamic Balance/Fall risk testing    TUG: 10 feet down and back   10 36 seconds without quad cane    10 meter walk test:  9 84 seconds = 3 35 ft/sec without quad cane    DGI: 18/24    Gait Deviations: decreased toe clearance, increased lateral sway, flexed posture when walking, decreased stride/step length      Functional Assessment     Comments  Endurance   5 time sit to stand test:   21 seconds without UE use              Precautions: Falls risk      Date/ visit  12/19, Visit 1         STS 10 reps         FTEC Firm/Foam         Standing hip  Flexion  Extension  Abduction  10 reps R/L         Tandem walking          Step taps          Step Ups

## 2019-01-28 ENCOUNTER — OFFICE VISIT (OUTPATIENT)
Dept: PHYSICAL THERAPY | Facility: CLINIC | Age: 79
End: 2019-01-28
Payer: MEDICARE

## 2019-01-28 DIAGNOSIS — R26.89 OTHER ABNORMALITIES OF GAIT AND MOBILITY: ICD-10-CM

## 2019-01-28 DIAGNOSIS — R53.1 WEAKNESS GENERALIZED: ICD-10-CM

## 2019-01-28 DIAGNOSIS — R29.6 MULTIPLE FALLS: Primary | ICD-10-CM

## 2019-01-28 DIAGNOSIS — R63.4 WEIGHT LOSS: ICD-10-CM

## 2019-01-28 PROCEDURE — 97110 THERAPEUTIC EXERCISES: CPT | Performed by: PHYSICAL THERAPIST

## 2019-01-28 PROCEDURE — 97530 THERAPEUTIC ACTIVITIES: CPT | Performed by: PHYSICAL THERAPIST

## 2019-01-28 PROCEDURE — 97112 NEUROMUSCULAR REEDUCATION: CPT | Performed by: PHYSICAL THERAPIST

## 2019-01-28 NOTE — PROGRESS NOTES
Daily Note     Today's date: 2019  Patient name: Greta Mckeon  : 1940  MRN: [id number]  Referring provider: Raúl Loera MD  Dx:   Encounter Diagnosis     ICD-10-CM    1  Multiple falls R29 6    2  Weakness generalized R53 1    3  Other abnormalities of gait and mobility R26 89    4  Weight loss R63 4                   Subjective: No issues but notes feeling a little steadier since starting  Objective: See treatment diary below  Precautions: Falls risk      Date/ visit  1/10 1/16/19 1/28/19 1/3 1/8   STS 2 sets, 10 reps  Onto foam   2 sets, 10 reps 25 reps 2 sets, 10 reps  Onto foam   FTEC Firm/Foam 6 reps 30 sec  On foam  LOB: 8 6 reps 30 sec  On foam  LOB:0 5 reps   30 sec  On foam  LOB: 3 5 reps  30 sec  On foam  LOB: 1 5 reps  30 sec  On foam  LOB: 0   Standing hip  Flexion  Extension  Abduction  20 reps  3 sec hold  On foam  1 UE support  1# ankle weight 3 sec iso hold  2 setOn foam  1 UE support  1# ankle weights, 10 reps   20 reps  3 sec hold  On foam  1 UE support 20 reps  3 sec hold  On foam  1 UE support  1# ankle weight   Tandem walking   4 cycles  1 UE  Forward and back  Foam BB  1 UE  3 cycles 4 cycles  1 UE   Step taps         Step Ups     20 reps  8" steps  From foam  FWD/LAT  1 UE support  1# ankle weight Fwd, Lat  8" and 6 inch step conservative from foam   1 UE  10 reps each 20 reps  8"step  From foam  FWD/LAT  1 UE support 20 reps  8" steps  From foam  FWD/LAT  1 UE support  1# ankle weight   Toe raises        Heel raises        HEP        Side-stepping On foam  2 cycles On foam  6cycles Side step over cones SOLO no UE    6 cycles     [place]  3 cycles  1 UE  SOLO no UE support 3 cycles  2 cycles  1 UE    Gait with head turns    25 ft  2 cycles  H, V      180 degree turns    20 reps each 20 reps each   Hurdles 3 cycles each  6" laura  FWD/LAT/BWD  1 UE support 6 cycles   12" laura  FWD/BWD  2 UE suppor  4 cycles each  6"  FWD/LAT  1 UE support 4 cycles each  6" laura  FWD/LAT  1 UE support   Tandem Stance  2 x 30" UE support       Balance Board  20 x forward and back 20 x L & R       Gait with P ball kick    50 feet 2 times no UE support CG     Gait with P ball bounce    25 feet with SOLO step 2 times      Cone weave    Gait with cone weave 4 cones SOLO step FWD and Back      Manual resistance with FWD and Back gait SOLO   25 feet, with manual resistance 2 cycles            Assessment: Patient tolerated session very well this date with focus on gait and balance activities without UE support  Use of SOLO step for safety but no loss of balance noted  Challenged with P ball activity with coordination and balancing  Challenged with cone weave with backwards direction  Resistive walking required decrease force with backwards amb  Patient would benefit from continued PT in order to continue to improve balance and LE strength impairments  Plan: Continue per plan of care

## 2019-01-31 ENCOUNTER — APPOINTMENT (OUTPATIENT)
Dept: PHYSICAL THERAPY | Facility: CLINIC | Age: 79
End: 2019-01-31
Payer: MEDICARE

## 2019-02-04 ENCOUNTER — OFFICE VISIT (OUTPATIENT)
Dept: PHYSICAL THERAPY | Facility: CLINIC | Age: 79
End: 2019-02-04
Payer: MEDICARE

## 2019-02-04 DIAGNOSIS — R26.89 OTHER ABNORMALITIES OF GAIT AND MOBILITY: ICD-10-CM

## 2019-02-04 DIAGNOSIS — R29.6 MULTIPLE FALLS: Primary | ICD-10-CM

## 2019-02-04 DIAGNOSIS — R53.1 WEAKNESS GENERALIZED: ICD-10-CM

## 2019-02-04 PROCEDURE — 97530 THERAPEUTIC ACTIVITIES: CPT

## 2019-02-04 PROCEDURE — 97112 NEUROMUSCULAR REEDUCATION: CPT

## 2019-02-04 NOTE — PROGRESS NOTES
Daily Note     Today's date: 2019  Patient name: Zoila Alicea  : 1940  MRN: [id number]  Referring provider: Jase Colunga MD  Dx:   Encounter Diagnosis     ICD-10-CM    1  Multiple falls R29 6    2  Weakness generalized R53 1    3  Other abnormalities of gait and mobility R26 89        Start Time: 0800  Stop Time: 0845  Total time in clinic (min): 45 minutes    Subjective: Patient stated that she is "feeling good today" reporting no pain  Patient stated that she had some "light-headedness" yesterday, which she attributes to not having one of her medications on hand, states no dizziness today  Objective: See treatment diary below  Precautions: Falls risk      Date/ visit  1/10 1/16/19 1/28/19 2/4 1/8   STS 2 sets, 10 reps  Onto foam   20 reps consecutively no UE  reps 2 sets, 10 reps  Onto foam   FTEC Firm/Foam 6 reps 30 sec  On foam  LOB: 8 6 reps 30 sec  On foam  LOB:0 6 reps 30 sec  On foam  LOB:0 5 reps  30 sec  On foam  LOB: 1 5 reps  30 sec  On foam  LOB: 0   Standing hip  Flexion  Extension  Abduction  20 reps  3 sec hold  On foam  1 UE support  1# ankle weight 3 sec iso hold  2 setOn foam  1 UE support  1# ankle weights, 10 reps  1 UE support for all, SOLO Step, 20 reps, 3 second hold 20 reps  3 sec hold  On foam  1 UE support 20 reps  3 sec hold  On foam  1 UE support  1# ankle weight   Tandem walking   4 cycles  1 UE  Forward and back  Foam BB Foam Beam in SOLO Step, 10 cycles 1 UE  3 cycles 4 cycles  1 UE   Step taps         Step Ups     20 reps  8" steps  From foam  FWD/LAT  1 UE support  1# ankle weight Fwd, Lat, Backwards   6 inch step conservative from foam on each side   20 reps each movement 20 reps  8"step  From foam  FWD/LAT  1 UE support 20 reps  8" steps  From foam  FWD/LAT  1 UE support  1# ankle weight   Toe raises        Heel raises        HEP        Side-stepping On foam  2 cycles On foam  6cycles Foam Beam in SOLO Step, 10 cycles     [place]  3 cycles  1 UE   2 cycles  1 UE    Gait with head turns         180 degree turns    20 reps each 20 reps each   Hurdles 3 cycles each  6" laura  FWD/LAT/BWD  1 UE support 6 cycles   12" laura  FWD/BWD  2 UE suppor 9" hurdles forwards, laterally, backwards, no UE, SOLO step 4 cycles each  6"  FWD/LAT  1 UE support 4 cycles each  6" laura  FWD/LAT  1 UE support   Tandem Stance  2 x 30" UE support       Balance Board  20 x forward and back 20 x L & R       Step over cones    SOLO Step, 10 cones for placement, 10 cycles      Gait with P ball bounce         Cone weave    SOLO Step, 10 cones for placement, 10 cycles     Manual resistance with FWD and Back gait SOLO              Assessment: Patient tolerated session well this date with no dizziness reported during session  Patient asked to reduce UE support today to attempt to reduce haptic touch for support, patient very hesitant to attempt due to her habit of haptic touch for balance  Patient progressed to posterior balance with step ups today, which is a progression in her POC as well as with her balance reactions  Patient improving with confidence via subjective reports  With no UE support, patient presents with bilateral hip circumduction to improve stability as well as due to weakness  Patient corrected by treating therapist to improve posture and step height, verbalized understanding and was able to correct  Patient would benefit from continued PT in order to continue to improve balance and LE strength impairments  Plan: Continue per plan of care

## 2019-02-07 ENCOUNTER — OFFICE VISIT (OUTPATIENT)
Dept: PHYSICAL THERAPY | Facility: CLINIC | Age: 79
End: 2019-02-07
Payer: MEDICARE

## 2019-02-07 DIAGNOSIS — R63.4 WEIGHT LOSS: ICD-10-CM

## 2019-02-07 DIAGNOSIS — R26.89 OTHER ABNORMALITIES OF GAIT AND MOBILITY: ICD-10-CM

## 2019-02-07 DIAGNOSIS — R29.6 MULTIPLE FALLS: Primary | ICD-10-CM

## 2019-02-07 DIAGNOSIS — R53.1 WEAKNESS GENERALIZED: ICD-10-CM

## 2019-02-07 PROCEDURE — 97110 THERAPEUTIC EXERCISES: CPT | Performed by: PHYSICAL THERAPIST

## 2019-02-07 PROCEDURE — 97112 NEUROMUSCULAR REEDUCATION: CPT | Performed by: PHYSICAL THERAPIST

## 2019-02-07 NOTE — PROGRESS NOTES
Daily Note     Today's date: 2019  Patient name: Patience Anderson  : 1940  MRN: [id number]  Referring provider: Sidney Schneider MD  Dx:   Encounter Diagnosis     ICD-10-CM    1  Multiple falls R29 6    2  Weakness generalized R53 1    3  Other abnormalities of gait and mobility R26 89    4  Weight loss R63 4                   Subjective: Patient reports that she is feeling "pretty good" today and notes no changes since last visit  Patient reports notable weight loss over the last year and a half that "is scaring me, because I don't fel sick" and was advised to talk to her physician about it  Objective: See treatment diary below  Precautions: Falls risk      Date/ visit  1/10 1/16/19 1/28/19 2/4 2/7   STS 2 sets, 10 reps  Onto foam   20 reps consecutively no UE  reps 3 sets, 10 reps  2 with ball between knees   FTEC Firm/Foam 6 reps 30 sec  On foam  LOB: 8 6 reps 30 sec  On foam  LOB:0 6 reps 30 sec  On foam  LOB:0 5 reps  30 sec  On foam  LOB: 1 5 reps  30 sec  On foam  LOB: 2   Standing hip  Flexion  Extension  Abduction  20 reps  3 sec hold  On foam  1 UE support  1# ankle weight 3 sec iso hold  2 setOn foam  1 UE support  1# ankle weights, 10 reps  1 UE support for all, SOLO Step, 20 reps, 3 second hold 20 reps  3 sec hold  On foam  1 UE support 20 reps  3 sec hold  On foam  1 UE support  2# ankle weights   Tandem walking   4 cycles  1 UE  Forward and back  Foam BB Foam Beam in SOLO Step, 10 cycles 1 UE  3 cycles    Step taps         Step Ups     20 reps  8" steps  From foam  FWD/LAT  1 UE support  1# ankle weight Fwd, Lat, Backwards   6 inch step conservative from foam on each side   20 reps each movement 20 reps  8"step  From foam  FWD/LAT  1 UE support 8" step from foam  20 reps  Fwd, Lat  1 UE support  Yellow TB around ankles to widen WINIFRED   Toe raises        Heel raises        HEP        Side-stepping On foam  2 cycles On foam  6cycles Foam Beam in SOLO Step, 10 cycles  Foam beam  5 cycles  Red TB around knees  1 UE support   [place]  3 cycles  1 UE   2 cycles  1 UE    Gait with head turns         180 degree turns    20 reps each    Hurdles 3 cycles each  6" laura  FWD/LAT/BWD  1 UE support 6 cycles   12" laura  FWD/BWD  2 UE suppor 9" hurdles forwards, laterally, backwards, no UE, SOLO step 4 cycles each  6"  FWD/LAT  1 UE support 9" hurdles  4 cycles each  PT resisted Fwd with yellow TB  Lat  1 UE support   Tandem Stance  2 x 30" UE support       Balance Board  20 x forward and back 20 x L & R       Step over cones    SOLO Step, 10 cones for placement, 10 cycles      Gait with P ball bounce         Cone weave    SOLO Step, 10 cones for placement, 10 cycles     Manual resistance with FWD and Back gait SOLO              Assessment: Patient able to tolerate treatment session well today with increased focus on wider WINIFRED throughout exercises  During STS, patient continues to demonstrate B/L knee collapse medially to aid in the transfer that was improved after placing a ball between her knees to be a tactile cue  She displayed good carryover with STS upon removal of tactile stimulus  To further increase WINIFRED, with step ups PT added yellow TB around ankles with improvements noted  Patient will continue to benefit from skilled outpatient PT services to improve her balance and strength deficits to maximize her function and decrease her risk for falls  Plan: Continue per plan of care

## 2019-02-11 ENCOUNTER — APPOINTMENT (OUTPATIENT)
Dept: PHYSICAL THERAPY | Facility: CLINIC | Age: 79
End: 2019-02-11
Payer: MEDICARE

## 2019-02-15 ENCOUNTER — OFFICE VISIT (OUTPATIENT)
Dept: PHYSICAL THERAPY | Facility: CLINIC | Age: 79
End: 2019-02-15
Payer: MEDICARE

## 2019-02-15 DIAGNOSIS — R29.6 MULTIPLE FALLS: Primary | ICD-10-CM

## 2019-02-15 DIAGNOSIS — R63.4 WEIGHT LOSS: ICD-10-CM

## 2019-02-15 DIAGNOSIS — R26.89 OTHER ABNORMALITIES OF GAIT AND MOBILITY: ICD-10-CM

## 2019-02-15 DIAGNOSIS — R53.1 WEAKNESS GENERALIZED: ICD-10-CM

## 2019-02-15 PROCEDURE — G8979 MOBILITY GOAL STATUS: HCPCS | Performed by: PHYSICAL THERAPIST

## 2019-02-15 PROCEDURE — 97164 PT RE-EVAL EST PLAN CARE: CPT

## 2019-02-15 PROCEDURE — G8980 MOBILITY D/C STATUS: HCPCS | Performed by: PHYSICAL THERAPIST

## 2019-02-15 NOTE — PROGRESS NOTES
Discharge     Today's date: 2/15/2019  Patient name: Marv Regalado  : 1940  MRN: [id number]  Referring provider: Steffi Shine MD  Dx:   Encounter Diagnosis     ICD-10-CM    1  Multiple falls R29 6    2  Weakness generalized R53 1    3  Other abnormalities of gait and mobility R26 89    4  Weight loss R63 4        Start Time: 1015  Stop Time: 1115  Total time in clinic (min): 60 minutes    Assessment  Assessment details: Patient is a 77 y o  female presenting to outpatient PT with generalized imbalance, unsteadiness, and gait abnormalities affecting her ability to exercise, ascend/descend stairs, and perform ADLs such as doing laundry  Her static balance scores are as follows: mCTSIB has improved to 30 seconds in all areas  Her dynamic balance scores are as follows: TUG was 10 seconds, 22/24 DGI, and 3 35 ft/sec for 10 meter walk test indicating decreased risk for falls  The patient was able to walk 1050 feet for the 6 Minute Walk Test and completed the 5x STS in 16 seconds without the use of BUE indicating improved endurance  All tests were performed without the use of a quad cane, and patient notes that she primarily uses it outside  During ambulation, patient displays minimal flexed posture with improvement noted in toe clearance and decreased festinating gait  Results of MMT to BLE are WFL  The patient has demonstrated marked improvements in her strength and balance and is independent with her HEP  She is in good verbal understanding and is ready to be discharged today  Impairments: abnormal gait, impaired balance, impaired physical strength and poor posture   Understanding of Dx/Px/POC: excellent   Prognosis: good    Goals  ST weeks  1  Patient will increase mCTSIB with EO and EC on foam by 5 seconds  MET  2  Patient will improve 6 MWT from 825 feet to 1015 feet in order to meet MDC and increase her endurance to encourage walking  MET  3   Patient will increase LE strength by a factor of 1 in order to improve gait mechanics and encourage stabilization when ambulating  MET  4  Patient will improve her 10 meter walk test score from 2 36 ft/sec to 2 78 ft/sec to encourage increased safety when ambulating in the community  MET    LT weeks  1  Patient will demonstrate improved balance in order to carry her laundry basket and perform ADLs  NOT MET  2  Patient will be able to reciprocally climb a flight of stairs independently using 1 HR in order to enter her home and access the second floor  MET  3  Patient will be able to tolerate 20 minutes of walking in order to exercise  MET  4  Patient will score low risk for falls for 3/3 fall risk measures  NOT MET    Plan  Patient would benefit from: skilled physical therapy  Planned therapy interventions: abdominal trunk stabilization, ADL retraining, manual therapy, joint mobilization, balance, postural training, patient education, neuromuscular re-education, strengthening, stretching, therapeutic activities, therapeutic exercise and home exercise program  Frequency: 3x week  Duration in weeks: 8  Plan of Care beginning date: 2018  Plan of Care expiration date: 2/15/2019  Treatment plan discussed with: patient        Subjective Evaluation    History of Present Illness  Mechanism of injury: Patient arrives to PT today using quad cane   She reports that she still feels "a little weary that I am going to fall to my right " She notes that she has been able to do more and "doesn't need the cane all of the time in the house "  Pain  No pain reported    Social Support  Steps to enter house: yes  Stairs in house: yes   Lives with: alone    Employment status: not working  Treatments  Current treatment: physical therapy  Patient Goals  Patient goals for therapy: improved balance, increased motion, increased strength and independence with ADLs/IADLs          Objective     Static Posture     Comments  Static Balance Testing:    MCTSIB:  Feet together Eyes open Firm surface: 30 seconds   Feet together Eyes closed Firm surface: 30 seconds  Feet together Eyes open Foam surface: 30 seconds  Feet together Eyes closed Foam surface: 30 seconds  Strength/Myotome Testing     Left Hip   Planes of Motion   Flexion: 4  Extension: 4-  Abduction: 4+  Adduction: 4+    Right Hip   Planes of Motion   Flexion: 4-  Extension: 4-  Abduction: 4  Adduction: 4+    Left Knee   Flexion: 4+  Extension: 4+    Right Knee   Flexion: 4+  Extension: 4+    Left Ankle/Foot   Dorsiflexion: 4+  Plantar flexion: 4    Right Ankle/Foot   Dorsiflexion: 4+  Plantar flexion: 4    Additional Strength Details  Sensation:   Normal denies any issues       Coordination  Heel to Shin Test: Normal       Ambulation     Comments   Dynamic Endurance testing    6 minute walk test:  1050 feet    Dynamic Balance/Fall risk testing    TUG: 10 feet down and back   10 seconds without quad cane    10 meter walk test:  9 80 seconds = 3 35 ft/sec without quad cane    DGI: 22/24    Gait Deviations: slight flexed posture when ambulating, decreased step length      Functional Assessment        Comments  Endurance   5 time sit to stand test:   16 seconds without UE use              Precautions: Falls risk      Date/ visit  12/19, Visit 1  2/15       STS 10 reps         FTEC Firm/Foam         Standing hip  Flexion  Extension  Abduction  10 reps R/L         Tandem walking          Step taps          Step Ups                   HEP  Tandem Walk, Static Tandem, SLS

## 2019-02-26 ENCOUNTER — TRANSCRIBE ORDERS (OUTPATIENT)
Dept: LAB | Facility: CLINIC | Age: 79
End: 2019-02-26

## 2019-02-26 ENCOUNTER — APPOINTMENT (OUTPATIENT)
Dept: LAB | Facility: CLINIC | Age: 79
End: 2019-02-26
Payer: MEDICARE

## 2019-02-26 DIAGNOSIS — N18.30 BENIGN HYPERTENSION WITH CHRONIC KIDNEY DISEASE, STAGE III (HCC): Chronic | ICD-10-CM

## 2019-02-26 DIAGNOSIS — Z79.899 NEED FOR PROPHYLACTIC CHEMOTHERAPY: Primary | ICD-10-CM

## 2019-02-26 DIAGNOSIS — N18.30 CKD (CHRONIC KIDNEY DISEASE), STAGE III (HCC): ICD-10-CM

## 2019-02-26 DIAGNOSIS — E55.9 VITAMIN D DEFICIENCY: ICD-10-CM

## 2019-02-26 DIAGNOSIS — I12.9 BENIGN HYPERTENSION WITH CHRONIC KIDNEY DISEASE, STAGE III (HCC): Chronic | ICD-10-CM

## 2019-02-26 DIAGNOSIS — M06.09 RHEUMATOID ARTHRITIS OF MULTIPLE SITES WITHOUT RHEUMATOID FACTOR (HCC): ICD-10-CM

## 2019-02-26 DIAGNOSIS — I12.9 HYPERTENSIVE CHRONIC KIDNEY DISEASE WITH STAGE 1 THROUGH STAGE 4 CHRONIC KIDNEY DISEASE, OR UNSPECIFIED CHRONIC KIDNEY DISEASE: ICD-10-CM

## 2019-02-26 DIAGNOSIS — I12.9 PARENCHYMAL RENAL HYPERTENSION, STAGE 1 THROUGH STAGE 4 OR UNSPECIFIED CHRONIC KIDNEY DISEASE: ICD-10-CM

## 2019-02-26 DIAGNOSIS — N18.30 CHRONIC KIDNEY DISEASE, STAGE III (MODERATE) (HCC): Chronic | ICD-10-CM

## 2019-02-26 DIAGNOSIS — N18.30 ANEMIA OF CHRONIC RENAL FAILURE, STAGE 3 (MODERATE) (HCC): ICD-10-CM

## 2019-02-26 DIAGNOSIS — E61.1 IRON DEFICIENCY: ICD-10-CM

## 2019-02-26 DIAGNOSIS — Z79.899 NEED FOR PROPHYLACTIC CHEMOTHERAPY: ICD-10-CM

## 2019-02-26 DIAGNOSIS — D63.1 ANEMIA OF CHRONIC RENAL FAILURE, STAGE 3 (MODERATE) (HCC): ICD-10-CM

## 2019-02-26 DIAGNOSIS — E78.5 DYSLIPIDEMIA: ICD-10-CM

## 2019-02-26 LAB
ALBUMIN SERPL BCP-MCNC: 3.8 G/DL (ref 3.5–5)
ALP SERPL-CCNC: 85 U/L (ref 46–116)
ALT SERPL W P-5'-P-CCNC: 24 U/L (ref 12–78)
ANION GAP SERPL CALCULATED.3IONS-SCNC: 7 MMOL/L (ref 4–13)
AST SERPL W P-5'-P-CCNC: 18 U/L (ref 5–45)
BASOPHILS # BLD AUTO: 0.05 THOUSANDS/ΜL (ref 0–0.1)
BASOPHILS NFR BLD AUTO: 1 % (ref 0–1)
BILIRUB DIRECT SERPL-MCNC: 0.14 MG/DL (ref 0–0.2)
BILIRUB SERPL-MCNC: 0.53 MG/DL (ref 0.2–1)
BUN SERPL-MCNC: 19 MG/DL (ref 5–25)
CALCIUM SERPL-MCNC: 9.2 MG/DL (ref 8.3–10.1)
CHLORIDE SERPL-SCNC: 104 MMOL/L (ref 100–108)
CHOLEST SERPL-MCNC: 230 MG/DL (ref 50–200)
CO2 SERPL-SCNC: 28 MMOL/L (ref 21–32)
CREAT SERPL-MCNC: 1.25 MG/DL (ref 0.6–1.3)
CREAT UR-MCNC: 204 MG/DL
CRP SERPL QL: 5.8 MG/L
EOSINOPHIL # BLD AUTO: 0.1 THOUSAND/ΜL (ref 0–0.61)
EOSINOPHIL NFR BLD AUTO: 1 % (ref 0–6)
ERYTHROCYTE [DISTWIDTH] IN BLOOD BY AUTOMATED COUNT: 14.1 % (ref 11.6–15.1)
ERYTHROCYTE [SEDIMENTATION RATE] IN BLOOD: 18 MM/HOUR (ref 0–20)
FERRITIN SERPL-MCNC: 96 NG/ML (ref 8–388)
GFR SERPL CREATININE-BSD FRML MDRD: 41 ML/MIN/1.73SQ M
GLUCOSE P FAST SERPL-MCNC: 97 MG/DL (ref 65–99)
HCT VFR BLD AUTO: 40.8 % (ref 34.8–46.1)
HDLC SERPL-MCNC: 59 MG/DL (ref 40–60)
HGB BLD-MCNC: 12.4 G/DL (ref 11.5–15.4)
IMM GRANULOCYTES # BLD AUTO: 0.03 THOUSAND/UL (ref 0–0.2)
IMM GRANULOCYTES NFR BLD AUTO: 0 % (ref 0–2)
IRON SATN MFR SERPL: 24 %
IRON SERPL-MCNC: 92 UG/DL (ref 50–170)
LDLC SERPL CALC-MCNC: 139 MG/DL (ref 0–100)
LYMPHOCYTES # BLD AUTO: 1.78 THOUSANDS/ΜL (ref 0.6–4.47)
LYMPHOCYTES NFR BLD AUTO: 23 % (ref 14–44)
MAGNESIUM SERPL-MCNC: 2.6 MG/DL (ref 1.6–2.6)
MCH RBC QN AUTO: 26 PG (ref 26.8–34.3)
MCHC RBC AUTO-ENTMCNC: 30.4 G/DL (ref 31.4–37.4)
MCV RBC AUTO: 86 FL (ref 82–98)
MONOCYTES # BLD AUTO: 0.56 THOUSAND/ΜL (ref 0.17–1.22)
MONOCYTES NFR BLD AUTO: 7 % (ref 4–12)
NEUTROPHILS # BLD AUTO: 5.23 THOUSANDS/ΜL (ref 1.85–7.62)
NEUTS SEG NFR BLD AUTO: 68 % (ref 43–75)
NRBC BLD AUTO-RTO: 0 /100 WBCS
PHOSPHATE SERPL-MCNC: 3.8 MG/DL (ref 2.3–4.1)
PLATELET # BLD AUTO: 230 THOUSANDS/UL (ref 149–390)
PMV BLD AUTO: 10.8 FL (ref 8.9–12.7)
POTASSIUM SERPL-SCNC: 4.2 MMOL/L (ref 3.5–5.3)
PROT SERPL-MCNC: 7.3 G/DL (ref 6.4–8.2)
PROT UR-MCNC: 53 MG/DL
PROT/CREAT UR: 0.26 MG/G{CREAT} (ref 0–0.1)
PTH-INTACT SERPL-MCNC: 57.5 PG/ML (ref 18.4–80.1)
RBC # BLD AUTO: 4.77 MILLION/UL (ref 3.81–5.12)
SODIUM SERPL-SCNC: 139 MMOL/L (ref 136–145)
TIBC SERPL-MCNC: 380 UG/DL (ref 250–450)
TRIGL SERPL-MCNC: 162 MG/DL
WBC # BLD AUTO: 7.75 THOUSAND/UL (ref 4.31–10.16)

## 2019-02-26 PROCEDURE — 84156 ASSAY OF PROTEIN URINE: CPT

## 2019-02-26 PROCEDURE — 83550 IRON BINDING TEST: CPT

## 2019-02-26 PROCEDURE — 83540 ASSAY OF IRON: CPT

## 2019-02-26 PROCEDURE — 83970 ASSAY OF PARATHORMONE: CPT

## 2019-02-26 PROCEDURE — 83735 ASSAY OF MAGNESIUM: CPT

## 2019-02-26 PROCEDURE — 84100 ASSAY OF PHOSPHORUS: CPT

## 2019-02-26 PROCEDURE — 82728 ASSAY OF FERRITIN: CPT

## 2019-02-26 PROCEDURE — 85025 COMPLETE CBC W/AUTO DIFF WBC: CPT

## 2019-02-26 PROCEDURE — 86140 C-REACTIVE PROTEIN: CPT

## 2019-02-26 PROCEDURE — 36415 COLL VENOUS BLD VENIPUNCTURE: CPT

## 2019-02-26 PROCEDURE — 82248 BILIRUBIN DIRECT: CPT

## 2019-02-26 PROCEDURE — 80061 LIPID PANEL: CPT

## 2019-02-26 PROCEDURE — 85652 RBC SED RATE AUTOMATED: CPT

## 2019-02-26 PROCEDURE — 82570 ASSAY OF URINE CREATININE: CPT

## 2019-02-26 PROCEDURE — 80053 COMPREHEN METABOLIC PANEL: CPT

## 2019-03-01 ENCOUNTER — OFFICE VISIT (OUTPATIENT)
Dept: NEPHROLOGY | Facility: CLINIC | Age: 79
End: 2019-03-01
Payer: MEDICARE

## 2019-03-01 VITALS — BODY MASS INDEX: 25 KG/M2 | WEIGHT: 146.4 LBS | HEIGHT: 64 IN

## 2019-03-01 DIAGNOSIS — D63.1 ANEMIA OF CHRONIC RENAL FAILURE, STAGE 3 (MODERATE) (HCC): ICD-10-CM

## 2019-03-01 DIAGNOSIS — E55.9 VITAMIN D DEFICIENCY: ICD-10-CM

## 2019-03-01 DIAGNOSIS — I12.9 HYPERTENSIVE CHRONIC KIDNEY DISEASE WITH STAGE 1 THROUGH STAGE 4 CHRONIC KIDNEY DISEASE, OR UNSPECIFIED CHRONIC KIDNEY DISEASE: ICD-10-CM

## 2019-03-01 DIAGNOSIS — E61.1 IRON DEFICIENCY: ICD-10-CM

## 2019-03-01 DIAGNOSIS — E78.1 HYPERTRIGLYCERIDEMIA: ICD-10-CM

## 2019-03-01 DIAGNOSIS — N18.30 CHRONIC KIDNEY DISEASE, STAGE III (MODERATE) (HCC): Chronic | ICD-10-CM

## 2019-03-01 DIAGNOSIS — N18.30 ANEMIA OF CHRONIC RENAL FAILURE, STAGE 3 (MODERATE) (HCC): ICD-10-CM

## 2019-03-01 DIAGNOSIS — E78.5 DYSLIPIDEMIA: ICD-10-CM

## 2019-03-01 DIAGNOSIS — I12.9 HYPERTENSIVE CHRONIC KIDNEY DISEASE WITH STAGE 1 THROUGH STAGE 4 CHRONIC KIDNEY DISEASE, OR UNSPECIFIED CHRONIC KIDNEY DISEASE: Primary | ICD-10-CM

## 2019-03-01 PROCEDURE — 99214 OFFICE O/P EST MOD 30 MIN: CPT | Performed by: INTERNAL MEDICINE

## 2019-03-01 RX ORDER — METOPROLOL SUCCINATE 50 MG/1
50 TABLET, EXTENDED RELEASE ORAL DAILY
Qty: 30 TABLET | Refills: 0 | Status: SHIPPED | OUTPATIENT
Start: 2019-03-01 | End: 2019-03-06 | Stop reason: SDUPTHER

## 2019-03-01 RX ORDER — ICOSAPENT ETHYL 1000 MG/1
1 CAPSULE ORAL 2 TIMES DAILY
Qty: 60 CAPSULE | Refills: 5 | Status: SHIPPED | OUTPATIENT
Start: 2019-03-01 | End: 2019-08-22 | Stop reason: SDUPTHER

## 2019-03-01 NOTE — PROGRESS NOTES
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  · Baseline creatinine:  1 5  · Current creatinine:  1 25 at baseline  · Urine protein creatinine ratio:  At goal at 0 26 g  Recommendations:  · Treat hypertension-please see below  · Treat dyslipidemia-please see below  · Maintain proteinuria less than 1 g or as low as possible  · Avoid nephrotoxic agents such as NSAIDs, patient counseled as such  2   Volume:  Euvolemic  3  Hypertension:  No blood pressure readings at this time  There were a couple readings however ranging 156-171/93-99  Of note, her new incontinent Myrbetriq can cause hypertension and 9-11%  · Goal blood pressure:  Less than 130/80 given CAD and closer to 120/80 if possible  Recommendations:  · Stopping Myrbetriq  · Increase spironolactone to 25 mg daily as it will not increase urination  · Increase amlodipine to 2 5 mg the morning but 5 mg in the evening  · Recheck blood pressures approximately 2 weeks after making the above medication changes  4  Electrolytes: All acceptable including her sodium  5  Mineral bone disorder:  · Calcium/magnesium/phosphorus:  All acceptable  · PTH intact:  Mild secondary hyperparathyroidism normal at 57 5  · Vitamin-D:  has been a goal of 45 4  6  Dyslipidemia:  Intolerant to statins, and intolerant to Welchol because of constipation  Just diet at this time  · Goal LDL:  Less than 70 given CAD  · Current lipid profile:  /HDL 59/triglycerides 162  Recommendations:  Continue diet as outlined above; CONSIDER VASCEPA CONSIDERING HYPERTRIGLYCERIDEMIA NO OTHER MEDICATIONS POSSIBLE  7  Anemia:  Essentially normal 12 4, iron saturation adequate but ferritin slightly low 96  Continue over the counter iron    8   Other problems:  -status post CABG/MI with recent intervention followed by Dr Ct Doll  -the the hypothyroidism status post partial thyroidectomy for benign lesion  -arthritic symptoms followed by Rheumatology  -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  -urinary incontinence monitored by Dr Brittney Neal:    Patient Instructions   1  Medication changes today:  · Begin Vascepa 1 g twice a day  · Increase amlodipine to 2 5 mg in the morning or 1 pill, but 2 pills in the evening or 5 mg  Please watch for swelling, and if he develops any significant swelling call me  · Stop the Myrbetriq now because of high blood pressure which can be a side effect and check with your urologist for an alternative  · Please increase spironolactone to 25 mg daily    2  Wait 1 full week after making the above medication changes and then go for nonfasting lab work any time of the day  3  Please wait 2 weeks after making the above medication changes and send in 1 week a blood pressure readings morning evening   -the morning readings before taking any medications  -the evening readings are before bedtime  Then please send these in    4  Follow-up in 3 months:  -please bring in 1 week a blood pressure readings morning and evening, sitting and standing:  · Take the morning readings before any medications  · Take the evening readings closer to bedtime  · When taking standing readings, keep your arm supported at heart level and not dangling  -PLEASE BRING IN YOUR BLOOD PRESSURE MACHINE FOR CORRELATION WITH THE OFFICE MACHINE  -please go for lab work in the morning but nonfasting prior to your appointment    5  General instructions:  -avoid salt  -avoid medications such as Motrin, Naprosyn, ibuprofen, Aleve or Advil or Celebrex as they can affect your kidney function; you can use Tylenol as needed for pain or fevers if you have no liver problems  -avoid medications such as Sudafed or other medications with decongestants as they can raise your blood pressure  -try to remain as active as possible          Subjective:   Patient with intermittent chest pain but usually relieved with nitroglycerin, this is a chronic symptom  No shortness of breath or leg swelling  No fevers chills cough or colds  Reasonably good appetite and energy  Patient has urinary incontinence, was doing well on Vesicare being switch to an alternative medication because of insurance  No gastrointestinal symptoms  No headaches, dizziness or lightheadedness  Blood pressure medications:  -torsemide 10 mg every other day   -spironolactone the 25 mg Mondays Wednesdays and Fridays  -Toprol  mg in the morning, and 50 mg in the evening  -losartan 50 mg daily in the morning  -amlodipine 2 5 mg twice a day    ROS:  See HPI, otherwise review of systems as completely reviewed with the patient are negative    Past Medical History:   Diagnosis Date    Anemia     Arthritis     Bladder calculi     CAD (coronary artery disease)     Status post CABG and PTCA to OM1    [facility]     CKD (chronic kidney disease) stage 3, GFR 30-59 ml/min [facility]     CKD (chronic kidney disease), stage III ([facility] [address] )     Hypertension     Hypothyroidism     Uterine prolapse      Past Surgical History:   Procedure Laterality Date    BACK SURGERY      BLADDER SURGERY      CORONARY ANGIOPLASTY WITH STENT PLACEMENT      CORONARY ARTERY BYPASS GRAFT      OOPHORECTOMY Left     [place] PARTIAL HIP REPLACEMENT Right 2/1/2018    Procedure: HEMIARTHROPLASTY HIP (BIPOLAR) (RIGHT); Surgeon: Maddy Briones MD;  Location: [address] [place] [address];  Service: Orthopedics     History reviewed  No pertinent family history  reports that she has never smoked  She has never used smokeless tobacco  She reports that she does not drink alcohol or use drugs      I COMPLETELY REVIEWED THE PAST MEDICAL HISTORY/PAST SURGICAL HISTORY/SOCIAL HISTORY/FAMILY HISTORY/AND MEDICATIONS  AND UPDATED ALL    Objective:     Vitals:   BP sitting on left:  176/76 with a heart rate of 60 and regular  BP standing on left:  168/74 with a heart rate of 60 and regular    Weight (last 2 days)     Date/Time   Weight    03/01/19 1008   66 4 (146 4)            Wt Readings from Last 3 Encounters:   03/01/19 66 4 kg (146 lb 6 4 oz)   12/11/18 67 1 kg (147 lb 14 9 oz)   10/26/18 67 1 kg (148 lb)     Body mass index is 25 13 kg/m²  Physical Exam: General:  No acute distress  Skin:  No acute rash  Eyes:  No scleral icterus, noninjected  ENT:  Moist mucous membranes  Neck:  Supple, no jugular venous distention  Back   No CVAT  Chest:  Clear to auscultation and percussion, good respiratory effort  CVS:  Regular rate and rhythm without a rub, murmurs or gallops  Abdomen:  Soft and nontender with normal bowel sounds  Extremities:  No cyanosis and no edema  Neuro:  Grossly intact  Psych:  Alert, oriented x3 and appropriate      Medications:    Current Outpatient Medications:     acetaminophen (TYLENOL) 325 mg tablet, Take 2 tablets (650 mg total) by mouth every 6 (six) hours as needed for mild pain, headaches or fever, Disp: 30 tablet, Rfl: 0    amLODIPine (NORVASC) 2 5 mg tablet, Take 2 5 mg by mouth 2 (two) times a day , Disp: , Rfl:     Ascorbic Acid (VITAMIN C) 1000 MG tablet, Take 1,000 mg by mouth daily, Disp: , Rfl:     aspirin (ECOTRIN LOW STRENGTH) 81 mg EC tablet, Take 81 mg by mouth daily Indications: Heart Attack  , Disp: , Rfl:     Coenzyme Q10 10 MG capsule, Take 10 mg by mouth daily, Disp: , Rfl:     hydroxychloroquine (PLAQUENIL) 200 mg tablet, Take 200 mg by mouth daily  , Disp: , Rfl:     levothyroxine 50 mcg tablet, Take 50 mcg by mouth daily  , Disp: , Rfl:     losartan (COZAAR) 25 mg tablet, Take 2 tablets (50 mg total) by mouth daily, Disp: 60 tablet, Rfl: 5    metoprolol succinate (TOPROL-XL) 50 mg 24 hr tablet, Take 1 tablet (50 mg total) by mouth daily Take 1 tablet in the morning (100mg) Take 1/2  tablet in the evening (50mg), Disp: 30 tablet, Rfl: 0    Mirabegron (MYRBETRIQ PO), Take 25 mg by mouth daily, Disp: , Rfl:     Multiple Vitamins-Minerals (CENTRUM SILVER PO), Take 1 tablet by mouth daily, Disp: , Rfl:     nitroglycerin (NITROLINGUAL) 0 4 mg/spray spray, Place 1 spray under the tongue every 5 (five) minutes as needed for chest pain, Disp: , Rfl:     ranolazine (RANEXA) 500 mg 12 hr tablet, Take 500 mg by mouth 2 (two) times a day  , Disp: , Rfl:     spironolactone (ALDACTONE) 25 mg tablet, Take 1 tablet (25 mg total) by mouth every other day (Patient taking differently: Take 25 mg by mouth 3 (three) times a week  ), Disp: 60 tablet, Rfl: 2    torsemide (DEMADEX) 10 mg tablet, Take 10 mg by mouth daily, Disp: , Rfl:     Vitamin D, Cholecalciferol, 1000 units CAPS, Take 2,000 Units by mouth daily  , Disp: , Rfl:     bisacodyl (DULCOLAX) 10 mg suppository, Insert 1 suppository (10 mg total) into the rectum daily as needed for constipation, Disp: 12 suppository, Rfl: 0    docusate sodium (COLACE) 100 mg capsule, Take 1 capsule (100 mg total) by mouth 2 (two) times a day (Patient not taking: Reported on 10/26/2018 ), Disp: 60 capsule, Rfl: 0    ezetimibe (ZETIA) 10 mg tablet, TAKE 1 TABLET AT BEDTIME  (Patient not taking: Reported on 3/1/2019), Disp: 90 tablet, Rfl: 0    Icosapent Ethyl 1 g CAPS, Take 1 capsule (1 g total) by mouth 2 (two) times a day, Disp: 60 capsule, Rfl: 5    isosorbide mononitrate (IMDUR) 60 mg 24 hr tablet, Take 60 mg by mouth daily, Disp: , Rfl:     polyethylene glycol (MIRALAX) 17 g packet, Take 17 g by mouth daily (Patient not taking: Reported on 10/26/2018 ), Disp: 14 each, Rfl: 0    senna (SENOKOT) 8 6 mg, Take 1 tablet (8 6 mg total) by mouth daily at bedtime (Patient not taking: Reported on 10/26/2018 ), Disp: 120 each, Rfl: 0    solifenacin (VESICARE) 10 MG tablet, Take 10 mg by mouth daily, Disp: , Rfl:     ticagrelor (BRILINTA) 90 MG, Take 1 tablet by mouth 2 (two) times a day (Patient not taking: Reported on 3/1/2019), Disp: 60 tablet, Rfl: 6    Lab, Imaging and other studies: I have personally reviewed pertinent labs    Laboratory Results:  Results for orders placed or performed in visit on 02/26/19   Comprehensive metabolic panel   Result Value Ref Range    Sodium 139 136 - 145 mmol/L    Potassium 4 2 3 5 - 5 3 mmol/L    Chloride 104 100 - 108 mmol/L    CO2 28 21 - 32 mmol/L    ANION GAP 7 4 - 13 mmol/L    BUN 19 5 - 25 mg/dL    Creatinine 1 25 0 60 - 1 30 mg/dL    Glucose, Fasting 97 65 - 99 mg/dL    Calcium 9 2 8 3 - 10 1 mg/dL    AST 18 5 - 45 U/L    ALT 24 12 - 78 U/L    Alkaline Phosphatase 85 46 - 116 U/L    Total Protein 7 3 6 4 - 8 2 g/dL    Albumin 3 8 3 5 - 5 0 g/dL    Total Bilirubin 0 53 0 20 - 1 00 mg/dL    eGFR 41 ml/min/1 73sq m   Ferritin   Result Value Ref Range    Ferritin 96 8 - 388 ng/mL   Iron Saturation %   Result Value Ref Range    Iron Saturation 24 %    TIBC 380 250 - 450 ug/dL    Iron 92 50 - 170 ug/dL   Lipid Panel with Direct LDL reflex   Result Value Ref Range    Cholesterol 230 (H) 50 - 200 mg/dL    Triglycerides 162 (H) <=150 mg/dL    HDL, Direct 59 40 - 60 mg/dL    LDL Calculated 139 (H) 0 - 100 mg/dL   Magnesium   Result Value Ref Range    Magnesium 2 6 1 6 - 2 6 mg/dL   Phosphorus   Result Value Ref Range    Phosphorus 3 8 2 3 - 4 1 mg/dL   Protein / creatinine ratio, urine   Result Value Ref Range    Creatinine, Ur 204 0 mg/dL    Protein Urine Random 53 mg/dL    Prot/Creat Ratio, Ur 0 26 (H) 0 00 - 0 10   PTH, intact   Result Value Ref Range    PTH 57 5 18 4 - 80 1 pg/mL   CBC and differential   Result Value Ref Range    WBC 7 75 4 31 - 10 16 Thousand/uL    RBC 4 77 3 81 - 5 12 Million/uL    Hemoglobin 12 4 11 5 - 15 4 g/dL    Hematocrit 40 8 34 8 - 46 1 %    MCV 86 82 - 98 fL    MCH 26 0 (L) 26 8 - 34 3 pg    MCHC 30 4 (L) 31 4 - 37 4 g/dL    RDW 14 1 11 6 - 15 1 %    MPV 10 8 8 9 - 12 7 fL    Platelets 491 120 - 511 Thousands/uL    nRBC 0 /100 WBCs    Neutrophils Relative 68 43 - 75 %    Immat GRANS % 0 0 - 2 %    Lymphocytes Relative 23 14 - 44 % Monocytes Relative 7 4 - 12 %    Eosinophils Relative 1 0 - 6 %    Basophils Relative 1 0 - 1 %    Neutrophils Absolute 5 23 1 85 - 7 62 Thousands/µL    Immature Grans Absolute 0 03 0 00 - 0 20 Thousand/uL    Lymphocytes Absolute 1 78 0 60 - 4 47 Thousands/µL    Monocytes Absolute 0 56 0 17 - 1 22 Thousand/µL    Eosinophils Absolute 0 10 0 00 - 0 61 Thousand/µL    Basophils Absolute 0 05 0 00 - 0 10 Thousands/µL   C-reactive protein   Result Value Ref Range    CRP 5 8 (H) <3 0 mg/L   Sedimentation rate, automated   Result Value Ref Range    Sed Rate 18 0 - 20 mm/hour   Bilirubin, direct   Result Value Ref Range    Bilirubin, Direct 0 14 0 00 - 0 20 mg/dL       Results from last 7 days   Lab Units 02/26/19  0945   WBC Thousand/uL 7 75   HEMOGLOBIN g/dL 12 4   HEMATOCRIT % 40 8   PLATELETS Thousands/uL 230   POTASSIUM mmol/L 4 2   CHLORIDE mmol/L 104   CO2 mmol/L 28   BUN mg/dL 19   CREATININE mg/dL 1 25   CALCIUM mg/dL 9 2   MAGNESIUM mg/dL 2 6   PHOSPHORUS mg/dL 3 8         Radiology review:   chest X-ray    Ultrasound      Portions of the record may have been created with voice recognition software   Occasional wrong word or "sound a like" substitutions may have occurred due to the inherent limitations of voice recognition software   Read the chart carefully and recognize, using context, where substitutions have occurred

## 2019-03-01 NOTE — PATIENT INSTRUCTIONS
1  Medication changes today:  · Begin Vascepa 1 g twice a day  · Increase amlodipine to 2 5 mg in the morning or 1 pill, but 2 pills in the evening or 5 mg  Please watch for swelling, and if he develops any significant swelling call me  · Stop the Myrbetriq now because of high blood pressure which can be a side effect and check with your urologist for an alternative  · Please increase spironolactone to 25 mg daily    2  Wait 1 full week after making the above medication changes and then go for nonfasting lab work any time of the day  3  Please wait 2 weeks after making the above medication changes and send in 1 week a blood pressure readings morning evening   -the morning readings before taking any medications  -the evening readings are before bedtime  Then please send these in    4  Follow-up in 3 months:  -please bring in 1 week a blood pressure readings morning and evening, sitting and standing:  · Take the morning readings before any medications  · Take the evening readings closer to bedtime  · When taking standing readings, keep your arm supported at heart level and not dangling  -PLEASE BRING IN YOUR BLOOD PRESSURE MACHINE FOR CORRELATION WITH THE OFFICE MACHINE  -please go for lab work in the morning but nonfasting prior to your appointment    5  General instructions:  -avoid salt  -avoid medications such as Motrin, Naprosyn, ibuprofen, Aleve or Advil or Celebrex as they can affect your kidney function; you can use Tylenol as needed for pain or fevers if you have no liver problems  -avoid medications such as Sudafed or other medications with decongestants as they can raise your blood pressure  -try to remain as active as possible

## 2019-03-01 NOTE — LETTER
March 1, 2019     Dang Cesar MD  [facility] [address]    Patient: Jo-Ann Patel   YOB: 1940   Date of Visit: 3/1/2019       Dear Dr Jessika Dwyer:    Thank you for referring Noel Mello to me for evaluation  Below are my notes for this consultation  If you have questions, please do not hesitate to call me  I look forward to following your patient along with you  Sincerely,        Lio Hahn MD        CC: MD Jadon Fowler MD Darlen Boards, MD Dru Gowda, MD  3/1/2019 10:46 AM  Sign at close encounter  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  · Baseline creatinine:  1 5  · Current creatinine:  1  25 at baseline  · Urine protein creatinine ratio:  At goal at 0 26 g  Recommendations:  · Treat hypertension-please see below  · Treat dyslipidemia-please see below  · Maintain proteinuria less than 1 g or as low as possible  · Avoid nephrotoxic agents such as NSAIDs, patient counseled as such  2   Volume:  Euvolemic  3  Hypertension:   No blood pressure readings at this time  There were a couple readings however ranging 156-171/93-99  Of note, her new incontinent Myrbetriq can cause hypertension and 9-11%  · Goal blood pressure:  Less than 130/80 given CAD and closer to 120/80 if possible  Recommendations:  · Stopping Myrbetriq  · Increase spironolactone to 25 mg daily as it will not increase urination  · Increase amlodipine to 2 5 mg the morning but 5 mg in the evening  · Recheck blood pressures approximately 2 weeks after making the above medication changes  4  Electrolytes: All acceptable including her sodium  5  Mineral bone disorder:  · Calcium/magnesium/phosphorus:  All acceptable  · PTH intact:  Mild secondary hyperparathyroidism normal at 57 5  · Vitamin-D:   has been a goal of 45 4  6    Dyslipidemia:  Intolerant to statins, and intolerant to [facility] because of constipation  Just diet at this time  · Goal LDL:  Less than 70 given CAD  · Current lipid profile:  LDL  139/HDL 59/triglycerides 162  Recommendations:  Continue diet as outlined above; CONSIDER VASCEPA CONSIDERING HYPERTRIGLYCERIDEMIA NO OTHER MEDICATIONS POSSIBLE  7  Anemia:  Essentially normal 12 4, iron saturation adequate but ferritin slightly low 96  Continue over the counter iron  8   Other problems:  -status post CABG/MI with recent intervention followed by Dr Hazel Swanson  -the the hypothyroidism status post partial thyroidectomy for benign lesion  -arthritic symptoms followed by Rheumatology  -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  -urinary incontinence monitored by Dr Deidre Runner:    Patient Instructions   1  Medication changes today:  · Begin Vascepa 1 g twice a day  · Increase amlodipine to 2 5 mg in the morning or 1 pill, but 2 pills in the evening or 5 mg  Please watch for swelling, and if he develops any significant swelling call me  · Stop the Myrbetriq now because of high blood pressure which can be a side effect and check with your urologist for an alternative  · Please increase spironolactone to 25 mg daily    2  Wait 1 full week after making the above medication changes and then go for nonfasting lab work any time of the day  3  Please wait 2 weeks after making the above medication changes and send in 1 week a blood pressure readings morning evening   -the morning readings before taking any medications  -the evening readings are before bedtime  Then please send these in    4   Follow-up in 3 months:  -please bring in 1 week a blood pressure readings morning and evening, sitting and standing:  · Take the morning readings before any medications  · Take the evening readings closer to bedtime  · When taking standing readings, keep your arm supported at heart level and not dangling  -PLEASE BRING IN YOUR BLOOD PRESSURE MACHINE FOR CORRELATION WITH THE OFFICE MACHINE  -please go for lab work in the morning but nonfasting prior to your appointment    5  General instructions:  -avoid salt  -avoid medications such as Motrin, Naprosyn, ibuprofen, Aleve or Advil or Celebrex as they can affect your kidney function; you can use Tylenol as needed for pain or fevers if you have no liver problems  -avoid medications such as Sudafed or other medications with decongestants as they can raise your blood pressure  -try to remain as active as possible          Subjective:   Patient with intermittent chest pain but usually relieved with nitroglycerin, this is a chronic symptom  No shortness of breath or leg swelling  No fevers chills cough or colds  Reasonably good appetite and energy  Patient has urinary incontinence, was doing well on Vesicare being switch to an alternative medication because of insurance  No gastrointestinal symptoms    No headaches, dizziness or lightheadedness  Blood pressure medications:  -torsemide 10 mg every other day   -spironolactone the 25 mg Mondays Wednesdays and Fridays  -Toprol  mg in the morning, and 50 mg in the evening  -losartan 50 mg daily in the morning  -amlodipine 2 5 mg twice a day    ROS:  See HPI, otherwise review of systems as completely reviewed with the patient are negative    Past Medical History:   Diagnosis Date    Anemia     Arthritis     Bladder calculi     CAD (coronary artery disease)     Status post CABG and PTCA to OM1    [facility]estive [facility] failure[facility]     CKD (chronic kidney disease) stage 3, GFR 30-59 ml/min ([facility])     CKD (chronic kidney disease), stage III ([address] )     Hypertension     Hypothyroidism     Uterine prolapse      Past Surgical History:   Procedure Laterality Date    BACK SURGERY      BLADDER SURGERY      CORONARY ANGIOPLASTY WITH STENT PLACEMENT      CORONARY ARTERY BYPASS GRAFT      OOPHORECTOMY Left     [place] PARTIAL HIP REPLACEMENT Right 2/1/2018    Procedure: HEMIARTHROPLASTY HIP (BIPOLAR) (RIGHT); Surgeon: Bayron Portillo MD;  Location: [address] [place] [address];  Service: Orthopedics     History reviewed  No pertinent family history  reports that she has never smoked  She has never used smokeless tobacco  She reports that she does not drink alcohol or use drugs  I COMPLETELY REVIEWED THE PAST MEDICAL HISTORY/PAST SURGICAL HISTORY/SOCIAL HISTORY/FAMILY HISTORY/AND MEDICATIONS  AND UPDATED ALL    Objective:     Vitals:   BP sitting on left:  176/76 with a heart rate of 60 and regular  BP standing on left:  168/74 with a heart rate of 60 and regular    Weight (last 2 days)     Date/Time   Weight    03/01/19 1008   66 4 (146 4)            Wt Readings from Last 3 Encounters:   03/01/19 66 4 kg (146 lb 6 4 oz)   12/11/18 67 1 kg (147 lb 14 9 oz)   10/26/18 67 1 kg (148 lb)     Body mass index is 25 13 kg/m²  Physical Exam: General:  No acute distress  Skin:  No acute rash  Eyes:  No scleral icterus, noninjected  ENT:  Moist mucous membranes  Neck:  Supple, no jugular venous distention  Back   No CVAT  Chest:  Clear to auscultation and percussion, good respiratory effort  CVS:  Regular rate and rhythm without a rub, murmurs or gallops  Abdomen:  Soft and nontender with normal bowel sounds  Extremities:  No cyanosis and no edema  Neuro:  Grossly intact  Psych:  Alert, oriented x3 and appropriate      Medications:    Current Outpatient Medications:     acetaminophen (TYLENOL) 325 mg tablet, Take 2 tablets (650 mg total) by mouth every 6 (six) hours as needed for mild pain, headaches or fever, Disp: 30 tablet, Rfl: 0    amLODIPine (NORVASC) 2 5 mg tablet, Take 2 5 mg by mouth 2 (two) times a day , Disp: , Rfl:     Ascorbic Acid (VITAMIN C) 1000 MG tablet, Take 1,000 mg by mouth daily, Disp: , Rfl:     aspirin (ECOTRIN LOW STRENGTH) 81 mg EC tablet, Take 81 mg by mouth daily Indications: Heart Attack  , Disp: , Rfl:     Coenzyme Q10 10 MG capsule, Take 10 mg by mouth daily, Disp: , Rfl:    hydroxychloroquine (PLAQUENIL) 200 mg tablet, Take 200 mg by mouth daily  , Disp: , Rfl:     levothyroxine 50 mcg tablet, Take 50 mcg by mouth daily  , Disp: , Rfl:     losartan (COZAAR) 25 mg tablet, Take 2 tablets (50 mg total) by mouth daily, Disp: 60 tablet, Rfl: 5    metoprolol succinate (TOPROL-XL) 50 mg 24 hr tablet, Take 1 tablet (50 mg total) by mouth daily Take 1 tablet in the morning (100mg) Take 1/2  tablet in the evening (50mg), Disp: 30 tablet, Rfl: 0    Mirabegron (MYRBETRIQ PO), Take 25 mg by mouth daily, Disp: , Rfl:     Multiple Vitamins-Minerals (CENTRUM SILVER PO), Take 1 tablet by mouth daily, Disp: , Rfl:     nitroglycerin (NITROLINGUAL) 0 4 mg/spray spray, Place 1 spray under the tongue every 5 (five) minutes as needed for chest pain, Disp: , Rfl:     ranolazine (RANEXA) 500 mg 12 hr tablet, Take 500 mg by mouth 2 (two) times a day  , Disp: , Rfl:     spironolactone (ALDACTONE) 25 mg tablet, Take 1 tablet (25 mg total) by mouth every other day (Patient taking differently: Take 25 mg by mouth 3 (three) times a week  ), Disp: 60 tablet, Rfl: 2    torsemide (DEMADEX) 10 mg tablet, Take 10 mg by mouth daily, Disp: , Rfl:     Vitamin D, Cholecalciferol, 1000 units CAPS, Take 2,000 Units by mouth daily  , Disp: , Rfl:     bisacodyl (DULCOLAX) 10 mg suppository, Insert 1 suppository (10 mg total) into the rectum daily as needed for constipation, Disp: 12 suppository, Rfl: 0    docusate sodium (COLACE) 100 mg capsule, Take 1 capsule (100 mg total) by mouth 2 (two) times a day (Patient not taking: Reported on 10/26/2018 ), Disp: 60 capsule, Rfl: 0    ezetimibe (ZETIA) 10 mg tablet, TAKE 1 TABLET AT BEDTIME  (Patient not taking: Reported on 3/1/2019), Disp: 90 tablet, Rfl: 0    Icosapent Ethyl 1 g CAPS, Take 1 capsule (1 g total) by mouth 2 (two) times a day, Disp: 60 capsule, Rfl: 5    isosorbide mononitrate (IMDUR) 60 mg 24 hr tablet, Take 60 mg by mouth daily, Disp: , Rfl:    polyethylene glycol (MIRALAX) 17 g packet, Take 17 g by mouth daily (Patient not taking: Reported on 10/26/2018 ), Disp: 14 each, Rfl: 0    senna (SENOKOT) 8 6 mg, Take 1 tablet (8 6 mg total) by mouth daily at bedtime (Patient not taking: Reported on 10/26/2018 ), Disp: 120 each, Rfl: 0    solifenacin (VESICARE) 10 MG tablet, Take 10 mg by mouth daily, Disp: , Rfl:     ticagrelor (BRILINTA) 90 MG, Take 1 tablet by mouth 2 (two) times a day (Patient not taking: Reported on 3/1/2019), Disp: 60 tablet, Rfl: 6    Lab, Imaging and other studies: I have personally reviewed pertinent labs    Laboratory Results:  Results for orders placed or performed in visit on 02/26/19   Comprehensive metabolic panel   Result Value Ref Range    Sodium 139 136 - 145 mmol/L    Potassium 4 2 3 5 - 5 3 mmol/L    Chloride 104 100 - 108 mmol/L    CO2 28 21 - 32 mmol/L    ANION GAP 7 4 - 13 mmol/L    BUN 19 5 - 25 mg/dL    Creatinine 1 25 0 60 - 1 30 mg/dL    Glucose, Fasting 97 65 - 99 mg/dL    Calcium 9 2 8 3 - 10 1 mg/dL    AST 18 5 - 45 U/L    ALT 24 12 - 78 U/L    Alkaline Phosphatase 85 46 - 116 U/L    Total Protein 7 3 6 4 - 8 2 g/dL    Albumin 3 8 3 5 - 5 0 g/dL    Total Bilirubin 0 53 0 20 - 1 00 mg/dL    eGFR 41 ml/min/1 73sq m   Ferritin   Result Value Ref Range    Ferritin 96 8 - 388 ng/mL   Iron Saturation %   Result Value Ref Range    Iron Saturation 24 %    TIBC 380 250 - 450 ug/dL    Iron 92 50 - 170 ug/dL   Lipid Panel with Direct LDL reflex   Result Value Ref Range    Cholesterol 230 (H) 50 - 200 mg/dL    Triglycerides 162 (H) <=150 mg/dL    HDL, Direct 59 40 - 60 mg/dL    LDL Calculated 139 (H) 0 - 100 mg/dL   Magnesium   Result Value Ref Range    Magnesium 2 6 1 6 - 2 6 mg/dL   Phosphorus   Result Value Ref Range    Phosphorus 3 8 2 3 - 4 1 mg/dL   Protein / creatinine ratio, urine   Result Value Ref Range    Creatinine, Ur 204 0 mg/dL    Protein Urine Random 53 mg/dL    Prot/Creat Ratio, Ur 0 26 (H) 0 00 - 0 10   PTH, intact   Result Value Ref Range    PTH 57 5 18 4 - 80 1 pg/mL   CBC and differential   Result Value Ref Range    WBC 7 75 4 31 - 10 16 Thousand/uL    RBC 4 77 3 81 - 5 12 Million/uL    Hemoglobin 12 4 11 5 - 15 4 g/dL    Hematocrit 40 8 34 8 - 46 1 %    MCV 86 82 - 98 fL    MCH 26 0 (L) 26 8 - 34 3 pg    MCHC 30 4 (L) 31 4 - 37 4 g/dL    RDW 14 1 11 6 - 15 1 %    MPV 10 8 8 9 - 12 7 fL    Platelets 644 538 - 257 Thousands/uL    nRBC 0 /100 WBCs    Neutrophils Relative 68 43 - 75 %    Immat GRANS % 0 0 - 2 %    Lymphocytes Relative 23 14 - 44 %    Monocytes Relative 7 4 - 12 %    Eosinophils Relative 1 0 - 6 %    Basophils Relative 1 0 - 1 %    Neutrophils Absolute 5 23 1 85 - 7 62 Thousands/µL    Immature Grans Absolute 0 03 0 00 - 0 20 Thousand/uL    Lymphocytes Absolute 1 78 0 60 - 4 47 Thousands/µL    Monocytes Absolute 0 56 0 17 - 1 22 Thousand/µL    Eosinophils Absolute 0 10 0 00 - 0 61 Thousand/µL    Basophils Absolute 0 05 0 00 - 0 10 Thousands/µL   C-reactive protein   Result Value Ref Range    CRP 5 8 (H) <3 0 mg/L   Sedimentation rate, automated   Result Value Ref Range    Sed Rate 18 0 - 20 mm/hour   Bilirubin, direct   Result Value Ref Range    Bilirubin, Direct 0 14 0 00 - 0 20 mg/dL       Results from last 7 days   Lab Units 02/26/19  0945   WBC Thousand/uL 7 75   HEMOGLOBIN g/dL 12 4   HEMATOCRIT % 40 8   PLATELETS Thousands/uL 230   POTASSIUM mmol/L 4 2   CHLORIDE mmol/L 104   CO2 mmol/L 28   BUN mg/dL 19   CREATININE mg/dL 1 25   CALCIUM mg/dL 9 2   MAGNESIUM mg/dL 2 6   PHOSPHORUS mg/dL 3 8         Radiology review:   chest X-ray    Ultrasound      Portions of the record may have been created with voice recognition software   Occasional wrong word or "sound a like" substitutions may have occurred due to the inherent limitations of voice recognition software   Read the chart carefully and recognize, using context, where substitutions have occurred

## 2019-03-04 RX ORDER — METOPROLOL SUCCINATE 50 MG/1
50 TABLET, EXTENDED RELEASE ORAL 2 TIMES DAILY
Qty: 180 TABLET | Refills: 3 | OUTPATIENT
Start: 2019-03-04

## 2019-03-04 NOTE — TELEPHONE ENCOUNTER
The refill is very confusing  If you read the chart I believe she takes 100 mg in the morning and half a tablet or 50 mg in the evening  This is not with the pharmacy is requesting is a refill as there 50 mg tablets not 100 mg tablets  Please find out the exact dose and then we can refill it appropriately

## 2019-03-06 ENCOUNTER — DOCUMENTATION (OUTPATIENT)
Dept: NEPHROLOGY | Facility: CLINIC | Age: 79
End: 2019-03-06

## 2019-03-06 DIAGNOSIS — N18.30 CHRONIC KIDNEY DISEASE, STAGE III (MODERATE) (HCC): Chronic | ICD-10-CM

## 2019-03-06 DIAGNOSIS — N18.30 ANEMIA OF CHRONIC RENAL FAILURE, STAGE 3 (MODERATE) (HCC): ICD-10-CM

## 2019-03-06 DIAGNOSIS — E78.5 DYSLIPIDEMIA: ICD-10-CM

## 2019-03-06 DIAGNOSIS — I12.9 HYPERTENSIVE CHRONIC KIDNEY DISEASE WITH STAGE 1 THROUGH STAGE 4 CHRONIC KIDNEY DISEASE, OR UNSPECIFIED CHRONIC KIDNEY DISEASE: ICD-10-CM

## 2019-03-06 DIAGNOSIS — E55.9 VITAMIN D DEFICIENCY: ICD-10-CM

## 2019-03-06 DIAGNOSIS — E61.1 IRON DEFICIENCY: ICD-10-CM

## 2019-03-06 DIAGNOSIS — D63.1 ANEMIA OF CHRONIC RENAL FAILURE, STAGE 3 (MODERATE) (HCC): ICD-10-CM

## 2019-03-06 RX ORDER — METOPROLOL SUCCINATE 100 MG/1
100 TABLET, EXTENDED RELEASE ORAL 2 TIMES DAILY
Qty: 180 TABLET | Refills: 3 | Status: SHIPPED | OUTPATIENT
Start: 2019-03-06 | End: 2019-06-04 | Stop reason: SDUPTHER

## 2019-03-14 ENCOUNTER — APPOINTMENT (EMERGENCY)
Dept: RADIOLOGY | Facility: HOSPITAL | Age: 79
DRG: 281 | End: 2019-03-14
Payer: MEDICARE

## 2019-03-14 ENCOUNTER — HOSPITAL ENCOUNTER (INPATIENT)
Facility: HOSPITAL | Age: 79
LOS: 3 days | DRG: 281 | End: 2019-03-17
Attending: EMERGENCY MEDICINE | Admitting: INTERNAL MEDICINE
Payer: MEDICARE

## 2019-03-14 DIAGNOSIS — R77.8 ELEVATED TROPONIN: ICD-10-CM

## 2019-03-14 DIAGNOSIS — N18.30 CKD (CHRONIC KIDNEY DISEASE) STAGE 3, GFR 30-59 ML/MIN (HCC): ICD-10-CM

## 2019-03-14 DIAGNOSIS — R07.9 CHEST PAIN: Primary | ICD-10-CM

## 2019-03-14 DIAGNOSIS — R07.9 CHEST PAIN WITH MODERATE RISK OF ACUTE CORONARY SYNDROME: ICD-10-CM

## 2019-03-14 PROBLEM — R39.198 URINARY DYSFUNCTION: Status: ACTIVE | Noted: 2019-03-14

## 2019-03-14 LAB
ALBUMIN SERPL BCP-MCNC: 3.5 G/DL (ref 3.5–5)
ALP SERPL-CCNC: 82 U/L (ref 46–116)
ALT SERPL W P-5'-P-CCNC: 24 U/L (ref 12–78)
ANION GAP SERPL CALCULATED.3IONS-SCNC: 8 MMOL/L (ref 4–13)
APTT PPP: 25 SECONDS (ref 26–38)
AST SERPL W P-5'-P-CCNC: 31 U/L (ref 5–45)
BASOPHILS # BLD AUTO: 0.05 THOUSANDS/ΜL (ref 0–0.1)
BASOPHILS NFR BLD AUTO: 1 % (ref 0–1)
BILIRUB SERPL-MCNC: 0.5 MG/DL (ref 0.2–1)
BUN SERPL-MCNC: 20 MG/DL (ref 5–25)
CALCIUM SERPL-MCNC: 9.1 MG/DL (ref 8.3–10.1)
CHLORIDE SERPL-SCNC: 104 MMOL/L (ref 100–108)
CO2 SERPL-SCNC: 26 MMOL/L (ref 21–32)
CREAT SERPL-MCNC: 1.06 MG/DL (ref 0.6–1.3)
EOSINOPHIL # BLD AUTO: 0.13 THOUSAND/ΜL (ref 0–0.61)
EOSINOPHIL NFR BLD AUTO: 1 % (ref 0–6)
ERYTHROCYTE [DISTWIDTH] IN BLOOD BY AUTOMATED COUNT: 13.4 % (ref 11.6–15.1)
GFR SERPL CREATININE-BSD FRML MDRD: 50 ML/MIN/1.73SQ M
GLUCOSE SERPL-MCNC: 102 MG/DL (ref 65–140)
HCT VFR BLD AUTO: 40.4 % (ref 34.8–46.1)
HGB BLD-MCNC: 12.4 G/DL (ref 11.5–15.4)
IMM GRANULOCYTES # BLD AUTO: 0.06 THOUSAND/UL (ref 0–0.2)
IMM GRANULOCYTES NFR BLD AUTO: 1 % (ref 0–2)
INR PPP: 0.92 (ref 0.86–1.16)
LYMPHOCYTES # BLD AUTO: 1.88 THOUSANDS/ΜL (ref 0.6–4.47)
LYMPHOCYTES NFR BLD AUTO: 21 % (ref 14–44)
MCH RBC QN AUTO: 26 PG (ref 26.8–34.3)
MCHC RBC AUTO-ENTMCNC: 30.7 G/DL (ref 31.4–37.4)
MCV RBC AUTO: 85 FL (ref 82–98)
MONOCYTES # BLD AUTO: 0.62 THOUSAND/ΜL (ref 0.17–1.22)
MONOCYTES NFR BLD AUTO: 7 % (ref 4–12)
NEUTROPHILS # BLD AUTO: 6.39 THOUSANDS/ΜL (ref 1.85–7.62)
NEUTS SEG NFR BLD AUTO: 69 % (ref 43–75)
NRBC BLD AUTO-RTO: 0 /100 WBCS
PLATELET # BLD AUTO: 252 THOUSANDS/UL (ref 149–390)
PMV BLD AUTO: 10 FL (ref 8.9–12.7)
POTASSIUM SERPL-SCNC: 4.4 MMOL/L (ref 3.5–5.3)
PROT SERPL-MCNC: 7.1 G/DL (ref 6.4–8.2)
PROTHROMBIN TIME: 9.7 SECONDS (ref 9.4–11.7)
RBC # BLD AUTO: 4.77 MILLION/UL (ref 3.81–5.12)
SODIUM SERPL-SCNC: 138 MMOL/L (ref 136–145)
TROPONIN I SERPL-MCNC: 0.09 NG/ML
TROPONIN I SERPL-MCNC: 1 NG/ML
TROPONIN I SERPL-MCNC: 1.3 NG/ML
WBC # BLD AUTO: 9.13 THOUSAND/UL (ref 4.31–10.16)

## 2019-03-14 PROCEDURE — 36415 COLL VENOUS BLD VENIPUNCTURE: CPT | Performed by: EMERGENCY MEDICINE

## 2019-03-14 PROCEDURE — 85025 COMPLETE CBC W/AUTO DIFF WBC: CPT | Performed by: EMERGENCY MEDICINE

## 2019-03-14 PROCEDURE — 93005 ELECTROCARDIOGRAM TRACING: CPT

## 2019-03-14 PROCEDURE — 99285 EMERGENCY DEPT VISIT HI MDM: CPT

## 2019-03-14 PROCEDURE — 84484 ASSAY OF TROPONIN QUANT: CPT | Performed by: EMERGENCY MEDICINE

## 2019-03-14 PROCEDURE — 71045 X-RAY EXAM CHEST 1 VIEW: CPT

## 2019-03-14 PROCEDURE — 84484 ASSAY OF TROPONIN QUANT: CPT | Performed by: INTERNAL MEDICINE

## 2019-03-14 PROCEDURE — 85730 THROMBOPLASTIN TIME PARTIAL: CPT | Performed by: EMERGENCY MEDICINE

## 2019-03-14 PROCEDURE — 80053 COMPREHEN METABOLIC PANEL: CPT | Performed by: EMERGENCY MEDICINE

## 2019-03-14 PROCEDURE — 99223 1ST HOSP IP/OBS HIGH 75: CPT | Performed by: INTERNAL MEDICINE

## 2019-03-14 PROCEDURE — 85610 PROTHROMBIN TIME: CPT | Performed by: EMERGENCY MEDICINE

## 2019-03-14 RX ORDER — SPIRONOLACTONE 25 MG/1
25 TABLET ORAL EVERY OTHER DAY
Status: DISCONTINUED | OUTPATIENT
Start: 2019-03-14 | End: 2019-03-17 | Stop reason: HOSPADM

## 2019-03-14 RX ORDER — METOPROLOL SUCCINATE 100 MG/1
100 TABLET, EXTENDED RELEASE ORAL DAILY
Status: DISCONTINUED | OUTPATIENT
Start: 2019-03-14 | End: 2019-03-17 | Stop reason: HOSPADM

## 2019-03-14 RX ORDER — HEPARIN SODIUM 10000 [USP'U]/100ML
3-20 INJECTION, SOLUTION INTRAVENOUS
Status: DISCONTINUED | OUTPATIENT
Start: 2019-03-14 | End: 2019-03-17 | Stop reason: HOSPADM

## 2019-03-14 RX ORDER — ISOSORBIDE MONONITRATE 60 MG/1
60 TABLET, EXTENDED RELEASE ORAL DAILY
Status: DISCONTINUED | OUTPATIENT
Start: 2019-03-14 | End: 2019-03-17 | Stop reason: HOSPADM

## 2019-03-14 RX ORDER — LEVOTHYROXINE SODIUM 0.05 MG/1
50 TABLET ORAL
Status: DISCONTINUED | OUTPATIENT
Start: 2019-03-15 | End: 2019-03-17 | Stop reason: HOSPADM

## 2019-03-14 RX ORDER — AMLODIPINE BESYLATE 5 MG/1
5 TABLET ORAL EVERY EVENING
Status: DISCONTINUED | OUTPATIENT
Start: 2019-03-14 | End: 2019-03-15

## 2019-03-14 RX ORDER — HYDRALAZINE HYDROCHLORIDE 20 MG/ML
10 INJECTION INTRAMUSCULAR; INTRAVENOUS EVERY 4 HOURS PRN
Status: DISCONTINUED | OUTPATIENT
Start: 2019-03-14 | End: 2019-03-17 | Stop reason: HOSPADM

## 2019-03-14 RX ORDER — AMLODIPINE BESYLATE 2.5 MG/1
5 TABLET ORAL SEE ADMIN INSTRUCTIONS
COMMUNITY
End: 2019-04-01 | Stop reason: SDUPTHER

## 2019-03-14 RX ORDER — TORSEMIDE 10 MG/1
10 TABLET ORAL DAILY
Status: DISCONTINUED | OUTPATIENT
Start: 2019-03-14 | End: 2019-03-16

## 2019-03-14 RX ORDER — RANOLAZINE 500 MG/1
500 TABLET, EXTENDED RELEASE ORAL 2 TIMES DAILY
Status: DISCONTINUED | OUTPATIENT
Start: 2019-03-14 | End: 2019-03-17 | Stop reason: HOSPADM

## 2019-03-14 RX ORDER — ASPIRIN 81 MG/1
81 TABLET ORAL DAILY
Status: DISCONTINUED | OUTPATIENT
Start: 2019-03-14 | End: 2019-03-17 | Stop reason: HOSPADM

## 2019-03-14 RX ORDER — HYDROXYCHLOROQUINE SULFATE 200 MG/1
200 TABLET, FILM COATED ORAL DAILY
Status: DISCONTINUED | OUTPATIENT
Start: 2019-03-14 | End: 2019-03-17 | Stop reason: HOSPADM

## 2019-03-14 RX ORDER — HEPARIN SODIUM 1000 [USP'U]/ML
1950 INJECTION, SOLUTION INTRAVENOUS; SUBCUTANEOUS AS NEEDED
Status: DISCONTINUED | OUTPATIENT
Start: 2019-03-14 | End: 2019-03-17 | Stop reason: HOSPADM

## 2019-03-14 RX ORDER — DOCUSATE SODIUM 100 MG/1
100 CAPSULE, LIQUID FILLED ORAL 2 TIMES DAILY PRN
Status: DISCONTINUED | OUTPATIENT
Start: 2019-03-14 | End: 2019-03-17 | Stop reason: HOSPADM

## 2019-03-14 RX ORDER — ACETAMINOPHEN 325 MG/1
650 TABLET ORAL EVERY 6 HOURS PRN
Status: DISCONTINUED | OUTPATIENT
Start: 2019-03-14 | End: 2019-03-17 | Stop reason: HOSPADM

## 2019-03-14 RX ORDER — MELATONIN
1000 DAILY
Status: DISCONTINUED | OUTPATIENT
Start: 2019-03-14 | End: 2019-03-17 | Stop reason: HOSPADM

## 2019-03-14 RX ORDER — HEPARIN SODIUM 1000 [USP'U]/ML
3900 INJECTION, SOLUTION INTRAVENOUS; SUBCUTANEOUS ONCE
Status: COMPLETED | OUTPATIENT
Start: 2019-03-14 | End: 2019-03-14

## 2019-03-14 RX ORDER — UBIDECARENONE 10 MG
10 CAPSULE ORAL DAILY
Status: DISCONTINUED | OUTPATIENT
Start: 2019-03-14 | End: 2019-03-15

## 2019-03-14 RX ORDER — HEPARIN SODIUM 1000 [USP'U]/ML
3900 INJECTION, SOLUTION INTRAVENOUS; SUBCUTANEOUS AS NEEDED
Status: DISCONTINUED | OUTPATIENT
Start: 2019-03-14 | End: 2019-03-17 | Stop reason: HOSPADM

## 2019-03-14 RX ORDER — METOPROLOL SUCCINATE 50 MG/1
50 TABLET, EXTENDED RELEASE ORAL EVERY EVENING
Status: DISCONTINUED | OUTPATIENT
Start: 2019-03-14 | End: 2019-03-17 | Stop reason: HOSPADM

## 2019-03-14 RX ORDER — HYDROCODONE BITARTRATE AND ACETAMINOPHEN 5; 325 MG/1; MG/1
1 TABLET ORAL EVERY 4 HOURS PRN
Status: DISCONTINUED | OUTPATIENT
Start: 2019-03-14 | End: 2019-03-17 | Stop reason: HOSPADM

## 2019-03-14 RX ORDER — ONDANSETRON 2 MG/ML
4 INJECTION INTRAMUSCULAR; INTRAVENOUS EVERY 4 HOURS PRN
Status: DISCONTINUED | OUTPATIENT
Start: 2019-03-14 | End: 2019-03-17 | Stop reason: HOSPADM

## 2019-03-14 RX ORDER — LOSARTAN POTASSIUM 50 MG/1
50 TABLET ORAL DAILY
Status: DISCONTINUED | OUTPATIENT
Start: 2019-03-14 | End: 2019-03-16

## 2019-03-14 RX ORDER — AMLODIPINE BESYLATE 2.5 MG/1
2.5 TABLET ORAL DAILY
Status: DISCONTINUED | OUTPATIENT
Start: 2019-03-14 | End: 2019-03-15

## 2019-03-14 RX ORDER — CHLORAL HYDRATE 500 MG
1000 CAPSULE ORAL 2 TIMES DAILY
Status: DISCONTINUED | OUTPATIENT
Start: 2019-03-14 | End: 2019-03-17 | Stop reason: HOSPADM

## 2019-03-14 RX ORDER — ASPIRIN 81 MG/1
324 TABLET, CHEWABLE ORAL ONCE
Status: COMPLETED | OUTPATIENT
Start: 2019-03-14 | End: 2019-03-14

## 2019-03-14 RX ADMIN — LOSARTAN POTASSIUM 50 MG: 50 TABLET, FILM COATED ORAL at 17:19

## 2019-03-14 RX ADMIN — SPIRONOLACTONE 25 MG: 25 TABLET ORAL at 17:19

## 2019-03-14 RX ADMIN — ASPIRIN 81 MG 324 MG: 81 TABLET ORAL at 10:12

## 2019-03-14 RX ADMIN — ASPIRIN 81 MG: 81 TABLET, COATED ORAL at 17:29

## 2019-03-14 RX ADMIN — TORSEMIDE 10 MG: 10 TABLET ORAL at 17:19

## 2019-03-14 RX ADMIN — ISOSORBIDE MONONITRATE 60 MG: 60 TABLET, EXTENDED RELEASE ORAL at 17:19

## 2019-03-14 RX ADMIN — HYDROXYCHLOROQUINE SULFATE 200 MG: 200 TABLET, FILM COATED ORAL at 17:18

## 2019-03-14 RX ADMIN — VITAMIN D, TAB 1000IU (100/BT) 1000 UNITS: 25 TAB at 17:20

## 2019-03-14 RX ADMIN — HEPARIN SODIUM AND DEXTROSE 12 UNITS/KG/HR: 10000; 5 INJECTION INTRAVENOUS at 18:55

## 2019-03-14 RX ADMIN — Medication 1 TABLET: at 17:20

## 2019-03-14 RX ADMIN — AMLODIPINE BESYLATE 5 MG: 5 TABLET ORAL at 17:19

## 2019-03-14 RX ADMIN — RANOLAZINE 500 MG: 500 TABLET, FILM COATED, EXTENDED RELEASE ORAL at 17:29

## 2019-03-14 RX ADMIN — Medication 1000 MG: at 17:29

## 2019-03-14 RX ADMIN — METOPROLOL SUCCINATE 50 MG: 50 TABLET, EXTENDED RELEASE ORAL at 17:29

## 2019-03-14 RX ADMIN — NITROGLYCERIN 1 INCH: 20 OINTMENT TOPICAL at 10:53

## 2019-03-14 RX ADMIN — HEPARIN SODIUM 3900 UNITS: 1000 INJECTION, SOLUTION INTRAVENOUS; SUBCUTANEOUS at 18:50

## 2019-03-14 NOTE — ASSESSMENT & PLAN NOTE
CKD 3 follows with Dr Tiffany Self    Lab Results   Component Value Date    CREATININE 1 06 03/14/2019    CREATININE 1 25 02/26/2019    CREATININE 1 09 01/10/2019    CREATININE 1 23 12/11/2018    CREATININE 1 38 (H) 10/17/2018

## 2019-03-14 NOTE — ED PROVIDER NOTES
History  Chief Complaint   Patient presents with    Chest Pain     Patient c/o having chest pain off and on for 1 week, today pain started at [place] went away with NTG then pain returned again about 45 mins ago did not take NTG at that time     Patient presents for evaluation of chest pain for the last week  States she has a history of CAD and often can get chest pain  Occurs at rest and with exertion no apparent modifying factors  States today the pain was worse  Took nitro spray at home and pain now improved  Chest pain is substernal pressure  History provided by:  Patient   used: No    Chest Pain       Prior to Admission Medications   Prescriptions Last Dose Informant Patient Reported? Taking? Ascorbic Acid (VITAMIN C) 1000 MG tablet  Self Yes No   Sig: Take 1,000 mg by mouth daily   Coenzyme Q10 10 MG capsule  Self Yes No   Sig: Take 10 mg by mouth daily   Icosapent Ethyl 1 g CAPS   No No   Sig: Take 1 capsule (1 g total) by mouth 2 (two) times a day   Mirabegron (MYRBETRIQ PO)   Yes No   Sig: Take 25 mg by mouth every other day    Multiple Vitamins-Minerals (CENTRUM SILVER PO)  Self Yes No   Sig: Take 1 tablet by mouth daily   Vitamin D, Cholecalciferol, 1000 units CAPS  Self Yes No   Sig: Take 2,000 Units by mouth daily     amLODIPine (NORVASC) 2 5 mg tablet   Yes Yes   Sig: Take 5 mg by mouth see administration instructions 2 5 mg in morning, 5 mg in evening   aspirin (ECOTRIN LOW STRENGTH) 81 mg EC tablet  Self Yes No   Sig: Take 81 mg by mouth daily Indications: Heart Attack  hydroxychloroquine (PLAQUENIL) 200 mg tablet  Self Yes No   Sig: Take 200 mg by mouth daily     isosorbide mononitrate (IMDUR) 60 mg 24 hr tablet   Yes No   Sig: Take 60 mg by mouth daily   levothyroxine 50 mcg tablet  Self Yes No   Sig: Take 50 mcg by mouth daily     losartan (COZAAR) 25 mg tablet   No No   Sig: Take 2 tablets (50 mg total) by mouth daily   metoprolol succinate (TOPROL-XL) 100 mg 24 hr tablet   No No   Sig: Take 1 tablet (100 mg total) by mouth 2 (two) times a day Take 1 tablet in the morning (100mg),Take 1/2  tablet in the evening (50mg)   Patient taking differently: Take 100 mg by mouth Take 1 tablet in the morning (100mg),Take 1/2  tablet in the evening (50mg)   nitroglycerin (NITROLINGUAL) 0 4 mg/spray spray  Self Yes No   Sig: Place 1 spray under the tongue every 5 (five) minutes as needed for chest pain   ranolazine (RANEXA) 500 mg 12 hr tablet  Self Yes No   Sig: Take 500 mg by mouth 2 (two) times a day     spironolactone (ALDACTONE) 25 mg tablet  Self No No   Sig: Take 1 tablet (25 mg total) by mouth every other day   Patient taking differently: Take 25 mg by mouth 3 (three) times a week     torsemide (DEMADEX) 10 mg tablet  Self Yes No   Sig: Take 10 mg by mouth daily      Facility-Administered Medications: None       Past Medical History:   Diagnosis Date    Anemia     Arthritis     Bladder calculi     CAD (coronary artery disease)     Status post CABG and PTCA to OM1    CKD (chronic kidney disease) stage 3, GFR 30-59 ml/min ([facility])     CKD (chronic kidney disease), stage III ([facility])     Diastolic congestive heart failure ([facility])     Hypertension     Hypothyroidism     Rheumatoid arthritis ([address] )     Uterine prolapse        Past Surgical History:   Procedure Laterality Date    BACK SURGERY      BLADDER SURGERY      CORONARY ANGIOPLASTY WITH STENT PLACEMENT      CORONARY ARTERY BYPASS GRAFT      OOPHORECTOMY Left     OR PARTIAL HIP REPLACEMENT Right 2/1/2018    Procedure: HEMIARTHROPLASTY HIP (BIPOLAR) (RIGHT); Surgeon: Modesta Graham MD;  Location: [address] [place] [address];  Service: Orthopedics    THYROIDECTOMY, PARTIAL         History reviewed  No pertinent family history  I have reviewed and agree with the history as documented      Social History     Tobacco Use    Smoking status: Never Smoker    Smokeless tobacco: Never Used   Substance Use Topics    Alcohol use: No    Drug use: No        Review of Systems   Cardiovascular: Positive for chest pain  All other systems reviewed and are negative  Physical Exam  Physical Exam   Constitutional: She is oriented to person, place, and time  No distress  HENT:   Mouth/Throat: Oropharynx is clear and moist    Eyes: Pupils are equal, round, and reactive to light  Neck: Normal range of motion  Cardiovascular: Normal rate, regular rhythm and intact distal pulses  Pulmonary/Chest: Effort normal and breath sounds normal  No respiratory distress  Abdominal: Soft  There is no tenderness  Musculoskeletal: Normal range of motion  Neurological: She is alert and oriented to person, place, and time  Skin: Capillary refill takes less than 2 seconds  She is not diaphoretic  Nursing note and vitals reviewed        Vital Signs  ED Triage Vitals [03/14/19 0907]   Temperature Pulse Respirations Blood Pressure SpO2   (!) 96 8 °F (36 °C) 81 16 (!) 172/86 100 %      Temp Source Heart Rate Source Patient Position - Orthostatic VS BP Location FiO2 (%)   Tympanic Monitor Lying Left arm --      Pain Score       4           Vitals:    03/14/19 0907 03/14/19 1014   BP: (!) 172/86 170/89   Pulse: 81 70   Patient Position - Orthostatic VS: Lying Lying       qSOFA     Row Name 03/14/19 1014 03/14/19 0907             Altered mental status GCS < 15           Respiratory Rate > / =22  0  0       Systolic BP < / =333  0  0       Q Sofa Score  0  0             Visual Acuity      ED Medications  Medications   aspirin chewable tablet 324 mg (324 mg Oral Given 3/14/19 1012)   nitroglycerin (NITRO-BID) 2 % TD ointment 1 inch (1 inch Topical Given 3/14/19 1053)       Diagnostic Studies  Results Reviewed     Procedure Component Value Units Date/Time    Troponin I [[id number]]  (Abnormal) Collected:  03/14/19 0918    Lab Status:  Final result Specimen:  Blood from Arm, Right Updated:  03/14/19 0951     Troponin I 0 09 ng/mL     Comprehensive metabolic panel [[id number]] Collected:  03/14/19 0918    Lab Status:  Final result Specimen:  Blood from Arm, Right Updated:  03/14/19 0945     Sodium 138 mmol/L      Potassium 4 4 mmol/L      Chloride 104 mmol/L      CO2 26 mmol/L      ANION GAP 8 mmol/L      BUN 20 mg/dL      Creatinine 1 06 mg/dL      Glucose 102 mg/dL      Calcium 9 1 mg/dL      AST 31 U/L      ALT 24 U/L      Alkaline Phosphatase 82 U/L      Total Protein 7 1 g/dL      Albumin 3 5 g/dL      Total Bilirubin 0 50 mg/dL      eGFR 50 ml/min/1 73sq m     Narrative:       National Kidney Disease Education Program recommendations are as follows:  GFR calculation is accurate only with a steady state creatinine  Chronic Kidney disease less than 60 ml/min/1 73 sq  meters  Kidney failure less than 15 ml/min/1 73 sq  meters      Protime-INR [[id number]]  (Normal) Collected:  03/14/19 0918    Lab Status:  Final result Specimen:  Blood from Arm, Right Updated:  03/14/19 0942     Protime 9 7 seconds      INR 0 92    APTT [[id number]]  (Abnormal) Collected:  03/14/19 0918    Lab Status:  Final result Specimen:  Blood from Arm, Right Updated:  03/14/19 0942     PTT 25 seconds     CBC and differential [[id number]]  (Abnormal) Collected:  03/14/19 0918    Lab Status:  Final result Specimen:  Blood from Arm, Right Updated:  03/14/19 0929     WBC 9 13 Thousand/uL      RBC 4 77 Million/uL      Hemoglobin 12 4 g/dL      Hematocrit 40 4 %      MCV 85 fL      MCH 26 0 pg      MCHC 30 7 g/dL      RDW 13 4 %      MPV 10 0 fL      Platelets 351 Thousands/uL      nRBC 0 /100 WBCs      Neutrophils Relative 69 %      Immat GRANS % 1 %      Lymphocytes Relative 21 %      Monocytes Relative 7 %      Eosinophils Relative 1 %      Basophils Relative 1 %      Neutrophils Absolute 6 39 Thousands/µL      Immature Grans Absolute 0 06 Thousand/uL      Lymphocytes Absolute 1 88 Thousands/µL      Monocytes Absolute 0 62 Thousand/µL      Eosinophils Absolute 0 13 Thousand/µL      Basophils Absolute 0 05 Thousands/µL                  XR chest 1 view portable   Final Result by Sofie Feldman MD (03/14 1007)      No acute cardiopulmonary disease  Workstation performed: [id number]                    Procedures  Procedures       Phone Contacts  ED Phone Contact    ED Course                               MDM  Number of Diagnoses or Management Options  Chest pain:   Elevated troponin:   Diagnosis management comments: Pulse ox 96% on RA indicating adequate oxygenation  CXR: NAD as read by me    Chest pain started to come back will give nitro paste  Admit for elevated troponin and new ST depression in lateral leads  Amount and/or Complexity of Data Reviewed  Clinical lab tests: ordered and reviewed  Tests in the radiology section of CPT®: ordered and reviewed  Decide to obtain previous medical records or to obtain history from someone other than the patient: yes  Review and summarize past medical records: yes    Patient Progress  Patient progress: stable      Disposition  Final diagnoses:   Chest pain   Elevated troponin     Time reflects when diagnosis was documented in both MDM as applicable and the Disposition within this note     Time User Action Codes Description Comment    3/14/2019 10:48 AM Katy Monaco Add [R07 9] Chest pain     3/14/2019 10:48 AM Katy Monaco Add [R74 8] Elevated troponin     3/14/2019 12:26 PM Elijah De Anda Add [R07 9] Chest pain with moderate risk of acute coronary syndrome       ED Disposition     ED Disposition Condition Date/Time Comment    Admit Stable Thu Mar 14, 2019 10:49 AM Case was discussed with Dr Margo Begum and the patient's admission status was agreed to be admission to the service of Dr Margo Begum  Follow-up Information    None         Patient's Medications   Discharge Prescriptions    No medications on file     No discharge procedures on file      ED Provider  Electronically Signed by           Annamaria Primrose, DO  03/14/19 0549

## 2019-03-14 NOTE — ASSESSMENT & PLAN NOTE
It was thought patient's mybetriq could be causing elevated blood pressures  She has been instructed by her urologist to take every other day

## 2019-03-14 NOTE — PLAN OF CARE
Problem: Potential for Falls  Goal: Patient will remain free of falls  Description  INTERVENTIONS:  - Assess patient frequently for physical needs  -  Identify cognitive and physical deficits and behaviors that affect risk of falls  -  [place] fall precautions as indicated by assessment   - Educate patient/family on patient safety including physical limitations  - Instruct patient to call for assistance with activity based on assessment  - Modify environment to reduce risk of injury  - Consider OT/PT consult to assist with strengthening/mobility  Outcome: Progressing     Problem: Prexisting or High Potential for Compromised Skin Integrity  Goal: Skin integrity is maintained or improved  Description  INTERVENTIONS:  - Identify patients at risk for skin breakdown  - Assess and monitor skin integrity  - Assess and monitor nutrition and hydration status  - Monitor labs (i e  albumin)  - Assess for incontinence   - Turn and reposition patient  - Assist with mobility/ambulation  - Relieve pressure over bony prominences  - Avoid friction and shearing  - Provide appropriate hygiene as needed including keeping skin clean and dry  - Evaluate need for skin moisturizer/barrier cream  - Collaborate with interdisciplinary team (i e  Nutrition, Rehabilitation, etc )   - Patient/family teaching  Outcome: Progressing     Problem: Nutrition/Hydration-ADULT  Goal: Nutrient/Hydration intake appropriate for improving, restoring or maintaining nutritional needs  Description  Monitor and assess patient's nutrition/hydration status for malnutrition (ex- brittle hair, bruises, dry skin, pale skin and conjunctiva, muscle wasting, smooth red tongue, and disorientation)  Collaborate with interdisciplinary team and initiate plan and interventions as ordered  Monitor patient's weight and dietary intake as ordered or per policy  Utilize nutrition screening tool and intervene per policy   Determine patient's food preferences and provide high-protein, high-caloric foods as appropriate       INTERVENTIONS:  - Monitor oral intake, urinary output, labs, and treatment plans  - Assess nutrition and hydration status and recommend course of action  - Evaluate amount of meals eaten  - Assist patient with eating if necessary   - Allow adequate time for meals  - Recommend/ encourage appropriate diets, oral nutritional supplements, and vitamin/mineral supplements  - Order, calculate, and assess calorie counts as needed  - Recommend, monitor, and adjust tube feedings and TPN/PPN based on assessed needs  - Assess need for intravenous fluids  - Provide specific nutrition/hydration education as appropriate  - Include patient/family/caregiver in decisions related to nutrition  Outcome: Progressing

## 2019-03-14 NOTE — ASSESSMENT & PLAN NOTE
Longstanding history of essential hypertension  Amlodipine increased to 2 5 mg in a m  5 mg in p m  as well as spironolactone to daily    Continue metoprolol succinate 100 mg in a m , 50 mg in p m  and losartan

## 2019-03-14 NOTE — ASSESSMENT & PLAN NOTE
Chest pain with extensive history of coronary artery disease  Was taken off Brilinta by Dr Trinidad Mojica two weeks ago  Cardiology to evaluate  Monitor on telemetry  Trend troponins

## 2019-03-14 NOTE — ASSESSMENT & PLAN NOTE
Coronary artery disease with history of CABG and subsequent stenting 2017  She was recently taken off Brilinta two weeks ago by Dr Christy Maria  Given elevated troponin cannot rule out NSTEMI  Have cardiology evaluate  Monitor on telemetry    Continue isosorbide and ranexa

## 2019-03-14 NOTE — H&P
[facility] INTERNAL MEDICINE  H&P- Uriah Hollow 1940, 77 y o  female MRN: [id number]  Unit/Bed#: ED 04 Encounter: [id number]  Primary Care Provider: Sathya Trevizo MD   Date and time admitted to hospital: 3/14/2019  9:04 AM           Assessment and Plan    * Chest pain with moderate risk of acute coronary syndrome  Assessment & Plan  Chest pain with extensive history of coronary artery disease  Was taken off Brilinta by Dr Candelario Martínez two weeks ago  Cardiology to evaluate  Monitor on telemetry  Trend troponins  Urinary dysfunction  Assessment & Plan  It was thought patient's mybetriq could be causing elevated blood pressures  She has been instructed by her urologist to take every other day  Rheumatoid arthritis (HCC)  Assessment & Plan  Rheumatoid arthritis maintained on hydroxychloroquine    CKD (chronic kidney disease) stage 3, GFR 30-59 ml/min ([facility])  Assessment & Plan  CKD 3 follows with Dr Oliver Arrington  Lab Results   Component Value Date    CREATININE 1 06 03/14/2019    CREATININE 1 25 02/26/2019    CREATININE 1 09 01/10/2019    CREATININE 1 23 12/11/2018    CREATININE 1 38 (H) 10/17/2018       Hypertension  Assessment & Plan  Longstanding history of essential hypertension  Amlodipine increased to 2 5 mg in a m  5 mg in p m  as well as spironolactone to daily  Continue metoprolol succinate 100 mg in a m , 50 mg in p m  and losartan    CAD (coronary artery disease)  Assessment & Plan  Coronary artery disease with history of CABG and subsequent stenting 2017  She was recently taken off Brilinta two weeks ago by Dr Candelario Martínez  Given elevated troponin cannot rule out NSTEMI  Have cardiology evaluate  Monitor on telemetry  Continue isosorbide and ranexa     Chronic systolic congestive heart failure (HCC)  Assessment & Plan  Last LVEF 45%    Currently compensated continue torsemide    Hypothyroid  Assessment & Plan  Continue levothyroxine    Hyperlipidemia  Assessment & Plan  Hyperlipidemia intolerant to statins  Has been started on omega 3      VTE Prophylaxis: Enoxaparin (Lovenox)  Code Status:  Level one full code  Anticipated Length of Stay:  Patient will be admitted on an Inpatient basis with an anticipated length of stay of  greater than 2 midnights  Justification for Hospital Stay: Chest pain with moderate risk of acute coronary syndrome  Total Time for Visit, including Counseling / Coordination of Care: 40 mins  Greater than 50% of this total time spent on direct patient counseling and coordination of care  Chief Complaint:     Chief Complaint   Patient presents with    Chest Pain     Patient c/o having chest pain off and on for 1 week, today pain started at [place] went away with NTG then pain returned again about 45 mins ago did not take NTG at that time     History of Present Illness:    Brian Bradley is a 77 y o  female who presents with worsening chest pain  The patient has a history of coronary artery disease status post CABG [facility] in remote past   She had additional event 2017 where she was transferred to [facility] and underwent angioplasty and drug-eluting stent placement of SVG  She follows with Dr Jenny Gardner  For last 24 hours she has been feeling lousy  She cannot get in to see Cardiology yesterday and she was a woken this morning with chest pain  She did apply one spray of nitroglycerin in oral cavity with near resolution of pains  She came here to the emergency department after chest pain recurred and after receiving nitroglycerin patch her chest pain has subsided  She has been undergoing a lot of stressors within the last year including death of both  and brother  Of note Dr Betty Smith had discontinued Brilinta approximately two weeks ago  She tells me she has been suffering from uncontrolled hypertension and does follow with Dr Jeremy Suárez from Nephrology      Review of Systems:  History obtained from chart review, the patient and daughter  General ROS: negative for - chills or fever  Psychological ROS: positive for - anxiety  Ophthalmic ROS: negative for - excessive tearing or eye pain  Respiratory ROS: negative for - cough or shortness of breath  Cardiovascular ROS: positive for - chest pain  Gastrointestinal ROS: negative for - gas/bloating or heartburn  Genito-Urinary ROS: negative for - hematuria  Musculoskeletal ROS: negative for - joint stiffness  Neurological ROS: negative for - seizures  Otherwise, all other 12 point review of systems normal     Past Medical and Surgical History:   Past Medical History:   Diagnosis Date    Anemia     Arthritis     Bladder calculi     CAD (coronary artery disease)     Status post CABG and PTCA to OM1    CKD (chronic kidney disease) stage 3, GFR 30-59 ml/min ([facility])     CKD (chronic kidney disease), stage III ([facility])     Diastolic congestive heart failure ([facility])     Hypertension     Hypothyroidism     Rheumatoid arthritis ([facility] [address] )     Uterine prolapse      Past Surgical History:   Procedure Laterality Date    BACK SURGERY      BLADDER SURGERY      CORONARY ANGIOPLASTY WITH STENT PLACEMENT      CORONARY ARTERY BYPASS GRAFT      OOPHORECTOMY Left     NE PARTIAL HIP REPLACEMENT Right 2/1/2018    Procedure: HEMIARTHROPLASTY HIP (BIPOLAR) (RIGHT); Surgeon: Soto Villela MD;  Location: [address] [place] [address];  Service: Orthopedics    THYROIDECTOMY, PARTIAL       Meds/Allergies: Allergies: Allergies   Allergen Reactions    Atorvastatin Hives    Boniva [Ibandronic Acid] Hives    Codeine      codeine derivatives - tolerates IV Dilaudid    Statins Hives    Zetia [Ezetimibe]      Prior to Admission Medications   Prescriptions Last Dose Informant Patient Reported? Taking?    Ascorbic Acid (VITAMIN C) 1000 MG tablet  Self Yes No   Sig: Take 1,000 mg by mouth daily   Coenzyme Q10 10 MG capsule  Self Yes No   Sig: Take 10 mg by mouth daily   Icosapent Ethyl 1 g CAPS   No No   Sig: Take 1 capsule (1 g total) by mouth 2 (two) times a day   Mirabegron (MYRBETRIQ PO)   Yes No   Sig: Take 25 mg by mouth every other day    Multiple Vitamins-Minerals (CENTRUM SILVER PO)  Self Yes No   Sig: Take 1 tablet by mouth daily   Vitamin D, Cholecalciferol, 1000 units CAPS  Self Yes No   Sig: Take 2,000 Units by mouth daily     amLODIPine (NORVASC) 2 5 mg tablet   Yes Yes   Sig: Take 5 mg by mouth see administration instructions 2 5 mg in morning, 5 mg in evening   aspirin (ECOTRIN LOW STRENGTH) 81 mg EC tablet  Self Yes No   Sig: Take 81 mg by mouth daily Indications: Heart Attack  hydroxychloroquine (PLAQUENIL) 200 mg tablet  Self Yes No   Sig: Take 200 mg by mouth daily     isosorbide mononitrate (IMDUR) 60 mg 24 hr tablet   Yes No   Sig: Take 60 mg by mouth daily   levothyroxine 50 mcg tablet  Self Yes No   Sig: Take 50 mcg by mouth daily  losartan (COZAAR) 25 mg tablet   No No   Sig: Take 2 tablets (50 mg total) by mouth daily   metoprolol succinate (TOPROL-XL) 100 mg 24 hr tablet   No No   Sig: Take 1 tablet (100 mg total) by mouth 2 (two) times a day Take 1 tablet in the morning (100mg),Take 1/2  tablet in the evening (50mg)   Patient taking differently: Take 100 mg by mouth Take 1 tablet in the morning (100mg),Take 1/2  tablet in the evening (50mg)   nitroglycerin (NITROLINGUAL) 0 4 mg/spray spray  Self Yes No   Sig: Place 1 spray under the tongue every 5 (five) minutes as needed for chest pain   ranolazine (RANEXA) 500 mg 12 hr tablet  Self Yes No   Sig: Take 500 mg by mouth 2 (two) times a day     spironolactone (ALDACTONE) 25 mg tablet  Self No No   Sig: Take 1 tablet (25 mg total) by mouth every other day   Patient taking differently: Take 25 mg by mouth 3 (three) times a week     torsemide (DEMADEX) 10 mg tablet  Self Yes No   Sig: Take 10 mg by mouth daily      Facility-Administered Medications: None     Social History:     Social History     Socioeconomic History    Marital status:       Spouse name: Not on file    Number of children: Not on file    Years of education: Not on file    Highest education level: Not on file   Occupational History    Occupation: RETIRED   Social Needs    Financial resource strain: Not on file    Food insecurity:     Worry: Not on file     Inability: Not on file    Transportation needs:     Medical: Not on file     Non-medical: Not on file   Tobacco Use    Smoking status: Never Smoker    Smokeless tobacco: Never Used   Substance and Sexual Activity    Alcohol use: No    Drug use: No    Sexual activity: Not Currently   Lifestyle    Physical activity:     Days per week: Not on file     Minutes per session: Not on file    Stress: Not on file   Relationships    Social connections:     Talks on phone: Not on file     Gets together: Not on file     Attends [employer] service: Not on file     Active member of club or organization: Not on file     Attends meetings of clubs or organizations: Not on file     Relationship status: Not on file    Intimate partner violence:     Fear of current or ex partner: Not on file     Emotionally abused: Not on file     Physically abused: Not on file     Forced sexual activity: Not on file   Other Topics Concern    Not on file   Social History Narrative    Not on file     Patient Pre-hospital Living Situation:   Patient Pre-hospital Level of Mobility:   Patient Pre-hospital Diet Restrictions:     Family History:  History reviewed  No pertinent family history    Physical Exam:   Vitals:   Blood Pressure: 170/89 (03/14/19 1014)  Pulse: 70 (03/14/19 1014)  Temperature: (!) 96 8 °F (36 °C) (03/14/19 0907)  Temp Source: Tympanic (03/14/19 0907)  Respirations: 16 (03/14/19 1014)  Weight - Scale: 67 1 kg (148 lb) (03/14/19 0907)  SpO2: 100 % (03/14/19 1014)    General appearance: alert, appears stated age and cooperative  Skin: Skin color, texture, turgor normal  No rashes or lesions  Head: Normocephalic, without obvious abnormality, atraumatic  Eyes: conjunctivae/corneas clear  PERRL, EOM's intact  Lungs: clear to auscultation bilaterally  Heart: regular rate and rhythm  Abdomen: soft, non-tender; bowel sounds normal; no masses,  no organomegaly  Back: range of motion normal  Extremities: extremities normal, atraumatic, no cyanosis or edema  Neurologic: Grossly normal  Psychiatric:  Good eye contact appropriate mood    Lab Results: I have personally reviewed pertinent reports  Results from last 7 days   Lab Units 03/14/19  0918   WBC Thousand/uL 9 13   HEMOGLOBIN g/dL 12 4   HEMATOCRIT % 40 4   PLATELETS Thousands/uL 252   NEUTROS PCT % 69   LYMPHS PCT % 21   MONOS PCT % 7   EOS PCT % 1     Results from last 7 days   Lab Units 03/14/19  0918   SODIUM mmol/L 138   POTASSIUM mmol/L 4 4   CHLORIDE mmol/L 104   CO2 mmol/L 26   ANION GAP mmol/L 8   BUN mg/dL 20   CREATININE mg/dL 1 06   CALCIUM mg/dL 9 1   ALBUMIN g/dL 3 5   TOTAL BILIRUBIN mg/dL 0 50   ALK PHOS U/L 82   ALT U/L 24   AST U/L 31   EGFR ml/min/1 73sq m 50   GLUCOSE RANDOM mg/dL 102     Results from last 7 days   Lab Units 03/14/19  0918   INR  0 92     Results from last 7 days   Lab Units 03/14/19  0918   TROPONIN I ng/mL 0 09*       Imaging: I have personally reviewed pertinent films in PACS  Xr Chest 1 View Portable  Result Date: 3/14/2019  Impression: No acute cardiopulmonary disease  Workstation performed: [id number]       EKG, Pathology, and Other Studies Reviewed on Admission:   EKG  Result Date: 03/14/19  Impression:  Sinus rhythm first degree AV block 72bpm    Allscripts/ Epic Records Reviewed: Yes    ** Please Note: This note has been constructed using a voice recognition system   **

## 2019-03-14 NOTE — ED PROCEDURE NOTE
PROCEDURE  ECG 12 Lead Documentation  Date/Time: 3/14/2019 9:15 AM  Performed by: Connor Flores DO  Authorized by: Connor Flores DO     ECG reviewed by me, the ED Provider: yes    Patient location:  ED  Interpretation:     Interpretation: abnormal    Rate:     ECG rate:  72    ECG rate assessment: normal    Rhythm:     Rhythm: sinus rhythm    Ectopy:     Ectopy: PAC and PVCs    Conduction:     Conduction: abnormal      Abnormal conduction: incomplete RBBB and 1st degree    ST segments:     ST segments:  Abnormal    Elevation:  V4, V5 and V6  T waves:     T waves: normal           Connor Flores DO  03/14/19 [id number]

## 2019-03-15 ENCOUNTER — APPOINTMENT (INPATIENT)
Dept: NON INVASIVE DIAGNOSTICS | Facility: HOSPITAL | Age: 79
DRG: 281 | End: 2019-03-15
Payer: MEDICARE

## 2019-03-15 LAB
ALBUMIN SERPL BCP-MCNC: 3.5 G/DL (ref 3.5–5)
ALP SERPL-CCNC: 81 U/L (ref 46–116)
ALT SERPL W P-5'-P-CCNC: 26 U/L (ref 12–78)
ANION GAP SERPL CALCULATED.3IONS-SCNC: 9 MMOL/L (ref 4–13)
APTT PPP: 45 SECONDS (ref 26–38)
APTT PPP: 58 SECONDS (ref 24–33)
APTT PPP: 58 SECONDS (ref 24–33)
AST SERPL W P-5'-P-CCNC: 25 U/L (ref 5–45)
ATRIAL RATE: 58 BPM
ATRIAL RATE: 60 BPM
ATRIAL RATE: 72 BPM
BILIRUB SERPL-MCNC: 0.5 MG/DL (ref 0.2–1)
BUN SERPL-MCNC: 24 MG/DL (ref 5–25)
CALCIUM SERPL-MCNC: 9.3 MG/DL (ref 8.3–10.1)
CHLORIDE SERPL-SCNC: 101 MMOL/L (ref 100–108)
CO2 SERPL-SCNC: 29 MMOL/L (ref 21–32)
CREAT SERPL-MCNC: 1.33 MG/DL (ref 0.6–1.3)
ERYTHROCYTE [DISTWIDTH] IN BLOOD BY AUTOMATED COUNT: 13.8 % (ref 11.6–15.1)
GFR SERPL CREATININE-BSD FRML MDRD: 38 ML/MIN/1.73SQ M
GLUCOSE SERPL-MCNC: 118 MG/DL (ref 65–140)
HCT VFR BLD AUTO: 37.9 % (ref 34.8–46.1)
HGB BLD-MCNC: 11.9 G/DL (ref 11.5–15.4)
MCH RBC QN AUTO: 26.3 PG (ref 26.8–34.3)
MCHC RBC AUTO-ENTMCNC: 31.4 G/DL (ref 31.4–37.4)
MCV RBC AUTO: 84 FL (ref 82–98)
P AXIS: 54 DEGREES
P AXIS: 60 DEGREES
P AXIS: 67 DEGREES
PLATELET # BLD AUTO: 239 THOUSANDS/UL (ref 149–390)
PMV BLD AUTO: 10.2 FL (ref 8.9–12.7)
POTASSIUM SERPL-SCNC: 3.9 MMOL/L (ref 3.5–5.3)
PR INTERVAL: 218 MS
PR INTERVAL: 226 MS
PR INTERVAL: 268 MS
PROT SERPL-MCNC: 6.9 G/DL (ref 6.4–8.2)
QRS AXIS: 67 DEGREES
QRS AXIS: 73 DEGREES
QRS AXIS: 81 DEGREES
QRSD INTERVAL: 100 MS
QRSD INTERVAL: 102 MS
QRSD INTERVAL: 106 MS
QT INTERVAL: 414 MS
QT INTERVAL: 444 MS
QT INTERVAL: 450 MS
QTC INTERVAL: 435 MS
QTC INTERVAL: 450 MS
QTC INTERVAL: 453 MS
RBC # BLD AUTO: 4.52 MILLION/UL (ref 3.81–5.12)
SODIUM SERPL-SCNC: 139 MMOL/L (ref 136–145)
T WAVE AXIS: 252 DEGREES
T WAVE AXIS: 60 DEGREES
T WAVE AXIS: 81 DEGREES
TROPONIN I SERPL-MCNC: 0.97 NG/ML
VENTRICULAR RATE: 58 BPM
VENTRICULAR RATE: 60 BPM
VENTRICULAR RATE: 72 BPM
WBC # BLD AUTO: 8.96 THOUSAND/UL (ref 4.31–10.16)

## 2019-03-15 PROCEDURE — 99232 SBSQ HOSP IP/OBS MODERATE 35: CPT | Performed by: INTERNAL MEDICINE

## 2019-03-15 PROCEDURE — 93010 ELECTROCARDIOGRAM REPORT: CPT | Performed by: INTERNAL MEDICINE

## 2019-03-15 PROCEDURE — 84484 ASSAY OF TROPONIN QUANT: CPT | Performed by: INTERNAL MEDICINE

## 2019-03-15 PROCEDURE — 85730 THROMBOPLASTIN TIME PARTIAL: CPT | Performed by: INTERNAL MEDICINE

## 2019-03-15 PROCEDURE — 93306 TTE W/DOPPLER COMPLETE: CPT

## 2019-03-15 PROCEDURE — 93306 TTE W/DOPPLER COMPLETE: CPT | Performed by: INTERNAL MEDICINE

## 2019-03-15 PROCEDURE — 99223 1ST HOSP IP/OBS HIGH 75: CPT | Performed by: INTERNAL MEDICINE

## 2019-03-15 PROCEDURE — 85027 COMPLETE CBC AUTOMATED: CPT | Performed by: INTERNAL MEDICINE

## 2019-03-15 PROCEDURE — 80053 COMPREHEN METABOLIC PANEL: CPT | Performed by: INTERNAL MEDICINE

## 2019-03-15 RX ORDER — AMLODIPINE BESYLATE 2.5 MG/1
2.5 TABLET ORAL DAILY
Status: DISCONTINUED | OUTPATIENT
Start: 2019-03-15 | End: 2019-03-17 | Stop reason: HOSPADM

## 2019-03-15 RX ORDER — NITROGLYCERIN 0.4 MG/1
0.4 TABLET SUBLINGUAL
Status: DISCONTINUED | OUTPATIENT
Start: 2019-03-15 | End: 2019-03-17 | Stop reason: HOSPADM

## 2019-03-15 RX ORDER — AMLODIPINE BESYLATE 2.5 MG/1
2.5 TABLET ORAL EVERY EVENING
Status: DISCONTINUED | OUTPATIENT
Start: 2019-03-15 | End: 2019-03-17 | Stop reason: HOSPADM

## 2019-03-15 RX ADMIN — HEPARIN SODIUM 1950 UNITS: 1000 INJECTION, SOLUTION INTRAVENOUS; SUBCUTANEOUS at 14:38

## 2019-03-15 RX ADMIN — Medication 1000 MG: at 09:33

## 2019-03-15 RX ADMIN — VITAMIN D, TAB 1000IU (100/BT) 1000 UNITS: 25 TAB at 09:33

## 2019-03-15 RX ADMIN — NITROGLYCERIN 0.4 MG: 0.4 TABLET SUBLINGUAL at 18:14

## 2019-03-15 RX ADMIN — Medication 1 TABLET: at 09:32

## 2019-03-15 RX ADMIN — RANOLAZINE 500 MG: 500 TABLET, FILM COATED, EXTENDED RELEASE ORAL at 09:33

## 2019-03-15 RX ADMIN — LEVOTHYROXINE SODIUM 50 MCG: 50 TABLET ORAL at 06:25

## 2019-03-15 RX ADMIN — METOPROLOL SUCCINATE 50 MG: 50 TABLET, EXTENDED RELEASE ORAL at 18:34

## 2019-03-15 RX ADMIN — HYDROXYCHLOROQUINE SULFATE 200 MG: 200 TABLET, FILM COATED ORAL at 09:32

## 2019-03-15 RX ADMIN — Medication 1000 MG: at 18:34

## 2019-03-15 RX ADMIN — AMLODIPINE BESYLATE 2.5 MG: 2.5 TABLET ORAL at 18:35

## 2019-03-15 RX ADMIN — ISOSORBIDE MONONITRATE 60 MG: 60 TABLET, EXTENDED RELEASE ORAL at 09:36

## 2019-03-15 RX ADMIN — ASPIRIN 81 MG: 81 TABLET, COATED ORAL at 09:33

## 2019-03-15 RX ADMIN — RANOLAZINE 500 MG: 500 TABLET, FILM COATED, EXTENDED RELEASE ORAL at 18:35

## 2019-03-15 RX ADMIN — HEPARIN SODIUM AND DEXTROSE 16 UNITS/KG/HR: 10000; 5 INJECTION INTRAVENOUS at 21:10

## 2019-03-15 RX ADMIN — HEPARIN SODIUM 1950 UNITS: 1000 INJECTION, SOLUTION INTRAVENOUS; SUBCUTANEOUS at 03:06

## 2019-03-15 RX ADMIN — LOSARTAN POTASSIUM 50 MG: 50 TABLET, FILM COATED ORAL at 09:33

## 2019-03-15 RX ADMIN — TORSEMIDE 10 MG: 10 TABLET ORAL at 09:33

## 2019-03-15 RX ADMIN — NITROGLYCERIN 1 INCH: 20 OINTMENT TOPICAL at 21:27

## 2019-03-15 NOTE — PROGRESS NOTES
Notified by RN that patient felt lightheaded  Patient seen and examined  Patient states she got up to the bathroom and felt lightheaded  Blood pressure has dropped from 190-110  She denies any chest pain or shortness of breath  Symptoms resolved upon lying back in bed  Continue to trend troponins and closely monitor    Notify provider for any changes in symptoms, labs

## 2019-03-15 NOTE — CONSULTS
Consultation - Cardiology   Freida Kumar 77 y o  female MRN: [id number]  Unit/Bed#: 12565 [facility] 404-01 Encounter: [id number]    Assessment/Plan     Assessment:  1  NSTEMI type 1  2  History of coronary artery disease with previous coronary artery bypass grafting in 1998 with loss of 2 grafts in 1999 currently only patent grafts are LIMA to the LAD and saphenous vein graft to obtuse marginal which was stented in 2017 after an MI  3  Ischemic cardiomyopathy with last known ejection fraction of 50% 2018  4  Dyslipidemia:  Intolerant to statins  5  Hypertension  6  Chronic combined systolic diastolic heart failure  7  Angina on Ranexa     Plan:  Patient has been admitted to the hospitalist service  1  Will continue her nitrates, Ranexa, beta-blocker, aspirin and IV heparin at this time  2  Obtain 2D echocardiogram to evaluate wall motion with comparison to previous study  3  Patient had an episode of hypotension last evening will decrease her Norvasc to 2 5 mg b i d  Continue monitor her vital signs  4  Consider increasing Ranexa to 1000 mg b i d   5  Will discuss with cardiologist further ischemic workup with either nuclear stress test or direct visualization with cardiac catheterization  History of Present Illness   Physician Requesting Consult: Malorie Bray DO  Reason for Consult / Principal Problem:  NSTEMI type 1    HPI: Freida Kumar is a 77y o  year old female who presented who presented to the emergency room with intermittent chest pain over a 1 week period of time which was relieved with sublingual nitroglycerin spray  Mrs Kermit Yeung has a known history of coronary artery disease with previous coronary artery bypass grafting x4 in 1998   In 1999 she had a 2nd MI and wound catheterization was noted to have lost 2 of her grafts her only patent grafts are LIMA to the LAD and saphenous vein to obtuse marginal   In 2017 patient had a 3rd MI and at that time was [facility] where she underwent cardiac catheterization and stenting to a 99% occlusion in her saphenous vein to the obtuse marginal   She was discharged on antianginal medications, dual anti-platelet therapy and beta-blockers  She was not given statins as she does have an allergy/intolerance  Patient states since that time she had been doing exceptionally well  She follows with Dr Mo Monroe at [facility] and states that he had told her when she completed her recent prescription for Brilinta she could discontinue  Patient states it has been approximately 2-3 weeks since her last dose of Brilinta  She notes approximately 1 week ago she began to experience exertional chest pain which was relieved with either rest or sublingual nitroglycerin  On admission initial troponin was 0 09, troponin peaked at 1 3 and recent troponin was 0 97  Patient was started on IV heparin ACS protocol  Patient states she has had no further chest discomfort since admission to the hospital     Inpatient consult to Cardiology  Consult performed by: MAYUR Laura  Consult ordered by: Enma Smith DO          Review of Systems   Constitutional: Negative for activity change, chills, fatigue and unexpected weight change  HENT: Negative for congestion, postnasal drip, sore throat and trouble swallowing  Eyes: Negative for photophobia, redness and visual disturbance  Respiratory: Positive for shortness of breath  Negative for chest tightness and wheezing  Cardiovascular: Positive for chest pain  Negative for palpitations and leg swelling  Gastrointestinal: Negative for abdominal distention, constipation, diarrhea, nausea and vomiting  Endocrine: Negative for polydipsia, polyphagia and polyuria  Genitourinary: Negative  Musculoskeletal: Negative for arthralgias, back pain and gait problem  Neurological: Negative  Hematological: Negative  Psychiatric/Behavioral: Negative          Historical Information   Past Medical History:   Diagnosis Date    Anemia     Arthritis     Bladder calculi     CAD (coronary artery disease)     Status post CABG and PTCA to OM1    CKD (chronic kidney disease) stage 3, GFR 30-59 ml/min ([facility])     CKD (chronic kidney disease), stage III ([facility])     Diastolic congestive heart failure ([facility])     Hypertension     Hypothyroidism     Rheumatoid arthritis ([address] )     Uterine prolapse      Past Surgical History:   Procedure Laterality Date    BACK SURGERY      BLADDER SURGERY      CORONARY ANGIOPLASTY WITH STENT PLACEMENT      CORONARY ARTERY BYPASS GRAFT      OOPHORECTOMY Left     [place] PARTIAL HIP REPLACEMENT Right 2/1/2018    Procedure: HEMIARTHROPLASTY HIP (BIPOLAR) (RIGHT); Surgeon: Farideh Pollock MD;  Location: [address] [place] [address];  Service: Orthopedics    THYROIDECTOMY, PARTIAL       Social History     Substance and Sexual Activity   Alcohol Use No     Social History     Substance and Sexual Activity   Drug Use No     Social History     Tobacco Use   Smoking Status Never Smoker   Smokeless Tobacco Never Used     Family History: History reviewed  No pertinent family history      Meds/Allergies   all current active meds have been reviewed, current meds:   Current Facility-Administered Medications   Medication Dose Route Frequency    acetaminophen (TYLENOL) tablet 650 mg  650 mg Oral Q6H PRN    amLODIPine (NORVASC) tablet 2 5 mg  2 5 mg Oral Daily    And    amLODIPine (NORVASC) tablet 5 mg  5 mg Oral QPM    aspirin (ECOTRIN LOW STRENGTH) EC tablet 81 mg  81 mg Oral Daily    cholecalciferol (VITAMIN D3) tablet 1,000 Units  1,000 Units Oral Daily    docusate sodium (COLACE) capsule 100 mg  100 mg Oral BID PRN    fish oil capsule 1,000 mg  1,000 mg Oral BID    heparin (porcine) 25,000 units in 250 mL infusion (premix)  3-20 Units/kg/hr (Order-Specific) Intravenous Titrated    heparin (porcine) injection 1,950 Units  1,950 Units Intravenous PRN    heparin (porcine) injection 3,900 Units  3,900 Units Intravenous PRN    hydrALAZINE (APRESOLINE) injection 10 mg  10 mg Intravenous Q4H PRN    HYDROcodone-acetaminophen (NORCO) 5-325 mg per tablet 1 tablet  1 tablet Oral Q4H PRN    hydroxychloroquine (PLAQUENIL) tablet 200 mg  200 mg Oral Daily    isosorbide mononitrate (IMDUR) 24 hr tablet 60 mg  60 mg Oral Daily    levothyroxine tablet 50 mcg  50 mcg Oral Early Morning    losartan (COZAAR) tablet 50 mg  50 mg Oral Daily    metoprolol succinate (TOPROL-XL) 24 hr tablet 100 mg  100 mg Oral Daily    And    metoprolol succinate (TOPROL-XL) 24 hr tablet 50 mg  50 mg Oral QPM    Mirabegron ER TB24 25 mg  25 mg Oral Every Other Day    morphine injection 2 mg  2 mg Intravenous Q4H PRN    multivitamin-minerals (CENTRUM) tablet 1 tablet  1 tablet Oral Daily    ondansetron (ZOFRAN) injection 4 mg  4 mg Intravenous Q4H PRN    ranolazine (RANEXA) 12 hr tablet 500 mg  500 mg Oral BID    spironolactone (ALDACTONE) tablet 25 mg  25 mg Oral Every Other Day    torsemide (DEMADEX) tablet 10 mg  10 mg Oral Daily    and PTA meds:   Prior to Admission Medications   Prescriptions Last Dose Informant Patient Reported? Taking?    Ascorbic Acid (VITAMIN C) 1000 MG tablet 3/13/2019 at Unknown time Self Yes Yes   Sig: Take 1,000 mg by mouth daily   Coenzyme Q10 10 MG capsule 3/13/2019 at Unknown time Self Yes Yes   Sig: Take 10 mg by mouth daily   Icosapent Ethyl 1 g CAPS 3/13/2019 at Unknown time  No Yes   Sig: Take 1 capsule (1 g total) by mouth 2 (two) times a day   Mirabegron (MYRBETRIQ PO) 3/13/2019 at Unknown time  Yes Yes   Sig: Take 25 mg by mouth every other day    Multiple Vitamins-Minerals (CENTRUM SILVER PO) 3/13/2019 at Unknown time Self Yes Yes   Sig: Take 1 tablet by mouth daily   Vitamin D, Cholecalciferol, 1000 units CAPS 3/13/2019 at Unknown time Self Yes Yes   Sig: Take 2,000 Units by mouth daily     amLODIPine (NORVASC) 2 5 mg tablet 3/13/2019 at Unknown time  Yes Yes   Sig: Take 5 mg by mouth see administration instructions 2 5 mg in morning, 5 mg in evening   aspirin (ECOTRIN LOW STRENGTH) 81 mg EC tablet 3/13/2019 at Unknown time Self Yes Yes   Sig: Take 81 mg by mouth daily Indications: Heart Attack  hydroxychloroquine (PLAQUENIL) 200 mg tablet 3/13/2019 at Unknown time Self Yes Yes   Sig: Take 200 mg by mouth daily     isosorbide mononitrate (IMDUR) 60 mg 24 hr tablet 3/13/2019 at Unknown time  Yes Yes   Sig: Take 60 mg by mouth daily   levothyroxine 50 mcg tablet 3/14/2019 at 0600  Self Yes Yes   Sig: Take 50 mcg by mouth daily     losartan (COZAAR) 25 mg tablet 3/13/2019 at Unknown time  No Yes   Sig: Take 2 tablets (50 mg total) by mouth daily   metoprolol succinate (TOPROL-XL) 100 mg 24 hr tablet 3/13/2019 at Unknown time  No Yes   Sig: Take 1 tablet (100 mg total) by mouth 2 (two) times a day Take 1 tablet in the morning (100mg),Take 1/2  tablet in the evening (50mg)   Patient taking differently: Take 100 mg by mouth Take 1 tablet in the morning (100mg),Take 1/2  tablet in the evening (50mg)   nitroglycerin (NITROLINGUAL) 0 4 mg/spray spray 3/14/2019 at Unknown time Self Yes Yes   Sig: Place 1 spray under the tongue every 5 (five) minutes as needed for chest pain   ranolazine (RANEXA) 500 mg 12 hr tablet 3/13/2019 at Unknown time Self Yes Yes   Sig: Take 500 mg by mouth 2 (two) times a day     spironolactone (ALDACTONE) 25 mg tablet 3/13/2019 at Unknown time Self No Yes   Sig: Take 1 tablet (25 mg total) by mouth every other day   Patient taking differently: Take 25 mg by mouth 3 (three) times a week     torsemide (DEMADEX) 10 mg tablet 3/13/2019 at Unknown time Self Yes Yes   Sig: Take 10 mg by mouth daily      Facility-Administered Medications: None     Allergies   Allergen Reactions    Atorvastatin Hives    Boniva [Ibandronic Acid] Hives    Codeine      codeine derivatives - tolerates IV Dilaudid    Statins Hives    Zetia [Ezetimibe]        Objective   Vitals: Blood pressure 100/54, pulse 65, temperature 97 6 °F (36 4 °C), temperature source Tympanic, resp  rate 18, height 5' 4" (1 626 m), weight 65 8 kg (145 lb), SpO2 98 %, not currently breastfeeding  Orthostatic Blood Pressures      Most Recent Value   Blood Pressure  100/54 filed at 03/15/2019 0736   Patient Position - Orthostatic VS  Lying filed at 03/15/2019 0736            Intake/Output Summary (Last 24 hours) at 3/15/2019 0846  Last data filed at 3/15/2019 0005  Gross per 24 hour   Intake    Output 300 ml   Net -300 ml       Invasive Devices     Peripheral Intravenous Line            Peripheral IV 03/14/19 Right Antecubital less than 1 day          Drain            Urethral Catheter Non-latex 16 Fr  404 days                Physical Exam   Constitutional: She is oriented to person, place, and time  She appears well-developed and well-nourished  No distress  HENT:   Head: Normocephalic and atraumatic  Right Ear: External ear normal    Left Ear: External ear normal    Eyes: Pupils are equal, round, and reactive to light  Conjunctivae are normal  Right eye exhibits no discharge  Left eye exhibits no discharge  Neck: Normal range of motion  Neck supple  No JVD present  No thyromegaly present  Cardiovascular: Normal rate, regular rhythm, intact distal pulses and normal pulses  Occasional extrasystoles are present  Murmur heard  Systolic murmur is present with a grade of 2/6  Pulmonary/Chest: Effort normal and breath sounds normal  No accessory muscle usage  No respiratory distress  She has no wheezes  She has no rales  Abdominal: Soft  Bowel sounds are normal  She exhibits no distension  Musculoskeletal: She exhibits no edema  Neurological: She is alert and oriented to person, place, and time  Skin: Skin is warm and dry  Capillary refill takes less than 2 seconds  She is not diaphoretic  Psychiatric: She has a normal mood and affect  Nursing note and vitals reviewed        Lab Results:   I have personally reviewed pertinent lab results  CBC with diff:   Results from last 7 days   Lab Units 19  0918   WBC Thousand/uL 9 13   RBC Million/uL 4 77   HEMOGLOBIN g/dL 12 4   HEMATOCRIT % 40 4   MCV fL 85   MCH pg 26 0*   MCHC g/dL 30 7*   RDW % 13 4   MPV fL 10 0   PLATELETS Thousands/uL 252     CMP:   Results from last 7 days   Lab Units 19  0918   SODIUM mmol/L 138   POTASSIUM mmol/L 4 4   CHLORIDE mmol/L 104   CO2 mmol/L 26   BUN mg/dL 20   CREATININE mg/dL 1 06   CALCIUM mg/dL 9 1   AST U/L 31   ALT U/L 24   ALK PHOS U/L 82   EGFR ml/min/1 73sq m 50     Troponin:   0   Lab Value Date/Time    TROPONINI 0 97 (H) 03/15/2019 0149    TROPONINI 1 30 (H) 2019 2031    TROPONINI 1 00 (H) 2019 1654    TROPONINI 0 09 (H) 2019 0918    TROPONINI >40 00 (H) 2017 0439    TROPONINI <0 02 2017 1321    TROPONINI <0 02 10/22/2016 1330     BNP:   Results from last 7 days   Lab Units 19  0918   POTASSIUM mmol/L 4 4   CHLORIDE mmol/L 104   CO2 mmol/L 26   BUN mg/dL 20   CREATININE mg/dL 1 06   CALCIUM mg/dL 9 1   EGFR ml/min/1 73sq m 50     Coags:   Results from last 7 days   Lab Units 03/15/19  0149 19  0918   PTT seconds 45* 25*   INR   --  0 92     Imaging: I have personally reviewed pertinent reports      EK lead EKG demonstrates sinus rhythm with incomplete right bundle-branch block and subtle ST segment abnormalities seen in lead 2 lead 3 the 4 through 6  VTE Prophylaxis: Sequential compression device (Venodyne)  and Heparin    Code Status: Level 1 - Full Code  Advance Directive and Living Will:      Power of :    POLST:      Kristen Avila

## 2019-03-15 NOTE — PLAN OF CARE
Problem: DISCHARGE PLANNING - CARE MANAGEMENT  Goal: Discharge to post-acute care or home with appropriate resources  Description  INTERVENTIONS:  - Conduct assessment to determine patient/family and health care team treatment goals, and need for post-acute services based on payer coverage, community resources, and patient preferences, and barriers to discharge  - Address psychosocial, clinical, and financial barriers to discharge as identified in assessment in conjunction with the patient/family and health care team  - Arrange appropriate level of post-acute services according to patient's   needs and preference and payer coverage in collaboration with the physician and health care team  - Communicate with and update the patient/family, physician, and health care team regarding progress on the discharge plan  - Arrange appropriate transportation to post-acute venues    Patient is independent  No anticipated needs at this time     Outcome: Progressing

## 2019-03-15 NOTE — ASSESSMENT & PLAN NOTE
Chest pain with extensive history of coronary artery disease  Possible NSTEMI type 1 after evaluation by cardiology today  Was taken off Brilinta by Dr Mayela Boateng two weeks ago    Continue heparin infusion    Results from last 7 days   Lab Units 03/15/19  0149 03/14/19 2031 03/14/19  1654   TROPONIN I ng/mL 0 97* 1 30* 1 00*

## 2019-03-15 NOTE — ASSESSMENT & PLAN NOTE
Longstanding history of essential hypertension  Due to drop in blood pressures, amlodipine decreased to 2 5 mg b i d  by cardiology this morning   Continue metoprolol succinate 100 mg in a m , 50 mg in p m, spironolactone and losartan

## 2019-03-15 NOTE — ASSESSMENT & PLAN NOTE
CKD 3 follows with Dr Kendall Martell    Lab Results   Component Value Date    CREATININE 1 06 03/14/2019    CREATININE 1 25 02/26/2019    CREATININE 1 09 01/10/2019    CREATININE 1 23 12/11/2018    CREATININE 1 38 (H) 10/17/2018

## 2019-03-15 NOTE — PROGRESS NOTES
Called to evaluate patient regarding chest pain  12 lead EKG to be ordered  Patient received sublingual nitroglycerin with near resolution of pain  Continue to monitor on telemetry    Add nitroglycerin patch

## 2019-03-15 NOTE — ASSESSMENT & PLAN NOTE
Coronary artery disease with history of CABG and subsequent stenting 2017  She was recently taken off Brilinta two weeks ago by Dr Matt Saldaña    Continue isosorbide and ranexa

## 2019-03-15 NOTE — PROGRESS NOTES
Progress Note - Sally Merritt 1940, 77 y o  female MRN: [id number]    Unit/Bed#: [address] [place] [address] Encounter: [id number]    Primary Care Provider: Connie Duran MD   Date and time admitted to hospital: 3/14/2019  9:04 AM        * Chest pain with moderate risk of acute coronary syndrome  Assessment & Plan  Chest pain with extensive history of coronary artery disease  Possible NSTEMI type 1 after evaluation by cardiology today  Was taken off Brilinta by Dr Lianna Fernandes two weeks ago  Continue heparin infusion    Results from last 7 days   Lab Units 03/15/19  0149 03/14/19  2031 03/14/19  1654   TROPONIN I ng/mL 0 97* 1 30* 1 00*       Rheumatoid arthritis ([facility])  Assessment & Plan  Rheumatoid arthritis maintained on hydroxychloroquine    CKD (chronic kidney disease) stage 3, GFR 30-59 ml/min ([facility])  Assessment & Plan  CKD 3 follows with Dr Farshad Gonzalez  Lab Results   Component Value Date    CREATININE 1 06 03/14/2019    CREATININE 1 25 02/26/2019    CREATININE 1 09 01/10/2019    CREATININE 1 23 12/11/2018    CREATININE 1 38 (H) 10/17/2018       Hypertension  Assessment & Plan  Longstanding history of essential hypertension  Due to drop in blood pressures, amlodipine decreased to 2 5 mg b i d  by cardiology this morning  Continue metoprolol succinate 100 mg in a m , 50 mg in p m, spironolactone and losartan    CAD (coronary artery disease)  Assessment & Plan  Coronary artery disease with history of CABG and subsequent stenting 2017  She was recently taken off Brilinta two weeks ago by Dr Lianna Fernandes  Continue isosorbide and ranexa     Chronic systolic congestive heart failure (HCC)  Assessment & Plan  Last LVEF 45%  Currently compensated continue torsemide    Hypothyroid  Assessment & Plan  Continue levothyroxine    Hyperlipidemia  Assessment & Plan  Hyperlipidemia intolerant to statins    Has been started on omega 3      VTE Pharmacologic Prophylaxis: Heparin Drip    Patient Centered Rounds: I have performed bedside rounds with nursing staff today  Discussions with Specialists or Other Care Team Provider:  Cardiology  Education and Discussions with Family / Patient:     Time Spent for Care: 25 mins  More than 50% of total time spent on counseling and coordination of care as described above  Current Length of Stay: 1 day(s)  Current Patient Status: Inpatient     Certification Statement: The patient will continue to require additional inpatient hospital stay due to Chest pain with moderate risk of acute coronary syndrome  Discharge Plan / Estimated Discharge Date:  Further ischemic evaluation deferred to cardiology    Code Status: Level 1 - Full Code  ______________________________________________________________________________    Subjective:   Patient seen and examined  No further chest pain  Did have lightheadedness after drop in blood pressures overnight  Objective:   Vitals: Blood pressure 100/54, pulse 65, temperature 97 6 °F (36 4 °C), temperature source Tympanic, resp  rate 18, height 5' 4" (1 626 m), weight 65 8 kg (145 lb), SpO2 98 %, not currently breastfeeding      Physical Exam:   General appearance: alert, appears stated age and cooperative  Head: Normocephalic, without obvious abnormality, atraumatic  Lungs: clear to auscultation bilaterally  Heart: regular rate and rhythm  Abdomen: soft, non-tender, positive bowel sounds   Back: negative, no tenderness to percussion or palpation  Extremities: edema trace lower extremities bilaterally  Neurologic: Grossly normal    Additional Data:   Labs:  Results from last 7 days   Lab Units 03/15/19  0931 03/14/19  0918   WBC Thousand/uL 8 96 9 13   HEMOGLOBIN g/dL 11 9 12 4   HEMATOCRIT % 37 9 40 4   MCV fL 84 85   PLATELETS Thousands/uL 239 252   INR   --  0 92     Results from last 7 days   Lab Units 03/14/19  0918   SODIUM mmol/L 138   POTASSIUM mmol/L 4 4   CHLORIDE mmol/L 104   CO2 mmol/L 26   ANION GAP mmol/L 8   BUN mg/dL 20   CREATININE mg/dL 1 06 CALCIUM mg/dL 9 1   ALBUMIN g/dL 3 5   TOTAL BILIRUBIN mg/dL 0 50   ALK PHOS U/L 82   ALT U/L 24   AST U/L 31   EGFR ml/min/1 73sq m 50   GLUCOSE RANDOM mg/dL 102         Results from last 7 days   Lab Units 03/15/19  0149 03/14/19  2031 03/14/19  1654   TROPONIN I ng/mL 0 97* 1 30* 1 00*                          * I Have Reviewed All Lab Data Listed Above  Cultures:           Imaging:  Imaging Reports Reviewed Today Include:   Procedure: Xr Chest 1 View Portable  Result Date: 3/14/2019  Impression: No acute cardiopulmonary disease   Workstation performed: [id number]     Scheduled Meds:  Current Facility-Administered Medications:  acetaminophen 650 mg Oral Q6H PRN Maralyn Lebanese, DO    amLODIPine 2 5 mg Oral QPM Criss Ken, DAVIDNP    And        amLODIPine 2 5 mg Oral Daily Criss Gaminois, CRNP    aspirin 81 mg Oral Daily Maralyn Lebanese, DO    cholecalciferol 1,000 Units Oral Daily Kapil Alaniz, DO    docusate sodium 100 mg Oral BID PRN Maralyn Lebanese, DO    fish oil 1,000 mg Oral BID Kapil Corbin, DO    heparin (porcine) 3-20 Units/kg/hr (Order-Specific) Intravenous Titrated Maralyn Lebanese, DO Last Rate: 14 Units/kg/hr (03/15/19 0310)   heparin (porcine) 1,950 Units Intravenous PRN Maralyn Lebanese, DO    heparin (porcine) 3,900 Units Intravenous PRN Maralyn Lebanese, DO    hydrALAZINE 10 mg Intravenous Q4H PRN Maralyn Lebanese, DO    HYDROcodone-acetaminophen 1 tablet Oral Q4H PRN Maralyn Lebanese, DO    hydroxychloroquine 200 mg Oral Daily Kapil Corbin, DO    isosorbide mononitrate 60 mg Oral Daily Kapil Corbin, DO    levothyroxine 50 mcg Oral Early Morning Kapil Corbin, DO    losartan 50 mg Oral Daily Kapil Corbin, DO    metoprolol succinate 100 mg Oral Daily Kapil Alaniz, DO    And        metoprolol succinate 50 mg Oral QPM Kapiljavier Alaniz, DO    Mirabegron ER 25 mg Oral Every Other Day Kapil Alaniz, DO    morphine injection 2 mg Intravenous Q4H PRN Maralyn Lebanese, DO    multivitamin-minerals 1 tablet Oral Daily Gretchen Andino, DO    ondansetron 4 mg Intravenous Q4H PRN Gretchen Andino,     ranolazine 500 mg Oral BID Kapil Alaniz, DO    spironolactone 25 mg Oral Every Other Day Kapil Alaniz, DO    torsemide 10 mg Oral Daily Gretchen Andino, DO        Gretchen Andino, DO  [facility]  Hospitalist    ** Please Note: This note has been constructed using a voice recognition system   **

## 2019-03-15 NOTE — PHYSICIAN ADVISOR
Current patient class: Inpatient  The patient is currently on Hospital Day: 2 at [address] [place] [address]      The patient was admitted to the hospital at 21  on 3/14/19 for the following diagnosis:  Chest pain [R07 9]  Elevated troponin [R74 8]  Chest pain with moderate risk of acute coronary syndrome [R07 9]       There is documentation in the medical record of an expected length of stay of at least 2 midnights  The patient is therefore expected to satisfy the 2 midnight benchmark and given the 2 midnight presumption is appropriate for INPATIENT ADMISSION  Given this expectation of a satisfying stay, CMS instructs us that the patient is most often appropriate for inpatient admission under part A provided medical necessity is documented in the chart  After review of the relevant documentation, labs, vital signs and test results, the patient is appropriate for INPATIENT ADMISSION  Admission to the hospital as an inpatient is a complex decision making process which requires the practitioner to consider the patients presenting complaint, history and physical examination and all relevant testing  With this in mind, in this case, the patient was deemed appropriate for INPATIENT ADMISSION  After review of the documentation and testing available at the time of the admission I concur with this clinical determination of medical necessity  Rationale is as follows: The patient is a 77 yrs old Female who presented to the ED at 3/14/2019  9:04 AM with a chief complaint of Chest Pain (Patient c/o having chest pain off and on for 1 week, today pain started at [place] went away with NTG then pain returned again about 45 mins ago did not take NTG at that time)  The patient woke up this morning with chest pain  After getting nitroglycerin in the emergency department the chest pain improved    Patient was also found to have an elevated troponin which went to 1 3 at its peak and there was concern for acute coronary syndrome and NSTEMI type 1 and the patient was put on IV heparin drip  Given the documentation in the chart based on medical necessity the patient is going to require greater than 2 midnights secondary to having a probable NSTEMI type 1 in need of cardiac evaluation and will either need a nuclear stress test or a cardiac catheterization currently being maintained on a heparin drip therefore the patient is inpatient admission appropriate  The patients vitals on arrival were ED Triage Vitals [03/14/19 0907]   Temperature Pulse Respirations Blood Pressure SpO2   (!) 96 8 °F (36 °C) 81 16 (!) 172/86 100 %      Temp Source Heart Rate Source Patient Position - Orthostatic VS BP Location FiO2 (%)   Tympanic Monitor Lying Left arm --      Pain Score       4           Past Medical History:   Diagnosis Date    Anemia     Arthritis     Bladder calculi     CAD (coronary artery disease)     Status post CABG and PTCA to OM1    CKD (chronic kidney disease) stage 3, GFR 30-59 ml/min ([facility])     CKD (chronic kidney disease), stage III ([facility])     Diastolic congestive heart failure ([facility])     Hypertension     Hypothyroidism     Rheumatoid arthritis ([address] )     Uterine prolapse      Past Surgical History:   Procedure Laterality Date    BACK SURGERY      BLADDER SURGERY      CORONARY ANGIOPLASTY WITH STENT PLACEMENT      CORONARY ARTERY BYPASS GRAFT      OOPHORECTOMY Left     GA PARTIAL HIP REPLACEMENT Right 2/1/2018    Procedure: HEMIARTHROPLASTY HIP (BIPOLAR) (RIGHT);   Surgeon: Megan Dugan MD;  Location: [address] [place] [address];  Service: Orthopedics    THYROIDECTOMY, PARTIAL             Consults have been placed to:   IP CONSULT TO CASE MANAGEMENT  IP CONSULT TO CARDIOLOGY    Vitals:    03/14/19 2135 03/14/19 2359 03/15/19 0405 03/15/19 0736   BP: 116/57 108/57 109/53 100/54   BP Location: Right arm Right arm Right arm Right arm   Pulse: 56 68 60 65   Resp: 18 18 18 18   Temp: (!) 97 2 °F (36 2 °C) 97 7 °F (36 5 °C) 97 8 °F (36 6 °C) 97 6 °F (36 4 °C)   TempSrc: Tympanic Oral Oral Tympanic   SpO2: 98% 97% 96% 98%   Weight:       Height:           Most recent labs:    Recent Labs     03/14/19  0918  03/15/19  0149 03/15/19  0931   WBC 9 13  --   --  8 96   HGB 12 4  --   --  11 9   HCT 40 4  --   --  37 9     --   --  239   K 4 4  --   --  3 9   CALCIUM 9 1  --   --  9 3   BUN 20  --   --  24   CREATININE 1 06  --   --  1 33*   INR 0 92  --   --   --    TROPONINI 0 09*   < > 0 97*  --    AST 31  --   --  25   ALT 24  --   --  26   ALKPHOS 82  --   --  81    < > = values in this interval not displayed         Scheduled Meds:  Current Facility-Administered Medications:  acetaminophen 650 mg Oral Q6H PRN Kapil Corbin, DO    amLODIPine 2 5 mg Oral QPM DAVID CarreroNP    And        amLODIPine 2 5 mg Oral Daily Ellie Moran CRNP    aspirin 81 mg Oral Daily Glorianne Maximiliano, DO    cholecalciferol 1,000 Units Oral Daily Kapil Corbin, DO    docusate sodium 100 mg Oral BID PRN Glorianne Maximiliano, DO    fish oil 1,000 mg Oral BID Kapil Corbin, DO    heparin (porcine) 3-20 Units/kg/hr (Order-Specific) Intravenous Titrated Glorianne Maximiliano, DO Last Rate: 16 Units/kg/hr (03/15/19 1440)   heparin (porcine) 1,950 Units Intravenous PRN Glorianne Maximiliano, DO    heparin (porcine) 3,900 Units Intravenous PRN Glorianne Maximiliano, DO    hydrALAZINE 10 mg Intravenous Q4H PRN Glorianne Maximiliano, DO    HYDROcodone-acetaminophen 1 tablet Oral Q4H PRN Glorianne Maximiliano, DO    hydroxychloroquine 200 mg Oral Daily Kapil Corbin, DO    isosorbide mononitrate 60 mg Oral Daily Kapil Corbin, DO    levothyroxine 50 mcg Oral Early Morning Kapil Corbin, DO    losartan 50 mg Oral Daily Kapil Corbin, DO    metoprolol succinate 100 mg Oral Daily Kapil Corbin, DO    And        metoprolol succinate 50 mg Oral QPM Kapil Alaniz DO    Mirabegron ER 25 mg Oral Every Other Day Kapil Alaniz DO    morphine injection 2 mg Intravenous Q4H PRN Carole Viera DO    multivitamin-minerals 1 tablet Oral Daily Kapil Alaniz,     ondansetron 4 mg Intravenous Q4H PRN Ilya Maria A, DO    ranolazine 500 mg Oral BID Kapil Alaniz, DO    spironolactone 25 mg Oral Every Other Day Kapil Alaniz, DO    torsemide 10 mg Oral Daily Kapil Alaniz,       Continuous Infusions:  heparin (porcine) 3-20 Units/kg/hr (Order-Specific) Last Rate: 16 Units/kg/hr (03/15/19 1440)     PRN Meds:   acetaminophen    docusate sodium    heparin (porcine)    heparin (porcine)    hydrALAZINE    HYDROcodone-acetaminophen    morphine injection    ondansetron    Surgical procedures (if appropriate):

## 2019-03-15 NOTE — UTILIZATION REVIEW
Initial Clinical Review    Admission: Date/Time/Statement: 3/14/19 @ 1050   Orders Placed This Encounter   Procedures    Inpatient Admission (expected length of stay for this patient Order details is greater than two midnights)     Standing Status:   Standing     Number of Occurrences:   1     Order Specific Question:   Admitting Physician     Answer:   Frida Mejia [[id number]]     Order Specific Question:   Level of Care     Answer:   Med Surg [[id number]]     Order Specific Question:   Estimated length of stay     Answer:   More than 2 Midnights     Order Specific Question:   Certification     Answer:   I certify that inpatient services are medically necessary for this patient for a duration of greater than two midnights  See H&P and MD Progress Notes for additional information about the patient's course of treatment  ED: Date/Time/Mode of Arrival:   ED Arrival Information     Expected Arrival Acuity Means of Arrival Escorted By Service Admission Type    - 3/14/2019 09:02 Urgent Walk-In Family Member Hospitalist Urgent    Arrival Complaint    chest pain        Chief Complaint:   Chief Complaint   Patient presents with    Chest Pain     Patient c/o having chest pain off and on for 1 week, today pain started at [place] went away with NTG then pain returned again about 45 mins ago did not take NTG at that time     Assessment/Plan:   Saman Ward is a 77 y o  female who presents with worsening chest pain  The patient has a history of coronary artery disease status post CABG [facility] in remote past   She had additional event 2017 where she was transferred to [facility] and underwent angioplasty and drug-eluting stent placement of SVG  She follows with Dr Mo Monroe  For last 24 hours she has been feeling lousy  She cannot get in to see Cardiology yesterday and she was a woken this morning with chest pain  She did apply one spray of nitroglycerin in oral cavity with near resolution of pains    She came here to the emergency department after chest pain recurred and after receiving nitroglycerin patch her chest pain has subsided  Chest pain with moderate risk of acute coronary syndrome  Assessment & Plan  Chest pain with extensive history of coronary artery disease  Was taken off Brilinta by Dr Mayela Boateng two weeks ago  Cardiology to evaluate  Monitor on telemetry  Trend troponins      Urinary dysfunction  Assessment & Plan  It was thought patient's mybetriq could be causing elevated blood pressures  She has been instructed by her urologist to take every other day      Rheumatoid arthritis ([address] )  Assessment & Plan  Rheumatoid arthritis maintained on hydroxychloroquine     CKD (chronic kidney   Hypertension  Assessment & Plan  Longstanding history of essential hypertension  Amlodipine increased to 2 5 mg in a m  5 mg in p m  as well as spironolactone to daily  Continue metoprolol succinate 100 mg in a m , 50 mg in p m  and losartan     CAD (coronary artery disease)  Assessment & Plan  Coronary artery disease with history of CABG and subsequent stenting 2017  She was recently taken off Brilinta two weeks ago by Dr Mayela Boateng  Given elevated troponin cannot rule out NSTEMI  Have cardiology evaluate  Monitor on telemetry  Continue isosorbide and ranexa      Chronic systolic congestive heart failure (HCC)  Assessment & Plan  Last LVEF 45%  Currently compensated continue torsemide     Hypothyroid  Assessment & Plan  Continue levothyroxine     Hyperlipidemia  Assessment & Plan  Hyperlipidemia intolerant to statins    Has been started on omega 3           ED Vital Signs:   ED Triage Vitals [03/14/19 0907]   Temperature Pulse Respirations Blood Pressure SpO2   (!) 96 8 °F (36 °C) 81 16 (!) 172/86 100 %      Temp Source Heart Rate Source Patient Position - Orthostatic VS BP Location FiO2 (%)   Tympanic Monitor Lying Left arm --      Pain Score       4        Wt Readings from Last 1 Encounters:   03/14/19 65 8 kg (145 lb)     Vital Signs (abnormal):   Pertinent Labs/Diagnostic Test Results: cr 1 33 troponin 0 09,1 00 1 30 0 97 ptt 25  cxr nad  ED Treatment:   Medication Administration from 03/14/2019 0902 to 03/14/2019 1353       Date/Time Order Dose Route Action Action by Comments     03/14/2019 1012 aspirin chewable tablet 324 mg 324 mg Oral Given iLsette Pineda RN      03/14/2019 1053 nitroglycerin (NITRO-BID) 2 % TD ointment 1 inch 1 inch Topical Given Jessy Johnston RN /78paulse 62chest pain 2/10        Past Medical/Surgical History:    Active Ambulatory Problems     Diagnosis Date Noted    Acute MI, true posterior wall ([facility] ) 07/30/2017    Chronic kidney disease, stage III (moderate) ([facility] ) 07/30/2017    S/P CABG x 4 07/30/2017    Hyperlipidemia 07/30/2017    Benign hypertension with chronic kidney disease, stage III ([address] ) 07/30/2017    Hypothyroid 07/31/2017    Heart failure, acute on chronic, systolic and diastolic (HCC) 69/04/9247    Chronic systolic congestive heart failure ([address] ) 01/30/2018    CAD (coronary artery disease) 01/30/2018    Weight loss 01/31/2018    Anemia 08/27/2013    Hypocalcemia 03/24/2015    Vitamin D deficiency 08/12/2013    Urinary retention 02/03/2018    Leukocytosis 02/03/2018    S/P right hip fracture 02/14/2018    Hypertensive chronic kidney disease with stage 1 through stage 4 chronic kidney disease, or unspecified chronic kidney disease 06/18/2018    Anemia of chronic renal failure, stage 3 (moderate) ([address] ) 06/18/2018    Dyslipidemia 06/18/2018    Iron deficiency 34/72/3125    Diastolic congestive heart failure (HCC)     Hypothyroidism     CKD (chronic kidney disease), stage III [facility])      Resolved Ambulatory Problems     Diagnosis Date Noted    Right hip pain 01/30/2018    Hypertensive urgency 01/30/2018    Closed displaced fracture of right femoral neck ([address] ) 01/31/2018    Osteoarthritis of hip 01/31/2018    Acute blood loss anemia 02/03/2018    Constipation 02/04/2018     Past Medical History:   Diagnosis Date    Anemia     Arthritis     Bladder calculi     CAD (coronary artery disease)     CKD (chronic kidney disease) stage 3, GFR 30-59 ml/min (HCC)     CKD (chronic kidney disease), stage III (HCC)     Diastolic congestive heart failure (HCC)     Hypertension     Hypothyroidism     Rheumatoid arthritis (HCC)     Uterine prolapse      Admitting Diagnosis: Chest pain [R07 9]  Elevated troponin [R74 8]  Chest pain with moderate risk of acute coronary syndrome [R07 9]  Age/Sex: 77 y o  female  Admission Orders:  Tele mon  Orthostatic bp  Echo  Consult cardiology  Scheduled Meds:   Current Facility-Administered Medications:  acetaminophen 650 mg Oral Q6H PRN Kapil Corbin, DO    amLODIPine 2 5 mg Oral QPM Violetta Claudio, CRNP    And        amLODIPine 2 5 mg Oral Daily Violetta Claudio, CRNP    aspirin 81 mg Oral Daily Amalia Wil, DO    cholecalciferol 1,000 Units Oral Daily Kapil Corbin, DO    docusate sodium 100 mg Oral BID PRN Amalia Wil, DO    fish oil 1,000 mg Oral BID Kapil Corbin, DO    heparin (porcine) 3-20 Units/kg/hr (Order-Specific) Intravenous Titrated Amalia Wil, DO Last Rate: 14 Units/kg/hr (03/15/19 0310)   heparin (porcine) 1,950 Units Intravenous PRN Kapil Corbin, DO    heparin (porcine) 3,900 Units Intravenous PRN Amalia Wil, DO    hydrALAZINE 10 mg Intravenous Q4H PRN Amalia Wil, DO    HYDROcodone-acetaminophen 1 tablet Oral Q4H PRN Amalia Wil, DO    hydroxychloroquine 200 mg Oral Daily Kapil Corbin, DO    isosorbide mononitrate 60 mg Oral Daily Kapil Corbin, DO    levothyroxine 50 mcg Oral Early Morning Kapil Corbin, DO    losartan 50 mg Oral Daily Kapil Corbin, DO    metoprolol succinate 100 mg Oral Daily Kapil Corbin, DO    And        metoprolol succinate 50 mg Oral QPM Kapil Corbin, DO    Mirabegron ER 25 mg Oral Every Other Day Kapil Corbin, DO    morphine injection 2 mg Intravenous Q4H PRN Robert Beltrán Corbin, DO    multivitamin-minerals 1 tablet Oral Daily Kaipl Alaniz, DO    ondansetron 4 mg Intravenous Q4H PRN Mariaelena Frey, DO    ranolazine 500 mg Oral BID Kapil Alaniz, DO    spironolactone 25 mg Oral Every Other Day Kapil Alaniz, DO    torsemide 10 mg Oral Daily Kapil Alaniz, DO      Continuous Infusions:   heparin (porcine) 3-20 Units/kg/hr (Order-Specific) Last Rate: 14 Units/kg/hr (03/15/19 0310)     PRN Meds:   acetaminophen    docusate sodium    heparin (porcine)    heparin (porcine)    hydrALAZINE    HYDROcodone-acetaminophen    morphine injection    ondansetron

## 2019-03-15 NOTE — SOCIAL WORK
LOS 1 day  Patient is not a bundle or a readmission  SW met with patient at bedside to discuss discharge planning  Patient is alert and oriented  She lives alone and uses a cane to ambulate and also has a life alert  She is independent with her ADL's  She has friends and family that visit daily  She has a hx of STR at [facility], [facility]  She uses Triptease  No hx of mental health or drug and alcohol issues  No anticipated needs at this time

## 2019-03-16 LAB
ANION GAP SERPL CALCULATED.3IONS-SCNC: 9 MMOL/L (ref 4–13)
APTT PPP: 59 SECONDS (ref 24–33)
APTT PPP: 59 SECONDS (ref 26–38)
APTT PPP: 61 SECONDS (ref 26–38)
BUN SERPL-MCNC: 31 MG/DL (ref 5–25)
CALCIUM SERPL-MCNC: 9.2 MG/DL (ref 8.3–10.1)
CHLORIDE SERPL-SCNC: 103 MMOL/L (ref 100–108)
CO2 SERPL-SCNC: 27 MMOL/L (ref 21–32)
CREAT SERPL-MCNC: 1.47 MG/DL (ref 0.6–1.3)
GFR SERPL CREATININE-BSD FRML MDRD: 34 ML/MIN/1.73SQ M
GLUCOSE SERPL-MCNC: 100 MG/DL (ref 65–140)
POTASSIUM SERPL-SCNC: 4 MMOL/L (ref 3.5–5.3)
SODIUM SERPL-SCNC: 139 MMOL/L (ref 136–145)
TROPONIN I SERPL-MCNC: 0.42 NG/ML

## 2019-03-16 PROCEDURE — 99232 SBSQ HOSP IP/OBS MODERATE 35: CPT | Performed by: INTERNAL MEDICINE

## 2019-03-16 PROCEDURE — 99233 SBSQ HOSP IP/OBS HIGH 50: CPT | Performed by: INTERNAL MEDICINE

## 2019-03-16 PROCEDURE — 84484 ASSAY OF TROPONIN QUANT: CPT | Performed by: INTERNAL MEDICINE

## 2019-03-16 PROCEDURE — 85730 THROMBOPLASTIN TIME PARTIAL: CPT | Performed by: INTERNAL MEDICINE

## 2019-03-16 PROCEDURE — 80048 BASIC METABOLIC PNL TOTAL CA: CPT | Performed by: INTERNAL MEDICINE

## 2019-03-16 RX ORDER — SODIUM CHLORIDE 9 MG/ML
40 INJECTION, SOLUTION INTRAVENOUS CONTINUOUS
Status: DISCONTINUED | OUTPATIENT
Start: 2019-03-16 | End: 2019-03-17 | Stop reason: HOSPADM

## 2019-03-16 RX ADMIN — RANOLAZINE 500 MG: 500 TABLET, FILM COATED, EXTENDED RELEASE ORAL at 17:13

## 2019-03-16 RX ADMIN — Medication 1000 MG: at 17:13

## 2019-03-16 RX ADMIN — RANOLAZINE 500 MG: 500 TABLET, FILM COATED, EXTENDED RELEASE ORAL at 09:34

## 2019-03-16 RX ADMIN — SPIRONOLACTONE 25 MG: 25 TABLET ORAL at 09:34

## 2019-03-16 RX ADMIN — Medication 1000 MG: at 09:35

## 2019-03-16 RX ADMIN — AMLODIPINE BESYLATE 2.5 MG: 2.5 TABLET ORAL at 09:34

## 2019-03-16 RX ADMIN — ISOSORBIDE MONONITRATE 60 MG: 60 TABLET, EXTENDED RELEASE ORAL at 09:34

## 2019-03-16 RX ADMIN — METOPROLOL SUCCINATE 50 MG: 50 TABLET, EXTENDED RELEASE ORAL at 17:13

## 2019-03-16 RX ADMIN — HYDROXYCHLOROQUINE SULFATE 200 MG: 200 TABLET, FILM COATED ORAL at 09:35

## 2019-03-16 RX ADMIN — LOSARTAN POTASSIUM 50 MG: 50 TABLET, FILM COATED ORAL at 09:35

## 2019-03-16 RX ADMIN — HEPARIN SODIUM AND DEXTROSE 16 UNITS/KG/HR: 10000; 5 INJECTION INTRAVENOUS at 22:17

## 2019-03-16 RX ADMIN — VITAMIN D, TAB 1000IU (100/BT) 1000 UNITS: 25 TAB at 09:35

## 2019-03-16 RX ADMIN — METOPROLOL SUCCINATE 100 MG: 100 TABLET, EXTENDED RELEASE ORAL at 09:34

## 2019-03-16 RX ADMIN — AMLODIPINE BESYLATE 2.5 MG: 2.5 TABLET ORAL at 17:13

## 2019-03-16 RX ADMIN — LEVOTHYROXINE SODIUM 50 MCG: 50 TABLET ORAL at 06:13

## 2019-03-16 RX ADMIN — ASPIRIN 81 MG: 81 TABLET, COATED ORAL at 09:35

## 2019-03-16 RX ADMIN — TICAGRELOR 90 MG: 90 TABLET ORAL at 21:08

## 2019-03-16 RX ADMIN — Medication 1 TABLET: at 09:34

## 2019-03-16 RX ADMIN — TORSEMIDE 10 MG: 10 TABLET ORAL at 09:34

## 2019-03-16 NOTE — ASSESSMENT & PLAN NOTE
Coronary artery disease with history of CABG and subsequent stenting 2017  She was recently taken off Brilinta two weeks ago by Dr Mayela Botaeng    Continue isosorbide and ranexa

## 2019-03-16 NOTE — NURSING NOTE
Pt got up to go to the BR without bringing her IV pump,IV access was removed and patient was bleeding from the IV site,I saw the patient walking outside her room,pressure dressing applied and bleeding was controlled  Bed alarm on and told patient again to use the bell for help when she is getting OOB

## 2019-03-16 NOTE — PROGRESS NOTES
Progress Note - Lobo Spencer 1940, 77 y o  female MRN: [id number]    Unit/Bed#: [address] [place] [address] Encounter: [id number]    Primary Care Provider: Riaz Wan MD   Date and time admitted to hospital: 3/14/2019  9:04 AM        * Chest pain with moderate risk of acute coronary syndrome  Assessment & Plan  Chest pain with extensive history of coronary artery disease  Likely NSTEMI type 1 after evaluation by cardiology   Was taken off Brilinta by Dr Christy Maria two weeks ago  Continue heparin infusion  Attempt to transfer patient to [facility] as patient scheduled for cardiac catheterization Monday    Results from last 7 days   Lab Units 03/16/19  0402 03/15/19  0149 03/14/19 2031   TROPONIN I ng/mL 0 42* 0 97* 1 30*       Rheumatoid arthritis ([facility])  Assessment & Plan  Rheumatoid arthritis maintained on hydroxychloroquine    CKD (chronic kidney disease) stage 3, GFR 30-59 ml/min ([facility])  Assessment & Plan  CKD 3 follows with Dr Amanda Bob  Will start gentle IV fluids as patient is anticipated to have cardiac catheterization  Lab Results   Component Value Date    CREATININE 1 47 (H) 03/16/2019    CREATININE 1 33 (H) 03/15/2019    CREATININE 1 06 03/14/2019    CREATININE 1 25 02/26/2019    CREATININE 1 09 01/10/2019       Hypertension  Assessment & Plan  Longstanding history of essential hypertension  Due to drop in blood pressures, amlodipine decreased to 2 5 mg b i d  by cardiology  Continue metoprolol succinate 100 mg in a m , 50 mg in p m, spironolactone and losartan    CAD (coronary artery disease)  Assessment & Plan  Coronary artery disease with history of CABG and subsequent stenting 2017  She was recently taken off Brilinta two weeks ago by Dr Christy Maria  Continue isosorbide and ranexa     Chronic systolic congestive heart failure (HCC)  Assessment & Plan  Last LVEF 45%    Currently compensated continue torsemide    Hypothyroid  Assessment & Plan  Continue levothyroxine    Hyperlipidemia  Assessment & Plan  Hyperlipidemia intolerant to statins  Continue omega 3      VTE Pharmacologic Prophylaxis: Heparin Drip    Patient Centered Rounds: I have performed bedside rounds with nursing staff today  Discussions with Specialists or Other Care Team Provider:  Cardiology, patient access  Education and Discussions with Family / Patient:  Granddaughter at bedside    Time Spent for Care: 30 mins  More than 50% of total time spent on counseling and coordination of care as described above  Current Length of Stay: 2 day(s)  Current Patient Status: Inpatient     Certification Statement: The patient will continue to require additional inpatient hospital stay due to Chest pain with moderate risk of acute coronary syndrome  Discharge Plan / Estimated Discharge Date:  Needs transfer to higher level of care over weekend    Code Status: Level 1 - Full Code  ______________________________________________________________________________    Subjective:   Patient seen and examined  Had chest pain last night which has now resolved with the addition of nitroglycerin patch  Attempt to transfer patient today unsuccessful  Patient does have scheduled cardiac catheterization Monday  Objective:   Vitals: Blood pressure 128/59, pulse 67, temperature 98 °F (36 7 °C), temperature source Oral, resp  rate 18, height 5' 4" (1 626 m), weight 65 8 kg (145 lb), SpO2 97 %, not currently breastfeeding      Physical Exam:   General appearance: alert, appears stated age and cooperative  Head: Normocephalic, without obvious abnormality, atraumatic  Lungs: clear to auscultation bilaterally  Heart: regular rate and rhythm  Abdomen: soft, non-tender, positive bowel sounds   Back: negative, range of motion normal  Extremities: extremities atraumatic, no cyanosis or edema  Neurologic: Grossly normal    Additional Data:   Labs:  Results from last 7 days   Lab Units 03/15/19  0931 03/14/19  0918   WBC Thousand/uL 8 96 9 13   HEMOGLOBIN g/dL 11 9 12 4   HEMATOCRIT % 37 9 40 4   MCV fL 84 85   PLATELETS Thousands/uL 239 252   INR   --  0 92     Results from last 7 days   Lab Units 03/16/19  0402 03/15/19  0931 03/14/19  0918   SODIUM mmol/L 139 139 138   POTASSIUM mmol/L 4 0 3 9 4 4   CHLORIDE mmol/L 103 101 104   CO2 mmol/L 27 29 26   ANION GAP mmol/L 9 9 8   BUN mg/dL 31* 24 20   CREATININE mg/dL 1 47* 1 33* 1 06   CALCIUM mg/dL 9 2 9 3 9 1   ALBUMIN g/dL  --  3 5 3 5   TOTAL BILIRUBIN mg/dL  --  0 50 0 50   ALK PHOS U/L  --  81 82   ALT U/L  --  26 24   AST U/L  --  25 31   EGFR ml/min/1 73sq m 34 38 50   GLUCOSE RANDOM mg/dL 100 118 102         Results from last 7 days   Lab Units 03/16/19  0402 03/15/19  0149 03/14/19  2031   TROPONIN I ng/mL 0 42* 0 97* 1 30*                          * I Have Reviewed All Lab Data Listed Above  Cultures:           Imaging:  Imaging Reports Reviewed Today Include:   Procedure: Xr Chest 1 View Portable    Result Date: 3/14/2019  Narrative: CHEST INDICATION:   chest pain  COMPARISON:  12/11/2018 EXAM PERFORMED/VIEWS:  XR CHEST PORTABLE Images: 1 FINDINGS:  There are median sternotomy wires indicating prior cardiac surgery  Heart shadow is enlarged but unchanged from prior exam  The lungs are clear  No pneumothorax or pleural effusion  Osseous structures appear within normal limits for patient age  Impression: No acute cardiopulmonary disease   Workstation performed: [id number]     Scheduled Meds:  Current Facility-Administered Medications:  acetaminophen 650 mg Oral Q6H PRN Wang Baas, DO    amLODIPine 2 5 mg Oral QPM Nayeli Stalls, CRNP    And        amLODIPine 2 5 mg Oral Daily Nayeli Stalls, CRNP    aspirin 81 mg Oral Daily Wang Baas, DO    cholecalciferol 1,000 Units Oral Daily Kapil Corbin, DO    docusate sodium 100 mg Oral BID PRN Wang Baas, DO    fish oil 1,000 mg Oral BID Kapil Corbin, DO    heparin (porcine) 3-20 Units/kg/hr (Order-Specific) Intravenous Titrated Gretchen Sina, DO Last Rate: 16 Units/kg/hr (03/15/19 2110)   heparin (porcine) 1,950 Units Intravenous PRN Gretchen Nanas, DO    heparin (porcine) 3,900 Units Intravenous PRN Gretchen Nanas, DO    hydrALAZINE 10 mg Intravenous Q4H PRN Gretchen Andino, DO    HYDROcodone-acetaminophen 1 tablet Oral Q4H PRN Gretchen Andino, DO    hydroxychloroquine 200 mg Oral Daily Kapil Alaniz, DO    isosorbide mononitrate 60 mg Oral Daily Kapil Alaniz, DO    levothyroxine 50 mcg Oral Early Morning Kapiljavier Alaniz, DO    metoprolol succinate 100 mg Oral Daily Kapil Alaniz, DO    And        metoprolol succinate 50 mg Oral QPM aKpil Alaniz, DO    Mirabegron ER 25 mg Oral Every Other Day Kapil Alaniz, DO    morphine injection 2 mg Intravenous Q4H PRN Gretchen Andino, DO    multivitamin-minerals 1 tablet Oral Daily Kapil Alaniz, DO    nitroglycerin 0 4 mg Sublingual Q5 Min PRN Kapil Alaniz, DO    ondansetron 4 mg Intravenous Q4H PRN Gretchen Andino, DO    ranolazine 500 mg Oral BID Kapil Alaniz, DO    spironolactone 25 mg Oral Every Other Day Kapil Alaniz, DO    ticagrelor 90 mg Oral Q12H [address] MD Gretchen Glover, DO  [facility] Internal Medicine  Hospitalist    ** Please Note: This note has been constructed using a voice recognition system   **

## 2019-03-16 NOTE — NURSING NOTE
Current PTT is 58,pt refused the IV heparin bolus and the IV heparin rate increase,she had a bleeding this morning when they took her IV site out and that they had a hard time controlling the bleeding,I spoke with Blanca Smith about it and he came to see and expalin to the patient the importance of heparin drip,her answer was still no

## 2019-03-16 NOTE — PROGRESS NOTES
Progress Note - Cardiology   Zen Hall 77 y o  female MRN: [id number]  Unit/Bed#: [address] [place] [address] Encounter: [id number]    Assessment/Plan:  Non STEMI type 1/history of coronary artery disease status post bypass grafting in 1998 with recent stenting of SVG to OM1 in 2017- troponins down trending  Continue heparin therapy  Plan for for PCI on Monday a m  Mima Beltran Tentatively on the schedule for [facility] in AM   NPO after midnight Sunday night  Transfer pending  Will restart the patient on Brilinta    Ischemic cardiomyopathy with EF 45%- no evidence of volume overload  Hold Cozaar for cardiac catheterization    CKD 3- recommend gentle hydration Sunday night for cardiac catheterization  Hold Cozaar  Hold torsemide  Contrast prophylaxis  No dye allergy    Chronic Angina- continue Ranexa plus spironolactone    Dyslipidemia    Htn    Subjective/Objective   Chest pain last night- completely relieved with nitroglycerin  EKG is reviewed  Nitro patch restarted  Will add back her Brilinta    Continue on heparin therapy    Objective:   Vitals: /59 (BP Location: Left arm)   Pulse 67   Temp 98 °F (36 7 °C) (Oral)   Resp 18   Ht 5' 4" (1 626 m)   Wt 65 8 kg (145 lb)   SpO2 97%   BMI 24 89 kg/m²   Vitals:    03/14/19 0907 03/14/19 1412   Weight: 67 1 kg (148 lb) 65 8 kg (145 lb)     Orthostatic Blood Pressures      Most Recent Value   Blood Pressure  128/59 filed at 03/16/2019 0754   Patient Position - Orthostatic VS  Lying filed at 03/16/2019 0754            Intake/Output Summary (Last 24 hours) at 3/16/2019 1445  Last data filed at 3/16/2019 0401  Gross per 24 hour   Intake 300 ml   Output 750 ml   Net -450 ml       Invasive Devices     Peripheral Intravenous Line            Peripheral IV 03/16/19 Right Hand less than 1 day          Drain            Urethral Catheter Non-latex 16 Fr  405 days                Review of Systems: reviewed    Physical Exam   Constitutional: She is oriented to person, place, and time  No distress  HENT:   Head: Normocephalic and atraumatic  Right Ear: External ear normal    Left Ear: External ear normal    Nose: Nose normal    Mouth/Throat: No oropharyngeal exudate  Eyes: Pupils are equal, round, and reactive to light  Conjunctivae and EOM are normal  Right eye exhibits no discharge  Left eye exhibits no discharge  No scleral icterus  Neck: Normal range of motion  Neck supple  No JVD present  No tracheal deviation present  No thyromegaly present  Cardiovascular: Regular rhythm and intact distal pulses  Exam reveals gallop and distant heart sounds  Exam reveals no friction rub  Murmur heard  Pulmonary/Chest: Effort normal and breath sounds normal  No stridor  No respiratory distress  She has no wheezes  She has no rales  She exhibits no tenderness  Abdominal: Soft  Bowel sounds are normal  She exhibits no distension and no mass  There is no tenderness  There is no rebound and no guarding  No hernia  Musculoskeletal: Normal range of motion  She exhibits no edema, tenderness or deformity  Lymphadenopathy:     She has no cervical adenopathy  Neurological: She is alert and oriented to person, place, and time  She has normal reflexes  She displays normal reflexes  No cranial nerve deficit  She exhibits normal muscle tone  Coordination normal    Skin: Skin is warm and dry  No rash noted  She is not diaphoretic  No erythema  No pallor  Psychiatric: She has a normal mood and affect  Her behavior is normal  Judgment and thought content normal    Nursing note and vitals reviewed  Lab Results: I have personally reviewed pertinent lab results  Imaging: I have personally reviewed pertinent reports  VTE Pharmacologic Prophylaxis: Sequential compression device (Venodyne)   VTE Mechanical Prophylaxis: sequential compression device    Counseling / Coordination of Care  Total time spent today 40 minutes   Greater than 50% of total time was spent with the patient and / or family counseling and / or coordination of care   A description of the counseling / coordination of care: nstemi type 1

## 2019-03-16 NOTE — ASSESSMENT & PLAN NOTE
Longstanding history of essential hypertension  Due to drop in blood pressures, amlodipine decreased to 2 5 mg b i d  by cardiology   Continue metoprolol succinate 100 mg in a m , 50 mg in p m, spironolactone and losartan

## 2019-03-16 NOTE — ASSESSMENT & PLAN NOTE
Chest pain with extensive history of coronary artery disease  Likely NSTEMI type 1 after evaluation by cardiology   Was taken off Brilinta by Dr Alessandro Dye two weeks ago  Continue heparin infusion    Attempt to transfer patient to [facility] as patient scheduled for cardiac catheterization Monday    Results from last 7 days   Lab Units 03/16/19  0402 03/15/19  0149 03/14/19 2031   TROPONIN I ng/mL 0 42* 0 97* 1 30*

## 2019-03-16 NOTE — ASSESSMENT & PLAN NOTE
CKD 3 follows with Dr Basilia Minor    Will start gentle IV fluids as patient is anticipated to have cardiac catheterization  Lab Results   Component Value Date    CREATININE 1 47 (H) 03/16/2019    CREATININE 1 33 (H) 03/15/2019    CREATININE 1 06 03/14/2019    CREATININE 1 25 02/26/2019    CREATININE 1 09 01/10/2019

## 2019-03-17 ENCOUNTER — HOSPITAL ENCOUNTER (INPATIENT)
Facility: HOSPITAL | Age: 79
LOS: 2 days | Discharge: HOME | DRG: 247 | End: 2019-03-19
Attending: INTERNAL MEDICINE | Admitting: INTERNAL MEDICINE
Payer: MEDICARE

## 2019-03-17 VITALS
HEART RATE: 68 BPM | BODY MASS INDEX: 24.75 KG/M2 | TEMPERATURE: 97.8 F | SYSTOLIC BLOOD PRESSURE: 138 MMHG | WEIGHT: 145 LBS | DIASTOLIC BLOOD PRESSURE: 79 MMHG | HEIGHT: 64 IN | RESPIRATION RATE: 20 BRPM | OXYGEN SATURATION: 97 %

## 2019-03-17 DIAGNOSIS — N18.30 CKD (CHRONIC KIDNEY DISEASE) STAGE 3, GFR 30-59 ML/MIN (HCC): ICD-10-CM

## 2019-03-17 DIAGNOSIS — R77.8 ELEVATED TROPONIN: ICD-10-CM

## 2019-03-17 DIAGNOSIS — I21.4 ACUTE NON-ST ELEVATION MYOCARDIAL INFARCTION (NSTEMI) (HCC): Primary | ICD-10-CM

## 2019-03-17 LAB
ANION GAP SERPL CALCULATED.3IONS-SCNC: 11 MMOL/L (ref 4–13)
APTT PPP: 110 SECONDS (ref 26–38)
APTT PPP: 50 SECONDS (ref 26–38)
APTT PPP: 74 SECONDS (ref 24–33)
APTT PPP: 91 SECONDS (ref 24–33)
BUN SERPL-MCNC: 29 MG/DL (ref 5–25)
CALCIUM SERPL-MCNC: 9.2 MG/DL (ref 8.3–10.1)
CHLORIDE SERPL-SCNC: 104 MMOL/L (ref 100–108)
CO2 SERPL-SCNC: 25 MMOL/L (ref 21–32)
CREAT SERPL-MCNC: 1.44 MG/DL (ref 0.6–1.3)
GFR SERPL CREATININE-BSD FRML MDRD: 35 ML/MIN/1.73SQ M
GLUCOSE SERPL-MCNC: 96 MG/DL (ref 65–140)
POTASSIUM SERPL-SCNC: 4 MMOL/L (ref 3.5–5.3)
SODIUM SERPL-SCNC: 140 MMOL/L (ref 136–145)

## 2019-03-17 PROCEDURE — 1123F ACP DISCUSS/DSCN MKR DOCD: CPT | Performed by: INTERNAL MEDICINE

## 2019-03-17 PROCEDURE — 99239 HOSP IP/OBS DSCHRG MGMT >30: CPT | Performed by: INTERNAL MEDICINE

## 2019-03-17 PROCEDURE — 99231 SBSQ HOSP IP/OBS SF/LOW 25: CPT | Performed by: INTERNAL MEDICINE

## 2019-03-17 PROCEDURE — 85730 THROMBOPLASTIN TIME PARTIAL: CPT | Performed by: INTERNAL MEDICINE

## 2019-03-17 PROCEDURE — 99223 1ST HOSP IP/OBS HIGH 75: CPT | Performed by: INTERNAL MEDICINE

## 2019-03-17 PROCEDURE — 80048 BASIC METABOLIC PNL TOTAL CA: CPT | Performed by: INTERNAL MEDICINE

## 2019-03-17 RX ORDER — HEPARIN SODIUM 1000 [USP'U]/ML
1950 INJECTION, SOLUTION INTRAVENOUS; SUBCUTANEOUS AS NEEDED
Status: CANCELLED | OUTPATIENT
Start: 2019-03-17

## 2019-03-17 RX ORDER — HYDROXYCHLOROQUINE SULFATE 200 MG/1
200 TABLET, FILM COATED ORAL DAILY
Status: CANCELLED | OUTPATIENT
Start: 2019-03-18

## 2019-03-17 RX ORDER — ASPIRIN 81 MG/1
81 TABLET ORAL DAILY
Status: DISCONTINUED | OUTPATIENT
Start: 2019-03-18 | End: 2019-03-19 | Stop reason: HOSPADM

## 2019-03-17 RX ORDER — AMLODIPINE BESYLATE 2.5 MG/1
2.5 TABLET ORAL DAILY
Status: CANCELLED | OUTPATIENT
Start: 2019-03-18

## 2019-03-17 RX ORDER — ISOSORBIDE MONONITRATE 60 MG/1
60 TABLET, EXTENDED RELEASE ORAL DAILY
Status: CANCELLED | OUTPATIENT
Start: 2019-03-18

## 2019-03-17 RX ORDER — HEPARIN SODIUM 10000 [USP'U]/100ML
3-20 INJECTION, SOLUTION INTRAVENOUS
Status: CANCELLED | OUTPATIENT
Start: 2019-03-17

## 2019-03-17 RX ORDER — ACETAMINOPHEN 325 MG/1
650 TABLET ORAL EVERY 6 HOURS PRN
Status: CANCELLED | OUTPATIENT
Start: 2019-03-17

## 2019-03-17 RX ORDER — NITROGLYCERIN 0.4 MG/1
0.4 TABLET SUBLINGUAL
Status: DISCONTINUED | OUTPATIENT
Start: 2019-03-17 | End: 2019-03-19 | Stop reason: HOSPADM

## 2019-03-17 RX ORDER — ACETAMINOPHEN 325 MG/1
650 TABLET ORAL EVERY 6 HOURS PRN
Status: DISCONTINUED | OUTPATIENT
Start: 2019-03-17 | End: 2019-03-19 | Stop reason: HOSPADM

## 2019-03-17 RX ORDER — METOPROLOL SUCCINATE 50 MG/1
50 TABLET, EXTENDED RELEASE ORAL EVERY EVENING
Status: CANCELLED | OUTPATIENT
Start: 2019-03-17

## 2019-03-17 RX ORDER — HYDROCODONE BITARTRATE AND ACETAMINOPHEN 5; 325 MG/1; MG/1
1 TABLET ORAL EVERY 4 HOURS PRN
Status: DISCONTINUED | OUTPATIENT
Start: 2019-03-17 | End: 2019-03-19 | Stop reason: HOSPADM

## 2019-03-17 RX ORDER — NITROGLYCERIN 0.4 MG/1
0.4 TABLET SUBLINGUAL
Status: CANCELLED | OUTPATIENT
Start: 2019-03-17

## 2019-03-17 RX ORDER — HEPARIN SODIUM 1000 [USP'U]/ML
1950 INJECTION, SOLUTION INTRAVENOUS; SUBCUTANEOUS AS NEEDED
Status: DISCONTINUED | OUTPATIENT
Start: 2019-03-17 | End: 2019-03-18

## 2019-03-17 RX ORDER — ISOSORBIDE MONONITRATE 60 MG/1
60 TABLET, EXTENDED RELEASE ORAL DAILY
Status: DISCONTINUED | OUTPATIENT
Start: 2019-03-18 | End: 2019-03-19 | Stop reason: HOSPADM

## 2019-03-17 RX ORDER — HYDRALAZINE HYDROCHLORIDE 20 MG/ML
10 INJECTION INTRAMUSCULAR; INTRAVENOUS EVERY 4 HOURS PRN
Status: DISCONTINUED | OUTPATIENT
Start: 2019-03-17 | End: 2019-03-19 | Stop reason: HOSPADM

## 2019-03-17 RX ORDER — LEVOTHYROXINE SODIUM 0.05 MG/1
50 TABLET ORAL
Status: DISCONTINUED | OUTPATIENT
Start: 2019-03-18 | End: 2019-03-19 | Stop reason: HOSPADM

## 2019-03-17 RX ORDER — DOCUSATE SODIUM 100 MG/1
100 CAPSULE, LIQUID FILLED ORAL 2 TIMES DAILY PRN
Status: DISCONTINUED | OUTPATIENT
Start: 2019-03-17 | End: 2019-03-19 | Stop reason: HOSPADM

## 2019-03-17 RX ORDER — HEPARIN SODIUM 10000 [USP'U]/100ML
3-20 INJECTION, SOLUTION INTRAVENOUS
Status: DISCONTINUED | OUTPATIENT
Start: 2019-03-17 | End: 2019-03-18

## 2019-03-17 RX ORDER — CHLORAL HYDRATE 500 MG
1000 CAPSULE ORAL 2 TIMES DAILY
Status: DISCONTINUED | OUTPATIENT
Start: 2019-03-17 | End: 2019-03-19 | Stop reason: HOSPADM

## 2019-03-17 RX ORDER — SODIUM CHLORIDE 9 MG/ML
50 INJECTION, SOLUTION INTRAVENOUS CONTINUOUS
Status: CANCELLED | OUTPATIENT
Start: 2019-03-17

## 2019-03-17 RX ORDER — ONDANSETRON 2 MG/ML
4 INJECTION INTRAMUSCULAR; INTRAVENOUS EVERY 4 HOURS PRN
Status: CANCELLED | OUTPATIENT
Start: 2019-03-17

## 2019-03-17 RX ORDER — ONDANSETRON 2 MG/ML
4 INJECTION INTRAMUSCULAR; INTRAVENOUS EVERY 4 HOURS PRN
Status: DISCONTINUED | OUTPATIENT
Start: 2019-03-17 | End: 2019-03-19 | Stop reason: HOSPADM

## 2019-03-17 RX ORDER — HEPARIN SODIUM 1000 [USP'U]/ML
3900 INJECTION, SOLUTION INTRAVENOUS; SUBCUTANEOUS AS NEEDED
Status: CANCELLED | OUTPATIENT
Start: 2019-03-17

## 2019-03-17 RX ORDER — MELATONIN
1000 DAILY
Status: DISCONTINUED | OUTPATIENT
Start: 2019-03-18 | End: 2019-03-19 | Stop reason: HOSPADM

## 2019-03-17 RX ORDER — ACETYLCYSTEINE 200 MG/ML
1200 SOLUTION ORAL; RESPIRATORY (INHALATION) EVERY 12 HOURS SCHEDULED
Status: DISCONTINUED | OUTPATIENT
Start: 2019-03-17 | End: 2019-03-17 | Stop reason: HOSPADM

## 2019-03-17 RX ORDER — ASPIRIN 81 MG/1
81 TABLET ORAL DAILY
Status: CANCELLED | OUTPATIENT
Start: 2019-03-18

## 2019-03-17 RX ORDER — DOCUSATE SODIUM 100 MG/1
100 CAPSULE, LIQUID FILLED ORAL 2 TIMES DAILY PRN
Status: CANCELLED | OUTPATIENT
Start: 2019-03-17

## 2019-03-17 RX ORDER — MELATONIN
1000 DAILY
Status: CANCELLED | OUTPATIENT
Start: 2019-03-18

## 2019-03-17 RX ORDER — AMLODIPINE BESYLATE 2.5 MG/1
2.5 TABLET ORAL EVERY EVENING
Status: CANCELLED | OUTPATIENT
Start: 2019-03-17

## 2019-03-17 RX ORDER — LEVOTHYROXINE SODIUM 0.05 MG/1
50 TABLET ORAL
Status: CANCELLED | OUTPATIENT
Start: 2019-03-18

## 2019-03-17 RX ORDER — HYDROCODONE BITARTRATE AND ACETAMINOPHEN 5; 325 MG/1; MG/1
1 TABLET ORAL EVERY 4 HOURS PRN
Status: CANCELLED | OUTPATIENT
Start: 2019-03-17

## 2019-03-17 RX ORDER — AMLODIPINE BESYLATE 2.5 MG/1
2.5 TABLET ORAL EVERY EVENING
Status: DISCONTINUED | OUTPATIENT
Start: 2019-03-17 | End: 2019-03-19

## 2019-03-17 RX ORDER — ACETYLCYSTEINE 200 MG/ML
1200 SOLUTION ORAL; RESPIRATORY (INHALATION) EVERY 12 HOURS SCHEDULED
Status: CANCELLED | OUTPATIENT
Start: 2019-03-17 | End: 2019-03-19

## 2019-03-17 RX ORDER — ACETYLCYSTEINE 200 MG/ML
1200 SOLUTION ORAL; RESPIRATORY (INHALATION) EVERY 12 HOURS SCHEDULED
Status: DISCONTINUED | OUTPATIENT
Start: 2019-03-18 | End: 2019-03-19 | Stop reason: HOSPADM

## 2019-03-17 RX ORDER — METOPROLOL SUCCINATE 100 MG/1
100 TABLET, EXTENDED RELEASE ORAL DAILY
Status: DISCONTINUED | OUTPATIENT
Start: 2019-03-18 | End: 2019-03-19 | Stop reason: HOSPADM

## 2019-03-17 RX ORDER — METOPROLOL SUCCINATE 100 MG/1
100 TABLET, EXTENDED RELEASE ORAL DAILY
Status: CANCELLED | OUTPATIENT
Start: 2019-03-18

## 2019-03-17 RX ORDER — METOPROLOL SUCCINATE 50 MG/1
50 TABLET, EXTENDED RELEASE ORAL EVERY EVENING
Status: DISCONTINUED | OUTPATIENT
Start: 2019-03-17 | End: 2019-03-19 | Stop reason: HOSPADM

## 2019-03-17 RX ORDER — AMLODIPINE BESYLATE 2.5 MG/1
2.5 TABLET ORAL DAILY
Status: DISCONTINUED | OUTPATIENT
Start: 2019-03-18 | End: 2019-03-19

## 2019-03-17 RX ORDER — HYDROXYCHLOROQUINE SULFATE 200 MG/1
200 TABLET, FILM COATED ORAL DAILY
Status: DISCONTINUED | OUTPATIENT
Start: 2019-03-18 | End: 2019-03-19 | Stop reason: HOSPADM

## 2019-03-17 RX ORDER — CHLORAL HYDRATE 500 MG
1000 CAPSULE ORAL 2 TIMES DAILY
Status: CANCELLED | OUTPATIENT
Start: 2019-03-17

## 2019-03-17 RX ORDER — SODIUM CHLORIDE 9 MG/ML
50 INJECTION, SOLUTION INTRAVENOUS CONTINUOUS
Status: DISCONTINUED | OUTPATIENT
Start: 2019-03-17 | End: 2019-03-19 | Stop reason: HOSPADM

## 2019-03-17 RX ORDER — HEPARIN SODIUM 1000 [USP'U]/ML
3900 INJECTION, SOLUTION INTRAVENOUS; SUBCUTANEOUS AS NEEDED
Status: DISCONTINUED | OUTPATIENT
Start: 2019-03-17 | End: 2019-03-18

## 2019-03-17 RX ORDER — HYDRALAZINE HYDROCHLORIDE 20 MG/ML
10 INJECTION INTRAMUSCULAR; INTRAVENOUS EVERY 4 HOURS PRN
Status: CANCELLED | OUTPATIENT
Start: 2019-03-17

## 2019-03-17 RX ORDER — RANOLAZINE 500 MG/1
500 TABLET, EXTENDED RELEASE ORAL 2 TIMES DAILY
Status: CANCELLED | OUTPATIENT
Start: 2019-03-17

## 2019-03-17 RX ORDER — RANOLAZINE 500 MG/1
500 TABLET, EXTENDED RELEASE ORAL 2 TIMES DAILY
Status: DISCONTINUED | OUTPATIENT
Start: 2019-03-17 | End: 2019-03-19 | Stop reason: HOSPADM

## 2019-03-17 RX ADMIN — AMLODIPINE BESYLATE 2.5 MG: 2.5 TABLET ORAL at 20:09

## 2019-03-17 RX ADMIN — METOPROLOL SUCCINATE 100 MG: 100 TABLET, EXTENDED RELEASE ORAL at 09:12

## 2019-03-17 RX ADMIN — RANOLAZINE 500 MG: 500 TABLET, FILM COATED, EXTENDED RELEASE ORAL at 20:05

## 2019-03-17 RX ADMIN — HEPARIN SODIUM 1950 UNITS: 1000 INJECTION, SOLUTION INTRAVENOUS; SUBCUTANEOUS at 01:42

## 2019-03-17 RX ADMIN — ACETYLCYSTEINE 1200 MG: 200 SOLUTION ORAL; RESPIRATORY (INHALATION) at 15:08

## 2019-03-17 RX ADMIN — ISOSORBIDE MONONITRATE 60 MG: 60 TABLET, EXTENDED RELEASE ORAL at 09:13

## 2019-03-17 RX ADMIN — Medication 1000 MG: at 09:13

## 2019-03-17 RX ADMIN — Medication 1 TABLET: at 09:13

## 2019-03-17 RX ADMIN — HYDROXYCHLOROQUINE SULFATE 200 MG: 200 TABLET, FILM COATED ORAL at 09:18

## 2019-03-17 RX ADMIN — HEPARIN SODIUM AND DEXTROSE 16 UNITS/KG/HR: 10000; 5 INJECTION INTRAVENOUS at 18:15

## 2019-03-17 RX ADMIN — METOPROLOL SUCCINATE 50 MG: 50 TABLET, EXTENDED RELEASE ORAL at 19:58

## 2019-03-17 RX ADMIN — AMLODIPINE BESYLATE 2.5 MG: 2.5 TABLET ORAL at 09:12

## 2019-03-17 RX ADMIN — HEPARIN SODIUM AND DEXTROSE 14 UNITS/KG/HR: 10000; 5 INJECTION INTRAVENOUS at 23:30

## 2019-03-17 RX ADMIN — Medication 1000 MG: at 20:05

## 2019-03-17 RX ADMIN — LEVOTHYROXINE SODIUM 50 MCG: 50 TABLET ORAL at 06:44

## 2019-03-17 RX ADMIN — SODIUM CHLORIDE 50 ML/HR: 0.9 INJECTION, SOLUTION INTRAVENOUS at 22:54

## 2019-03-17 RX ADMIN — VITAMIN D, TAB 1000IU (100/BT) 1000 UNITS: 25 TAB at 09:12

## 2019-03-17 RX ADMIN — TICAGRELOR 90 MG: 90 TABLET ORAL at 20:07

## 2019-03-17 RX ADMIN — RANOLAZINE 500 MG: 500 TABLET, FILM COATED, EXTENDED RELEASE ORAL at 09:12

## 2019-03-17 RX ADMIN — ASPIRIN 81 MG: 81 TABLET, COATED ORAL at 09:13

## 2019-03-17 RX ADMIN — TICAGRELOR 90 MG: 90 TABLET ORAL at 09:18

## 2019-03-17 NOTE — H&P
H&P- Jenifer Hinton 1940, 77 y o  female MRN: [id number]    Unit/Bed#: -01 Encounter: [id number]    Primary Care Provider: Kat Connolly MD   Date and time admitted to hospital: 3/17/2019  5:04 PM    * Acute non-ST elevation myocardial infarction (NSTEMI) [facility])  Assessment & Plan  · Presenting from [place] [address] for cardiac catheterization planned 3/18/19 following diagnosis of type 1 non ST elevation MI  · Patient has an underlying history of coronary artery disease status post CABG in 1998 with recent stenting of SVG to OM1 in 2017  · Continue heparin infusion  · Continue Brilinta, Imdur, Ranexa, metoprolol    Benign hypertension with chronic kidney disease, stage III (HCC)  Assessment & Plan  · BP stable  Creatinine close to baseline  · Started on gentle hydration as well as Mucomyst in preparation for PCI tomorrow  · Patient follows with Dr Sindi Albarran, nephrology consulted  · Cozaar, Demadex, Aldactone on hold for cardiac catheterization    Chronic systolic congestive heart failure ([facility] [address] )  Assessment & Plan  · No acute exacerbation at this time  · Has history of ischemic cardiomyopathy with most recent EF 45%  · Monitor closely while of diuretics on gentle hydration  · Daily weights, I's and O's      VTE Prophylaxis: Heparin Drip       Code Status:  Full code    POLST: POLST form is not discussed and not completed at this time  Anticipated Length of Stay:  Patient will be admitted on an Inpatient basis with an anticipated length of stay of  > 2 midnights  Justification for Hospital Stay:  Acute non ST elevation MI    Chief Complaint:     Chest pain    History of Present Illness:  Jenifer Hinton is a 77 y o  female who presents with chest pain  [place] [facility] [place] presented there on 3/14/19 with chest pain  She was diagnosed with type 1 NSTEMI or and has been on heparin infusion    Patient recently had Brilinta discontinued by her cardiologist approximately 1 month ago  This was restarted during her current admission  She is now transferred for PCI on 3/18/19  At the time of my evaluation, she denies chest pain  Review of Systems   Constitutional: Negative  HENT: Negative  Respiratory: Negative  Cardiovascular: Positive for chest pain  Negative for palpitations and leg swelling  Gastrointestinal: Negative  Genitourinary: Negative  Musculoskeletal: Negative  Reports recurrent falls  Ambulates with a cane   Skin: Negative  Neurological: Negative  Psychiatric/Behavioral: Negative  All other systems reviewed and are negative  Past Medical History:   Diagnosis Date    Anemia     Arthritis     Bladder calculi     CAD (coronary artery disease)     Status post CABG and PTCA to OM1    CKD (chronic kidney disease) stage 3, GFR 30-59 ml/min ([facility])     CKD (chronic kidney disease), stage III ([facility])     Diastolic congestive heart failure ([facility])     Hypertension     Hypothyroidism     Rheumatoid arthritis ([facility] [address] )     Uterine prolapse        Past Surgical History:   Procedure Laterality Date    BACK SURGERY      BLADDER SURGERY      CORONARY ANGIOPLASTY WITH STENT PLACEMENT      CORONARY ARTERY BYPASS GRAFT      OOPHORECTOMY Left     AK PARTIAL HIP REPLACEMENT Right 2/1/2018    Procedure: HEMIARTHROPLASTY HIP (BIPOLAR) (RIGHT); Surgeon: Megan Dugan MD;  [facility];  Service: Orthopedics    THYROIDECTOMY, PARTIAL         Family History:  No family history on file  Home Meds:  all medications and allergies reviewed    Allergies: Allergies   Allergen Reactions    Atorvastatin Hives    Boniva [Ibandronic Acid] Hives    Codeine      codeine derivatives - tolerates IV Dilaudid    Statins Hives    Zetia [Ezetimibe]          Marital Status:       Social History     Substance and Sexual Activity   Alcohol Use Never    Frequency: Never     Social History     Tobacco Use Smoking Status Never Smoker   Smokeless Tobacco Never Used     Social History     Substance and Sexual Activity   Drug Use No           Physical Exam:   Vitals:        Physical Exam   Constitutional: She is oriented to person, place, and time  She appears well-developed and well-nourished  No distress  HENT:   Head: Normocephalic and atraumatic  Eyes: Conjunctivae and EOM are normal  Right eye exhibits no discharge  Left eye exhibits no discharge  No scleral icterus  Neck: Normal range of motion  Neck supple  Cardiovascular: Normal rate and regular rhythm  Exam reveals no gallop and no friction rub  Murmur heard  Pulmonary/Chest: Effort normal and breath sounds normal  No respiratory distress  She has no wheezes  She has no rales  Abdominal: Soft  Bowel sounds are normal  She exhibits no distension and no mass  There is no tenderness  Musculoskeletal: Normal range of motion  She exhibits no edema or tenderness  Neurological: She is alert and oriented to person, place, and time  No cranial nerve deficit  Skin: Skin is warm and dry  She is not diaphoretic  Psychiatric: She has a normal mood and affect  Her behavior is normal    Vitals reviewed  Lab Results: I have personally reviewed pertinent reports  Results from last 7 days   Lab Units 03/15/19  0931 03/14/19  0918   WBC Thousand/uL 8 96 9 13   HEMOGLOBIN g/dL 11 9 12 4   HEMATOCRIT % 37 9 40 4   PLATELETS Thousands/uL 239 252   NEUTROS PCT %  --  69   LYMPHS PCT %  --  21   MONOS PCT %  --  7   EOS PCT %  --  1     Results from last 7 days   Lab Units 03/17/19  0728  03/15/19  0931   POTASSIUM mmol/L 4 0   < > 3 9   CHLORIDE mmol/L 104   < > 101   CO2 mmol/L 25   < > 29   BUN mg/dL 29*   < > 24   CREATININE mg/dL 1 44*   < > 1 33*   CALCIUM mg/dL 9 2   < > 9 3   ALK PHOS U/L  --   --  81   ALT U/L  --   --  26   AST U/L  --   --  25    < > = values in this interval not displayed       Results from last 7 days   Lab Units 03/14/19  0918   INR  0 92       Imaging: I have personally reviewed pertinent reports  Xr Chest 1 View Portable    Result Date: 3/14/2019  Narrative: CHEST INDICATION:   chest pain  COMPARISON:  12/11/2018 EXAM PERFORMED/VIEWS:  XR CHEST PORTABLE Images: 1 FINDINGS:  There are median sternotomy wires indicating prior cardiac surgery  Heart shadow is enlarged but unchanged from prior exam  The lungs are clear  No pneumothorax or pleural effusion  Osseous structures appear within normal limits for patient age  Impression: No acute cardiopulmonary disease  Workstation performed: [id number]       EKG, Pathology, and Other Studies Reviewed on Admission:   · NSR, first-degree AV block, nonspecific ST-T abnormalities    ** Please Note: Dragon 360 Dictation voice to text software may have been used in the creation of this document   **

## 2019-03-17 NOTE — PROGRESS NOTES
Case discussed with Dr Chrisise Jones from nephrology the patient's personal nephrologist at patient request   Will consult upon transfer  Advised to start N-acetylcysteine for 4 doses starting today  Adjust IV fluids to 50 mL/hour

## 2019-03-17 NOTE — ASSESSMENT & PLAN NOTE
· Presenting from [place] [address] for cardiac catheterization planned 3/18/19 following diagnosis of type 1 non ST elevation MI  · Patient has an underlying history of coronary artery disease status post CABG in 1998 with recent stenting of SVG to OM1 in 2017  · Continue heparin infusion  · Continue Brilinta, Imdur, Ranexa, metoprolol

## 2019-03-17 NOTE — EMTALA/ACUTE CARE TRANSFER
[address] [facility] [address] [id number]  Dept: [phone]      ACUTE CARE TRANSFER CONSENT    NAME Marv Regalado                                         1940                              MRN [id number]    I have been informed of my rights regarding examination, treatment, and transfer   by Dr Chavo Brown,     Benefits: needs higher level of care    Risks: injury or decompensation of clinical condition during transport      Consent for Transfer:  I acknowledge that my medical condition has been evaluated and explained to me by the treating physician or other qualified medical person and/or my attending physician, who has recommended that I be transferred to the service of    at    The above potential benefits of such transfer, the potential risks associated with such transfer, and the probable risks of not being transferred have been explained to me, and I fully understand them  The doctor has explained that, in my case, the benefits of transfer outweigh the risks  I agree to be transferred  I authorize the performance of emergency medical procedures and treatments upon me in both transit and upon arrival at the receiving facility  Additionally, I authorize the release of any and all medical records to the receiving facility and request they be transported with me, if possible  I understand that the safest mode of transportation during a medical emergency is an ambulance and that the Hospital advocates the use of this mode of transport  Risks of traveling to the receiving facility by car, including absence of medical control, life sustaining equipment, such as oxygen, and medical personnel has been explained to me and I fully understand them  (NORAH CORRECT BOX BELOW)  [x ]  I consent to the stated transfer and to be transported by ambulance/helicopter    [  ]  I consent to the stated transfer, but refuse transportation by ambulance and accept full responsibility for my transportation by car  I understand the risks of non-ambulance transfers and I exonerate the Hospital and its staff from any deterioration in my condition that results from this refusal     X___________________________________________    DATE  19  TIME________  Signature of patient or legally responsible individual signing on patient behalf           RELATIONSHIP TO PATIENT_________________________          Provider Certification    NAME Felix MARTINEZ 1940                              MRN [id number]    A medical screening exam was performed on the above named patient  Based on the examination:    Condition Necessitating Transfer NSTEMI    Patient Condition: stable    Reason for Transfer: needs higher level of care - CARDIAC CATHERIZATION    Transfer Requirements: Facility     · Space available and qualified personnel available for treatment as acknowledged by    · Agreed to accept transfer and to provide appropriate medical treatment as acknowledged by          · Appropriate medical records of the examination and treatment of the patient are provided at the time of transfer   [facility], [address] _______  · Transfer will be performed by qualified personnel from    and appropriate transfer equipment as required, including the use of necessary and appropriate life support measures      Provider Certification: I have examined the patient and explained the following risks and benefits of being transferred/refusing transfer to the patient/family:         Based on these reasonable risks and benefits to the patient and/or the unborn child(elias), and based upon the information available at the time of the patients examination, I certify that the medical benefits reasonably to be expected from the provision of appropriate medical treatments at another medical facility outweigh the increasing risks, if any, to the individuals medical condition, and in the case of labor to the unborn child, from effecting the transfer      X____________________________________________ DATE 03/17/19        TIME_______      ORIGINAL - SEND TO MEDICAL RECORDS   COPY - SEND WITH PATIENT DURING TRANSFER

## 2019-03-17 NOTE — ASSESSMENT & PLAN NOTE
· BP stable    Creatinine close to baseline  · Started on gentle hydration as well as Mucomyst in preparation for PCI tomorrow  · Patient follows with Dr Clark Magana, nephrology consulted  · Cozaar, Demadex, Aldactone on hold for cardiac catheterization

## 2019-03-17 NOTE — PROGRESS NOTES
Progress Note - Cardiology   Greta Mckeon 77 y o  female MRN: [id number]  Unit/Bed#: [address] [place] [address] Encounter: [id number]    Assessment/Plan:  Non STEMI type 1/history of coronary artery disease status post bypass grafting in 1998 with recent stenting of SVG to OM1 in 2017- troponins down trending  Continue heparin therapy  Plan for for PCI on Monday a m  On the schedule for [address] in AM   NPO after midnight tonight  Transfer pending  Laton Boehringer restarted  Ischemic cardiomyopathy with EF 45%- no evidence of volume overload  Hold Cozaar for cardiac catheterization    CKD 3- recommend gentle hydration tonight for cardiac catheterization  Hold Cozaar  Hold torsemide  Contrast prophylaxis  No dye allergy    Chronic Angina- continue Ranexa plus spironolactone    Dyslipidemia    Htn    Subjective/Objective   Chest pain free over night  No significant arrhythmia on telemetry    Objective:   Vitals: /79 (BP Location: Left arm)   Pulse 68   Temp 97 8 °F (36 6 °C) (Tympanic)   Resp 20   Ht 5' 4" (1 626 m)   Wt 65 8 kg (145 lb)   SpO2 97%   BMI 24 89 kg/m²   Vitals:    03/14/19 0907 03/14/19 1412   Weight: 67 1 kg (148 lb) 65 8 kg (145 lb)     Orthostatic Blood Pressures      Most Recent Value   Blood Pressure  138/79 filed at 03/17/2019 0908   Patient Position - Orthostatic VS  Lying filed at 03/17/2019 0908          No intake or output data in the 24 hours ending 03/17/19 1245    Invasive Devices     Peripheral Intravenous Line            Peripheral IV 03/16/19 Right Hand 1 day          Drain            Urethral Catheter Non-latex 16 Fr  406 days                Review of Systems: reviewed    Physical Exam   Constitutional: She is oriented to person, place, and time  No distress  HENT:   Head: Normocephalic and atraumatic  Right Ear: External ear normal    Left Ear: External ear normal    Nose: Nose normal    Mouth/Throat: No oropharyngeal exudate     Eyes: Pupils are equal, round, and reactive to light  Conjunctivae and EOM are normal  Right eye exhibits no discharge  Left eye exhibits no discharge  No scleral icterus  Neck: Normal range of motion  Neck supple  No JVD present  No tracheal deviation present  No thyromegaly present  Cardiovascular: Normal rate, regular rhythm and intact distal pulses  Exam reveals gallop and distant heart sounds  Exam reveals no friction rub  Murmur heard  Pulmonary/Chest: Effort normal and breath sounds normal  No stridor  No respiratory distress  She has no wheezes  She has no rales  She exhibits no tenderness  Abdominal: Soft  Bowel sounds are normal  She exhibits no distension and no mass  There is no tenderness  There is no rebound and no guarding  No hernia  Musculoskeletal: Normal range of motion  She exhibits no edema, tenderness or deformity  Lymphadenopathy:     She has no cervical adenopathy  Neurological: She is alert and oriented to person, place, and time  She has normal reflexes  She displays normal reflexes  No cranial nerve deficit  She exhibits normal muscle tone  Coordination normal    Skin: Skin is warm and dry  No rash noted  She is not diaphoretic  No erythema  No pallor  Psychiatric: She has a normal mood and affect  Her behavior is normal  Judgment and thought content normal    Nursing note and vitals reviewed  Lab Results: I have personally reviewed pertinent lab results  Imaging: I have personally reviewed pertinent reports  VTE Pharmacologic Prophylaxis: Sequential compression device (Venodyne)   VTE Mechanical Prophylaxis: sequential compression device    Counseling / Coordination of Care  Total time spent today 40 minutes  Greater than 50% of total time was spent with the patient and / or family counseling and / or coordination of care   A description of the counseling / coordination of care: nstemi type 1

## 2019-03-17 NOTE — PLAN OF CARE
Problem: Potential for Falls  Goal: Patient will remain free of falls  Description  INTERVENTIONS:  - Assess patient frequently for physical needs  -  Identify cognitive and physical deficits and behaviors that affect risk of falls  -  Alto fall precautions as indicated by assessment   - Educate patient/family on patient safety including physical limitations  - Instruct patient to call for assistance with activity based on assessment  - Modify environment to reduce risk of injury  - Consider OT/PT consult to assist with strengthening/mobility  Outcome: Progressing     Problem: Prexisting or High Potential for Compromised Skin Integrity  Goal: Skin integrity is maintained or improved  Description  INTERVENTIONS:  - Identify patients at risk for skin breakdown  - Assess and monitor skin integrity  - Assess and monitor nutrition and hydration status  - Monitor labs (i e  albumin)  - Assess for incontinence   - Turn and reposition patient  - Assist with mobility/ambulation  - Relieve pressure over bony prominences  - Avoid friction and shearing  - Provide appropriate hygiene as needed including keeping skin clean and dry  - Evaluate need for skin moisturizer/barrier cream  - Collaborate with interdisciplinary team (i e  Nutrition, Rehabilitation, etc )   - Patient/family teaching  Outcome: Progressing     Problem: Nutrition/Hydration-ADULT  Goal: Nutrient/Hydration intake appropriate for improving, restoring or maintaining nutritional needs  Description  Monitor and assess patient's nutrition/hydration status for malnutrition (ex- brittle hair, bruises, dry skin, pale skin and conjunctiva, muscle wasting, smooth red tongue, and disorientation)  Collaborate with interdisciplinary team and initiate plan and interventions as ordered  Monitor patient's weight and dietary intake as ordered or per policy  Utilize nutrition screening tool and intervene per policy   Determine patient's food preferences and provide high-protein, high-caloric foods as appropriate  INTERVENTIONS:  - Monitor oral intake, urinary output, labs, and treatment plans  - Assess nutrition and hydration status and recommend course of action  - Evaluate amount of meals eaten  - Assist patient with eating if necessary   - Allow adequate time for meals  - Recommend/ encourage appropriate diets, oral nutritional supplements, and vitamin/mineral supplements  - Order, calculate, and assess calorie counts as needed  - Recommend, monitor, and adjust tube feedings and TPN/PPN based on assessed needs  - Assess need for intravenous fluids  - Provide specific nutrition/hydration education as appropriate  - Include patient/family/caregiver in decisions related to nutrition  Outcome: Progressing     Problem: DISCHARGE PLANNING - CARE MANAGEMENT  Goal: Discharge to post-acute care or home with appropriate resources  Description  INTERVENTIONS:  - Conduct assessment to determine patient/family and health care team treatment goals, and need for post-acute services based on payer coverage, community resources, and patient preferences, and barriers to discharge  - Address psychosocial, clinical, and financial barriers to discharge as identified in assessment in conjunction with the patient/family and health care team  - Arrange appropriate level of post-acute services according to patient's   needs and preference and payer coverage in collaboration with the physician and health care team  - Communicate with and update the patient/family, physician, and health care team regarding progress on the discharge plan  - Arrange appropriate transportation to post-acute venues    Patient is independent  No anticipated needs at this time     Outcome: Progressing

## 2019-03-17 NOTE — ASSESSMENT & PLAN NOTE
Chest pain with extensive history of coronary artery disease  Likely NSTEMI type 1 for cardiology   Was taken off Brilinta by Dr Karli Cai two weeks ago  Continue heparin infusion  Patient to be transferred to [address] [place] [address] as patient scheduled for cardiac catheterization Monday    Restart on brilinta by cardiology    Results from last 7 days   Lab Units 03/16/19  0402 03/15/19  0149 03/14/19  2031   TROPONIN I ng/mL 0 42* 0 97* 1 30*

## 2019-03-17 NOTE — DISCHARGE SUMMARY
[facility] Internal Medicine  Discharge- Wiliam Hoyt 1940, 77 y o  female MRN: [id number]  Unit/Bed#: [address] [address] Encounter: [id number]  Primary Care Provider: Mary Peck MD   Date and time admitted to hospital: 3/14/2019  9:04 AM        Admitting Provider:  Felipe Underwood DO  Discharge Provider:  Felipe Underwood DO  Admission Date: 3/14/2019       Discharge Date: 03/17/19   LOS: 3  Primary Care Physician at Discharge: Mary Peck [phone]    HOSPITAL COURSE:  Wiliam Hoyt is a 77 y o  female who presented to the hospital chest pain  She has extensive history of CAD status post CABG 1998 and subsequent stenting in 2017 of SVG  During hospitalization she had NSTEMI and was started on heparin infusion and she was restarted on Brilinta as this was recently discontinued by her cardiologist Dr Holger Chahal to three weeks ago  She is currently being medically optimized given CKD  Her spironolactone will be discontinued at time of transfer  Her losartan and torsemide has been held by Cardiology  Cardiology here recommends cardiac catheterization to be done at [facility] on Monday  Case discussed with Dr Sorin Corye Internal Medicine at that facility who will assume care  DISCHARGE DIAGNOSES  * Chest pain with moderate risk of acute coronary syndrome  Assessment & Plan  Chest pain with extensive history of coronary artery disease  Likely NSTEMI type 1 for cardiology   Was taken off Brilinta by Dr Holger Chahal two weeks ago  Continue heparin infusion  Patient to be transferred to [facility] as patient scheduled for cardiac catheterization Monday    Restart on brilinta by cardiology    Results from last 7 days   Lab Units 03/16/19  0402 03/15/19  0149 03/14/19  2031   TROPONIN I ng/mL 0 42* 0 97* 1 30*       Rheumatoid arthritis (HCC)  Assessment & Plan  Rheumatoid arthritis maintained on hydroxychloroquine    CKD (chronic kidney disease) stage 3, GFR 30-59 ml/min [facility])  Assessment & Plan  CKD 3 follows with Dr Dilip Teixeira  Started gentle IV fluids as patient is anticipated to have cardiac catheterization  Will have Nephrology evaluate upon transfer  Lab Results   Component Value Date    CREATININE 1 44 (H) 03/17/2019    CREATININE 1 47 (H) 03/16/2019    CREATININE 1 33 (H) 03/15/2019    CREATININE 1 06 03/14/2019    CREATININE 1 25 02/26/2019       Hypertension  Assessment & Plan  Longstanding history of essential hypertension  Due to drop in blood pressures, amlodipine decreased to 2 5 mg b i d  by cardiology  Continue metoprolol succinate 100 mg in a m , 50 mg in p m; discontinue spironolactone and losartan anticipation of cardiac catheterization and CKD    CAD (coronary artery disease)  Assessment & Plan  Coronary artery disease with history of CABG and subsequent stenting 2017  She was recently taken off Brilinta two weeks ago by Dr Nuzhat Coyne  Continue isosorbide and ranexa  The has been restarted on brilinta by cardiology given NSTEMI     Chronic systolic congestive heart failure ([facility] [address] )  Assessment & Plan  Last LVEF 45%  Currently compensated; holding torsemide and will discontinue spironolactone anticipation of cardiac catheterization  Gentle IV fluids started    Hypothyroid  Assessment & Plan  Continue levothyroxine    Hyperlipidemia  Assessment & Plan  Hyperlipidemia intolerant to statins  Continue maxi Sun - cardiology    PROCEDURES PERFORMED  Echocardiogram  Result Date: 3/14/2019  Impression:  LVEF 35%  Moderate hypokinesis mid basal inferior lateral walls  Severe hypokinesis mid basal anterolateral walls  Grade 1 diastolic dysfunction  RADIOLOGY RESULTS  Xr Chest 1 View Portable  Result Date: 3/14/2019  Impression: No acute cardiopulmonary disease   Workstation performed: [id number]       LABS  Results from last 7 days   Lab Units 03/15/19  0931 03/14/19  0918   WBC Thousand/uL 8 96 9 13   HEMOGLOBIN g/dL 11 9 12 4 HEMATOCRIT % 37 9 40 4   MCV fL 84 85   PLATELETS Thousands/uL 239 252   INR   --  0 92     Results from last 7 days   Lab Units 03/17/19  0728 03/16/19  0402 03/15/19  0931 03/14/19  0918   SODIUM mmol/L 140 139 139 138   POTASSIUM mmol/L 4 0 4 0 3 9 4 4   CHLORIDE mmol/L 104 103 101 104   CO2 mmol/L 25 27 29 26   BUN mg/dL 29* 31* 24 20   CREATININE mg/dL 1 44* 1 47* 1 33* 1 06   CALCIUM mg/dL 9 2 9 2 9 3 9 1   ALBUMIN g/dL  --   --  3 5 3 5   TOTAL BILIRUBIN mg/dL  --   --  0 50 0 50   ALK PHOS U/L  --   --  81 82   ALT U/L  --   --  26 24   AST U/L  --   --  25 31   EGFR ml/min/1 73sq m 35 34 38 50   GLUCOSE RANDOM mg/dL 96 100 118 102     Results from last 7 days   Lab Units 03/16/19  0402 03/15/19  0149 03/14/19 2031   TROPONIN I ng/mL 0 42* 0 97* 1 30*       PHYSICAL EXAM:  Vitals:   Blood Pressure: 138/79 (03/17/19 0908)  Pulse: 68 (03/17/19 0908)  Temperature: 97 8 °F (36 6 °C) (03/17/19 0908)  Temp Source: Tympanic (03/17/19 0908)  Respirations: 20 (03/17/19 0908)  Height: 5' 4" (162 6 cm) (03/14/19 1412)  Weight - Scale: 65 8 kg (145 lb) (03/14/19 1412)  SpO2: 97 % (03/17/19 0908)    General appearance: alert, appears stated age and cooperative  Head: Normocephalic, without obvious abnormality, atraumatic  Eyes: conjunctivae/corneas clear  PERRL, EOM's intact    Lungs: clear to auscultation bilaterally  Heart: regular rate and rhythm  Abdomen: soft, non-tender; bowel sounds normal; no masses,  no organomegaly  Back: range of motion normal  Extremities: edema trace lower extremities  Neurologic: Grossly normal    Planned Re-admission: YES  Discharge Disposition: Acute Hospital  [facility]    Test Results Pending at Discharge:  None  Incidental findings:  None    Medications   · Summary of Medication Adjustments made as a result of this hospitalization:   · Due to need for cardiac catheterization and CKD losartan, torsemide, spironolactone being held  · Medication Dosing Tapers - Please refer to Discharge Medication List for details on any medication dosing tapers (if applicable to patient)  · Discharge Medication List: See after visit summary for reconciled discharge medications  Diet restrictions:  Cardiac diet  NPO after midnight  Activity restrictions: No strenuous activity  Discharge Condition: stable    Outpatient Follow-Up and Discharge Instructions  See after visit summary section titled Discharge Instructions for information provided to patient and family  Code Status: Level 1 - Full Code  Discharge Statement   I spent 45 minutes discharging the patient  This time was spent on the day of discharge  Greater than 50% of total time was spent with the patient and / or family counseling and / or coordination of care  ** Please Note: This note has been constructed using a voice recognition system   **

## 2019-03-17 NOTE — ASSESSMENT & PLAN NOTE
Longstanding history of essential hypertension  Due to drop in blood pressures, amlodipine decreased to 2 5 mg b i d  by cardiology   Continue metoprolol succinate 100 mg in a m , 50 mg in p m; discontinue spironolactone and losartan anticipation of cardiac catheterization and CKD

## 2019-03-17 NOTE — ASSESSMENT & PLAN NOTE
· No acute exacerbation at this time  · Has history of ischemic cardiomyopathy with most recent EF 45%  · Monitor closely while of diuretics on gentle hydration  · Daily weights, I's and O's

## 2019-03-17 NOTE — ASSESSMENT & PLAN NOTE
Last LVEF 45%  Currently compensated; holding torsemide and will discontinue spironolactone anticipation of cardiac catheterization    Gentle IV fluids started

## 2019-03-17 NOTE — ASSESSMENT & PLAN NOTE
CKD 3 follows with Dr Hair Garcia  Started gentle IV fluids as patient is anticipated to have cardiac catheterization    Will have Nephrology evaluate upon transfer  Lab Results   Component Value Date    CREATININE 1 44 (H) 03/17/2019    CREATININE 1 47 (H) 03/16/2019    CREATININE 1 33 (H) 03/15/2019    CREATININE 1 06 03/14/2019    CREATININE 1 25 02/26/2019

## 2019-03-17 NOTE — ASSESSMENT & PLAN NOTE
Coronary artery disease with history of CABG and subsequent stenting 2017  She was recently taken off Brilinta two weeks ago by Dr Julieth Cervantes  Continue isosorbide and ranexa    The has been restarted on brilinta by cardiology given NSTEMI

## 2019-03-18 ENCOUNTER — APPOINTMENT (INPATIENT)
Dept: NON INVASIVE DIAGNOSTICS | Facility: HOSPITAL | Age: 79
DRG: 247 | End: 2019-03-18
Attending: INTERNAL MEDICINE
Payer: MEDICARE

## 2019-03-18 LAB
ALBUMIN SERPL BCP-MCNC: 3.2 G/DL (ref 3.5–5)
ALP SERPL-CCNC: 83 U/L (ref 46–116)
ALT SERPL W P-5'-P-CCNC: 29 U/L (ref 12–78)
ANION GAP SERPL CALCULATED.3IONS-SCNC: 9 MMOL/L (ref 4–13)
APTT PPP: 105 SECONDS (ref 26–38)
AST SERPL W P-5'-P-CCNC: 24 U/L (ref 5–45)
BILIRUB SERPL-MCNC: 0.5 MG/DL (ref 0.2–1)
BUN SERPL-MCNC: 28 MG/DL (ref 5–25)
CALCIUM SERPL-MCNC: 9.1 MG/DL (ref 8.3–10.1)
CHLORIDE SERPL-SCNC: 106 MMOL/L (ref 100–108)
CO2 SERPL-SCNC: 26 MMOL/L (ref 21–32)
CREAT SERPL-MCNC: 1.16 MG/DL (ref 0.6–1.3)
ERYTHROCYTE [DISTWIDTH] IN BLOOD BY AUTOMATED COUNT: 13.9 % (ref 11.6–15.1)
GFR SERPL CREATININE-BSD FRML MDRD: 45 ML/MIN/1.73SQ M
GLUCOSE SERPL-MCNC: 98 MG/DL (ref 65–140)
HCT VFR BLD AUTO: 37.1 % (ref 34.8–46.1)
HGB BLD-MCNC: 12 G/DL (ref 11.5–15.4)
KCT BLD-ACNC: 316 SEC (ref 89–137)
MCH RBC QN AUTO: 26.4 PG (ref 26.8–34.3)
MCHC RBC AUTO-ENTMCNC: 32.3 G/DL (ref 31.4–37.4)
MCV RBC AUTO: 82 FL (ref 82–98)
PLATELET # BLD AUTO: 203 THOUSANDS/UL (ref 149–390)
PMV BLD AUTO: 9.7 FL (ref 8.9–12.7)
POTASSIUM SERPL-SCNC: 4.3 MMOL/L (ref 3.5–5.3)
PROT SERPL-MCNC: 6.7 G/DL (ref 6.4–8.2)
RBC # BLD AUTO: 4.55 MILLION/UL (ref 3.81–5.12)
SODIUM SERPL-SCNC: 141 MMOL/L (ref 136–145)
SPECIMEN SOURCE: ABNORMAL
WBC # BLD AUTO: 6.44 THOUSAND/UL (ref 4.31–10.16)

## 2019-03-18 PROCEDURE — C1894 INTRO/SHEATH, NON-LASER: HCPCS | Performed by: INTERNAL MEDICINE

## 2019-03-18 PROCEDURE — 99221 1ST HOSP IP/OBS SF/LOW 40: CPT | Performed by: INTERNAL MEDICINE

## 2019-03-18 PROCEDURE — 85027 COMPLETE CBC AUTOMATED: CPT | Performed by: INTERNAL MEDICINE

## 2019-03-18 PROCEDURE — B2111ZZ FLUOROSCOPY OF MULTIPLE CORONARY ARTERIES USING LOW OSMOLAR CONTRAST: ICD-10-PCS | Performed by: INTERNAL MEDICINE

## 2019-03-18 PROCEDURE — 99153 MOD SED SAME PHYS/QHP EA: CPT | Performed by: INTERNAL MEDICINE

## 2019-03-18 PROCEDURE — 80053 COMPREHEN METABOLIC PANEL: CPT | Performed by: INTERNAL MEDICINE

## 2019-03-18 PROCEDURE — 93455 CORONARY ART/GRFT ANGIO S&I: CPT | Performed by: INTERNAL MEDICINE

## 2019-03-18 PROCEDURE — 99152 MOD SED SAME PHYS/QHP 5/>YRS: CPT | Performed by: INTERNAL MEDICINE

## 2019-03-18 PROCEDURE — C1725 CATH, TRANSLUMIN NON-LASER: HCPCS | Performed by: INTERNAL MEDICINE

## 2019-03-18 PROCEDURE — 027034Z DILATION OF CORONARY ARTERY, ONE ARTERY WITH DRUG-ELUTING INTRALUMINAL DEVICE, PERCUTANEOUS APPROACH: ICD-10-PCS | Performed by: INTERNAL MEDICINE

## 2019-03-18 PROCEDURE — 85730 THROMBOPLASTIN TIME PARTIAL: CPT | Performed by: INTERNAL MEDICINE

## 2019-03-18 PROCEDURE — C1887 CATHETER, GUIDING: HCPCS | Performed by: INTERNAL MEDICINE

## 2019-03-18 PROCEDURE — 99232 SBSQ HOSP IP/OBS MODERATE 35: CPT | Performed by: INTERNAL MEDICINE

## 2019-03-18 PROCEDURE — 93454 CORONARY ARTERY ANGIO S&I: CPT | Performed by: INTERNAL MEDICINE

## 2019-03-18 PROCEDURE — C1874 STENT, COATED/COV W/DEL SYS: HCPCS

## 2019-03-18 PROCEDURE — C1760 CLOSURE DEV, VASC: HCPCS | Performed by: INTERNAL MEDICINE

## 2019-03-18 PROCEDURE — 92937 PRQ TRLUML REVSC CAB GRF 1: CPT | Performed by: INTERNAL MEDICINE

## 2019-03-18 PROCEDURE — 85347 COAGULATION TIME ACTIVATED: CPT

## 2019-03-18 PROCEDURE — C1769 GUIDE WIRE: HCPCS | Performed by: INTERNAL MEDICINE

## 2019-03-18 PROCEDURE — C9604 PERC D-E COR REVASC T CABG S: HCPCS | Performed by: INTERNAL MEDICINE

## 2019-03-18 PROCEDURE — 94760 N-INVAS EAR/PLS OXIMETRY 1: CPT

## 2019-03-18 RX ORDER — FENTANYL CITRATE 50 UG/ML
INJECTION, SOLUTION INTRAMUSCULAR; INTRAVENOUS CODE/TRAUMA/SEDATION MEDICATION
Status: COMPLETED | OUTPATIENT
Start: 2019-03-18 | End: 2019-03-18

## 2019-03-18 RX ORDER — ASPIRIN 81 MG/1
TABLET, CHEWABLE ORAL CODE/TRAUMA/SEDATION MEDICATION
Status: COMPLETED | OUTPATIENT
Start: 2019-03-18 | End: 2019-03-18

## 2019-03-18 RX ORDER — HEPARIN SODIUM 5000 [USP'U]/ML
5000 INJECTION, SOLUTION INTRAVENOUS; SUBCUTANEOUS EVERY 8 HOURS SCHEDULED
Status: DISCONTINUED | OUTPATIENT
Start: 2019-03-18 | End: 2019-03-19 | Stop reason: HOSPADM

## 2019-03-18 RX ORDER — HEPARIN SODIUM 1000 [USP'U]/ML
INJECTION, SOLUTION INTRAVENOUS; SUBCUTANEOUS CODE/TRAUMA/SEDATION MEDICATION
Status: COMPLETED | OUTPATIENT
Start: 2019-03-18 | End: 2019-03-18

## 2019-03-18 RX ORDER — SODIUM CHLORIDE 9 MG/ML
50 INJECTION, SOLUTION INTRAVENOUS CONTINUOUS
Status: DISCONTINUED | OUTPATIENT
Start: 2019-03-18 | End: 2019-03-19

## 2019-03-18 RX ORDER — MIDAZOLAM HYDROCHLORIDE 1 MG/ML
INJECTION INTRAMUSCULAR; INTRAVENOUS CODE/TRAUMA/SEDATION MEDICATION
Status: COMPLETED | OUTPATIENT
Start: 2019-03-18 | End: 2019-03-18

## 2019-03-18 RX ORDER — LIDOCAINE HYDROCHLORIDE 10 MG/ML
INJECTION, SOLUTION INFILTRATION; PERINEURAL CODE/TRAUMA/SEDATION MEDICATION
Status: COMPLETED | OUTPATIENT
Start: 2019-03-18 | End: 2019-03-18

## 2019-03-18 RX ADMIN — TICAGRELOR 90 MG: 90 TABLET ORAL at 08:05

## 2019-03-18 RX ADMIN — FENTANYL CITRATE 25 MCG: 50 INJECTION, SOLUTION INTRAMUSCULAR; INTRAVENOUS at 08:12

## 2019-03-18 RX ADMIN — RANOLAZINE 500 MG: 500 TABLET, FILM COATED, EXTENDED RELEASE ORAL at 17:19

## 2019-03-18 RX ADMIN — MIDAZOLAM HYDROCHLORIDE 1 MG: 1 INJECTION, SOLUTION INTRAMUSCULAR; INTRAVENOUS at 08:12

## 2019-03-18 RX ADMIN — HEPARIN SODIUM 7000 UNITS: 1000 INJECTION INTRAVENOUS; SUBCUTANEOUS at 08:20

## 2019-03-18 RX ADMIN — ACETYLCYSTEINE 1200 MG: 200 SOLUTION ORAL; RESPIRATORY (INHALATION) at 10:18

## 2019-03-18 RX ADMIN — ACETYLCYSTEINE 1200 MG: 200 SOLUTION ORAL; RESPIRATORY (INHALATION) at 21:21

## 2019-03-18 RX ADMIN — ISOSORBIDE MONONITRATE 60 MG: 60 TABLET, EXTENDED RELEASE ORAL at 10:19

## 2019-03-18 RX ADMIN — VITAMIN D, TAB 1000IU (100/BT) 1000 UNITS: 25 TAB at 10:19

## 2019-03-18 RX ADMIN — AMLODIPINE BESYLATE 2.5 MG: 2.5 TABLET ORAL at 10:19

## 2019-03-18 RX ADMIN — Medication 1000 MG: at 10:20

## 2019-03-18 RX ADMIN — LIDOCAINE HYDROCHLORIDE ANHYDROUS 5 ML: 10 INJECTION, SOLUTION INFILTRATION at 08:13

## 2019-03-18 RX ADMIN — TICAGRELOR 90 MG: 90 TABLET ORAL at 21:20

## 2019-03-18 RX ADMIN — Medication 1 TABLET: at 10:19

## 2019-03-18 RX ADMIN — FENTANYL CITRATE 25 MCG: 50 INJECTION, SOLUTION INTRAMUSCULAR; INTRAVENOUS at 08:42

## 2019-03-18 RX ADMIN — HYDROXYCHLOROQUINE SULFATE 200 MG: 200 TABLET, FILM COATED ORAL at 10:19

## 2019-03-18 RX ADMIN — FENTANYL CITRATE 25 MCG: 50 INJECTION, SOLUTION INTRAMUSCULAR; INTRAVENOUS at 08:09

## 2019-03-18 RX ADMIN — RANOLAZINE 500 MG: 500 TABLET, FILM COATED, EXTENDED RELEASE ORAL at 10:19

## 2019-03-18 RX ADMIN — METOPROLOL SUCCINATE 50 MG: 50 TABLET, EXTENDED RELEASE ORAL at 17:19

## 2019-03-18 RX ADMIN — METOPROLOL SUCCINATE 100 MG: 100 TABLET, FILM COATED, EXTENDED RELEASE ORAL at 10:20

## 2019-03-18 RX ADMIN — SODIUM CHLORIDE 50 ML/HR: 0.9 INJECTION, SOLUTION INTRAVENOUS at 17:24

## 2019-03-18 RX ADMIN — ASPIRIN 81 MG: 81 TABLET, CHEWABLE ORAL at 08:05

## 2019-03-18 RX ADMIN — Medication 1000 MG: at 17:18

## 2019-03-18 RX ADMIN — AMLODIPINE BESYLATE 2.5 MG: 2.5 TABLET ORAL at 17:19

## 2019-03-18 RX ADMIN — MIDAZOLAM HYDROCHLORIDE 1 MG: 1 INJECTION, SOLUTION INTRAMUSCULAR; INTRAVENOUS at 08:09

## 2019-03-18 NOTE — ASSESSMENT & PLAN NOTE
· BP stable  Creatinine stable  · Received gentle hydration as well as Mucomyst pre PCI  · Patient follows with Dr Fabiola Schultz outpatient  · Cozaar, Demadex, Aldactone were held pre cath  · Nephrology following    Resume above medications once cleared from the nephrology standpoint

## 2019-03-18 NOTE — PROGRESS NOTES
Patient has bloody drainage covering about half of her right groin site dressing  Slow ooze noted  Pressure held for 15 minutes  Cardiac Cath team assessed and re-dressed site  Will continue to monitor site  Yong Munguia RN     1200pm  Small amount of bloody drainage noted again  Laid patient flat  Will continue to monitor   Yong Munguia RN

## 2019-03-18 NOTE — CONSULTS
[address] 77 y o  female MRN: [id number]  Unit/Bed#: -01 Encounter: [id number]    ASSESSMENT and PLAN:    50-year-old female with history of chronic kidney disease, coronary artery disease who presented to [facility] for chest pain on March 14th and was to have found to have a NSTEMI type 1, she was transferred to [facility] for cardiac catheterization and further monitoring and treatment  Nephrology consulted for underlying chronic kidney disease with a recent contrast study      1 ) Chronic Kidney Disease Stage III  - outpatient nephrologist:  Dr Nadya Cervantes  - baseline creatinine:  1 2-1 5 mg/dL  - Etiology:  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  -renal function currently stable and at baseline  -patient currently underwent a cardiac catheterization on March 18th, given prophylaxis with acetylcysteine and isotonic normal saline  -continue normal saline for at least 12 hours post cardiac catheterization then can discontinue  -daily basic metabolic panel  -avoid NSAIDs    2 ) Hypertension  -examined euvolemic  -the normal saline will also raise her blood pressure  -blood pressures have been running high as an outpatient as well  -there was a concern whether her Myrbetriq would be causing some elevation in her blood pressure  -she has not received her morning medications  -continue amlodipine 2 5 mg twice a day  -metoprolol 100 mg the morning and 50 mg in the evening  -Imdur 60 mg  -spironolactone currently on hold  -can titrate up on the amlodipine if needed for better blood pressure control or changed to Procardia  -has hydralazine ordered p r n  for systolic blood pressure greater than 160    3 ) Acute NSTEMI type 1  -status post cardiac catheterization on March 18, cardiac catheterization showed 100% stenosis at the proximal LAD, she was found to have significant triple-vessel coronary artery disease, the SVG vein to the RCA was 100% occluded which is not new, SVG to OM1 90% stenosis was stented    4 ) Ischemic cardiomyopathy  -ejection fraction 45%  -appears to be euvolemic  -currently on normal saline at 50 mL/hour  -Cozaar currently being held    SUMMARY OF RECOMMENDATIONS:    · Continue normal saline for an additional 12 hours post cardiac catheterization at which point can be discontinued  · Continue to monitor blood pressure recommendations as above  · Avoid NSAIDs and further contrast studies if possible  · Low 2 g sodium diet  · Maintain mean arterial pressure greater than 65  · Monitor closely for evidence of volume overload while on fluids    HISTORY OF PRESENT ILLNESS:  Requesting Physician: Nora Kimball MD  Reason for Consult:  Chronic kidney disease    Alejo Angelucci is a 77 y o  female who was admitted to [facility] but was recently transferred from [address] [place] [address] after presenting with chest pain  A renal consultation is requested today for assistance in the management of chronic kidney disease with a meeting for a contrast study  She has a history of chronic kidney disease stage 3 with hypertension a follows with my colleague Dr Harriett Lares  Baseline creatinine ranges anywhere from 1 2-1 5  She had presented to [facility] initially for chest pain  She has a history of cardiac disease  She was diagnosed with a type 1 NSTEMI, and was transferred to [facility] for a cardiac catheterization which was performed this morning  She did have stenting placed and was found to have triple-vessel disease      PAST MEDICAL HISTORY:  Past Medical History:   Diagnosis Date    Anemia     Arthritis     Bladder calculi     CAD (coronary artery disease)     Status post CABG and PTCA to OM1    CKD (chronic kidney disease) stage 3, GFR 30-59 ml/min ([facility])     CKD (chronic kidney disease), stage III ([facility])     Diastolic congestive [facility] failure ([facility]     Hypertension     Hypothyroidism     Rheumatoid arthritis ([facility] )     Uterine prolapse        PAST SURGICAL HISTORY:  Past Surgical History:   Procedure Laterality Date    BACK SURGERY      BLADDER SURGERY      CORONARY ANGIOPLASTY WITH STENT PLACEMENT      CORONARY ARTERY BYPASS GRAFT      OOPHORECTOMY Left     IN PARTIAL HIP REPLACEMENT Right 2/1/2018    Procedure: HEMIARTHROPLASTY HIP (BIPOLAR) (RIGHT); Surgeon: Diane Yousif MD;  Location: [facility];  Service: Orthopedics    THYROIDECTOMY, PARTIAL         ALLERGIES:  Allergies   Allergen Reactions    Atorvastatin Hives    Boniva [Ibandronic Acid] Hives    Codeine      codeine derivatives - tolerates IV Dilaudid    Statins Hives    Zetia [Ezetimibe]        SOCIAL HISTORY:  Social History     Substance and Sexual Activity   Alcohol Use Never    Frequency: Never     Social History     Substance and Sexual Activity   Drug Use No     Social History     Tobacco Use   Smoking Status Never Smoker   Smokeless Tobacco Never Used       FAMILY HISTORY:  No family history on file      MEDICATIONS:    Current Facility-Administered Medications:     acetaminophen (TYLENOL) tablet 650 mg, 650 mg, Oral, Q6H PRN, Elis Abbasi MD    acetylcysteine (MUCOMYST) 200 mg/mL oral solution 1,200 mg, 1,200 mg, Oral, Q12H ANDREW, Elis Abbasi MD    amLODIPine (NORVASC) tablet 2 5 mg, 2 5 mg, Oral, Daily **AND** amLODIPine (NORVASC) tablet 2 5 mg, 2 5 mg, Oral, QPM, Elis Abbasi MD, 2 5 mg at 03/17/19 2009    aspirin (ECOTRIN LOW STRENGTH) EC tablet 81 mg, 81 mg, Oral, Daily, Elis Abbasi MD    cholecalciferol (VITAMIN D3) tablet 1,000 Units, 1,000 Units, Oral, Daily, Elis Abbasi MD    docusate sodium (COLACE) capsule 100 mg, 100 mg, Oral, BID PRN, Elis Abbasi MD    fish oil capsule 1,000 mg, 1,000 mg, Oral, BID, Elis Abbasi MD, 1,000 mg at 03/17/19 2005    heparin (porcine) 25,000 units in 250 mL infusion (premix), 3-20 Units/kg/hr (Order-Specific), Intravenous, Titrated, Roberta Thakur MD, Stopped at 03/18/19 0806    heparin (porcine) injection 1,950 Units, 1,950 Units, Intravenous, PRN, Roberta Thakur MD    heparin (porcine) injection 3,900 Units, 3,900 Units, Intravenous, PRN, Roberta Thakur MD    hydrALAZINE (APRESOLINE) injection 10 mg, 10 mg, Intravenous, Q4H PRN, Roberta Thakur MD    HYDROcodone-acetaminophen (NORCO) 5-325 mg per tablet 1 tablet, 1 tablet, Oral, Q4H PRN, Roberta Thakur MD    hydroxychloroquine (PLAQUENIL) tablet 200 mg, 200 mg, Oral, Daily, Roberta Thakur MD    isosorbide mononitrate (IMDUR) 24 hr tablet 60 mg, 60 mg, Oral, Daily, Roberta Thakur MD    levothyroxine tablet 50 mcg, 50 mcg, Oral, Early Morning, Roberta Thakur MD    metoprolol succinate (TOPROL-XL) 24 hr tablet 100 mg, 100 mg, Oral, Daily **AND** metoprolol succinate (TOPROL-XL) 24 hr tablet 50 mg, 50 mg, Oral, QPM, Roberta Thakur MD, 50 mg at 03/17/19 1958    Mirabegron ER TB24 25 mg, 25 mg, Oral, Every Other Day, Roberta Thakur MD    morphine injection 2 mg, 2 mg, Intravenous, Q4H PRN, Roberta Thakur MD    multivitamin-minerals (CENTRUM) tablet 1 tablet, 1 tablet, Oral, Daily, Roberta Thakur MD    nitroglycerin (NITROSTAT) SL tablet 0 4 mg, 0 4 mg, Sublingual, Q5 Min PRN, Roberta Thakur MD    ondansetron (ZOFRAN) injection 4 mg, 4 mg, Intravenous, Q4H PRN, Roberta Thakur MD    ranolazine (RANEXA) 12 hr tablet 500 mg, 500 mg, Oral, BID, Roberta Thakur MD, 500 mg at 03/17/19 2005    sodium chloride 0 9 % infusion, 50 mL/hr, Intravenous, Continuous, Roberta Thakur MD, Last Rate: 50 mL/hr at 03/17/19 2254, 50 mL/hr at 03/17/19 2254    sodium chloride 0 9 % infusion, 50 mL/hr, Intravenous, Continuous, Brittny Duffy MD    ticagrelor (BRILINTA) tablet 90 mg, 90 mg, Oral, Q12H [address], Roberta Thakur MD, 90 mg at 03/17/19 2007    REVIEW OF SYSTEMS:  Constitutional: Negative for fatigue, anorexia, fever, chills, diaphoresis  HENT: Negative for postnasal drip  Eyes: Negative for visual disturbance  Respiratory: Negative for cough, shortness of breath and wheezing  Cardiovascular: Negative for chest pain, palpitations and leg swelling  Gastrointestinal: Negative for abdominal pain, constipation, diarrhea, nausea and vomiting  Genitourinary: No dysuria, hematuria  Endocrine: Negative for polyuria  Musculoskeletal: Negative for arthralgias, back pain and joint swelling  Skin: Negative for rash  Neurological: Negative for focal weakness, headaches, dizziness  Hematological: Negative for easy bruising or bleeding  Psychiatric/Behavioral: Negative for confusion and sleep disturbance  All the systems were reviewed and were negative except as documented on the HPI  PHYSICAL EXAM:  Current Weight: Weight - Scale: 67 9 kg (149 lb 11 1 oz)  First Weight: Weight - Scale: 67 9 kg (149 lb 11 1 oz)  Vitals:    03/17/19 2242 03/18/19 0700 03/18/19 0915 03/18/19 0930   BP: 137/68 (!) 171/81 155/74 (!) 151/41   BP Location: Left arm Left arm     Pulse: 56 77 56 56   Resp: 18 18     Temp: 98 °F (36 7 °C) 97 5 °F (36 4 °C)     TempSrc: Oral Oral     SpO2: 98% 95%     Weight:       Height:           Intake/Output Summary (Last 24 hours) at 3/18/2019 0957  Last data filed at 3/17/2019 1900  Gross per 24 hour   Intake 120 ml   Output    Net 120 ml     Physical Exam   Constitutional: She is oriented to person, place, and time  She appears well-developed and well-nourished  No distress  HENT:   Head: Normocephalic and atraumatic  Eyes: Pupils are equal, round, and reactive to light  Conjunctivae are normal  No scleral icterus  Neck: Normal range of motion  Neck supple  Cardiovascular: Normal rate, regular rhythm, S1 normal, S2 normal and intact distal pulses  Exam reveals no gallop and no friction rub  No murmur heard  Pulmonary/Chest: Effort normal and breath sounds normal  No respiratory distress  She has no wheezes  She has no rales  Abdominal: Soft   Bowel sounds are normal  There is no tenderness  There is no rebound  Musculoskeletal: Normal range of motion  She exhibits no edema  Neurological: She is alert and oriented to person, place, and time  Skin: No rash noted  Psychiatric: She has a normal mood and affect  Her behavior is normal    Nursing note and vitals reviewed  Invasive Devices:   Urethral Catheter Non-latex 16 Fr   (Active)     Lab Results:   Results from last 7 days   Lab Units 03/18/19  0626 03/17/19  0728 03/16/19  0402 03/15/19  0931 03/14/19  0918   WBC Thousand/uL 6 44  --   --  8 96 9 13   HEMOGLOBIN g/dL 12 0  --   --  11 9 12 4   HEMATOCRIT % 37 1  --   --  37 9 40 4   PLATELETS Thousands/uL 203  --   --  239 252   POTASSIUM mmol/L 4 3 4 0 4 0 3 9 4 4   CHLORIDE mmol/L 106 104 103 101 104   CO2 mmol/L 26 25 27 29 26   BUN mg/dL 28* 29* 31* 24 20   CREATININE mg/dL 1 16 1 44* 1 47* 1 33* 1 06   CALCIUM mg/dL 9 1 9 2 9 2 9 3 9 1   ALK PHOS U/L 83  --   --  81 82   ALT U/L 29  --   --  26 24   AST U/L 24  --   --  25 31

## 2019-03-18 NOTE — PLAN OF CARE
Problem: Potential for Falls  Goal: Patient will remain free of falls  Description  INTERVENTIONS:  - Assess patient frequently for physical needs  -  Identify cognitive and physical deficits and behaviors that affect risk of falls  -  [place] fall precautions as indicated by assessment   - Educate patient/family on patient safety including physical limitations  - Instruct patient to call for assistance with activity based on assessment  - Modify environment to reduce risk of injury  - Consider OT/PT consult to assist with strengthening/mobility  Outcome: Progressing     Problem: Nutrition/Hydration-ADULT  Goal: Nutrient/Hydration intake appropriate for improving, restoring or maintaining nutritional needs  Description  Monitor and assess patient's nutrition/hydration status for malnutrition (ex- brittle hair, bruises, dry skin, pale skin and conjunctiva, muscle wasting, smooth red tongue, and disorientation)  Collaborate with interdisciplinary team and initiate plan and interventions as ordered  Monitor patient's weight and dietary intake as ordered or per policy  Utilize nutrition screening tool and intervene per policy  Determine patient's food preferences and provide high-protein, high-caloric foods as appropriate       INTERVENTIONS:  - Monitor oral intake, urinary output, labs, and treatment plans  - Assess nutrition and hydration status and recommend course of action  - Evaluate amount of meals eaten  - Assist patient with eating if necessary   - Allow adequate time for meals  - Recommend/ encourage appropriate diets, oral nutritional supplements, and vitamin/mineral supplements  - Order, calculate, and assess calorie counts as needed  - Recommend, monitor, and adjust tube feedings and TPN/PPN based on assessed needs  - Assess need for intravenous fluids  - Provide specific nutrition/hydration education as appropriate  - Include patient/family/caregiver in decisions related to nutrition  Outcome: Progressing

## 2019-03-18 NOTE — UTILIZATION REVIEW
Initial Clinical Review    Admission: Date/Time/Statement: 3/17/19 @ 1704 Inpatient Written   Orders Placed This Encounter   Procedures    Inpatient Admission     Standing Status:   Standing     Number of Occurrences:   1     Order Specific Question:   Admitting Physician     Answer:   Leslie Muñiz [[id number]]     Order Specific Question:   Level of Care     Answer:   Med Surg [[id number]]     Order Specific Question:   Estimated length of stay     Answer:   More than 2 Midnights     Order Specific Question:   Certification     Answer:   I certify that inpatient services are medically necessary for this patient for a duration of greater than two midnights  See H&P and MD Progress Notes for additional information about the patient's course of treatment  Chief Complaint: Chest Pain, TRANSFER FROM [facility]  Assessment/Plan: Susie Bai is a 77 y o  female who presents with chest pain  Patient is transferred from [facility] after she presented there on 3/14/19 with chest pain  She was diagnosed with type 1 NSTEMI or and has been on heparin infusion  Patient recently had Brilinta discontinued by her cardiologist approximately 1 month ago  This was restarted during her current admission  She is now transferred for PCI on 3/18/19  At the time of my evaluation, she denies chest pain  Acute non-ST elevation myocardial infarction (NSTEMI) [facility])  Assessment & Plan  · Presenting from [place] [address] for cardiac catheterization planned 3/18/19 following diagnosis of type 1 non ST elevation MI  · Patient has an underlying history of coronary artery disease status post CABG in 1998 with recent stenting of SVG to OM1 in 2017  · Continue heparin infusion  · Continue Brilinta, Imdur, Ranexa, metoprolol  Benign hypertension with chronic kidney disease, stage III (HCC)  Assessment & Plan  · BP stable    Creatinine close to baseline  · Started on gentle hydration as well as Mucomyst in preparation for PCI tomorrow  · Patient follows with Dr Christiano Barkley, nephrology consulted  · Cozaar, Demadex, Aldactone on hold for cardiac catheterization  Chronic systolic congestive heart failure ([facility] )  Assessment & Plan  · No acute exacerbation at this time  · Has history of ischemic cardiomyopathy with most recent EF 45%  · Monitor closely while of diuretics on gentle hydration  · Daily weights, I's and O's  VTE Prophylaxis: Heparin Drip     Anticipated Length of Stay:  Patient will be admitted on an Inpatient basis with an anticipated length of stay of  > 2 midnights  Justification for Hospital Stay:  Acute non ST elevation MI  ED Vital Signs:   ED Triage Vitals [03/17/19 1742]   Temperature Pulse Respirations Blood Pressure SpO2   98 3 °F (36 8 °C) 66 18 141/74 98 %      Temp Source Heart Rate Source Patient Position - Orthostatic VS BP Location FiO2 (%)   Oral -- Lying Left arm --      Pain Score       No Pain        Wt Readings from Last 1 Encounters:   03/17/19 67 9 kg (149 lb 11 1 oz)     Vital Signs (abnormal): HR 50s, /81, 187/76, 182/77  Pertinent Labs/Diagnostic Test Results: BUN 29, CREAT 1 44, TROP 0 42  EKG:  NSR, first-degree AV block, nonspecific ST-T abnormalities  Past Medical/Surgical History:    Active Ambulatory Problems     Diagnosis Date Noted    Acute non-ST elevation myocardial infarction (NSTEMI) ([place] [address] ) 07/30/2017    Chronic kidney disease, stage III (moderate) ([facility]) 07/30/2017    S/P CABG x 4 07/30/2017    Hyperlipidemia 07/30/2017    Benign hypertension with chronic kidney disease, stage III ([place] [address] ) 07/30/2017    Hypothyroid 07/31/2017    Heart failure, acute on chronic, systolic and diastolic ([facility]) 58/83/6576    Chronic systolic congestive heart failure ([place] [address] ) 01/30/2018    CAD (coronary artery disease) 01/30/2018    Weight loss 01/31/2018    Anemia 08/27/2013    Hypocalcemia 03/24/2015    Vitamin D deficiency 08/12/2013    Urinary retention 02/03/2018    Leukocytosis 02/03/2018    S/P right hip fracture 02/14/2018    Hypertensive chronic kidney disease with stage 1 through stage 4 chronic kidney disease, or unspecified chronic kidney disease 06/18/2018    Anemia of chronic renal failure, stage 3 (moderate) (HCC) 06/18/2018    Dyslipidemia 06/18/2018    Iron deficiency 65/99/6079    Diastolic congestive heart failure (HCC)     Hypothyroidism     Hypertension     CKD (chronic kidney disease), stage III (HCC)     CKD (chronic kidney disease) stage 3, GFR 30-59 ml/min (HCC)     Rheumatoid arthritis (HCC)     Chest pain with moderate risk of acute coronary syndrome 03/14/2019    Urinary dysfunction 03/14/2019     Past Medical History:   Diagnosis Date    Anemia     Arthritis     Bladder calculi     CAD (coronary artery disease)     CKD (chronic kidney disease) stage 3, GFR 30-59 ml/min (HCC)     CKD (chronic kidney disease), stage III (HCC)     Diastolic congestive heart failure (HCC)     Hypertension     Hypothyroidism     Rheumatoid arthritis ([facility] [address] )     Uterine prolapse      Admitting Diagnosis: Chest pain with moderate risk of acute coronary syndrome [R07 9]  Age/Sex: 77 y o  female  Admission Orders:  Telemetry, trop q3h  NPO  Cardiac cath  Bedrest   EKG prn  IO  Puncture site care  Check cath site and distal pulses with VS  Scheduled Meds:   Current Facility-Administered Medications:  acetaminophen 650 mg Oral Q6H PRN   acetylcysteine 1,200 mg Oral Q12H ANDREW   amLODIPine 2 5 mg Oral Daily   And      amLODIPine 2 5 mg Oral QPM   aspirin 81 mg Oral Daily   cholecalciferol 1,000 Units Oral Daily   docusate sodium 100 mg Oral BID PRN   fish oil 1,000 mg Oral BID   heparin (porcine) 3-20 Units/kg/hr (Order-Specific) Intravenous Titrated   heparin (porcine) 1,950 Units Intravenous PRN   heparin (porcine) 3,900 Units Intravenous PRN   hydrALAZINE 10 mg Intravenous Q4H PRN   HYDROcodone-acetaminophen 1 tablet Oral Q4H PRN   hydroxychloroquine 200 mg Oral Daily isosorbide mononitrate 60 mg Oral Daily   levothyroxine 50 mcg Oral Early Morning   metoprolol succinate 100 mg Oral Daily   And      metoprolol succinate 50 mg Oral QPM   Mirabegron ER 25 mg Oral Every Other Day   morphine injection 2 mg Intravenous Q4H PRN   multivitamin-minerals 1 tablet Oral Daily   nitroglycerin 0 4 mg Sublingual Q5 Min PRN   ondansetron 4 mg Intravenous Q4H PRN   ranolazine 500 mg Oral BID   sodium chloride 50 mL/hr Intravenous Continuous   sodium chloride 50 mL/hr Intravenous Continuous   ticagrelor 90 mg Oral Q12H [address]     Continuous Infusions:   heparin (porcine) 3-20 Units/kg/hr (Order-Specific) Last Rate: Stopped (03/18/19 0806)   sodium chloride 50 mL/hr Last Rate: 50 mL/hr (03/17/19 2254)   sodium chloride 50 mL/hr Last Rate: 50 mL/hr (03/18/19 1010)     PRN Meds:   acetaminophen    docusate sodium    heparin (porcine)    heparin (porcine)    hydrALAZINE    HYDROcodone-acetaminophen    morphine injection    nitroglycerin    ondansetron    Network Utilization Review Department  Phone: [phone]; Fax [phone]  [email or web address]  ATTENTION: Please call with any questions or concerns to [phone]  and carefully listen to the prompts so that you are directed to the right person  Send all requests for admission clinical reviews, approved or denied determinations and any other requests to fax [phone]   All voicemails are confidential

## 2019-03-18 NOTE — PROGRESS NOTES
Patient saturated her second cath site dressing  Dressing changed and 20 minutes of manual pressure maintained  Patient laid flat  Will remain so for 1 hour  Cath team and cardiology informed   Aneudy Kee RN

## 2019-03-18 NOTE — ASSESSMENT & PLAN NOTE
· No acute exacerbation at this time  · Has history of ischemic cardiomyopathy with most recent EF 45%  · Monitor closely while off diuretics s/p gentle hydration  · Daily weights, I's and O's

## 2019-03-18 NOTE — RESPIRATORY THERAPY NOTE
RT Protocol Note  Saman Ward 77 y o  female MRN: [id number]  Unit/Bed#: -01 Encounter: [id number]    Assessment    Principal Problem:    Acute non-ST elevation myocardial infarction (NSTEMI) ([facility] )  Active Problems:    Benign hypertension with chronic kidney disease, stage III ([facility])    Chronic systolic congestive heart failure ([facility])      Home Pulmonary Medications:  none       Past Medical History:   Diagnosis Date    Anemia     Arthritis     Bladder calculi     CAD (coronary artery disease)     Status post CABG and PTCA to OM1    CKD (chronic kidney disease) stage 3, GFR 30-59 ml/min ([facility])     CKD (chronic kidney disease), stage III ([facility])     Diastolic congestive heart failure ([facility])     Hypertension     Hypothyroidism     Rheumatoid arthritis ([facility] )     Uterine prolapse      Social History     Socioeconomic History    Marital status:       Spouse name: Not on file    Number of children: Not on file    Years of education: Not on file    Highest education level: Not on file   Occupational History    Occupation: RETIRED   Social Needs    Financial resource strain: Not on file    Food insecurity:     Worry: Not on file     Inability: Not on file    Transportation needs:     Medical: Not on file     Non-medical: Not on file   Tobacco Use    Smoking status: Never Smoker    Smokeless tobacco: Never Used   Substance and Sexual Activity    Alcohol use: Never     Frequency: Never    Drug use: No    Sexual activity: Not Currently   Lifestyle    Physical activity:     Days per week: Not on file     Minutes per session: Not on file    Stress: Not on file   Relationships    Social connections:     Talks on phone: Not on file     Gets together: Not on file     Attends Rastafari service: Not on file     Active member of club or organization: Not on file     Attends meetings of clubs or organizations: Not on file     Relationship status: Not on file    Intimate partner violence:     Fear of current or ex partner: Not on file     Emotionally abused: Not on file     Physically abused: Not on file     Forced sexual activity: Not on file   Other Topics Concern    Not on file   Social History Narrative    Not on file       Subjective         Objective    Physical Exam:   Assessment Type: Assess only  General Appearance: Alert, Awake  Respiratory Pattern: Normal  Chest Assessment: Chest expansion symmetrical  Bilateral Breath Sounds: Clear, Diminished  Cough: None    Vitals:  Blood pressure (!) 187/76, pulse 58, temperature 97 5 °F (36 4 °C), temperature source Oral, resp  rate 18, height 5' 4" (1 626 m), weight 67 9 kg (149 lb 11 1 oz), SpO2 97 %, not currently breastfeeding  Imaging and other studies: I have personally reviewed pertinent reports  Plan    Respiratory Plan: No distress/Pulmonary history, Discontinue Protocol        Resp Comments: Assessment performed on pt as per dede  Pt does not have shortness of breath  Pt has bilateral clear/diminished breath sounds  Pt does not have prior lung disease nor pulmonary medications takin at home  SpO2 97 on room air  Protocal discontinued

## 2019-03-18 NOTE — ASSESSMENT & PLAN NOTE
· Presenting from [place] [id number] for cardiac catheterization following diagnosis of type 1 non ST elevation MI  · She is status post cardiac catheterization this morning with PCI "SVG to OM1 has in stent 90 stenosis which was stented with 3 5 x22 mm Medtronic stent with excellent results"  · Patient has an underlying history of coronary artery disease status post CABG in 1998 with subsequent stenting of SVG to OM1 in 2017  · Off heparin infusion post catheterization  · Continue Brilinta, Imdur, Ranexa, metoprolol

## 2019-03-18 NOTE — PROGRESS NOTES
Pt seen post cardiac cath; underwent PCI with XOCHILT to vein graft to OM1; She is feeling well post cath; no chest pain or SOB  R femoral site closed with angioseal; small amount of blood on dressing; site soft, non-tender and no hematoma  Discussed with RN; had just held pressure for 15min; continue to monitor and hold manual pressure if needed  Instructed to page cardiology if any swelling or pain

## 2019-03-18 NOTE — PROGRESS NOTES
Progress Note - Zen Hall 1940, 77 y o  female MRN: [id number]    Unit/Bed#: -Shelli Encounter: [id number]    Primary Care Provider: Radha Mueller MD   Date and time admitted to hospital: 3/17/2019  5:04 PM    * Acute non-ST elevation myocardial infarction (NSTEMI) [facility])  Assessment & Plan  · Presenting from [place] [id number] for cardiac catheterization following diagnosis of type 1 non ST elevation MI  · She is status post cardiac catheterization this morning with PCI "SVG to OM1 has in stent 90 stenosis which was stented with 3 5 x22 mm Medtronic stent with excellent results"  · Patient has an underlying history of coronary artery disease status post CABG in 1998 with subsequent stenting of SVG to OM1 in 2017  · Off heparin infusion post catheterization  · Continue Brilinta, Imdur, Ranexa, metoprolol    Benign hypertension with chronic kidney disease, stage III ([facility] )  Assessment & Plan  · BP stable  Creatinine stable  · Received gentle hydration as well as Mucomyst pre PCI  · Patient follows with Dr Davide Lund outpatient  · Cozaar, Demadex, Aldactone were held pre cath  · Nephrology following  Resume above medications once cleared from the nephrology standpoint    Chronic systolic congestive heart failure ([facility] )  Assessment & Plan  · No acute exacerbation at this time  · Has history of ischemic cardiomyopathy with most recent EF 45%  · Monitor closely while off diuretics s/p gentle hydration  · Daily weights, I's and O's      VTE Pharmacologic Prophylaxis:   Pharmacologic: Heparin  Mechanical VTE Prophylaxis in Place: Yes    Patient Centered Rounds: I have performed bedside rounds with nursing staff today  Discussions with Specialists or Other Care Team Provider:  Nursing/cardiology    Education and Discussions with Family / Patient:  Patient and daughter updated at the bedside    Current Length of Stay: 1 day(s)    Current Patient Status: Inpatient   Certification Statement:  The patient will continue to require additional inpatient hospital stay due to Above diagnosis and care plan    Discharge Plan:  Anticipate discharge tomorrow if renal function stable and cleared from the Cardiology standpoint    Code Status: Level 1 - Full Code      Subjective:   Doing well this morning  Denies chest pain  Had some oozing from catheterization site which improved with manual pressure  Off heparin infusion  Objective:     Vitals:   Temp (24hrs), Av 9 °F (36 6 °C), Min:97 5 °F (36 4 °C), Max:98 3 °F (36 8 °C)    Temp:  [97 5 °F (36 4 °C)-98 3 °F (36 8 °C)] 97 5 °F (36 4 °C)  HR:  [52-77] 55  Resp:  [18] 18  BP: (114-187)/(41-81) 124/60  SpO2:  [95 %-99 %] 99 %  Body mass index is 25 69 kg/m²  Input and Output Summary (last 24 hours): Intake/Output Summary (Last 24 hours) at 3/18/2019 1432  Last data filed at 3/17/2019 1900  Gross per 24 hour   Intake 120 ml   Output    Net 120 ml       Physical Exam:  General Appearance:    Alert, cooperative, no distress, appropriately responsive    Head:    Normocephalic, without obvious abnormality, atraumatic, mucous membranes moist    Eyes:    Conjunctiva/corneas clear, EOM's intact   Neck:   Supple   Lungs:     Clear to auscultation bilaterally, respirations unlabored, no crackles or wheeze     Heart:    Regular rate and rhythm, S1 and S2, +SM    Abdomen:     Soft, non-tender, bowel sounds active all four quadrants,     no masses, no organomegaly   Extremities:   Extremities normal, atraumatic, no cyanosis or edema   Neurologic:  nonfocal         Additional Data:     Labs:    Results from last 7 days   Lab Units 19  0626  19  0918   WBC Thousand/uL 6 44   < > 9 13   HEMOGLOBIN g/dL 12 0   < > 12 4   HEMATOCRIT % 37 1   < > 40 4   PLATELETS Thousands/uL 203   < > 252   NEUTROS PCT %  --   --  69   LYMPHS PCT %  --   --  21   MONOS PCT %  --   --  7   EOS PCT %  --   --  1    < > = values in this interval not displayed       Results from last 7 days   Lab Units 03/18/19  0626   POTASSIUM mmol/L 4 3   CHLORIDE mmol/L 106   CO2 mmol/L 26   BUN mg/dL 28*   CREATININE mg/dL 1 16   CALCIUM mg/dL 9 1   ALK PHOS U/L 83   ALT U/L 29   AST U/L 24     Results from last 7 days   Lab Units 03/14/19  0918   INR  0 92       * I Have Reviewed All Lab Data Listed Above  * Additional Pertinent Lab Tests Reviewed:  [facility] Admission Reviewed    Cultures:   Blood Culture: No results found for: BLOODCX  Urine Culture:   Lab Results   Component Value Date    URINECX (A) 12/11/2018     >100,000 cfu/ml Klebsiella oxytoca Raoultella ornithinolytica    URINECX No Growth <1000 cfu/mL 02/03/2018    URINECX 10,000-19,000 cfu/ml Mixed Contaminants X3 06/01/2016     Sputum Culture: No components found for: SPUTUMCX  Wound Culture: No results found for: WOUNDCULT    Last 24 Hours Medication List:     Current Facility-Administered Medications:  acetaminophen 650 mg Oral Q6H PRN Orval Or, MD    acetylcysteine 1,200 mg Oral Q12H [address] Orval Or, MD    amLODIPine 2 5 mg Oral Daily Orval Or, MD    And        amLODIPine 2 5 mg Oral QPM Orval Or, MD    aspirin 81 mg Oral Daily Orval Or, MD    cholecalciferol 1,000 Units Oral Daily Orval Or, MD    docusate sodium 100 mg Oral BID PRN Orval Or, MD    fish oil 1,000 mg Oral BID Orval Or, MD    heparin (porcine) 3-20 Units/kg/hr (Order-Specific) Intravenous Titrated Orval Or, MD Last Rate: Stopped (03/18/19 0806)   heparin (porcine) 1,950 Units Intravenous PRN Orval Or, MD    heparin (porcine) 3,900 Units Intravenous PRN Orval Or, MD    hydrALAZINE 10 mg Intravenous Q4H PRN Orval Or, MD    HYDROcodone-acetaminophen 1 tablet Oral Q4H PRN Orval Or, MD    hydroxychloroquine 200 mg Oral Daily Orval Or, MD    isosorbide mononitrate 60 mg Oral Daily Orval Or, MD    levothyroxine 50 mcg Oral Early Morning Orval Or, MD    metoprolol succinate 100 mg Oral Daily Huma Myers MD    And        metoprolol succinate 50 mg Oral QPM Huma Myers MD    Mirabegron ER 25 mg Oral Every Other Day Huma Myers MD    morphine injection 2 mg Intravenous Q4H PRN Huma Myers MD    multivitamin-minerals 1 tablet Oral Daily Huma Myers MD    nitroglycerin 0 4 mg Sublingual Q5 Min PRN Huma Myers MD    ondansetron 4 mg Intravenous Q4H PRN Huma Myers MD    ranolazine 500 mg Oral BID Huma Myers MD    sodium chloride 50 mL/hr Intravenous Continuous Huma Myers MD Last Rate: 50 mL/hr (03/17/19 7535)   sodium chloride 50 mL/hr Intravenous Continuous Alexey Hendrix MD Last Rate: 50 mL/hr (03/18/19 1010)   ticagrelor 90 mg Oral Q12H [address] Huma Myers MD         Today, Patient Was Seen By: Huma Myers MD    ** Please Note: Dragon 360 Dictation voice to text software may have been used in the creation of this document   **

## 2019-03-19 VITALS
HEIGHT: 64 IN | TEMPERATURE: 97.6 F | BODY MASS INDEX: 25.56 KG/M2 | HEART RATE: 65 BPM | OXYGEN SATURATION: 99 % | RESPIRATION RATE: 18 BRPM | WEIGHT: 149.69 LBS | DIASTOLIC BLOOD PRESSURE: 56 MMHG | SYSTOLIC BLOOD PRESSURE: 103 MMHG

## 2019-03-19 LAB
ANION GAP SERPL CALCULATED.3IONS-SCNC: 10 MMOL/L (ref 4–13)
BUN SERPL-MCNC: 24 MG/DL (ref 5–25)
CALCIUM SERPL-MCNC: 9.1 MG/DL (ref 8.3–10.1)
CHLORIDE SERPL-SCNC: 105 MMOL/L (ref 100–108)
CO2 SERPL-SCNC: 25 MMOL/L (ref 21–32)
CREAT SERPL-MCNC: 1.26 MG/DL (ref 0.6–1.3)
ERYTHROCYTE [DISTWIDTH] IN BLOOD BY AUTOMATED COUNT: 13.9 % (ref 11.6–15.1)
GFR SERPL CREATININE-BSD FRML MDRD: 41 ML/MIN/1.73SQ M
GLUCOSE SERPL-MCNC: 96 MG/DL (ref 65–140)
HCT VFR BLD AUTO: 39.4 % (ref 34.8–46.1)
HGB BLD-MCNC: 12.6 G/DL (ref 11.5–15.4)
MAGNESIUM SERPL-MCNC: 2.3 MG/DL (ref 1.6–2.6)
MCH RBC QN AUTO: 26.4 PG (ref 26.8–34.3)
MCHC RBC AUTO-ENTMCNC: 32 G/DL (ref 31.4–37.4)
MCV RBC AUTO: 82 FL (ref 82–98)
PLATELET # BLD AUTO: 210 THOUSANDS/UL (ref 149–390)
PMV BLD AUTO: 10.1 FL (ref 8.9–12.7)
POTASSIUM SERPL-SCNC: 3.9 MMOL/L (ref 3.5–5.3)
RBC # BLD AUTO: 4.78 MILLION/UL (ref 3.81–5.12)
SODIUM SERPL-SCNC: 140 MMOL/L (ref 136–145)
WBC # BLD AUTO: 5.83 THOUSAND/UL (ref 4.31–10.16)

## 2019-03-19 PROCEDURE — 99232 SBSQ HOSP IP/OBS MODERATE 35: CPT | Performed by: INTERNAL MEDICINE

## 2019-03-19 PROCEDURE — 85027 COMPLETE CBC AUTOMATED: CPT | Performed by: INTERNAL MEDICINE

## 2019-03-19 PROCEDURE — 99239 HOSP IP/OBS DSCHRG MGMT >30: CPT | Performed by: HOSPITALIST

## 2019-03-19 PROCEDURE — 80048 BASIC METABOLIC PNL TOTAL CA: CPT | Performed by: INTERNAL MEDICINE

## 2019-03-19 PROCEDURE — 83735 ASSAY OF MAGNESIUM: CPT | Performed by: INTERNAL MEDICINE

## 2019-03-19 RX ORDER — LOSARTAN POTASSIUM 25 MG/1
25 TABLET ORAL DAILY
Status: DISCONTINUED | OUTPATIENT
Start: 2019-03-19 | End: 2019-03-19 | Stop reason: HOSPADM

## 2019-03-19 RX ORDER — AMLODIPINE BESYLATE 2.5 MG/1
2.5 TABLET ORAL EVERY EVENING
Status: DISCONTINUED | OUTPATIENT
Start: 2019-03-19 | End: 2019-03-19 | Stop reason: HOSPADM

## 2019-03-19 RX ORDER — AMLODIPINE BESYLATE 2.5 MG/1
2.5 TABLET ORAL DAILY
Status: DISCONTINUED | OUTPATIENT
Start: 2019-03-20 | End: 2019-03-19 | Stop reason: HOSPADM

## 2019-03-19 RX ADMIN — ISOSORBIDE MONONITRATE 60 MG: 60 TABLET, EXTENDED RELEASE ORAL at 08:57

## 2019-03-19 RX ADMIN — VITAMIN D, TAB 1000IU (100/BT) 1000 UNITS: 25 TAB at 08:41

## 2019-03-19 RX ADMIN — RANOLAZINE 500 MG: 500 TABLET, FILM COATED, EXTENDED RELEASE ORAL at 08:40

## 2019-03-19 RX ADMIN — METOPROLOL SUCCINATE 100 MG: 100 TABLET, FILM COATED, EXTENDED RELEASE ORAL at 08:41

## 2019-03-19 RX ADMIN — TICAGRELOR 90 MG: 90 TABLET ORAL at 08:41

## 2019-03-19 RX ADMIN — ASPIRIN 81 MG: 81 TABLET, COATED ORAL at 08:41

## 2019-03-19 RX ADMIN — Medication 1 TABLET: at 08:40

## 2019-03-19 RX ADMIN — LEVOTHYROXINE SODIUM 50 MCG: 50 TABLET ORAL at 05:37

## 2019-03-19 RX ADMIN — LOSARTAN POTASSIUM 25 MG: 25 TABLET, FILM COATED ORAL at 13:59

## 2019-03-19 RX ADMIN — HEPARIN SODIUM 5000 UNITS: 5000 INJECTION, SOLUTION INTRAVENOUS; SUBCUTANEOUS at 05:37

## 2019-03-19 RX ADMIN — Medication 1000 MG: at 08:40

## 2019-03-19 RX ADMIN — AMLODIPINE BESYLATE 2.5 MG: 2.5 TABLET ORAL at 08:41

## 2019-03-19 RX ADMIN — ACETYLCYSTEINE 1200 MG: 200 SOLUTION ORAL; RESPIRATORY (INHALATION) at 08:42

## 2019-03-19 RX ADMIN — HYDROXYCHLOROQUINE SULFATE 200 MG: 200 TABLET, FILM COATED ORAL at 08:40

## 2019-03-19 NOTE — PROGRESS NOTES
General Cardiology   Progress Note -  Team One   Anastasia Quiroga 77 y o  female MRN: [id number]    Unit/Bed#: -[facility] Encounter: [id number]    Assessment/ Plan:    NSTEMI Type 1: troponin peak 1 3; she is s/p cardiac cath yesterday with PCI to vein graft to OM 1 which was 90% stenosed  Post catheterization she is feeling well, no recurrent chest pain  Resuming dual anti-platelet therapy with aspirin and Brilinta ( no need to check cost per patient she has medication at home)   Repeat echocardiogram showed EF 35-40%; previously 40-45%; no ectopy on telemetry  Continue beta-blocker, renal function stable today will resume losartan  Continue Imdur and Ranexa  Right femoral catheterization site uncomplicated  Stable from a cardiac standpoint for discharge home today  Ischemic cardiomyopathy:  LVEF 35-40%  No clinical signs or symptoms of volume overload resume home diuretics and discharged  Dyslipidemia:  Continue high-intensity statin    Hypertension; blood pressure is well controlled, currently 103/56; continue Norvasc, metoprolol, Imdur  Chronic kidney disease:  Evaluated by Nephrology; creatinine at baseline today  Will resume ARB  Chronic angina:  Imdur or Ranexa    Subjective: Pt seen for follow up, no events overnight; She is feeling well today; no recurrent chest pain  No SOB, LE edema or orthopnea  No right groin pain  Review of Systems   Constitution: Negative for decreased appetite and fever  Cardiovascular: Negative for chest pain, dyspnea on exertion, leg swelling, orthopnea, palpitations and syncope  Respiratory: Negative for cough, shortness of breath and wheezing  Musculoskeletal: Negative for falls  Gastrointestinal: Negative for bloating, nausea and vomiting  Genitourinary: Negative for dysuria  Neurological: Negative for dizziness and weakness  Psychiatric/Behavioral: Negative for altered mental status     All other systems reviewed and are negative  Objective:   Vitals: Blood pressure 103/56, pulse 65, temperature 97 6 °F (36 4 °C), temperature source Oral, resp  rate 18, height 5' 4" (1 626 m), weight 67 9 kg (149 lb 11 1 oz), SpO2 99 %, not currently breastfeeding ,     Body mass index is 25 69 kg/m²  ,     Systolic (18GQN), OFO:296 , Min:103 , DAK:544     Diastolic (26PCA), ZYU:02, Min:56, Max:69      Intake/Output Summary (Last 24 hours) at 3/19/2019 1134  Last data filed at 3/19/2019 0900  Gross per 24 hour   Intake 460 ml   Output    Net 460 ml     Weight (last 2 days)     Date/Time   Weight    03/17/19 1742   67 9 (149 69)            Telemetry Review:   Sinus rhythm, no significant events    Physical Exam   Constitutional: She is oriented to person, place, and time  No distress  Patient is sitting up in bed in NAD alert pleasant cooperative   HENT:   Head: Normocephalic and atraumatic  Cardiovascular: Normal rate, regular rhythm, S1 normal and S2 normal    No murmur heard  No lower extremity edema  Right femoral cath site wnl; soft, NT, no hematoma or bleeding   Pulmonary/Chest: Effort normal and breath sounds normal  No respiratory distress  She has no wheezes  She has no rales  Abdominal: Soft  Musculoskeletal: She exhibits no edema  Neurological: She is alert and oriented to person, place, and time  Skin: Skin is dry  She is not diaphoretic  Psychiatric: She has a normal mood and affect  Her behavior is normal    Nursing note and vitals reviewed      LABORATORY RESULTS  Results from last 7 days   Lab Units 03/16/19  0402 03/15/19  0149 03/14/19  2031   TROPONIN I ng/mL 0 42* 0 97* 1 30*     CBC with diff: Results from last 7 days   Lab Units 03/19/19  0840 03/18/19  0626 03/15/19  0931 03/14/19  0918   WBC Thousand/uL 5 83 6 44 8 96 9 13   HEMOGLOBIN g/dL 12 6 12 0 11 9 12 4   HEMATOCRIT % 39 4 37 1 37 9 40 4   MCV fL 82 82 84 85   PLATELETS Thousands/uL 210 203 239 252   MCH pg 26 4* 26 4* 26 3* 26 0*   MCHC g/dL 32 0 32 3 31 4 30 7*   RDW % 13 9 13 9 13 8 13 4   MPV fL 10 1 9 7 10 2 10 0   NRBC AUTO /100 WBCs  --   --   --  0     CMP:  Results from last 7 days   Lab Units 03/19/19  0840 03/18/19  0626 03/17/19  0728 03/16/19  0402 03/15/19  0931 03/14/19  0918   POTASSIUM mmol/L 3 9 4 3 4 0 4 0 3 9 4 4   CHLORIDE mmol/L 105 106 104 103 101 104   CO2 mmol/L 25 26 25 27 29 26   BUN mg/dL 24 28* 29* 31* 24 20   CREATININE mg/dL 1 26 1 16 1 44* 1 47* 1 33* 1 06   CALCIUM mg/dL 9 1 9 1 9 2 9 2 9 3 9 1   AST U/L  --  24  --   --  25 31   ALT U/L  --  29  --   --  26 24   ALK PHOS U/L  --  83  --   --  81 82   EGFR ml/min/1 73sq m 41 45 35 34 38 50     BMP:  Results from last 7 days   Lab Units 03/19/19  0840 03/18/19  0626 03/17/19  0728 03/16/19  0402 03/15/19  0931 03/14/19  0918   POTASSIUM mmol/L 3 9 4 3 4 0 4 0 3 9 4 4   CHLORIDE mmol/L 105 106 104 103 101 104   CO2 mmol/L 25 26 25 27 29 26   BUN mg/dL 24 28* 29* 31* 24 20   CREATININE mg/dL 1 26 1 16 1 44* 1 47* 1 33* 1 06   CALCIUM mg/dL 9 1 9 1 9 2 9 2 9 3 9 1     Lab Results   Component Value Date    NTBNP 555 (H) 07/30/2017     Results from last 7 days   Lab Units 03/19/19  0840   MAGNESIUM mg/dL 2 3     Results from last 7 days   Lab Units 03/14/19  0918   INR  0 92     Lipid Profile:   No results found for: CHOL  Lab Results   Component Value Date    HDL 59 02/26/2019    HDL 37 (L) 10/17/2018    HDL 55 10/09/2017     Lab Results   Component Value Date    LDLCALC 139 (H) 02/26/2019    LDLCALC 102 (H) 10/17/2018    LDLCALC 92 10/09/2017     Lab Results   Component Value Date    TRIG 162 (H) 02/26/2019    TRIG 159 (H) 10/17/2018    TRIG 177 (H) 10/09/2017     Cardiac testing:   Results for orders placed during the hospital encounter of 03/14/19   Echo complete with contrast if indicated    [address]  [phone]    Transthoracic Echocardiogram  2D, M-mode, Doppler, and Color Doppler    Study date: 15-Mar-2019    Patient: Campos Aburto  MR number: [id number]  Account number: [de-identified]  : 1940  Age: 77 years  Gender: Female  Status: Inpatient  Location: Bedside  Height: 64 in  Weight: 144 8 lb  BP: 100/ 54 mmHg    Indications: MI    Diagnoses: I25 83 - Coronary atherosclerosis due to lipid rich plaque    Sonographer:  OCTAVIO Mazariegos  Primary Physician:  Ileana Márquez MD  Referring Physician:  MAYUR Laura  [facility]  [facility]  Interpreting Physician:  Archana Parikh MD    SUMMARY    LEFT VENTRICLE:  Systolic function was moderately to markedly reduced  Ejection fraction was estimated in the range of 35 % to 40 %  There was moderate hypokinesis of the basal-mid inferolateral wall(s)  There was of the basal inferior wall(s)  There was severe hypokinesis of the basal-mid anterolateral wall(s)  Wall thickness was mildly increased  Doppler parameters were consistent with abnormal left ventricular relaxation (grade 1 diastolic dysfunction)  LEFT ATRIUM:  The atrium was mildly dilated  RIGHT ATRIUM:  The atrium was mildly dilated  MITRAL VALVE:  There was trace regurgitation  AORTIC VALVE:  Transaortic velocity was increased due to valvular stenosis  There was mild stenosis  Valve mean gradient was 10 mmHg  TRICUSPID VALVE:  There was trace regurgitation  IVC, HEPATIC VEINS:  The inferior vena cava was normal in size  HISTORY: PRIOR HISTORY: HTN, RA, CKD, CHF, CABG, CAD    PROCEDURE: The procedure was performed at the bedside  This was a routine study  The transthoracic approach was used  The study included complete 2D imaging, M-mode, complete spectral Doppler, and color Doppler  The heart rate was 70 bpm,  at the start of the study  Image quality was adequate  LEFT VENTRICLE: Size was normal  Systolic function was moderately to markedly reduced  Ejection fraction was estimated in the range of 35 % to 40 %   There was moderate hypokinesis of the basal-mid inferolateral wall(s)  There was of the  basal inferior wall(s)  There was severe hypokinesis of the basal-mid anterolateral wall(s)  Wall thickness was mildly increased  No evidence of apical thrombus  DOPPLER: Doppler parameters were consistent with abnormal left ventricular  relaxation (grade 1 diastolic dysfunction)  RIGHT VENTRICLE: The size was normal  Systolic function was normal  Wall thickness was normal     LEFT ATRIUM: The atrium was mildly dilated  RIGHT ATRIUM: The atrium was mildly dilated  MITRAL VALVE: There was mild thickening  There was normal leaflet separation  DOPPLER: The transmitral velocity was within the normal range  There was no evidence for stenosis  There was trace regurgitation  AORTIC VALVE: The valve was probably trileaflet  Leaflets exhibited mildly increased thickness, mild calcification, normal cuspal separation, and sclerosis  DOPPLER: Transaortic velocity was increased due to valvular stenosis  There was  mild stenosis  There was no significant regurgitation  TRICUSPID VALVE: The valve structure was normal  There was normal leaflet separation  DOPPLER: The transtricuspid velocity was within the normal range  There was no evidence for stenosis  There was trace regurgitation  PULMONIC VALVE: Leaflets exhibited normal thickness, no calcification, and normal cuspal separation  DOPPLER: The transpulmonic velocity was within the normal range  There was no significant regurgitation  PERICARDIUM: There was no pericardial effusion  The pericardium was normal in appearance  AORTA: The root exhibited normal size  SYSTEMIC VEINS: IVC: The inferior vena cava was normal in size      MEASUREMENT TABLES    DOPPLER MEASUREMENTS  Aortic valve   (Reference normals)  Peak negrito   241 cm/s   (--)  Peak gradient   23 mmHg   (--)  Mean gradient   10 mmHg   (--)    SYSTEM MEASUREMENT TABLES    2D mode  AoR Diam 2D: 3 cm  LA Diam (2D): 4 3 cm  LA/Ao (2D): 1 43  FS (2D Teich): 15 %  IVSd (2D): 1 1 cm  LVDEV: 122 cm³  LVESV: 83 1 cm³  LVIDd(2D): 5 1 cm  LVISd (2D): 4 3 cm  LVOT Area 2D: 2 84 cm squared  LVPWd (2D): 1 05 cm  SV (Teich): 38 9 cm³    Apical four chamber  LVEF A4C: 40 %    Unspecified Scan Mode  ANTONIO Cont Eq (Peak Jonah): 1 28 cm squared  ANTONIO Cont Eq (VTI): 1 36 cm squared  LVOT (VTI): 25 8 cm  LVOT Diam : 1 9 cm  LVOT Vmax: 1080 mm/s  LVOT Vmax; Mean: 1080 mm/s  Peak Grad ; Mean: 5 mm[Hg]  SV (LVOT): 73 cm³  VTI;Mean: 2 mm[Hg]  MV Peak A Jonah: 617 mm/s  MV Peak E Jonah   Mean: 487 mm/s  MVA (PHT): 3 06 cm squared  PHT: 72 ms  Max P mm[Hg]  V Max: 2330 mm/s  Vmax: 2330 mm/s  RA Area: 18 5 cm squared  RA Volume: 48 5 cm³  TAPSE: 1 7 cm    [facility]    Prepared and electronically signed by    Krystian Waters MD  Signed 15-Mar-2019 13:28:08       Meds/Allergies   current meds:   Current Facility-Administered Medications   Medication Dose Route Frequency    acetaminophen (TYLENOL) tablet 650 mg  650 mg Oral Q6H PRN    acetylcysteine (MUCOMYST) 200 mg/mL oral solution 1,200 mg  1,200 mg Oral Q12H [facility]    [START ON 3/20/2019] amLODIPine (NORVASC) tablet 2 5 mg  2 5 mg Oral Daily    And    amLODIPine (NORVASC) tablet 2 5 mg  2 5 mg Oral QPM    aspirin (ECOTRIN LOW STRENGTH) EC tablet 81 mg  81 mg Oral Daily    cholecalciferol (VITAMIN D3) tablet 1,000 Units  1,000 Units Oral Daily    docusate sodium (COLACE) capsule 100 mg  100 mg Oral BID PRN    fish oil capsule 1,000 mg  1,000 mg Oral BID    heparin (porcine) subcutaneous injection 5,000 Units  5,000 Units Subcutaneous Q8H [facility]    hydrALAZINE (APRESOLINE) injection 10 mg  10 mg Intravenous Q4H PRN    HYDROcodone-acetaminophen (NORCO) 5-325 mg per tablet 1 tablet  1 tablet Oral Q4H PRN    hydroxychloroquine (PLAQUENIL) tablet 200 mg  200 mg Oral Daily    isosorbide mononitrate (IMDUR) 24 hr tablet 60 mg  60 mg Oral Daily    levothyroxine tablet 50 mcg  50 mcg Oral Early Morning    metoprolol succinate (TOPROL-XL) 24 hr tablet 100 mg  100 mg Oral Daily    And    metoprolol succinate (TOPROL-XL) 24 hr tablet 50 mg  50 mg Oral QPM    Mirabegron ER TB24 25 mg  25 mg Oral Every Other Day    morphine injection 2 mg  2 mg Intravenous Q4H PRN    multivitamin-minerals (CENTRUM) tablet 1 tablet  1 tablet Oral Daily    nitroglycerin (NITROSTAT) SL tablet 0 4 mg  0 4 mg Sublingual Q5 Min PRN    ondansetron (ZOFRAN) injection 4 mg  4 mg Intravenous Q4H PRN    ranolazine (RANEXA) 12 hr tablet 500 mg  500 mg Oral BID    sodium chloride 0 9 % infusion  50 mL/hr Intravenous Continuous    ticagrelor (BRILINTA) tablet 90 mg  90 mg Oral Q12H ANDREW     Medications Prior to Admission   Medication    amLODIPine (NORVASC) 2 5 mg tablet    Ascorbic Acid (VITAMIN C) 1000 MG tablet    aspirin (ECOTRIN LOW STRENGTH) 81 mg EC tablet    Coenzyme Q10 10 MG capsule    hydroxychloroquine (PLAQUENIL) 200 mg tablet    Icosapent Ethyl 1 g CAPS    isosorbide mononitrate (IMDUR) 60 mg 24 hr tablet    levothyroxine 50 mcg tablet    losartan (COZAAR) 25 mg tablet    metoprolol succinate (TOPROL-XL) 100 mg 24 hr tablet    Mirabegron (MYRBETRIQ PO)    Multiple Vitamins-Minerals (CENTRUM SILVER PO)    nitroglycerin (NITROLINGUAL) 0 4 mg/spray spray    ranolazine (RANEXA) 500 mg 12 hr tablet    spironolactone (ALDACTONE) 25 mg tablet    torsemide (DEMADEX) 10 mg tablet    Vitamin D, Cholecalciferol, 1000 units CAPS       sodium chloride 50 mL/hr Last Rate: 50 mL/hr (03/17/19 3517)     Assessment:  Principal Problem:    Acute non-ST elevation myocardial infarction (NSTEMI) ([facility] [address] )  Active Problems:    Benign hypertension with chronic kidney disease, stage III (HCC)    Chronic systolic congestive heart failure (HCC)    Counseling / Coordination of Care  Total floor / unit time spent today 20 minutes    Greater than 50% of total time was spent with the patient and / or family counseling and / or coordination of care  ** Please Note: Dragon 360 Dictation voice to text software may have been used in the creation of this document   **

## 2019-03-19 NOTE — PLAN OF CARE
Problem: Potential for Falls  Goal: Patient will remain free of falls  Description  INTERVENTIONS:  - Assess patient frequently for physical needs  -  Identify cognitive and physical deficits and behaviors that affect risk of falls  -  [place] fall precautions as indicated by assessment   - Educate patient/family on patient safety including physical limitations  - Instruct patient to call for assistance with activity based on assessment  - Modify environment to reduce risk of injury  - Consider OT/PT consult to assist with strengthening/mobility  Outcome: Progressing     Problem: Nutrition/Hydration-ADULT  Goal: Nutrient/Hydration intake appropriate for improving, restoring or maintaining nutritional needs  Description  Monitor and assess patient's nutrition/hydration status for malnutrition (ex- brittle hair, bruises, dry skin, pale skin and conjunctiva, muscle wasting, smooth red tongue, and disorientation)  Collaborate with interdisciplinary team and initiate plan and interventions as ordered  Monitor patient's weight and dietary intake as ordered or per policy  Utilize nutrition screening tool and intervene per policy  Determine patient's food preferences and provide high-protein, high-caloric foods as appropriate       INTERVENTIONS:  - Monitor oral intake, urinary output, labs, and treatment plans  - Assess nutrition and hydration status and recommend course of action  - Evaluate amount of meals eaten  - Assist patient with eating if necessary   - Allow adequate time for meals  - Recommend/ encourage appropriate diets, oral nutritional supplements, and vitamin/mineral supplements  - Order, calculate, and assess calorie counts as needed  - Recommend, monitor, and adjust tube feedings and TPN/PPN based on assessed needs  - Assess need for intravenous fluids  - Provide specific nutrition/hydration education as appropriate  - Include patient/family/caregiver in decisions related to nutrition  Outcome: Progressing     Problem: PAIN - ADULT  Goal: Verbalizes/displays adequate comfort level or baseline comfort level  Description  Interventions:  - Encourage patient to monitor pain and request assistance  - Assess pain using appropriate pain scale  - Administer analgesics based on type and severity of pain and evaluate response  - Implement non-pharmacological measures as appropriate and evaluate response  - Consider cultural and social influences on pain and pain management  - Notify physician/advanced practitioner if interventions unsuccessful or patient reports new pain  Outcome: Progressing     Problem: INFECTION - ADULT  Goal: Absence or prevention of progression during hospitalization  Description  INTERVENTIONS:  - Assess and monitor for signs and symptoms of infection  - Monitor lab/diagnostic results  - Monitor all insertion sites, i e  indwelling lines, tubes, and drains  - Monitor endotracheal (as able) and nasal secretions for changes in amount and color  - [place] appropriate cooling/warming therapies per order  - Administer medications as ordered  - Instruct and encourage patient and family to use good hand hygiene technique  - Identify and instruct in appropriate isolation precautions for identified infection/condition  Outcome: Progressing     Problem: SAFETY ADULT  Goal: Patient will remain free of falls  Description  INTERVENTIONS:  - Assess patient frequently for physical needs  -  Identify cognitive and physical deficits and behaviors that affect risk of falls    -  [place] fall precautions as indicated by assessment   - Educate patient/family on patient safety including physical limitations  - Instruct patient to call for assistance with activity based on assessment  - Modify environment to reduce risk of injury  - Consider OT/PT consult to assist with strengthening/mobility  Outcome: Progressing  Goal: Maintain or return to baseline ADL function  Description  INTERVENTIONS:  -  Assess patient's ability to carry out ADLs; assess patient's baseline for ADL function and identify physical deficits which impact ability to perform ADLs (bathing, care of mouth/teeth, toileting, grooming, dressing, etc )  - Assess/evaluate cause of self-care deficits   - Assess range of motion  - Assess patient's mobility; develop plan if impaired  - Assess patient's need for assistive devices and provide as appropriate  - Encourage maximum independence but intervene and supervise when necessary  ¯ Involve family in performance of ADLs  ¯ Assess for home care needs following discharge   ¯ Request OT consult to assist with ADL evaluation and planning for discharge  ¯ Provide patient education as appropriate  Outcome: Progressing  Goal: Maintain or return mobility status to optimal level  Description  INTERVENTIONS:  - Assess patient's baseline mobility status (ambulation, transfers, stairs, etc )    - Identify cognitive and physical deficits and behaviors that affect mobility  - Identify mobility aids required to assist with transfers and/or ambulation (gait belt, sit-to-stand, lift, walker, cane, etc )  - [place] fall precautions as indicated by assessment  - Record patient progress and toleration of activity level on Mobility SBAR; progress patient to next Phase/Stage  - Instruct patient to call for assistance with activity based on assessment  - Request Rehabilitation consult to assist with strengthening/weightbearing, etc   Outcome: Progressing     Problem: DISCHARGE PLANNING  Goal: Discharge to home or other facility with appropriate resources  Description  INTERVENTIONS:  - Identify barriers to discharge w/patient and caregiver  - Arrange for needed discharge resources and transportation as appropriate  - Identify discharge learning needs (meds, wound care, etc )  - Arrange for interpretive services to assist at discharge as needed  - Refer to Case Management Department for coordinating discharge planning if the patient needs post-hospital services based on physician/advanced practitioner order or complex needs related to functional status, cognitive ability, or social support system  Outcome: Progressing     Problem: Knowledge Deficit  Goal: Patient/family/caregiver demonstrates understanding of disease process, treatment plan, medications, and discharge instructions  Description  Complete learning assessment and assess knowledge base  Interventions:  - Provide teaching at level of understanding  - Provide teaching via preferred learning methods  Outcome: Progressing     Problem: CARDIOVASCULAR - ADULT  Goal: Maintains optimal cardiac output and hemodynamic stability  Description  INTERVENTIONS:  - Monitor I/O, vital signs and rhythm  - Monitor for S/S and trends of decreased cardiac output i e  bleeding, hypotension  - Administer and titrate ordered vasoactive medications to optimize hemodynamic stability  - Assess quality of pulses, skin color and temperature  - Assess for signs of decreased coronary artery perfusion - ex   Angina  - Instruct patient to report change in severity of symptoms  Outcome: Progressing  Goal: Absence of cardiac dysrhythmias or at baseline rhythm  Description  INTERVENTIONS:  - Continuous cardiac monitoring, monitor vital signs, obtain 12 lead EKG if indicated  - Administer antiarrhythmic and heart rate control medications as ordered  - Monitor electrolytes and administer replacement therapy as ordered  Outcome: Progressing

## 2019-03-19 NOTE — PLAN OF CARE
Problem: Potential for Falls  Goal: Patient will remain free of falls  Description  INTERVENTIONS:  - Assess patient frequently for physical needs  -  Identify cognitive and physical deficits and behaviors that affect risk of falls  -  [place] fall precautions as indicated by assessment   - Educate patient/family on patient safety including physical limitations  - Instruct patient to call for assistance with activity based on assessment  - Modify environment to reduce risk of injury  - Consider OT/PT consult to assist with strengthening/mobility  Outcome: Adequate for Discharge     Problem: Nutrition/Hydration-ADULT  Goal: Nutrient/Hydration intake appropriate for improving, restoring or maintaining nutritional needs  Description  Monitor and assess patient's nutrition/hydration status for malnutrition (ex- brittle hair, bruises, dry skin, pale skin and conjunctiva, muscle wasting, smooth red tongue, and disorientation)  Collaborate with interdisciplinary team and initiate plan and interventions as ordered  Monitor patient's weight and dietary intake as ordered or per policy  Utilize nutrition screening tool and intervene per policy  Determine patient's food preferences and provide high-protein, high-caloric foods as appropriate       INTERVENTIONS:  - Monitor oral intake, urinary output, labs, and treatment plans  - Assess nutrition and hydration status and recommend course of action  - Evaluate amount of meals eaten  - Assist patient with eating if necessary   - Allow adequate time for meals  - Recommend/ encourage appropriate diets, oral nutritional supplements, and vitamin/mineral supplements  - Order, calculate, and assess calorie counts as needed  - Recommend, monitor, and adjust tube feedings and TPN/PPN based on assessed needs  - Assess need for intravenous fluids  - Provide specific nutrition/hydration education as appropriate  - Include patient/family/caregiver in decisions related to nutrition  Outcome: Adequate for Discharge     Problem: PAIN - ADULT  Goal: Verbalizes/displays adequate comfort level or baseline comfort level  Description  Interventions:  - Encourage patient to monitor pain and request assistance  - Assess pain using appropriate pain scale  - Administer analgesics based on type and severity of pain and evaluate response  - Implement non-pharmacological measures as appropriate and evaluate response  - Consider cultural and social influences on pain and pain management  - Notify physician/advanced practitioner if interventions unsuccessful or patient reports new pain  Outcome: Adequate for Discharge     Problem: INFECTION - ADULT  Goal: Absence or prevention of progression during hospitalization  Description  INTERVENTIONS:  - Assess and monitor for signs and symptoms of infection  - Monitor lab/diagnostic results  - Monitor all insertion sites, i e  indwelling lines, tubes, and drains  - Monitor endotracheal (as able) and nasal secretions for changes in amount and color  - [place] appropriate cooling/warming therapies per order  - Administer medications as ordered  - Instruct and encourage patient and family to use good hand hygiene technique  - Identify and instruct in appropriate isolation precautions for identified infection/condition  Outcome: Adequate for Discharge     Problem: SAFETY ADULT  Goal: Patient will remain free of falls  Description  INTERVENTIONS:  - Assess patient frequently for physical needs  -  Identify cognitive and physical deficits and behaviors that affect risk of falls    -  [place] fall precautions as indicated by assessment   - Educate patient/family on patient safety including physical limitations  - Instruct patient to call for assistance with activity based on assessment  - Modify environment to reduce risk of injury  - Consider OT/PT consult to assist with strengthening/mobility  Outcome: Adequate for Discharge  Goal: Maintain or return to baseline ADL function  Description  INTERVENTIONS:  -  Assess patient's ability to carry out ADLs; assess patient's baseline for ADL function and identify physical deficits which impact ability to perform ADLs (bathing, care of mouth/teeth, toileting, grooming, dressing, etc )  - Assess/evaluate cause of self-care deficits   - Assess range of motion  - Assess patient's mobility; develop plan if impaired  - Assess patient's need for assistive devices and provide as appropriate  - Encourage maximum independence but intervene and supervise when necessary  ¯ Involve family in performance of ADLs  ¯ Assess for home care needs following discharge   ¯ Request OT consult to assist with ADL evaluation and planning for discharge  ¯ Provide patient education as appropriate  Outcome: Adequate for Discharge  Goal: Maintain or return mobility status to optimal level  Description  INTERVENTIONS:  - Assess patient's baseline mobility status (ambulation, transfers, stairs, etc )    - Identify cognitive and physical deficits and behaviors that affect mobility  - Identify mobility aids required to assist with transfers and/or ambulation (gait belt, sit-to-stand, lift, walker, cane, etc )  - [place] fall precautions as indicated by assessment  - Record patient progress and toleration of activity level on Mobility SBAR; progress patient to next Phase/Stage  - Instruct patient to call for assistance with activity based on assessment  - Request Rehabilitation consult to assist with strengthening/weightbearing, etc   Outcome: Adequate for Discharge     Problem: DISCHARGE PLANNING  Goal: Discharge to home or other facility with appropriate resources  Description  INTERVENTIONS:  - Identify barriers to discharge w/patient and caregiver  - Arrange for needed discharge resources and transportation as appropriate  - Identify discharge learning needs (meds, wound care, etc )  - Arrange for interpretive services to assist at discharge as needed  - Refer to Case Management Department for coordinating discharge planning if the patient needs post-hospital services based on physician/advanced practitioner order or complex needs related to functional status, cognitive ability, or social support system  Outcome: Adequate for Discharge     Problem: Knowledge Deficit  Goal: Patient/family/caregiver demonstrates understanding of disease process, treatment plan, medications, and discharge instructions  Description  Complete learning assessment and assess knowledge base  Interventions:  - Provide teaching at level of understanding  - Provide teaching via preferred learning methods  Outcome: Adequate for Discharge     Problem: CARDIOVASCULAR - ADULT  Goal: Maintains optimal cardiac output and hemodynamic stability  Description  INTERVENTIONS:  - Monitor I/O, vital signs and rhythm  - Monitor for S/S and trends of decreased cardiac output i e  bleeding, hypotension  - Administer and titrate ordered vasoactive medications to optimize hemodynamic stability  - Assess quality of pulses, skin color and temperature  - Assess for signs of decreased coronary artery perfusion - ex   Angina  - Instruct patient to report change in severity of symptoms  Outcome: Adequate for Discharge  Goal: Absence of cardiac dysrhythmias or at baseline rhythm  Description  INTERVENTIONS:  - Continuous cardiac monitoring, monitor vital signs, obtain 12 lead EKG if indicated  - Administer antiarrhythmic and heart rate control medications as ordered  - Monitor electrolytes and administer replacement therapy as ordered  Outcome: Adequate for Discharge

## 2019-03-19 NOTE — PROGRESS NOTES
Progress Note - Nephrology   Anthony Montelongo 77 y o  female MRN: [id number]  Unit/Bed#: -[facility] Encounter: [id number]    ASSESSMENT AND PLAN:  59-year-old female with history of chronic kidney disease, coronary artery disease who presented to [facility] for chest pain on March 14th and was to have found to have a NSTEMI type 1, she was transferred to [facility] for cardiac catheterization and further monitoring and treatment  Nephrology consulted for underlying chronic kidney disease with a recent contrast study  1  CKD stage 3:  Baseline creatinine is 1 2-1 5  Status post cardiac catheterization yesterday  -  awaiting today's labs  If stable and at baseline patient can be discharged from my perspective/renal perspective  -IF DISCHARGED PATIENT WILL REQUIRE BASIC METABOLIC PROFILE IN 1 WEEK    2  Hypertension:  Labile  Observe for now with no medication changes  Hold parameters to avoid hypoperfusion  3  Electrolytes: All acceptable as of yesterday  Check today's labs  4  Cardiac:  Ischemic cardiomyopathy with an ejection fraction 45%, acute NSTEMI type 1, cardiac catheterization with  -March 18, cardiac catheterization showed 100% stenosis at the proximal LAD, she was found to have significant triple-vessel coronary artery disease, the SVG vein to the RCA was 100% occluded which is not new, SVG to OM1 90% stenosis was stented            Subjective: The patient is asymptomatic today  No chest pain or shortness of Breath per no nausea vomiting or diarrhea  Status post cardiac catheterization yesterday with successful stent placement  She denies any urinary symptoms  Objective:     Vitals: Blood pressure 142/67, pulse 60, temperature 97 6 °F (36 4 °C), temperature source Oral, resp  rate 18, height 5' 4" (1 626 m), weight 67 9 kg (149 lb 11 1 oz), SpO2 99 %, not currently breastfeeding  ,Body mass index is 25 69 kg/m²      Weight (last 2 days)     Date/Time Weight    03/17/19 1742   67 9 (149 69)                Intake/Output Summary (Last 24 hours) at 3/19/2019 0910  Last data filed at 3/18/2019 1900  Gross per 24 hour   Intake 240 ml   Output    Net 240 ml       Urethral Catheter Non-latex 16 Fr   (Active)       Physical Exam: General:  No acute distress  Skin:  No acute rash  Eyes:  No scleral icterus  ENT:  Moist mucous membranes  Neck:  Supple, no jugular venous distention  Chest:  Clear to auscultation  CVS:  No rubs or gallops appreciable  Abdomen:  Soft and nontender with normal bowel sounds  Extremities:  No edema and no cyanosis; right groin is clean and dry and no bleeding  Neuro:  No gross focality  Psych:  Alert and oriented                Medications:    Scheduled Meds:  Current Facility-Administered Medications:  acetaminophen 650 mg Oral Q6H PRN Luis Eduardo Smith MD    acetylcysteine 1,200 mg Oral Q12H [facility] Luis Eduardo Smith MD    amLODIPine 2 5 mg Oral Daily Luis Eduardo Smith MD    And        amLODIPine 2 5 mg Oral QPM Luis Eduardo Smith MD    aspirin 81 mg Oral Daily Luis Eduardo Smith MD    cholecalciferol 1,000 Units Oral Daily Luis Eduardo Smith MD    docusate sodium 100 mg Oral BID PRN Luis Eduardo Smith MD    fish oil 1,000 mg Oral BID Luis Eduardo Smith MD    heparin (porcine) 5,000 Units Subcutaneous Q8H [facility] Luis Eduardo Smith MD    hydrALAZINE 10 mg Intravenous Q4H PRN Luis Eduardo Smith MD    HYDROcodone-acetaminophen 1 tablet Oral Q4H PRN Luis Eduardo Smith MD    hydroxychloroquine 200 mg Oral Daily Luis Eduardo Smith MD    isosorbide mononitrate 60 mg Oral Daily Luis Eduardo Smith MD    levothyroxine 50 mcg Oral Early Morning Luis Eduardo Smith MD    metoprolol succinate 100 mg Oral Daily Luis Eduardo Smith MD    And        metoprolol succinate 50 mg Oral QPM Luis Eduardo Smith MD    Mirabegron ER 25 mg Oral Every Other Day Luis Eduardo Smith MD    morphine injection 2 mg Intravenous Q4H PRN Luis Eduardo Smith MD    multivitamin-minerals 1 tablet Oral Daily Luis Eduardo Smith MD    nitroglycerin 0 4 mg Sublingual Q5 Min PRN Sharmaine Ricci MD    ondansetron 4 mg Intravenous Q4H PRN Sharmaine Ricci MD    ranolazine 500 mg Oral BID Sharmaine Ricci MD    sodium chloride 50 mL/hr Intravenous Continuous Burnetta Place, MD Last Rate: 50 mL/hr (03/17/19 2254)   sodium chloride 50 mL/hr Intravenous Continuous Lucero Chaves MD Last Rate: 50 mL/hr (03/18/19 1724)   ticagrelor 90 mg Oral Q12H [address] Sharmaine Ricci MD        PRN Meds:   acetaminophen    docusate sodium    hydrALAZINE    HYDROcodone-acetaminophen    morphine injection    nitroglycerin    ondansetron    Continuous Infusions:  sodium chloride 50 mL/hr Last Rate: 50 mL/hr (03/17/19 2254)   sodium chloride 50 mL/hr Last Rate: 50 mL/hr (03/18/19 1724)       Lab, Imaging and other studies: I have personally reviewed pertinent labs    Laboratory Results:  Results from last 7 days   Lab Units 03/19/19  0840 03/18/19  0626 03/17/19  0728 03/16/19  0402 03/15/19  0931 03/14/19  0918   WBC Thousand/uL 5 83 6 44  --   --  8 96 9 13   HEMOGLOBIN g/dL 12 6 12 0  --   --  11 9 12 4   HEMATOCRIT % 39 4 37 1  --   --  37 9 40 4   PLATELETS Thousands/uL 210 203  --   --  239 252   POTASSIUM mmol/L  --  4 3 4 0 4 0 3 9 4 4   CHLORIDE mmol/L  --  106 104 103 101 104   CO2 mmol/L  --  26 25 27 29 26   BUN mg/dL  --  28* 29* 31* 24 20   CREATININE mg/dL  --  1 16 1 44* 1 47* 1 33* 1 06   CALCIUM mg/dL  --  9 1 9 2 9 2 9 3 9 1     Urinalysis: Lab Results   Component Value Date    COLORU Yellow 12/11/2018    CLARITYU Slightly Cloudy 12/11/2018    SPECGRAV 1 015 12/11/2018    PHUR 7 0 12/11/2018    LEUKOCYTESUR Moderate (A) 12/11/2018    NITRITE Negative 12/11/2018    GLUCOSEU Negative 12/11/2018    KETONESU Negative 12/11/2018    BILIRUBINUR Negative 12/11/2018    BLOODU Negative 12/11/2018     ABGs: No results found for: [facility]  Radiology review:     Portions of the record may have been created with voice recognition software   Occasional wrong word or "sound a like" substitutions may have occurred due to the inherent limitations of voice recognition software   Read the chart carefully and recognize, using context, where substitutions have occurred

## 2019-03-19 NOTE — PLAN OF CARE
Problem: Potential for Falls  Goal: Patient will remain free of falls  Description  INTERVENTIONS:  - Assess patient frequently for physical needs  -  Identify cognitive and physical deficits and behaviors that affect risk of falls  -  [place] fall precautions as indicated by assessment   - Educate patient/family on patient safety including physical limitations  - Instruct patient to call for assistance with activity based on assessment  - Modify environment to reduce risk of injury  - Consider OT/PT consult to assist with strengthening/mobility  Outcome: Progressing     Problem: Nutrition/Hydration-ADULT  Goal: Nutrient/Hydration intake appropriate for improving, restoring or maintaining nutritional needs  Description  Monitor and assess patient's nutrition/hydration status for malnutrition (ex- brittle hair, bruises, dry skin, pale skin and conjunctiva, muscle wasting, smooth red tongue, and disorientation)  Collaborate with interdisciplinary team and initiate plan and interventions as ordered  Monitor patient's weight and dietary intake as ordered or per policy  Utilize nutrition screening tool and intervene per policy  Determine patient's food preferences and provide high-protein, high-caloric foods as appropriate       INTERVENTIONS:  - Monitor oral intake, urinary output, labs, and treatment plans  - Assess nutrition and hydration status and recommend course of action  - Evaluate amount of meals eaten  - Assist patient with eating if necessary   - Allow adequate time for meals  - Recommend/ encourage appropriate diets, oral nutritional supplements, and vitamin/mineral supplements  - Order, calculate, and assess calorie counts as needed  - Recommend, monitor, and adjust tube feedings and TPN/PPN based on assessed needs  - Assess need for intravenous fluids  - Provide specific nutrition/hydration education as appropriate  - Include patient/family/caregiver in decisions related to nutrition  Outcome: Progressing     Problem: PAIN - ADULT  Goal: Verbalizes/displays adequate comfort level or baseline comfort level  Description  Interventions:  - Encourage patient to monitor pain and request assistance  - Assess pain using appropriate pain scale  - Administer analgesics based on type and severity of pain and evaluate response  - Implement non-pharmacological measures as appropriate and evaluate response  - Consider cultural and social influences on pain and pain management  - Notify physician/advanced practitioner if interventions unsuccessful or patient reports new pain  Outcome: Progressing     Problem: INFECTION - ADULT  Goal: Absence or prevention of progression during hospitalization  Description  INTERVENTIONS:  - Assess and monitor for signs and symptoms of infection  - Monitor lab/diagnostic results  - Monitor all insertion sites, i e  indwelling lines, tubes, and drains  - Monitor endotracheal (as able) and nasal secretions for changes in amount and color  - [place] appropriate cooling/warming therapies per order  - Administer medications as ordered  - Instruct and encourage patient and family to use good hand hygiene technique  - Identify and instruct in appropriate isolation precautions for identified infection/condition  Outcome: Progressing     Problem: SAFETY ADULT  Goal: Patient will remain free of falls  Description  INTERVENTIONS:  - Assess patient frequently for physical needs  -  Identify cognitive and physical deficits and behaviors that affect risk of falls    -  [place] fall precautions as indicated by assessment   - Educate patient/family on patient safety including physical limitations  - Instruct patient to call for assistance with activity based on assessment  - Modify environment to reduce risk of injury  - Consider OT/PT consult to assist with strengthening/mobility  Outcome: Progressing  Goal: Maintain or return to baseline ADL function  Description  INTERVENTIONS:  -  Assess patient's ability to carry out ADLs; assess patient's baseline for ADL function and identify physical deficits which impact ability to perform ADLs (bathing, care of mouth/teeth, toileting, grooming, dressing, etc )  - Assess/evaluate cause of self-care deficits   - Assess range of motion  - Assess patient's mobility; develop plan if impaired  - Assess patient's need for assistive devices and provide as appropriate  - Encourage maximum independence but intervene and supervise when necessary  ¯ Involve family in performance of ADLs  ¯ Assess for home care needs following discharge   ¯ Request OT consult to assist with ADL evaluation and planning for discharge  ¯ Provide patient education as appropriate  Outcome: Progressing  Goal: Maintain or return mobility status to optimal level  Description  INTERVENTIONS:  - Assess patient's baseline mobility status (ambulation, transfers, stairs, etc )    - Identify cognitive and physical deficits and behaviors that affect mobility  - Identify mobility aids required to assist with transfers and/or ambulation (gait belt, sit-to-stand, lift, walker, cane, etc )  - [place] fall precautions as indicated by assessment  - Record patient progress and toleration of activity level on Mobility SBAR; progress patient to next Phase/Stage  - Instruct patient to call for assistance with activity based on assessment  - Request Rehabilitation consult to assist with strengthening/weightbearing, etc   Outcome: Progressing     Problem: DISCHARGE PLANNING  Goal: Discharge to home or other facility with appropriate resources  Description  INTERVENTIONS:  - Identify barriers to discharge w/patient and caregiver  - Arrange for needed discharge resources and transportation as appropriate  - Identify discharge learning needs (meds, wound care, etc )  - Arrange for interpretive services to assist at discharge as needed  - Refer to Case Management Department for coordinating discharge planning if the patient needs post-hospital services based on physician/advanced practitioner order or complex needs related to functional status, cognitive ability, or social support system  Outcome: Progressing     Problem: Knowledge Deficit  Goal: Patient/family/caregiver demonstrates understanding of disease process, treatment plan, medications, and discharge instructions  Description  Complete learning assessment and assess knowledge base  Interventions:  - Provide teaching at level of understanding  - Provide teaching via preferred learning methods  Outcome: Progressing     Problem: CARDIOVASCULAR - ADULT  Goal: Maintains optimal cardiac output and hemodynamic stability  Description  INTERVENTIONS:  - Monitor I/O, vital signs and rhythm  - Monitor for S/S and trends of decreased cardiac output i e  bleeding, hypotension  - Administer and titrate ordered vasoactive medications to optimize hemodynamic stability  - Assess quality of pulses, skin color and temperature  - Assess for signs of decreased coronary artery perfusion - ex   Angina  - Instruct patient to report change in severity of symptoms  Outcome: Progressing  Goal: Absence of cardiac dysrhythmias or at baseline rhythm  Description  INTERVENTIONS:  - Continuous cardiac monitoring, monitor vital signs, obtain 12 lead EKG if indicated  - Administer antiarrhythmic and heart rate control medications as ordered  - Monitor electrolytes and administer replacement therapy as ordered  Outcome: Progressing

## 2019-03-19 NOTE — DISCHARGE SUMMARY
Discharge- Marv Regalado 1940, 77 y o  female MRN: [id number]    Unit/Bed#: -01 Encounter: [id number]    Primary Care Provider: David Fox MD   Date and time admitted to hospital: 3/17/2019  5:04 PM      NSTEMI Type 1: troponin peak 1 3; she is s/p cardiac cath yesterday with PCI to vein graft to OM 1 which was 90% stenosed  Post catheterization she is feeling well, no recurrent chest pain  Resuming dual anti-platelet therapy with aspirin and Brilinta ( no need to check cost per patient she has medication at home)   Repeat echocardiogram showed EF 35-40%; previously 40-45%; no ectopy on telemetry  Continue beta-blocker, renal function stable today will resume losartan  Continue Imdur and Ranexa  Right femoral catheterization site uncomplicated  Has an allergy to statin  Cleared by cardio for discharge       Ischemic cardiomyopathy:  LVEF 35-40%  No clinical signs or symptoms of volume overload resume home diuretics and discharged      Dyslipidemia:  allergy so statins and Zetia      Hypertension; blood pressure is well controlled, currently 103/56; continue Norvasc, metoprolol, Imdur      Chronic kidney disease:  Evaluated by Nephrology; creatinine at baseline today    Will resume ARB       Chronic angina:  Imdur or Ranexa    Discharge Summary - [place] [id number] Internal Medicine    Patient Information: Marv Regalado 77 y o  female MRN: [id number]  Unit/Bed#: -01 Encounter: [id number]    Discharging Physician / Practitioner: Aliya Martinez MD  PCP: David Fox MD  Admission Date: 3/17/2019  Discharge Date: 03/19/19    Disposition:     Home    Reason for Admission: intermittent chest pain    Discharge Diagnoses:     Principal Problem:    Acute non-ST elevation myocardial infarction (NSTEMI) [facility])  Active Problems:    Benign hypertension with chronic kidney disease, stage III ([facility] [address] )    Chronic systolic congestive heart failure ([facility] [address] )  Resolved Problems:    * No resolved hospital problems  *    Consultations During Hospital Stay:  · cardiology    Procedures Performed:   · [facility] Course:   Chrissie Fernandez is a 77y o  year old female who presented who presented to the emergency room with intermittent chest pain over a 1 week period of time which was relieved with sublingual nitroglycerin spray  Mrs Stefani Alaniz has a known history of coronary artery disease with previous coronary artery bypass grafting x4 in 1998  In 1999 she had a 2nd MI and wound catheterization was noted to have lost 2 of her grafts her only patent grafts are LIMA to the LAD and saphenous vein to obtuse marginal   In 2017 patient had a 3rd MI and at that time was taken to [place] where she underwent cardiac catheterization and stenting to a 99% occlusion in her saphenous vein to the obtuse marginal   She was discharged on antianginal medications, dual anti-platelet therapy and beta-blockers  She was not given statins as she does have an allergy/intolerance  Patient states since that time she had been doing exceptionally well      She follows with Dr Socorro Gottlieb at [facility] ([facility]) and states that he had told her when she completed her recent prescription for Brilinta she could discontinue  Patient states it has been approximately 2-3 weeks since her last dose of Brilinta  She notes approximately 1 week ago she began to experience exertional chest pain which was relieved with either rest or sublingual nitroglycerin  On admission initial troponin was 0 09, troponin peaked at 1 3 and recent troponin was 0 97  Patient was started on IV heparin ACS protocol  Patient states she has had no further chest discomfort since admission to the hospital     She is status post cardiac catheterization yesterday with drug-eluting stent to SVG to om 1, doing quite well, telemetry has been completely unremarkable  She continues on dual antiplatelet therapy with aspirin and Brilinta    She feels well, no complaints of orthopnea, shortness of breath, chest pain  She does take Imdur and Ranexa for chronic angina  She does have an ischemic cardiomyopathy, moderate, EF 35-40%  NSTEMI  Ischemic cardiomyopathy  Chronic systolic/diastolic CHF-euvolemic  Dyslipidemia-on statin therapy  Hypertension-well controlled  CKD-stable     Resume ARB  Continue current meds  Okay with discharge from [address] [place] [address] follow up  Condition at Discharge: stable     Discharge Day Visit / Exam:     Subjective:  Feels great; no acute distress or chest pain noted  Vitals: Blood Pressure: 103/56 (03/19/19 1100)  Pulse: 65 (03/19/19 1100)  Temperature: 97 6 °F (36 4 °C) (03/19/19 0700)  Temp Source: Oral (03/19/19 0700)  Respirations: 18 (03/19/19 0700)  Height: 5' 4" (162 6 cm) (03/17/19 1742)  Weight - Scale: 67 9 kg (149 lb 11 1 oz) (03/17/19 1742)  SpO2: 99 % (03/19/19 1100)    Exam:   Physical Exam  Gen -Patient comfortable   Neck- Supple  No thyromegaly or lymphadenopathy  Lungs-Clear bilaterally without any wheeze or rales   Heart S1-S2, regular rate and rhythm, no murmurs  Abdomen-soft nontender, no organomegaly  Bowel sounds present  Extremities-no cyanosis, clubbing or edema  Skin- no rash  Neuro-nonfocal     Discharge instructions/Information to patient and family:   See after visit summary for information provided to patient and family  Provisions for Follow-Up Care:  See after visit summary for information related to follow-up care and any pertinent home health orders  Discharge Statement:  I spent 35 minutes discharging the patient  This time was spent on the day of discharge  I had direct contact with the patient on the day of discharge  Greater than 50% of the total time was spent examining patient, answering all patient questions, arranging and discussing plan of care with patient as well as directly providing post-discharge instructions  Additional time then spent on discharge activities      Discharge Medications:  See after visit summary for reconciled discharge medications provided to patient and family        ** Please Note: This note has been constructed using a voice recognition system **

## 2019-03-19 NOTE — PLAN OF CARE
Problem: Potential for Falls  Goal: Patient will remain free of falls  Description  INTERVENTIONS:  - Assess patient frequently for physical needs  -  Identify cognitive and physical deficits and behaviors that affect risk of falls  -  [place] fall precautions as indicated by assessment   - Educate patient/family on patient safety including physical limitations  - Instruct patient to call for assistance with activity based on assessment  - Modify environment to reduce risk of injury  - Consider OT/PT consult to assist with strengthening/mobility  Outcome: Progressing     Problem: Nutrition/Hydration-ADULT  Goal: Nutrient/Hydration intake appropriate for improving, restoring or maintaining nutritional needs  Description  Monitor and assess patient's nutrition/hydration status for malnutrition (ex- brittle hair, bruises, dry skin, pale skin and conjunctiva, muscle wasting, smooth red tongue, and disorientation)  Collaborate with interdisciplinary team and initiate plan and interventions as ordered  Monitor patient's weight and dietary intake as ordered or per policy  Utilize nutrition screening tool and intervene per policy  Determine patient's food preferences and provide high-protein, high-caloric foods as appropriate       INTERVENTIONS:  - Monitor oral intake, urinary output, labs, and treatment plans  - Assess nutrition and hydration status and recommend course of action  - Evaluate amount of meals eaten  - Assist patient with eating if necessary   - Allow adequate time for meals  - Recommend/ encourage appropriate diets, oral nutritional supplements, and vitamin/mineral supplements  - Order, calculate, and assess calorie counts as needed  - Recommend, monitor, and adjust tube feedings and TPN/PPN based on assessed needs  - Assess need for intravenous fluids  - Provide specific nutrition/hydration education as appropriate  - Include patient/family/caregiver in decisions related to nutrition  Outcome: Progressing     Problem: PAIN - ADULT  Goal: Verbalizes/displays adequate comfort level or baseline comfort level  Description  Interventions:  - Encourage patient to monitor pain and request assistance  - Assess pain using appropriate pain scale  - Administer analgesics based on type and severity of pain and evaluate response  - Implement non-pharmacological measures as appropriate and evaluate response  - Consider cultural and social influences on pain and pain management  - Notify physician/advanced practitioner if interventions unsuccessful or patient reports new pain  Outcome: Progressing     Problem: INFECTION - ADULT  Goal: Absence or prevention of progression during hospitalization  Description  INTERVENTIONS:  - Assess and monitor for signs and symptoms of infection  - Monitor lab/diagnostic results  - Monitor all insertion sites, i e  indwelling lines, tubes, and drains  - Monitor endotracheal (as able) and nasal secretions for changes in amount and color  - [place] appropriate cooling/warming therapies per order  - Administer medications as ordered  - Instruct and encourage patient and family to use good hand hygiene technique  - Identify and instruct in appropriate isolation precautions for identified infection/condition  Outcome: Progressing     Problem: SAFETY ADULT  Goal: Patient will remain free of falls  Description  INTERVENTIONS:  - Assess patient frequently for physical needs  -  Identify cognitive and physical deficits and behaviors that affect risk of falls    -  [place] fall precautions as indicated by assessment   - Educate patient/family on patient safety including physical limitations  - Instruct patient to call for assistance with activity based on assessment  - Modify environment to reduce risk of injury  - Consider OT/PT consult to assist with strengthening/mobility  Outcome: Progressing  Goal: Maintain or return to baseline ADL function  Description  INTERVENTIONS:  -  Assess patient's ability to carry out ADLs; assess patient's baseline for ADL function and identify physical deficits which impact ability to perform ADLs (bathing, care of mouth/teeth, toileting, grooming, dressing, etc )  - Assess/evaluate cause of self-care deficits   - Assess range of motion  - Assess patient's mobility; develop plan if impaired  - Assess patient's need for assistive devices and provide as appropriate  - Encourage maximum independence but intervene and supervise when necessary  ¯ Involve family in performance of ADLs  ¯ Assess for home care needs following discharge   ¯ Request OT consult to assist with ADL evaluation and planning for discharge  ¯ Provide patient education as appropriate  Outcome: Progressing  Goal: Maintain or return mobility status to optimal level  Description  INTERVENTIONS:  - Assess patient's baseline mobility status (ambulation, transfers, stairs, etc )    - Identify cognitive and physical deficits and behaviors that affect mobility  - Identify mobility aids required to assist with transfers and/or ambulation (gait belt, sit-to-stand, lift, walker, cane, etc )  - [place] fall precautions as indicated by assessment  - Record patient progress and toleration of activity level on Mobility SBAR; progress patient to next Phase/Stage  - Instruct patient to call for assistance with activity based on assessment  - Request Rehabilitation consult to assist with strengthening/weightbearing, etc   Outcome: Progressing     Problem: DISCHARGE PLANNING  Goal: Discharge to home or other facility with appropriate resources  Description  INTERVENTIONS:  - Identify barriers to discharge w/patient and caregiver  - Arrange for needed discharge resources and transportation as appropriate  - Identify discharge learning needs (meds, wound care, etc )  - Arrange for interpretive services to assist at discharge as needed  - Refer to Case Management Department for coordinating discharge planning if the patient needs post-hospital services based on physician/advanced practitioner order or complex needs related to functional status, cognitive ability, or social support system  Outcome: Progressing     Problem: Knowledge Deficit  Goal: Patient/family/caregiver demonstrates understanding of disease process, treatment plan, medications, and discharge instructions  Description  Complete learning assessment and assess knowledge base  Interventions:  - Provide teaching at level of understanding  - Provide teaching via preferred learning methods  Outcome: Progressing     Problem: CARDIOVASCULAR - ADULT  Goal: Maintains optimal cardiac output and hemodynamic stability  Description  INTERVENTIONS:  - Monitor I/O, vital signs and rhythm  - Monitor for S/S and trends of decreased cardiac output i e  bleeding, hypotension  - Administer and titrate ordered vasoactive medications to optimize hemodynamic stability  - Assess quality of pulses, skin color and temperature  - Assess for signs of decreased coronary artery perfusion - ex   Angina  - Instruct patient to report change in severity of symptoms  Outcome: Progressing  Goal: Absence of cardiac dysrhythmias or at baseline rhythm  Description  INTERVENTIONS:  - Continuous cardiac monitoring, monitor vital signs, obtain 12 lead EKG if indicated  - Administer antiarrhythmic and heart rate control medications as ordered  - Monitor electrolytes and administer replacement therapy as ordered  Outcome: Progressing

## 2019-03-19 NOTE — DISCHARGE INSTRUCTIONS
Please follow up with PMD and Cardiology within 1-2 weeks  Ticagrelor (By mouth)   Ticagrelor ([id number]-EV-sted-or)  Helps prevent stroke, heart attack, and other heart problems  This medicine is a blood thinner  Brand Name(s): Tiara   There may be other brand names for this medicine  When This Medicine Should Not Be Used: This medicine is not right for everyone  Do not use it if you had an allergic reaction to ticagrelor, or if you have bleeding problems (such as a bleeding stomach ulcer) or a history of bleeding in your brain  How to Use This Medicine:   Tablet  · Your doctor will tell you how much medicine to use  Do not use more than directed  Take this medicine at the same time every day  · Your doctor may tell you to take aspirin with this medicine  Do not use more than 100 milligrams of aspirin per day  Check the labels of other medicines to make sure they do not contain aspirin  · If you cannot swallow the tablet, you may do this:   ¨ Crush the tablet and mix it in a glass of water  Drink it right away  Rinse the glass with more water and drink that too, so you get all the medicine  ¨ You may give the tablet and water mixture through a nasogastric tube  Flush the tube with more water so you receive all the medicine  · This medicine should come with a Medication Guide  Ask your pharmacist for a copy if you do not have one  · Missed dose: Skip the missed dose and take your next dose as usual  Do not take extra medicine to make up for a missed dose  · Store the medicine in a closed container at room temperature, away from heat, moisture, and direct light  Drugs and Foods to Avoid:   Ask your doctor or pharmacist before using any other medicine, including over-the-counter medicines, vitamins, and herbal products  · Some medicines can affect how ticagrelor works   Tell your doctor if you are using any of the following:  ¨ Atazanavir, carbamazepine, clarithromycin, digoxin, indinavir, itraconazole, ketoconazole, lovastatin, nefazodone, nelfinavir, phenobarbital, phenytoin, rifampin, ritonavir, saquinavir, simvastatin, telithromycin, or voriconazole  ¨ Blood thinner (including warfarin or heparin)  ¨ NSAID pain or arthritis medicine (including celecoxib, diclofenac, ibuprofen, naproxen)  Warnings While Using This Medicine:   · Tell your doctor if you are pregnant or breastfeeding, or if you have liver disease, heart rhythm problems (including slow heartbeat), lung or breathing problems (such as asthma or COPD), or a history of bleeding problems  · This medicine may cause you to bleed and bruise more easily, and it may take longer than usual for bleeding to stop  Be careful to avoid injuries  · Do not stop using this medicine unless your doctor tells you to  To stop it may increase your risk of a heart attack, blood clot, or other serious problem  · Tell any doctor or dentist who treats you that you are using this medicine  With your doctor's permission, you may need to stop using this medicine several days before you have surgery to reduce the risk of bleeding problems  Follow your doctor's instructions carefully  · Your doctor will do lab tests at regular visits to check on the effects of this medicine  Keep all appointments  · Keep all medicine out of the reach of children  Never share your medicine with anyone  Possible Side Effects While Using This Medicine:   Call your doctor right away if you notice any of these side effects:  · Allergic reaction: Itching or hives, swelling in your face or hands, swelling or tingling in your mouth or throat, chest tightness, trouble breathing  · Bloody or black, tarry stools, red or dark brown urine  · Fast, slow, or pounding heartbeat  · Trouble breathing  · Unusual bleeding, bruising, or weakness  · Vomiting of blood or material that looks like coffee grounds  If you notice other side effects that you think are caused by this medicine, tell your doctor     Call your doctor for medical advice about side effects  You may report side effects to FDA at 7-491-FDA-1565  © 2017 [address] [employer] Information is for End User's use only and may not be sold, redistributed or otherwise used for commercial purposes  The above information is an  only  It is not intended as medical advice for individual conditions or treatments  Talk to your doctor, nurse or pharmacist before following any medical regimen to see if it is safe and effective for you

## 2019-03-20 ENCOUNTER — DOCUMENTATION (OUTPATIENT)
Dept: NEPHROLOGY | Facility: CLINIC | Age: 79
End: 2019-03-20

## 2019-03-20 DIAGNOSIS — N18.32 CHRONIC KIDNEY DISEASE (CKD) STAGE G3B/A2, MODERATELY DECREASED GLOMERULAR FILTRATION RATE (GFR) BETWEEN 30-44 ML/MIN/1.73 SQUARE METER AND ALBUMINURIA CREATININE RATIO BETWEEN 30-299 MG/G (HCC): Primary | ICD-10-CM

## 2019-03-20 NOTE — PROGRESS NOTES
Can you please make sure a basic metabolic profile is ordered in 1 week following a cardiac catheterization that was just done yesterday   This is nonfasting

## 2019-03-21 ENCOUNTER — TELEPHONE (OUTPATIENT)
Dept: CARDIOLOGY CLINIC | Facility: CLINIC | Age: 79
End: 2019-03-21

## 2019-03-21 NOTE — TELEPHONE ENCOUNTER
PLEASE CALL PATIENT WAS JUST DISCHARGED FROM /[facility] PRETTY HAS QUESTIONS REGARDING HER MEDICATIONS

## 2019-03-21 NOTE — TELEPHONE ENCOUNTER
I called patient back to attempt to answer any questions around medications she may have  Her main concern is that IMDUR is on her medication list, however she does not have this medication, nor does she think she's been taking it  Should she take this, should we be sending over a prescription, or do you have any advice on the IMDUR? Thanks

## 2019-03-25 ENCOUNTER — APPOINTMENT (OUTPATIENT)
Dept: LAB | Facility: CLINIC | Age: 79
End: 2019-03-25
Payer: MEDICARE

## 2019-03-25 DIAGNOSIS — N18.32 CHRONIC KIDNEY DISEASE (CKD) STAGE G3B/A2, MODERATELY DECREASED GLOMERULAR FILTRATION RATE (GFR) BETWEEN 30-44 ML/MIN/1.73 SQUARE METER AND ALBUMINURIA CREATININE RATIO BETWEEN 30-299 MG/G (HCC): ICD-10-CM

## 2019-03-25 LAB
ANION GAP SERPL CALCULATED.3IONS-SCNC: 6 MMOL/L (ref 4–13)
BUN SERPL-MCNC: 36 MG/DL (ref 5–25)
CALCIUM SERPL-MCNC: 9.5 MG/DL (ref 8.3–10.1)
CHLORIDE SERPL-SCNC: 102 MMOL/L (ref 100–108)
CO2 SERPL-SCNC: 28 MMOL/L (ref 21–32)
CREAT SERPL-MCNC: 1.91 MG/DL (ref 0.6–1.3)
GFR SERPL CREATININE-BSD FRML MDRD: 25 ML/MIN/1.73SQ M
GLUCOSE SERPL-MCNC: 106 MG/DL (ref 65–140)
POTASSIUM SERPL-SCNC: 4 MMOL/L (ref 3.5–5.3)
SODIUM SERPL-SCNC: 136 MMOL/L (ref 136–145)

## 2019-03-25 PROCEDURE — 36415 COLL VENOUS BLD VENIPUNCTURE: CPT

## 2019-03-25 PROCEDURE — 80048 BASIC METABOLIC PNL TOTAL CA: CPT

## 2019-03-26 ENCOUNTER — TELEPHONE (OUTPATIENT)
Dept: NEPHROLOGY | Facility: CLINIC | Age: 79
End: 2019-03-26

## 2019-03-27 ENCOUNTER — OFFICE VISIT (OUTPATIENT)
Dept: AUDIOLOGY | Facility: CLINIC | Age: 79
End: 2019-03-27
Payer: MEDICARE

## 2019-03-27 DIAGNOSIS — H90.3 SENSORY HEARING LOSS, BILATERAL: Primary | ICD-10-CM

## 2019-03-27 PROCEDURE — 92557 COMPREHENSIVE HEARING TEST: CPT | Performed by: AUDIOLOGIST

## 2019-03-27 PROCEDURE — 92567 TYMPANOMETRY: CPT | Performed by: AUDIOLOGIST

## 2019-03-27 NOTE — LETTER
2019     Lashawn Andre MD  [facility] [id number]    Patient: Patience Anderson   YOB: 1940   Date of Visit: 3/27/2019       Dear Dr Yuliya Chen:    Thank you for referring Erinn Sr to me for evaluation  Below are my notes for this consultation  If you have questions, please do not hesitate to call me  I look forward to following your patient along with you  Sincerely,        Diamond Mckeon        CC: No Recipients  Diamond Mckeon  3/27/2019 11:04 AM  Sign at close encounter  HEARING EVALUATION    Name:  Patience Anderson  :  1940  Age:  77 y o  Date of Evaluation: 19     History: Difficulty Understanding  Reason for visit: Patience Anderson is being seen today at the request of Dr Yuliya Chen for an evaluation of hearing  Patient reports she has noticed increased difficulty hearing, especially on the phone, for over the past year  She has a high-pitched tinnitus that has been present since a heart attack 40 years ago  The patient also has dizziness and imbalance for which she has been treated by the [facility] and [address] [place] [address]  She denies history of noise exposure  There is a history of presbycusis in her family, and her granddaughter was born with hearing loss  EVALUATION:    Otoscopic Evaluation:   Right Ear: Clear and healthy ear canal and tympanic membrane   Left Ear: Clear and healthy ear canal and tympanic membrane    Tympanometry:   Right: Type A - normal middle ear pressure and compliance   Left: Type A - normal middle ear pressure and compliance    Audiogram Results:  Mild sloping to severe sensorineural hearing loss bilaterally  WRS was 92% in the right ear and 80% in the left ear  *see attached audiogram       RECOMMENDATIONS:  Annual hearing eval, Hearing Aid Evaluation and Copy to Patient/Caregiver    PATIENT EDUCATION:   Discussed results and recommendations with the patient  She will return on 2019 for an HAE  Questions were addressed and the patient was encouraged to contact our department should concerns arise        Miguel Reina, Audiology Intern  [place]omasa Blanca , [address]  Clinical Audiologist

## 2019-03-27 NOTE — PROGRESS NOTES
HEARING EVALUATION    Name:  Lobo Spencer  :  1940  Age:  77 y o  Date of Evaluation: 19     History: Difficulty Understanding  Reason for visit: Lobo Spencer is being seen today at the request of Dr Scooby Aceves for an evaluation of hearing  Patient reports she has noticed increased difficulty hearing, especially on the phone, for over the past year  She has a high-pitched tinnitus that has been present since a heart attack 40 years ago  The patient also has dizziness and imbalance for which she has been treated by the [address]  She denies history of noise exposure  There is a history of presbycusis in her family, and her granddaughter was born with hearing loss  EVALUATION:    Otoscopic Evaluation:   Right Ear: Clear and healthy ear canal and tympanic membrane   Left Ear: Clear and healthy ear canal and tympanic membrane    Tympanometry:   Right: Type A - normal middle ear pressure and compliance   Left: Type A - normal middle ear pressure and compliance    Audiogram Results:  Mild sloping to severe sensorineural hearing loss bilaterally  WRS was 92% in the right ear and 80% in the left ear  *see attached audiogram       RECOMMENDATIONS:  Annual hearing eval, Hearing Aid Evaluation and Copy to Patient/Caregiver     Pt is interested in [employer]  Sent request on her behalf  PATIENT EDUCATION:   Discussed results and recommendations with the patient  She will return on 2019 for an HAE  Questions were addressed and the patient was encouraged to contact our department should concerns arise        Mando Valdivia, Audiology Intern  Tomasa Barfield , [address] [employer]  Clinical Audiologist

## 2019-03-28 ENCOUNTER — PATIENT OUTREACH (OUTPATIENT)
Dept: CASE MANAGEMENT | Facility: OTHER | Age: 79
End: 2019-03-28

## 2019-03-28 NOTE — PROGRESS NOTES
Patient states she feels well  She weighs herself daily, reviewed when to report weight gain  She denies SOB, edema, chest pain, lightheadedness  C/o chronic dizziness so she ambulates w/a cane  She states she has lost 40 lbs in a little over a year, but has not made any lifestyle changes  Her PCP is aware & they are monitoring  She has all of her meds & is taking as ordered  Her copays are high, but affordable so far  She will let me know if she wants to apply for Rx assistance  Reviewed how & when to administer Nitro  Patient states she will go to the ED if she takes 3x w/out result  She starts cardiac rehab on Monday & was interested in transportation assistance to make sure she doesn't miss appointments  We discussed the [employer]  She will wait until she gets her schedule from therapy & will let me know if she would like me to schedule transportation  One of her daughters is usually able to take her to doctor's appointments  She has a wellness check w/her PCP on 7/1/19, but was made aware she needs to f/u with them s/p hospitalization sooner  She will call to make appointment  She is agreeable to outreach  Will mail [address] letter

## 2019-04-01 ENCOUNTER — CLINICAL SUPPORT (OUTPATIENT)
Dept: CARDIAC REHAB | Facility: CLINIC | Age: 79
End: 2019-04-01
Payer: MEDICARE

## 2019-04-01 VITALS — HEIGHT: 64 IN | WEIGHT: 143.5 LBS | BODY MASS INDEX: 24.5 KG/M2

## 2019-04-01 DIAGNOSIS — I12.9 BENIGN HYPERTENSION WITH CHRONIC KIDNEY DISEASE, STAGE III (HCC): Primary | Chronic | ICD-10-CM

## 2019-04-01 DIAGNOSIS — N18.30 BENIGN HYPERTENSION WITH CHRONIC KIDNEY DISEASE, STAGE III (HCC): Primary | Chronic | ICD-10-CM

## 2019-04-01 DIAGNOSIS — I21.4 ACUTE NON-ST ELEVATION MYOCARDIAL INFARCTION (NSTEMI) (HCC): ICD-10-CM

## 2019-04-01 PROCEDURE — 93797 PHYS/QHP OP CAR RHAB WO ECG: CPT

## 2019-04-01 RX ORDER — AMLODIPINE BESYLATE 2.5 MG/1
5 TABLET ORAL SEE ADMIN INSTRUCTIONS
Qty: 180 TABLET | Refills: 3 | Status: SHIPPED | OUTPATIENT
Start: 2019-04-01 | End: 2019-11-05 | Stop reason: SDUPTHER

## 2019-04-02 ENCOUNTER — APPOINTMENT (OUTPATIENT)
Dept: CARDIAC REHAB | Facility: CLINIC | Age: 79
End: 2019-04-02
Payer: MEDICARE

## 2019-04-05 ENCOUNTER — CLINICAL SUPPORT (OUTPATIENT)
Dept: CARDIAC REHAB | Facility: CLINIC | Age: 79
End: 2019-04-05
Payer: MEDICARE

## 2019-04-05 DIAGNOSIS — I21.4 ACUTE NON-ST ELEVATION MYOCARDIAL INFARCTION (NSTEMI) (HCC): ICD-10-CM

## 2019-04-05 PROCEDURE — 93798 PHYS/QHP OP CAR RHAB W/ECG: CPT

## 2019-04-08 ENCOUNTER — CLINICAL SUPPORT (OUTPATIENT)
Dept: CARDIAC REHAB | Facility: CLINIC | Age: 79
End: 2019-04-08
Payer: MEDICARE

## 2019-04-08 ENCOUNTER — OFFICE VISIT (OUTPATIENT)
Dept: AUDIOLOGY | Facility: CLINIC | Age: 79
End: 2019-04-08

## 2019-04-08 DIAGNOSIS — H90.3 SENSORINEURAL HEARING LOSS, BILATERAL: Primary | ICD-10-CM

## 2019-04-08 DIAGNOSIS — I21.4 ACUTE NON-ST ELEVATION MYOCARDIAL INFARCTION (NSTEMI) (HCC): ICD-10-CM

## 2019-04-08 PROCEDURE — 93798 PHYS/QHP OP CAR RHAB W/ECG: CPT

## 2019-04-10 ENCOUNTER — CLINICAL SUPPORT (OUTPATIENT)
Dept: CARDIAC REHAB | Facility: CLINIC | Age: 79
End: 2019-04-10
Payer: MEDICARE

## 2019-04-10 ENCOUNTER — OFFICE VISIT (OUTPATIENT)
Dept: CARDIOLOGY CLINIC | Facility: CLINIC | Age: 79
End: 2019-04-10
Payer: MEDICARE

## 2019-04-10 ENCOUNTER — TRANSCRIBE ORDERS (OUTPATIENT)
Dept: LAB | Facility: CLINIC | Age: 79
End: 2019-04-10

## 2019-04-10 ENCOUNTER — APPOINTMENT (OUTPATIENT)
Dept: LAB | Facility: CLINIC | Age: 79
End: 2019-04-10
Payer: MEDICARE

## 2019-04-10 VITALS
WEIGHT: 146 LBS | BODY MASS INDEX: 24.92 KG/M2 | HEIGHT: 64 IN | DIASTOLIC BLOOD PRESSURE: 70 MMHG | HEART RATE: 68 BPM | OXYGEN SATURATION: 97 % | SYSTOLIC BLOOD PRESSURE: 134 MMHG

## 2019-04-10 DIAGNOSIS — I10 ESSENTIAL HYPERTENSION: ICD-10-CM

## 2019-04-10 DIAGNOSIS — I12.9 BENIGN HYPERTENSION WITH CHRONIC KIDNEY DISEASE, STAGE III (HCC): Chronic | ICD-10-CM

## 2019-04-10 DIAGNOSIS — I25.118 CORONARY ARTERY DISEASE OF NATIVE ARTERY OF NATIVE HEART WITH STABLE ANGINA PECTORIS (HCC): Primary | ICD-10-CM

## 2019-04-10 DIAGNOSIS — N18.30 BENIGN HYPERTENSION WITH CHRONIC KIDNEY DISEASE, STAGE III (HCC): Chronic | ICD-10-CM

## 2019-04-10 DIAGNOSIS — Z95.1 S/P CABG X 4: Chronic | ICD-10-CM

## 2019-04-10 DIAGNOSIS — I50.43 HEART FAILURE, ACUTE ON CHRONIC, SYSTOLIC AND DIASTOLIC (HCC): ICD-10-CM

## 2019-04-10 DIAGNOSIS — M81.0 SENILE OSTEOPOROSIS: Primary | ICD-10-CM

## 2019-04-10 DIAGNOSIS — I21.4 ACUTE NON-ST ELEVATION MYOCARDIAL INFARCTION (NSTEMI) (HCC): ICD-10-CM

## 2019-04-10 DIAGNOSIS — E78.00 PURE HYPERCHOLESTEROLEMIA: Chronic | ICD-10-CM

## 2019-04-10 LAB
25(OH)D3 SERPL-MCNC: 31.3 NG/ML (ref 30–100)
PTH-INTACT SERPL-MCNC: 55.2 PG/ML (ref 18.4–80.1)

## 2019-04-10 PROCEDURE — 93798 PHYS/QHP OP CAR RHAB W/ECG: CPT

## 2019-04-10 PROCEDURE — 99215 OFFICE O/P EST HI 40 MIN: CPT | Performed by: INTERNAL MEDICINE

## 2019-04-10 PROCEDURE — 83970 ASSAY OF PARATHORMONE: CPT

## 2019-04-10 PROCEDURE — 82306 VITAMIN D 25 HYDROXY: CPT

## 2019-04-10 PROCEDURE — 36415 COLL VENOUS BLD VENIPUNCTURE: CPT

## 2019-04-10 PROCEDURE — 93000 ELECTROCARDIOGRAM COMPLETE: CPT | Performed by: INTERNAL MEDICINE

## 2019-04-11 ENCOUNTER — APPOINTMENT (OUTPATIENT)
Dept: LAB | Facility: CLINIC | Age: 79
End: 2019-04-11
Payer: MEDICARE

## 2019-04-11 LAB
ANION GAP SERPL CALCULATED.3IONS-SCNC: 3 MMOL/L (ref 4–13)
BUN SERPL-MCNC: 21 MG/DL (ref 5–25)
CALCIUM SERPL-MCNC: 8.9 MG/DL (ref 8.3–10.1)
CHLORIDE SERPL-SCNC: 105 MMOL/L (ref 100–108)
CO2 SERPL-SCNC: 28 MMOL/L (ref 21–32)
CREAT SERPL-MCNC: 1.23 MG/DL (ref 0.6–1.3)
GFR SERPL CREATININE-BSD FRML MDRD: 42 ML/MIN/1.73SQ M
GLUCOSE SERPL-MCNC: 91 MG/DL (ref 65–140)
POTASSIUM SERPL-SCNC: 3.9 MMOL/L (ref 3.5–5.3)
SODIUM SERPL-SCNC: 136 MMOL/L (ref 136–145)

## 2019-04-11 PROCEDURE — 36415 COLL VENOUS BLD VENIPUNCTURE: CPT | Performed by: INTERNAL MEDICINE

## 2019-04-11 PROCEDURE — 80048 BASIC METABOLIC PNL TOTAL CA: CPT | Performed by: INTERNAL MEDICINE

## 2019-04-12 ENCOUNTER — CLINICAL SUPPORT (OUTPATIENT)
Dept: CARDIAC REHAB | Facility: CLINIC | Age: 79
End: 2019-04-12
Payer: MEDICARE

## 2019-04-12 ENCOUNTER — TELEPHONE (OUTPATIENT)
Dept: CARDIOLOGY CLINIC | Facility: CLINIC | Age: 79
End: 2019-04-12

## 2019-04-12 DIAGNOSIS — I21.4 NON-ST ELEVATION MYOCARDIAL INFARCTION (NSTEMI) (HCC): ICD-10-CM

## 2019-04-12 PROCEDURE — 93798 PHYS/QHP OP CAR RHAB W/ECG: CPT

## 2019-04-15 ENCOUNTER — OFFICE VISIT (OUTPATIENT)
Dept: AUDIOLOGY | Facility: CLINIC | Age: 79
End: 2019-04-15

## 2019-04-15 ENCOUNTER — CLINICAL SUPPORT (OUTPATIENT)
Dept: CARDIAC REHAB | Facility: CLINIC | Age: 79
End: 2019-04-15
Payer: MEDICARE

## 2019-04-15 DIAGNOSIS — H90.3 SENSORY HEARING LOSS, BILATERAL: Primary | ICD-10-CM

## 2019-04-15 DIAGNOSIS — Z95.1 S/P CABG X 4: Chronic | ICD-10-CM

## 2019-04-15 PROCEDURE — 93798 PHYS/QHP OP CAR RHAB W/ECG: CPT

## 2019-04-17 ENCOUNTER — CLINICAL SUPPORT (OUTPATIENT)
Dept: CARDIAC REHAB | Facility: CLINIC | Age: 79
End: 2019-04-17
Payer: MEDICARE

## 2019-04-17 DIAGNOSIS — I21.4 NON-ST ELEVATION MYOCARDIAL INFARCTION (NSTEMI) (HCC): ICD-10-CM

## 2019-04-17 PROCEDURE — 93798 PHYS/QHP OP CAR RHAB W/ECG: CPT

## 2019-04-19 ENCOUNTER — APPOINTMENT (OUTPATIENT)
Dept: CARDIAC REHAB | Facility: CLINIC | Age: 79
End: 2019-04-19
Payer: MEDICARE

## 2019-04-22 ENCOUNTER — OFFICE VISIT (OUTPATIENT)
Dept: AUDIOLOGY | Facility: CLINIC | Age: 79
End: 2019-04-22
Payer: MEDICARE

## 2019-04-22 ENCOUNTER — CLINICAL SUPPORT (OUTPATIENT)
Dept: CARDIAC REHAB | Facility: CLINIC | Age: 79
End: 2019-04-22
Payer: MEDICARE

## 2019-04-22 DIAGNOSIS — H90.3 SENSORY HEARING LOSS, BILATERAL: Primary | ICD-10-CM

## 2019-04-22 DIAGNOSIS — I21.4 NSTEMI (NON-ST ELEVATION MYOCARDIAL INFARCTION) (HCC): ICD-10-CM

## 2019-04-22 PROCEDURE — V5261 HEARING AID, DIGIT, BIN, BTE: HCPCS | Performed by: AUDIOLOGIST

## 2019-04-22 PROCEDURE — 93798 PHYS/QHP OP CAR RHAB W/ECG: CPT

## 2019-04-22 PROCEDURE — V5160 DISPENSING FEE BINAURAL: HCPCS | Performed by: AUDIOLOGIST

## 2019-04-23 ENCOUNTER — PATIENT OUTREACH (OUTPATIENT)
Dept: CASE MANAGEMENT | Facility: OTHER | Age: 79
End: 2019-04-23

## 2019-04-24 ENCOUNTER — CLINICAL SUPPORT (OUTPATIENT)
Dept: CARDIAC REHAB | Facility: CLINIC | Age: 79
End: 2019-04-24
Payer: MEDICARE

## 2019-04-24 DIAGNOSIS — Z95.1 S/P CABG X 4: Chronic | ICD-10-CM

## 2019-04-24 PROCEDURE — 93798 PHYS/QHP OP CAR RHAB W/ECG: CPT

## 2019-04-26 ENCOUNTER — CLINICAL SUPPORT (OUTPATIENT)
Dept: CARDIAC REHAB | Facility: CLINIC | Age: 79
End: 2019-04-26
Payer: MEDICARE

## 2019-04-26 ENCOUNTER — OFFICE VISIT (OUTPATIENT)
Dept: AUDIOLOGY | Facility: CLINIC | Age: 79
End: 2019-04-26

## 2019-04-26 DIAGNOSIS — I21.4 NSTEMI (NON-ST ELEVATION MYOCARDIAL INFARCTION) (HCC): ICD-10-CM

## 2019-04-26 DIAGNOSIS — H90.3 SENSORY HEARING LOSS, BILATERAL: Primary | ICD-10-CM

## 2019-04-26 PROCEDURE — 93798 PHYS/QHP OP CAR RHAB W/ECG: CPT

## 2019-04-29 ENCOUNTER — CLINICAL SUPPORT (OUTPATIENT)
Dept: CARDIAC REHAB | Facility: CLINIC | Age: 79
End: 2019-04-29
Payer: MEDICARE

## 2019-04-29 ENCOUNTER — OFFICE VISIT (OUTPATIENT)
Dept: AUDIOLOGY | Facility: CLINIC | Age: 79
End: 2019-04-29

## 2019-04-29 DIAGNOSIS — H90.3 SENSORY HEARING LOSS, BILATERAL: Primary | ICD-10-CM

## 2019-04-29 DIAGNOSIS — Z95.1 S/P CABG X 4: Chronic | ICD-10-CM

## 2019-04-29 PROCEDURE — 93798 PHYS/QHP OP CAR RHAB W/ECG: CPT

## 2019-05-01 ENCOUNTER — CLINICAL SUPPORT (OUTPATIENT)
Dept: CARDIAC REHAB | Facility: CLINIC | Age: 79
End: 2019-05-01
Payer: MEDICARE

## 2019-05-01 DIAGNOSIS — I21.4 NON-ST ELEVATION MYOCARDIAL INFARCTION (NSTEMI) (HCC): ICD-10-CM

## 2019-05-01 PROCEDURE — 93798 PHYS/QHP OP CAR RHAB W/ECG: CPT

## 2019-05-03 ENCOUNTER — APPOINTMENT (OUTPATIENT)
Dept: CARDIAC REHAB | Facility: CLINIC | Age: 79
End: 2019-05-03
Payer: MEDICARE

## 2019-05-06 ENCOUNTER — CLINICAL SUPPORT (OUTPATIENT)
Dept: CARDIAC REHAB | Facility: CLINIC | Age: 79
End: 2019-05-06
Payer: MEDICARE

## 2019-05-06 DIAGNOSIS — I21.4 NON-ST ELEVATION MYOCARDIAL INFARCTION (NSTEMI) (HCC): ICD-10-CM

## 2019-05-06 PROCEDURE — 93798 PHYS/QHP OP CAR RHAB W/ECG: CPT

## 2019-05-08 ENCOUNTER — CLINICAL SUPPORT (OUTPATIENT)
Dept: CARDIAC REHAB | Facility: CLINIC | Age: 79
End: 2019-05-08
Payer: MEDICARE

## 2019-05-08 DIAGNOSIS — I21.4 NON-ST ELEVATION MYOCARDIAL INFARCTION (NSTEMI), INITIAL CARE EPISODE (HCC): ICD-10-CM

## 2019-05-08 PROCEDURE — 93798 PHYS/QHP OP CAR RHAB W/ECG: CPT

## 2019-05-10 ENCOUNTER — APPOINTMENT (OUTPATIENT)
Dept: CARDIAC REHAB | Facility: CLINIC | Age: 79
End: 2019-05-10
Payer: MEDICARE

## 2019-05-13 ENCOUNTER — CLINICAL SUPPORT (OUTPATIENT)
Dept: CARDIAC REHAB | Facility: CLINIC | Age: 79
End: 2019-05-13
Payer: MEDICARE

## 2019-05-13 DIAGNOSIS — Z95.1 S/P CABG X 4: Chronic | ICD-10-CM

## 2019-05-13 PROCEDURE — 93798 PHYS/QHP OP CAR RHAB W/ECG: CPT

## 2019-05-15 ENCOUNTER — CLINICAL SUPPORT (OUTPATIENT)
Dept: CARDIAC REHAB | Facility: CLINIC | Age: 79
End: 2019-05-15
Payer: MEDICARE

## 2019-05-15 ENCOUNTER — PATIENT OUTREACH (OUTPATIENT)
Dept: CASE MANAGEMENT | Facility: OTHER | Age: 79
End: 2019-05-15

## 2019-05-15 DIAGNOSIS — Z95.1 S/P CABG X 4: Chronic | ICD-10-CM

## 2019-05-15 PROCEDURE — 93798 PHYS/QHP OP CAR RHAB W/ECG: CPT

## 2019-05-17 ENCOUNTER — APPOINTMENT (OUTPATIENT)
Dept: CARDIAC REHAB | Facility: CLINIC | Age: 79
End: 2019-05-17
Payer: MEDICARE

## 2019-05-17 ENCOUNTER — PATIENT OUTREACH (OUTPATIENT)
Dept: CASE MANAGEMENT | Facility: OTHER | Age: 79
End: 2019-05-17

## 2019-05-20 ENCOUNTER — CLINICAL SUPPORT (OUTPATIENT)
Dept: CARDIAC REHAB | Facility: CLINIC | Age: 79
End: 2019-05-20
Payer: MEDICARE

## 2019-05-20 DIAGNOSIS — I21.4 NSTEMI (NON-ST ELEVATION MYOCARDIAL INFARCTION) (HCC): ICD-10-CM

## 2019-05-20 PROCEDURE — 93798 PHYS/QHP OP CAR RHAB W/ECG: CPT

## 2019-05-22 ENCOUNTER — CLINICAL SUPPORT (OUTPATIENT)
Dept: CARDIAC REHAB | Facility: CLINIC | Age: 79
End: 2019-05-22
Payer: MEDICARE

## 2019-05-22 DIAGNOSIS — I21.4 NON-ST ELEVATION MYOCARDIAL INFARCTION (NSTEMI) (HCC): ICD-10-CM

## 2019-05-22 PROCEDURE — 93798 PHYS/QHP OP CAR RHAB W/ECG: CPT

## 2019-05-24 ENCOUNTER — CLINICAL SUPPORT (OUTPATIENT)
Dept: CARDIAC REHAB | Facility: CLINIC | Age: 79
End: 2019-05-24
Payer: MEDICARE

## 2019-05-24 DIAGNOSIS — I21.4 NON-STEMI (NON-ST ELEVATED MYOCARDIAL INFARCTION) (HCC): ICD-10-CM

## 2019-05-24 PROCEDURE — 93798 PHYS/QHP OP CAR RHAB W/ECG: CPT

## 2019-05-27 ENCOUNTER — APPOINTMENT (OUTPATIENT)
Dept: CARDIAC REHAB | Facility: CLINIC | Age: 79
End: 2019-05-27
Payer: MEDICARE

## 2019-05-28 DIAGNOSIS — I21.4 ACUTE NON-ST ELEVATION MYOCARDIAL INFARCTION (NSTEMI) (HCC): ICD-10-CM

## 2019-05-29 ENCOUNTER — CLINICAL SUPPORT (OUTPATIENT)
Dept: CARDIAC REHAB | Facility: CLINIC | Age: 79
End: 2019-05-29
Payer: MEDICARE

## 2019-05-29 ENCOUNTER — TELEPHONE (OUTPATIENT)
Dept: CARDIOLOGY CLINIC | Facility: CLINIC | Age: 79
End: 2019-05-29

## 2019-05-29 DIAGNOSIS — I21.4 NSTEMI (NON-ST ELEVATION MYOCARDIAL INFARCTION) (HCC): ICD-10-CM

## 2019-05-29 PROCEDURE — 93798 PHYS/QHP OP CAR RHAB W/ECG: CPT

## 2019-05-31 ENCOUNTER — CLINICAL SUPPORT (OUTPATIENT)
Dept: CARDIAC REHAB | Facility: CLINIC | Age: 79
End: 2019-05-31
Payer: MEDICARE

## 2019-05-31 DIAGNOSIS — I21.4 NSTEMI (NON-ST ELEVATION MYOCARDIAL INFARCTION) (HCC): ICD-10-CM

## 2019-05-31 PROCEDURE — 93798 PHYS/QHP OP CAR RHAB W/ECG: CPT

## 2019-06-03 ENCOUNTER — APPOINTMENT (OUTPATIENT)
Dept: LAB | Facility: CLINIC | Age: 79
End: 2019-06-03
Payer: MEDICARE

## 2019-06-03 ENCOUNTER — CLINICAL SUPPORT (OUTPATIENT)
Dept: CARDIAC REHAB | Facility: CLINIC | Age: 79
End: 2019-06-03
Payer: MEDICARE

## 2019-06-03 ENCOUNTER — TRANSCRIBE ORDERS (OUTPATIENT)
Dept: LAB | Facility: CLINIC | Age: 79
End: 2019-06-03

## 2019-06-03 DIAGNOSIS — E61.1 IRON DEFICIENCY: ICD-10-CM

## 2019-06-03 DIAGNOSIS — N18.30 CHRONIC KIDNEY DISEASE, STAGE III (MODERATE) (HCC): Chronic | ICD-10-CM

## 2019-06-03 DIAGNOSIS — D63.1 ANEMIA OF CHRONIC RENAL FAILURE, STAGE 3 (MODERATE) (HCC): ICD-10-CM

## 2019-06-03 DIAGNOSIS — E78.1 HYPERTRIGLYCERIDEMIA: ICD-10-CM

## 2019-06-03 DIAGNOSIS — E55.9 VITAMIN D DEFICIENCY: ICD-10-CM

## 2019-06-03 DIAGNOSIS — E78.5 DYSLIPIDEMIA: ICD-10-CM

## 2019-06-03 DIAGNOSIS — I12.9 HYPERTENSIVE CHRONIC KIDNEY DISEASE WITH STAGE 1 THROUGH STAGE 4 CHRONIC KIDNEY DISEASE, OR UNSPECIFIED CHRONIC KIDNEY DISEASE: ICD-10-CM

## 2019-06-03 DIAGNOSIS — N18.30 ANEMIA OF CHRONIC RENAL FAILURE, STAGE 3 (MODERATE) (HCC): ICD-10-CM

## 2019-06-03 DIAGNOSIS — I21.4 NSTEMI (NON-ST ELEVATION MYOCARDIAL INFARCTION) (HCC): ICD-10-CM

## 2019-06-03 LAB
ALBUMIN SERPL BCP-MCNC: 3.9 G/DL (ref 3.5–5)
ALP SERPL-CCNC: 101 U/L (ref 46–116)
ALT SERPL W P-5'-P-CCNC: 28 U/L (ref 12–78)
ANION GAP SERPL CALCULATED.3IONS-SCNC: 4 MMOL/L (ref 4–13)
AST SERPL W P-5'-P-CCNC: 19 U/L (ref 5–45)
BILIRUB SERPL-MCNC: 0.61 MG/DL (ref 0.2–1)
BUN SERPL-MCNC: 25 MG/DL (ref 5–25)
CALCIUM SERPL-MCNC: 8.6 MG/DL (ref 8.3–10.1)
CHLORIDE SERPL-SCNC: 107 MMOL/L (ref 100–108)
CO2 SERPL-SCNC: 27 MMOL/L (ref 21–32)
CREAT SERPL-MCNC: 1.05 MG/DL (ref 0.6–1.3)
CREAT UR-MCNC: 216 MG/DL
ERYTHROCYTE [DISTWIDTH] IN BLOOD BY AUTOMATED COUNT: 13.9 % (ref 11.6–15.1)
GFR SERPL CREATININE-BSD FRML MDRD: 50 ML/MIN/1.73SQ M
GLUCOSE P FAST SERPL-MCNC: 96 MG/DL (ref 65–99)
HCT VFR BLD AUTO: 39.4 % (ref 34.8–46.1)
HGB BLD-MCNC: 12.1 G/DL (ref 11.5–15.4)
MAGNESIUM SERPL-MCNC: 2.4 MG/DL (ref 1.6–2.6)
MCH RBC QN AUTO: 26.1 PG (ref 26.8–34.3)
MCHC RBC AUTO-ENTMCNC: 30.7 G/DL (ref 31.4–37.4)
MCV RBC AUTO: 85 FL (ref 82–98)
PLATELET # BLD AUTO: 255 THOUSANDS/UL (ref 149–390)
PMV BLD AUTO: 10.7 FL (ref 8.9–12.7)
POTASSIUM SERPL-SCNC: 3.8 MMOL/L (ref 3.5–5.3)
PROT SERPL-MCNC: 7.5 G/DL (ref 6.4–8.2)
PROT UR-MCNC: 29 MG/DL
PROT/CREAT UR: 0.13 MG/G{CREAT} (ref 0–0.1)
RBC # BLD AUTO: 4.64 MILLION/UL (ref 3.81–5.12)
SODIUM SERPL-SCNC: 138 MMOL/L (ref 136–145)
WBC # BLD AUTO: 7.5 THOUSAND/UL (ref 4.31–10.16)

## 2019-06-03 PROCEDURE — 93798 PHYS/QHP OP CAR RHAB W/ECG: CPT

## 2019-06-03 PROCEDURE — 80053 COMPREHEN METABOLIC PANEL: CPT

## 2019-06-03 PROCEDURE — 83735 ASSAY OF MAGNESIUM: CPT

## 2019-06-03 PROCEDURE — 36415 COLL VENOUS BLD VENIPUNCTURE: CPT

## 2019-06-03 PROCEDURE — 85027 COMPLETE CBC AUTOMATED: CPT

## 2019-06-03 PROCEDURE — 82570 ASSAY OF URINE CREATININE: CPT

## 2019-06-03 PROCEDURE — 84156 ASSAY OF PROTEIN URINE: CPT

## 2019-06-04 DIAGNOSIS — N18.30 CHRONIC KIDNEY DISEASE, STAGE III (MODERATE) (HCC): Chronic | ICD-10-CM

## 2019-06-04 DIAGNOSIS — E78.5 DYSLIPIDEMIA: ICD-10-CM

## 2019-06-04 DIAGNOSIS — E55.9 VITAMIN D DEFICIENCY: ICD-10-CM

## 2019-06-04 DIAGNOSIS — D63.1 ANEMIA OF CHRONIC RENAL FAILURE, STAGE 3 (MODERATE) (HCC): ICD-10-CM

## 2019-06-04 DIAGNOSIS — I12.9 HYPERTENSIVE CHRONIC KIDNEY DISEASE WITH STAGE 1 THROUGH STAGE 4 CHRONIC KIDNEY DISEASE, OR UNSPECIFIED CHRONIC KIDNEY DISEASE: ICD-10-CM

## 2019-06-04 DIAGNOSIS — N18.30 ANEMIA OF CHRONIC RENAL FAILURE, STAGE 3 (MODERATE) (HCC): ICD-10-CM

## 2019-06-04 DIAGNOSIS — E61.1 IRON DEFICIENCY: ICD-10-CM

## 2019-06-04 RX ORDER — METOPROLOL SUCCINATE 100 MG/1
100 TABLET, EXTENDED RELEASE ORAL DAILY
Qty: 45 TABLET | Refills: 5 | Status: CANCELLED | OUTPATIENT
Start: 2019-06-04

## 2019-06-04 RX ORDER — METOPROLOL SUCCINATE 100 MG/1
100 TABLET, EXTENDED RELEASE ORAL 2 TIMES DAILY
Qty: 180 TABLET | Refills: 3 | Status: ON HOLD | OUTPATIENT
Start: 2019-06-04 | End: 2020-06-05 | Stop reason: SDUPTHER

## 2019-06-05 ENCOUNTER — CLINICAL SUPPORT (OUTPATIENT)
Dept: CARDIAC REHAB | Facility: CLINIC | Age: 79
End: 2019-06-05
Payer: MEDICARE

## 2019-06-05 DIAGNOSIS — I21.4 NSTEMI (NON-ST ELEVATION MYOCARDIAL INFARCTION) (HCC): ICD-10-CM

## 2019-06-05 PROCEDURE — 93798 PHYS/QHP OP CAR RHAB W/ECG: CPT

## 2019-06-07 ENCOUNTER — APPOINTMENT (OUTPATIENT)
Dept: CARDIAC REHAB | Facility: CLINIC | Age: 79
End: 2019-06-07
Payer: MEDICARE

## 2019-06-10 ENCOUNTER — APPOINTMENT (OUTPATIENT)
Dept: CARDIAC REHAB | Facility: CLINIC | Age: 79
End: 2019-06-10
Payer: MEDICARE

## 2019-06-12 ENCOUNTER — TRANSCRIBE ORDERS (OUTPATIENT)
Dept: LAB | Facility: CLINIC | Age: 79
End: 2019-06-12

## 2019-06-12 ENCOUNTER — CLINICAL SUPPORT (OUTPATIENT)
Dept: CARDIAC REHAB | Facility: CLINIC | Age: 79
End: 2019-06-12
Payer: MEDICARE

## 2019-06-12 ENCOUNTER — APPOINTMENT (OUTPATIENT)
Dept: LAB | Facility: CLINIC | Age: 79
End: 2019-06-12
Payer: MEDICARE

## 2019-06-12 DIAGNOSIS — I21.4 NSTEMI (NON-ST ELEVATION MYOCARDIAL INFARCTION) (HCC): ICD-10-CM

## 2019-06-12 DIAGNOSIS — E04.2 NONTOXIC MULTINODULAR GOITER: ICD-10-CM

## 2019-06-12 DIAGNOSIS — E04.2 NONTOXIC MULTINODULAR GOITER: Primary | ICD-10-CM

## 2019-06-12 LAB — TSH SERPL DL<=0.05 MIU/L-ACNC: 1.29 UIU/ML (ref 0.36–3.74)

## 2019-06-12 PROCEDURE — 93798 PHYS/QHP OP CAR RHAB W/ECG: CPT

## 2019-06-12 PROCEDURE — 84443 ASSAY THYROID STIM HORMONE: CPT

## 2019-06-12 PROCEDURE — 36415 COLL VENOUS BLD VENIPUNCTURE: CPT

## 2019-06-14 ENCOUNTER — CLINICAL SUPPORT (OUTPATIENT)
Dept: CARDIAC REHAB | Facility: CLINIC | Age: 79
End: 2019-06-14
Payer: MEDICARE

## 2019-06-14 ENCOUNTER — PATIENT OUTREACH (OUTPATIENT)
Dept: CASE MANAGEMENT | Facility: OTHER | Age: 79
End: 2019-06-14

## 2019-06-14 DIAGNOSIS — I21.4 NSTEMI (NON-ST ELEVATION MYOCARDIAL INFARCTION) (HCC): ICD-10-CM

## 2019-06-14 PROCEDURE — 93798 PHYS/QHP OP CAR RHAB W/ECG: CPT

## 2019-06-17 ENCOUNTER — TELEPHONE (OUTPATIENT)
Dept: NEPHROLOGY | Facility: CLINIC | Age: 79
End: 2019-06-17

## 2019-06-17 ENCOUNTER — OFFICE VISIT (OUTPATIENT)
Dept: NEPHROLOGY | Facility: CLINIC | Age: 79
End: 2019-06-17
Payer: MEDICARE

## 2019-06-17 ENCOUNTER — CLINICAL SUPPORT (OUTPATIENT)
Dept: CARDIAC REHAB | Facility: CLINIC | Age: 79
End: 2019-06-17
Payer: MEDICARE

## 2019-06-17 VITALS — HEIGHT: 64 IN | WEIGHT: 152.8 LBS | BODY MASS INDEX: 26.09 KG/M2

## 2019-06-17 DIAGNOSIS — E55.9 VITAMIN D DEFICIENCY: ICD-10-CM

## 2019-06-17 DIAGNOSIS — D63.1 ANEMIA OF CHRONIC RENAL FAILURE, STAGE 3 (MODERATE) (HCC): ICD-10-CM

## 2019-06-17 DIAGNOSIS — R09.89 BILATERAL CAROTID BRUITS: ICD-10-CM

## 2019-06-17 DIAGNOSIS — N18.30 BENIGN HYPERTENSION WITH CHRONIC KIDNEY DISEASE, STAGE III (HCC): Primary | Chronic | ICD-10-CM

## 2019-06-17 DIAGNOSIS — I12.9 BENIGN HYPERTENSION WITH CHRONIC KIDNEY DISEASE, STAGE III (HCC): Primary | Chronic | ICD-10-CM

## 2019-06-17 DIAGNOSIS — I12.9 PARENCHYMAL RENAL HYPERTENSION, STAGE 1 THROUGH STAGE 4 OR UNSPECIFIED CHRONIC KIDNEY DISEASE: ICD-10-CM

## 2019-06-17 DIAGNOSIS — E61.1 IRON DEFICIENCY: ICD-10-CM

## 2019-06-17 DIAGNOSIS — N18.30 ANEMIA OF CHRONIC RENAL FAILURE, STAGE 3 (MODERATE) (HCC): ICD-10-CM

## 2019-06-17 DIAGNOSIS — I21.4 NON-ST ELEVATION MYOCARDIAL INFARCTION (NSTEMI), INITIAL CARE EPISODE (HCC): ICD-10-CM

## 2019-06-17 DIAGNOSIS — N18.30 CHRONIC KIDNEY DISEASE, STAGE III (MODERATE) (HCC): Chronic | ICD-10-CM

## 2019-06-17 PROCEDURE — 93798 PHYS/QHP OP CAR RHAB W/ECG: CPT

## 2019-06-17 PROCEDURE — 99214 OFFICE O/P EST MOD 30 MIN: CPT | Performed by: INTERNAL MEDICINE

## 2019-06-17 RX ORDER — LOSARTAN POTASSIUM 50 MG/1
50 TABLET ORAL 2 TIMES DAILY
Qty: 60 TABLET | Refills: 5 | Status: SHIPPED | OUTPATIENT
Start: 2019-06-17 | End: 2019-12-06 | Stop reason: SDUPTHER

## 2019-06-17 RX ORDER — SPIRONOLACTONE 25 MG/1
25 TABLET ORAL DAILY
Qty: 30 TABLET | Refills: 5 | Status: SHIPPED | OUTPATIENT
Start: 2019-06-17 | End: 2020-06-02

## 2019-06-18 ENCOUNTER — PATIENT OUTREACH (OUTPATIENT)
Dept: CASE MANAGEMENT | Facility: OTHER | Age: 79
End: 2019-06-18

## 2019-06-19 ENCOUNTER — CLINICAL SUPPORT (OUTPATIENT)
Dept: CARDIAC REHAB | Facility: CLINIC | Age: 79
End: 2019-06-19
Payer: MEDICARE

## 2019-06-19 DIAGNOSIS — I21.4 NSTEMI (NON-ST ELEVATION MYOCARDIAL INFARCTION) (HCC): ICD-10-CM

## 2019-06-19 PROCEDURE — 93798 PHYS/QHP OP CAR RHAB W/ECG: CPT

## 2019-06-21 ENCOUNTER — CLINICAL SUPPORT (OUTPATIENT)
Dept: CARDIAC REHAB | Facility: CLINIC | Age: 79
End: 2019-06-21
Payer: MEDICARE

## 2019-06-21 DIAGNOSIS — I21.4 NSTEMI (NON-ST ELEVATION MYOCARDIAL INFARCTION) (HCC): ICD-10-CM

## 2019-06-21 PROCEDURE — 93798 PHYS/QHP OP CAR RHAB W/ECG: CPT

## 2019-06-24 ENCOUNTER — CLINICAL SUPPORT (OUTPATIENT)
Dept: CARDIAC REHAB | Facility: CLINIC | Age: 79
End: 2019-06-24
Payer: MEDICARE

## 2019-06-24 ENCOUNTER — APPOINTMENT (OUTPATIENT)
Dept: LAB | Facility: CLINIC | Age: 79
End: 2019-06-24
Payer: MEDICARE

## 2019-06-24 DIAGNOSIS — E61.1 IRON DEFICIENCY: ICD-10-CM

## 2019-06-24 DIAGNOSIS — N18.30 ANEMIA OF CHRONIC RENAL FAILURE, STAGE 3 (MODERATE) (HCC): ICD-10-CM

## 2019-06-24 DIAGNOSIS — I21.09 MYOCARDIAL INFARCTION INVOLVING OTHER CORONARY ARTERY OF ANTERIOR WALL, UNSPECIFIED MI TYPE (HCC): ICD-10-CM

## 2019-06-24 DIAGNOSIS — D63.1 ANEMIA OF CHRONIC RENAL FAILURE, STAGE 3 (MODERATE) (HCC): ICD-10-CM

## 2019-06-24 DIAGNOSIS — I12.9 BENIGN HYPERTENSION WITH CHRONIC KIDNEY DISEASE, STAGE III (HCC): Chronic | ICD-10-CM

## 2019-06-24 DIAGNOSIS — N18.30 BENIGN HYPERTENSION WITH CHRONIC KIDNEY DISEASE, STAGE III (HCC): Chronic | ICD-10-CM

## 2019-06-24 DIAGNOSIS — I12.9 PARENCHYMAL RENAL HYPERTENSION, STAGE 1 THROUGH STAGE 4 OR UNSPECIFIED CHRONIC KIDNEY DISEASE: ICD-10-CM

## 2019-06-24 DIAGNOSIS — E55.9 VITAMIN D DEFICIENCY: ICD-10-CM

## 2019-06-24 DIAGNOSIS — N18.30 CHRONIC KIDNEY DISEASE, STAGE III (MODERATE) (HCC): Chronic | ICD-10-CM

## 2019-06-24 LAB
ANION GAP SERPL CALCULATED.3IONS-SCNC: 6 MMOL/L (ref 4–13)
BUN SERPL-MCNC: 25 MG/DL (ref 5–25)
CALCIUM SERPL-MCNC: 9 MG/DL (ref 8.3–10.1)
CHLORIDE SERPL-SCNC: 110 MMOL/L (ref 100–108)
CO2 SERPL-SCNC: 26 MMOL/L (ref 21–32)
CREAT SERPL-MCNC: 1.13 MG/DL (ref 0.6–1.3)
GFR SERPL CREATININE-BSD FRML MDRD: 46 ML/MIN/1.73SQ M
GLUCOSE SERPL-MCNC: 108 MG/DL (ref 65–140)
POTASSIUM SERPL-SCNC: 4 MMOL/L (ref 3.5–5.3)
SODIUM SERPL-SCNC: 142 MMOL/L (ref 136–145)

## 2019-06-24 PROCEDURE — 93798 PHYS/QHP OP CAR RHAB W/ECG: CPT

## 2019-06-24 PROCEDURE — 36415 COLL VENOUS BLD VENIPUNCTURE: CPT

## 2019-06-24 PROCEDURE — 80048 BASIC METABOLIC PNL TOTAL CA: CPT

## 2019-06-26 ENCOUNTER — CLINICAL SUPPORT (OUTPATIENT)
Dept: CARDIAC REHAB | Facility: CLINIC | Age: 79
End: 2019-06-26
Payer: MEDICARE

## 2019-06-26 DIAGNOSIS — Z95.1 S/P CABG X 4: Chronic | ICD-10-CM

## 2019-06-26 PROCEDURE — 93798 PHYS/QHP OP CAR RHAB W/ECG: CPT

## 2019-06-28 ENCOUNTER — CLINICAL SUPPORT (OUTPATIENT)
Dept: CARDIAC REHAB | Facility: CLINIC | Age: 79
End: 2019-06-28
Payer: MEDICARE

## 2019-06-28 DIAGNOSIS — Z95.1 S/P CABG X 4: Chronic | ICD-10-CM

## 2019-06-28 PROCEDURE — 93798 PHYS/QHP OP CAR RHAB W/ECG: CPT

## 2019-07-02 ENCOUNTER — CLINICAL SUPPORT (OUTPATIENT)
Dept: CARDIAC REHAB | Facility: CLINIC | Age: 79
End: 2019-07-02
Payer: MEDICARE

## 2019-07-02 DIAGNOSIS — I21.4 NSTEMI (NON-ST ELEVATION MYOCARDIAL INFARCTION) (HCC): ICD-10-CM

## 2019-07-02 PROCEDURE — 93798 PHYS/QHP OP CAR RHAB W/ECG: CPT

## 2019-07-03 ENCOUNTER — CLINICAL SUPPORT (OUTPATIENT)
Dept: CARDIAC REHAB | Facility: CLINIC | Age: 79
End: 2019-07-03
Payer: MEDICARE

## 2019-07-03 DIAGNOSIS — I21.4 NSTEMI (NON-ST ELEVATION MYOCARDIAL INFARCTION) (HCC): ICD-10-CM

## 2019-07-03 PROCEDURE — 93798 PHYS/QHP OP CAR RHAB W/ECG: CPT

## 2019-07-05 ENCOUNTER — APPOINTMENT (OUTPATIENT)
Dept: CARDIAC REHAB | Facility: CLINIC | Age: 79
End: 2019-07-05
Payer: MEDICARE

## 2019-07-08 ENCOUNTER — CLINICAL SUPPORT (OUTPATIENT)
Dept: CARDIAC REHAB | Facility: CLINIC | Age: 79
End: 2019-07-08
Payer: MEDICARE

## 2019-07-08 DIAGNOSIS — I21.4 NON-ST ELEVATION MYOCARDIAL INFARCTION (NSTEMI), INITIAL CARE EPISODE (HCC): ICD-10-CM

## 2019-07-08 PROCEDURE — 93798 PHYS/QHP OP CAR RHAB W/ECG: CPT

## 2019-07-10 ENCOUNTER — CLINICAL SUPPORT (OUTPATIENT)
Dept: CARDIAC REHAB | Facility: CLINIC | Age: 79
End: 2019-07-10
Payer: MEDICARE

## 2019-07-10 ENCOUNTER — OFFICE VISIT (OUTPATIENT)
Dept: AUDIOLOGY | Facility: CLINIC | Age: 79
End: 2019-07-10

## 2019-07-10 VITALS — WEIGHT: 149 LBS | HEIGHT: 64 IN | BODY MASS INDEX: 25.44 KG/M2

## 2019-07-10 DIAGNOSIS — H90.3 SENSORY HEARING LOSS, BILATERAL: Primary | ICD-10-CM

## 2019-07-10 DIAGNOSIS — Z95.1 S/P CABG X 4: Chronic | ICD-10-CM

## 2019-07-10 PROCEDURE — 93798 PHYS/QHP OP CAR RHAB W/ECG: CPT

## 2019-07-17 ENCOUNTER — CLINICAL SUPPORT (OUTPATIENT)
Dept: CARDIAC REHAB | Facility: CLINIC | Age: 79
End: 2019-07-17
Payer: MEDICARE

## 2019-07-17 ENCOUNTER — OFFICE VISIT (OUTPATIENT)
Dept: CARDIOLOGY CLINIC | Facility: CLINIC | Age: 79
End: 2019-07-17
Payer: MEDICARE

## 2019-07-17 VITALS
OXYGEN SATURATION: 95 % | HEART RATE: 71 BPM | WEIGHT: 150.4 LBS | BODY MASS INDEX: 25.68 KG/M2 | SYSTOLIC BLOOD PRESSURE: 130 MMHG | DIASTOLIC BLOOD PRESSURE: 80 MMHG | HEIGHT: 64 IN

## 2019-07-17 DIAGNOSIS — I10 ESSENTIAL HYPERTENSION: ICD-10-CM

## 2019-07-17 DIAGNOSIS — I50.32 CHRONIC DIASTOLIC CONGESTIVE HEART FAILURE (HCC): ICD-10-CM

## 2019-07-17 DIAGNOSIS — I25.118 CORONARY ARTERY DISEASE OF NATIVE ARTERY OF NATIVE HEART WITH STABLE ANGINA PECTORIS (HCC): Primary | ICD-10-CM

## 2019-07-17 DIAGNOSIS — I12.9 HYPERTENSIVE CHRONIC KIDNEY DISEASE WITH STAGE 1 THROUGH STAGE 4 CHRONIC KIDNEY DISEASE, OR UNSPECIFIED CHRONIC KIDNEY DISEASE: ICD-10-CM

## 2019-07-17 DIAGNOSIS — Z95.1 S/P CABG X 4: Chronic | ICD-10-CM

## 2019-07-17 DIAGNOSIS — I50.22 CHRONIC SYSTOLIC CONGESTIVE HEART FAILURE (HCC): Chronic | ICD-10-CM

## 2019-07-17 DIAGNOSIS — I21.11 ST ELEVATION MYOCARDIAL INFARCTION INVOLVING RIGHT CORONARY ARTERY (HCC): ICD-10-CM

## 2019-07-17 DIAGNOSIS — E78.5 DYSLIPIDEMIA: ICD-10-CM

## 2019-07-17 PROBLEM — I50.43 HEART FAILURE, ACUTE ON CHRONIC, SYSTOLIC AND DIASTOLIC (HCC): Status: RESOLVED | Noted: 2017-08-02 | Resolved: 2019-07-17

## 2019-07-17 PROCEDURE — 93000 ELECTROCARDIOGRAM COMPLETE: CPT | Performed by: INTERNAL MEDICINE

## 2019-07-17 PROCEDURE — 99214 OFFICE O/P EST MOD 30 MIN: CPT | Performed by: INTERNAL MEDICINE

## 2019-07-17 PROCEDURE — 93798 PHYS/QHP OP CAR RHAB W/ECG: CPT

## 2019-07-17 NOTE — PROGRESS NOTES
Cardiology Follow Up    Jaqueline Valero  1940  [id number]      Interval History: Jaqueline Valero is here for follow up of CAD  She was admitted to [facility] with chest pain and shortness of breath which was occurring for over 24 hours prior to arrival   There was some relief with nitroglycerin but not complete  Workup in the emergency room revealed a non ST segment elevation myocardial infarction and she was transferred to [facility] AT [facility] for catheterization  On March 18, 2019, cardiac catheterization showed:  Proximal left main: The vessel was normal sized and mildly calcified  Angiography showed moderate atherosclerosis  Proximal LAD: There was a 100 % stenosis at proximal LAD and LIMA to LAD is patent  Mid circumflex: The vessel was small sized  Angiography showed moderate atherosclerosis  It gives collaterals to RCA  1st obtuse marginal: There was a 100 % stenosis  This lesion is a chronic total occlusion  SVG vein graft to OM1 has previos stent and there is in-stent 90 % stenosis  RCA: There was a 100 % stenosis  This lesion is a chronic total occlusion  Vein graft to RCA is 100 occluded and already known  Distal RCA and RPDA has collaterals from distal Lcx and lad     She underwent percutaneous intervention  At the proximal anastomosis of the SVG to OM1 graft with a Resolute drug-eluting stent which was 3 5 x 22 mm  in length  Echocardiogram done prior to the procedure showed ejection fraction of 35-40% with moderate hypokinesis of the inferolateral, inferior and anterolateral walls  In January 2018, ejection fraction was 50% with no segmental  wall motion abnormalities reported  Since her last visit, she has completed cardiac rehab  Did not have significant improvements in 6 minute walk due to leg pain  Weight has increased  She denies any chest pain or shortness of breath      The following portions of the patient's history were reviewed and updated as appropriate: allergies, current medications, past family history, past medical history, past social history, past surgical history and problem list     Current Outpatient Medications on File Prior to Visit   Medication Sig Dispense Refill    amLODIPine (NORVASC) 2 5 mg tablet Take 2 tablets (5 mg total) by mouth see administration instructions 2 5 mg in morning, 5 mg in evening 180 tablet 3    Ascorbic Acid (VITAMIN C) 1000 MG tablet Take 1,000 mg by mouth daily      aspirin (ECOTRIN LOW STRENGTH) 81 mg EC tablet Take 81 mg by mouth daily Indications: Heart Attack   Coenzyme Q10 10 MG capsule Take 10 mg by mouth daily      hydroxychloroquine (PLAQUENIL) 200 mg tablet Take 200 mg by mouth daily        Icosapent Ethyl 1 g CAPS Take 1 capsule (1 g total) by mouth 2 (two) times a day 60 capsule 5    levothyroxine 50 mcg tablet Take 50 mcg by mouth daily        losartan (COZAAR) 50 mg tablet Take 1 tablet (50 mg total) by mouth 2 (two) times a day 60 tablet 5    metoprolol succinate (TOPROL-XL) 100 mg 24 hr tablet Take 1 tablet (100 mg total) by mouth 2 (two) times a day Take 1 tablet in the morning (100mg),Take 1/2  tablet in the evening (50mg) 180 tablet 3    Multiple Vitamins-Minerals (CENTRUM SILVER PO) Take 1 tablet by mouth daily      nitroglycerin (NITROLINGUAL) 0 4 mg/spray spray Place 1 spray under the tongue every 5 (five) minutes as needed for chest pain      spironolactone (ALDACTONE) 25 mg tablet Take 1 tablet (25 mg total) by mouth daily 30 tablet 5    ticagrelor (BRILINTA) 90 MG Take 1 tablet (90 mg total) by mouth every 12 (twelve) hours 60 tablet 5    torsemide (DEMADEX) 10 mg tablet Take 10 mg by mouth daily      Vitamin D, Cholecalciferol, 1000 units CAPS Take 2,000 Units by mouth daily        isosorbide mononitrate (IMDUR) 60 mg 24 hr tablet Take 60 mg by mouth daily      Mirabegron (MYRBETRIQ PO) Take 25 mg by mouth every other day No current facility-administered medications on file prior to visit  Review of Systems:  Review of Systems   Constitutional: Negative for chills, fatigue and fever  HENT: Negative for congestion, nosebleeds and postnasal drip  Respiratory: Negative for cough, chest tightness and shortness of breath  Cardiovascular: Negative for chest pain, palpitations and leg swelling  Gastrointestinal: Negative for abdominal distention, abdominal pain, diarrhea, nausea and vomiting  Endocrine: Negative for polydipsia, polyphagia and polyuria  Musculoskeletal: Negative for gait problem and myalgias  Skin: Negative for color change, pallor and rash  Allergic/Immunologic: Negative for environmental allergies, food allergies and immunocompromised state  Neurological: Negative for dizziness, seizures, syncope and light-headedness  Hematological: Negative for adenopathy  Does not bruise/bleed easily  Psychiatric/Behavioral: Negative for dysphoric mood  The patient is not nervous/anxious  Physical Exam:  /80 (BP Location: Left arm, Patient Position: Sitting, Cuff Size: Standard)   Pulse 71   Ht 5' 4" (1 626 m)   Wt 68 2 kg (150 lb 6 4 oz)   SpO2 95%   BMI 25 82 kg/m²     Physical Exam   Constitutional: She is oriented to person, place, and time  She appears well-developed  No distress  HENT:   Head: Normocephalic and atraumatic  Eyes: Pupils are equal, round, and reactive to light  Conjunctivae and EOM are normal    Neck: Neck supple  No JVD present  No thyromegaly present  Cardiovascular: Normal rate and regular rhythm  Exam reveals no gallop and no friction rub  Murmur heard  Pulmonary/Chest: Effort normal and breath sounds normal    Abdominal: Soft  She exhibits no distension  There is no tenderness  Musculoskeletal: She exhibits no edema  Neurological: She is alert and oriented to person, place, and time  No cranial nerve deficit  Skin: Skin is warm and dry   No rash noted  She is not diaphoretic  No erythema  Psychiatric: She has a normal mood and affect  Her behavior is normal  Judgment and thought content normal        Cardiographics  ECG:  Normal sinus rhythm with first-degree AV block and PAC    Labs:  Lab Results   Component Value Date    K 4 0 06/24/2019     (H) 06/24/2019    CO2 26 06/24/2019    BUN 25 06/24/2019    CREATININE 1 13 06/24/2019    CALCIUM 9 0 06/24/2019     Lab Results   Component Value Date    WBC 7 50 06/03/2019    HGB 12 1 06/03/2019    HCT 39 4 06/03/2019    MCV 85 06/03/2019     06/03/2019     Lab Results   Component Value Date    TRIG 162 (H) 02/26/2019    HDL 59 02/26/2019     Imaging: Xr Chest 1 View Portable    Result Date: 3/14/2019  Narrative: CHEST INDICATION:   chest pain  COMPARISON:  12/11/2018 EXAM PERFORMED/VIEWS:  XR CHEST PORTABLE Images: 1 FINDINGS:  There are median sternotomy wires indicating prior cardiac surgery  Heart shadow is enlarged but unchanged from prior exam  The lungs are clear  No pneumothorax or pleural effusion  Osseous structures appear within normal limits for patient age  Impression: No acute cardiopulmonary disease  Workstation performed: [id number]       Discussion/Summary:  1  Coronary artery disease of native artery of native heart with stable angina pectoris ([address] )    2  Chronic diastolic congestive heart failure ([address] )    3  Chronic systolic congestive heart failure ([address] )    4  Essential hypertension    5  Hypertensive chronic kidney disease with stage 1 through stage 4 chronic kidney disease, or unspecified chronic kidney disease    6  S/P CABG x 4    7  Dyslipidemia      - Ms Elizabet Wheat is doing well post PCI  She has no symptoms of shortness of breath or chest pain  - Continue current BP medication regimen  - She had a reduction in EF during recent event  Currently, she is on metoprolol succinate, aldactone and losartan  Will repeat echocardiogram   - Will repeat BMP   Her creatinine went from 1 2 to 1 9 post catheterization  She should hold torsemide for now  - After 1 year, will switch Brilinta to Xarelto 2 5 mg bid  - She has difficulty tolerating statins due to muscle weakness and does not wish to take it  Continue Vascepa and zetia    May need to use PCSK9 inhibitor

## 2019-07-17 NOTE — PROGRESS NOTES
Nura Wallacedrew   1940     Risk: high     Pre Post % Change Goal   Date: 4/1/2019 7/17/2019     Physical       Sub Max ETT (mets) 3 1 3 1 0 0% 10% increase   6MWT (feet) 960 930 -3 1% 10% increase   Arm curls 14 13     Chair to Stands 8 5     SANDOVAL Al (est peak O2) 4 3 4 73 10 0%    Peak exercise CR/NH (mets) 2 4 2 9 20 8% 40% increase   Emotional       PHQ9 (> 10 refer to MD) 4 5 25 0% 4 pt decrease   [place] (lower score = improvement)       Total 24 26 8 3% < 27   Feelings 3 2 -33 3% < 3   Physical Fitness 4 4 0 0% < 3   Social Support 5 1 -80 0% < 3   Daily Activities 3 3 0 0% < 3   Social Activities 1 3 200 0% < 3   Pain 4 4 0 0% < 3   Overall Health 4 4 0 0% < 3   Quality of Life 3 3 0 0% < 3   Change in Health 1 2 100 0% < 3   Dietary       Rate your plate 56 69 93 6% > 58   Measurements       Weight 143 5 149 3 8% 2 5 - 5%   BMI 24 6 25 6 4 1% 19 - 25   Waist Circ  37 37 5 1 4% < 40 M / < 35 F   % Body fat 39 3 40 6 3 3% < 25 M / < 33 F   BP left arm               (systolic) 076 613 -2 4% < 987   (diastolic) 72 66 -6 3% < 90   Smoking #/day  (if applicable) 0 0 3 1% 0   Lipids/Glucose (Date) 2/26/19      Total cholesterol 230   50 - 200   Triglycerides 162   < 150   HDL 59   40 - 60      < 100   A1C    4 0 - 5 6%   Fasting BG 97

## 2019-07-17 NOTE — PROGRESS NOTES
Cardiac Rehabilitation Plan of Care   Discharge      Today's date: 2019   Visits:36  Patient name: Audrey Rosa      : 1940  Age: [de-identified] y o  MRN: [id number]  Referring Physician: Tj Stokes MD  Provider: Maria Antonia Amaya  Clinician: Alfredo Crandall Rn    Dx:   Encounter Diagnosis   Name Primary?  ST elevation myocardial infarction involving right coronary artery [facility])      Date of onset: 3/14/19      SUMMARY OF PROGRESS:  Mrs Jayashree Guy completed the 36 sessions of the cardiac rehabilitation program  Her telemetry monitor has been NSR with PVC, PAC at rest and with exercise  She completes 40-45 minutes of cardiovascular exercise  Her exercise MET level increased from 2 4 to 3 0 MET  s  Her RPE is 4-6  She has a normal hemodynamic response to exercise  She complains of occasional chronic left knee discomfort 0-4/10 on pain scale  Also complained of fatigue  She stated she is cleaning her house to provide room for her granddaughter to move in with her  She stated she noticed an increase in energy and endurance  Her nutrition has improved according to rate your plate score increased from 56 to 69  She plans to rejoin the fitness center to continue with her exercise  Medication compliance: Yes   Comments: she stated she needs to refill her metoprolol  Fall Risk: High   Comments: stated she had recent falls and has attended the balance center    EKG changes: NSR with race PVC, PAC, PVC couplets      EXERCISE ASSESSMENT and PLAN    Current Exercise Program in Rehab:       Frequency: 3 days/week        Minutes: 40-45         METS: 2 4-3 0            HR: 84   RPE: 4-6         Modalities: Treadmill, UBE, NuStep and Recumbent bike      Progressing:   In Progress    Home Exercise: none at this time    Goals: increase endurance to be more active with great grand childchildren, walk around the block, and walk longer than 960 feet in 6 minutes in 12 weeks (930 feet)  Education: signs and sxs and RPE scale   Plan:attend cardiac rehabilitation 3 times a week  Readiness to change: Action      NUTRITION ASSESSMENT AND PLAN    Weight control:    Starting weight: 143 5   Current weight:     Waist circumference:    Startin   Current:    Diabetes: N/A  Lipid management: Discussed diet and lipid management and Last lipid profile 230  Chol 230    HDL 59    Goals:decrease sweet consumption  Education: heart healthy diet, visceral fat and the effects on the heart and body, provided healthy body fat options, reading food labels, my plate, heart healthy food choices for the holidays  Progressing: In Progress  Plan: decrease sweets  Readiness to change: Action      PSYCHOSOCIAL ASSESSMENT AND PLAN    Emotional:              1-4 = Minimal Depression  Self-reported stress level: 5   Social support: Good   Goals:  Reduce perceived stress to 1-3/10  Education: benefits of positive support system and coping mechanisms, decreasing stress and relaxation tips  Progressing: In Progress  Plan: Practice relaxation techniques  Readiness to change: Action      OTHER CORE COMPONENTS     Tobacco:   Social History     Tobacco Use   Smoking Status Never Smoker   Smokeless Tobacco Never Used       Tobacco Use Intervention: Referral to tobacco expert:   N/A    Blood pressure:    Restin/70   Exercise: 162/80    Goals: take medications as prescribed (met goal)  Education:  understanding HTN and CAD and low sodium diet and HTN, common heart medications, exercise tips in warm weather  Progressing: In Progress  Plan: continue to take medications as prescribed  Readiness to change: Action

## 2019-07-19 ENCOUNTER — OFFICE VISIT (OUTPATIENT)
Dept: NEPHROLOGY | Facility: CLINIC | Age: 79
End: 2019-07-19
Payer: MEDICARE

## 2019-07-19 ENCOUNTER — TELEPHONE (OUTPATIENT)
Dept: NEPHROLOGY | Facility: CLINIC | Age: 79
End: 2019-07-19

## 2019-07-19 VITALS
HEIGHT: 64 IN | RESPIRATION RATE: 18 BRPM | SYSTOLIC BLOOD PRESSURE: 104 MMHG | HEART RATE: 66 BPM | BODY MASS INDEX: 25.33 KG/M2 | WEIGHT: 148.4 LBS | DIASTOLIC BLOOD PRESSURE: 72 MMHG

## 2019-07-19 DIAGNOSIS — Z01.30 BLOOD PRESSURE CHECK: Primary | ICD-10-CM

## 2019-07-19 PROCEDURE — 1123F ACP DISCUSS/DSCN MKR DOCD: CPT

## 2019-07-19 PROCEDURE — 99211 OFF/OP EST MAY X REQ PHY/QHP: CPT

## 2019-07-19 NOTE — PROGRESS NOTES
History of Present Illness:   Dola Mcardle is a [de-identified] y o  female who is here for a blood pressure check  She did forget to bring in her home machine and blood pressure logs  She said her blood pressures at home range mainly in the 260'C systolic but do sometimes go up into the 140's  She denies any dizziness, or feeling lightheaded  She recently finished with cardiac rehab  She plans to join the wellness center in order to stay active  She follows a low salt diet  She is using a cane to aid in ambulation  No complaints offered  Review of Systems:   Cardiac: neg  Respiratory: neg   Abdominal: neg  Musculoskeletal: neg    Physical Exam:  Heart: regular rate and rhythm  Lungs: clear to auscultation  Extremities: Trace edema BLLE    Vitals:  Vitals:    07/19/19 1353 07/19/19 1405   BP: 110/78 104/72   BP Location: Left arm Right arm   Patient Position: Sitting Sitting   Cuff Size: Standard Standard   Pulse: 66    Resp: 18    Weight: 67 3 kg (148 lb 6 4 oz)    Height: 5' 4 17" (1 63 m)      Body mass index is 25 34 kg/m²  Allergies:   Atorvastatin; Boniva [ibandronic acid]; Codeine; Statins; and Zetia [ezetimibe]    Medications:     Current Outpatient Medications:     amLODIPine (NORVASC) 2 5 mg tablet, Take 2 tablets (5 mg total) by mouth see administration instructions 2 5 mg in morning, 5 mg in evening, Disp: 180 tablet, Rfl: 3    Ascorbic Acid (VITAMIN C) 1000 MG tablet, Take 1,000 mg by mouth daily, Disp: , Rfl:     aspirin (ECOTRIN LOW STRENGTH) 81 mg EC tablet, Take 81 mg by mouth daily Indications: Heart Attack  , Disp: , Rfl:     Coenzyme Q10 10 MG capsule, Take 10 mg by mouth daily, Disp: , Rfl:     hydroxychloroquine (PLAQUENIL) 200 mg tablet, Take 200 mg by mouth daily  , Disp: , Rfl:     Icosapent Ethyl 1 g CAPS, Take 1 capsule (1 g total) by mouth 2 (two) times a day, Disp: 60 capsule, Rfl: 5    isosorbide mononitrate (IMDUR) 60 mg 24 hr tablet, Take 60 mg by mouth daily, Disp: , Rfl:     levothyroxine 50 mcg tablet, Take 50 mcg by mouth daily  , Disp: , Rfl:     losartan (COZAAR) 50 mg tablet, Take 1 tablet (50 mg total) by mouth 2 (two) times a day, Disp: 60 tablet, Rfl: 5    metoprolol succinate (TOPROL-XL) 100 mg 24 hr tablet, Take 1 tablet (100 mg total) by mouth 2 (two) times a day Take 1 tablet in the morning (100mg),Take 1/2  tablet in the evening (50mg), Disp: 180 tablet, Rfl: 3    Mirabegron (MYRBETRIQ PO), Take 25 mg by mouth every other day , Disp: , Rfl:     Multiple Vitamins-Minerals (CENTRUM SILVER PO), Take 1 tablet by mouth daily, Disp: , Rfl:     nitroglycerin (NITROLINGUAL) 0 4 mg/spray spray, Place 1 spray under the tongue every 5 (five) minutes as needed for chest pain, Disp: , Rfl:     spironolactone (ALDACTONE) 25 mg tablet, Take 1 tablet (25 mg total) by mouth daily, Disp: 30 tablet, Rfl: 5    ticagrelor (BRILINTA) 90 MG, Take 1 tablet (90 mg total) by mouth every 12 (twelve) hours, Disp: 60 tablet, Rfl: 5    torsemide (DEMADEX) 10 mg tablet, Take 10 mg by mouth daily, Disp: , Rfl:     Vitamin D, Cholecalciferol, 1000 units CAPS, Take 2,000 Units by mouth daily  , Disp: , Rfl:     Plan:   Eat a low salt diet  Stay active  Avoid nonsteroidal medications such as advil, aleve, ibuprofen, motrin, naproxen, indomethacin, celebrex, etc   Check your blood pressure at home regularly, and bring these readings with you to your next appointment  Bring home machine next appointment for correlation  Our team will call you with any medication changes needed from your visit today

## 2019-07-19 NOTE — TELEPHONE ENCOUNTER
----- Message from Abe Sanches MD sent at 7/19/2019  3:22 PM EDT -----  Please have the patient send in 1 week a blood pressure readings now morning evening, sitting and standing:   Take the morning readings before any medications  Take the evening readings closer to bedtime  When taking standing readings, keep your arm supported at heart level and not dangling    Based on these readings I may make further changes before she sees me
I spoke to patient she is aware to start 1 week of BP readings and will send them in for further evaluation 
No

## 2019-07-19 NOTE — PROGRESS NOTES
Please make sure the patient since in 1 week a blood pressure readings now morning evening, sitting and standing:  · Take the morning readings before any medications  · Take the evening readings closer to bedtime  · When taking standing readings, keep your arm supported at heart level and not dangling  Based on these readings I may make changes before her appointment

## 2019-07-25 ENCOUNTER — HOSPITAL ENCOUNTER (OUTPATIENT)
Dept: NON INVASIVE DIAGNOSTICS | Facility: HOSPITAL | Age: 79
Discharge: HOME | End: 2019-07-25
Attending: INTERNAL MEDICINE
Payer: MEDICARE

## 2019-07-25 DIAGNOSIS — I50.22 CHRONIC SYSTOLIC CONGESTIVE HEART FAILURE (HCC): Chronic | ICD-10-CM

## 2019-07-25 PROCEDURE — 93306 TTE W/DOPPLER COMPLETE: CPT

## 2019-07-26 PROCEDURE — 93306 TTE W/DOPPLER COMPLETE: CPT | Performed by: INTERNAL MEDICINE

## 2019-08-22 DIAGNOSIS — I12.9 HYPERTENSIVE CHRONIC KIDNEY DISEASE WITH STAGE 1 THROUGH STAGE 4 CHRONIC KIDNEY DISEASE, OR UNSPECIFIED CHRONIC KIDNEY DISEASE: ICD-10-CM

## 2019-08-22 DIAGNOSIS — E78.1 HYPERTRIGLYCERIDEMIA: ICD-10-CM

## 2019-08-22 DIAGNOSIS — N18.30 CHRONIC KIDNEY DISEASE, STAGE III (MODERATE) (HCC): Chronic | ICD-10-CM

## 2019-08-22 DIAGNOSIS — E61.1 IRON DEFICIENCY: ICD-10-CM

## 2019-08-22 DIAGNOSIS — D63.1 ANEMIA OF CHRONIC RENAL FAILURE, STAGE 3 (MODERATE) (HCC): ICD-10-CM

## 2019-08-22 DIAGNOSIS — E78.5 DYSLIPIDEMIA: ICD-10-CM

## 2019-08-22 DIAGNOSIS — N18.30 ANEMIA OF CHRONIC RENAL FAILURE, STAGE 3 (MODERATE) (HCC): ICD-10-CM

## 2019-08-22 DIAGNOSIS — E55.9 VITAMIN D DEFICIENCY: ICD-10-CM

## 2019-08-23 RX ORDER — ICOSAPENT ETHYL 1000 MG/1
CAPSULE ORAL
Qty: 60 CAPSULE | Refills: 5 | Status: SHIPPED | OUTPATIENT
Start: 2019-08-23 | End: 2020-03-09

## 2019-10-16 ENCOUNTER — APPOINTMENT (OUTPATIENT)
Dept: LAB | Facility: CLINIC | Age: 79
End: 2019-10-16
Payer: MEDICARE

## 2019-10-16 DIAGNOSIS — N18.30 BENIGN HYPERTENSION WITH CHRONIC KIDNEY DISEASE, STAGE III (HCC): Chronic | ICD-10-CM

## 2019-10-16 DIAGNOSIS — I12.9 PARENCHYMAL RENAL HYPERTENSION, STAGE 1 THROUGH STAGE 4 OR UNSPECIFIED CHRONIC KIDNEY DISEASE: ICD-10-CM

## 2019-10-16 DIAGNOSIS — E55.9 VITAMIN D DEFICIENCY: ICD-10-CM

## 2019-10-16 DIAGNOSIS — N18.30 CHRONIC KIDNEY DISEASE, STAGE III (MODERATE) (HCC): Chronic | ICD-10-CM

## 2019-10-16 DIAGNOSIS — N18.30 ANEMIA OF CHRONIC RENAL FAILURE, STAGE 3 (MODERATE) (HCC): ICD-10-CM

## 2019-10-16 DIAGNOSIS — E61.1 IRON DEFICIENCY: ICD-10-CM

## 2019-10-16 DIAGNOSIS — D63.1 ANEMIA OF CHRONIC RENAL FAILURE, STAGE 3 (MODERATE) (HCC): ICD-10-CM

## 2019-10-16 DIAGNOSIS — I12.9 BENIGN HYPERTENSION WITH CHRONIC KIDNEY DISEASE, STAGE III (HCC): Chronic | ICD-10-CM

## 2019-10-16 LAB
ALBUMIN SERPL BCP-MCNC: 4.2 G/DL (ref 3.5–5)
ALP SERPL-CCNC: 110 U/L (ref 46–116)
ALT SERPL W P-5'-P-CCNC: 29 U/L (ref 12–78)
ANION GAP SERPL CALCULATED.3IONS-SCNC: 6 MMOL/L (ref 4–13)
AST SERPL W P-5'-P-CCNC: 16 U/L (ref 5–45)
BILIRUB SERPL-MCNC: 0.77 MG/DL (ref 0.2–1)
BUN SERPL-MCNC: 18 MG/DL (ref 5–25)
CALCIUM SERPL-MCNC: 9.1 MG/DL (ref 8.3–10.1)
CHLORIDE SERPL-SCNC: 109 MMOL/L (ref 100–108)
CHOLEST SERPL-MCNC: 217 MG/DL (ref 50–200)
CO2 SERPL-SCNC: 28 MMOL/L (ref 21–32)
CREAT SERPL-MCNC: 0.97 MG/DL (ref 0.6–1.3)
CREAT UR-MCNC: 76 MG/DL
ERYTHROCYTE [DISTWIDTH] IN BLOOD BY AUTOMATED COUNT: 14.6 % (ref 11.6–15.1)
FERRITIN SERPL-MCNC: 103 NG/ML (ref 8–388)
GFR SERPL CREATININE-BSD FRML MDRD: 55 ML/MIN/1.73SQ M
GLUCOSE P FAST SERPL-MCNC: 97 MG/DL (ref 65–99)
HCT VFR BLD AUTO: 38.9 % (ref 34.8–46.1)
HDLC SERPL-MCNC: 56 MG/DL (ref 40–60)
HGB BLD-MCNC: 12.2 G/DL (ref 11.5–15.4)
IRON SATN MFR SERPL: 17 %
IRON SERPL-MCNC: 64 UG/DL (ref 50–170)
LDLC SERPL CALC-MCNC: 137 MG/DL (ref 0–100)
MAGNESIUM SERPL-MCNC: 2.3 MG/DL (ref 1.6–2.6)
MCH RBC QN AUTO: 25.9 PG (ref 26.8–34.3)
MCHC RBC AUTO-ENTMCNC: 31.4 G/DL (ref 31.4–37.4)
MCV RBC AUTO: 83 FL (ref 82–98)
PLATELET # BLD AUTO: 232 THOUSANDS/UL (ref 149–390)
PMV BLD AUTO: 10.4 FL (ref 8.9–12.7)
POTASSIUM SERPL-SCNC: 3.7 MMOL/L (ref 3.5–5.3)
PROT SERPL-MCNC: 6.9 G/DL (ref 6.4–8.2)
PROT UR-MCNC: 24 MG/DL
PROT/CREAT UR: 0.32 MG/G{CREAT} (ref 0–0.1)
RBC # BLD AUTO: 4.71 MILLION/UL (ref 3.81–5.12)
SODIUM SERPL-SCNC: 143 MMOL/L (ref 136–145)
TIBC SERPL-MCNC: 376 UG/DL (ref 250–450)
TRIGL SERPL-MCNC: 118 MG/DL
WBC # BLD AUTO: 9.18 THOUSAND/UL (ref 4.31–10.16)

## 2019-10-16 PROCEDURE — 83550 IRON BINDING TEST: CPT

## 2019-10-16 PROCEDURE — 80053 COMPREHEN METABOLIC PANEL: CPT

## 2019-10-16 PROCEDURE — 85027 COMPLETE CBC AUTOMATED: CPT

## 2019-10-16 PROCEDURE — 80061 LIPID PANEL: CPT

## 2019-10-16 PROCEDURE — 84156 ASSAY OF PROTEIN URINE: CPT

## 2019-10-16 PROCEDURE — 83735 ASSAY OF MAGNESIUM: CPT

## 2019-10-16 PROCEDURE — 83540 ASSAY OF IRON: CPT

## 2019-10-16 PROCEDURE — 82728 ASSAY OF FERRITIN: CPT

## 2019-10-16 PROCEDURE — 36415 COLL VENOUS BLD VENIPUNCTURE: CPT

## 2019-10-16 PROCEDURE — 82570 ASSAY OF URINE CREATININE: CPT

## 2019-11-04 ENCOUNTER — OFFICE VISIT (OUTPATIENT)
Dept: NEPHROLOGY | Facility: CLINIC | Age: 79
End: 2019-11-04
Payer: MEDICARE

## 2019-11-04 VITALS
BODY MASS INDEX: 24.62 KG/M2 | HEIGHT: 64 IN | SYSTOLIC BLOOD PRESSURE: 147 MMHG | WEIGHT: 144.2 LBS | DIASTOLIC BLOOD PRESSURE: 75 MMHG | HEART RATE: 59 BPM

## 2019-11-04 DIAGNOSIS — N18.30 ANEMIA OF CHRONIC RENAL FAILURE, STAGE 3 (MODERATE) (HCC): ICD-10-CM

## 2019-11-04 DIAGNOSIS — E61.1 IRON DEFICIENCY: ICD-10-CM

## 2019-11-04 DIAGNOSIS — D63.1 ANEMIA OF CHRONIC RENAL FAILURE, STAGE 3 (MODERATE) (HCC): ICD-10-CM

## 2019-11-04 DIAGNOSIS — I12.9 HYPERTENSIVE CHRONIC KIDNEY DISEASE WITH STAGE 1 THROUGH STAGE 4 CHRONIC KIDNEY DISEASE, OR UNSPECIFIED CHRONIC KIDNEY DISEASE: ICD-10-CM

## 2019-11-04 DIAGNOSIS — N18.30 BENIGN HYPERTENSION WITH CHRONIC KIDNEY DISEASE, STAGE III (HCC): Primary | Chronic | ICD-10-CM

## 2019-11-04 DIAGNOSIS — I12.9 BENIGN HYPERTENSION WITH CHRONIC KIDNEY DISEASE, STAGE III (HCC): Primary | Chronic | ICD-10-CM

## 2019-11-04 DIAGNOSIS — E78.5 DYSLIPIDEMIA: ICD-10-CM

## 2019-11-04 DIAGNOSIS — E55.9 VITAMIN D DEFICIENCY: ICD-10-CM

## 2019-11-04 DIAGNOSIS — R09.89 BILATERAL CAROTID BRUITS: ICD-10-CM

## 2019-11-04 DIAGNOSIS — N18.30 CHRONIC KIDNEY DISEASE, STAGE III (MODERATE) (HCC): Chronic | ICD-10-CM

## 2019-11-04 PROCEDURE — 99214 OFFICE O/P EST MOD 30 MIN: CPT | Performed by: INTERNAL MEDICINE

## 2019-11-04 NOTE — PATIENT INSTRUCTIONS
1  No medication changes today  2  Please take 1 week a blood pressure readings morning and evening, just sitting at this time as follows:  · Take the morning readings before any medications  · Take the evening readings before supper  · When taking standing readings, keep your arm supported at heart level and not dangling  · Make sure you are sitting with your back supported in feet on the ground on crossed  · Make sure you have not taken any coffee or caffeine products or exercised or smoke cigarettes at least 30 minutes before taking your blood pressure  Then please send those readings in period  We may make changes based on these readings  3  Please go for an ultrasound of your carotid arteries at this time, we will help to arrange for this study     4  Follow-up in 4 months:  · PRIOR TO YOUR NEXT APPOINTMENT IF YOU COULD PURCHASE A NEW BLOOD PRESSURE MACHINE:  THERE IS PREFERABLY AN OMRON JUST AS YOU HAVE THE HIGHER THIS SERIES NUMBER THE BETTER THE MACHINE  PLEASE BRING IN YOUR NEW MACHINE ALONG WITH YOUR BLOOD PRESSURE READINGS AT THE NEXT VISIT  · Please take 1 week a blood pressure readings morning evening, sitting and standing as outlined above  · Please go for fasting lab work prior to your appointment    5  General instructions:   AVOID SALT BUT NOT ADDING AN READING LABELS TO MAKE SURE THERE IS LOW-SALT IN THE FOOD THAT YOU ARE EATING     Avoid nonsteroidal anti-inflammatory drugs such as Naprosyn, ibuprofen, Aleve, Advil, Celebrex, etc   You can use Tylenol as needed if you do not have any liver condition to be concerned about     Avoid medications such as Sudafed or decongestants and antihistamines that contained the D component which is the decongestant  You can take antihistamines without the decongestant or D component   Try to avoid medications such as pantoprazole or  Protonix/Nexium or Esomeprazole)/Prilosec or omeprazole/Prevacid or lansoprazole/AcipHex or Rabeprazole    If you are able to, use Pepcid as this is safer for your kidneys   Try to exercise at least 30 minutes 3 days a week to begin with with an ultimate goal of 5 days a week for at least 30 minutes       Please do not drink more than 2 glasses of alcohol/wine on a daily basis as this may contribute to your high blood pressure  5  PLEASE DISCUSS WITH DR ALVARES YOUR CARDIOLOGIST THE POSSIBILITY ABOUT PUTTING YOU ON A NEW CHOLESTEROL MEDICATION CALLED REPATHA WHICH IS AN INJECTABLE FORM OF A MEDICATION

## 2019-11-04 NOTE — PROGRESS NOTES
RENAL FOLLOW UP NOTE: td    ASSESSMENT AND PLAN:  1   CKD stage 3 :  · Etiology:  Hypertensive nephrosclerosis and arteriolar nephrosclerosis  · Baseline creatinine:  1 5  · Current creatinine:  0 97 at baseline  · Urine protein creatinine ratio:  At goal at 0 32 g  Recommendations:  · Treat hypertension-please see below  · Treat dyslipidemia-please see below  · Maintain proteinuria less than 1 g or as low as possible  · Avoid nephrotoxic agents such as NSAIDs, patient counseled as such  2   Volume:  Euvolemic  3   Hypertension:   · Current blood pressures: To be done  · Goal blood pressure:  Less than 125/80  Recommendations:  · Push nonmedical regimen including weight loss, maintaining activities much as possible, and avoidance of salt  · Medication changes today:  No changes as of today pending home readings  She has a very low diastolic reading today although her systolic is slightly elevated  Given the very low diastolic readings in the setting of significant CAD I probably would error the side of not over treating to avoid hypoperfusion  4   Electrolytes:  All acceptable   5   Mineral bone disorder:  · Calcium/magnesium/phosphorus:  All acceptable:  Magnesium 2 3  · Vitamin-D:  31 3 at goal, continue supplementation:  Check next visit  6   Dyslipidemia:  Intolerant to statins and Zetia, and intolerant to Welchol because of constipation  · Goal LDL:  Less than 70 given CAD  · Current lipid profile:  LDL 137/HDL 56/triglycerides 118  Recommendations:  Continue with diet and Vascepa  Addendum:  After last appointment:    I discussed the lipid profile with Dr Curtis Sneed the cardiologist about potentially using 1 of the new medications such as Repatha which is a PCSK9 inhibitor especially in this statin intolerant patient  7   Anemia:  Essentially normal 12 2, on iron supplementation with a saturation of 17%, ferritin of 103  Continue iron supplementation      8   Other problems:  · status post CABG/MI with recent intervention followed by Dr Josh Shoemaker at this time  · the the hypothyroidism status post partial thyroidectomy for benign lesion  · arthritic symptoms followed by Rheumatology  · 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  · urinary incontinence monitored by Dr Sheridan Layne  · Bilateral carotid bruits:  Patient should have had a carotid duplex: To be done  PATIENT INSTRUCTIONS:    Patient Instructions   1  No medication changes today  2  Please take 1 week a blood pressure readings morning and evening, just sitting at this time as follows:  · Take the morning readings before any medications  · Take the evening readings before supper  · When taking standing readings, keep your arm supported at heart level and not dangling  · Make sure you are sitting with your back supported in feet on the ground on crossed  · Make sure you have not taken any coffee or caffeine products or exercised or smoke cigarettes at least 30 minutes before taking your blood pressure  Then please send those readings in period  We may make changes based on these readings  3  Please go for an ultrasound of your carotid arteries at this time, we will help to arrange for this study     4  Follow-up in 4 months:  · PRIOR TO YOUR NEXT APPOINTMENT IF YOU COULD PURCHASE A NEW BLOOD PRESSURE MACHINE:  THERE IS PREFERABLY AN OMRON JUST AS YOU HAVE THE HIGHER THIS SERIES NUMBER THE BETTER THE MACHINE  PLEASE BRING IN YOUR NEW MACHINE ALONG WITH YOUR BLOOD PRESSURE READINGS AT THE NEXT VISIT  · Please take 1 week a blood pressure readings morning evening, sitting and standing as outlined above  · Please go for fasting lab work prior to your appointment    5  General instructions:   AVOID SALT BUT NOT ADDING AN READING LABELS TO MAKE SURE THERE IS LOW-SALT IN THE FOOD THAT YOU ARE EATING     Avoid nonsteroidal anti-inflammatory drugs such as Naprosyn, ibuprofen, Aleve, Advil, Celebrex, etc   You can use Tylenol as needed if you do not have any liver condition to be concerned about     Avoid medications such as Sudafed or decongestants and antihistamines that contained the D component which is the decongestant  You can take antihistamines without the decongestant or D component   Try to avoid medications such as pantoprazole or  Protonix/Nexium or Esomeprazole)/Prilosec or omeprazole/Prevacid or lansoprazole/AcipHex or Rabeprazole  If you are able to, use Pepcid as this is safer for your kidneys   Try to exercise at least 30 minutes 3 days a week to begin with with an ultimate goal of 5 days a week for at least 30 minutes       Please do not drink more than 2 glasses of alcohol/wine on a daily basis as this may contribute to your high blood pressure  5  PLEASE DISCUSS WITH DR ALVARES YOUR CARDIOLOGIST THE POSSIBILITY ABOUT PUTTING YOU ON A NEW CHOLESTEROL MEDICATION CALLED REPATHA WHICH IS AN INJECTABLE FORM OF A MEDICATION  Subjective: Macular edema and her right eye status post injections  The patient overall is feeling well  No fevers chills cough or colds  Good appetite and fair energy  Her weight has been essentially stable  Occasionally she will gain some occasionally lose some    No urinary symptoms including foamy urine or blood in the urine  No gastrointestinal symptoms  Usual cardiovascular symptoms, occasional chest pain when she is under stress, occasional shortness of breath but no different than usual; including swelling of the legs  No headaches, dizziness or lightheadedness  Blood pressure medications:  · Torsemide 10 mg every 3 days or so  · Spironolactone 25 mg daily  · Metoprolol succinate/Toprol  mg twice a day  · Losartan 50 mg twice a day  · Imdur 60 mg daily  · Amlodipine 2 5 mg in the morning and 5 mg in the evening      ROS:  See HPI, otherwise review of systems as completely reviewed with the patient are negative    Past Medical History:   Diagnosis Date    Anemia     Arthritis     Bladder calculi  CAD (coronary artery disease)     Status post CABG and PTCA to OM1    CKD (chronic kidney disease) stage 3, GFR 30-59 ml/min (HCC)     CKD [facility]ch[facility] III [facility]     Diastolic congestive heart failure ([facility]     Hypertension     Hypothyroidism     Rheumatoid arthritis ([address] )     Uterine prolapse      Past Surgical History:   Procedure Laterality Date    BACK SURGERY      BLADDER SURGERY      CORONARY ANGIOPLASTY WITH STENT PLACEMENT      CORONARY ARTERY BYPASS GRAFT      OOPHORECTOMY Left     MI PARTIAL HIP REPLACEMENT Right 2018    Procedure: HEMIARTHROPLASTY HIP (BIPOLAR) (RIGHT); Surgeon: Lizette Atwood MD;  [facility];  Service: Orthopedics    THYROIDECTOMY, PARTIAL       Family History   Problem Relation Age of Onset    Heart attack Mother     Heart attack Father     Stroke Sister     Heart attack Brother         3 brothers all       reports that she has never smoked  She has never used smokeless tobacco  She reports that she does not drink alcohol or use drugs  I COMPLETELY REVIEWED THE PAST MEDICAL HISTORY/PAST SURGICAL HISTORY/SOCIAL HISTORY/FAMILY HISTORY/AND MEDICATIONS  AND UPDATED ALL    Objective:     Vitals:   Vitals:    19 0949   BP: 147/75   Pulse: 59    BP sitting on left:  168/62 with a heart rate of 64 and regular  BP sitting on right:  162/64    Weight (last 2 days)     Date/Time   Weight    19 0949   65 4 (144 2)            Wt Readings from Last 3 Encounters:   19 65 4 kg (144 lb 3 2 oz)   19 67 3 kg (148 lb 6 4 oz)   19 68 2 kg (150 lb 6 4 oz)       Body mass index is 24 62 kg/m²      Physical Exam: General:  No acute distress  Skin:  No acute rash  Eyes:  No scleral icterus, noninjected  ENT:  Moist mucous membranes  Neck:  Supple, no jugular venous distention; bilateral carotid bruits  Back   No CVAT  Chest:  Clear to auscultation and percussion, good respiratory effort  CVS:  Regular rate and rhythm without a rub, murmurs or gallops  Abdomen:  Soft and nontender with normal bowel sounds  Extremities:  No cyanosis and no edema  Neuro:  Grossly intact  Psych:  Alert, oriented x3 and appropriate      Medications:    Current Outpatient Medications:     amLODIPine (NORVASC) 2 5 mg tablet, Take 2 tablets (5 mg total) by mouth see administration instructions 2 5 mg in morning, 5 mg in evening, Disp: 180 tablet, Rfl: 3    Ascorbic Acid (VITAMIN C) 1000 MG tablet, Take 1,000 mg by mouth daily, Disp: , Rfl:     aspirin (ECOTRIN LOW STRENGTH) 81 mg EC tablet, Take 81 mg by mouth daily Indications: Heart Attack  , Disp: , Rfl:     Coenzyme Q10 10 MG capsule, Take 10 mg by mouth daily, Disp: , Rfl:     hydroxychloroquine (PLAQUENIL) 200 mg tablet, Take 200 mg by mouth daily  , Disp: , Rfl:     levothyroxine 50 mcg tablet, Take 50 mcg by mouth daily  , Disp: , Rfl:     losartan (COZAAR) 50 mg tablet, Take 1 tablet (50 mg total) by mouth 2 (two) times a day, Disp: 60 tablet, Rfl: 5    metoprolol succinate (TOPROL-XL) 100 mg 24 hr tablet, Take 1 tablet (100 mg total) by mouth 2 (two) times a day Take 1 tablet in the morning (100mg),Take 1/2  tablet in the evening (50mg), Disp: 180 tablet, Rfl: 3    Multiple Vitamins-Minerals (CENTRUM SILVER PO), Take 1 tablet by mouth daily, Disp: , Rfl:     nitroglycerin (NITROLINGUAL) 0 4 mg/spray spray, Place 1 spray under the tongue every 5 (five) minutes as needed for chest pain, Disp: , Rfl:     spironolactone (ALDACTONE) 25 mg tablet, Take 1 tablet (25 mg total) by mouth daily, Disp: 30 tablet, Rfl: 5    ticagrelor (BRILINTA) 90 MG, Take 1 tablet (90 mg total) by mouth every 12 (twelve) hours, Disp: 60 tablet, Rfl: 5    torsemide (DEMADEX) 10 mg tablet, Take 10 mg by mouth daily, Disp: , Rfl:     VASCEPA 1 g CAPS, TAKE 1 CAPSULE (1 G TOTAL) BY MOUTH 2 (TWO) TIMES A DAY, Disp: 60 capsule, Rfl: 5    Vitamin D, Cholecalciferol, 1000 units CAPS, Take 2,000 Units by mouth daily  , Disp: , Rfl:     isosorbide mononitrate (IMDUR) 60 mg 24 hr tablet, Take 60 mg by mouth daily, Disp: , Rfl:     Mirabegron (MYRBETRIQ PO), Take 25 mg by mouth every other day , Disp: , Rfl:     Lab, Imaging and other studies: I have personally reviewed pertinent labs    Laboratory Results:  Results for orders placed or performed in visit on 10/16/19   Comprehensive metabolic panel   Result Value Ref Range    Sodium 143 136 - 145 mmol/L    Potassium 3 7 3 5 - 5 3 mmol/L    Chloride 109 (H) 100 - 108 mmol/L    CO2 28 21 - 32 mmol/L    ANION GAP 6 4 - 13 mmol/L    BUN 18 5 - 25 mg/dL    Creatinine 0 97 0 60 - 1 30 mg/dL    Glucose, Fasting 97 65 - 99 mg/dL    Calcium 9 1 8 3 - 10 1 mg/dL    AST 16 5 - 45 U/L    ALT 29 12 - 78 U/L    Alkaline Phosphatase 110 46 - 116 U/L    Total Protein 6 9 6 4 - 8 2 g/dL    Albumin 4 2 3 5 - 5 0 g/dL    Total Bilirubin 0 77 0 20 - 1 00 mg/dL    eGFR 55 ml/min/1 73sq m   CBC   Result Value Ref Range    WBC 9 18 4 31 - 10 16 Thousand/uL    RBC 4 71 3 81 - 5 12 Million/uL    Hemoglobin 12 2 11 5 - 15 4 g/dL    Hematocrit 38 9 34 8 - 46 1 %    MCV 83 82 - 98 fL    MCH 25 9 (L) 26 8 - 34 3 pg    MCHC 31 4 31 4 - 37 4 g/dL    RDW 14 6 11 6 - 15 1 %    Platelets 376 002 - 631 Thousands/uL    MPV 10 4 8 9 - 12 7 fL   Lipid Panel with Direct LDL reflex   Result Value Ref Range    Cholesterol 217 (H) 50 - 200 mg/dL    Triglycerides 118 <=150 mg/dL    HDL, Direct 56 40 - 60 mg/dL    LDL Calculated 137 (H) 0 - 100 mg/dL   Magnesium   Result Value Ref Range    Magnesium 2 3 1 6 - 2 6 mg/dL   Protein / creatinine ratio, urine   Result Value Ref Range    Creatinine, Ur 76 0 mg/dL    Protein Urine Random 24 mg/dL    Prot/Creat Ratio, Ur 0 32 (H) 0 00 - 0 10   Ferritin   Result Value Ref Range    Ferritin 103 8 - 388 ng/mL   Iron Saturation %   Result Value Ref Range    Iron Saturation 17 %    TIBC 376 250 - 450 ug/dL    Iron 64 50 - 170 ug/dL             Invalid input(s): ALBUMIN      Radiology review:   chest X-ray    Ultrasound      Portions of the record may have been created with voice recognition software  Occasional wrong word or "sound a like" substitutions may have occurred due to the inherent limitations of voice recognition software  Read the chart carefully and recognize, using context, where substitutions have occurred

## 2019-11-04 NOTE — LETTER
November 4, 2019     Terrance Reyes MD  [facility] [id number]    Patient: Marvin Lawrence   YOB: 1940   Date of Visit: 11/4/2019       Dear Dr Antoinette Horvath:    Thank you for referring Carrillo Segovia to me for evaluation  Below are my notes for this consultation  If you have questions, please do not hesitate to call me  I look forward to following your patient along with you  Sincerely,        Mark Irvin MD        CC: MD Adriana Toledo MD Ernestina Decamp, DO Mark Irvin MD  11/4/2019 10:41 AM  Sign at close encounter  RENAL FOLLOW UP NOTE: td    ASSESSMENT AND PLAN:  1   CKD stage 3 :  · Etiology:  Hypertensive nephrosclerosis and arteriolar nephrosclerosis  · Baseline creatinine:  1 5  · Current creatinine:  0 97 at baseline  · Urine protein creatinine ratio:  At goal at 0 32 g  Recommendations:  · Treat hypertension-please see below  · Treat dyslipidemia-please see below  · Maintain proteinuria less than 1 g or as low as possible  · Avoid nephrotoxic agents such as NSAIDs, patient counseled as such  2   Volume:  Euvolemic  3   Hypertension:   · Current blood pressures: To be done  · Goal blood pressure:  Less than 125/80  Recommendations:  · Push nonmedical regimen including weight loss, maintaining activities much as possible, and avoidance of salt  · Medication changes today:  No changes as of today pending home readings  She has a very low diastolic reading today although her systolic is slightly elevated  Given the very low diastolic readings in the setting of significant CAD I probably would error the side of not over treating to avoid hypoperfusion    4   Electrolytes:  All acceptable   5   Mineral bone disorder:  · Calcium/magnesium/phosphorus:  All acceptable:  Magnesium 2 3  · Vitamin-D:  31 3 at goal, continue supplementation:  Check next visit  6   Dyslipidemia:  Intolerant to statins and Zetia, and intolerant to [facility] because of constipation  · Goal LDL:  Less than 70 given CAD  · Current lipid profile:  LDL 137/HDL 56/triglycerides 118  Recommendations:  Continue with diet and Vascepa  Addendum:  After last appointment:    I discussed the lipid profile with Dr Dominic Rodriguez the cardiologist about potentially using 1 of the new medications such as Repatha which is a PCSK9 inhibitor especially in this statin intolerant patient  7   Anemia:  Essentially normal 12 2, on iron supplementation with a saturation of 17%, ferritin of 103  Continue iron supplementation  8   Other problems:  · status post CABG/MI with recent intervention followed by Dr Dominic Rodriguez at this time  · the the hypothyroidism status post partial thyroidectomy for benign lesion  · arthritic symptoms followed by Rheumatology  · 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  · urinary incontinence monitored by Dr Marcela Flood  · Bilateral carotid bruits:  Patient should have had a carotid duplex: To be done  PATIENT INSTRUCTIONS:    Patient Instructions   1  No medication changes today  2  Please take 1 week a blood pressure readings morning and evening, just sitting at this time as follows:  · Take the morning readings before any medications  · Take the evening readings before supper  · When taking standing readings, keep your arm supported at heart level and not dangling  · Make sure you are sitting with your back supported in feet on the ground on crossed  · Make sure you have not taken any coffee or caffeine products or exercised or smoke cigarettes at least 30 minutes before taking your blood pressure  Then please send those readings in period  We may make changes based on these readings  3  Please go for an ultrasound of your carotid arteries at this time, we will help to arrange for this study     4    Follow-up in 4 months:  · PRIOR TO YOUR NEXT APPOINTMENT IF YOU COULD PURCHASE A NEW BLOOD PRESSURE MACHINE:  THERE IS PREFERABLY AN OMRON JUST AS YOU HAVE THE HIGHER THIS SERIES NUMBER THE BETTER THE MACHINE  PLEASE BRING IN YOUR NEW MACHINE ALONG WITH YOUR BLOOD PRESSURE READINGS AT THE NEXT VISIT  · Please take 1 week a blood pressure readings morning evening, sitting and standing as outlined above  · Please go for fasting lab work prior to your appointment    5  General instructions:   AVOID SALT BUT NOT ADDING AN READING LABELS TO MAKE SURE THERE IS LOW-SALT IN THE FOOD THAT YOU ARE EATING     Avoid nonsteroidal anti-inflammatory drugs such as Naprosyn, ibuprofen, Aleve, Advil, Celebrex, etc   You can use Tylenol as needed if you do not have any liver condition to be concerned about     Avoid medications such as Sudafed or decongestants and antihistamines that contained the D component which is the decongestant  You can take antihistamines without the decongestant or D component   Try to avoid medications such as pantoprazole or  Protonix/Nexium or Esomeprazole)/Prilosec or omeprazole/Prevacid or lansoprazole/AcipHex or Rabeprazole  If you are able to, use Pepcid as this is safer for your kidneys   Try to exercise at least 30 minutes 3 days a week to begin with with an ultimate goal of 5 days a week for at least 30 minutes       Please do not drink more than 2 glasses of alcohol/wine on a daily basis as this may contribute to your high blood pressure  5  PLEASE DISCUSS WITH DR ALVARES YOUR CARDIOLOGIST THE POSSIBILITY ABOUT PUTTING YOU ON A NEW CHOLESTEROL MEDICATION CALLED REPATHA WHICH IS AN INJECTABLE FORM OF A MEDICATION  Subjective: Macular edema and her right eye status post injections  The patient overall is feeling well  No fevers chills cough or colds  Good appetite and fair energy  Her weight has been essentially stable  Occasionally she will gain some occasionally lose some    No urinary symptoms including foamy urine or blood in the urine  No gastrointestinal symptoms  Usual cardiovascular symptoms, occasional chest pain when she is under stress, occasional shortness of breath but no different than usual; including swelling of the legs  No headaches, dizziness or lightheadedness  Blood pressure medications:  · Torsemide 10 mg every 3 days or so  · Spironolactone 25 mg daily  · Metoprolol succinate/Toprol  mg twice a day  · Losartan 50 mg twice a day  · Imdur 60 mg daily  · Amlodipine 2 5 mg in the morning and 5 mg in the evening      ROS:  See HPI, otherwise review of systems as completely reviewed with the patient are negative    Past Medical History:   Diagnosis Date    Anemia     Arthritis     Bladder calculi     CAD (coronary artery disease)     Status post CABG and PTCA to OM1    CKD (chronic kidney disease) stage 3, GFR 30-59 ml/min [facility]     CKD (chronic kidney disease), stage III [facility])     Diastolic congestive heart failure ([facility])     Hypertension     Hypothyroidism     Rheumatoid arthritis ([facility] [address] )     Uterine prolapse      Past Surgical History:   Procedure Laterality Date    BACK SURGERY      BLADDER SURGERY      CORONARY ANGIOPLASTY WITH STENT PLACEMENT      CORONARY ARTERY BYPASS GRAFT      OOPHORECTOMY Left     PA PARTIAL HIP REPLACEMENT Right 2018    Procedure: HEMIARTHROPLASTY HIP (BIPOLAR) (RIGHT); Surgeon: Erika Tsai MD;  [facility];  Service: Orthopedics    THYROIDECTOMY, PARTIAL       Family History   Problem Relation Age of Onset    Heart attack Mother     Heart attack Father     Stroke Sister     Heart attack Brother         3 brothers all       reports that she has never smoked  She has never used smokeless tobacco  She reports that she does not drink alcohol or use drugs      I COMPLETELY REVIEWED THE PAST MEDICAL HISTORY/PAST SURGICAL HISTORY/SOCIAL HISTORY/FAMILY HISTORY/AND MEDICATIONS  AND UPDATED ALL    Objective:     Vitals:   Vitals:    19 0949   BP: 147/75   Pulse: 59    BP sitting on left:  168/62 with a heart rate of 64 and regular  BP sitting on right: 162/64    Weight (last 2 days)     Date/Time   Weight    11/04/19 0949   65 4 (144 2)            Wt Readings from Last 3 Encounters:   11/04/19 65 4 kg (144 lb 3 2 oz)   07/19/19 67 3 kg (148 lb 6 4 oz)   07/17/19 68 2 kg (150 lb 6 4 oz)       Body mass index is 24 62 kg/m²  Physical Exam: General:  No acute distress  Skin:  No acute rash  Eyes:  No scleral icterus, noninjected  ENT:  Moist mucous membranes  Neck:  Supple, no jugular venous distention; bilateral carotid bruits  Back   No CVAT  Chest:  Clear to auscultation and percussion, good respiratory effort  CVS:  Regular rate and rhythm without a rub, murmurs or gallops  Abdomen:  Soft and nontender with normal bowel sounds  Extremities:  No cyanosis and no edema  Neuro:  Grossly intact  Psych:  Alert, oriented x3 and appropriate      Medications:    Current Outpatient Medications:     amLODIPine (NORVASC) 2 5 mg tablet, Take 2 tablets (5 mg total) by mouth see administration instructions 2 5 mg in morning, 5 mg in evening, Disp: 180 tablet, Rfl: 3    Ascorbic Acid (VITAMIN C) 1000 MG tablet, Take 1,000 mg by mouth daily, Disp: , Rfl:     aspirin (ECOTRIN LOW STRENGTH) 81 mg EC tablet, Take 81 mg by mouth daily Indications: Heart Attack  , Disp: , Rfl:     Coenzyme Q10 10 MG capsule, Take 10 mg by mouth daily, Disp: , Rfl:     hydroxychloroquine (PLAQUENIL) 200 mg tablet, Take 200 mg by mouth daily  , Disp: , Rfl:     levothyroxine 50 mcg tablet, Take 50 mcg by mouth daily  , Disp: , Rfl:     losartan (COZAAR) 50 mg tablet, Take 1 tablet (50 mg total) by mouth 2 (two) times a day, Disp: 60 tablet, Rfl: 5    metoprolol succinate (TOPROL-XL) 100 mg 24 hr tablet, Take 1 tablet (100 mg total) by mouth 2 (two) times a day Take 1 tablet in the morning (100mg),Take 1/2  tablet in the evening (50mg), Disp: 180 tablet, Rfl: 3    Multiple Vitamins-Minerals (CENTRUM SILVER PO), Take 1 tablet by mouth daily, Disp: , Rfl:     nitroglycerin (NITROLINGUAL) 0 4 mg/spray spray, Place 1 spray under the tongue every 5 (five) minutes as needed for chest pain, Disp: , Rfl:     spironolactone (ALDACTONE) 25 mg tablet, Take 1 tablet (25 mg total) by mouth daily, Disp: 30 tablet, Rfl: 5    ticagrelor (BRILINTA) 90 MG, Take 1 tablet (90 mg total) by mouth every 12 (twelve) hours, Disp: 60 tablet, Rfl: 5    torsemide (DEMADEX) 10 mg tablet, Take 10 mg by mouth daily, Disp: , Rfl:     VASCEPA 1 g CAPS, TAKE 1 CAPSULE (1 G TOTAL) BY MOUTH 2 (TWO) TIMES A DAY, Disp: 60 capsule, Rfl: 5    Vitamin D, Cholecalciferol, 1000 units CAPS, Take 2,000 Units by mouth daily  , Disp: , Rfl:     isosorbide mononitrate (IMDUR) 60 mg 24 hr tablet, Take 60 mg by mouth daily, Disp: , Rfl:     Mirabegron (MYRBETRIQ PO), Take 25 mg by mouth every other day , Disp: , Rfl:     Lab, Imaging and other studies: I have personally reviewed pertinent labs    Laboratory Results:  Results for orders placed or performed in visit on 10/16/19   Comprehensive metabolic panel   Result Value Ref Range    Sodium 143 136 - 145 mmol/L    Potassium 3 7 3 5 - 5 3 mmol/L    Chloride 109 (H) 100 - 108 mmol/L    CO2 28 21 - 32 mmol/L    ANION GAP 6 4 - 13 mmol/L    BUN 18 5 - 25 mg/dL    Creatinine 0 97 0 60 - 1 30 mg/dL    Glucose, Fasting 97 65 - 99 mg/dL    Calcium 9 1 8 3 - 10 1 mg/dL    AST 16 5 - 45 U/L    ALT 29 12 - 78 U/L    Alkaline Phosphatase 110 46 - 116 U/L    Total Protein 6 9 6 4 - 8 2 g/dL    Albumin 4 2 3 5 - 5 0 g/dL    Total Bilirubin 0 77 0 20 - 1 00 mg/dL    eGFR 55 ml/min/1 73sq m   CBC   Result Value Ref Range    WBC 9 18 4 31 - 10 16 Thousand/uL    RBC 4 71 3 81 - 5 12 Million/uL    Hemoglobin 12 2 11 5 - 15 4 g/dL    Hematocrit 38 9 34 8 - 46 1 %    MCV 83 82 - 98 fL    MCH 25 9 (L) 26 8 - 34 3 pg    MCHC 31 4 31 4 - 37 4 g/dL    RDW 14 6 11 6 - 15 1 %    Platelets 148 143 - 056 Thousands/uL    MPV 10 4 8 9 - 12 7 fL   Lipid Panel with Direct LDL reflex   Result Value Ref Range    Cholesterol 217 (H) 50 - 200 mg/dL    Triglycerides 118 <=150 mg/dL    HDL, Direct 56 40 - 60 mg/dL    LDL Calculated 137 (H) 0 - 100 mg/dL   Magnesium   Result Value Ref Range    Magnesium 2 3 1 6 - 2 6 mg/dL   Protein / creatinine ratio, urine   Result Value Ref Range    Creatinine, Ur 76 0 mg/dL    Protein Urine Random 24 mg/dL    Prot/Creat Ratio, Ur 0 32 (H) 0 00 - 0 10   Ferritin   Result Value Ref Range    Ferritin 103 8 - 388 ng/mL   Iron Saturation %   Result Value Ref Range    Iron Saturation 17 %    TIBC 376 250 - 450 ug/dL    Iron 64 50 - 170 ug/dL             Invalid input(s): ALBUMIN      Radiology review:   chest X-ray    Ultrasound      Portions of the record may have been created with voice recognition software  Occasional wrong word or "sound a like" substitutions may have occurred due to the inherent limitations of voice recognition software  Read the chart carefully and recognize, using context, where substitutions have occurred

## 2019-11-05 DIAGNOSIS — N18.30 BENIGN HYPERTENSION WITH CHRONIC KIDNEY DISEASE, STAGE III (HCC): Chronic | ICD-10-CM

## 2019-11-05 DIAGNOSIS — I12.9 BENIGN HYPERTENSION WITH CHRONIC KIDNEY DISEASE, STAGE III (HCC): Chronic | ICD-10-CM

## 2019-11-06 RX ORDER — AMLODIPINE BESYLATE 2.5 MG/1
TABLET ORAL
Qty: 180 TABLET | Refills: 3 | Status: SHIPPED | OUTPATIENT
Start: 2019-11-06 | End: 2020-05-22 | Stop reason: SDUPTHER

## 2019-11-13 ENCOUNTER — HOSPITAL ENCOUNTER (OUTPATIENT)
Dept: RADIOLOGY | Facility: HOSPITAL | Age: 79
Discharge: HOME | End: 2019-11-13
Attending: INTERNAL MEDICINE
Payer: MEDICARE

## 2019-11-13 DIAGNOSIS — I12.9 HYPERTENSIVE CHRONIC KIDNEY DISEASE WITH STAGE 1 THROUGH STAGE 4 CHRONIC KIDNEY DISEASE, OR UNSPECIFIED CHRONIC KIDNEY DISEASE: ICD-10-CM

## 2019-11-13 DIAGNOSIS — N18.30 BENIGN HYPERTENSION WITH CHRONIC KIDNEY DISEASE, STAGE III (HCC): Chronic | ICD-10-CM

## 2019-11-13 DIAGNOSIS — E78.5 DYSLIPIDEMIA: ICD-10-CM

## 2019-11-13 DIAGNOSIS — R09.89 BILATERAL CAROTID BRUITS: ICD-10-CM

## 2019-11-13 DIAGNOSIS — E61.1 IRON DEFICIENCY: ICD-10-CM

## 2019-11-13 DIAGNOSIS — N18.30 CHRONIC KIDNEY DISEASE, STAGE III (MODERATE) (HCC): Chronic | ICD-10-CM

## 2019-11-13 DIAGNOSIS — D63.1 ANEMIA OF CHRONIC RENAL FAILURE, STAGE 3 (MODERATE) (HCC): ICD-10-CM

## 2019-11-13 DIAGNOSIS — N18.30 ANEMIA OF CHRONIC RENAL FAILURE, STAGE 3 (MODERATE) (HCC): ICD-10-CM

## 2019-11-13 DIAGNOSIS — I12.9 BENIGN HYPERTENSION WITH CHRONIC KIDNEY DISEASE, STAGE III (HCC): Chronic | ICD-10-CM

## 2019-11-13 DIAGNOSIS — E55.9 VITAMIN D DEFICIENCY: ICD-10-CM

## 2019-11-13 PROCEDURE — 93880 EXTRACRANIAL BILAT STUDY: CPT

## 2019-11-13 PROCEDURE — 93880 EXTRACRANIAL BILAT STUDY: CPT | Performed by: SURGERY

## 2019-11-14 ENCOUNTER — TELEPHONE (OUTPATIENT)
Dept: NEPHROLOGY | Facility: CLINIC | Age: 79
End: 2019-11-14

## 2019-11-14 ENCOUNTER — TELEPHONE (OUTPATIENT)
Dept: CARDIOLOGY CLINIC | Facility: CLINIC | Age: 79
End: 2019-11-14

## 2019-11-14 DIAGNOSIS — I12.9 BENIGN HYPERTENSION WITH CHRONIC KIDNEY DISEASE, STAGE III (HCC): Chronic | ICD-10-CM

## 2019-11-14 DIAGNOSIS — I50.32 CHRONIC DIASTOLIC CONGESTIVE HEART FAILURE (HCC): ICD-10-CM

## 2019-11-14 DIAGNOSIS — I25.118 CORONARY ARTERY DISEASE OF NATIVE ARTERY OF NATIVE HEART WITH STABLE ANGINA PECTORIS (HCC): Chronic | ICD-10-CM

## 2019-11-14 DIAGNOSIS — I10 ESSENTIAL HYPERTENSION: ICD-10-CM

## 2019-11-14 DIAGNOSIS — N18.30 ANEMIA OF CHRONIC RENAL FAILURE, STAGE 3 (MODERATE) (HCC): ICD-10-CM

## 2019-11-14 DIAGNOSIS — D63.1 ANEMIA OF CHRONIC RENAL FAILURE, STAGE 3 (MODERATE) (HCC): ICD-10-CM

## 2019-11-14 DIAGNOSIS — R09.89 BILATERAL CAROTID BRUITS: ICD-10-CM

## 2019-11-14 DIAGNOSIS — I50.22 CHRONIC SYSTOLIC CONGESTIVE HEART FAILURE (HCC): Primary | Chronic | ICD-10-CM

## 2019-11-14 DIAGNOSIS — N18.30 BENIGN HYPERTENSION WITH CHRONIC KIDNEY DISEASE, STAGE III (HCC): Chronic | ICD-10-CM

## 2019-11-14 NOTE — TELEPHONE ENCOUNTER
I spoke to patient and made her aware that the carotid duplex was okay per Dr Ramos     Will send pt order for 6 month repeat

## 2019-11-14 NOTE — LETTER
Cardiology Pre Operative Clearance      PRE OPERATIVE CARDIAC RISK ASSESSMENT    11/14/19    Arlingtonturner Jim  1940  [id number]    Date of Surgery: TBD    Type of Surgery: Tooth Extraction    Surgeon: Ganga ZHOU    No Cardiac Contraindication for Planned Surgical Procedures    Anticoagulation: ASA and brilinta - Cannot be interrupted for procedure       Electronically Signed: Suraj Dueñas DO

## 2019-11-14 NOTE — TELEPHONE ENCOUNTER
I spoke with patient, she is looking to attain cardiac clearance for an upcoming tooth extraction that is not yet scheduled  She is concerned regarding her cardiac history and blood thinners  This extraction is to be done at [facility]  Can you please advise on if you would like a clearance note submitted or if you'd like to see the patient prior to clearing her?

## 2019-11-14 NOTE — TELEPHONE ENCOUNTER
----- Message from Gabriela Gardner MD sent at 11/13/2019  6:49 PM EST -----  Carotid duplex looked okay   Please order a carotid duplex in 6 months for follow-up as recommend

## 2019-11-15 NOTE — TELEPHONE ENCOUNTER
S/w pt - she is not on plavix, but brilinta - advise - she does not want to stop brilinta    Letter updated to route to [address] [facility] - please complete

## 2019-11-18 DIAGNOSIS — I21.4 ACUTE NON-ST ELEVATION MYOCARDIAL INFARCTION (NSTEMI) (HCC): ICD-10-CM

## 2019-11-19 RX ORDER — TICAGRELOR 90 MG/1
TABLET ORAL
Qty: 60 TABLET | Refills: 5 | Status: SHIPPED | OUTPATIENT
Start: 2019-11-19 | End: 2020-06-05 | Stop reason: HOSPADM

## 2019-11-20 ENCOUNTER — DOCUMENTATION (OUTPATIENT)
Dept: NEPHROLOGY | Facility: CLINIC | Age: 79
End: 2019-11-20

## 2019-11-20 DIAGNOSIS — N18.30 BENIGN HYPERTENSION WITH CHRONIC KIDNEY DISEASE, STAGE III (HCC): Primary | Chronic | ICD-10-CM

## 2019-11-20 DIAGNOSIS — N18.30 CHRONIC KIDNEY DISEASE, STAGE III (MODERATE) (HCC): Chronic | ICD-10-CM

## 2019-11-20 DIAGNOSIS — E78.00 PURE HYPERCHOLESTEROLEMIA: Chronic | ICD-10-CM

## 2019-11-20 DIAGNOSIS — I12.9 BENIGN HYPERTENSION WITH CHRONIC KIDNEY DISEASE, STAGE III (HCC): Primary | Chronic | ICD-10-CM

## 2019-11-20 NOTE — PROGRESS NOTES
I spoke to patient and made her aware that amlodipine and her spironolactone are being increased  Patient understood instructions demonstrated understanding repeating them back  Will send BMP order in mail     -mediation list updated

## 2019-11-20 NOTE — PROGRESS NOTES
Home blood pressure readings:  -a m :  155/71, no orthostatic changes  -p  m :  167/83 without orthostatic changes  -heart rate:  60 range    Recommendations:  -increase amlodipine to 5 mg twice a day watch for swelling and call if any significant swelling develops  -increase spironolactone to 25 mg 1 day alternating with 50 mg or 2-25 mg tablets the next day  -repeat a basic metabolic profile in 1-2 weeks after making the medication changes  -wait 4 weeks after making the medication changes and take an additional week a blood pressure readings morning evening just sitting as follows:  · Take the morning readings before any medications  · Take the evening readings before supper and before taking any medications at that time  · When taking standing readings, keep your arm supported at heart level and not dangling  · Make sure you are sitting with your back supported in feet on the ground on crossed  · Make sure you have not taken any coffee or caffeine products or exercised or smoke cigarettes at least 30 minutes before taking your blood pressure  Then please send those readings in

## 2019-11-22 ENCOUNTER — OFFICE VISIT (OUTPATIENT)
Dept: AUDIOLOGY | Facility: CLINIC | Age: 79
End: 2019-11-22

## 2019-11-22 DIAGNOSIS — H90.3 SENSORY HEARING LOSS, BILATERAL: Primary | ICD-10-CM

## 2019-11-22 NOTE — PROGRESS NOTES
Hearing Aid Visit:    Name:  River Dykes  :  1940  Age:  [de-identified] y o  Date of Evaluation: 19     River Dykes is being seen for a hearing aid visit  Patient is fit with [employer] OPN S 3 (Not RC) hearing aid(s)  Right serial number [id number]  Left serial number [id number]  Warranty date: 5/15/22 (Loss/Damage and repair)  Patient reports no current issues with the devices  Action:  1  Cleaned and checked both aids; Listening check notes no static or weakness in the sound quality  2  Went to [facility] 8 0  3  Cut down left canal lock    4  Canals clear     Recommendations:   1   RTO 20 for 5 month maintenance / check when last audio     Tomasa Pino , CCC-A, [place]# [id number], Hearing Aid Dispenser, [place]# [id number]  Clinical Audiologist

## 2019-11-22 NOTE — PROGRESS NOTES
Hearing Aid Visit:    Name:  Inessa Ferrara  :  1940  Age:  [de-identified] y o  Date of Evaluation: 7/10/19    Inessa Ferrara is being seen for a hearing aid visit  Patient fit with [employer] OPN S 3 minirites serial number [id number] serial [id number] left  Warranty expires 5/15/2022  Patient reports no current issues with the devices  Action:  1  Went to step 3   2  Activated VC's and discussed use   3  Is utilizing the [employer] phone     Recommendations:   1  RTO in 4-6 months for monitoring purposes         Tomasa Leigh , CCC-A, [place]# [id number], Hearing Aid Dispenser, [place]# [id number]  Clinical Audiologist

## 2019-12-06 ENCOUNTER — APPOINTMENT (OUTPATIENT)
Dept: LAB | Facility: CLINIC | Age: 79
End: 2019-12-06
Payer: MEDICARE

## 2019-12-06 DIAGNOSIS — I12.9 PARENCHYMAL RENAL HYPERTENSION, STAGE 1 THROUGH STAGE 4 OR UNSPECIFIED CHRONIC KIDNEY DISEASE: ICD-10-CM

## 2019-12-06 DIAGNOSIS — E78.00 PURE HYPERCHOLESTEROLEMIA: Chronic | ICD-10-CM

## 2019-12-06 DIAGNOSIS — N18.30 BENIGN HYPERTENSION WITH CHRONIC KIDNEY DISEASE, STAGE III (HCC): Chronic | ICD-10-CM

## 2019-12-06 DIAGNOSIS — N18.30 ANEMIA OF CHRONIC RENAL FAILURE, STAGE 3 (MODERATE) (HCC): ICD-10-CM

## 2019-12-06 DIAGNOSIS — I12.9 BENIGN HYPERTENSION WITH CHRONIC KIDNEY DISEASE, STAGE III (HCC): Chronic | ICD-10-CM

## 2019-12-06 DIAGNOSIS — N18.30 CHRONIC KIDNEY DISEASE, STAGE III (MODERATE) (HCC): Chronic | ICD-10-CM

## 2019-12-06 DIAGNOSIS — E61.1 IRON DEFICIENCY: ICD-10-CM

## 2019-12-06 DIAGNOSIS — D63.1 ANEMIA OF CHRONIC RENAL FAILURE, STAGE 3 (MODERATE) (HCC): ICD-10-CM

## 2019-12-06 DIAGNOSIS — E55.9 VITAMIN D DEFICIENCY: ICD-10-CM

## 2019-12-06 LAB
ANION GAP SERPL CALCULATED.3IONS-SCNC: 6 MMOL/L (ref 4–13)
BUN SERPL-MCNC: 24 MG/DL (ref 5–25)
CALCIUM SERPL-MCNC: 9.7 MG/DL (ref 8.3–10.1)
CHLORIDE SERPL-SCNC: 108 MMOL/L (ref 100–108)
CO2 SERPL-SCNC: 27 MMOL/L (ref 21–32)
CREAT SERPL-MCNC: 1.11 MG/DL (ref 0.6–1.3)
GFR SERPL CREATININE-BSD FRML MDRD: 47 ML/MIN/1.73SQ M
GLUCOSE P FAST SERPL-MCNC: 102 MG/DL (ref 65–99)
POTASSIUM SERPL-SCNC: 4.3 MMOL/L (ref 3.5–5.3)
SODIUM SERPL-SCNC: 141 MMOL/L (ref 136–145)

## 2019-12-06 PROCEDURE — 80048 BASIC METABOLIC PNL TOTAL CA: CPT

## 2019-12-06 PROCEDURE — 36415 COLL VENOUS BLD VENIPUNCTURE: CPT

## 2019-12-06 RX ORDER — LOSARTAN POTASSIUM 50 MG/1
50 TABLET ORAL 2 TIMES DAILY
Qty: 60 TABLET | Refills: 5 | Status: ON HOLD | OUTPATIENT
Start: 2019-12-06 | End: 2020-06-05 | Stop reason: SDUPTHER

## 2019-12-10 ENCOUNTER — OFFICE VISIT (OUTPATIENT)
Dept: CARDIOLOGY CLINIC | Facility: CLINIC | Age: 79
End: 2019-12-10
Payer: MEDICARE

## 2019-12-10 VITALS
BODY MASS INDEX: 24.75 KG/M2 | OXYGEN SATURATION: 98 % | DIASTOLIC BLOOD PRESSURE: 58 MMHG | HEART RATE: 67 BPM | SYSTOLIC BLOOD PRESSURE: 112 MMHG | HEIGHT: 64 IN | WEIGHT: 145 LBS

## 2019-12-10 DIAGNOSIS — I10 ESSENTIAL HYPERTENSION: ICD-10-CM

## 2019-12-10 DIAGNOSIS — E78.5 DYSLIPIDEMIA: ICD-10-CM

## 2019-12-10 DIAGNOSIS — I25.118 CORONARY ARTERY DISEASE OF NATIVE ARTERY OF NATIVE HEART WITH STABLE ANGINA PECTORIS (HCC): Primary | ICD-10-CM

## 2019-12-10 DIAGNOSIS — I12.9 HYPERTENSIVE CHRONIC KIDNEY DISEASE WITH STAGE 1 THROUGH STAGE 4 CHRONIC KIDNEY DISEASE, OR UNSPECIFIED CHRONIC KIDNEY DISEASE: ICD-10-CM

## 2019-12-10 DIAGNOSIS — I50.22 CHRONIC SYSTOLIC CHF (CONGESTIVE HEART FAILURE) (HCC): ICD-10-CM

## 2019-12-10 DIAGNOSIS — I50.32 CHRONIC DIASTOLIC HEART FAILURE (HCC): ICD-10-CM

## 2019-12-10 DIAGNOSIS — Z95.1 S/P CABG X 4: ICD-10-CM

## 2019-12-10 DIAGNOSIS — I50.22 CHRONIC SYSTOLIC CONGESTIVE HEART FAILURE (HCC): Chronic | ICD-10-CM

## 2019-12-10 DIAGNOSIS — I12.9 BENIGN HYPERTENSION WITH CHRONIC KIDNEY DISEASE, STAGE III (HCC): Chronic | ICD-10-CM

## 2019-12-10 DIAGNOSIS — N18.30 BENIGN HYPERTENSION WITH CHRONIC KIDNEY DISEASE, STAGE III (HCC): Chronic | ICD-10-CM

## 2019-12-10 PROCEDURE — 99214 OFFICE O/P EST MOD 30 MIN: CPT | Performed by: INTERNAL MEDICINE

## 2019-12-10 PROCEDURE — 93000 ELECTROCARDIOGRAM COMPLETE: CPT | Performed by: INTERNAL MEDICINE

## 2019-12-10 RX ORDER — KETOROLAC TROMETHAMINE 5 MG/ML
1 SOLUTION OPHTHALMIC 2 TIMES DAILY
Refills: 3 | COMMUNITY
Start: 2019-11-30 | End: 2020-07-23 | Stop reason: HOSPADM

## 2019-12-10 NOTE — PROGRESS NOTES
Cardiology Follow Up    Xu Maria  1940  [id number]      Interval History: Xu Maria is here for follow up of CAD  Since her last visit, she has been feeling shortness of breath as she lays down at night  She denies PND or LE edema  She denies any chest pain  There was no inciting event  Her blood pressure was elevated and  Her spironolactone was increased to 25 milligrams b i d  Every other day alternating with 25 milligrams daily  Amlodipine was increased to 2 5 milligrams b i d  Niko Brown Blood pressure is much better controlled  She complains of increased urination  Her last echocardiogram in July 2019 showed an ejection fraction of 40 percent with diffuse hypokinesis and segmental wall motion abnormalities of the inferior and inferolateral wall  In March 2019, she was admitted to [facility] with chest pain and shortness of breath which was occurring for over 24 hours prior to arrival   There was some relief with nitroglycerin but not complete  Workup in the emergency room revealed a non ST segment elevation myocardial infarction and she was transferred to [facility] for catheterization  On March 18, 2019, cardiac catheterization showed:  Proximal left main: The vessel was normal sized and mildly calcified  Angiography showed moderate atherosclerosis  Proximal LAD: There was a 100 % stenosis at proximal LAD and LIMA to LAD is patent  Mid circumflex: The vessel was small sized  Angiography showed moderate atherosclerosis  It gives collaterals to RCA  1st obtuse marginal: There was a 100 % stenosis  This lesion is a chronic total occlusion  SVG vein graft to OM1 has previous stent and there was in-stent 90 % stenosis  RCA: There was a 100 % stenosis  This lesion is a chronic total occlusion  Vein graft to RCA is 100 occluded and already known   Distal RCA and RPDA has collaterals from distal Lcx and lad     She underwent percutaneous intervention  at the proximal anastomosis of the SVG to OM1 graft with a Resolute drug-eluting stent which was 3 5 x 22 mm  in length  Echocardiogram done prior to the procedure showed ejection fraction of 35-40% with moderate hypokinesis of the inferolateral, inferior and anterolateral walls  In January 2018, ejection fraction was 50% with no segmental  wall motion abnormalities reported  Afterwards, she completed cardiac rehab but did not have significant improvement in 6 minute walk due to leg pain  The following portions of the patient's history were reviewed and updated as appropriate: allergies, current medications, past family history, past medical history, past social history, past surgical history and problem list     Current Outpatient Medications on File Prior to Visit   Medication Sig Dispense Refill    amLODIPine (NORVASC) 2 5 mg tablet TAKE ONE TABLET EVERY MORNING, AND TWO TABLETS IN THE EVENING AS DIRECTED (Patient taking differently: Take 5 mg by mouth 2 (two) times a day ) 180 tablet 3    Ascorbic Acid (VITAMIN C) 1000 MG tablet Take 1,000 mg by mouth daily      aspirin (ECOTRIN LOW STRENGTH) 81 mg EC tablet Take 81 mg by mouth daily Indications: Heart Attack   BRILINTA 90 MG TAKE 1 TABLET (90 MG TOTAL) BY MOUTH EVERY 12 (TWELVE) HOURS 60 tablet 5    Coenzyme Q10 10 MG capsule Take 10 mg by mouth daily      hydroxychloroquine (PLAQUENIL) 200 mg tablet Take 200 mg by mouth daily        isosorbide mononitrate (IMDUR) 60 mg 24 hr tablet Take 60 mg by mouth daily      ketorolac (ACULAR) 0 5 % ophthalmic solution   3    levothyroxine 50 mcg tablet Take 50 mcg by mouth daily        losartan (COZAAR) 50 mg tablet TAKE 1 TABLET (50 MG TOTAL) BY MOUTH 2 (TWO) TIMES A DAY 60 tablet 5    metoprolol succinate (TOPROL-XL) 100 mg 24 hr tablet Take 1 tablet (100 mg total) by mouth 2 (two) times a day Take 1 tablet in the morning (100mg),Take 1/2  tablet in the evening (50mg) 180 tablet 3    Mirabegron (MYRBETRIQ PO) Take 25 mg by mouth every other day       Multiple Vitamins-Minerals (CENTRUM SILVER PO) Take 1 tablet by mouth daily      nitroglycerin (NITROLINGUAL) 0 4 mg/spray spray Place 1 spray under the tongue every 5 (five) minutes as needed for chest pain      spironolactone (ALDACTONE) 25 mg tablet Take 1 tablet (25 mg total) by mouth daily (Patient taking differently: Take 25 mg by mouth ) 30 tablet 5    torsemide (DEMADEX) 10 mg tablet Take 10 mg by mouth daily      VASCEPA 1 g CAPS TAKE 1 CAPSULE (1 G TOTAL) BY MOUTH 2 (TWO) TIMES A DAY 60 capsule 5    Vitamin D, Cholecalciferol, 1000 units CAPS Take 2,000 Units by mouth daily         No current facility-administered medications on file prior to visit  Review of Systems:  Review of Systems   Constitutional: Positive for fatigue  Negative for chills and fever  HENT: Negative for congestion, nosebleeds and postnasal drip  Respiratory: Positive for shortness of breath  Negative for cough and chest tightness  Cardiovascular: Negative for chest pain, palpitations and leg swelling  Gastrointestinal: Negative for abdominal distention, abdominal pain, diarrhea, nausea and vomiting  Endocrine: Negative for polydipsia, polyphagia and polyuria  Musculoskeletal: Positive for arthralgias, gait problem and myalgias  Skin: Negative for color change, pallor and rash  Allergic/Immunologic: Negative for environmental allergies, food allergies and immunocompromised state  Neurological: Negative for dizziness, seizures, syncope and light-headedness  Hematological: Negative for adenopathy  Does not bruise/bleed easily  Psychiatric/Behavioral: Negative for dysphoric mood  The patient is not nervous/anxious          Physical Exam:  /58 (BP Location: Right arm, Patient Position: Sitting, Cuff Size: Standard)   Pulse 67 Comment: apical  Ht 5' 4" (1 626 m)   Wt 65 8 kg (145 lb)   SpO2 98%   BMI 24 89 kg/m²     Physical Exam   Constitutional: She is oriented to person, place, and time  She appears well-developed  No distress  HENT:   Head: Normocephalic and atraumatic  Eyes: Pupils are equal, round, and reactive to light  Conjunctivae and EOM are normal    Neck: Neck supple  No JVD present  No thyromegaly present  Cardiovascular: Normal rate, regular rhythm and intact distal pulses  Exam reveals no gallop and no friction rub  Murmur heard  Pulmonary/Chest: Effort normal and breath sounds normal    Abdominal: Soft  She exhibits no distension  There is no tenderness  Musculoskeletal: She exhibits no edema  Neurological: She is alert and oriented to person, place, and time  No cranial nerve deficit  Skin: Skin is warm and dry  No rash noted  She is not diaphoretic  No erythema  Psychiatric: She has a normal mood and affect  Her behavior is normal  Judgment and thought content normal        Cardiographics  ECG:  Normal sinus rhythm with first-degree AV block     Labs:  Lab Results   Component Value Date    K 4 3 12/06/2019     12/06/2019    CO2 27 12/06/2019    BUN 24 12/06/2019    CREATININE 1 11 12/06/2019    CALCIUM 9 7 12/06/2019     Lab Results   Component Value Date    WBC 9 18 10/16/2019    HGB 12 2 10/16/2019    HCT 38 9 10/16/2019    MCV 83 10/16/2019     10/16/2019     Lab Results   Component Value Date    TRIG 118 10/16/2019    HDL 56 10/16/2019     Imaging: Xr Chest 1 View Portable    Result Date: 3/14/2019  Narrative: CHEST INDICATION:   chest pain  COMPARISON:  12/11/2018 EXAM PERFORMED/VIEWS:  XR CHEST PORTABLE Images: 1 FINDINGS:  There are median sternotomy wires indicating prior cardiac surgery  Heart shadow is enlarged but unchanged from prior exam  The lungs are clear  No pneumothorax or pleural effusion  Osseous structures appear within normal limits for patient age  Impression: No acute cardiopulmonary disease   Workstation performed: [id number]       Discussion/Summary:  1  Coronary artery disease of native artery of native heart with stable angina pectoris ([facility] )    2  Chronic diastolic heart failure ([facility] )    3  Essential hypertension    4  Chronic systolic CHF (congestive heart failure) ([facility] )    5  Hypertensive chronic kidney disease with stage 1 through stage 4 chronic kidney disease, or unspecified chronic kidney disease    6  S/P CABG x 4    7  Dyslipidemia      - Ms Javan Dougherty has dyspnea as she lays flat  No rhonchi present on exam today  - Will obtain Chest Xray to rule out vascular congestion/CHF   - Continue torsemide and aldactone - last BMP showed normal potassium and creatinine levels  - Last echocardiogram showed LV function unchanged  Remains at 40% with inferior wall motion abnormality  She had a reduction in EF during cardiac event  Currently, she is on metoprolol succinate, aldactone and losartan  - After 1 year, will switch Brilinta to Xarelto 2 5 mg bid  - She has difficulty tolerating statins due to muscle weakness and does not wish to take it  Continue Vascepa and zetia  May need to use PCSK9 inhibitor    - Lipid panel ordered by Dr Monroe Pond to be done with other blood work

## 2019-12-31 ENCOUNTER — HOSPITAL ENCOUNTER (OUTPATIENT)
Dept: RADIOLOGY | Facility: HOSPITAL | Age: 79
Discharge: HOME | End: 2019-12-31
Attending: INTERNAL MEDICINE
Payer: MEDICARE

## 2019-12-31 ENCOUNTER — HOSPITAL ENCOUNTER (OUTPATIENT)
Dept: RADIOLOGY | Facility: HOSPITAL | Age: 79
Discharge: HOME | End: 2019-12-31
Payer: MEDICARE

## 2019-12-31 ENCOUNTER — TRANSCRIBE ORDERS (OUTPATIENT)
Dept: ADMINISTRATIVE | Facility: HOSPITAL | Age: 79
End: 2019-12-31

## 2019-12-31 DIAGNOSIS — M81.0 OSTEOPOROSIS, UNSPECIFIED OSTEOPOROSIS TYPE, UNSPECIFIED PATHOLOGICAL FRACTURE PRESENCE: Primary | ICD-10-CM

## 2019-12-31 DIAGNOSIS — M81.0 OSTEOPOROSIS, UNSPECIFIED OSTEOPOROSIS TYPE, UNSPECIFIED PATHOLOGICAL FRACTURE PRESENCE: ICD-10-CM

## 2019-12-31 DIAGNOSIS — I50.22 CHRONIC SYSTOLIC CHF (CONGESTIVE HEART FAILURE) (HCC): ICD-10-CM

## 2019-12-31 PROCEDURE — 77080 DXA BONE DENSITY AXIAL: CPT

## 2019-12-31 PROCEDURE — 71046 X-RAY EXAM CHEST 2 VIEWS: CPT

## 2020-01-02 ENCOUNTER — TELEPHONE (OUTPATIENT)
Dept: CARDIOLOGY CLINIC | Facility: CLINIC | Age: 80
End: 2020-01-02

## 2020-01-02 NOTE — TELEPHONE ENCOUNTER
----- Message from Radha Jerez DO sent at 12/31/2019  4:23 PM EST -----  Can you please let the patient know chest xray was normal

## 2020-01-23 ENCOUNTER — TELEPHONE (OUTPATIENT)
Dept: NEPHROLOGY | Facility: CLINIC | Age: 80
End: 2020-01-23

## 2020-03-02 ENCOUNTER — OFFICE VISIT (OUTPATIENT)
Dept: CARDIOLOGY CLINIC | Facility: CLINIC | Age: 80
End: 2020-03-02
Payer: MEDICARE

## 2020-03-02 VITALS
HEIGHT: 64 IN | DIASTOLIC BLOOD PRESSURE: 62 MMHG | WEIGHT: 142 LBS | BODY MASS INDEX: 24.24 KG/M2 | SYSTOLIC BLOOD PRESSURE: 120 MMHG | HEART RATE: 64 BPM | OXYGEN SATURATION: 98 %

## 2020-03-02 DIAGNOSIS — I50.22 CHRONIC SYSTOLIC CONGESTIVE HEART FAILURE (HCC): Chronic | ICD-10-CM

## 2020-03-02 DIAGNOSIS — N18.30 BENIGN HYPERTENSION WITH CHRONIC KIDNEY DISEASE, STAGE III (HCC): Chronic | ICD-10-CM

## 2020-03-02 DIAGNOSIS — Z01.810 PREOPERATIVE CARDIOVASCULAR EXAMINATION: ICD-10-CM

## 2020-03-02 DIAGNOSIS — I12.9 BENIGN HYPERTENSION WITH CHRONIC KIDNEY DISEASE, STAGE III (HCC): Chronic | ICD-10-CM

## 2020-03-02 DIAGNOSIS — E78.00 PURE HYPERCHOLESTEROLEMIA: Chronic | ICD-10-CM

## 2020-03-02 DIAGNOSIS — I25.118 CORONARY ARTERY DISEASE OF NATIVE ARTERY OF NATIVE HEART WITH STABLE ANGINA PECTORIS (HCC): Primary | Chronic | ICD-10-CM

## 2020-03-02 DIAGNOSIS — Z95.1 S/P CABG X 4: Chronic | ICD-10-CM

## 2020-03-02 PROCEDURE — 99214 OFFICE O/P EST MOD 30 MIN: CPT | Performed by: INTERNAL MEDICINE

## 2020-03-02 RX ORDER — FESOTERODINE FUMARATE 4 MG/1
1 TABLET, FILM COATED, EXTENDED RELEASE ORAL DAILY
COMMUNITY
Start: 2020-02-03 | End: 2020-07-23 | Stop reason: HOSPADM

## 2020-03-02 NOTE — PROGRESS NOTES
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  She is scheduled for surgery in 1 month  In March 2019, she was admitted to [facility] with chest pain and shortness of breath which was occurring for over 24 hours prior to arrival   There was some relief with nitroglycerin but not complete  Workup in the emergency room revealed a non ST segment elevation myocardial infarction and she was transferred to [facility] for catheterization  On March 18, 2019, cardiac catheterization showed:  Proximal left main: The vessel was normal sized and mildly calcified  Angiography showed moderate atherosclerosis  Proximal LAD: There was a 100 % stenosis at proximal LAD and LIMA to LAD is patent  Mid circumflex: The vessel was small sized  Angiography showed moderate atherosclerosis  It gives collaterals to RCA  1st obtuse marginal: There was a 100 % stenosis  This lesion is a chronic total occlusion  SVG vein graft to OM1 has previous stent and there was in-stent 90 % stenosis  RCA: There was a 100 % stenosis  This lesion is a chronic total occlusion  Vein graft to RCA is 100 occluded and already known   Distal RCA and RPDA has collaterals from distal Lcx and lad     She underwent percutaneous intervention  at the proximal anastomosis of the SVG to OM1 graft with a Resolute drug-eluting stent which was 3 5 x 22 mm  in length  Echocardiogram done prior to the procedure showed ejection fraction of 35-40% with moderate hypokinesis of the inferolateral, inferior and anterolateral walls  In January 2018, ejection fraction was 50% with no segmental  wall motion abnormalities reported  Afterwards, she completed cardiac rehab but did not have significant improvement in 6 minute walk due to leg pain  Her last echocardiogram in July 2019 showed an ejection fraction of 40 percent with diffuse hypokinesis and segmental wall motion abnormalities of the inferior and inferolateral wall  The following portions of the patient's history were reviewed and updated as appropriate: allergies, current medications, past family history, past medical history, past social history, past surgical history and problem list     Current Outpatient Medications on File Prior to Visit   Medication Sig Dispense Refill    amLODIPine (NORVASC) 2 5 mg tablet TAKE ONE TABLET EVERY MORNING, AND TWO TABLETS IN THE EVENING AS DIRECTED (Patient taking differently: Take 5 mg by mouth 2 (two) times a day ) 180 tablet 3    Ascorbic Acid (VITAMIN C) 1000 MG tablet Take 1,000 mg by mouth daily      aspirin (ECOTRIN LOW STRENGTH) 81 mg EC tablet Take 81 mg by mouth daily Indications: Heart Attack   BRILINTA 90 MG TAKE 1 TABLET (90 MG TOTAL) BY MOUTH EVERY 12 (TWELVE) HOURS 60 tablet 5    Coenzyme Q10 10 MG capsule Take 10 mg by mouth daily      hydroxychloroquine (PLAQUENIL) 200 mg tablet Take 200 mg by mouth daily        ketorolac (ACULAR) 0 5 % ophthalmic solution   3    levothyroxine 50 mcg tablet Take 50 mcg by mouth daily        losartan (COZAAR) 50 mg tablet TAKE 1 TABLET (50 MG TOTAL) BY MOUTH 2 (TWO) TIMES A DAY 60 tablet 5    metoprolol succinate (TOPROL-XL) 100 mg 24 hr tablet Take 1 tablet (100 mg total) by mouth 2 (two) times a day Take 1 tablet in the morning (100mg),Take 1/2  tablet in the evening (50mg) 180 tablet 3    Multiple Vitamins-Minerals (CENTRUM SILVER PO) Take 1 tablet by mouth daily      nitroglycerin (NITROLINGUAL) 0 4 mg/spray spray Place 1 spray under the tongue every 5 (five) minutes as needed for chest pain      spironolactone (ALDACTONE) 25 mg tablet Take 1 tablet (25 mg total) by mouth daily (Patient taking differently: Take 25 mg by mouth ) 30 tablet 5    TOVIAZ 4 MG TB24 Take 1 tablet by mouth daily       VASCEPA 1 g CAPS TAKE 1 CAPSULE (1 G TOTAL) BY MOUTH 2 (TWO) TIMES A DAY 60 capsule 5    Vitamin D, Cholecalciferol, 1000 units CAPS Take 2,000 Units by mouth daily        isosorbide mononitrate (IMDUR) 60 mg 24 hr tablet Take 60 mg by mouth daily      torsemide (DEMADEX) 10 mg tablet Take 10 mg by mouth daily      [DISCONTINUED] Mirabegron (MYRBETRIQ PO) Take 25 mg by mouth every other day        No current facility-administered medications on file prior to visit  Review of Systems:  Review of Systems   Constitutional: Negative for chills, fatigue and fever  HENT: Negative for congestion, nosebleeds and postnasal drip  Eyes: Positive for visual disturbance  Respiratory: Negative for cough, chest tightness and shortness of breath  Cardiovascular: Negative for chest pain, palpitations and leg swelling  Gastrointestinal: Negative for abdominal distention, abdominal pain, diarrhea, nausea and vomiting  Endocrine: Negative for polydipsia, polyphagia and polyuria  Musculoskeletal: Positive for arthralgias, gait problem and myalgias  Skin: Negative for color change, pallor and rash  Allergic/Immunologic: Negative for environmental allergies, food allergies and immunocompromised state  Neurological: Negative for dizziness, seizures, syncope and light-headedness  Hematological: Negative for adenopathy  Does not bruise/bleed easily  Psychiatric/Behavioral: Negative for dysphoric mood   The patient is not nervous/anxious  Physical Exam:  /62 (BP Location: Left arm, Patient Position: Sitting, Cuff Size: Standard)   Pulse 64 Comment: left radial  Ht 5' 4" (1 626 m)   Wt 64 4 kg (142 lb)   SpO2 98% Comment: RA  BMI 24 37 kg/m²     Physical Exam   Constitutional: She is oriented to person, place, and time  She appears well-developed  No distress  HENT:   Head: Normocephalic and atraumatic  Eyes: Conjunctivae and EOM are normal    Neck: Neck supple  No JVD present  No thyromegaly present  Cardiovascular: Normal rate and regular rhythm  Exam reveals no gallop and no friction rub  Murmur heard  Pulmonary/Chest: Effort normal and breath sounds normal    Abdominal: Soft  She exhibits no distension  There is no tenderness  Musculoskeletal: She exhibits no edema  Neurological: She is alert and oriented to person, place, and time  No cranial nerve deficit  Skin: Skin is warm and dry  No rash noted  She is not diaphoretic  No erythema  Psychiatric: She has a normal mood and affect  Her behavior is normal  Judgment and thought content normal        Cardiographics  ECG:  Normal sinus rhythm with first-degree AV block     Labs:  Lab Results   Component Value Date    K 4 3 12/06/2019     12/06/2019    CO2 27 12/06/2019    BUN 24 12/06/2019    CREATININE 1 11 12/06/2019    CALCIUM 9 7 12/06/2019     Lab Results   Component Value Date    WBC 9 18 10/16/2019    HGB 12 2 10/16/2019    HCT 38 9 10/16/2019    MCV 83 10/16/2019     10/16/2019     Lab Results   Component Value Date    TRIG 118 10/16/2019    HDL 56 10/16/2019     Imaging: Xr Chest 1 View Portable    Result Date: 3/14/2019  Narrative: CHEST INDICATION:   chest pain  COMPARISON:  12/11/2018 EXAM PERFORMED/VIEWS:  XR CHEST PORTABLE Images: 1 FINDINGS:  There are median sternotomy wires indicating prior cardiac surgery  Heart shadow is enlarged but unchanged from prior exam  The lungs are clear    No pneumothorax or pleural effusion  Osseous structures appear within normal limits for patient age  Impression: No acute cardiopulmonary disease  Workstation performed: [id number]       Discussion/Summary:  1  Coronary artery disease of native artery of native heart with stable angina pectoris ([facility] )    2  Benign hypertension with chronic kidney disease, stage III ([facility] )    3  Chronic systolic congestive heart failure ([facility] )    4  S/P CABG x 4    5  Pure hypercholesterolemia    6  Preoperative cardiovascular examination      - Ms Dequan Peralta had her PCI in March 2019  She may stop Brilinta for procedure  If needed to be stopped sooner to allow resolution of vitreous hemorrhage, she may, otherwise, she should discontinue 7 days prior to procedure and restart 7 days after procedure  - Continue ASA 81 mg if possible  - Continue torsemide and aldactone - last BMP showed normal potassium and creatinine levels  - Last echocardiogram showed LV function unchanged  Remains at 40% with inferior wall motion abnormality  She had a reduction in EF during cardiac event  Currently, she is on metoprolol succinate, aldactone and losartan  - She has difficulty tolerating statins due to muscle weakness and does not wish to take it  Continue Vascepa and zetia  May need to use PCSK9 inhibitor    - Lipid panel ordered by Dr Avila Crawford was unavailable for review

## 2020-03-05 ENCOUNTER — APPOINTMENT (OUTPATIENT)
Dept: LAB | Facility: CLINIC | Age: 80
End: 2020-03-05
Payer: MEDICARE

## 2020-03-05 ENCOUNTER — TRANSCRIBE ORDERS (OUTPATIENT)
Dept: LAB | Facility: CLINIC | Age: 80
End: 2020-03-05

## 2020-03-05 DIAGNOSIS — I12.9 BENIGN HYPERTENSION WITH CHRONIC KIDNEY DISEASE, STAGE III (HCC): Chronic | ICD-10-CM

## 2020-03-05 DIAGNOSIS — E61.1 IRON DEFICIENCY: ICD-10-CM

## 2020-03-05 DIAGNOSIS — D63.1 ANEMIA OF CHRONIC RENAL FAILURE, STAGE 3 (MODERATE) (HCC): ICD-10-CM

## 2020-03-05 DIAGNOSIS — N18.30 BENIGN HYPERTENSION WITH CHRONIC KIDNEY DISEASE, STAGE III (HCC): Chronic | ICD-10-CM

## 2020-03-05 DIAGNOSIS — N18.30 CHRONIC KIDNEY DISEASE, STAGE III (MODERATE) (HCC): Chronic | ICD-10-CM

## 2020-03-05 DIAGNOSIS — I12.9 HYPERTENSIVE CHRONIC KIDNEY DISEASE WITH STAGE 1 THROUGH STAGE 4 CHRONIC KIDNEY DISEASE, OR UNSPECIFIED CHRONIC KIDNEY DISEASE: ICD-10-CM

## 2020-03-05 DIAGNOSIS — N18.30 ANEMIA OF CHRONIC RENAL FAILURE, STAGE 3 (MODERATE) (HCC): ICD-10-CM

## 2020-03-05 DIAGNOSIS — E55.9 VITAMIN D DEFICIENCY: ICD-10-CM

## 2020-03-05 DIAGNOSIS — E78.5 DYSLIPIDEMIA: ICD-10-CM

## 2020-03-05 LAB
25(OH)D3 SERPL-MCNC: 50.3 NG/ML (ref 30–100)
ALBUMIN SERPL BCP-MCNC: 3.8 G/DL (ref 3.5–5)
ALP SERPL-CCNC: 91 U/L (ref 46–116)
ALT SERPL W P-5'-P-CCNC: 53 U/L (ref 12–78)
ANION GAP SERPL CALCULATED.3IONS-SCNC: 5 MMOL/L (ref 4–13)
AST SERPL W P-5'-P-CCNC: 36 U/L (ref 5–45)
BILIRUB SERPL-MCNC: 0.75 MG/DL (ref 0.2–1)
BUN SERPL-MCNC: 22 MG/DL (ref 5–25)
CALCIUM SERPL-MCNC: 8.7 MG/DL (ref 8.3–10.1)
CHLORIDE SERPL-SCNC: 109 MMOL/L (ref 100–108)
CHOLEST SERPL-MCNC: 195 MG/DL (ref 50–200)
CO2 SERPL-SCNC: 26 MMOL/L (ref 21–32)
CREAT SERPL-MCNC: 1.13 MG/DL (ref 0.6–1.3)
CREAT UR-MCNC: 154 MG/DL
ERYTHROCYTE [DISTWIDTH] IN BLOOD BY AUTOMATED COUNT: 14.4 % (ref 11.6–15.1)
FERRITIN SERPL-MCNC: 88 NG/ML (ref 8–388)
GFR SERPL CREATININE-BSD FRML MDRD: 46 ML/MIN/1.73SQ M
GLUCOSE P FAST SERPL-MCNC: 108 MG/DL (ref 65–99)
HCT VFR BLD AUTO: 39 % (ref 34.8–46.1)
HDLC SERPL-MCNC: 50 MG/DL
HGB BLD-MCNC: 11.9 G/DL (ref 11.5–15.4)
IRON SATN MFR SERPL: 17 %
IRON SERPL-MCNC: 74 UG/DL (ref 50–170)
LDLC SERPL CALC-MCNC: 117 MG/DL (ref 0–100)
MAGNESIUM SERPL-MCNC: 2.4 MG/DL (ref 1.6–2.6)
MCH RBC QN AUTO: 25.9 PG (ref 26.8–34.3)
MCHC RBC AUTO-ENTMCNC: 30.5 G/DL (ref 31.4–37.4)
MCV RBC AUTO: 85 FL (ref 82–98)
PHOSPHATE SERPL-MCNC: 3.5 MG/DL (ref 2.3–4.1)
PLATELET # BLD AUTO: 232 THOUSANDS/UL (ref 149–390)
PMV BLD AUTO: 10.5 FL (ref 8.9–12.7)
POTASSIUM SERPL-SCNC: 4 MMOL/L (ref 3.5–5.3)
PROT SERPL-MCNC: 6.9 G/DL (ref 6.4–8.2)
PROT UR-MCNC: 70 MG/DL
PROT/CREAT UR: 0.45 MG/G{CREAT} (ref 0–0.1)
PTH-INTACT SERPL-MCNC: 82.1 PG/ML (ref 18.4–80.1)
RBC # BLD AUTO: 4.59 MILLION/UL (ref 3.81–5.12)
SODIUM SERPL-SCNC: 140 MMOL/L (ref 136–145)
TIBC SERPL-MCNC: 436 UG/DL (ref 250–450)
TRIGL SERPL-MCNC: 139 MG/DL
WBC # BLD AUTO: 8.35 THOUSAND/UL (ref 4.31–10.16)

## 2020-03-05 PROCEDURE — 84156 ASSAY OF PROTEIN URINE: CPT

## 2020-03-05 PROCEDURE — 83550 IRON BINDING TEST: CPT

## 2020-03-05 PROCEDURE — 80061 LIPID PANEL: CPT

## 2020-03-05 PROCEDURE — 36415 COLL VENOUS BLD VENIPUNCTURE: CPT

## 2020-03-05 PROCEDURE — 80053 COMPREHEN METABOLIC PANEL: CPT

## 2020-03-05 PROCEDURE — 83970 ASSAY OF PARATHORMONE: CPT

## 2020-03-05 PROCEDURE — 83735 ASSAY OF MAGNESIUM: CPT

## 2020-03-05 PROCEDURE — 84100 ASSAY OF PHOSPHORUS: CPT

## 2020-03-05 PROCEDURE — 82728 ASSAY OF FERRITIN: CPT

## 2020-03-05 PROCEDURE — 85027 COMPLETE CBC AUTOMATED: CPT

## 2020-03-05 PROCEDURE — 82306 VITAMIN D 25 HYDROXY: CPT

## 2020-03-05 PROCEDURE — 82570 ASSAY OF URINE CREATININE: CPT

## 2020-03-05 PROCEDURE — 83540 ASSAY OF IRON: CPT

## 2020-03-06 ENCOUNTER — TELEPHONE (OUTPATIENT)
Dept: NEPHROLOGY | Facility: CLINIC | Age: 80
End: 2020-03-06

## 2020-03-06 ENCOUNTER — DOCUMENTATION (OUTPATIENT)
Dept: NEPHROLOGY | Facility: CLINIC | Age: 80
End: 2020-03-06

## 2020-03-06 RX ORDER — FERROUS SULFATE 325(65) MG
325 TABLET ORAL
COMMUNITY
End: 2020-07-23 | Stop reason: HOSPADM

## 2020-03-06 NOTE — TELEPHONE ENCOUNTER
Patient is aware of results and will begin iron once a day   Patient will verify if she can get a ride to her appointment next week if not she will call to reschedule  Patient has been unable to get a new omron machine   BP recently 120/60 feeling good

## 2020-03-06 NOTE — TELEPHONE ENCOUNTER
----- Message from Alessandro Montalvo MD sent at 3/5/2020 12:59 PM EST -----  I think she is due for follow-up appointment in the next month or so  Her iron saturation slightly low so if she can tolerate iron have her take 1 over-the-counter watch for constipation

## 2020-03-09 ENCOUNTER — TELEPHONE (OUTPATIENT)
Dept: CARDIOLOGY CLINIC | Facility: CLINIC | Age: 80
End: 2020-03-09

## 2020-03-09 DIAGNOSIS — I12.9 HYPERTENSIVE CHRONIC KIDNEY DISEASE WITH STAGE 1 THROUGH STAGE 4 CHRONIC KIDNEY DISEASE, OR UNSPECIFIED CHRONIC KIDNEY DISEASE: ICD-10-CM

## 2020-03-09 DIAGNOSIS — E78.5 DYSLIPIDEMIA: ICD-10-CM

## 2020-03-09 DIAGNOSIS — E61.1 IRON DEFICIENCY: ICD-10-CM

## 2020-03-09 DIAGNOSIS — E55.9 VITAMIN D DEFICIENCY: ICD-10-CM

## 2020-03-09 DIAGNOSIS — E78.1 HYPERTRIGLYCERIDEMIA: ICD-10-CM

## 2020-03-09 DIAGNOSIS — N18.30 ANEMIA OF CHRONIC RENAL FAILURE, STAGE 3 (MODERATE) (HCC): ICD-10-CM

## 2020-03-09 DIAGNOSIS — N18.30 CHRONIC KIDNEY DISEASE, STAGE III (MODERATE) (HCC): Chronic | ICD-10-CM

## 2020-03-09 DIAGNOSIS — D63.1 ANEMIA OF CHRONIC RENAL FAILURE, STAGE 3 (MODERATE) (HCC): ICD-10-CM

## 2020-03-09 RX ORDER — ICOSAPENT ETHYL 1000 MG/1
CAPSULE ORAL
Qty: 60 CAPSULE | Refills: 5 | Status: SHIPPED | OUTPATIENT
Start: 2020-03-09 | End: 2020-07-23 | Stop reason: HOSPADM

## 2020-03-09 NOTE — PROGRESS NOTES
RENAL FOLLOW UP NOTE: td    ASSESSMENT AND PLAN:  1   CKD stage 3 :  · Etiology:  Hypertensive nephrosclerosis and arteriolar nephrosclerosis  · Baseline creatinine:  1 5  · Current creatinine:  1 13 at baseline  · Urine protein creatinine ratio:  At goal at 0 45 g  Recommendations:  · Treat hypertension-please see below  · Treat dyslipidemia-please see below  · Maintain proteinuria less than 1 g or as low as possible  · Avoid nephrotoxic agents such as NSAIDs, patient counseled as such  2   Volume:  Euvolemic  3   Hypertension:   · Current blood pressures:   A m :  143/58 without orthostatic changes but this was just based on 2 readings  P m :  151/65 without orthostatic changes but again based on only 1 reading  Heart rate:  60s    · Goal blood pressure:  Less than 125/80  Recommendations:  · Push nonmedical regimen including weight loss, maintaining activities much as possible, and avoidance of salt  · Medication changes today:   I would recommend chlorthalidone 12 5 mg daily  She will purchase a new blood pressure machine then take 1 week a readings in a few weeks and then bring in her new machine for correlation  We will need to be cognizant of low diastolic readings  Also, I will repeat a basic metabolic profile 1-2 weeks after the medication change  4   Electrolytes:  All acceptable   5   Mineral bone disorder:  · Calcium/magnesium/phosphorus:  All acceptable:  Magnesium 2 3  · 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  Repatha per Cardiology? I will discuss with Cardiology    7   Anemia:  Essentially normal 11 9 on iron supplementation with a saturation of 17%, ferritin of 80 a:  Replete  8   Other problems:  · status post CABG/MI with recent intervention followed by Dr Denise Baca at this time  · hypothyroidism status post partial thyroidectomy for benign lesion  · arthritic symptoms followed by Rheumatology  · 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  · urinary incontinence monitored by Dr Zahra Krishna  · Bilateral carotid bruits:  right carotid less than 50%; left 50-69%:  Recommended 6 month follow-up which would be May to June of this year  PATIENT INSTRUCTIONS:    Patient Instructions   1  Medication changes today:  · Please begin chlorthalidone 1/2 of a 25 mg tablet or 12 5 mg once a day  This will be in the morning  · (I will be checking with Dr Denise Baca as to whether not you should be taking Imdur/isosorbide)  · I will also be checking with Dr Denise Baca about placing on a different type of cholesterol medication  2  Please go for nonfasting lab work 1-2 weeks after making the above medication change    3  Please wait 4 weeks after making the above medication change and take 1 week a blood pressure readings with your new machine:  A m  And p m , sitting and standing as follows:  · Take the morning readings before any medications  · Take the evening readings before supper and before taking any medications at that time  · When taking standing readings, keep your arm supported at heart level and not dangling  · Make sure you are sitting with your back supported in feet on the ground on crossed  · Make sure you have not taken any coffee or caffeine products or exercised or smoke cigarettes at least 30 minutes before taking your blood pressure  THEN PLEASE BRING IN YOUR BLOOD PRESSURE MACHINE ALONG WITH YOUR NEW BLOOD PRESSURE READINGS TO SEE 1 OF MY ADVANCED PRACTITIONER'S IN ABOUT 4-6 WEEKS    4  Please go for the ultrasound of your carotid arteries either in May or June    5   In 3 months:  · Please send in 1 week a blood pressure readings as outlined above  :  Morning evening, sitting and standing  · Please go for nonfasting lab work any time of the day    6  Follow-up in 6 months:  -please bring in 1 week a blood pressure readings morning evening, sitting and standing is outlined above  -please go for fasting lab work 1-2 weeks prior to your appointment    7  General instructions:   AVOID SALT BUT NOT ADDING AN READING LABELS TO MAKE SURE THERE IS LOW-SALT IN THE FOOD THAT YOU ARE EATING     Avoid nonsteroidal anti-inflammatory drugs such as Naprosyn, ibuprofen, Aleve, Advil, Celebrex, etc   You can use Tylenol as needed if you do not have any liver condition to be concerned about     Avoid medications such as Sudafed or decongestants and antihistamines that contained the D component which is the decongestant  You can take antihistamines without the decongestant or D component   Try to avoid medications such as pantoprazole or  Protonix/Nexium or Esomeprazole)/Prilosec or omeprazole/Prevacid or lansoprazole/AcipHex or Rabeprazole  If you are able to, use Pepcid as this is safer for your kidneys   Try to exercise at least 30 minutes 3 days a week to begin with with an ultimate goal of 5 days a week for at least 30 minutes     Try to lose 5-10 lb by your next visit     Please do not drink more than 2 glasses of alcohol/wine on a daily basis as this may contribute to your high blood pressure  Subjective: The patient is going to require surgery/vitrectomy on her right eye at [facility]  There has been no hospitalizations or acute illnesses since last visit  No fevers, chills, or cough or colds    Good appetite and good energy  No hematuria, dysuria, voiding symptoms or foamy urine  No gastrointestinal symptoms  No cardiovascular symptoms including swelling of the legs  No headaches, dizziness or lightheadedness  Blood pressure medications:  · Spironolactone 25 mg daily  · Toprol  mg the morning and 50 mg in the evening  · Losartan 50 mg twice a day  · Amlodipine 5 mg twice a day    ROS:  See HPI, otherwise review of systems as completely reviewed with the patient are negative    Past Medical History:   Diagnosis Date    Anemia     Arthritis     Bladder calculi     CAD (coronary artery disease)     Status post CABG and PTCA to OM1    CKD (chronic kidney disease) stage 3, GFR 30-59 ml/min (HCC)     CKD (chronic kidney disease), stage III (HCC)     Diastolic congestive heart failure (HCC)     Hypertension     Hypothyroidism     Rheumatoid arthritis ([address] )     Uterine prolapse      Past Surgical History:   Procedure Laterality Date    BACK SURGERY      BLADDER SURGERY      CORONARY ANGIOPLASTY WITH STENT PLACEMENT      CORONARY ARTERY BYPASS GRAFT      OOPHORECTOMY Left     [place] PARTIAL HIP REPLACEMENT Right 2018    Procedure: HEMIARTHROPLASTY HIP (BIPOLAR) (RIGHT); Surgeon: Konrad Galdamez MD;  Location: [facility];  Service: Orthopedics    THYROIDECTOMY, PARTIAL       Family History   Problem Relation Age of Onset    Heart attack Mother     Heart attack Father     Stroke Sister     Heart attack Brother         3 brothers all       reports that she has never smoked  She has never used smokeless tobacco  She reports that she does not drink alcohol or use drugs  I COMPLETELY REVIEWED THE PAST MEDICAL HISTORY/PAST SURGICAL HISTORY/SOCIAL HISTORY/FAMILY HISTORY/AND MEDICATIONS  AND UPDATED ALL    Objective:     Vitals:   Vitals:    03/10/20 0920   BP: 139/69   Pulse: 58   BP sitting on left:  164/72 with a heart rate of 60 and slightly irregular  BP standing on left:  164/68 with a heart rate of 60 and slightly irregular    Weight (last 2 days)     Date/Time   Weight    03/10/20 0920   67 9 (149 8)            Wt Readings from Last 3 Encounters:   03/10/20 67 9 kg (149 lb 12 8 oz)   20 64 4 kg (142 lb)   12/10/19 65 8 kg (145 lb)       Body mass index is 25 71 kg/m²      Physical Exam: General:  No acute distress  Skin:  No acute rash  Eyes:  No scleral icterus, noninjected, no discharge from eyes  ENT:  Moist mucous membranes  Neck:  Supple, no jugular venous distention, trachea is midline, no lymphadenopathy and no thyromegaly  Back   No CVAT  Chest:  Clear to auscultation and percussion, good respiratory effort  CVS:  Regular rate and rhythm without a rub, or gallops or murmurs  Abdomen:  Soft and nontender with normal bowel sounds  Extremities:  No cyanosis and no edema, moderate hand arthritic changes, normal range of motion  Neuro:  Grossly intact  Psych:  Alert, oriented x3 and appropriate      Medications:    Current Outpatient Medications:     amLODIPine (NORVASC) 2 5 mg tablet, TAKE ONE TABLET EVERY MORNING, AND TWO TABLETS IN THE EVENING AS DIRECTED (Patient taking differently: Take 5 mg by mouth 2 (two) times a day ), Disp: 180 tablet, Rfl: 3    Ascorbic Acid (VITAMIN C) 1000 MG tablet, Take 1,000 mg by mouth daily, Disp: , Rfl:     aspirin (ECOTRIN LOW STRENGTH) 81 mg EC tablet, Take 81 mg by mouth daily Indications: Heart Attack  , Disp: , Rfl:     BRILINTA 90 MG, TAKE 1 TABLET (90 MG TOTAL) BY MOUTH EVERY 12 (TWELVE) HOURS, Disp: 60 tablet, Rfl: 5    Coenzyme Q10 10 MG capsule, Take 10 mg by mouth daily, Disp: , Rfl:     ferrous sulfate 325 (65 Fe) mg tablet, Take 325 mg by mouth daily with breakfast, Disp: , Rfl:     hydroxychloroquine (PLAQUENIL) 200 mg tablet, Take 200 mg by mouth daily  , Disp: , Rfl:     isosorbide mononitrate (IMDUR) 60 mg 24 hr tablet, Take 60 mg by mouth daily, Disp: , Rfl:     ketorolac (ACULAR) 0 5 % ophthalmic solution, , Disp: , Rfl: 3    levothyroxine 50 mcg tablet, Take 50 mcg by mouth daily  , Disp: , Rfl:     losartan (COZAAR) 50 mg tablet, TAKE 1 TABLET (50 MG TOTAL) BY MOUTH 2 (TWO) TIMES A DAY, Disp: 60 tablet, Rfl: 5    metoprolol succinate (TOPROL-XL) 100 mg 24 hr tablet, Take 1 tablet (100 mg total) by mouth 2 (two) times a day Take 1 tablet in the morning (100mg),Take 1/2  tablet in the evening (50mg), Disp: 180 tablet, Rfl: 3    Multiple Vitamins-Minerals (CENTRUM SILVER PO), Take 1 tablet by mouth daily, Disp: , Rfl:     nitroglycerin (NITROLINGUAL) 0 4 mg/spray spray, Place 1 spray under the tongue every 5 (five) minutes as needed for chest pain, Disp: , Rfl:     spironolactone (ALDACTONE) 25 mg tablet, Take 1 tablet (25 mg total) by mouth daily (Patient taking differently: Take 25 mg by mouth ), Disp: 30 tablet, Rfl: 5    TOVIAZ 4 MG TB24, Take 1 tablet by mouth daily , Disp: , Rfl:     VASCEPA 1 g CAPS, ONE CAPSULE TWICE A DAY, Disp: 60 capsule, Rfl: 5    Vitamin D, Cholecalciferol, 1000 units CAPS, Take 2,000 Units by mouth daily  , Disp: , Rfl:     chlorthalidone 25 mg tablet, Take 0 5 tablets (12 5 mg total) by mouth daily, Disp: 15 tablet, Rfl: 5    Lab, Imaging and other studies: I have personally reviewed pertinent labs    Laboratory Results:  Results for orders placed or performed in visit on 03/05/20   Comprehensive metabolic panel   Result Value Ref Range    Sodium 140 136 - 145 mmol/L    Potassium 4 0 3 5 - 5 3 mmol/L    Chloride 109 (H) 100 - 108 mmol/L    CO2 26 21 - 32 mmol/L    ANION GAP 5 4 - 13 mmol/L    BUN 22 5 - 25 mg/dL    Creatinine 1 13 0 60 - 1 30 mg/dL    Glucose, Fasting 108 (H) 65 - 99 mg/dL    Calcium 8 7 8 3 - 10 1 mg/dL    AST 36 5 - 45 U/L    ALT 53 12 - 78 U/L    Alkaline Phosphatase 91 46 - 116 U/L    Total Protein 6 9 6 4 - 8 2 g/dL    Albumin 3 8 3 5 - 5 0 g/dL    Total Bilirubin 0 75 0 20 - 1 00 mg/dL    eGFR 46 ml/min/1 73sq m   CBC   Result Value Ref Range    WBC 8 35 4 31 - 10 16 Thousand/uL    RBC 4 59 3 81 - 5 12 Million/uL    Hemoglobin 11 9 11 5 - 15 4 g/dL    Hematocrit 39 0 34 8 - 46 1 %    MCV 85 82 - 98 fL    MCH 25 9 (L) 26 8 - 34 3 pg    MCHC 30 5 (L) 31 4 - 37 4 g/dL    RDW 14 4 11 6 - 15 1 %    Platelets 327 517 - 631 Thousands/uL    MPV 10 5 8 9 - 12 7 fL   Iron Saturation %   Result Value Ref Range    Iron Saturation 17 %    TIBC 436 250 - 450 ug/dL    Iron 74 50 - 170 ug/dL   Ferritin   Result Value Ref Range    Ferritin 88 8 - 388 ng/mL   Magnesium   Result Value Ref Range    Magnesium 2 4 1 6 - 2 6 mg/dL   Phosphorus   Result Value Ref Range    Phosphorus 3 5 2 3 - 4 1 mg/dL   Lipid Panel with Direct LDL reflex   Result Value Ref Range    Cholesterol 195 50 - 200 mg/dL    Triglycerides 139 <=150 mg/dL    HDL, Direct 50 >=40 mg/dL    LDL Calculated 117 (H) 0 - 100 mg/dL   Protein / creatinine ratio, urine   Result Value Ref Range    Creatinine, Ur 154 0 mg/dL    Protein Urine Random 70 mg/dL    Prot/Creat Ratio, Ur 0 45 (H) 0 00 - 0 10   PTH, intact   Result Value Ref Range    PTH 82 1 (H) 18 4 - 80 1 pg/mL   Vitamin D 25 hydroxy   Result Value Ref Range    Vit D, 25-Hydroxy 50 3 30 0 - 100 0 ng/mL       Results from last 7 days   Lab Units 03/05/20  0912   WBC Thousand/uL 8 35   HEMOGLOBIN g/dL 11 9   HEMATOCRIT % 39 0   PLATELETS Thousands/uL 232   POTASSIUM mmol/L 4 0   CHLORIDE mmol/L 109*   CO2 mmol/L 26   BUN mg/dL 22   CREATININE mg/dL 1 13   CALCIUM mg/dL 8 7   MAGNESIUM mg/dL 2 4   PHOSPHORUS mg/dL 3 5         Radiology review:   chest X-ray    Ultrasound      Portions of the record may have been created with voice recognition software  Occasional wrong word or "sound a like" substitutions may have occurred due to the inherent limitations of voice recognition software  Read the chart carefully and recognize, using context, where substitutions have occurred

## 2020-03-10 ENCOUNTER — OFFICE VISIT (OUTPATIENT)
Dept: NEPHROLOGY | Facility: CLINIC | Age: 80
End: 2020-03-10
Payer: MEDICARE

## 2020-03-10 VITALS
DIASTOLIC BLOOD PRESSURE: 69 MMHG | HEIGHT: 64 IN | WEIGHT: 149.8 LBS | BODY MASS INDEX: 25.57 KG/M2 | HEART RATE: 58 BPM | SYSTOLIC BLOOD PRESSURE: 139 MMHG

## 2020-03-10 DIAGNOSIS — E55.9 VITAMIN D DEFICIENCY: ICD-10-CM

## 2020-03-10 DIAGNOSIS — N18.30 CKD (CHRONIC KIDNEY DISEASE), STAGE III (HCC): ICD-10-CM

## 2020-03-10 DIAGNOSIS — E78.5 DYSLIPIDEMIA: ICD-10-CM

## 2020-03-10 DIAGNOSIS — I12.9 HYPERTENSIVE CHRONIC KIDNEY DISEASE WITH STAGE 1 THROUGH STAGE 4 CHRONIC KIDNEY DISEASE, OR UNSPECIFIED CHRONIC KIDNEY DISEASE: Primary | ICD-10-CM

## 2020-03-10 DIAGNOSIS — D63.1 ANEMIA OF CHRONIC RENAL FAILURE, STAGE 3 (MODERATE) (HCC): ICD-10-CM

## 2020-03-10 DIAGNOSIS — N18.30 ANEMIA OF CHRONIC RENAL FAILURE, STAGE 3 (MODERATE) (HCC): ICD-10-CM

## 2020-03-10 DIAGNOSIS — E61.1 IRON DEFICIENCY: ICD-10-CM

## 2020-03-10 PROCEDURE — 99214 OFFICE O/P EST MOD 30 MIN: CPT | Performed by: INTERNAL MEDICINE

## 2020-03-10 RX ORDER — CHLORTHALIDONE 25 MG/1
12.5 TABLET ORAL DAILY
Qty: 15 TABLET | Refills: 5 | Status: SHIPPED | OUTPATIENT
Start: 2020-03-10 | End: 2020-05-22

## 2020-03-10 NOTE — LETTER
March 10, 2020     Juan Carlos Guerra MD  [facility]  [address]    Patient: Guerita Hansen   YOB: 1940   Date of Visit: 3/10/2020       Dear Dr Светлана Pickering: Thank you for referring Dread Newman to me for evaluation  Below are my notes for this consultation  If you have questions, please do not hesitate to call me  I look forward to following your patient along with you  Sincerely,        Kia Johnston MD        CC: MD Elza Luo DO Brandy Bottom, MD  3/10/2020 10:04 AM  Incomplete  RENAL FOLLOW UP NOTE: td    ASSESSMENT AND PLAN:  1  Rhys Kelly :  · Etiology:  Hypertensive nephrosclerosis and arteriolar nephrosclerosis  · Baseline creatinine:  1 5  · Current creatinine:  1 13 at baseline  · Urine protein creatinine ratio:  At goal at 0 45 g  Recommendations:  · Treat hypertension-please see below  · Treat dyslipidemia-please see below  · Maintain proteinuria less than 1 g or as low as possible  · Avoid nephrotoxic agents such as NSAIDs, patient counseled as such  2   Volume:  Euvolemic  3   Hypertension:   · Current blood pressures:   A m :  143/58 without orthostatic changes but this was just based on 2 readings  P m :  151/65 without orthostatic changes but again based on only 1 reading  Heart rate:  60s    · Goal blood pressure:  Less than 125/80  Recommendations:  · Push nonmedical regimen including weight loss, maintaining activities much as possible, and avoidance of salt  · Medication changes today:   I would recommend chlorthalidone 12 5 mg daily  She will purchase a new blood pressure machine then take 1 week a readings in a few weeks and then bring in her new machine for correlation  We will need to be cognizant of low diastolic readings    Also, I will repeat a basic metabolic profile 1-2 weeks after the medication change  4   Electrolytes:  All acceptable   5   Mineral bone disorder:  · Calcium/magnesium/phosphorus:  All acceptable:  Magnesium 2 3  · 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  Repatha per Cardiology? I will discuss with Cardiology  7   Anemia:  Essentially normal 11 9 on iron supplementation with a saturation of 17%, ferritin of 80 a:  Replete  8   Other problems:  · status post CABG/MI with recent intervention followed by Dr Ezekiel Talavera at this time  · hypothyroidism status post partial thyroidectomy for benign lesion  · arthritic symptoms followed by Rheumatology  · 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  · urinary incontinence monitored by Dr Jose Shanks  · Bilateral carotid bruits:  right carotid less than 50%; left 50-69%:  Recommended 6 month follow-up which would be May to June of this year  PATIENT INSTRUCTIONS:    Patient Instructions   1  Medication changes today:  · Please begin chlorthalidone 1/2 of a 25 mg tablet or 12 5 mg once a day  This will be in the morning  · (I will be checking with Dr Ezekiel Talavera as to whether not you should be taking Imdur/isosorbide)  · I will also be checking with Dr Ezekiel Talavera about placing on a different type of cholesterol medication  2  Please go for nonfasting lab work 1-2 weeks after making the above medication change    3  Please wait 4 weeks after making the above medication change and take 1 week a blood pressure readings with your new machine:  A m   And p m , sitting and standing as follows:  · Take the morning readings before any medications  · Take the evening readings before supper and before taking any medications at that time  · When taking standing readings, keep your arm supported at heart level and not dangling  · Make sure you are sitting with your back supported in feet on the ground on crossed  · Make sure you have not taken any coffee or caffeine products or exercised or smoke cigarettes at least 30 minutes before taking your blood pressure  THEN PLEASE BRING IN YOUR BLOOD PRESSURE MACHINE ALONG WITH YOUR NEW BLOOD PRESSURE READINGS TO SEE 1 OF MY ADVANCED PRACTITIONER'S IN ABOUT 4-6 WEEKS    4  Please go for the ultrasound of your carotid arteries either in May or June    5  In 3 months:  · Please send in 1 week a blood pressure readings as outlined above  :  Morning evening, sitting and standing  · Please go for nonfasting lab work any time of the day    6  Follow-up in 6 months:  -please bring in 1 week a blood pressure readings morning evening, sitting and standing is outlined above  -please go for fasting lab work 1-2 weeks prior to your appointment    7  General instructions:   AVOID SALT BUT NOT ADDING AN READING LABELS TO MAKE SURE THERE IS LOW-SALT IN THE FOOD THAT YOU ARE EATING     Avoid nonsteroidal anti-inflammatory drugs such as Naprosyn, ibuprofen, Aleve, Advil, Celebrex, etc   You can use Tylenol as needed if you do not have any liver condition to be concerned about     Avoid medications such as Sudafed or decongestants and antihistamines that contained the D component which is the decongestant  You can take antihistamines without the decongestant or D component   Try to avoid medications such as pantoprazole or  Protonix/Nexium or Esomeprazole)/Prilosec or omeprazole/Prevacid or lansoprazole/AcipHex or Rabeprazole  If you are able to, use Pepcid as this is safer for your kidneys   Try to exercise at least 30 minutes 3 days a week to begin with with an ultimate goal of 5 days a week for at least 30 minutes     Try to lose 5-10 lb by your next visit     Please do not drink more than 2 glasses of alcohol/wine on a daily basis as this may contribute to your high blood pressure  Subjective:    The patient is going to require surgery/vitrectomy on her right eye at [facility]  There has been no hospitalizations or acute illnesses since last visit  No fevers, chills, or cough or colds  Good appetite and good energy  No hematuria, dysuria, voiding symptoms or foamy urine  No gastrointestinal symptoms  No cardiovascular symptoms including swelling of the legs  No headaches, dizziness or lightheadedness  Blood pressure medications:  · Spironolactone 25 mg daily  · Toprol  mg the morning and 50 mg in the evening  · Losartan 50 mg twice a day  · Amlodipine 5 mg twice a day    ROS:  See HPI, otherwise review of systems as completely reviewed with the patient are negative    Past Medical History:   Diagnosis Date    Anemia     Arthritis     Bladder calculi     CAD (coronary artery disease)     Status post CABG and PTCA to OM1    CKD (chronic kidney disease) stage 3, GFR 30-59 ml/min ([facility])     CKD (chronic kidney disease), stage III ([facility])     Diastolic congestive heart failure ([facility])     Hypertension     Hypothyroidism     Rheumatoid arthritis ([address] )     Uterine prolapse      Past Surgical History:   Procedure Laterality Date    BACK SURGERY      BLADDER SURGERY      CORONARY ANGIOPLASTY WITH STENT PLACEMENT      CORONARY ARTERY BYPASS GRAFT      OOPHORECTOMY Left     [place] PARTIAL HIP REPLACEMENT Right 2018    Procedure: HEMIARTHROPLASTY HIP (BIPOLAR) (RIGHT); Surgeon: Katya Higginbotham MD;  Location: [facility];  Service: Orthopedics    THYROIDECTOMY, PARTIAL       Family History   Problem Relation Age of Onset    Heart attack Mother     Heart attack Father     Stroke Sister     Heart attack Brother         3 brothers all       reports that she has never smoked  She has never used smokeless tobacco  She reports that she does not drink alcohol or use drugs      I COMPLETELY REVIEWED THE PAST MEDICAL HISTORY/PAST SURGICAL HISTORY/SOCIAL HISTORY/FAMILY HISTORY/AND MEDICATIONS  AND UPDATED ALL    Objective:     Vitals:   Vitals:    03/10/20 0920   BP: 139/69   Pulse: 58   BP sitting on left:  164/72 with a heart rate of 60 and slightly irregular  BP standing on left:  164/68 with a heart rate of 60 and slightly irregular    Weight (last 2 days)     Date/Time   Weight    03/10/20 0920   67 9 (149 8)            Wt Readings from Last 3 Encounters:   03/10/20 67 9 kg (149 lb 12 8 oz)   03/02/20 64 4 kg (142 lb)   12/10/19 65 8 kg (145 lb)       Body mass index is 25 71 kg/m²  Physical Exam: General:  No acute distress  Skin:  No acute rash  Eyes:  No scleral icterus, noninjected, no discharge from eyes  ENT:  Moist mucous membranes  Neck:  Supple, no jugular venous distention, trachea is midline, no lymphadenopathy and no thyromegaly  Back   No CVAT  Chest:  Clear to auscultation and percussion, good respiratory effort  CVS:  Regular rate and rhythm without a rub, or gallops or murmurs  Abdomen:  Soft and nontender with normal bowel sounds  Extremities:  No cyanosis and no edema, moderate hand arthritic changes, normal range of motion  Neuro:  Grossly intact  Psych:  Alert, oriented x3 and appropriate      Medications:    Current Outpatient Medications:     amLODIPine (NORVASC) 2 5 mg tablet, TAKE ONE TABLET EVERY MORNING, AND TWO TABLETS IN THE EVENING AS DIRECTED (Patient taking differently: Take 5 mg by mouth 2 (two) times a day ), Disp: 180 tablet, Rfl: 3    Ascorbic Acid (VITAMIN C) 1000 MG tablet, Take 1,000 mg by mouth daily, Disp: , Rfl:     aspirin (ECOTRIN LOW STRENGTH) 81 mg EC tablet, Take 81 mg by mouth daily Indications: Heart Attack  , Disp: , Rfl:     BRILINTA 90 MG, TAKE 1 TABLET (90 MG TOTAL) BY MOUTH EVERY 12 (TWELVE) HOURS, Disp: 60 tablet, Rfl: 5    Coenzyme Q10 10 MG capsule, Take 10 mg by mouth daily, Disp: , Rfl:     ferrous sulfate 325 (65 Fe) mg tablet, Take 325 mg by mouth daily with breakfast, Disp: , Rfl:     hydroxychloroquine (PLAQUENIL) 200 mg tablet, Take 200 mg by mouth daily  , Disp: , Rfl:     isosorbide mononitrate (IMDUR) 60 mg 24 hr tablet, Take 60 mg by mouth daily, Disp: , Rfl:     ketorolac (ACULAR) 0 5 % ophthalmic solution, , Disp: , Rfl: 3    levothyroxine 50 mcg tablet, Take 50 mcg by mouth daily  , Disp: , Rfl:     losartan (COZAAR) 50 mg tablet, TAKE 1 TABLET (50 MG TOTAL) BY MOUTH 2 (TWO) TIMES A DAY, Disp: 60 tablet, Rfl: 5    metoprolol succinate (TOPROL-XL) 100 mg 24 hr tablet, Take 1 tablet (100 mg total) by mouth 2 (two) times a day Take 1 tablet in the morning (100mg),Take 1/2  tablet in the evening (50mg), Disp: 180 tablet, Rfl: 3    Multiple Vitamins-Minerals (CENTRUM SILVER PO), Take 1 tablet by mouth daily, Disp: , Rfl:     nitroglycerin (NITROLINGUAL) 0 4 mg/spray spray, Place 1 spray under the tongue every 5 (five) minutes as needed for chest pain, Disp: , Rfl:     spironolactone (ALDACTONE) 25 mg tablet, Take 1 tablet (25 mg total) by mouth daily (Patient taking differently: Take 25 mg by mouth ), Disp: 30 tablet, Rfl: 5    TOVIAZ 4 MG TB24, Take 1 tablet by mouth daily , Disp: , Rfl:     VASCEPA 1 g CAPS, ONE CAPSULE TWICE A DAY, Disp: 60 capsule, Rfl: 5    Vitamin D, Cholecalciferol, 1000 units CAPS, Take 2,000 Units by mouth daily  , Disp: , Rfl:     chlorthalidone 25 mg tablet, Take 0 5 tablets (12 5 mg total) by mouth daily, Disp: 15 tablet, Rfl: 5    Lab, Imaging and other studies: I have personally reviewed pertinent labs    Laboratory Results:  Results for orders placed or performed in visit on 03/05/20   Comprehensive metabolic panel   Result Value Ref Range    Sodium 140 136 - 145 mmol/L    Potassium 4 0 3 5 - 5 3 mmol/L    Chloride 109 (H) 100 - 108 mmol/L    CO2 26 21 - 32 mmol/L    ANION GAP 5 4 - 13 mmol/L    BUN 22 5 - 25 mg/dL    Creatinine 1 13 0 60 - 1 30 mg/dL    Glucose, Fasting 108 (H) 65 - 99 mg/dL    Calcium 8 7 8 3 - 10 1 mg/dL    AST 36 5 - 45 U/L    ALT 53 12 - 78 U/L    Alkaline Phosphatase 91 46 - 116 U/L    Total Protein 6 9 6 4 - 8 2 g/dL    Albumin 3 8 3 5 - 5 0 g/dL    Total Bilirubin 0 75 0 20 - 1 00 mg/dL    eGFR 46 ml/min/1 73sq m   CBC   Result Value Ref Range    WBC 8 35 4 31 - 10 16 Thousand/uL    RBC 4 59 3 81 - 5 12 Million/uL    Hemoglobin 11 9 11 5 - 15 4 g/dL    Hematocrit 39 0 34 8 - 46 1 %    MCV 85 82 - 98 fL    MCH 25 9 (L) 26 8 - 34 3 pg    MCHC 30 5 (L) 31 4 - 37 4 g/dL    RDW 14 4 11 6 - 15 1 %    Platelets 115 660 - 729 Thousands/uL    MPV 10 5 8 9 - 12 7 fL   Iron Saturation %   Result Value Ref Range    Iron Saturation 17 %    TIBC 436 250 - 450 ug/dL    Iron 74 50 - 170 ug/dL   Ferritin   Result Value Ref Range    Ferritin 88 8 - 388 ng/mL   Magnesium   Result Value Ref Range    Magnesium 2 4 1 6 - 2 6 mg/dL   Phosphorus   Result Value Ref Range    Phosphorus 3 5 2 3 - 4 1 mg/dL   Lipid Panel with Direct LDL reflex   Result Value Ref Range    Cholesterol 195 50 - 200 mg/dL    Triglycerides 139 <=150 mg/dL    HDL, Direct 50 >=40 mg/dL    LDL Calculated 117 (H) 0 - 100 mg/dL   Protein / creatinine ratio, urine   Result Value Ref Range    Creatinine, Ur 154 0 mg/dL    Protein Urine Random 70 mg/dL    Prot/Creat Ratio, Ur 0 45 (H) 0 00 - 0 10   PTH, intact   Result Value Ref Range    PTH 82 1 (H) 18 4 - 80 1 pg/mL   Vitamin D 25 hydroxy   Result Value Ref Range    Vit D, 25-Hydroxy 50 3 30 0 - 100 0 ng/mL       Results from last 7 days   Lab Units 03/05/20  0912   WBC Thousand/uL 8 35   HEMOGLOBIN g/dL 11 9   HEMATOCRIT % 39 0   PLATELETS Thousands/uL 232   POTASSIUM mmol/L 4 0   CHLORIDE mmol/L 109*   CO2 mmol/L 26   BUN mg/dL 22   CREATININE mg/dL 1 13   CALCIUM mg/dL 8 7   MAGNESIUM mg/dL 2 4   PHOSPHORUS mg/dL 3 5         Radiology review:   chest X-ray    Ultrasound      Portions of the record may have been created with voice recognition software  Occasional wrong word or "sound a like" substitutions may have occurred due to the inherent limitations of voice recognition software    Read the chart carefully and recognize, using context, where substitutions have occurred  Elbert Bolanos MD  3/10/2020 10:03 AM  Sign at close encounter  RENAL FOLLOW UP NOTE: td    ASSESSMENT AND PLAN:  1  Cornelious Angella :  · Etiology:  Hypertensive nephrosclerosis and arteriolar nephrosclerosis  · Baseline creatinine:  1 5  · Current creatinine:  1 13 at baseline  · Urine protein creatinine ratio:  At goal at 0 45 g  Recommendations:  · Treat hypertension-please see below  · Treat dyslipidemia-please see below  · Maintain proteinuria less than 1 g or as low as possible  · Avoid nephrotoxic agents such as NSAIDs, patient counseled as such  2   Volume:  Euvolemic  3   Hypertension:   · Current blood pressures:   A m :  143/58 without orthostatic changes but this was just based on 2 readings  P m :  151/65 without orthostatic changes but again based on only 1 reading  Heart rate:  60s    · Goal blood pressure:  Less than 125/80  Recommendations:  · Push nonmedical regimen including weight loss, maintaining activities much as possible, and avoidance of salt  · Medication changes today:   I would recommend chlorthalidone 12 5 mg daily  She will purchase a new blood pressure machine then take 1 week a readings in a few weeks and then bring in her new machine for correlation  We will need to be cognizant of low diastolic readings  Also, I will repeat a basic metabolic profile 1-2 weeks after the medication change  4   Electrolytes:  All acceptable   5   Mineral bone disorder:  · Calcium/magnesium/phosphorus:  All acceptable:  Magnesium 2 3  · 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  Repatha per Cardiology? I will discuss with Cardiology  7   Anemia:  Essentially normal 11 9 on iron supplementation with a saturation of 17%, ferritin of 80 a:  Replete  8   Other problems:  · status post CABG/MI with recent intervention followed by Dr Shelly Leroy at this time  · hypothyroidism status post partial thyroidectomy for benign lesion  · arthritic symptoms followed by Rheumatology  · 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  · urinary incontinence monitored by Dr Abiel Jeff  · Bilateral carotid bruits:  right carotid less than 50%; left 50-69%:  Recommended 6 month follow-up which would be May to June of this year  PATIENT INSTRUCTIONS:    Patient Instructions   1  Medication changes today:  · Please begin chlorthalidone 1/2 of a 25 mg tablet or 12 5 mg once a day  This will be in the morning  · (I will be checking with Dr Shelly Leroy as to whether not you should be taking Imdur/isosorbide)  · I will also be checking with Dr Shelly Leroy about placing on a different type of cholesterol medication  2  Please go for nonfasting lab work 1-2 weeks after making the above medication change    3  Please wait 4 weeks after making the above medication change and take 1 week a blood pressure readings with your new machine:  A m  And p m , sitting and standing as follows:  · Take the morning readings before any medications  · Take the evening readings before supper and before taking any medications at that time  · When taking standing readings, keep your arm supported at heart level and not dangling  · Make sure you are sitting with your back supported in feet on the ground on crossed  · Make sure you have not taken any coffee or caffeine products or exercised or smoke cigarettes at least 30 minutes before taking your blood pressure  THEN PLEASE BRING IN YOUR BLOOD PRESSURE MACHINE ALONG WITH YOUR NEW BLOOD PRESSURE READINGS TO SEE 1 OF MY ADVANCED PRACTITIONER'S IN ABOUT 4-6 WEEKS    4   Please go for the ultrasound of your carotid arteries either in May or June 5  In 3 months:  · Please send in 1 week a blood pressure readings as outlined above  :  Morning evening, sitting and standing  · Please go for nonfasting lab work any time of the day    6  Follow-up in 6 months:  -please bring in 1 week a blood pressure readings morning evening, sitting and standing is outlined above  -please go for fasting lab work 1-2 weeks prior to your appointment    7  General instructions:   AVOID SALT BUT NOT ADDING AN READING LABELS TO MAKE SURE THERE IS LOW-SALT IN THE FOOD THAT YOU ARE EATING     Avoid nonsteroidal anti-inflammatory drugs such as Naprosyn, ibuprofen, Aleve, Advil, Celebrex, etc   You can use Tylenol as needed if you do not have any liver condition to be concerned about     Avoid medications such as Sudafed or decongestants and antihistamines that contained the D component which is the decongestant  You can take antihistamines without the decongestant or D component   Try to avoid medications such as pantoprazole or  Protonix/Nexium or Esomeprazole)/Prilosec or omeprazole/Prevacid or lansoprazole/AcipHex or Rabeprazole  If you are able to, use Pepcid as this is safer for your kidneys   Try to exercise at least 30 minutes 3 days a week to begin with with an ultimate goal of 5 days a week for at least 30 minutes     Try to lose 5-10 lb by your next visit     Please do not drink more than 2 glasses of alcohol/wine on a daily basis as this may contribute to your high blood pressure  Subjective: The patient is going to require surgery/vitrectomy on her right eye at [facility]  There has been no hospitalizations or acute illnesses since last visit  No fevers, chills, or cough or colds    Good appetite and good energy  No hematuria, dysuria, voiding symptoms or foamy urine  No gastrointestinal symptoms  No cardiovascular symptoms including swelling of the legs  No headaches, dizziness or lightheadedness  Blood pressure medications:  · Spironolactone 25 mg daily  · Toprol  mg the morning and 50 mg in the evening  · Losartan 50 mg twice a day  · Amlodipine 5 mg twice a day    ROS:  See HPI, otherwise review of systems as completely reviewed with the patient are negative    Past Medical History:   Diagnosis Date    Anemia     Arthritis     Bladder calculi     CAD (coronary artery disease)     Status post CABG and PTCA to OM1    CKD (chronic kidney disease) stage 3, GFR 30-59 ml/min ([facility])     CKD (chronic kidney disease), stage III ([facility])     Diastolic congestive heart failure ([facility])     Hypertension     Hypothyroidism     Rheumatoid arthritis ([address] )     Uterine prolapse      Past Surgical History:   Procedure Laterality Date    BACK SURGERY      BLADDER SURGERY      CORONARY ANGIOPLASTY WITH STENT PLACEMENT      CORONARY ARTERY BYPASS GRAFT      OOPHORECTOMY Left     [place] PARTIAL HIP REPLACEMENT Right 2018    Procedure: HEMIARTHROPLASTY HIP (BIPOLAR) (RIGHT); Surgeon: Judy Maldonado MD;  Location: [facility];  Service: Orthopedics    THYROIDECTOMY, PARTIAL       Family History   Problem Relation Age of Onset    Heart attack Mother     Heart attack Father     Stroke Sister     Heart attack Brother         3 brothers all       reports that she has never smoked  She has never used smokeless tobacco  She reports that she does not drink alcohol or use drugs      I COMPLETELY REVIEWED THE PAST MEDICAL HISTORY/PAST SURGICAL HISTORY/SOCIAL HISTORY/FAMILY HISTORY/AND MEDICATIONS  AND UPDATED ALL    Objective:     Vitals:   Vitals:    03/10/20 0920   BP: 139/69   Pulse: 58   BP sitting on left:  164/72 with a heart rate of 60 and slightly irregular  BP standing on left:  164/68 with a heart rate of 60 and slightly irregular    Weight (last 2 days)     Date/Time   Weight    03/10/20 0920   67 9 (149 8)            Wt Readings from Last 3 Encounters:   03/10/20 67 9 kg (149 lb 12 8 oz)   20 64 4 kg (142 lb)   12/10/19 65 8 kg (145 lb)       Body mass index is 25 71 kg/m²  Physical Exam: General:  No acute distress  Skin:  No acute rash  Eyes:  No scleral icterus, noninjected, no discharge from eyes  ENT:  Moist mucous membranes  Neck:  Supple, no jugular venous distention, trachea is midline, no lymphadenopathy and no thyromegaly  Back   No CVAT  Chest:  Clear to auscultation and percussion, good respiratory effort  CVS:  Regular rate and rhythm without a rub, or gallops or murmurs  Abdomen:  Soft and nontender with normal bowel sounds  Extremities:  No cyanosis and no edema, no arthritic changes, normal range of motion  Neuro:  Grossly intact  Psych:  Alert, oriented x3 and appropriate      Medications:    Current Outpatient Medications:     amLODIPine (NORVASC) 2 5 mg tablet, TAKE ONE TABLET EVERY MORNING, AND TWO TABLETS IN THE EVENING AS DIRECTED (Patient taking differently: Take 5 mg by mouth 2 (two) times a day ), Disp: 180 tablet, Rfl: 3    Ascorbic Acid (VITAMIN C) 1000 MG tablet, Take 1,000 mg by mouth daily, Disp: , Rfl:     aspirin (ECOTRIN LOW STRENGTH) 81 mg EC tablet, Take 81 mg by mouth daily Indications: Heart Attack  , Disp: , Rfl:     BRILINTA 90 MG, TAKE 1 TABLET (90 MG TOTAL) BY MOUTH EVERY 12 (TWELVE) HOURS, Disp: 60 tablet, Rfl: 5    Coenzyme Q10 10 MG capsule, Take 10 mg by mouth daily, Disp: , Rfl:     ferrous sulfate 325 (65 Fe) mg tablet, Take 325 mg by mouth daily with breakfast, Disp: , Rfl:     hydroxychloroquine (PLAQUENIL) 200 mg tablet, Take 200 mg by mouth daily  , Disp: , Rfl:     isosorbide mononitrate (IMDUR) 60 mg 24 hr tablet, Take 60 mg by mouth daily, Disp: , Rfl:     ketorolac (ACULAR) 0 5 % ophthalmic solution, , Disp: , Rfl: 3    levothyroxine 50 mcg tablet, Take 50 mcg by mouth daily  , Disp: , Rfl:     losartan (COZAAR) 50 mg tablet, TAKE 1 TABLET (50 MG TOTAL) BY MOUTH 2 (TWO) TIMES A DAY, Disp: 60 tablet, Rfl: 5    metoprolol succinate (TOPROL-XL) 100 mg 24 hr tablet, Take 1 tablet (100 mg total) by mouth 2 (two) times a day Take 1 tablet in the morning (100mg),Take 1/2  tablet in the evening (50mg), Disp: 180 tablet, Rfl: 3    Multiple Vitamins-Minerals (CENTRUM SILVER PO), Take 1 tablet by mouth daily, Disp: , Rfl:     nitroglycerin (NITROLINGUAL) 0 4 mg/spray spray, Place 1 spray under the tongue every 5 (five) minutes as needed for chest pain, Disp: , Rfl:     spironolactone (ALDACTONE) 25 mg tablet, Take 1 tablet (25 mg total) by mouth daily (Patient taking differently: Take 25 mg by mouth ), Disp: 30 tablet, Rfl: 5    TOVIAZ 4 MG TB24, Take 1 tablet by mouth daily , Disp: , Rfl:     VASCEPA 1 g CAPS, ONE CAPSULE TWICE A DAY, Disp: 60 capsule, Rfl: 5    Vitamin D, Cholecalciferol, 1000 units CAPS, Take 2,000 Units by mouth daily  , Disp: , Rfl:     chlorthalidone 25 mg tablet, Take 0 5 tablets (12 5 mg total) by mouth daily, Disp: 15 tablet, Rfl: 5    Lab, Imaging and other studies: I have personally reviewed pertinent labs    Laboratory Results:  Results for orders placed or performed in visit on 03/05/20   Comprehensive metabolic panel   Result Value Ref Range    Sodium 140 136 - 145 mmol/L    Potassium 4 0 3 5 - 5 3 mmol/L    Chloride 109 (H) 100 - 108 mmol/L    CO2 26 21 - 32 mmol/L    ANION GAP 5 4 - 13 mmol/L    BUN 22 5 - 25 mg/dL    Creatinine 1 13 0 60 - 1 30 mg/dL    Glucose, Fasting 108 (H) 65 - 99 mg/dL    Calcium 8 7 8 3 - 10 1 mg/dL    AST 36 5 - 45 U/L    ALT 53 12 - 78 U/L    Alkaline Phosphatase 91 46 - 116 U/L    Total Protein 6 9 6 4 - 8 2 g/dL    Albumin 3 8 3 5 - 5 0 g/dL    Total Bilirubin 0 75 0 20 - 1 00 mg/dL    eGFR 46 ml/min/1 73sq m   CBC   Result Value Ref Range    WBC 8 35 4 31 - 10 16 Thousand/uL    RBC 4 59 3 81 - 5 12 Million/uL    Hemoglobin 11 9 11 5 - 15 4 g/dL    Hematocrit 39 0 34 8 - 46 1 %    MCV 85 82 - 98 fL    MCH 25 9 (L) 26 8 - 34 3 pg    MCHC 30 5 (L) 31 4 - 37 4 g/dL    RDW 14 4 11 6 - 15 1 %    Platelets 232 149 - 390 Thousands/uL    MPV 10 5 8 9 - 12 7 fL   Iron Saturation %   Result Value Ref Range    Iron Saturation 17 %    TIBC 436 250 - 450 ug/dL    Iron 74 50 - 170 ug/dL   Ferritin   Result Value Ref Range    Ferritin 88 8 - 388 ng/mL   Magnesium   Result Value Ref Range    Magnesium 2 4 1 6 - 2 6 mg/dL   Phosphorus   Result Value Ref Range    Phosphorus 3 5 2 3 - 4 1 mg/dL   Lipid Panel with Direct LDL reflex   Result Value Ref Range    Cholesterol 195 50 - 200 mg/dL    Triglycerides 139 <=150 mg/dL    HDL, Direct 50 >=40 mg/dL    LDL Calculated 117 (H) 0 - 100 mg/dL   Protein / creatinine ratio, urine   Result Value Ref Range    Creatinine, Ur 154 0 mg/dL    Protein Urine Random 70 mg/dL    Prot/Creat Ratio, Ur 0 45 (H) 0 00 - 0 10   PTH, intact   Result Value Ref Range    PTH 82 1 (H) 18 4 - 80 1 pg/mL   Vitamin D 25 hydroxy   Result Value Ref Range    Vit D, 25-Hydroxy 50 3 30 0 - 100 0 ng/mL       Results from last 7 days   Lab Units 03/05/20  0912   WBC Thousand/uL 8 35   HEMOGLOBIN g/dL 11 9   HEMATOCRIT % 39 0   PLATELETS Thousands/uL 232   POTASSIUM mmol/L 4 0   CHLORIDE mmol/L 109*   CO2 mmol/L 26   BUN mg/dL 22   CREATININE mg/dL 1 13   CALCIUM mg/dL 8 7   MAGNESIUM mg/dL 2 4   PHOSPHORUS mg/dL 3 5         Radiology review:   chest X-ray    Ultrasound      Portions of the record may have been created with voice recognition software  Occasional wrong word or "sound a like" substitutions may have occurred due to the inherent limitations of voice recognition software  Read the chart carefully and recognize, using context, where substitutions have occurred

## 2020-03-10 NOTE — PATIENT INSTRUCTIONS
1  Medication changes today:  · Please begin chlorthalidone 1/2 of a 25 mg tablet or 12 5 mg once a day  This will be in the morning  · (I will be checking with Dr Melody Biswas as to whether not you should be taking Imdur/isosorbide)  · I will also be checking with Dr Melody Biswas about placing on a different type of cholesterol medication  2  Please go for nonfasting lab work 1-2 weeks after making the above medication change    3  Please wait 4 weeks after making the above medication change and take 1 week a blood pressure readings with your new machine:  A m  And p m , sitting and standing as follows:  · Take the morning readings before any medications  · Take the evening readings before supper and before taking any medications at that time  · When taking standing readings, keep your arm supported at heart level and not dangling  · Make sure you are sitting with your back supported in feet on the ground on crossed  · Make sure you have not taken any coffee or caffeine products or exercised or smoke cigarettes at least 30 minutes before taking your blood pressure  THEN PLEASE BRING IN YOUR BLOOD PRESSURE MACHINE ALONG WITH YOUR NEW BLOOD PRESSURE READINGS TO SEE 1 OF MY ADVANCED PRACTITIONER'S IN ABOUT 4-6 WEEKS    4  Please go for the ultrasound of your carotid arteries either in May or June    5  In 3 months:  · Please send in 1 week a blood pressure readings as outlined above  :  Morning evening, sitting and standing  · Please go for nonfasting lab work any time of the day    6  Follow-up in 6 months:  -please bring in 1 week a blood pressure readings morning evening, sitting and standing is outlined above  -please go for fasting lab work 1-2 weeks prior to your appointment    7  General instructions:   AVOID SALT BUT NOT ADDING AN READING LABELS TO MAKE SURE THERE IS LOW-SALT IN THE FOOD THAT YOU ARE EATING     Avoid nonsteroidal anti-inflammatory drugs such as Naprosyn, ibuprofen, Aleve, Advil, Celebrex, etc   You can use Tylenol as needed if you do not have any liver condition to be concerned about     Avoid medications such as Sudafed or decongestants and antihistamines that contained the D component which is the decongestant  You can take antihistamines without the decongestant or D component   Try to avoid medications such as pantoprazole or  Protonix/Nexium or Esomeprazole)/Prilosec or omeprazole/Prevacid or lansoprazole/AcipHex or Rabeprazole  If you are able to, use Pepcid as this is safer for your kidneys   Try to exercise at least 30 minutes 3 days a week to begin with with an ultimate goal of 5 days a week for at least 30 minutes     Try to lose 5-10 lb by your next visit     Please do not drink more than 2 glasses of alcohol/wine on a daily basis as this may contribute to your high blood pressure

## 2020-03-13 ENCOUNTER — TELEPHONE (OUTPATIENT)
Dept: NEPHROLOGY | Facility: CLINIC | Age: 80
End: 2020-03-13

## 2020-03-13 NOTE — TELEPHONE ENCOUNTER
I spoke to the patient she is aware that Dr Anaya is okay with her being off the Imdur and will find a potential new medication for her

## 2020-03-13 NOTE — TELEPHONE ENCOUNTER
----- Message from Carmelina Andre MD sent at 3/10/2020  3:54 PM EDT -----  Let the patient know that Dr Denise Baca got back to me:  1  If she is not taking Imdur which I believe is the case she can stay off it as long as she is doing well per Cardiology Dr Denise Baca  2   He will look into potential other treatment for her cholesterol

## 2020-04-30 ENCOUNTER — TELEPHONE (OUTPATIENT)
Dept: NEPHROLOGY | Facility: CLINIC | Age: 80
End: 2020-04-30

## 2020-05-06 ENCOUNTER — TELEMEDICINE (OUTPATIENT)
Dept: NEPHROLOGY | Facility: CLINIC | Age: 80
End: 2020-05-06
Payer: MEDICARE

## 2020-05-06 VITALS — SYSTOLIC BLOOD PRESSURE: 135 MMHG | BODY MASS INDEX: 24.37 KG/M2 | WEIGHT: 142 LBS | DIASTOLIC BLOOD PRESSURE: 86 MMHG

## 2020-05-06 DIAGNOSIS — N18.30 BENIGN HYPERTENSION WITH CHRONIC KIDNEY DISEASE, STAGE III (HCC): Chronic | ICD-10-CM

## 2020-05-06 DIAGNOSIS — I12.9 BENIGN HYPERTENSION WITH CHRONIC KIDNEY DISEASE, STAGE III (HCC): Chronic | ICD-10-CM

## 2020-05-06 DIAGNOSIS — E61.1 IRON DEFICIENCY: ICD-10-CM

## 2020-05-06 DIAGNOSIS — D63.1 ANEMIA OF CHRONIC RENAL FAILURE, STAGE 3 (MODERATE) (HCC): Primary | ICD-10-CM

## 2020-05-06 DIAGNOSIS — N18.30 ANEMIA OF CHRONIC RENAL FAILURE, STAGE 3 (MODERATE) (HCC): Primary | ICD-10-CM

## 2020-05-06 DIAGNOSIS — N18.30 CHRONIC KIDNEY DISEASE, STAGE III (MODERATE) (HCC): Chronic | ICD-10-CM

## 2020-05-06 PROCEDURE — 99443 PR PHYS/QHP TELEPHONE EVALUATION 21-30 MIN: CPT | Performed by: NURSE PRACTITIONER

## 2020-05-13 DIAGNOSIS — I25.118 CORONARY ARTERY DISEASE OF NATIVE ARTERY OF NATIVE HEART WITH STABLE ANGINA PECTORIS (HCC): Primary | Chronic | ICD-10-CM

## 2020-05-14 RX ORDER — NITROGLYCERIN 400 UG/1
1 SPRAY ORAL
Qty: 15 SPRAY | Refills: 5 | Status: SHIPPED | OUTPATIENT
Start: 2020-05-14 | End: 2020-07-10 | Stop reason: HOSPADM

## 2020-05-22 ENCOUNTER — TELEPHONE (OUTPATIENT)
Dept: CARDIOLOGY CLINIC | Facility: CLINIC | Age: 80
End: 2020-05-22

## 2020-05-22 ENCOUNTER — OFFICE VISIT (OUTPATIENT)
Dept: CARDIOLOGY CLINIC | Facility: CLINIC | Age: 80
End: 2020-05-22
Payer: MEDICARE

## 2020-05-22 VITALS
DIASTOLIC BLOOD PRESSURE: 70 MMHG | OXYGEN SATURATION: 98 % | SYSTOLIC BLOOD PRESSURE: 130 MMHG | HEART RATE: 75 BPM | HEIGHT: 64 IN | WEIGHT: 145 LBS | TEMPERATURE: 95 F | BODY MASS INDEX: 24.75 KG/M2

## 2020-05-22 DIAGNOSIS — E03.9 HYPOTHYROIDISM, UNSPECIFIED TYPE: ICD-10-CM

## 2020-05-22 DIAGNOSIS — I50.22 CHRONIC SYSTOLIC CONGESTIVE HEART FAILURE (HCC): Chronic | ICD-10-CM

## 2020-05-22 DIAGNOSIS — I48.91 ATRIAL FIBRILLATION, UNSPECIFIED TYPE (HCC): ICD-10-CM

## 2020-05-22 DIAGNOSIS — Z01.810 PREOP CARDIOVASCULAR EXAM: ICD-10-CM

## 2020-05-22 DIAGNOSIS — N18.30 BENIGN HYPERTENSION WITH CHRONIC KIDNEY DISEASE, STAGE III (HCC): Chronic | ICD-10-CM

## 2020-05-22 DIAGNOSIS — I12.9 BENIGN HYPERTENSION WITH CHRONIC KIDNEY DISEASE, STAGE III (HCC): Chronic | ICD-10-CM

## 2020-05-22 DIAGNOSIS — I25.118 CORONARY ARTERY DISEASE OF NATIVE ARTERY OF NATIVE HEART WITH STABLE ANGINA PECTORIS (HCC): Chronic | ICD-10-CM

## 2020-05-22 DIAGNOSIS — N18.30 CKD (CHRONIC KIDNEY DISEASE), STAGE III (HCC): ICD-10-CM

## 2020-05-22 DIAGNOSIS — E78.5 DYSLIPIDEMIA: ICD-10-CM

## 2020-05-22 DIAGNOSIS — I10 ESSENTIAL HYPERTENSION: ICD-10-CM

## 2020-05-22 DIAGNOSIS — Z95.1 S/P CABG X 4: Chronic | ICD-10-CM

## 2020-05-22 PROCEDURE — 93000 ELECTROCARDIOGRAM COMPLETE: CPT | Performed by: INTERNAL MEDICINE

## 2020-05-22 PROCEDURE — 99215 OFFICE O/P EST HI 40 MIN: CPT | Performed by: INTERNAL MEDICINE

## 2020-05-22 RX ORDER — AMLODIPINE BESYLATE 5 MG/1
5 TABLET ORAL 2 TIMES DAILY
Qty: 60 TABLET | Refills: 1 | Status: SHIPPED | OUTPATIENT
Start: 2020-05-22 | End: 2020-06-05 | Stop reason: HOSPADM

## 2020-05-22 RX ORDER — AMLODIPINE BESYLATE 2.5 MG/1
5 TABLET ORAL 2 TIMES DAILY
Qty: 60 TABLET | Refills: 1 | Status: SHIPPED | OUTPATIENT
Start: 2020-05-22 | End: 2020-05-22 | Stop reason: SDUPTHER

## 2020-06-02 DIAGNOSIS — N18.30 BENIGN HYPERTENSION WITH CHRONIC KIDNEY DISEASE, STAGE III (HCC): Chronic | ICD-10-CM

## 2020-06-02 DIAGNOSIS — I12.9 BENIGN HYPERTENSION WITH CHRONIC KIDNEY DISEASE, STAGE III (HCC): Chronic | ICD-10-CM

## 2020-06-02 RX ORDER — SPIRONOLACTONE 25 MG/1
25 TABLET ORAL DAILY
Qty: 30 TABLET | Refills: 5 | Status: ON HOLD | OUTPATIENT
Start: 2020-06-02 | End: 2020-07-10 | Stop reason: SDUPTHER

## 2020-06-03 ENCOUNTER — HOSPITAL ENCOUNTER (INPATIENT)
Facility: HOSPITAL | Age: 80
LOS: 2 days | Discharge: HOME | DRG: 065 | End: 2020-06-05
Attending: EMERGENCY MEDICINE | Admitting: INTERNAL MEDICINE
Payer: MEDICARE

## 2020-06-03 ENCOUNTER — APPOINTMENT (EMERGENCY)
Dept: RADIOLOGY | Facility: HOSPITAL | Age: 80
DRG: 065 | End: 2020-06-03
Payer: MEDICARE

## 2020-06-03 DIAGNOSIS — E78.5 DYSLIPIDEMIA: ICD-10-CM

## 2020-06-03 DIAGNOSIS — I63.9 CVA (CEREBRAL VASCULAR ACCIDENT) (HCC): Primary | ICD-10-CM

## 2020-06-03 DIAGNOSIS — D63.1 ANEMIA OF CHRONIC RENAL FAILURE, STAGE 3 (MODERATE) (HCC): ICD-10-CM

## 2020-06-03 DIAGNOSIS — N18.30 BENIGN HYPERTENSION WITH CHRONIC KIDNEY DISEASE, STAGE III (HCC): Chronic | ICD-10-CM

## 2020-06-03 DIAGNOSIS — N18.30 ANEMIA OF CHRONIC RENAL FAILURE, STAGE 3 (MODERATE) (HCC): ICD-10-CM

## 2020-06-03 DIAGNOSIS — I12.9 PARENCHYMAL RENAL HYPERTENSION, STAGE 1 THROUGH STAGE 4 OR UNSPECIFIED CHRONIC KIDNEY DISEASE: ICD-10-CM

## 2020-06-03 DIAGNOSIS — I48.91 ATRIAL FIBRILLATION (HCC): ICD-10-CM

## 2020-06-03 DIAGNOSIS — I12.9 BENIGN HYPERTENSION WITH CHRONIC KIDNEY DISEASE, STAGE III (HCC): Chronic | ICD-10-CM

## 2020-06-03 DIAGNOSIS — E61.1 IRON DEFICIENCY: ICD-10-CM

## 2020-06-03 DIAGNOSIS — I48.91 NEW ONSET ATRIAL FIBRILLATION (HCC): ICD-10-CM

## 2020-06-03 DIAGNOSIS — N18.30 CHRONIC KIDNEY DISEASE, STAGE III (MODERATE) (HCC): Chronic | ICD-10-CM

## 2020-06-03 DIAGNOSIS — I12.9 HYPERTENSIVE CHRONIC KIDNEY DISEASE WITH STAGE 1 THROUGH STAGE 4 CHRONIC KIDNEY DISEASE, OR UNSPECIFIED CHRONIC KIDNEY DISEASE: ICD-10-CM

## 2020-06-03 DIAGNOSIS — E55.9 VITAMIN D DEFICIENCY: ICD-10-CM

## 2020-06-03 DIAGNOSIS — R47.1 DYSARTHRIA: ICD-10-CM

## 2020-06-03 LAB
ANION GAP SERPL CALCULATED.3IONS-SCNC: 11 MMOL/L (ref 4–13)
APTT PPP: 24 SECONDS (ref 23–37)
BUN SERPL-MCNC: 26 MG/DL (ref 5–25)
CALCIUM SERPL-MCNC: 9.4 MG/DL (ref 8.3–10.1)
CHLORIDE SERPL-SCNC: 105 MMOL/L (ref 100–108)
CO2 SERPL-SCNC: 25 MMOL/L (ref 21–32)
CREAT SERPL-MCNC: 1.01 MG/DL (ref 0.6–1.3)
ERYTHROCYTE [DISTWIDTH] IN BLOOD BY AUTOMATED COUNT: 14.2 % (ref 11.6–15.1)
GFR SERPL CREATININE-BSD FRML MDRD: 52 ML/MIN/1.73SQ M
GLUCOSE SERPL-MCNC: 114 MG/DL (ref 65–140)
GLUCOSE SERPL-MCNC: 127 MG/DL (ref 65–140)
HCT VFR BLD AUTO: 38.9 % (ref 34.8–46.1)
HGB BLD-MCNC: 12.6 G/DL (ref 11.5–15.4)
INR PPP: 0.92 (ref 0.84–1.19)
MAGNESIUM SERPL-MCNC: 2.3 MG/DL (ref 1.6–2.6)
MCH RBC QN AUTO: 26.9 PG (ref 26.8–34.3)
MCHC RBC AUTO-ENTMCNC: 32.4 G/DL (ref 31.4–37.4)
MCV RBC AUTO: 83 FL (ref 82–98)
PHOSPHATE SERPL-MCNC: 3.5 MG/DL (ref 2.3–4.1)
PLATELET # BLD AUTO: 273 THOUSANDS/UL (ref 149–390)
PMV BLD AUTO: 10.3 FL (ref 8.9–12.7)
POTASSIUM SERPL-SCNC: 3.7 MMOL/L (ref 3.5–5.3)
PROTHROMBIN TIME: 12.6 SECONDS (ref 11.6–14.5)
RBC # BLD AUTO: 4.69 MILLION/UL (ref 3.81–5.12)
SODIUM SERPL-SCNC: 141 MMOL/L (ref 136–145)
TROPONIN I SERPL-MCNC: <0.02 NG/ML
TSH SERPL DL<=0.05 MIU/L-ACNC: 5.58 UIU/ML (ref 0.36–3.74)
WBC # BLD AUTO: 12.66 THOUSAND/UL (ref 4.31–10.16)

## 2020-06-03 PROCEDURE — 84443 ASSAY THYROID STIM HORMONE: CPT | Performed by: EMERGENCY MEDICINE

## 2020-06-03 PROCEDURE — 71045 X-RAY EXAM CHEST 1 VIEW: CPT

## 2020-06-03 PROCEDURE — 82948 REAGENT STRIP/BLOOD GLUCOSE: CPT

## 2020-06-03 PROCEDURE — 84100 ASSAY OF PHOSPHORUS: CPT | Performed by: EMERGENCY MEDICINE

## 2020-06-03 PROCEDURE — 70496 CT ANGIOGRAPHY HEAD: CPT

## 2020-06-03 PROCEDURE — 85610 PROTHROMBIN TIME: CPT | Performed by: EMERGENCY MEDICINE

## 2020-06-03 PROCEDURE — 99292 CRITICAL CARE ADDL 30 MIN: CPT | Performed by: EMERGENCY MEDICINE

## 2020-06-03 PROCEDURE — 99285 EMERGENCY DEPT VISIT HI MDM: CPT

## 2020-06-03 PROCEDURE — 85730 THROMBOPLASTIN TIME PARTIAL: CPT | Performed by: EMERGENCY MEDICINE

## 2020-06-03 PROCEDURE — 70498 CT ANGIOGRAPHY NECK: CPT

## 2020-06-03 PROCEDURE — 93005 ELECTROCARDIOGRAM TRACING: CPT

## 2020-06-03 PROCEDURE — 84439 ASSAY OF FREE THYROXINE: CPT | Performed by: EMERGENCY MEDICINE

## 2020-06-03 PROCEDURE — 99291 CRITICAL CARE FIRST HOUR: CPT | Performed by: EMERGENCY MEDICINE

## 2020-06-03 PROCEDURE — 85027 COMPLETE CBC AUTOMATED: CPT | Performed by: EMERGENCY MEDICINE

## 2020-06-03 PROCEDURE — 80048 BASIC METABOLIC PNL TOTAL CA: CPT | Performed by: EMERGENCY MEDICINE

## 2020-06-03 PROCEDURE — 99223 1ST HOSP IP/OBS HIGH 75: CPT | Performed by: NURSE PRACTITIONER

## 2020-06-03 PROCEDURE — 83735 ASSAY OF MAGNESIUM: CPT | Performed by: EMERGENCY MEDICINE

## 2020-06-03 PROCEDURE — 36415 COLL VENOUS BLD VENIPUNCTURE: CPT | Performed by: EMERGENCY MEDICINE

## 2020-06-03 PROCEDURE — 84484 ASSAY OF TROPONIN QUANT: CPT | Performed by: EMERGENCY MEDICINE

## 2020-06-03 RX ADMIN — TICAGRELOR 90 MG: 90 TABLET ORAL at 21:55

## 2020-06-03 RX ADMIN — SODIUM CHLORIDE 500 ML: 0.9 INJECTION, SOLUTION INTRAVENOUS at 21:55

## 2020-06-03 RX ADMIN — IOHEXOL 85 ML: 350 INJECTION, SOLUTION INTRAVENOUS at 20:50

## 2020-06-04 ENCOUNTER — APPOINTMENT (INPATIENT)
Dept: NON INVASIVE DIAGNOSTICS | Facility: HOSPITAL | Age: 80
DRG: 065 | End: 2020-06-04
Payer: MEDICARE

## 2020-06-04 ENCOUNTER — APPOINTMENT (INPATIENT)
Dept: RADIOLOGY | Facility: HOSPITAL | Age: 80
DRG: 065 | End: 2020-06-04
Payer: MEDICARE

## 2020-06-04 PROBLEM — G45.9 TIA (TRANSIENT ISCHEMIC ATTACK): Status: ACTIVE | Noted: 2020-06-03

## 2020-06-04 LAB
ANION GAP SERPL CALCULATED.3IONS-SCNC: 12 MMOL/L (ref 4–13)
ATRIAL RATE: 277 BPM
ATRIAL RATE: 357 BPM
BILIRUB UR QL STRIP: NEGATIVE
BUN SERPL-MCNC: 19 MG/DL (ref 5–25)
CALCIUM SERPL-MCNC: 9.4 MG/DL (ref 8.3–10.1)
CHLORIDE SERPL-SCNC: 105 MMOL/L (ref 100–108)
CHOLEST SERPL-MCNC: 177 MG/DL (ref 50–200)
CLARITY UR: CLEAR
CO2 SERPL-SCNC: 25 MMOL/L (ref 21–32)
COLOR UR: YELLOW
CREAT SERPL-MCNC: 0.89 MG/DL (ref 0.6–1.3)
ERYTHROCYTE [DISTWIDTH] IN BLOOD BY AUTOMATED COUNT: 13.9 % (ref 11.6–15.1)
EST. AVERAGE GLUCOSE BLD GHB EST-MCNC: 137 MG/DL
GFR SERPL CREATININE-BSD FRML MDRD: 61 ML/MIN/1.73SQ M
GLUCOSE SERPL-MCNC: 113 MG/DL (ref 65–140)
GLUCOSE UR STRIP-MCNC: NEGATIVE MG/DL
HBA1C MFR BLD: 6.4 %
HCT VFR BLD AUTO: 39.4 % (ref 34.8–46.1)
HDLC SERPL-MCNC: 52 MG/DL
HGB BLD-MCNC: 12.5 G/DL (ref 11.5–15.4)
HGB UR QL STRIP.AUTO: NEGATIVE
KETONES UR STRIP-MCNC: NEGATIVE MG/DL
LDLC SERPL CALC-MCNC: 107 MG/DL (ref 0–100)
LEUKOCYTE ESTERASE UR QL STRIP: NEGATIVE
MCH RBC QN AUTO: 26.2 PG (ref 26.8–34.3)
MCHC RBC AUTO-ENTMCNC: 31.7 G/DL (ref 31.4–37.4)
MCV RBC AUTO: 82 FL (ref 82–98)
NITRITE UR QL STRIP: NEGATIVE
PH UR STRIP.AUTO: 7 [PH]
PLATELET # BLD AUTO: 237 THOUSANDS/UL (ref 149–390)
PMV BLD AUTO: 10.2 FL (ref 8.9–12.7)
POTASSIUM SERPL-SCNC: 3.3 MMOL/L (ref 3.5–5.3)
PROT UR STRIP-MCNC: NEGATIVE MG/DL
QRS AXIS: 78 DEGREES
QRS AXIS: 90 DEGREES
QRSD INTERVAL: 102 MS
QRSD INTERVAL: 106 MS
QT INTERVAL: 432 MS
QT INTERVAL: 452 MS
QTC INTERVAL: 443 MS
QTC INTERVAL: 449 MS
RBC # BLD AUTO: 4.78 MILLION/UL (ref 3.81–5.12)
SODIUM SERPL-SCNC: 142 MMOL/L (ref 136–145)
SP GR UR STRIP.AUTO: 1.01 (ref 1–1.03)
T WAVE AXIS: 2 DEGREES
T WAVE AXIS: 5 DEGREES
T4 FREE SERPL-MCNC: 1.26 NG/DL (ref 0.76–1.46)
TRIGL SERPL-MCNC: 90 MG/DL
UROBILINOGEN UR QL STRIP.AUTO: 0.2 E.U./DL
VENTRICULAR RATE: 58 BPM
VENTRICULAR RATE: 65 BPM
WBC # BLD AUTO: 10.68 THOUSAND/UL (ref 4.31–10.16)

## 2020-06-04 PROCEDURE — 99232 SBSQ HOSP IP/OBS MODERATE 35: CPT | Performed by: INTERNAL MEDICINE

## 2020-06-04 PROCEDURE — 80048 BASIC METABOLIC PNL TOTAL CA: CPT | Performed by: NURSE PRACTITIONER

## 2020-06-04 PROCEDURE — 83036 HEMOGLOBIN GLYCOSYLATED A1C: CPT | Performed by: NURSE PRACTITIONER

## 2020-06-04 PROCEDURE — 93010 ELECTROCARDIOGRAM REPORT: CPT | Performed by: INTERNAL MEDICINE

## 2020-06-04 PROCEDURE — 97165 OT EVAL LOW COMPLEX 30 MIN: CPT

## 2020-06-04 PROCEDURE — 70551 MRI BRAIN STEM W/O DYE: CPT

## 2020-06-04 PROCEDURE — 99223 1ST HOSP IP/OBS HIGH 75: CPT | Performed by: NURSE PRACTITIONER

## 2020-06-04 PROCEDURE — 93306 TTE W/DOPPLER COMPLETE: CPT | Performed by: INTERNAL MEDICINE

## 2020-06-04 PROCEDURE — 81003 URINALYSIS AUTO W/O SCOPE: CPT | Performed by: NURSE PRACTITIONER

## 2020-06-04 PROCEDURE — 93306 TTE W/DOPPLER COMPLETE: CPT

## 2020-06-04 PROCEDURE — 80061 LIPID PANEL: CPT | Performed by: NURSE PRACTITIONER

## 2020-06-04 PROCEDURE — 99223 1ST HOSP IP/OBS HIGH 75: CPT | Performed by: INTERNAL MEDICINE

## 2020-06-04 PROCEDURE — 85027 COMPLETE CBC AUTOMATED: CPT | Performed by: NURSE PRACTITIONER

## 2020-06-04 RX ORDER — POLYMYXIN B SULFATE AND TRIMETHOPRIM 1; 10000 MG/ML; [USP'U]/ML
1 SOLUTION OPHTHALMIC 4 TIMES DAILY
Status: DISCONTINUED | OUTPATIENT
Start: 2020-06-04 | End: 2020-06-05 | Stop reason: ALTCHOICE

## 2020-06-04 RX ORDER — POLYMYXIN B SULFATE AND TRIMETHOPRIM 1; 10000 MG/ML; [USP'U]/ML
1 SOLUTION OPHTHALMIC 4 TIMES DAILY
COMMUNITY
Start: 2020-06-04 | End: 2020-06-05 | Stop reason: HOSPADM

## 2020-06-04 RX ORDER — ASPIRIN 81 MG/1
81 TABLET ORAL DAILY
Status: DISCONTINUED | OUTPATIENT
Start: 2020-06-04 | End: 2020-06-05 | Stop reason: HOSPADM

## 2020-06-04 RX ORDER — METOPROLOL SUCCINATE 100 MG/1
100 TABLET, EXTENDED RELEASE ORAL 2 TIMES DAILY
Status: DISCONTINUED | OUTPATIENT
Start: 2020-06-04 | End: 2020-06-05 | Stop reason: HOSPADM

## 2020-06-04 RX ORDER — LEVOTHYROXINE SODIUM 0.05 MG/1
50 TABLET ORAL
Status: DISCONTINUED | OUTPATIENT
Start: 2020-06-04 | End: 2020-06-05 | Stop reason: HOSPADM

## 2020-06-04 RX ORDER — METOPROLOL SUCCINATE 50 MG/1
50 TABLET, EXTENDED RELEASE ORAL ONCE
Status: COMPLETED | OUTPATIENT
Start: 2020-06-04 | End: 2020-06-04

## 2020-06-04 RX ORDER — HYDROXYCHLOROQUINE SULFATE 200 MG/1
200 TABLET, FILM COATED ORAL DAILY
Status: DISCONTINUED | OUTPATIENT
Start: 2020-06-04 | End: 2020-06-05 | Stop reason: HOSPADM

## 2020-06-04 RX ORDER — POTASSIUM CHLORIDE 20 MEQ/1
40 TABLET, EXTENDED RELEASE ORAL ONCE
Status: COMPLETED | OUTPATIENT
Start: 2020-06-04 | End: 2020-06-04

## 2020-06-04 RX ORDER — ASCORBIC ACID 500 MG
1000 TABLET ORAL DAILY
Status: DISCONTINUED | OUTPATIENT
Start: 2020-06-04 | End: 2020-06-05 | Stop reason: HOSPADM

## 2020-06-04 RX ORDER — FUROSEMIDE 10 MG/ML
20 INJECTION INTRAMUSCULAR; INTRAVENOUS ONCE
Status: COMPLETED | OUTPATIENT
Start: 2020-06-04 | End: 2020-06-04

## 2020-06-04 RX ORDER — FERROUS SULFATE 325(65) MG
325 TABLET ORAL
Status: DISCONTINUED | OUTPATIENT
Start: 2020-06-04 | End: 2020-06-05 | Stop reason: HOSPADM

## 2020-06-04 RX ORDER — DIAZEPAM 2 MG/1
2 TABLET ORAL
Status: COMPLETED | OUTPATIENT
Start: 2020-06-04 | End: 2020-06-04

## 2020-06-04 RX ORDER — PREDNISOLONE ACETATE 10 MG/ML
1 SUSPENSION/ DROPS OPHTHALMIC 4 TIMES DAILY
Status: ON HOLD | COMMUNITY
Start: 2020-06-04 | End: 2020-07-03

## 2020-06-04 RX ORDER — OXYBUTYNIN CHLORIDE 5 MG/1
5 TABLET, EXTENDED RELEASE ORAL DAILY
Status: DISCONTINUED | OUTPATIENT
Start: 2020-06-04 | End: 2020-06-05 | Stop reason: HOSPADM

## 2020-06-04 RX ORDER — KETOROLAC TROMETHAMINE 5 MG/ML
1 SOLUTION OPHTHALMIC 4 TIMES DAILY
Status: DISCONTINUED | OUTPATIENT
Start: 2020-06-04 | End: 2020-06-05 | Stop reason: HOSPADM

## 2020-06-04 RX ORDER — UBIDECARENONE 10 MG
10 CAPSULE ORAL DAILY
Status: DISCONTINUED | OUTPATIENT
Start: 2020-06-04 | End: 2020-06-04 | Stop reason: RX

## 2020-06-04 RX ORDER — PREDNISOLONE ACETATE 10 MG/ML
1 SUSPENSION/ DROPS OPHTHALMIC 4 TIMES DAILY
Status: DISCONTINUED | OUTPATIENT
Start: 2020-06-04 | End: 2020-06-05 | Stop reason: HOSPADM

## 2020-06-04 RX ADMIN — POLYMYXIN B SULFATE, TRIMETHOPRIM SULFATE 1 DROP: 10000; 1 SOLUTION/ DROPS OPHTHALMIC at 09:34

## 2020-06-04 RX ADMIN — POTASSIUM CHLORIDE 40 MEQ: 1500 TABLET, EXTENDED RELEASE ORAL at 16:28

## 2020-06-04 RX ADMIN — PREDNISOLONE ACETATE 1 DROP: 10 SUSPENSION/ DROPS OPHTHALMIC at 12:41

## 2020-06-04 RX ADMIN — ASPIRIN 81 MG: 81 TABLET, COATED ORAL at 09:15

## 2020-06-04 RX ADMIN — KETOROLAC TROMETHAMINE 1 DROP: 5 SOLUTION OPHTHALMIC at 17:07

## 2020-06-04 RX ADMIN — ENOXAPARIN SODIUM 40 MG: 40 INJECTION SUBCUTANEOUS at 09:15

## 2020-06-04 RX ADMIN — OXYCODONE HYDROCHLORIDE AND ACETAMINOPHEN 1000 MG: 500 TABLET ORAL at 09:14

## 2020-06-04 RX ADMIN — POTASSIUM CHLORIDE 40 MEQ: 1500 TABLET, EXTENDED RELEASE ORAL at 09:12

## 2020-06-04 RX ADMIN — POLYMYXIN B SULFATE, TRIMETHOPRIM SULFATE 1 DROP: 10000; 1 SOLUTION/ DROPS OPHTHALMIC at 17:07

## 2020-06-04 RX ADMIN — RIVAROXABAN 20 MG: 10 TABLET, FILM COATED ORAL at 16:28

## 2020-06-04 RX ADMIN — KETOROLAC TROMETHAMINE 1 DROP: 5 SOLUTION OPHTHALMIC at 21:50

## 2020-06-04 RX ADMIN — HYDROXYCHLOROQUINE SULFATE 200 MG: 200 TABLET, FILM COATED ORAL at 08:40

## 2020-06-04 RX ADMIN — LEVOTHYROXINE SODIUM 50 MCG: 50 TABLET ORAL at 06:19

## 2020-06-04 RX ADMIN — KETOROLAC TROMETHAMINE 1 DROP: 5 SOLUTION OPHTHALMIC at 09:15

## 2020-06-04 RX ADMIN — OXYBUTYNIN 5 MG: 5 TABLET, FILM COATED, EXTENDED RELEASE ORAL at 09:14

## 2020-06-04 RX ADMIN — POLYMYXIN B SULFATE, TRIMETHOPRIM SULFATE 1 DROP: 10000; 1 SOLUTION/ DROPS OPHTHALMIC at 21:43

## 2020-06-04 RX ADMIN — PREDNISOLONE ACETATE 1 DROP: 10 SUSPENSION/ DROPS OPHTHALMIC at 17:07

## 2020-06-04 RX ADMIN — KETOROLAC TROMETHAMINE 1 DROP: 5 SOLUTION OPHTHALMIC at 12:41

## 2020-06-04 RX ADMIN — DIAZEPAM 2 MG: 2 TABLET ORAL at 13:14

## 2020-06-04 RX ADMIN — Medication 1 TABLET: at 09:14

## 2020-06-04 RX ADMIN — PREDNISOLONE ACETATE 1 DROP: 10 SUSPENSION/ DROPS OPHTHALMIC at 09:35

## 2020-06-04 RX ADMIN — POLYMYXIN B SULFATE, TRIMETHOPRIM SULFATE 1 DROP: 10000; 1 SOLUTION/ DROPS OPHTHALMIC at 12:42

## 2020-06-04 RX ADMIN — METOPROLOL SUCCINATE 50 MG: 50 TABLET, EXTENDED RELEASE ORAL at 21:58

## 2020-06-04 RX ADMIN — FUROSEMIDE 20 MG: 10 INJECTION, SOLUTION INTRAMUSCULAR; INTRAVENOUS at 09:12

## 2020-06-04 RX ADMIN — FERROUS SULFATE TAB 325 MG (65 MG ELEMENTAL FE) 325 MG: 325 (65 FE) TAB at 08:13

## 2020-06-04 RX ADMIN — METOPROLOL SUCCINATE 100 MG: 100 TABLET, EXTENDED RELEASE ORAL at 09:14

## 2020-06-05 VITALS
TEMPERATURE: 97.8 F | RESPIRATION RATE: 22 BRPM | WEIGHT: 140.87 LBS | SYSTOLIC BLOOD PRESSURE: 117 MMHG | BODY MASS INDEX: 24.05 KG/M2 | HEART RATE: 64 BPM | DIASTOLIC BLOOD PRESSURE: 54 MMHG | OXYGEN SATURATION: 100 % | HEIGHT: 64 IN

## 2020-06-05 PROCEDURE — 99233 SBSQ HOSP IP/OBS HIGH 50: CPT | Performed by: INTERNAL MEDICINE

## 2020-06-05 PROCEDURE — 99239 HOSP IP/OBS DSCHRG MGMT >30: CPT | Performed by: INTERNAL MEDICINE

## 2020-06-05 RX ORDER — LOSARTAN POTASSIUM 50 MG/1
50 TABLET ORAL DAILY
Qty: 60 TABLET | Refills: 0 | Status: ON HOLD
Start: 2020-06-05 | End: 2020-07-10 | Stop reason: SDUPTHER

## 2020-06-05 RX ORDER — METOPROLOL SUCCINATE 100 MG/1
100 TABLET, EXTENDED RELEASE ORAL DAILY
Qty: 180 TABLET | Refills: 0 | Status: SHIPPED | OUTPATIENT
Start: 2020-06-05 | End: 2020-06-05 | Stop reason: SDUPTHER

## 2020-06-05 RX ORDER — METOPROLOL SUCCINATE 100 MG/1
100 TABLET, EXTENDED RELEASE ORAL DAILY
Qty: 180 TABLET | Refills: 0 | Status: SHIPPED | OUTPATIENT
Start: 2020-06-05 | End: 2020-07-23 | Stop reason: HOSPADM

## 2020-06-05 RX ORDER — ACETAMINOPHEN 325 MG/1
650 TABLET ORAL EVERY 6 HOURS PRN
Status: DISCONTINUED | OUTPATIENT
Start: 2020-06-05 | End: 2020-06-05 | Stop reason: HOSPADM

## 2020-06-05 RX ADMIN — ASPIRIN 81 MG: 81 TABLET, COATED ORAL at 09:43

## 2020-06-05 RX ADMIN — LEVOTHYROXINE SODIUM 50 MCG: 50 TABLET ORAL at 05:26

## 2020-06-05 RX ADMIN — OXYCODONE HYDROCHLORIDE AND ACETAMINOPHEN 1000 MG: 500 TABLET ORAL at 09:43

## 2020-06-05 RX ADMIN — HYDROXYCHLOROQUINE SULFATE 200 MG: 200 TABLET, FILM COATED ORAL at 09:43

## 2020-06-05 RX ADMIN — PREDNISOLONE ACETATE 1 DROP: 10 SUSPENSION/ DROPS OPHTHALMIC at 10:51

## 2020-06-05 RX ADMIN — PREDNISOLONE ACETATE 1 DROP: 10 SUSPENSION/ DROPS OPHTHALMIC at 12:47

## 2020-06-05 RX ADMIN — OXYBUTYNIN 5 MG: 5 TABLET, FILM COATED, EXTENDED RELEASE ORAL at 09:43

## 2020-06-05 RX ADMIN — RIVAROXABAN 15 MG: 15 TABLET, FILM COATED ORAL at 16:20

## 2020-06-05 RX ADMIN — FERROUS SULFATE TAB 325 MG (65 MG ELEMENTAL FE) 325 MG: 325 (65 FE) TAB at 09:43

## 2020-06-05 RX ADMIN — KETOROLAC TROMETHAMINE 1 DROP: 5 SOLUTION OPHTHALMIC at 12:48

## 2020-06-05 RX ADMIN — KETOROLAC TROMETHAMINE 1 DROP: 5 SOLUTION OPHTHALMIC at 10:50

## 2020-06-05 RX ADMIN — Medication 1 TABLET: at 09:43

## 2020-06-08 ENCOUNTER — TELEPHONE (OUTPATIENT)
Dept: NEUROLOGY | Facility: CLINIC | Age: 80
End: 2020-06-08

## 2020-06-11 ENCOUNTER — DOCUMENTATION (OUTPATIENT)
Dept: NEPHROLOGY | Facility: CLINIC | Age: 80
End: 2020-06-11

## 2020-06-11 ENCOUNTER — APPOINTMENT (OUTPATIENT)
Dept: LAB | Facility: CLINIC | Age: 80
End: 2020-06-11
Payer: MEDICARE

## 2020-06-11 ENCOUNTER — TELEPHONE (OUTPATIENT)
Dept: NEPHROLOGY | Facility: CLINIC | Age: 80
End: 2020-06-11

## 2020-06-11 DIAGNOSIS — N18.30 CKD (CHRONIC KIDNEY DISEASE), STAGE III (HCC): ICD-10-CM

## 2020-06-11 DIAGNOSIS — I12.9 HYPERTENSIVE CHRONIC KIDNEY DISEASE WITH STAGE 1 THROUGH STAGE 4 CHRONIC KIDNEY DISEASE, OR UNSPECIFIED CHRONIC KIDNEY DISEASE: ICD-10-CM

## 2020-06-11 DIAGNOSIS — E61.1 IRON DEFICIENCY: ICD-10-CM

## 2020-06-11 DIAGNOSIS — I48.91 NEW ONSET ATRIAL FIBRILLATION (HCC): ICD-10-CM

## 2020-06-11 DIAGNOSIS — E55.9 VITAMIN D DEFICIENCY: ICD-10-CM

## 2020-06-11 DIAGNOSIS — D63.1 ANEMIA OF CHRONIC RENAL FAILURE, STAGE 3 (MODERATE) (HCC): ICD-10-CM

## 2020-06-11 DIAGNOSIS — N18.30 ANEMIA OF CHRONIC RENAL FAILURE, STAGE 3 (MODERATE) (HCC): ICD-10-CM

## 2020-06-11 DIAGNOSIS — E78.5 DYSLIPIDEMIA: ICD-10-CM

## 2020-06-11 LAB
ANION GAP SERPL CALCULATED.3IONS-SCNC: 8 MMOL/L (ref 4–13)
BUN SERPL-MCNC: 22 MG/DL (ref 5–25)
CALCIUM SERPL-MCNC: 8.8 MG/DL (ref 8.3–10.1)
CHLORIDE SERPL-SCNC: 109 MMOL/L (ref 100–108)
CO2 SERPL-SCNC: 22 MMOL/L (ref 21–32)
CREAT SERPL-MCNC: 1.04 MG/DL (ref 0.6–1.3)
GFR SERPL CREATININE-BSD FRML MDRD: 51 ML/MIN/1.73SQ M
GLUCOSE P FAST SERPL-MCNC: 95 MG/DL (ref 65–99)
MAGNESIUM SERPL-MCNC: 2.6 MG/DL (ref 1.6–2.6)
POTASSIUM SERPL-SCNC: 4 MMOL/L (ref 3.5–5.3)
SODIUM SERPL-SCNC: 139 MMOL/L (ref 136–145)

## 2020-06-11 PROCEDURE — 83735 ASSAY OF MAGNESIUM: CPT

## 2020-06-11 PROCEDURE — 80048 BASIC METABOLIC PNL TOTAL CA: CPT

## 2020-06-11 PROCEDURE — 36415 COLL VENOUS BLD VENIPUNCTURE: CPT

## 2020-06-12 ENCOUNTER — TELEPHONE (OUTPATIENT)
Dept: CARDIOLOGY CLINIC | Facility: CLINIC | Age: 80
End: 2020-06-12

## 2020-06-12 ENCOUNTER — OFFICE VISIT (OUTPATIENT)
Dept: CARDIOLOGY CLINIC | Facility: CLINIC | Age: 80
End: 2020-06-12
Payer: MEDICARE

## 2020-06-12 VITALS
WEIGHT: 140 LBS | SYSTOLIC BLOOD PRESSURE: 160 MMHG | OXYGEN SATURATION: 98 % | BODY MASS INDEX: 23.9 KG/M2 | HEART RATE: 64 BPM | TEMPERATURE: 99.5 F | DIASTOLIC BLOOD PRESSURE: 70 MMHG | HEIGHT: 64 IN

## 2020-06-12 DIAGNOSIS — I48.19 OTHER PERSISTENT ATRIAL FIBRILLATION (HCC): ICD-10-CM

## 2020-06-12 DIAGNOSIS — I63.412 CEREBROVASCULAR ACCIDENT (CVA) DUE TO EMBOLISM OF LEFT MIDDLE CEREBRAL ARTERY (HCC): ICD-10-CM

## 2020-06-12 DIAGNOSIS — N18.30 BENIGN HYPERTENSION WITH CHRONIC KIDNEY DISEASE, STAGE III (HCC): Chronic | ICD-10-CM

## 2020-06-12 DIAGNOSIS — I12.9 HYPERTENSIVE CHRONIC KIDNEY DISEASE WITH STAGE 1 THROUGH STAGE 4 CHRONIC KIDNEY DISEASE, OR UNSPECIFIED CHRONIC KIDNEY DISEASE: ICD-10-CM

## 2020-06-12 DIAGNOSIS — I50.32 CHRONIC DIASTOLIC CONGESTIVE HEART FAILURE (HCC): Primary | ICD-10-CM

## 2020-06-12 DIAGNOSIS — I50.22 CHRONIC SYSTOLIC CONGESTIVE HEART FAILURE (HCC): Chronic | ICD-10-CM

## 2020-06-12 DIAGNOSIS — I25.118 CORONARY ARTERY DISEASE OF NATIVE ARTERY OF NATIVE HEART WITH STABLE ANGINA PECTORIS (HCC): Chronic | ICD-10-CM

## 2020-06-12 DIAGNOSIS — I10 ESSENTIAL HYPERTENSION: ICD-10-CM

## 2020-06-12 DIAGNOSIS — I12.9 BENIGN HYPERTENSION WITH CHRONIC KIDNEY DISEASE, STAGE III (HCC): Chronic | ICD-10-CM

## 2020-06-12 DIAGNOSIS — E78.5 DYSLIPIDEMIA: ICD-10-CM

## 2020-06-12 DIAGNOSIS — I48.91 NEW ONSET ATRIAL FIBRILLATION (HCC): ICD-10-CM

## 2020-06-12 PROCEDURE — 99214 OFFICE O/P EST MOD 30 MIN: CPT | Performed by: INTERNAL MEDICINE

## 2020-06-12 RX ORDER — AMLODIPINE BESYLATE 5 MG/1
5 TABLET ORAL DAILY
Qty: 30 TABLET | Refills: 5
Start: 2020-06-12 | End: 2020-07-10 | Stop reason: HOSPADM

## 2020-06-13 PROBLEM — I48.19 OTHER PERSISTENT ATRIAL FIBRILLATION (HCC): Status: ACTIVE | Noted: 2020-05-22

## 2020-06-13 PROCEDURE — 93000 ELECTROCARDIOGRAM COMPLETE: CPT | Performed by: INTERNAL MEDICINE

## 2020-06-23 ENCOUNTER — TELEPHONE (OUTPATIENT)
Dept: CARDIOLOGY CLINIC | Facility: CLINIC | Age: 80
End: 2020-06-23

## 2020-06-24 ENCOUNTER — EPISODE CHANGES (OUTPATIENT)
Dept: CASE MANAGEMENT | Facility: OTHER | Age: 80
End: 2020-06-24

## 2020-06-25 ENCOUNTER — PATIENT OUTREACH (OUTPATIENT)
Dept: CASE MANAGEMENT | Facility: OTHER | Age: 80
End: 2020-06-25

## 2020-06-26 ENCOUNTER — TELEPHONE (OUTPATIENT)
Dept: OTHER | Facility: OTHER | Age: 80
End: 2020-06-26

## 2020-06-26 DIAGNOSIS — I48.19 OTHER PERSISTENT ATRIAL FIBRILLATION (HCC): Primary | ICD-10-CM

## 2020-06-26 DIAGNOSIS — I63.412 CEREBROVASCULAR ACCIDENT (CVA) DUE TO EMBOLISM OF LEFT MIDDLE CEREBRAL ARTERY (HCC): ICD-10-CM

## 2020-06-29 ENCOUNTER — TELEPHONE (OUTPATIENT)
Dept: CARDIOLOGY CLINIC | Facility: CLINIC | Age: 80
End: 2020-06-29

## 2020-06-29 DIAGNOSIS — I10 ESSENTIAL HYPERTENSION: Primary | ICD-10-CM

## 2020-06-30 ENCOUNTER — APPOINTMENT (OUTPATIENT)
Dept: LAB | Facility: CLINIC | Age: 80
DRG: 435 | End: 2020-06-30
Payer: MEDICARE

## 2020-06-30 LAB
ALBUMIN SERPL BCP-MCNC: 3.1 G/DL (ref 3.5–5)
ALP SERPL-CCNC: 75 U/L (ref 46–116)
ALT SERPL W P-5'-P-CCNC: 21 U/L (ref 12–78)
ANION GAP SERPL CALCULATED.3IONS-SCNC: 10 MMOL/L (ref 4–13)
AST SERPL W P-5'-P-CCNC: 16 U/L (ref 5–45)
BILIRUB SERPL-MCNC: 0.51 MG/DL (ref 0.2–1)
BUN SERPL-MCNC: 29 MG/DL (ref 5–25)
CALCIUM SERPL-MCNC: 8.6 MG/DL (ref 8.3–10.1)
CHLORIDE SERPL-SCNC: 102 MMOL/L (ref 100–108)
CO2 SERPL-SCNC: 23 MMOL/L (ref 21–32)
CREAT SERPL-MCNC: 1.34 MG/DL (ref 0.6–1.3)
ERYTHROCYTE [DISTWIDTH] IN BLOOD BY AUTOMATED COUNT: 15.1 % (ref 11.6–15.1)
GFR SERPL CREATININE-BSD FRML MDRD: 37 ML/MIN/1.73SQ M
GLUCOSE SERPL-MCNC: 127 MG/DL (ref 65–140)
HCT VFR BLD AUTO: 26.3 % (ref 34.8–46.1)
HGB BLD-MCNC: 8.3 G/DL (ref 11.5–15.4)
MCH RBC QN AUTO: 26 PG (ref 26.8–34.3)
MCHC RBC AUTO-ENTMCNC: 31.6 G/DL (ref 31.4–37.4)
MCV RBC AUTO: 82 FL (ref 82–98)
PLATELET # BLD AUTO: 322 THOUSANDS/UL (ref 149–390)
PMV BLD AUTO: 10.5 FL (ref 8.9–12.7)
POTASSIUM SERPL-SCNC: 4.4 MMOL/L (ref 3.5–5.3)
PROT SERPL-MCNC: 6.4 G/DL (ref 6.4–8.2)
RBC # BLD AUTO: 3.19 MILLION/UL (ref 3.81–5.12)
SODIUM SERPL-SCNC: 135 MMOL/L (ref 136–145)
WBC # BLD AUTO: 12.03 THOUSAND/UL (ref 4.31–10.16)

## 2020-06-30 PROCEDURE — 85027 COMPLETE CBC AUTOMATED: CPT | Performed by: INTERNAL MEDICINE

## 2020-06-30 PROCEDURE — 36415 COLL VENOUS BLD VENIPUNCTURE: CPT | Performed by: INTERNAL MEDICINE

## 2020-06-30 PROCEDURE — 80053 COMPREHEN METABOLIC PANEL: CPT | Performed by: INTERNAL MEDICINE

## 2020-07-02 ENCOUNTER — TELEPHONE (OUTPATIENT)
Dept: CARDIOLOGY CLINIC | Facility: CLINIC | Age: 80
End: 2020-07-02

## 2020-07-02 NOTE — TELEPHONE ENCOUNTER
1) eliquis started 6/26/2020 - has GI upset, continuing to get worse - constipation - pt still taking the eliquis (afraid of a stroke) - advise    2) Labs done 6/30/20 - result

## 2020-07-03 ENCOUNTER — APPOINTMENT (EMERGENCY)
Dept: RADIOLOGY | Facility: HOSPITAL | Age: 80
DRG: 435 | End: 2020-07-03
Payer: MEDICARE

## 2020-07-03 ENCOUNTER — HOSPITAL ENCOUNTER (INPATIENT)
Facility: HOSPITAL | Age: 80
LOS: 7 days | Discharge: HOME HEALTH | DRG: 435 | End: 2020-07-10
Attending: EMERGENCY MEDICINE | Admitting: FAMILY MEDICINE
Payer: MEDICARE

## 2020-07-03 DIAGNOSIS — N18.30 CHRONIC KIDNEY DISEASE, STAGE III (MODERATE) (HCC): Chronic | ICD-10-CM

## 2020-07-03 DIAGNOSIS — Z87.81 S/P RIGHT HIP FRACTURE: ICD-10-CM

## 2020-07-03 DIAGNOSIS — E61.1 IRON DEFICIENCY: ICD-10-CM

## 2020-07-03 DIAGNOSIS — N18.30 BENIGN HYPERTENSION WITH CHRONIC KIDNEY DISEASE, STAGE III (HCC): Chronic | ICD-10-CM

## 2020-07-03 DIAGNOSIS — I50.22 CHRONIC SYSTOLIC CONGESTIVE HEART FAILURE (HCC): Chronic | ICD-10-CM

## 2020-07-03 DIAGNOSIS — I48.91 ATRIAL FIBRILLATION (HCC): ICD-10-CM

## 2020-07-03 DIAGNOSIS — I12.9 PARENCHYMAL RENAL HYPERTENSION, STAGE 1 THROUGH STAGE 4 OR UNSPECIFIED CHRONIC KIDNEY DISEASE: ICD-10-CM

## 2020-07-03 DIAGNOSIS — E55.9 VITAMIN D DEFICIENCY: ICD-10-CM

## 2020-07-03 DIAGNOSIS — K85.90 PANCREATITIS: ICD-10-CM

## 2020-07-03 DIAGNOSIS — I10 ESSENTIAL HYPERTENSION: ICD-10-CM

## 2020-07-03 DIAGNOSIS — M25.551 RIGHT HIP PAIN: ICD-10-CM

## 2020-07-03 DIAGNOSIS — D63.1 ANEMIA OF CHRONIC RENAL FAILURE, STAGE 3 (MODERATE) (HCC): ICD-10-CM

## 2020-07-03 DIAGNOSIS — D64.9 ANEMIA: ICD-10-CM

## 2020-07-03 DIAGNOSIS — N18.30 CKD (CHRONIC KIDNEY DISEASE) STAGE 3, GFR 30-59 ML/MIN (HCC): ICD-10-CM

## 2020-07-03 DIAGNOSIS — N18.30 ANEMIA OF CHRONIC RENAL FAILURE, STAGE 3 (MODERATE) (HCC): ICD-10-CM

## 2020-07-03 DIAGNOSIS — I12.9 BENIGN HYPERTENSION WITH CHRONIC KIDNEY DISEASE, STAGE III (HCC): Chronic | ICD-10-CM

## 2020-07-03 DIAGNOSIS — R10.9 ABDOMINAL PAIN: Primary | ICD-10-CM

## 2020-07-03 DIAGNOSIS — K86.89 PANCREATIC MASS: ICD-10-CM

## 2020-07-03 PROBLEM — E87.1 HYPONATREMIA: Status: ACTIVE | Noted: 2020-07-03

## 2020-07-03 LAB
ALBUMIN SERPL BCP-MCNC: 3.2 G/DL (ref 3.5–5)
ALP SERPL-CCNC: 73 U/L (ref 46–116)
ALT SERPL W P-5'-P-CCNC: 25 U/L (ref 12–78)
ANION GAP SERPL CALCULATED.3IONS-SCNC: 12 MMOL/L (ref 4–13)
APTT PPP: 30 SECONDS (ref 23–37)
AST SERPL W P-5'-P-CCNC: 20 U/L (ref 5–45)
BACTERIA UR QL AUTO: ABNORMAL /HPF
BASOPHILS # BLD AUTO: 0.06 THOUSANDS/ΜL (ref 0–0.1)
BASOPHILS NFR BLD AUTO: 1 % (ref 0–1)
BILIRUB SERPL-MCNC: 0.5 MG/DL (ref 0.2–1)
BILIRUB UR QL STRIP: NEGATIVE
BUN SERPL-MCNC: 22 MG/DL (ref 5–25)
CALCIUM SERPL-MCNC: 8.5 MG/DL (ref 8.3–10.1)
CHLORIDE SERPL-SCNC: 97 MMOL/L (ref 100–108)
CLARITY UR: CLEAR
CO2 SERPL-SCNC: 23 MMOL/L (ref 21–32)
COLOR UR: YELLOW
CREAT SERPL-MCNC: 1.11 MG/DL (ref 0.6–1.3)
EOSINOPHIL # BLD AUTO: 0.13 THOUSAND/ΜL (ref 0–0.61)
EOSINOPHIL NFR BLD AUTO: 1 % (ref 0–6)
ERYTHROCYTE [DISTWIDTH] IN BLOOD BY AUTOMATED COUNT: 15 % (ref 11.6–15.1)
GFR SERPL CREATININE-BSD FRML MDRD: 47 ML/MIN/1.73SQ M
GLUCOSE SERPL-MCNC: 122 MG/DL (ref 65–140)
GLUCOSE UR STRIP-MCNC: NEGATIVE MG/DL
HCT VFR BLD AUTO: 25.5 % (ref 34.8–46.1)
HGB BLD-MCNC: 8 G/DL (ref 11.5–15.4)
HGB UR QL STRIP.AUTO: NEGATIVE
IMM GRANULOCYTES # BLD AUTO: 0.08 THOUSAND/UL (ref 0–0.2)
IMM GRANULOCYTES NFR BLD AUTO: 1 % (ref 0–2)
INR PPP: 1.38 (ref 0.84–1.19)
KETONES UR STRIP-MCNC: ABNORMAL MG/DL
LEUKOCYTE ESTERASE UR QL STRIP: ABNORMAL
LIPASE SERPL-CCNC: 3009 U/L (ref 73–393)
LYMPHOCYTES # BLD AUTO: 1.1 THOUSANDS/ΜL (ref 0.6–4.47)
LYMPHOCYTES NFR BLD AUTO: 9 % (ref 14–44)
MCH RBC QN AUTO: 26.4 PG (ref 26.8–34.3)
MCHC RBC AUTO-ENTMCNC: 31.4 G/DL (ref 31.4–37.4)
MCV RBC AUTO: 84 FL (ref 82–98)
MONOCYTES # BLD AUTO: 0.75 THOUSAND/ΜL (ref 0.17–1.22)
MONOCYTES NFR BLD AUTO: 6 % (ref 4–12)
NEUTROPHILS # BLD AUTO: 10.15 THOUSANDS/ΜL (ref 1.85–7.62)
NEUTS SEG NFR BLD AUTO: 82 % (ref 43–75)
NITRITE UR QL STRIP: NEGATIVE
NON-SQ EPI CELLS URNS QL MICRO: ABNORMAL /HPF
NRBC BLD AUTO-RTO: 0 /100 WBCS
PH UR STRIP.AUTO: 6 [PH]
PLATELET # BLD AUTO: 315 THOUSANDS/UL (ref 149–390)
PMV BLD AUTO: 10 FL (ref 8.9–12.7)
POTASSIUM SERPL-SCNC: 4.4 MMOL/L (ref 3.5–5.3)
PROT SERPL-MCNC: 6.4 G/DL (ref 6.4–8.2)
PROT UR STRIP-MCNC: NEGATIVE MG/DL
PROTHROMBIN TIME: 16.9 SECONDS (ref 11.6–14.5)
RBC # BLD AUTO: 3.03 MILLION/UL (ref 3.81–5.12)
RBC #/AREA URNS AUTO: ABNORMAL /HPF
SARS-COV-2 RNA RESP QL NAA+PROBE: NEGATIVE
SODIUM SERPL-SCNC: 132 MMOL/L (ref 136–145)
SP GR UR STRIP.AUTO: 1.02 (ref 1–1.03)
TROPONIN I SERPL-MCNC: 0.02 NG/ML
UROBILINOGEN UR QL STRIP.AUTO: 0.2 E.U./DL
WBC # BLD AUTO: 12.27 THOUSAND/UL (ref 4.31–10.16)
WBC #/AREA URNS AUTO: ABNORMAL /HPF

## 2020-07-03 PROCEDURE — 93005 ELECTROCARDIOGRAM TRACING: CPT

## 2020-07-03 PROCEDURE — 85610 PROTHROMBIN TIME: CPT | Performed by: EMERGENCY MEDICINE

## 2020-07-03 PROCEDURE — 85730 THROMBOPLASTIN TIME PARTIAL: CPT | Performed by: EMERGENCY MEDICINE

## 2020-07-03 PROCEDURE — 36415 COLL VENOUS BLD VENIPUNCTURE: CPT | Performed by: EMERGENCY MEDICINE

## 2020-07-03 PROCEDURE — 84484 ASSAY OF TROPONIN QUANT: CPT | Performed by: EMERGENCY MEDICINE

## 2020-07-03 PROCEDURE — 81001 URINALYSIS AUTO W/SCOPE: CPT | Performed by: EMERGENCY MEDICINE

## 2020-07-03 PROCEDURE — 83690 ASSAY OF LIPASE: CPT | Performed by: EMERGENCY MEDICINE

## 2020-07-03 PROCEDURE — 82746 ASSAY OF FOLIC ACID SERUM: CPT | Performed by: INTERNAL MEDICINE

## 2020-07-03 PROCEDURE — 85025 COMPLETE CBC W/AUTO DIFF WBC: CPT | Performed by: EMERGENCY MEDICINE

## 2020-07-03 PROCEDURE — 80053 COMPREHEN METABOLIC PANEL: CPT | Performed by: EMERGENCY MEDICINE

## 2020-07-03 PROCEDURE — 99285 EMERGENCY DEPT VISIT HI MDM: CPT

## 2020-07-03 PROCEDURE — 87635 SARS-COV-2 COVID-19 AMP PRB: CPT | Performed by: EMERGENCY MEDICINE

## 2020-07-03 PROCEDURE — 99223 1ST HOSP IP/OBS HIGH 75: CPT | Performed by: INTERNAL MEDICINE

## 2020-07-03 PROCEDURE — 83540 ASSAY OF IRON: CPT | Performed by: INTERNAL MEDICINE

## 2020-07-03 PROCEDURE — 74177 CT ABD & PELVIS W/CONTRAST: CPT

## 2020-07-03 PROCEDURE — 83550 IRON BINDING TEST: CPT | Performed by: INTERNAL MEDICINE

## 2020-07-03 PROCEDURE — 82728 ASSAY OF FERRITIN: CPT | Performed by: INTERNAL MEDICINE

## 2020-07-03 PROCEDURE — 71045 X-RAY EXAM CHEST 1 VIEW: CPT

## 2020-07-03 PROCEDURE — 82607 VITAMIN B-12: CPT | Performed by: INTERNAL MEDICINE

## 2020-07-03 PROCEDURE — 99285 EMERGENCY DEPT VISIT HI MDM: CPT | Performed by: EMERGENCY MEDICINE

## 2020-07-03 RX ORDER — MORPHINE SULFATE 4 MG/ML
4 INJECTION, SOLUTION INTRAMUSCULAR; INTRAVENOUS ONCE
Status: COMPLETED | OUTPATIENT
Start: 2020-07-03 | End: 2020-07-03

## 2020-07-03 RX ORDER — ASPIRIN 81 MG/1
81 TABLET, CHEWABLE ORAL DAILY
Status: DISCONTINUED | OUTPATIENT
Start: 2020-07-04 | End: 2020-07-09

## 2020-07-03 RX ORDER — CHLORAL HYDRATE 500 MG
1000 CAPSULE ORAL 2 TIMES DAILY
Status: DISCONTINUED | OUTPATIENT
Start: 2020-07-04 | End: 2020-07-10 | Stop reason: HOSPADM

## 2020-07-03 RX ORDER — OMEGA-3-ACID ETHYL ESTERS 1 G/1
2 CAPSULE, LIQUID FILLED ORAL 2 TIMES DAILY
Status: DISCONTINUED | OUTPATIENT
Start: 2020-07-03 | End: 2020-07-03 | Stop reason: SDUPTHER

## 2020-07-03 RX ORDER — HYDROMORPHONE HCL/PF 1 MG/ML
0.2 SYRINGE (ML) INJECTION EVERY 6 HOURS PRN
Status: DISCONTINUED | OUTPATIENT
Start: 2020-07-03 | End: 2020-07-05

## 2020-07-03 RX ORDER — CHLORAL HYDRATE 500 MG
2000 CAPSULE ORAL 2 TIMES DAILY
Status: DISCONTINUED | OUTPATIENT
Start: 2020-07-03 | End: 2020-07-03

## 2020-07-03 RX ORDER — METOPROLOL SUCCINATE 100 MG/1
100 TABLET, EXTENDED RELEASE ORAL DAILY
Status: DISCONTINUED | OUTPATIENT
Start: 2020-07-03 | End: 2020-07-10 | Stop reason: HOSPADM

## 2020-07-03 RX ORDER — SODIUM CHLORIDE, SODIUM LACTATE, POTASSIUM CHLORIDE, CALCIUM CHLORIDE 600; 310; 30; 20 MG/100ML; MG/100ML; MG/100ML; MG/100ML
75 INJECTION, SOLUTION INTRAVENOUS CONTINUOUS
Status: DISCONTINUED | OUTPATIENT
Start: 2020-07-03 | End: 2020-07-06

## 2020-07-03 RX ORDER — OXYBUTYNIN CHLORIDE 5 MG/1
5 TABLET, EXTENDED RELEASE ORAL DAILY
Status: DISCONTINUED | OUTPATIENT
Start: 2020-07-04 | End: 2020-07-10 | Stop reason: HOSPADM

## 2020-07-03 RX ORDER — ONDANSETRON 2 MG/ML
4 INJECTION INTRAMUSCULAR; INTRAVENOUS EVERY 6 HOURS PRN
Status: DISCONTINUED | OUTPATIENT
Start: 2020-07-03 | End: 2020-07-10 | Stop reason: HOSPADM

## 2020-07-03 RX ORDER — PANTOPRAZOLE SODIUM 40 MG/1
40 TABLET, DELAYED RELEASE ORAL
Status: DISCONTINUED | OUTPATIENT
Start: 2020-07-03 | End: 2020-07-10 | Stop reason: HOSPADM

## 2020-07-03 RX ORDER — ONDANSETRON 2 MG/ML
4 INJECTION INTRAMUSCULAR; INTRAVENOUS ONCE
Status: COMPLETED | OUTPATIENT
Start: 2020-07-03 | End: 2020-07-03

## 2020-07-03 RX ORDER — LEVOTHYROXINE SODIUM 0.05 MG/1
50 TABLET ORAL DAILY
Status: DISCONTINUED | OUTPATIENT
Start: 2020-07-04 | End: 2020-07-10 | Stop reason: HOSPADM

## 2020-07-03 RX ORDER — HYDROMORPHONE HCL/PF 1 MG/ML
0.2 SYRINGE (ML) INJECTION EVERY 6 HOURS PRN
Status: DISCONTINUED | OUTPATIENT
Start: 2020-07-03 | End: 2020-07-03

## 2020-07-03 RX ORDER — KETOROLAC TROMETHAMINE 5 MG/ML
1 SOLUTION OPHTHALMIC 2 TIMES DAILY
Status: DISCONTINUED | OUTPATIENT
Start: 2020-07-03 | End: 2020-07-10 | Stop reason: HOSPADM

## 2020-07-03 RX ORDER — LOSARTAN POTASSIUM 25 MG/1
25 TABLET ORAL DAILY
Status: DISCONTINUED | OUTPATIENT
Start: 2020-07-04 | End: 2020-07-10 | Stop reason: HOSPADM

## 2020-07-03 RX ORDER — SODIUM CHLORIDE 9 MG/ML
125 INJECTION, SOLUTION INTRAVENOUS CONTINUOUS
Status: DISCONTINUED | OUTPATIENT
Start: 2020-07-03 | End: 2020-07-03

## 2020-07-03 RX ORDER — ACETAMINOPHEN 325 MG/1
650 TABLET ORAL EVERY 6 HOURS PRN
Status: DISCONTINUED | OUTPATIENT
Start: 2020-07-03 | End: 2020-07-10 | Stop reason: HOSPADM

## 2020-07-03 RX ORDER — HYDROXYCHLOROQUINE SULFATE 200 MG/1
200 TABLET, FILM COATED ORAL DAILY
Status: DISCONTINUED | OUTPATIENT
Start: 2020-07-04 | End: 2020-07-10 | Stop reason: HOSPADM

## 2020-07-03 RX ADMIN — SODIUM CHLORIDE, SODIUM LACTATE, POTASSIUM CHLORIDE, AND CALCIUM CHLORIDE 100 ML/HR: .6; .31; .03; .02 INJECTION, SOLUTION INTRAVENOUS at 15:04

## 2020-07-03 RX ADMIN — MORPHINE SULFATE 4 MG: 4 INJECTION INTRAVENOUS at 12:18

## 2020-07-03 RX ADMIN — METOPROLOL SUCCINATE 100 MG: 100 TABLET, EXTENDED RELEASE ORAL at 18:17

## 2020-07-03 RX ADMIN — SODIUM CHLORIDE 125 ML/HR: 0.9 INJECTION, SOLUTION INTRAVENOUS at 12:10

## 2020-07-03 RX ADMIN — Medication 2000 MG: at 18:17

## 2020-07-03 RX ADMIN — IOHEXOL 100 ML: 350 INJECTION, SOLUTION INTRAVENOUS at 08:56

## 2020-07-03 RX ADMIN — KETOROLAC TROMETHAMINE 1 DROP: 5 SOLUTION OPHTHALMIC at 18:19

## 2020-07-03 RX ADMIN — ONDANSETRON 4 MG: 2 INJECTION INTRAMUSCULAR; INTRAVENOUS at 12:14

## 2020-07-03 RX ADMIN — PANTOPRAZOLE SODIUM 40 MG: 40 TABLET, DELAYED RELEASE ORAL at 18:17

## 2020-07-03 RX ADMIN — ENOXAPARIN SODIUM 60 MG: 60 INJECTION SUBCUTANEOUS at 18:17

## 2020-07-03 RX ADMIN — HYDROMORPHONE HYDROCHLORIDE 0.2 MG: 1 INJECTION, SOLUTION INTRAMUSCULAR; INTRAVENOUS; SUBCUTANEOUS at 18:42

## 2020-07-03 RX ADMIN — ONDANSETRON 4 MG: 2 INJECTION INTRAMUSCULAR; INTRAVENOUS at 20:10

## 2020-07-03 NOTE — H&P
History and Physical - [facility] Internal Medicine    Patient Information: Zen Hall 79 y o  female MRN: [id number]  Unit/Bed#: [address] [place] [address] Encounter: [id number]  Admitting Physician: Jose Osuna MD  PCP: No primary care provider on file  Date of Admission:  07/03/20        Hospital Problem List:     Principal Problem:    Pancreatic mass  Active Problems:    Acute pancreatitis    Chronic systolic congestive heart failure (HCC)    Anemia    Other persistent atrial fibrillation (HCC)    CVA (cerebral vascular accident) ([facility] [address] )    CAD (coronary artery disease)    Leukocytosis    Hypertension    CKD (chronic kidney disease) stage 3, GFR 30-59 ml/min (HCC)    Hyponatremia    Hyperlipidemia    Hypothyroid    Urinary dysfunction      Assessment/Plan:    Acute pancreatitis  Assessment & Plan  Likely secondary to above  Lipase level was 3009  CT scan revealed gallstone but no biliary ductal dilatation, LFT normal  · NPO for now  · IV fluids with LR at 100 cc/hour, closely monitor volume status given systolic CHF  · Follow-up labs  · Check triglyceride  · Pain control  · Monitor abdominal exam  · GI evaluation    * Pancreatic mass  Assessment & Plan  Patient presented with bandlike upper abdominal discomfort gradually worsening over last 2-2 and half weeks  CT scan revealed large pancreatic head mass concerning for pancreatic carcinoma with mass effect on superior mesenteric vein with possible tumor extension with tumor extension in portal vein  Probable periportal adenopathy    Nonspecific subcentimeter hepatic lesions  Likely malignancy, results were discussed with patient  · Check CA 19 9  · GI evaluation    CVA (cerebral vascular accident) [facility])  Assessment & Plan  Recently hospitalized in May 2020 with left MCA CVA involving left frontal cortex  Continue aspirin  Hold Eliquis, transition to Lovenox at present    Other persistent atrial fibrillation [facility])  Assessment & Plan  Controlled  Continue metoprolol  Hold Eliquis, transition to Lovenox while in hospital      Anemia  Assessment & Plan  Patient's hemoglobin noted to be 8 grams/deciliter compared to 12 5 grams/deciliter last month after patient was started on anticoagulant  Patient denies any overt bleeding  Normocytic  · Check stool occult  · Check iron studies  · B12, folate  · Monitor hemoglobin closely on anticoagulation  · Patient is at high risk of thromboembolism due to persistent AFib, recent CVA and now underlying malignancy  Hemoglobin has decreased on anticoagulation but patient does not report any overt bleeding  Chronic systolic congestive heart failure (HCC)  Assessment & Plan  Wt Readings from Last 3 Encounters:   07/03/20 62 5 kg (137 lb 12 6 oz)   06/12/20 63 5 kg (140 lb)   06/05/20 63 9 kg (140 lb 14 oz)     Weight is lower than prior  Appears hypovolemic/euvolemic  Chest x-ray without any acute abnormality  Monitor volume status closely on IV fluids  Monitor daily weight, intake output  Holding spironolactone for now    Hyponatremia  Assessment & Plan  Mild  Monitor with IV fluids    CKD (chronic kidney disease) stage 3, GFR 30-59 ml/min ([facility])  Assessment & Plan  Baseline creatinine appears to be 0 9-1 2, lower than prior?   Decreasing muscle mass  Currently at baseline  Monitor    Hypertension  Assessment & Plan  Patient reports low blood pressure recently with systolic in 465C, also reports symptoms of lightheadedness  Will continue metoprolol  Decrease losartan to 25 mg, hold for systolic less than 679  Hold Norvasc at present  Also holding spironolactone  Monitor    Leukocytosis  Assessment & Plan  Mild likely reactive to pancreatic mass/pancreatitis    CAD (coronary artery disease)  Assessment & Plan  Status post CABG and subsequent PCI x2  Continue aspirin, metoprolol    Urinary dysfunction  Assessment & Plan  On toviaz, will substitute with oxybutynin    Hypothyroid  Assessment & Plan  Continue Synthroid    Hyperlipidemia  Assessment & Plan  Intolerant to statin and Zetia  On Vascepa at home, substitute with facial  Outpatient awaiting P CS K 9 inhibitor          VTE Prophylaxis: Enoxaparin (Lovenox)  / sequential compression device   Code Status: Level 1 - Full Code    Anticipated Length of Stay:  Patient will be admitted on an Inpatient basis with an anticipated length of stay of  > 2 midnights  Justification for Hospital Stay:  Pancreatitis, pancreatic mass    Total Time for Visit, including Counseling / Coordination of Care: 45 minutes  Greater than 50% of this total time spent on direct patient counseling and coordination of care  Chief Complaint:     Hip Pain (pt c/o abdominal pain raidiating to her right artificial hip & constipation  says recent eye surgery & stroke & medication changes with blood thinners that "dont agree with me"  ); Abdominal Pain; and Constipation    History of Present Illness:    Katty Astorga is a 79 y o  female with multiple comorbidities including CAD/MI status post CABG and PCI x2, hypertension, chronic systolic CHF (EF 34-01%, moderate MR), AFib, recent CVA, hypothyroidism, CKD3, dyslipidemia with statin intolerance, rheumatoid arthritis, anemia who presents with abdominal pain over last 2-3 weeks  Patient reported that she started having upper abdominal band like pain about 2 to 2-1/2 weeks ago often radiating to the right hip, pain gradually become more persistent and continuous, worsened by oral intake associated with some nausea  Patient initially attributed this pain to recent change in medications as she was started on Xarelto after recent stroke, after talking to her cardiologist medication was changed but her pain remained persistent an continue to worsen, hence he presented to ED for further evaluation  Patient was afebrile with stable vital sign, workup revealed elevated lipase, mild leukocytosis, decrease in hemoglobin    Patient underwent CT scan which revealed large pancreatic head mass, patient was subsequently admitted for further evaluation and treatment  Patient received IV pain medication ED with resolution of the pain  Currently lying comfortably in bed  Patient denied any fever, chills, vomiting, hematemesis, melena, dark stool  She reported that her p o  Intake has diminished due to pain and she is mainly taking fruits and bland foods  Review of Systems:    Review of Systems   Constitutional: Positive for activity change, appetite change and fatigue  Negative for chills, diaphoresis, fever and unexpected weight change (Patient denies any weight change but chart review reveals 12 lb weight loss in last 1 month)  HENT: Negative for congestion, nosebleeds, postnasal drip, rhinorrhea, sinus pressure, sneezing, sore throat, tinnitus, trouble swallowing and voice change  Eyes: Negative for visual disturbance  Respiratory: Negative for cough, chest tightness, shortness of breath, wheezing and stridor  Cardiovascular: Positive for palpitations (At times)  Negative for chest pain and leg swelling  Gastrointestinal: Positive for abdominal pain, constipation and nausea  Negative for abdominal distention, anal bleeding, blood in stool, diarrhea and vomiting  Genitourinary: Positive for difficulty urinating (On Tedra Shell)  Negative for dysuria and flank pain  Musculoskeletal: Positive for gait problem  Negative for back pain and neck stiffness  Skin: Negative for pallor and rash  Neurological: Positive for light-headedness  Negative for seizures, facial asymmetry, speech difficulty, numbness and headaches  Hematological: Negative for adenopathy  Psychiatric/Behavioral: Negative for agitation and confusion         Past Medical and Surgical History:     Past Medical History:   Diagnosis Date    Anemia     Arthritis     Bladder calculi     CAD (coronary artery disease)     Status post CABG and PTCA to OM1    CKD (chronic kidney disease) stage 3, GFR 30-59 ml/min (HCC)     CKD (chronic kidney disease), stage III ([facility])     Diastolic congestive heart failure ([facility])     Hypertension     Hypothyroidism     Rheumatoid arthritis ([address] )     Uterine prolapse        Past Surgical History:   Procedure Laterality Date    BACK SURGERY      BLADDER SURGERY      CORONARY ANGIOPLASTY WITH STENT PLACEMENT      CORONARY ARTERY BYPASS GRAFT      EYE SURGERY Right 05/2020    OOPHORECTOMY Left     AK PARTIAL HIP REPLACEMENT Right 2/1/2018    Procedure: HEMIARTHROPLASTY HIP (BIPOLAR) (RIGHT); Surgeon: Jesika Perales MD;  Location: [address] [place] [address];  Service: Orthopedics    THYROIDECTOMY, PARTIAL         Meds/Allergies:    PTA meds:   Prior to Admission Medications   Prescriptions Last Dose Informant Patient Reported? Taking? Ascorbic Acid (VITAMIN C) 1000 MG tablet 7/2/2020 at Unknown time Self Yes Yes   Sig: Take 1,000 mg by mouth daily   Coenzyme Q10 10 MG capsule 7/2/2020 at Unknown time Self Yes Yes   Sig: Take 10 mg by mouth daily   Multiple Vitamins-Minerals (CENTRUM SILVER PO) 7/2/2020 at Unknown time Self Yes Yes   Sig: Take 1 tablet by mouth daily   TOVIAZ 4 MG TB24 7/2/2020 at Unknown time Self Yes Yes   Sig: Take 1 tablet by mouth daily    VASCEPA 1 g CAPS 7/2/2020 at Unknown time Self No Yes   Sig: ONE CAPSULE TWICE A DAY   Vitamin D, Cholecalciferol, 1000 units CAPS 7/2/2020 at Unknown time Self Yes Yes   Sig: Take 2,000 Units by mouth daily     amLODIPine (NORVASC) 5 mg tablet 7/2/2020 at Unknown time  No Yes   Sig: Take 1 tablet (5 mg total) by mouth daily   apixaban (ELIQUIS) 5 mg 7/2/2020 at Unknown time  No Yes   Sig: Take 1 tablet (5 mg total) by mouth 2 (two) times a day   aspirin (ECOTRIN LOW STRENGTH) 81 mg EC tablet 7/2/2020 at Unknown time Self Yes Yes   Sig: Take 81 mg by mouth daily Indications: Heart Attack     ferrous sulfate 325 (65 Fe) mg tablet 7/2/2020 at Unknown time Self Yes Yes   Sig: Take 325 mg by mouth daily with breakfast   hydroxychloroquine (PLAQUENIL) 200 mg tablet 7/2/2020 at Unknown time Self Yes Yes   Sig: Take 200 mg by mouth daily     ketorolac (ACULAR) 0 5 % ophthalmic solution 7/2/2020 at Unknown time Self Yes Yes   Sig: Administer 1 drop to the right eye 2 (two) times a day    levothyroxine 50 mcg tablet 7/2/2020 at Unknown time Self Yes Yes   Sig: Take 50 mcg by mouth daily  losartan (COZAAR) 50 mg tablet 7/2/2020 at Unknown time Self No Yes   Sig: Take 1 tablet (50 mg total) by mouth daily   metoprolol succinate (TOPROL-XL) 100 mg 24 hr tablet 7/2/2020 at Unknown time Self No Yes   Sig: Take 1 tablet (100 mg total) by mouth daily   nitroglycerin (Nitrolingual) 0 4 mg/spray spray Not Taking at Unknown time Self No No   Sig: Place 1 spray under the tongue every 5 (five) minutes as needed for chest pain   Patient not taking: Reported on 6/12/2020   prednisoLONE acetate (PRED FORTE) 1 % ophthalmic suspension Not Taking at Unknown time Self Yes No   Sig: Administer 1 drop to the right eye 4 (four) times a day   rivaroxaban (XARELTO) 15 mg tablet Not Taking at Unknown time Self No No   Sig: Take 1 tablet (15 mg total) by mouth daily with dinner   Patient not taking: Reported on 7/3/2020   spironolactone (ALDACTONE) 25 mg tablet 7/2/2020 at Unknown time Self No Yes   Sig: TAKE 1 TABLET (25 MG TOTAL) BY MOUTH DAILY      Facility-Administered Medications: None       Allergies: Allergies   Allergen Reactions    Atorvastatin Hives    Boniva [Ibandronic Acid] Hives    Codeine      codeine derivatives - tolerates IV Dilaudid    Statins Hives    Zetia [Ezetimibe]      History:     Marital Status:       Substance Use History:   Social History     Substance and Sexual Activity   Alcohol Use Never    Frequency: Never     Social History     Tobacco Use   Smoking Status Never Smoker   Smokeless Tobacco Never Used     Social History     Substance and Sexual Activity   Drug Use No       Family History:    Family History   Problem Relation Age of Onset    Heart attack Mother     Heart attack Father     Stroke Sister     Heart attack Brother         3 brothers all        Physical Exam:     Vitals:   Blood Pressure: 123/59 (20 134)  Pulse: 70 (20 134)  Temperature: 98 1 °F (36 7 °C) (20 134)  Temp Source: Oral (20 134)  Respirations: 13 (20 134)  Weight - Scale: 62 5 kg (137 lb 12 6 oz) (20 134)  SpO2: 99 % (20 134)    Physical Exam   Constitutional: She is oriented to person, place, and time  Vital signs are normal  She is cooperative  No distress  Pleasant   HENT:   Head: Normocephalic and atraumatic  Eyes: Pupils are equal, round, and reactive to light  Conjunctivae are normal    Neck: Normal range of motion  Neck supple  Cardiovascular: Normal rate and normal heart sounds  An irregularly irregular rhythm present  Pulmonary/Chest: Effort normal and breath sounds normal  No respiratory distress  She has no wheezes  She has no rales  Abdominal: Soft  Bowel sounds are normal  She exhibits mass  She exhibits no distension and no pulsatile midline mass  There is no tenderness  There is no rebound and no guarding  Musculoskeletal: Normal range of motion  She exhibits no edema  Neurological: She is alert and oriented to person, place, and time  No cranial nerve deficit  Skin: Skin is warm and dry  No rash noted  Psychiatric: She has a normal mood and affect  Lab Results: I have personally reviewed pertinent reports        Results from last 7 days   Lab Units 20  0811   WBC Thousand/uL 12 27*   HEMOGLOBIN g/dL 8 0*   HEMATOCRIT % 25 5*   PLATELETS Thousands/uL 315   NEUTROS PCT % 82*   LYMPHS PCT % 9*   MONOS PCT % 6   EOS PCT % 1     Results from last 7 days   Lab Units 20  0811   POTASSIUM mmol/L 4 4   CHLORIDE mmol/L 97*   CO2 mmol/L 23   BUN mg/dL 22   CREATININE mg/dL 1 11   CALCIUM mg/dL 8 5   ALK PHOS U/L 73   ALT U/L 25   AST U/L 20     Results from last 7 days   Lab Units 07/03/20  0811   INR  1 38*       Imaging: I have personally reviewed pertinent reports  Ct Abdomen Pelvis With Contrast    Result Date: 7/3/2020  Narrative: CT ABDOMEN AND PELVIS WITH IV CONTRAST INDICATION:   Abdominal distension Epigastric pain Jaundice  COMPARISON:  CT 6/1/2016 TECHNIQUE:  CT examination of the abdomen and pelvis was performed  Axial, sagittal, and coronal 2D reformatted images were created from the source data and submitted for interpretation  Radiation dose length product (DLP) for this visit:  438 68 mGy-cm   This examination, like all CT scans performed in the [facility], was performed utilizing techniques to minimize radiation dose exposure, including the use of iterative  reconstruction and automated exposure control  IV Contrast:  100 mL of iohexol (OMNIPAQUE) Enteric Contrast:  Enteric contrast was not administered  FINDINGS: ABDOMEN LOWER CHEST:  No clinically significant abnormality identified in the visualized lower chest  LIVER/BILIARY TREE:  Stable 6 mm probable cyst in segment 2  Small 5 mm low-attenuation lesion identified in segment V (series 2 image 21) and 4 mm low-attenuation lesion in segment IVb (series 2 image 27) too small to accurately characterization and could represent a small hemangiomas, cysts, or metastatic lesions  No biliary ductal dilatation is identified  A GALLBLADDER:  There are gallstone(s) within the gallbladder, without pericholecystic inflammatory changes  SPLEEN:  Nonspecific noncystic 9 mm low-attenuation lesion in the spleen, too small for accurate characterization  PANCREAS:  No identified is a large heterogeneous mass which appears to originate from the head of the pancreas measuring 4 3 x 5 2 x 4 8 cm with resultant dilatation of the pancreatic duct which measures 6 mm in diameter  Findings most consistent with pancreatic adenocarcinoma    The splenic and portal venous system appear patent although there is considerable mass effect upon the superior mesenteric vein by this process with possible bland or tumor thrombus extension into the central aspect of the superior mesenteric vein  The proximal celiac and superior mesenteric arteries are patent as is the common hepatic artery  There appears to be encasement of the gastroduodenal artery  Several small cystic lesions noted in the body, neck, and tail of the pancreas, the largest, somewhat increased in size when compared to the prior study in the neck currently measuring 13 mm in greatest dimension (similar to the previous dimension)  Lobulated low-attenuation 2 4 x 2 6 x 3 1 cm structure in the sreedhar hepatis is identified presumably representing periportal adenopathy, possibly necrotic  ADRENAL GLANDS:  Unremarkable  KIDNEYS/URETERS:  No hydronephrosis or urinary tract calculus  One or more sharply circumscribed subcentimeter renal hypodensities are present, too small to accurately characterize, and statistically most likely benign findings  According to recent literature (Radiology 2019) no further workup of these findings is recommended  STOMACH AND BOWEL:  Unremarkable  APPENDIX:  No findings to suggest appendicitis  ABDOMINOPELVIC CAVITY:  No ascites  No pneumoperitoneum  No lymphadenopathy  VESSELS:  Atherosclerotic changes are present  No evidence of aneurysm  PELVIS REPRODUCTIVE ORGANS:  Calcified fibroids  URINARY BLADDER:  There is some calcification identified in the left inferior bladder which may represent a bladder calculus versus mural calcification  This should be correlated urologically  ABDOMINAL WALL/INGUINAL REGIONS:  Unremarkable  OSSEOUS STRUCTURES:  Right hip bipolar hemiarthroplasty present with associated artifact  Diffuse mild osteopenia is present  Mild loss of height of the L4 vertebral body is noted new since 6/1/2016 with approximately 30% loss of height superiorly  Grade 1 anterior listhesis of L3 on L4 is felt to be degenerative in nature  No definite lucent or sclerotic osseous lesions are noted  Impression: 1  Interim development of large pancreatic head mass concerning for pancreatic carcinoma  There is mass effect upon the superior mesenteric vein with possible gland and/or tumor thrombus within it  Splenic, superior mesenteric, and portal venous system remain patent, however  Probable periportal adenopathy  No definite biliary obstruction appreciated  2   Nonspecific subcentimeter low-attenuation hepatic lesions as noted  Metastasis not excluded  3   Gallstones  4   Nonspecific 9 mm splenic low-attenuation noncystic lesion  5   Additional findings as noted  I personally discussed this study with Raul Corrigan on 7/3/2020 at 10:16 AM   Workstation performed: [id number]         XR chest 1 view   Final Result      No acute cardiopulmonary disease  Workstation performed: [id number]         CT abdomen pelvis with contrast   Final Result      1  Interim development of large pancreatic head mass concerning for pancreatic carcinoma  There is mass effect upon the superior mesenteric vein with possible gland and/or tumor thrombus within it  Splenic, superior mesenteric, and portal venous    system remain patent, however  Probable periportal adenopathy  No definite biliary obstruction appreciated  2   Nonspecific subcentimeter low-attenuation hepatic lesions as noted  Metastasis not excluded  3   Gallstones  4   Nonspecific 9 mm splenic low-attenuation noncystic lesion  5   Additional findings as noted               I personally discussed this study with [employer] [employer] on 7/3/2020 at 10:16 AM                    Workstation performed: [id number]             EKG, Pathology, and Other Studies Reviewed on Admission:   · Prior imaging    Allscripts/EPIC Records Reviewed: Yes     ** Please Note: "This note has been constructed using a voice recognition system  Therefore there may be syntax, spelling, and/or grammatical errors   Please call if you have any questions  "**

## 2020-07-03 NOTE — ED PROVIDER NOTES
History  Chief Complaint   Patient presents with    Hip Pain     pt c/o abdominal pain raidiating to her right artificial hip & constipation  says recent eye surgery & stroke & medication changes with blood thinners that "dont agree with me"   Abdominal Pain    Constipation     Patient states she has been having upper abdominal pain in a bandlike fashion just under the ribs for about 2 weeks  Associated with nausea but no vomiting  She has had no fever chills  She had mild decrease in appetite recently  She noticed a little constipation which resolved with prunes and fruit yesterday  Patient thought the abdominal pain coincided with her starting Xarelto  She had talked to her cardiologist to switch the medication last week  She is not taking Xarelto since then however the pain has continued  Patient states that she had noticed some yellow discoloration of the eyes and skin last week which also improved  Patient states the pain radiates to her hip, pointing to the right side of the pelvis          Prior to Admission Medications   Prescriptions Last Dose Informant Patient Reported? Taking?    Ascorbic Acid (VITAMIN C) 1000 MG tablet 7/2/2020 at Unknown time Self Yes Yes   Sig: Take 1,000 mg by mouth daily   Coenzyme Q10 10 MG capsule 7/2/2020 at Unknown time Self Yes Yes   Sig: Take 10 mg by mouth daily   Multiple Vitamins-Minerals (CENTRUM SILVER PO) 7/2/2020 at Unknown time Self Yes Yes   Sig: Take 1 tablet by mouth daily   TOVIAZ 4 MG TB24 7/2/2020 at Unknown time Self Yes Yes   Sig: Take 1 tablet by mouth daily    VASCEPA 1 g CAPS 7/2/2020 at Unknown time Self No Yes   Sig: ONE CAPSULE TWICE A DAY   Vitamin D, Cholecalciferol, 1000 units CAPS 7/2/2020 at Unknown time Self Yes Yes   Sig: Take 2,000 Units by mouth daily     amLODIPine (NORVASC) 5 mg tablet 7/2/2020 at Unknown time  No Yes   Sig: Take 1 tablet (5 mg total) by mouth daily   apixaban (ELIQUIS) 5 mg 7/2/2020 at Unknown time  No Yes Sig: Take 1 tablet (5 mg total) by mouth 2 (two) times a day   aspirin (ECOTRIN LOW STRENGTH) 81 mg EC tablet 7/2/2020 at Unknown time Self Yes Yes   Sig: Take 81 mg by mouth daily Indications: Heart Attack  ferrous sulfate 325 (65 Fe) mg tablet 7/2/2020 at Unknown time Self Yes Yes   Sig: Take 325 mg by mouth daily with breakfast   hydroxychloroquine (PLAQUENIL) 200 mg tablet 7/2/2020 at Unknown time Self Yes Yes   Sig: Take 200 mg by mouth daily     ketorolac (ACULAR) 0 5 % ophthalmic solution 7/2/2020 at Unknown time Self Yes Yes   levothyroxine 50 mcg tablet 7/2/2020 at Unknown time Self Yes Yes   Sig: Take 50 mcg by mouth daily     losartan (COZAAR) 50 mg tablet 7/2/2020 at Unknown time Self No Yes   Sig: Take 1 tablet (50 mg total) by mouth daily   metoprolol succinate (TOPROL-XL) 100 mg 24 hr tablet 7/2/2020 at Unknown time Self No Yes   Sig: Take 1 tablet (100 mg total) by mouth daily   nitroglycerin (Nitrolingual) 0 4 mg/spray spray Not Taking at Unknown time Self No No   Sig: Place 1 spray under the tongue every 5 (five) minutes as needed for chest pain   Patient not taking: Reported on 6/12/2020   prednisoLONE acetate (PRED FORTE) 1 % ophthalmic suspension 7/2/2020 at Unknown time Self Yes Yes   Sig: Administer 1 drop to the right eye 4 (four) times a day   rivaroxaban (XARELTO) 15 mg tablet 7/2/2020 at Unknown time Self No Yes   Sig: Take 1 tablet (15 mg total) by mouth daily with dinner   spironolactone (ALDACTONE) 25 mg tablet 7/2/2020 at Unknown time Self No Yes   Sig: TAKE 1 TABLET (25 MG TOTAL) BY MOUTH DAILY      Facility-Administered Medications: None       Past Medical History:   Diagnosis Date    Anemia     Arthritis     Bladder calculi     CAD (coronary artery disease)     Status post CABG and PTCA to OM1    CKD (chronic kidney disease) stage 3, GFR 30-59 ml/min ([facility]     CKD (chronic kidney disease), stage III ([facility])     Diastolic congestive heart failure ([facility] [address]     Hypertension     Hypothyroidism     Rheumatoid arthritis ([facility] [address] [facility]     Uterine prolapse        Past Surgical History:   Procedure Laterality Date    BACK SURGERY      BLADDER SURGERY      CORONARY ANGIOPLASTY WITH STENT PLACEMENT      CORONARY ARTERY BYPASS GRAFT      EYE SURGERY Right 2020    OOPHORECTOMY Left     DE PARTIAL HIP REPLACEMENT Right 2018    Procedure: HEMIARTHROPLASTY HIP (BIPOLAR) (RIGHT); Surgeon: Angeles Palacios MD;  Location: [facility];  Service: Orthopedics    THYROIDECTOMY, PARTIAL         Family History   Problem Relation Age of Onset    Heart attack Mother     Heart attack Father     Stroke Sister     Heart attack Brother         3 brothers all      I have reviewed and agree with the history as documented  E-Cigarette/Vaping    E-Cigarette Use Never User      E-Cigarette/Vaping Substances     Social History     Tobacco Use    Smoking status: Never Smoker    Smokeless tobacco: Never Used   Substance Use Topics    Alcohol use: Never     Frequency: Never    Drug use: No       Review of Systems   Constitutional: Positive for appetite change  Negative for fever  HENT: Negative for congestion and sore throat  Respiratory: Negative for cough and shortness of breath  Cardiovascular: Negative for chest pain and leg swelling  Gastrointestinal: Positive for abdominal distention, abdominal pain, constipation and nausea  Negative for anal bleeding, blood in stool, rectal pain and vomiting  Genitourinary: Negative for dysuria  Musculoskeletal: Positive for arthralgias  Negative for back pain  Skin: Positive for color change  Neurological: Negative for weakness and headaches  Hematological: Does not bruise/bleed easily  Psychiatric/Behavioral: Negative for confusion  Physical Exam  Physical Exam   Constitutional: She is oriented to person, place, and time  She appears well-developed and well-nourished  HENT:   Head: Normocephalic and atraumatic  Mouth/Throat: Oropharynx is clear and moist    Eyes: Pupils are equal, round, and reactive to light  EOM are normal  Scleral icterus is present  Cardiovascular: Normal rate, regular rhythm and normal heart sounds  Pulmonary/Chest: Effort normal    Abdominal: Soft  Normal appearance and bowel sounds are normal  She exhibits mass  There is tenderness in the right upper quadrant and epigastric area  Neurological: She is alert and oriented to person, place, and time  Skin: Skin is warm and dry  Capillary refill takes less than 2 seconds  Psychiatric: She has a normal mood and affect  Her behavior is normal    Nursing note and vitals reviewed        Vital Signs  ED Triage Vitals [07/03/20 0720]   Temperature Pulse Respirations Blood Pressure SpO2   (!) 96 9 °F (36 1 °C) 76 19 141/85 95 %      Temp Source Heart Rate Source Patient Position - Orthostatic VS BP Location FiO2 (%)   Tympanic Monitor Sitting Right arm --      Pain Score       7           Vitals:    07/03/20 1015 07/03/20 1100 07/03/20 1114 07/03/20 1115   BP:   128/90 128/90   Pulse: 72 76 77 74   Patient Position - Orthostatic VS:   Sitting          Visual Acuity      ED Medications  Medications   iohexol (OMNIPAQUE) 350 MG/ML injection (SINGLE-DOSE) 100 mL (100 mL Intravenous Given 7/3/20 0856)       Diagnostic Studies  Results Reviewed     Procedure Component Value Units Date/Time    Urine Microscopic [[id number]]  (Abnormal) Collected:  07/03/20 0914    Lab Status:  Final result Specimen:  Urine, Clean Catch Updated:  07/03/20 0939     RBC, UA None Seen /hpf      WBC, UA 10-20 /hpf      Epithelial Cells Occasional /hpf      Bacteria, UA Occasional /hpf     UA (URINE) with reflex to Scope [[id number]]  (Abnormal) Collected:  07/03/20 0914    Lab Status:  Final result Specimen:  Urine, Clean Catch Updated:  07/03/20 0922     Color, UA Yellow     Clarity, UA Clear     Specific [place], UA 1 020     pH, UA 6 0     Leukocytes, UA Moderate     Nitrite, UA Negative     Protein, UA Negative mg/dl      Glucose, UA Negative mg/dl      Ketones, UA Trace mg/dl      Urobilinogen, UA 0 2 E U /dl      Bilirubin, UA Negative     Blood, UA Negative    Lipase [[id number]]  (Abnormal) Collected:  07/03/20 0811    Lab Status:  Final result Specimen:  Blood from Arm, Left Updated:  07/03/20 0847     Lipase 3,009 u/L     Troponin I [[id number]]  (Normal) Collected:  07/03/20 0811    Lab Status:  Final result Specimen:  Blood from Arm, Left Updated:  07/03/20 0839     Troponin I 0 02 ng/mL     Comprehensive metabolic panel [[id number]]  (Abnormal) Collected:  07/03/20 0811    Lab Status:  Final result Specimen:  Blood from Arm, Left Updated:  07/03/20 0834     Sodium 132 mmol/L      Potassium 4 4 mmol/L      Chloride 97 mmol/L      CO2 23 mmol/L      ANION GAP 12 mmol/L      BUN 22 mg/dL      Creatinine 1 11 mg/dL      Glucose 122 mg/dL      Calcium 8 5 mg/dL      AST 20 U/L      ALT 25 U/L      Alkaline Phosphatase 73 U/L      Total Protein 6 4 g/dL      Albumin 3 2 g/dL      Total Bilirubin 0 50 mg/dL      eGFR 47 ml/min/1 73sq m     Narrative:       [place] guidelines for Chronic Kidney Disease (CKD):     Stage 1 with normal or high GFR (GFR > 90 mL/min/1 73 square meters)    Stage 2 Mild CKD (GFR = 60-89 mL/min/1 73 square meters)    Stage 3A Moderate CKD (GFR = 45-59 mL/min/1 73 square meters)    Stage 3B Moderate CKD (GFR = 30-44 mL/min/1 73 square meters)    Stage 4 Severe CKD (GFR = 15-29 mL/min/1 73 square meters)    Stage 5 End Stage CKD (GFR <15 mL/min/1 73 square meters)  Note: GFR calculation is accurate only with a steady state creatinine    Protime-INR [[id number]]  (Abnormal) Collected:  07/03/20 0811    Lab Status:  Final result Specimen:  Blood from Arm, Left Updated:  07/03/20 0830     Protime 16 9 seconds      INR 1 38    APTT [[id number]]  (Normal) Collected:  07/03/20 0811    Lab Status:  Final result Specimen:  Blood from Arm, Left Updated:  07/03/20 0830     PTT 30 seconds     CBC and differential [[id number]]  (Abnormal) Collected:  07/03/20 0811    Lab Status:  Final result Specimen:  Blood from Arm, Left Updated:  07/03/20 0817     WBC 12 27 Thousand/uL      RBC 3 03 Million/uL      Hemoglobin 8 0 g/dL      Hematocrit 25 5 %      MCV 84 fL      MCH 26 4 pg      MCHC 31 4 g/dL      RDW 15 0 %      MPV 10 0 fL      Platelets 971 Thousands/uL      nRBC 0 /100 WBCs      Neutrophils Relative 82 %      Immat GRANS % 1 %      Lymphocytes Relative 9 %      Monocytes Relative 6 %      Eosinophils Relative 1 %      Basophils Relative 1 %      Neutrophils Absolute 10 15 Thousands/µL      Immature Grans Absolute 0 08 Thousand/uL      Lymphocytes Absolute 1 10 Thousands/µL      Monocytes Absolute 0 75 Thousand/µL      Eosinophils Absolute 0 13 Thousand/µL      Basophils Absolute 0 06 Thousands/µL                  CT abdomen pelvis with contrast   Final Result by Chandu Gama MD (07/03 1019)      1  Interim development of large pancreatic head mass concerning for pancreatic carcinoma  There is mass effect upon the superior mesenteric vein with possible gland and/or tumor thrombus within it  Splenic, superior mesenteric, and portal venous    system remain patent, however  Probable periportal adenopathy  No definite biliary obstruction appreciated  2   Nonspecific subcentimeter low-attenuation hepatic lesions as noted  Metastasis not excluded  3   Gallstones  4   Nonspecific 9 mm splenic low-attenuation noncystic lesion  5   Additional findings as noted               I personally discussed this study with Malathi Pierce on 7/3/2020 at 10:16 AM                    Workstation performed: [id number]         XR chest 1 view    (Results Pending)              Procedures  ECG 12 Lead Documentation Only  Date/Time: 7/3/2020 8:30 AM  Performed by: Jonatan Begum MD  Authorized by: Jonatan Begum MD     Indications / Diagnosis:  Abdominal pain  ECG reviewed by me, the ED Provider: yes    Patient location:  ED  Interpretation:     Interpretation: abnormal    Rate:     ECG rate:  74    ECG rate assessment: normal    Rhythm:     Rhythm: atrial fibrillation and atrial flutter    Ectopy:     Ectopy: none    QRS:     QRS axis:  Normal    QRS intervals:  Normal  Conduction:     Conduction: normal    ST segments:     ST segments:  Normal  T waves:     T waves: normal               ED Course       US AUDIT      Most Recent Value   Initial Alcohol Screen: US AUDIT-C    1  How often do you have a drink containing alcohol?  0 Filed at: 07/03/2020 0732   Audit-C Score  0 Filed at: 07/03/2020 0732                  MICHEL/DAST-10      Most Recent Value   How many times in the past year have you    Used an illegal drug or used a prescription medication for non-medical reasons? Never Filed at: 07/03/2020 0732                                MDM  Number of Diagnoses or Management Options  Abdominal pain:   Anemia:   Atrial fibrillation [facility]):   Pancreatic mass:   Pancreatitis:   Diagnosis management comments: Patient has multiple medical problems and evidence of an inflamed pancreas  She lives alone and will dehydrate at the rate she is going  She has no appetite and poor intake    She will need evaluation for the pancreatic mass a soon as possible        Disposition  Final diagnoses:   Abdominal pain   Pancreatitis   Anemia   Pancreatic mass   Atrial fibrillation ([facility] [address] )     Time reflects when diagnosis was documented in both MDM as applicable and the Disposition within this note     Time User Action Codes Description Comment    7/3/2020 11:24 AM Sabrina Ramirez A Add [R10 9] Abdominal pain     7/3/2020 11:24 AM Sabrina Ramirez A Add [K85 90] Pancreatitis     7/3/2020 11:24 AM Sabrina Ramirez A Add [D64 9] Anemia     7/3/2020 11:24 AM Dauna Pay Add [K86 89] Pancreatic mass     7/3/2020 11:24 AM Sabrina Ramirez A Add [I48 91] Atrial fibrillation [facility])       ED Disposition     ED Disposition Condition Date/Time Comment    Admit Stable Fri Jul 3, 2020 11:24 AM Case was discussed with hospitalist and the patient's admission status was agreed to be Admission Status: inpatient status to the service of Dr Cb Mac   Follow-up Information    None         Patient's Medications   Discharge Prescriptions    No medications on file     No discharge procedures on file      PDMP Review     None          ED Provider  Electronically Signed by           Maris Briseno MD  07/03/20 1126

## 2020-07-04 ENCOUNTER — APPOINTMENT (INPATIENT)
Dept: RADIOLOGY | Facility: HOSPITAL | Age: 80
DRG: 435 | End: 2020-07-04
Payer: MEDICARE

## 2020-07-04 ENCOUNTER — TELEPHONE (OUTPATIENT)
Dept: GASTROENTEROLOGY | Facility: AMBULARY SURGERY CENTER | Age: 80
End: 2020-07-04

## 2020-07-04 LAB
ABO GROUP BLD: NORMAL
ALBUMIN SERPL BCP-MCNC: 2.8 G/DL (ref 3.5–5)
ALP SERPL-CCNC: 67 U/L (ref 46–116)
ALT SERPL W P-5'-P-CCNC: 22 U/L (ref 12–78)
ANION GAP SERPL CALCULATED.3IONS-SCNC: 10 MMOL/L (ref 4–13)
AST SERPL W P-5'-P-CCNC: 21 U/L (ref 5–45)
BASOPHILS # BLD AUTO: 0.05 THOUSANDS/ΜL (ref 0–0.1)
BASOPHILS NFR BLD AUTO: 1 % (ref 0–1)
BILIRUB SERPL-MCNC: 0.6 MG/DL (ref 0.2–1)
BLD GP AB SCN SERPL QL: NEGATIVE
BUN SERPL-MCNC: 19 MG/DL (ref 5–25)
CALCIUM SERPL-MCNC: 8 MG/DL (ref 8.3–10.1)
CHLORIDE SERPL-SCNC: 99 MMOL/L (ref 100–108)
CO2 SERPL-SCNC: 22 MMOL/L (ref 21–32)
CREAT SERPL-MCNC: 1.09 MG/DL (ref 0.6–1.3)
EOSINOPHIL # BLD AUTO: 0.14 THOUSAND/ΜL (ref 0–0.61)
EOSINOPHIL NFR BLD AUTO: 2 % (ref 0–6)
ERYTHROCYTE [DISTWIDTH] IN BLOOD BY AUTOMATED COUNT: 15.2 % (ref 11.6–15.1)
FERRITIN SERPL-MCNC: 114 NG/ML (ref 8–388)
FOLATE SERPL-MCNC: >20 NG/ML (ref 3.1–17.5)
GFR SERPL CREATININE-BSD FRML MDRD: 48 ML/MIN/1.73SQ M
GLUCOSE SERPL-MCNC: 88 MG/DL (ref 65–140)
HCT VFR BLD AUTO: 22.9 % (ref 34.8–46.1)
HCT VFR BLD AUTO: 24.1 % (ref 34.8–46.1)
HGB BLD-MCNC: 7.4 G/DL (ref 11.5–15.4)
HGB BLD-MCNC: 7.4 G/DL (ref 11.5–15.4)
IMM GRANULOCYTES # BLD AUTO: 0.05 THOUSAND/UL (ref 0–0.2)
IMM GRANULOCYTES NFR BLD AUTO: 1 % (ref 0–2)
IRON SATN MFR SERPL: 11 %
IRON SERPL-MCNC: 46 UG/DL (ref 50–170)
LIPASE SERPL-CCNC: 1684 U/L (ref 73–393)
LYMPHOCYTES # BLD AUTO: 1.59 THOUSANDS/ΜL (ref 0.6–4.47)
LYMPHOCYTES NFR BLD AUTO: 17 % (ref 14–44)
MAGNESIUM SERPL-MCNC: 2.1 MG/DL (ref 1.6–2.6)
MCH RBC QN AUTO: 25.9 PG (ref 26.8–34.3)
MCHC RBC AUTO-ENTMCNC: 30.7 G/DL (ref 31.4–37.4)
MCV RBC AUTO: 84 FL (ref 82–98)
MONOCYTES # BLD AUTO: 0.73 THOUSAND/ΜL (ref 0.17–1.22)
MONOCYTES NFR BLD AUTO: 8 % (ref 4–12)
NEUTROPHILS # BLD AUTO: 6.98 THOUSANDS/ΜL (ref 1.85–7.62)
NEUTS SEG NFR BLD AUTO: 71 % (ref 43–75)
NRBC BLD AUTO-RTO: 0 /100 WBCS
PLATELET # BLD AUTO: 273 THOUSANDS/UL (ref 149–390)
PMV BLD AUTO: 9.8 FL (ref 8.9–12.7)
POTASSIUM SERPL-SCNC: 4.2 MMOL/L (ref 3.5–5.3)
PROT SERPL-MCNC: 5.8 G/DL (ref 6.4–8.2)
RBC # BLD AUTO: 2.86 MILLION/UL (ref 3.81–5.12)
RH BLD: POSITIVE
SODIUM SERPL-SCNC: 131 MMOL/L (ref 136–145)
SPECIMEN EXPIRATION DATE: NORMAL
TIBC SERPL-MCNC: 405 UG/DL (ref 250–450)
TRIGL SERPL-MCNC: 81 MG/DL
VIT B12 SERPL-MCNC: 1967 PG/ML (ref 100–900)
WBC # BLD AUTO: 9.54 THOUSAND/UL (ref 4.31–10.16)

## 2020-07-04 PROCEDURE — 80053 COMPREHEN METABOLIC PANEL: CPT | Performed by: INTERNAL MEDICINE

## 2020-07-04 PROCEDURE — 86923 COMPATIBILITY TEST ELECTRIC: CPT

## 2020-07-04 PROCEDURE — 86900 BLOOD TYPING SEROLOGIC ABO: CPT | Performed by: INTERNAL MEDICINE

## 2020-07-04 PROCEDURE — 73502 X-RAY EXAM HIP UNI 2-3 VIEWS: CPT

## 2020-07-04 PROCEDURE — 99223 1ST HOSP IP/OBS HIGH 75: CPT | Performed by: INTERNAL MEDICINE

## 2020-07-04 PROCEDURE — 83690 ASSAY OF LIPASE: CPT | Performed by: INTERNAL MEDICINE

## 2020-07-04 PROCEDURE — 86901 BLOOD TYPING SEROLOGIC RH(D): CPT | Performed by: INTERNAL MEDICINE

## 2020-07-04 PROCEDURE — 85025 COMPLETE CBC W/AUTO DIFF WBC: CPT | Performed by: INTERNAL MEDICINE

## 2020-07-04 PROCEDURE — 97116 GAIT TRAINING THERAPY: CPT

## 2020-07-04 PROCEDURE — 83735 ASSAY OF MAGNESIUM: CPT | Performed by: INTERNAL MEDICINE

## 2020-07-04 PROCEDURE — 84478 ASSAY OF TRIGLYCERIDES: CPT | Performed by: INTERNAL MEDICINE

## 2020-07-04 PROCEDURE — 85014 HEMATOCRIT: CPT | Performed by: INTERNAL MEDICINE

## 2020-07-04 PROCEDURE — 97163 PT EVAL HIGH COMPLEX 45 MIN: CPT

## 2020-07-04 PROCEDURE — 99232 SBSQ HOSP IP/OBS MODERATE 35: CPT | Performed by: INTERNAL MEDICINE

## 2020-07-04 PROCEDURE — 86850 RBC ANTIBODY SCREEN: CPT | Performed by: INTERNAL MEDICINE

## 2020-07-04 PROCEDURE — 85018 HEMOGLOBIN: CPT | Performed by: INTERNAL MEDICINE

## 2020-07-04 PROCEDURE — 86301 IMMUNOASSAY TUMOR CA 19-9: CPT | Performed by: INTERNAL MEDICINE

## 2020-07-04 RX ORDER — POLYETHYLENE GLYCOL 3350 17 G/17G
17 POWDER, FOR SOLUTION ORAL DAILY PRN
Status: DISCONTINUED | OUTPATIENT
Start: 2020-07-04 | End: 2020-07-10 | Stop reason: HOSPADM

## 2020-07-04 RX ADMIN — HYDROXYCHLOROQUINE SULFATE 200 MG: 200 TABLET, FILM COATED ORAL at 09:42

## 2020-07-04 RX ADMIN — KETOROLAC TROMETHAMINE 1 DROP: 5 SOLUTION OPHTHALMIC at 17:04

## 2020-07-04 RX ADMIN — LOSARTAN POTASSIUM 25 MG: 25 TABLET, FILM COATED ORAL at 09:42

## 2020-07-04 RX ADMIN — ENOXAPARIN SODIUM 60 MG: 60 INJECTION SUBCUTANEOUS at 21:10

## 2020-07-04 RX ADMIN — METOPROLOL SUCCINATE 100 MG: 100 TABLET, EXTENDED RELEASE ORAL at 09:42

## 2020-07-04 RX ADMIN — Medication 1000 MG: at 09:42

## 2020-07-04 RX ADMIN — ASPIRIN 81 MG 81 MG: 81 TABLET ORAL at 09:42

## 2020-07-04 RX ADMIN — SODIUM CHLORIDE, SODIUM LACTATE, POTASSIUM CHLORIDE, AND CALCIUM CHLORIDE 75 ML/HR: .6; .31; .03; .02 INJECTION, SOLUTION INTRAVENOUS at 17:04

## 2020-07-04 RX ADMIN — Medication 1000 MG: at 17:04

## 2020-07-04 RX ADMIN — LEVOTHYROXINE SODIUM 50 MCG: 50 TABLET ORAL at 05:17

## 2020-07-04 RX ADMIN — PANTOPRAZOLE SODIUM 40 MG: 40 TABLET, DELAYED RELEASE ORAL at 05:17

## 2020-07-04 RX ADMIN — HYDROMORPHONE HYDROCHLORIDE 0.2 MG: 1 INJECTION, SOLUTION INTRAMUSCULAR; INTRAVENOUS; SUBCUTANEOUS at 18:28

## 2020-07-04 RX ADMIN — KETOROLAC TROMETHAMINE 1 DROP: 5 SOLUTION OPHTHALMIC at 09:42

## 2020-07-04 RX ADMIN — OXYBUTYNIN 5 MG: 5 TABLET, FILM COATED, EXTENDED RELEASE ORAL at 09:42

## 2020-07-04 RX ADMIN — ENOXAPARIN SODIUM 60 MG: 60 INJECTION SUBCUTANEOUS at 12:09

## 2020-07-04 RX ADMIN — SODIUM CHLORIDE, SODIUM LACTATE, POTASSIUM CHLORIDE, AND CALCIUM CHLORIDE 100 ML/HR: .6; .31; .03; .02 INJECTION, SOLUTION INTRAVENOUS at 01:08

## 2020-07-04 RX ADMIN — HYDROMORPHONE HYDROCHLORIDE 0.2 MG: 1 INJECTION, SOLUTION INTRAMUSCULAR; INTRAVENOUS; SUBCUTANEOUS at 09:49

## 2020-07-04 RX ADMIN — POLYETHYLENE GLYCOL 3350 17 G: 17 POWDER, FOR SOLUTION ORAL at 12:09

## 2020-07-04 NOTE — PLAN OF CARE
Problem: Potential for Falls  Goal: Patient will remain free of falls  Description  INTERVENTIONS:  - Assess patient frequently for physical needs  -  Identify cognitive and physical deficits and behaviors that affect risk of falls    -  [place] fall precautions as indicated by assessment   - Educate patient/family on patient safety including physical limitations  - Instruct patient to call for assistance with activity based on assessment  - Modify environment to reduce risk of injury  - Consider OT/PT consult to assist with strengthening/mobility  Outcome: Progressing

## 2020-07-04 NOTE — PLAN OF CARE
Problem: Potential for Falls  Goal: Patient will remain free of falls  Description  INTERVENTIONS:  - Assess patient frequently for physical needs  -  Identify cognitive and physical deficits and behaviors that affect risk of falls    -  [place] fall precautions as indicated by assessment   - Educate patient/family on patient safety including physical limitations  - Instruct patient to call for assistance with activity based on assessment  - Modify environment to reduce risk of injury  - Consider OT/PT consult to assist with strengthening/mobility  Outcome: Progressing     Problem: PAIN - ADULT  Goal: Verbalizes/displays adequate comfort level or baseline comfort level  Description  Interventions:  - Encourage patient to monitor pain and request assistance  - Assess pain using appropriate pain scale  - Administer analgesics based on type and severity of pain and evaluate response  - Implement non-pharmacological measures as appropriate and evaluate response  - Consider cultural and social influences on pain and pain management  - Notify physician/advanced practitioner if interventions unsuccessful or patient reports new pain  Outcome: Progressing     Problem: INFECTION - ADULT  Goal: Absence or prevention of progression during hospitalization  Description  INTERVENTIONS:  - Assess and monitor for signs and symptoms of infection  - Monitor lab/diagnostic results  - Monitor all insertion sites, i e  indwelling lines, tubes, and drains  - Monitor endotracheal if appropriate and nasal secretions for changes in amount and color  - [place] appropriate cooling/warming therapies per order  - Administer medications as ordered  - Instruct and encourage patient and family to use good hand hygiene technique  - Identify and instruct in appropriate isolation precautions for identified infection/condition  Outcome: Progressing     Problem: SAFETY ADULT  Goal: Patient will remain free of falls  Description  INTERVENTIONS:  - Assess patient frequently for physical needs  -  Identify cognitive and physical deficits and behaviors that affect risk of falls  -  [place] fall precautions as indicated by assessment   - Educate patient/family on patient safety including physical limitations  - Instruct patient to call for assistance with activity based on assessment  - Modify environment to reduce risk of injury  - Consider OT/PT consult to assist with strengthening/mobility  Outcome: Progressing     Problem: DISCHARGE PLANNING  Goal: Discharge to home or other facility with appropriate resources  Description  INTERVENTIONS:  - Identify barriers to discharge w/patient and caregiver  - Arrange for needed discharge resources and transportation as appropriate  - Identify discharge learning needs (meds, wound care, etc )  - Arrange for interpretive services to assist at discharge as needed  - Refer to Case Management Department for coordinating discharge planning if the patient needs post-hospital services based on physician/advanced practitioner order or complex needs related to functional status, cognitive ability, or social support system  Outcome: Progressing     Problem: Knowledge Deficit  Goal: Patient/family/caregiver demonstrates understanding of disease process, treatment plan, medications, and discharge instructions  Description  Complete learning assessment and assess knowledge base    Interventions:  - Provide teaching at level of understanding  - Provide teaching via preferred learning methods  Outcome: Progressing

## 2020-07-04 NOTE — ASSESSMENT & PLAN NOTE
Patient's hemoglobin noted to be 8 grams/deciliter compared to 12 5 grams/deciliter last month after patient was started on anticoagulant  Hemoglobin slowly down trended 7 0 grams/deciliter  without any evidence of overt bleeding, possibly dilution component on 7/6  Normocytic  Iron study noted, iron saturation 11%  B12 and folate elevated  · Received one unit PRBC yesterday  Hemoglobin 9 0 today  · Follow-up stool studies, pending  · Monitor hemoglobin closely on anticoagulation  · Follow-up GI, for EGD and colonoscopy tomorrow  · Patient is at high risk of thromboembolism due to persistent AFib, recent CVA and now underlying malignancy  Hemoglobin has decreased on anticoagulation but patient does not report any overt bleeding

## 2020-07-04 NOTE — PROGRESS NOTES
[facility] Progress Note  Patient: Elie Connolly 79 y o  female   MRN: [id number]  PCP: No primary care provider on file  Unit/Bed#: [address] [place] [address] Encounter: [id number]  Date Of Visit: 07/04/20    Problem List:    Principal Problem:    Pancreatic mass  Active Problems:    Acute pancreatitis    Chronic systolic congestive heart failure (HCC)    Anemia    Other persistent atrial fibrillation (HCC)    CVA (cerebral vascular accident) ([address] )    Right hip pain    CAD (coronary artery disease)    Leukocytosis    Hypertension    CKD (chronic kidney disease) stage 3, GFR 30-59 ml/min (HCC)    Hyponatremia    Hyperlipidemia    Hypothyroid    Urinary dysfunction      Assessment & Plan:    Acute pancreatitis  Assessment & Plan  Likely secondary to above  Lipase level was 3009 at the time of admission  CT scan revealed gallstone but no biliary ductal dilatation, LFT normal  Triglyceride within normal limit  Denies any abdominal pain at present  Lipase is down trending  · Clear liquid diet  · IV fluids with LR at 75 cc/hour, closely monitor volume status given systolic CHF  · Follow-up labs  · Pain control  · Monitor abdominal exam  · GI evaluation    * Pancreatic mass  Assessment & Plan  Patient presented with bandlike upper abdominal discomfort gradually worsening over last 2-2 5 weeks  CT scan revealed large pancreatic head mass concerning for pancreatic carcinoma with mass effect on superior mesenteric vein with possible tumor extension with tumor extension in portal vein  Probable periportal adenopathy    Nonspecific subcentimeter hepatic lesions  Likely malignancy, results were discussed with patient  · Check CA 19 9  · GI evaluation    CVA (cerebral vascular accident) [facility])  Assessment & Plan  Recently hospitalized in May 2020 with left MCA CVA involving left frontal cortex  Continue aspirin  Hold Eliquis, transition to Lovenox at present    Other persistent atrial fibrillation [facility])  Assessment & Plan  Controlled  Continue metoprolol  Hold Eliquis, transition to Lovenox while in hospital      Anemia  Assessment & Plan  Patient's hemoglobin noted to be 8 grams/deciliter compared to 12 5 grams/deciliter last month after patient was started on anticoagulant  Hemoglobin slightly lower at 7 4 grams/deciliter today without any evidence of overt bleeding, possibly dilution  Normocytic  · Iron study noted, iron saturation 11%  B12 and folate elevated  · Check stool studies  · Monitor hemoglobin closely on anticoagulation  · Patient is at high risk of thromboembolism due to persistent AFib, recent CVA and now underlying malignancy  Hemoglobin has decreased on anticoagulation but patient does not report any overt bleeding  Chronic systolic congestive heart failure (HCC)  Assessment & Plan  Wt Readings from Last 3 Encounters:   07/04/20 62 2 kg (137 lb 2 oz)   06/12/20 63 5 kg (140 lb)   06/05/20 63 9 kg (140 lb 14 oz)     Weight is lower than prior  Appears euvolemic  Chest x-ray without any acute abnormality  Monitor volume status closely on IV fluids  Monitor daily weight, intake output  Holding spironolactone for now    Hyponatremia  Assessment & Plan  Mild  Monitor with IV fluids    CKD (chronic kidney disease) stage 3, GFR 30-59 ml/min ([facility])  Assessment & Plan  Baseline creatinine appears to be 0 9-1 2, lower than prior?   Decreasing muscle mass  Currently at baseline  Monitor    Hypertension  Assessment & Plan  Patient reports low blood pressure recently with systolic in 656G, also reports symptoms of lightheadedness  Will continue metoprolol  Decrease losartan to 25 mg, hold for systolic less than 149  Hold Norvasc at present  Also holding spironolactone  Monitor    Leukocytosis  Assessment & Plan  Mild likely reactive to pancreatic mass/pancreatitis  Improved    CAD (coronary artery disease)  Assessment & Plan  Status post CABG and subsequent PCI x2  Continue aspirin, metoprolol    Right hip pain  Assessment & Plan  Status post right hip replacement in 2018  Patient is reporting ongoing right hip discomfort, over loss few which  Right hip and prosthesis appears unremarkable on the CT scan  Exam is benign  · Pain control  · Check x-ray  · Monitor    Urinary dysfunction  Assessment & Plan  On toviaz, will substitute with oxybutynin    Hypothyroid  Assessment & Plan  Continue Synthroid    Hyperlipidemia  Assessment & Plan  Intolerant to statin and Zetia  On Vascepa at home, substitute with facial  Outpatient awaiting P CS K 9 inhibitor        VTE Pharmacologic Prophylaxis:   Pharmacologic: Enoxaparin (Lovenox)  Mechanical VTE Prophylaxis in Place: Yes    Patient Centered Rounds: I have performed bedside rounds with nursing staff today  Discussions with Specialists or Other Care Team Provider:  Gastroenterology    Education and Discussions with Family / Patient:  Daughter over the phone    Time Spent for Care: 45 minutes  More than 50% of total time spent on counseling and coordination of care as described above  Current Length of Stay: 1 day(s)    Current Patient Status: Inpatient   Certification Statement: The patient will continue to require additional inpatient hospital stay due to Treatment for pancreatitis and pancreatic mass, evaluation of anemia    Discharge Plan:  Home when clinically improved    Code Status: Level 1 - Full Code      Subjective:   Denies any abdominal pain  Denies any chest pain shortness of breath or dizziness  Continues with the continuous dull right hip pain    Denies any bowel movement or overt bleeding    Objective:     Vitals:   Temp (24hrs), Av 1 °F (36 7 °C), Min:97 5 °F (36 4 °C), Max:98 5 °F (36 9 °C)    Temp:  [97 5 °F (36 4 °C)-98 5 °F (36 9 °C)] 97 5 °F (36 4 °C)  HR:  [67-80] 71  Resp:  [13-24] 16  BP: (113-148)/(58-70) 145/63  SpO2:  [92 %-100 %] 100 %  Body mass index is 23 54 kg/m²       Input and Output Summary (last 24 hours): Intake/Output Summary (Last 24 hours) at 7/4/2020 1117  Last data filed at 7/4/2020 0941  Gross per 24 hour   Intake 1000 ml   Output 400 ml   Net 600 ml       Physical Exam:     Physical Exam   Constitutional: She is oriented to person, place, and time  She appears well-developed  No distress  HENT:   Head: Atraumatic  Eyes: Pupils are equal, round, and reactive to light  Neck: Neck supple  Cardiovascular: Normal rate  Pulmonary/Chest: Effort normal and breath sounds normal  No respiratory distress  She has no wheezes  She has no rales  Diminished   Abdominal: Soft  Bowel sounds are normal  She exhibits mass  She exhibits no distension  There is no tenderness  There is no rebound and no guarding  Musculoskeletal: Normal range of motion  She exhibits no edema  Right hip: She exhibits normal range of motion, no tenderness, no crepitus and no deformity  Neurological: She is alert and oriented to person, place, and time  No cranial nerve deficit  Skin: Skin is warm and dry  No rash noted  Psychiatric: She has a normal mood and affect  Additional Data:     Labs:    Results from last 7 days   Lab Units 07/04/20  0449   WBC Thousand/uL 9 54   HEMOGLOBIN g/dL 7 4*   HEMATOCRIT % 24 1*   PLATELETS Thousands/uL 273   NEUTROS PCT % 71   LYMPHS PCT % 17   MONOS PCT % 8   EOS PCT % 2     Results from last 7 days   Lab Units 07/04/20  0449   POTASSIUM mmol/L 4 2   CHLORIDE mmol/L 99*   CO2 mmol/L 22   BUN mg/dL 19   CREATININE mg/dL 1 09   CALCIUM mg/dL 8 0*   ALK PHOS U/L 67   ALT U/L 22   AST U/L 21     Results from last 7 days   Lab Units 07/03/20  0811   INR  1 38*       * I Have Reviewed All Lab Data Listed Above  * Additional Pertinent Lab Tests Reviewed:  All Labs Within Last 24 Hours Reviewed      Imaging:  Imaging Reports Reviewed Today Include:  CT scan    Recent Cultures (last 7 days):           Last 24 Hours Medication List:     Current Facility-Administered Medications:  acetaminophen 650 mg Oral Q6H PRN Mak Chua MD    aspirin 81 mg Oral Daily Mak Chua MD    enoxaparin 1 mg/kg Subcutaneous Q12H [address] Mak Chua MD    fish oil 1,000 mg Oral BID Mak Chua MD    HYDROmorphone 0 2 mg Intravenous Q6H PRN Mak Chua MD    hydroxychloroquine 200 mg Oral Daily Mak Chua MD    ketorolac 1 drop Right Eye BID Mak Chua MD    lactated ringers 75 mL/hr Intravenous Continuous Mak Chua MD Last Rate: 75 mL/hr (07/04/20 0942)   levothyroxine 50 mcg Oral Daily Mak Chua MD    losartan 25 mg Oral Daily Mak Chua MD    metoprolol succinate 100 mg Oral Daily Mak Chua MD    ondansetron 4 mg Intravenous Q6H PRN Mak Chua MD    oxybutynin 5 mg Oral Daily Mak Chua MD    pantoprazole 40 mg Oral Early Morning Mak Chua MD    polyethylene glycol 17 g Oral Daily PRN Mak Chua MD           Today, Patient Was Seen By: Mak Chua MD    ** Please Note: "This note has been constructed using a voice recognition system  Therefore there may be syntax, spelling, and/or grammatical errors   Please call if you have any questions  "**

## 2020-07-04 NOTE — ASSESSMENT & PLAN NOTE
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

## 2020-07-04 NOTE — ASSESSMENT & PLAN NOTE
Wt Readings from Last 3 Encounters:   07/06/20 64 6 kg (142 lb 6 7 oz)   06/12/20 63 5 kg (140 lb)   06/05/20 63 9 kg (140 lb 14 oz)     Weight is lower than prior on admission  Appears euvolemic at present  Chest x-ray without any acute abnormality  Discontinued IV fluid, discontinued salt tablet  Monitor daily weight, intake output  Resume spironolactone in a m   If blood pressure and renal function stable

## 2020-07-04 NOTE — ASSESSMENT & PLAN NOTE
Status post right hip replacement in January 2018  Patient is reporting ongoing right hip discomfort, over last few months  Right hip and prosthesis appears unremarkable on the CT scan, x-ray unremarkable  Exam is benign  · Continue pain control, trial of local heat  · Ortho follow-up with consideration of further workup i e  MRI  · Discussed with patient  · PT/OT

## 2020-07-04 NOTE — ASSESSMENT & PLAN NOTE
Likely secondary to above  Lipase level was 3009 at the time of admission  CT scan revealed gallstone but no biliary ductal dilatation, LFT normal  Triglyceride within normal limit  Tolerating clear liquid diet, reports intermittent abdominal discomfort  Lipase is down trended to 636 today  · Clear liquid diet, consider advancement after MRI as per GI  ·  Discontinued IV fluid   · closely monitor volume status given systolic CHF  · Pain control  · Monitor abdominal exam  · GI following, input appreciated

## 2020-07-04 NOTE — ASSESSMENT & PLAN NOTE
Baseline creatinine appears to be 0 9-1 2, lower than prior?   Decreasing muscle mass  Currently at baseline  Monitor

## 2020-07-04 NOTE — ASSESSMENT & PLAN NOTE
Intolerant to statin and Zetia  On Vascepa at home, substitute with fish oil    Outpatient awaiting P CS K 9 inhibitor

## 2020-07-04 NOTE — PHYSICAL THERAPY NOTE
PT EVALUATION       07/04/20 3015   Note Type   Note type Eval/Treat   Pain Assessment   Pain Assessment Tool 0-10   Pain Score 4   Pain Location/Orientation Location: Hip;Orientation: Right   Home Living   Type of Home House  (bilevel;6+7STE)   Home Layout Multi-level; Able to live on main level with bedroom/bathroom   Bathroom Shower/Tub Walk-in shower  (and a tub/shower)   [address] chair;Grab bars in [employer]   Additional Comments Pt had a borrowed RW but is not sure where it is or if she still has it  Prior Function   Level of [place] Independent with ADLs and functional mobility   Lives With Alone   Receives Help From Family   ADL Assistance Independent   Comments Pt amb w/out AD inside and uses cane outside PTA  Pt does not drive  Restrictions/Precautions   Other Precautions Fall Risk;Bed Alarm; Chair Alarm;Pain   General   Additional Pertinent History Pt adm with abd pain x 2-3 wks  Pt presents today with right hip pain  Family/Caregiver Present No   Cognition   Overall Cognitive Status WFL   Arousal/Participation Cooperative   Orientation Level Oriented X4   Following Commands Follows all commands and directions without difficulty   RLE Assessment   RLE Assessment WFL  (3+ to 4-/5)   LLE Assessment   LLE Assessment WFL  (3+/5)   Bed Mobility   Supine to Sit 7  Independent   Sit to Supine 7  Independent   Transfers   Sit to Stand 5  Supervision   Additional items Verbal cues   Stand to Sit 5  Supervision   Additional items Verbal cues   Ambulation/Elevation   Gait pattern Narrow WINIFRED  (unsteady, (+) loss of balance needing assist to correct)   Gait Assistance 4  Minimal assist   Additional items Assist x 1;Verbal cues; Tactile cues   Assistive Device SPC   Distance 100 feet with change in direction   Balance   Static Sitting Fair +   Static Standing Fair   Dynamic Standing Fair -   Ambulatory Poor +   Activity Tolerance   Activity Tolerance Patient limited by pain;Patient limited by fatigue  (gait imbalance)   Assessment   Problem List Decreased strength;Decreased range of motion;Decreased endurance; Impaired balance;Decreased mobility; Decreased coordination;Decreased safety awareness;Pain   Assessment Patient seen for Physical Therapy evaluation  Patient admitted with Pancreatic mass  Comorbidities affecting patient's physical performance include: HLD, CHF, CAD, anemia, HTN, A-Fib, CVA, hyponatremia, CKD3, CABG x 4   Personal factors affecting patient at time of initial evaluation include: lives in multi story house, ambulating with assistive device, stairs to enter home, inability to navigate community distances, inability to navigate level surfaces without external assistance, inability to perform dynamic tasks in community and limited home support  Prior to admission, patient was independent with functional mobility with cane, independent with functional mobility without assistive device, independent with ADLS, living alone in multi story home with 6+7 steps to enter, ambulating household distance and ambulating community distances  Please find objective findings from Physical Therapy assessment regarding body systems outlined above with impairments and limitations including weakness, decreased ROM, impaired balance, decreased endurance, impaired coordination, gait deviations, pain, decreased activity tolerance, decreased functional mobility tolerance, decreased safety awareness and fall risk  The Barthel Index was used as a functional outcome tool presenting with a score of 60 today indicating moderate limitations of functional mobility and ADLS  Patient's clinical presentation is currently unstable/unpredictable as seen in patient's presentation of vital sign response, changing level of pain, increased fall risk, new onset of impairment of functional mobility, decreased endurance and new onset of weakness   Pt would benefit from continued Physical Therapy treatment to address deficits as defined above and maximize level of functional mobility  As demonstrated by objective findings, the assigned level of complexity for this evaluation is high  Goals   Patient Goals go home   STG Expiration Date 07/11/20   Short Term Goal #1 trans - I; pt will amb with RW or cane functional household distances - S   Short Term Goal #2 balance with AD - F/F+ for safe mobility and to decrease fall risk; up/down 6+7 steps with rail so pt can enter/exit her home   LTG Expiration Date 07/18/20   Long Term Goal #1 pt will amb with cane or RW functional household and community distances - I; balance with AD - F+/G for safe mobility and to decrease fall risk   Long Term Goal #2 up/down 6+7 steps with rail - I so pt can enter/exit her home; strength LEs - 4- to 4/5   Plan   Treatment/Interventions ADL retraining;Functional transfer training;LE strengthening/ROM; Elevations; Therapeutic exercise; Endurance training;Patient/family training;Equipment eval/education; Bed mobility;Gait training; Compensatory technique education   PT Frequency 5x/wk   Recommendation   PT Discharge Recommendation Home with skilled therapy   Additional Comments Recommended to pt RW for home use when dc if a family member cannot locate her RW at home  Before officially ordering a new RW for pt, pt would like to continue to see how she does while in the hospital  PT does recommend a RW for ambulation at this time due to gait imbalance     Barthel Index   Feeding 10   Bathing 0   Grooming Score 0   Dressing Score 5   Bladder Score 10   Bowels Score 10   Toilet Use Score 5   Transfers (Bed/Chair) Score 10   Mobility (Level Surface) Score 10   Stairs Score 0   Barthel Index Score 61   Licensure   [place] License Number  Niko Stevens [place], [place] [id number]     Time In:1245  Time Out:1300  Total Time: 15 mins      S:  "I've had a hard few wks"  O:  Pt trans sit to stand with S  Pt amb with RW x 100 feet with change in direction with min A/S  Pt trans stand to sit with S   A:  Gait remains slightly unsteady with RW, but improved vs cane  Recommend cont amb with RW and RW for home use  Pt does not want to get a RW for home use at this time, but wants to see how the next few days go  P:  Cont per PT POC  DCP - home PT;RW for home use recommended by PT, but not agreed on by pt - will see how pt does over the next few days      Yu Delaney [place]   [id number]

## 2020-07-04 NOTE — ASSESSMENT & PLAN NOTE
Mild, likely secondary to poor solute intake,?   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

## 2020-07-04 NOTE — CONSULTS
Consultation - [facility]  Anthony Montelongo 79 y o  female MRN: [id number]  Unit/Bed#: [address] [place] [address] Encounter: [id number]        Inpatient consult to gastroenterology  Consult performed by: Digeo Soto PA-C  Consult ordered by: Colette Brower MD          Reason for Consult / Principal Problem: pancreatic mass    ASSESSMENT and PLAN:    Principal Problem:    Pancreatic mass  Active Problems:    Hyperlipidemia    Hypothyroid    Right hip pain    Chronic systolic congestive heart failure (HCC)    CAD (coronary artery disease)    Anemia    Leukocytosis    Hypertension    CKD (chronic kidney disease) stage 3, GFR 30-59 ml/min (HCC)    Urinary dysfunction    Other persistent atrial fibrillation (HCC)    CVA (cerebral vascular accident) ([facility] [address] )    Hyponatremia    Acute pancreatitis    #1  Pancreatitic mass with abdominal pain and elevated lipase: possibly a component of pancreatitis from mass, highly concerning for pancreatic cancer  CT showing had mass in the pancreas concerning for pancreatic carcinoma with mass effect on the superior mesenteric vein with possible planned neuro tumor thrombus within it  Also probable periportal adenopathy  Elevated lipase with abdominal pain, lipase is improving   -continue clear liquids today, advance to low-fat tomorrow if continuing to improve  -pain control  -IV fluids  -discussed findings of CT scan with patient and daughter and discussed that this is concerning for cancer  Patient would like to proceed with endoscopic ultrasound for biopsy and then discuss with oncology about the treatment options  We will plan to schedule outpatient endoscopic ultrasound in the near future followed by outpatient oncology evaluation  -will need to clear with Dr Saunders Coverivy holding her Xarelto for 2 days prior to the endoscopic ultrasound  -CA 19-9    #2  Normocytic anemia:  Patient's hemoglobin was 12 4 earlier this month is now down to 7 4    No signs of active bleeding at this time  -recommend checking iron, B12, folate  -monitor for any active signs of bleeding   -could perform EGD at the same time as endoscopic ultrasound, would try to hold off on colonoscopy in this elderly female with likely diagnosis of cancer if possible  -------------------------------------------------------------------------------------------------------------------    HPI:  This is an 44-year-old female with a history of CKD, CAD, CHF, hypertension, rheumatoid arthritis, and arthritis who presented to the hospital secondary to abdominal pain  She reports she has been having this pain for couple weeks and it is radiating sometimes into her back and also down into her right hip  She had associated lack of appetite and nausea with this  She denies any vomiting with this  Denies any changes to her bowels  Denies any vomiting of blood, blood in the stool, or black tarry stools  Reports that she has lost about 40 lb in the last 4 years since her    She is currently on Xarelto for history of stroke  She denies ever smoking cigarettes  Denies any family history  REVIEW OF SYSTEMS:    CONSTITUTIONAL: Denies any fever, chills, or rigors  Decreased appetite, and recent weight loss  HEENT: No earache or tinnitus  Denies hearing loss or visual disturbances  CARDIOVASCULAR: No chest pain or palpitations  RESPIRATORY: Denies any cough, hemoptysis, shortness of breath or dyspnea on exertion  GASTROINTESTINAL: As noted in the History of Present Illness  GENITOURINARY: No problems with urination  Denies any hematuria or dysuria  NEUROLOGIC: No dizziness or vertigo, denies headaches  MUSCULOSKELETAL: Denies any muscle or joint pain  + right hip pain  SKIN: Denies skin rashes or itching  ENDOCRINE: Denies excessive thirst  Denies intolerance to heat or cold  PSYCHOSOCIAL: Denies depression or anxiety  Denies any recent memory loss         Historical Information   Past Medical History: Diagnosis Date    Anemia     Arthritis     Bladder calculi     CAD (coronary artery disease)     Status post CABG and PTCA to OM1    CKD (chronic kidney disease) stage 3, GFR 30-59 ml/min (HCC)     CKD [facility]ch[facility]), stage III ([facility])     Diastolic congestive heart failure ([facility])     Hypertension     Hypothyroidism     Rheumatoid arthritis ([address] )     Uterine prolapse      Past Surgical History:   Procedure Laterality Date    BACK SURGERY      BLADDER SURGERY      CORONARY ANGIOPLASTY WITH STENT PLACEMENT      CORONARY ARTERY BYPASS GRAFT      EYE SURGERY Right 2020    OOPHORECTOMY Left     MS PARTIAL HIP REPLACEMENT Right 2018    Procedure: HEMIARTHROPLASTY HIP (BIPOLAR) (RIGHT);   Surgeon: Alvin Collado MD;  Location: [facility];  Service: Orthopedics    THYROIDECTOMY, PARTIAL       Social History   Social History     Substance and Sexual Activity   Alcohol Use Never    Frequency: Never     Social History     Substance and Sexual Activity   Drug Use No     Social History     Tobacco Use   Smoking Status Never Smoker   Smokeless Tobacco Never Used     Family History   Problem Relation Age of Onset    Heart attack Mother     Heart attack Father     Stroke Sister     Heart attack Brother         3 brothers all        Meds/Allergies     Medications Prior to Admission   Medication    amLODIPine (NORVASC) 5 mg tablet    apixaban (ELIQUIS) 5 mg    Ascorbic Acid (VITAMIN C) 1000 MG tablet    aspirin (ECOTRIN LOW STRENGTH) 81 mg EC tablet    Coenzyme Q10 10 MG capsule    ferrous sulfate 325 (65 Fe) mg tablet    hydroxychloroquine (PLAQUENIL) 200 mg tablet    ketorolac (ACULAR) 0 5 % ophthalmic solution    levothyroxine 50 mcg tablet    losartan (COZAAR) 50 mg tablet    metoprolol succinate (TOPROL-XL) 100 mg 24 hr tablet    Multiple Vitamins-Minerals (CENTRUM SILVER PO)    spironolactone (ALDACTONE) 25 mg tablet    TOVIAZ 4 MG TB24    VASCEPA 1 g CAPS    Vitamin D, Cholecalciferol, 1000 units CAPS    nitroglycerin (Nitrolingual) 0 4 mg/spray spray     Current Facility-Administered Medications   Medication Dose Route Frequency    acetaminophen (TYLENOL) tablet 650 mg  650 mg Oral Q6H PRN    aspirin chewable tablet 81 mg  81 mg Oral Daily    enoxaparin (LOVENOX) subcutaneous injection 60 mg  1 mg/kg Subcutaneous Q12H [facility]    fish oil capsule 1,000 mg  1,000 mg Oral BID    HYDROmorphone (DILAUDID) injection 0 2 mg  0 2 mg Intravenous Q6H PRN    hydroxychloroquine (PLAQUENIL) tablet 200 mg  200 mg Oral Daily    ketorolac (ACULAR) 0 5 % ophthalmic solution 1 drop  1 drop Right Eye BID    lactated ringers infusion  75 mL/hr Intravenous Continuous    levothyroxine tablet 50 mcg  50 mcg Oral Daily    losartan (COZAAR) tablet 25 mg  25 mg Oral Daily    metoprolol succinate (TOPROL-XL) 24 hr tablet 100 mg  100 mg Oral Daily    ondansetron (ZOFRAN) injection 4 mg  4 mg Intravenous Q6H PRN    oxybutynin (DITROPAN-XL) 24 hr tablet 5 mg  5 mg Oral Daily    pantoprazole (PROTONIX) EC tablet 40 mg  40 mg Oral Early Morning    polyethylene glycol (MIRALAX) packet 17 g  17 g Oral Daily PRN       Allergies   Allergen Reactions    Atorvastatin Hives    Boniva [Ibandronic Acid] Hives    Codeine      codeine derivatives - tolerates IV Dilaudid    Statins Hives    Zetia [Ezetimibe]            Objective     Blood pressure 145/63, pulse 71, temperature 97 5 °F (36 4 °C), temperature source Oral, resp  rate 16, weight 62 2 kg (137 lb 2 oz), SpO2 100 %, not currently breastfeeding        Intake/Output Summary (Last 24 hours) at 7/4/2020 1423  Last data filed at 7/4/2020 1304  Gross per 24 hour   Intake 1000 ml   Output 800 ml   Net 200 ml         PHYSICAL EXAM:      General Appearance:   Alert, cooperative, no distress, appears stated age    HEENT:   Normocephalic, atraumatic, anicteric, no oropharyngeal thrush present      Neck:  Supple, symmetrical, trachea midline, no adenopathy;    thyroid: no enlargement/tenderness/nodules; no carotid  bruit or JVD    Lungs:   Clear to auscultation bilaterally; no rales, rhonchi or wheezing; respirations unlabored    Heart[de-identified]   S1 and S2 normal; regular rate and rhythm; no murmur, rub, or gallop  Abdomen:   Soft, mild upper abdominal discomfort to palpation, non-distended; normal bowel sounds; no masses, no organomegaly    Genitalia:   Deferred    Rectal:   Deferred    Extremities:  No cyanosis, clubbing or edema    Pulses:  2+ and symmetric all extremities    Skin:  Skin color, texture, turgor normal, no rashes or lesions    Lymph nodes:  No palpable cervical, axillary or inguinal lymphadenopathy        Lab Results:   Results from last 7 days   Lab Units 07/04/20  0449   WBC Thousand/uL 9 54   HEMOGLOBIN g/dL 7 4*   HEMATOCRIT % 24 1*   PLATELETS Thousands/uL 273   NEUTROS PCT % 71   LYMPHS PCT % 17   MONOS PCT % 8   EOS PCT % 2     Results from last 7 days   Lab Units 07/04/20  0449   POTASSIUM mmol/L 4 2   CHLORIDE mmol/L 99*   CO2 mmol/L 22   BUN mg/dL 19   CREATININE mg/dL 1 09   CALCIUM mg/dL 8 0*   ALK PHOS U/L 67   ALT U/L 22   AST U/L 21     Results from last 7 days   Lab Units 07/03/20  0811   INR  1 38*     Results from last 7 days   Lab Units 07/04/20  0449   LIPASE u/L 1,684*       Imaging Studies: I have personally reviewed pertinent imaging studies  Xr Chest 1 View    Result Date: 7/3/2020  Impression: No acute cardiopulmonary disease  Workstation performed: [id number]     Ct Abdomen Pelvis With Contrast    Result Date: 7/3/2020  Impression: 1  Interim development of large pancreatic head mass concerning for pancreatic carcinoma  There is mass effect upon the superior mesenteric vein with possible gland and/or tumor thrombus within it  Splenic, superior mesenteric, and portal venous system remain patent, however  Probable periportal adenopathy  No definite biliary obstruction appreciated   2   Nonspecific subcentimeter low-attenuation hepatic lesions as noted  Metastasis not excluded  3   Gallstones  4   Nonspecific 9 mm splenic low-attenuation noncystic lesion  5   Additional findings as noted  I personally discussed this study with Fatuma Fish on 7/3/2020 at 10:16 AM   Workstation performed: [id number]           Patient was seen and examined by Dr Frantz Abbott  All almanza medical decisions were made by Dr Frantz Abbott  Thank you for allowing us to participate in the care of this present patient  We will follow-up with you closely

## 2020-07-04 NOTE — ASSESSMENT & PLAN NOTE
Patient presented with bandlike upper abdominal discomfort gradually worsening over last 2-2 5 weeks  CT scan revealed large pancreatic head mass concerning for pancreatic carcinoma with mass effect on superior mesenteric vein with possible tumor extension with tumor extension in portal vein  Probable periportal adenopathy  Nonspecific subcentimeter hepatic lesions  Likely malignancy, results were discussed with patient  MRI abdomen - Complex somewhat heterogeneous pancreatic head mass measuring approximately 5 7 x 4 7 x 7 7 cm, suspicious for pancreatic adenocarcinoma  Small focus of tumor thrombus at the portal confluence extending into the SMV  · CA 19 -9 normal  · GI evaluation appreciated     · Will eventually need EUS with biopsy

## 2020-07-04 NOTE — ASSESSMENT & PLAN NOTE
Patient reports low blood pressure recently with systolic in 099E, also reports symptoms of lightheadedness  Blood pressure remains soft  · Continue metoprolol  · Decreased losartan to 25 mg, hold for systolic less than 150  · Hold Norvasc at present  · Also holding spironolactone, resume in a m   · Monitor

## 2020-07-05 LAB
ALBUMIN SERPL BCP-MCNC: 3 G/DL (ref 3.5–5)
ALP SERPL-CCNC: 66 U/L (ref 46–116)
ALT SERPL W P-5'-P-CCNC: 22 U/L (ref 12–78)
ANION GAP SERPL CALCULATED.3IONS-SCNC: 6 MMOL/L (ref 4–13)
AST SERPL W P-5'-P-CCNC: 24 U/L (ref 5–45)
ATRIAL RATE: 357 BPM
BILIRUB DIRECT SERPL-MCNC: 0.16 MG/DL (ref 0–0.2)
BILIRUB SERPL-MCNC: 0.5 MG/DL (ref 0.2–1)
BUN SERPL-MCNC: 14 MG/DL (ref 5–25)
CALCIUM SERPL-MCNC: 8 MG/DL (ref 8.3–10.1)
CHLORIDE SERPL-SCNC: 98 MMOL/L (ref 100–108)
CO2 SERPL-SCNC: 26 MMOL/L (ref 21–32)
CREAT SERPL-MCNC: 0.89 MG/DL (ref 0.6–1.3)
ERYTHROCYTE [DISTWIDTH] IN BLOOD BY AUTOMATED COUNT: 15.1 % (ref 11.6–15.1)
GFR SERPL CREATININE-BSD FRML MDRD: 61 ML/MIN/1.73SQ M
GLUCOSE SERPL-MCNC: 109 MG/DL (ref 65–140)
HCT VFR BLD AUTO: 23.6 % (ref 34.8–46.1)
HCT VFR BLD AUTO: 24.4 % (ref 34.8–46.1)
HCT VFR BLD AUTO: 24.9 % (ref 34.8–46.1)
HGB BLD-MCNC: 7.5 G/DL (ref 11.5–15.4)
HGB BLD-MCNC: 7.7 G/DL (ref 11.5–15.4)
HGB BLD-MCNC: 8 G/DL (ref 11.5–15.4)
LIPASE SERPL-CCNC: 1715 U/L (ref 73–393)
MCH RBC QN AUTO: 26.5 PG (ref 26.8–34.3)
MCHC RBC AUTO-ENTMCNC: 31.6 G/DL (ref 31.4–37.4)
MCV RBC AUTO: 84 FL (ref 82–98)
PLATELET # BLD AUTO: 264 THOUSANDS/UL (ref 149–390)
PMV BLD AUTO: 9.3 FL (ref 8.9–12.7)
POTASSIUM SERPL-SCNC: 3.9 MMOL/L (ref 3.5–5.3)
PROT SERPL-MCNC: 5.7 G/DL (ref 6.4–8.2)
QRS AXIS: 69 DEGREES
QRSD INTERVAL: 96 MS
QT INTERVAL: 418 MS
QTC INTERVAL: 463 MS
RBC # BLD AUTO: 2.91 MILLION/UL (ref 3.81–5.12)
SODIUM SERPL-SCNC: 130 MMOL/L (ref 136–145)
T WAVE AXIS: 41 DEGREES
VENTRICULAR RATE: 74 BPM
WBC # BLD AUTO: 9.71 THOUSAND/UL (ref 4.31–10.16)

## 2020-07-05 PROCEDURE — 85014 HEMATOCRIT: CPT | Performed by: NURSE PRACTITIONER

## 2020-07-05 PROCEDURE — 84300 ASSAY OF URINE SODIUM: CPT | Performed by: INTERNAL MEDICINE

## 2020-07-05 PROCEDURE — 97166 OT EVAL MOD COMPLEX 45 MIN: CPT

## 2020-07-05 PROCEDURE — 93010 ELECTROCARDIOGRAM REPORT: CPT | Performed by: INTERNAL MEDICINE

## 2020-07-05 PROCEDURE — 85018 HEMOGLOBIN: CPT | Performed by: INTERNAL MEDICINE

## 2020-07-05 PROCEDURE — 80048 BASIC METABOLIC PNL TOTAL CA: CPT | Performed by: INTERNAL MEDICINE

## 2020-07-05 PROCEDURE — 85014 HEMATOCRIT: CPT | Performed by: INTERNAL MEDICINE

## 2020-07-05 PROCEDURE — 85027 COMPLETE CBC AUTOMATED: CPT | Performed by: INTERNAL MEDICINE

## 2020-07-05 PROCEDURE — 85018 HEMOGLOBIN: CPT | Performed by: NURSE PRACTITIONER

## 2020-07-05 PROCEDURE — 99232 SBSQ HOSP IP/OBS MODERATE 35: CPT | Performed by: INTERNAL MEDICINE

## 2020-07-05 PROCEDURE — 83690 ASSAY OF LIPASE: CPT | Performed by: INTERNAL MEDICINE

## 2020-07-05 PROCEDURE — 80076 HEPATIC FUNCTION PANEL: CPT | Performed by: INTERNAL MEDICINE

## 2020-07-05 RX ORDER — SODIUM CHLORIDE 1000 MG
1 TABLET, SOLUBLE MISCELLANEOUS
Status: DISCONTINUED | OUTPATIENT
Start: 2020-07-05 | End: 2020-07-06

## 2020-07-05 RX ORDER — HYDROMORPHONE HCL/PF 1 MG/ML
0.2 SYRINGE (ML) INJECTION EVERY 6 HOURS PRN
Status: DISCONTINUED | OUTPATIENT
Start: 2020-07-05 | End: 2020-07-10 | Stop reason: HOSPADM

## 2020-07-05 RX ORDER — OXYCODONE HYDROCHLORIDE 5 MG/1
2.5 TABLET ORAL EVERY 4 HOURS PRN
Status: DISCONTINUED | OUTPATIENT
Start: 2020-07-05 | End: 2020-07-10 | Stop reason: HOSPADM

## 2020-07-05 RX ADMIN — HYDROMORPHONE HYDROCHLORIDE 0.2 MG: 1 INJECTION, SOLUTION INTRAMUSCULAR; INTRAVENOUS; SUBCUTANEOUS at 14:06

## 2020-07-05 RX ADMIN — Medication 1000 MG: at 09:05

## 2020-07-05 RX ADMIN — Medication 1000 MG: at 17:12

## 2020-07-05 RX ADMIN — ASPIRIN 81 MG 81 MG: 81 TABLET ORAL at 09:06

## 2020-07-05 RX ADMIN — POLYETHYLENE GLYCOL 3350 17 G: 17 POWDER, FOR SOLUTION ORAL at 18:39

## 2020-07-05 RX ADMIN — Medication 1 G: at 14:07

## 2020-07-05 RX ADMIN — ENOXAPARIN SODIUM 60 MG: 60 INJECTION SUBCUTANEOUS at 09:05

## 2020-07-05 RX ADMIN — LOSARTAN POTASSIUM 25 MG: 25 TABLET, FILM COATED ORAL at 09:06

## 2020-07-05 RX ADMIN — OXYCODONE HYDROCHLORIDE 2.5 MG: 5 TABLET ORAL at 23:41

## 2020-07-05 RX ADMIN — PANTOPRAZOLE SODIUM 40 MG: 40 TABLET, DELAYED RELEASE ORAL at 06:33

## 2020-07-05 RX ADMIN — ENOXAPARIN SODIUM 60 MG: 60 INJECTION SUBCUTANEOUS at 22:01

## 2020-07-05 RX ADMIN — LEVOTHYROXINE SODIUM 50 MCG: 50 TABLET ORAL at 06:33

## 2020-07-05 RX ADMIN — HYDROMORPHONE HYDROCHLORIDE 0.2 MG: 1 INJECTION, SOLUTION INTRAMUSCULAR; INTRAVENOUS; SUBCUTANEOUS at 06:33

## 2020-07-05 RX ADMIN — ONDANSETRON 4 MG: 2 INJECTION INTRAMUSCULAR; INTRAVENOUS at 14:06

## 2020-07-05 RX ADMIN — HYDROXYCHLOROQUINE SULFATE 200 MG: 200 TABLET, FILM COATED ORAL at 09:06

## 2020-07-05 RX ADMIN — KETOROLAC TROMETHAMINE 1 DROP: 5 SOLUTION OPHTHALMIC at 09:06

## 2020-07-05 RX ADMIN — METOPROLOL SUCCINATE 100 MG: 100 TABLET, EXTENDED RELEASE ORAL at 09:05

## 2020-07-05 RX ADMIN — OXYBUTYNIN 5 MG: 5 TABLET, FILM COATED, EXTENDED RELEASE ORAL at 09:06

## 2020-07-05 RX ADMIN — Medication 1 G: at 17:12

## 2020-07-05 RX ADMIN — KETOROLAC TROMETHAMINE 1 DROP: 5 SOLUTION OPHTHALMIC at 20:37

## 2020-07-05 RX ADMIN — OXYCODONE HYDROCHLORIDE 2.5 MG: 5 TABLET ORAL at 18:38

## 2020-07-05 NOTE — OCCUPATIONAL THERAPY NOTE
OT EVALUATION       07/05/20 0900   Note Type   Note type Eval only   Restrictions/Precautions   Other Precautions Pain; Fall Risk   Pain Assessment   Pain Assessment Tool 0-10   Pain Score 5   Pain Location/Orientation Orientation: Right;Location: Hip;Location: Abdomen   Home Living   Type of Home House  (bi-level 6+7 JANICE)   Home Layout Multi-level; Able to live on main level with bedroom/bathroom   Bathroom Shower/Tub Walk-in shower   Bathroom Equipment Shower chair;Grab bars in [facility] Cane;Reacher;Sock aid  (uses reacher at times )   Prior Function   Level of Beals Independent with ADLs and functional mobility   Lives With Alone   Receives Help From Family   ADL Assistance Independent   IADLs Independent   Comments does not drive    ADL   Eating Assistance 7  Independent   Grooming Assistance 5  Supervision/Setup   UB Bathing Assistance 5  Supervision/Setup   LB Bathing Assistance 5  Supervision/Setup   UB Dressing Assistance 5  Supervision/Setup   LB Dressing Assistance 5  Supervision/Setup   Toileting Assistance  5  Supervision/Setup   Functional Assistance 5  Supervision/Setup   Transfers   Sit to Stand 5  Supervision   Stand to Sit 5  Supervision   Functional Mobility   Functional Mobility 5  Supervision   Additional Comments 20 feet   Additional items Rolling walker   Balance   Static Sitting Fair +   Dynamic Sitting Fair   Static Standing Fair   Dynamic Standing Fair   Activity Tolerance   Activity Tolerance Patient limited by fatigue;Patient limited by pain   RUE Assessment   RUE Assessment WFL   LUE Assessment   LUE Assessment WFL   Cognition   Overall Cognitive Status WFL   Arousal/Participation Cooperative   Attention Within functional limits   Orientation Level Oriented X4   Following Commands Follows all commands and directions without difficulty   Assessment   Limitation Decreased ADL status; Decreased Safe judgement during ADL;Decreased UE strength;Decreased endurance;Decreased self-care trans;Decreased high-level ADLs  (decreased balance and mobility )   Prognosis Good   Assessment Patient evaluated by Occupational Therapy  Patient admitted with Pancreatic mass  The patients occupational profile, medical and therapy history includes a extensive additional review of physical, cognitive, or psychosocial history related to current functional performance  Comorbidities affecting functional mobility and ADLS include: HLD, CHF, CAD, anemia, HTN, A-Fib, CVA, hyponatremia, CKD3, CABG x 4   Prior to admission, patient was independent with functional mobility with cane at times, independent with ADLS and independent with IADLS  The evaluation identifies the following performance deficits: weakness, impaired balance, decreased endurance, increased fall risk, new onset of impairment of functional mobility, decreased ADLS, decreased IADLS, pain, decreased activity tolerance, decreased safety awareness and decreased strength, that result in activity limitations and/or participation restrictions  This evaluation requires clinical decision making of moderate complexity, because the patient may present with comorbidities that affect occupational performance and required minimal or moderate modification of tasks or assistance with the consideration of several treatment options  The Barthel Index was used as a functional outcome tool presenting with a score of 65, indicating moderate limitations of functional mobility and ADLS  Patient will benefit from skilled Occupational Therapy services to address above deficits and facilitate a safe return to prior level of function     Goals   Patient Goals go home   STG Time Frame   (1-7 days)   Short Term Goal  Goals established to promote patient goal of going home:  Patient will increase standing tolerance to 3 minutes during ADL task to decrease assistance level and decrease fall risk;  Patient will increase functional mobility to and from bathroom with rolling walker independently to increase performance with ADLS and to use a toilet; Patient will tolerate 10 minutes of UE ROM/strengthening to increase general activity tolerance and performance in ADLS/IADLS; Patient will improve functional activity tolerance to 10 minutes of sustained functional tasks to increase participation in basic self-care and decrease assistance level;   Patient will increase dynamic standing balance to fair+ to improve postural stability and decrease fall risk during standing ADLS and transfers  LTG Time Frame   (8-14 days)   Long Term Goal Goals established to promote patient goal of going home:  Patient will increase standing tolerance to 6 minutes during ADL task to decrease assistance level and decrease fall risk;  Patient will tolerate 20 minutes of UE ROM/strengthening to increase general activity tolerance and performance in ADLS/IADLS; Patient will improve functional activity tolerance to 20 minutes of sustained functional tasks to increase participation in basic self-care and decrease assistance level;   Patient will increase dynamic standing balance to good to improve postural stability and decrease fall risk during standing ADLS and transfers  Functional Transfer Goals   Pt Will Perform All Functional Transfers   (STG independent )   ADL Goals   Pt Will Perform Grooming Standing at sink  (STG independent )   Pt Will Perform Bathing   (LTG independent )   Pt Will Perform LE Dressing   (STG independent )   Pt Will Perform Toileting   (STG independent )   Plan   Treatment Interventions ADL retraining;Functional transfer training;UE strengthening/ROM; Endurance training;Patient/family training;Equipment evaluation/education; Activityengagement; Compensatory technique education   Goal Expiration Date 07/19/20   OT Frequency 3-5x/wk   Recommendation   OT Discharge Recommendation Home with skilled therapy   Barthel Index   Feeding 10   Bathing 0   Grooming Score 0   Dressing Score 5   Bladder Score 10   Bowels Score 10   Toilet Use Score 5   Transfers (Bed/Chair) Score 10   Mobility (Level Surface) Score 10   Stairs Score 5   Barthel Index Score 72   Licensure   [place] License Number  Mago Garcia MS OTR/L [id number]

## 2020-07-05 NOTE — PROGRESS NOTES
[facility] Progress Note  Patient: Marv Regalado 79 y o  female   MRN: [id number]  PCP: No primary care provider on file  Unit/Bed#: [address] [address] Encounter: [id number]  Date Of Visit: 07/05/20    Problem List:    Principal Problem:    Pancreatic mass  Active Problems:    Acute pancreatitis    Chronic systolic congestive heart failure (HCC)    Anemia    Other persistent atrial fibrillation (HCC)    CVA (cerebral vascular accident) ([address] )    Right hip pain    CAD (coronary artery disease)    Leukocytosis    Hypertension    CKD (chronic kidney disease) stage 3, GFR 30-59 ml/min (HCC)    Hyponatremia    Hyperlipidemia    Hypothyroid    Urinary dysfunction      Assessment & Plan:    Acute pancreatitis  Assessment & Plan  Likely secondary to above  Lipase level was 3009 at the time of admission  CT scan revealed gallstone but no biliary ductal dilatation, LFT normal  Triglyceride within normal limit  Tolerating clear liquid diet, reports intermittent abdominal discomfort  Lipase is down trended, stable today without improvement  · Clear liquid diet  · IV fluids with LR at 75 cc/hour, closely monitor volume status given systolic CHF  · Follow-up labs  · Follow-up MRI  · Pain control  · Monitor abdominal exam  · GI following, input appreciated    * Pancreatic mass  Assessment & Plan  Patient presented with bandlike upper abdominal discomfort gradually worsening over last 2-2 5 weeks  CT scan revealed large pancreatic head mass concerning for pancreatic carcinoma with mass effect on superior mesenteric vein with possible tumor extension with tumor extension in portal vein  Probable periportal adenopathy  Nonspecific subcentimeter hepatic lesions  Likely malignancy, results were discussed with patient  · Follow-up CA 19 9  · GI evaluation appreciated, MRI/MRCP in a m    · Will eventually need EUS with biopsy    CVA (cerebral vascular accident) [facility])  Assessment & Plan  Recently hospitalized in May 2020 with left MCA CVA involving left frontal cortex  Continue aspirin  Hold Eliquis, transition to Lovenox at present    Other persistent atrial fibrillation [facility])  Assessment & Plan  Controlled  Continue metoprolol  Hold Eliquis, transition to Lovenox while in hospital      Anemia  Assessment & Plan  Patient's hemoglobin noted to be 8 grams/deciliter compared to 12 5 grams/deciliter last month after patient was started on anticoagulant  Hemoglobin slightly lower at 7 4 grams/deciliter today without any evidence of overt bleeding, possibly dilution, remains stable  Normocytic  · Iron study noted, iron saturation 11%  B12 and folate elevated  · Follow-up stool studies  · Monitor hemoglobin closely on anticoagulation  · Patient is at high risk of thromboembolism due to persistent AFib, recent CVA and now underlying malignancy  Hemoglobin has decreased on anticoagulation but patient does not report any overt bleeding  Chronic systolic congestive heart failure (HCC)  Assessment & Plan  Wt Readings from Last 3 Encounters:   07/05/20 62 7 kg (138 lb 3 7 oz)   06/12/20 63 5 kg (140 lb)   06/05/20 63 9 kg (140 lb 14 oz)     Weight is lower than prior  Appears euvolemic  Chest x-ray without any acute abnormality  Monitor volume status closely on IV fluids  Monitor daily weight, intake output  Holding spironolactone for now    Hyponatremia  Assessment & Plan  Mild, likely secondary to poor solute intake,? 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  No improvement with IV fluids  · Add salt tablet  · Monitor    CKD (chronic kidney disease) stage 3, GFR 30-59 ml/min ([facility])  Assessment & Plan  Baseline creatinine appears to be 0 9-1 2, lower than prior?   Decreasing muscle mass  Currently at baseline  Monitor    Hypertension  Assessment & Plan  Patient reports low blood pressure recently with systolic in 942M, also reports symptoms of lightheadedness  · Continue metoprolol  · Decreased losartan to 25 mg, hold for systolic less than 130  · Hold Norvasc at present  · Also holding spironolactone  · Monitor    Leukocytosis  Assessment & Plan  Mild likely reactive to pancreatic mass/pancreatitis  Improved    CAD (coronary artery disease)  Assessment & Plan  Status post CABG and subsequent PCI x2  Continue aspirin, metoprolol    Right hip pain  Assessment & Plan  Status post right hip replacement in January 2018  Patient is reporting ongoing right hip discomfort, over loss few which  Right hip and prosthesis appears unremarkable on the CT scan  Exam is benign  · Continue pain control, trial of local heat  · Follow-up x-ray  · PT/OT    Urinary dysfunction  Assessment & Plan  On toviaz, will substitute with oxybutynin    Hypothyroid  Assessment & Plan  Continue Synthroid    Hyperlipidemia  Assessment & Plan  Intolerant to statin and Zetia  On Vascepa at home, substitute with facial  Outpatient awaiting P CS K 9 inhibitor        VTE Pharmacologic Prophylaxis:   Pharmacologic: Enoxaparin (Lovenox)  Mechanical VTE Prophylaxis in Place: Yes    Patient Centered Rounds: I have performed bedside rounds with nursing staff today  Discussions with Specialists or Other Care Team Provider:  Gastroenterology    Education and Discussions with Family / Patient:  Daughter over the phone    Time Spent for Care: 45 minutes  More than 50% of total time spent on counseling and coordination of care as described above      Current Length of Stay: 2 day(s)    Current Patient Status: Inpatient   Certification Statement: The patient will continue to require additional inpatient hospital stay due to Treatment for pancreatitis and pancreatic mass, evaluation of anemia    Discharge Plan:  Home when clinically improved    Code Status: Level 1 - Full Code      Subjective:   Remains with intermittent abdominal discomfort but denies worsening with clear liquid  Right hip dull ache persists, denies difficulty in ambulation  Denies any chest pain shortness of breath or dizziness    Denies any bowel movement or overt bleeding    Objective:     Vitals:   Temp (24hrs), Av 1 °F (36 7 °C), Min:97 8 °F (36 6 °C), Max:98 7 °F (37 1 °C)    Temp:  [97 8 °F (36 6 °C)-98 7 °F (37 1 °C)] 98 °F (36 7 °C)  HR:  [62-83] 83  Resp:  [18-20] 19  BP: (103-142)/(58-66) 103/59  SpO2:  [97 %-100 %] 98 %  Body mass index is 23 73 kg/m²  Input and Output Summary (last 24 hours): Intake/Output Summary (Last 24 hours) at 2020 1049  Last data filed at 2020 1798  Gross per 24 hour   Intake 480 ml   Output 2500 ml   Net -2020 ml       Physical Exam:     Physical Exam   Constitutional: She is oriented to person, place, and time  She appears well-developed  No distress  HENT:   Head: Normocephalic and atraumatic  Eyes: Pupils are equal, round, and reactive to light  Neck: Neck supple  Cardiovascular: Normal rate and regular rhythm  Pulmonary/Chest: Effort normal and breath sounds normal  No respiratory distress  She has no wheezes  She has no rales  Abdominal: Soft  Bowel sounds are normal  She exhibits mass  She exhibits no distension  There is no tenderness  There is no rebound and no guarding  Musculoskeletal: Normal range of motion  She exhibits no edema  Neurological: She is alert and oriented to person, place, and time  No cranial nerve deficit  Skin: Skin is warm and dry  No rash noted  Psychiatric: She has a normal mood and affect  Additional Data:     Labs:    Results from last 7 days   Lab Units 2033  20  0449   WBC Thousand/uL 9 71  --  9 54   HEMOGLOBIN g/dL 7 7*   < > 7 4*   HEMATOCRIT % 24 4*   < > 24 1*   PLATELETS Thousands/uL 264  --  273   NEUTROS PCT %  --   --  71   LYMPHS PCT %  --   --  17   MONOS PCT %  --   --  8   EOS PCT %  --   --  2    < > = values in this interval not displayed       Results from last 7 days   Lab Units 20  0533 20  0449   POTASSIUM mmol/L 3 9 4 2   CHLORIDE mmol/L 98* 99*   CO2 mmol/L 26 22 BUN mg/dL 14 19   CREATININE mg/dL 0 89 1 09   CALCIUM mg/dL 8 0* 8 0*   ALK PHOS U/L  --  67   ALT U/L  --  22   AST U/L  --  21     Results from last 7 days   Lab Units 07/03/20  0811   INR  1 38*       * I Have Reviewed All Lab Data Listed Above  * Additional Pertinent Lab Tests Reviewed: All Labs Within Last 24 Hours Reviewed      Imaging:  Imaging Reports Reviewed Today Include:  CT scan    Recent Cultures (last 7 days):           Last 24 Hours Medication List:     Current Facility-Administered Medications:  acetaminophen 650 mg Oral Q6H PRN Lilibeth Sterling MD    aspirin 81 mg Oral Daily Lilibeth Sterling MD    enoxaparin 1 mg/kg Subcutaneous Q12H Alan Rene MD    fish oil 1,000 mg Oral BID Lilibeth Sterling MD    HYDROmorphone 0 2 mg Intravenous Q6H PRN Lilibeth Sterling MD    hydroxychloroquine 200 mg Oral Daily Lilibeth Sterling MD    ketorolac 1 drop Right Eye BID Lilibeth Sterling MD    lactated ringers 75 mL/hr Intravenous Continuous Lilibeth Sterling MD Last Rate: 75 mL/hr (07/04/20 1704)   levothyroxine 50 mcg Oral Daily Lilibeth Sterling MD    losartan 25 mg Oral Daily Lilibeth Sterling MD    metoprolol succinate 100 mg Oral Daily Lilibeth Sterling MD    ondansetron 4 mg Intravenous Q6H PRN Lilibeth Sterling MD    oxybutynin 5 mg Oral Daily Lilibeth Sterling MD    oxyCODONE 2 5 mg Oral Q4H PRN Lilibeth Sterling MD    pantoprazole 40 mg Oral Early Morning Lilibeth Sterling MD    polyethylene glycol 17 g Oral Daily PRN Lilibeth Sterling MD    sodium chloride 1 g Oral TID With Jasmin Morgan MD           Today, Patient Was Seen By: Lilibeth Sterling MD    ** Please Note: "This note has been constructed using a voice recognition system  Therefore there may be syntax, spelling, and/or grammatical errors   Please call if you have any questions  "**

## 2020-07-05 NOTE — PLAN OF CARE
Problem: Potential for Falls  Goal: Patient will remain free of falls  Description  INTERVENTIONS:  - Assess patient frequently for physical needs  -  Identify cognitive and physical deficits and behaviors that affect risk of falls    -  [place] fall precautions as indicated by assessment   - Educate patient/family on patient safety including physical limitations  - Instruct patient to call for assistance with activity based on assessment  - Modify environment to reduce risk of injury  - Consider OT/PT consult to assist with strengthening/mobility  Outcome: Progressing     Problem: PAIN - ADULT  Goal: Verbalizes/displays adequate comfort level or baseline comfort level  Description  Interventions:  - Encourage patient to monitor pain and request assistance  - Assess pain using appropriate pain scale  - Administer analgesics based on type and severity of pain and evaluate response  - Implement non-pharmacological measures as appropriate and evaluate response  - Consider cultural and social influences on pain and pain management  - Notify physician/advanced practitioner if interventions unsuccessful or patient reports new pain  Outcome: Progressing     Problem: INFECTION - ADULT  Goal: Absence or prevention of progression during hospitalization  Description  INTERVENTIONS:  - Assess and monitor for signs and symptoms of infection  - Monitor lab/diagnostic results  - Monitor all insertion sites, i e  indwelling lines, tubes, and drains  - Monitor endotracheal if appropriate and nasal secretions for changes in amount and color  - [place] appropriate cooling/warming therapies per order  - Administer medications as ordered  - Instruct and encourage patient and family to use good hand hygiene technique  - Identify and instruct in appropriate isolation precautions for identified infection/condition  Outcome: Progressing     Problem: SAFETY ADULT  Goal: Patient will remain free of falls  Description  INTERVENTIONS:  - Assess patient frequently for physical needs  -  Identify cognitive and physical deficits and behaviors that affect risk of falls  -  [place] fall precautions as indicated by assessment   - Educate patient/family on patient safety including physical limitations  - Instruct patient to call for assistance with activity based on assessment  - Modify environment to reduce risk of injury  - Consider OT/PT consult to assist with strengthening/mobility  Outcome: Progressing     Problem: DISCHARGE PLANNING  Goal: Discharge to home or other facility with appropriate resources  Description  INTERVENTIONS:  - Identify barriers to discharge w/patient and caregiver  - Arrange for needed discharge resources and transportation as appropriate  - Identify discharge learning needs (meds, wound care, etc )  - Arrange for interpretive services to assist at discharge as needed  - Refer to Case Management Department for coordinating discharge planning if the patient needs post-hospital services based on physician/advanced practitioner order or complex needs related to functional status, cognitive ability, or social support system  Outcome: Progressing     Problem: Knowledge Deficit  Goal: Patient/family/caregiver demonstrates understanding of disease process, treatment plan, medications, and discharge instructions  Description  Complete learning assessment and assess knowledge base    Interventions:  - Provide teaching at level of understanding  - Provide teaching via preferred learning methods  Outcome: Progressing

## 2020-07-05 NOTE — TELEPHONE ENCOUNTER
If she is getting it with both Xarelto and Eliquis, we can try Pradaxa  Is there any other reason she could be feeling this way?

## 2020-07-05 NOTE — PROGRESS NOTES
Progress Note - Laray Route 79 y o  female MRN: [id number]    Unit/Bed#: [address] [place] [address] Encounter: [id number]        Subjective:     Patient is complaining about right hip pain in the back  Still with some discomfort in the abdomen  Denies any nausea or vomiting  Objective:     Vitals: Blood pressure 142/66, pulse 62, temperature 97 9 °F (36 6 °C), resp  rate 19, height 5' 4" (1 626 m), weight 62 7 kg (138 lb 3 7 oz), SpO2 97 %, not currently breastfeeding  ,Body mass index is 23 73 kg/m²  Intake/Output Summary (Last 24 hours) at 7/5/2020 0803  Last data filed at 7/5/2020 9060  Gross per 24 hour   Intake 480 ml   Output 2500 ml   Net -2020 ml       Physical Exam:     General Appearance: Alert, appears stated age and cooperative  Lungs: Clear to auscultation bilaterally, no rales or rhonchi, no labored breathing/accessory muscle use  Heart: Regular rate and rhythm, S1, S2 normal, no murmur, click, rub or gallop  Abdomen: Soft, mild tenderness, non-distended; bowel sounds normal; no masses or no organomegaly  Extremities: No cyanosis, edema    Invasive Devices     Peripheral Intravenous Line            Peripheral IV 07/05/20 Dorsal (posterior); Left Hand less than 1 day                Lab Results:    Results from last 7 days   Lab Units 07/05/20 0533  07/04/20  0449   WBC Thousand/uL 9 71  --  9 54   HEMOGLOBIN g/dL 7 7*   < > 7 4*   HEMATOCRIT % 24 4*   < > 24 1*   PLATELETS Thousands/uL 264  --  273   NEUTROS PCT %  --   --  71   LYMPHS PCT %  --   --  17   MONOS PCT %  --   --  8   EOS PCT %  --   --  2    < > = values in this interval not displayed       Results from last 7 days   Lab Units 07/05/20  0533 07/04/20  0449   POTASSIUM mmol/L 3 9 4 2   CHLORIDE mmol/L 98* 99*   CO2 mmol/L 26 22   BUN mg/dL 14 19   CREATININE mg/dL 0 89 1 09   CALCIUM mg/dL 8 0* 8 0*   ALK PHOS U/L  --  67   ALT U/L  --  22   AST U/L  --  21     Results from last 7 days   Lab Units 07/03/20  0811   INR  1 38*     Results from last 7 days   Lab Units 07/05/20  0533   LIPASE u/L 1,715*       Imaging Studies: I have personally reviewed pertinent imaging studies  Xr Chest 1 View    Result Date: 7/3/2020  Impression: No acute cardiopulmonary disease  Workstation performed: [id number]     Ct Abdomen Pelvis With Contrast    Result Date: 7/3/2020  Impression: 1  Interim development of large pancreatic head mass concerning for pancreatic carcinoma  There is mass effect upon the superior mesenteric vein with possible gland and/or tumor thrombus within it  Splenic, superior mesenteric, and portal venous system remain patent, however  Probable periportal adenopathy  No definite biliary obstruction appreciated  2   Nonspecific subcentimeter low-attenuation hepatic lesions as noted  Metastasis not excluded  3   Gallstones  4   Nonspecific 9 mm splenic low-attenuation noncystic lesion  5   Additional findings as noted  I personally discussed this study with Thomas Ricci on 7/3/2020 at 10:16 AM   Workstation performed: [id number]       ASSESMENT/ PLAN:   Principal Problem:    Pancreatic mass  Active Problems:    Hyperlipidemia    Hypothyroid    Right hip pain    Chronic systolic congestive heart failure (HCC)    CAD (coronary artery disease)    Anemia    Leukocytosis    Hypertension    CKD (chronic kidney disease) stage 3, GFR 30-59 ml/min (HCC)    Urinary dysfunction    Other persistent atrial fibrillation (HCC)    CVA (cerebral vascular accident) ([address] )    Hyponatremia    Acute pancreatitis      1  Pancreatic head mass with elevated lipase-no significant evidence of acute pancreatitis on CT scan     -keep her on clear liquids and IV hydration for now    -check MRI/MRCP    -CA 19-9    -outpatient EUS with biopsy    We need to get the clearance from cardiologist and neurologist regarding holding anticoagulation therapy

## 2020-07-06 ENCOUNTER — EPISODE CHANGES (OUTPATIENT)
Dept: CASE MANAGEMENT | Facility: OTHER | Age: 80
End: 2020-07-06

## 2020-07-06 PROBLEM — I25.10 CAD (CORONARY ARTERY DISEASE): Chronic | Status: RESOLVED | Noted: 2018-01-30 | Resolved: 2020-07-06

## 2020-07-06 PROBLEM — D72.829 LEUKOCYTOSIS: Status: RESOLVED | Noted: 2018-02-03 | Resolved: 2020-07-06

## 2020-07-06 LAB
ALBUMIN SERPL BCP-MCNC: 2.7 G/DL (ref 3.5–5)
ALP SERPL-CCNC: 57 U/L (ref 46–116)
ALT SERPL W P-5'-P-CCNC: 25 U/L (ref 12–78)
ANION GAP SERPL CALCULATED.3IONS-SCNC: 7 MMOL/L (ref 4–13)
AST SERPL W P-5'-P-CCNC: 26 U/L (ref 5–45)
BASOPHILS # BLD AUTO: 0.03 THOUSANDS/ΜL (ref 0–0.1)
BASOPHILS NFR BLD AUTO: 0 % (ref 0–1)
BILIRUB SERPL-MCNC: 0.4 MG/DL (ref 0.2–1)
BUN SERPL-MCNC: 13 MG/DL (ref 5–25)
CALCIUM SERPL-MCNC: 8.1 MG/DL (ref 8.3–10.1)
CHLORIDE SERPL-SCNC: 101 MMOL/L (ref 100–108)
CO2 SERPL-SCNC: 26 MMOL/L (ref 21–32)
CREAT SERPL-MCNC: 1.14 MG/DL (ref 0.6–1.3)
EOSINOPHIL # BLD AUTO: 0.19 THOUSAND/ΜL (ref 0–0.61)
EOSINOPHIL NFR BLD AUTO: 2 % (ref 0–6)
ERYTHROCYTE [DISTWIDTH] IN BLOOD BY AUTOMATED COUNT: 15.6 % (ref 11.6–15.1)
GFR SERPL CREATININE-BSD FRML MDRD: 45 ML/MIN/1.73SQ M
GLUCOSE SERPL-MCNC: 111 MG/DL (ref 65–140)
HCT VFR BLD AUTO: 22 % (ref 34.8–46.1)
HCT VFR BLD AUTO: 26.7 % (ref 34.8–46.1)
HGB BLD-MCNC: 7 G/DL (ref 11.5–15.4)
HGB BLD-MCNC: 8.5 G/DL (ref 11.5–15.4)
IMM GRANULOCYTES # BLD AUTO: 0.04 THOUSAND/UL (ref 0–0.2)
IMM GRANULOCYTES NFR BLD AUTO: 1 % (ref 0–2)
LIPASE SERPL-CCNC: 781 U/L (ref 73–393)
LYMPHOCYTES # BLD AUTO: 1.69 THOUSANDS/ΜL (ref 0.6–4.47)
LYMPHOCYTES NFR BLD AUTO: 20 % (ref 14–44)
MCH RBC QN AUTO: 26.9 PG (ref 26.8–34.3)
MCHC RBC AUTO-ENTMCNC: 31.8 G/DL (ref 31.4–37.4)
MCV RBC AUTO: 85 FL (ref 82–98)
MONOCYTES # BLD AUTO: 0.67 THOUSAND/ΜL (ref 0.17–1.22)
MONOCYTES NFR BLD AUTO: 8 % (ref 4–12)
NEUTROPHILS # BLD AUTO: 5.7 THOUSANDS/ΜL (ref 1.85–7.62)
NEUTS SEG NFR BLD AUTO: 69 % (ref 43–75)
NRBC BLD AUTO-RTO: 0 /100 WBCS
PLATELET # BLD AUTO: 233 THOUSANDS/UL (ref 149–390)
PMV BLD AUTO: 9.3 FL (ref 8.9–12.7)
POTASSIUM SERPL-SCNC: 3.6 MMOL/L (ref 3.5–5.3)
PROT SERPL-MCNC: 5.4 G/DL (ref 6.4–8.2)
RBC # BLD AUTO: 2.6 MILLION/UL (ref 3.81–5.12)
SODIUM 24H UR-SCNC: 27 MOL/L
SODIUM SERPL-SCNC: 134 MMOL/L (ref 136–145)
WBC # BLD AUTO: 8.32 THOUSAND/UL (ref 4.31–10.16)

## 2020-07-06 PROCEDURE — 30233N1 TRANSFUSION OF NONAUTOLOGOUS RED BLOOD CELLS INTO PERIPHERAL VEIN, PERCUTANEOUS APPROACH: ICD-10-PCS | Performed by: INTERNAL MEDICINE

## 2020-07-06 PROCEDURE — 99232 SBSQ HOSP IP/OBS MODERATE 35: CPT | Performed by: INTERNAL MEDICINE

## 2020-07-06 PROCEDURE — 80053 COMPREHEN METABOLIC PANEL: CPT | Performed by: INTERNAL MEDICINE

## 2020-07-06 PROCEDURE — 85025 COMPLETE CBC W/AUTO DIFF WBC: CPT | Performed by: INTERNAL MEDICINE

## 2020-07-06 PROCEDURE — 85014 HEMATOCRIT: CPT | Performed by: INTERNAL MEDICINE

## 2020-07-06 PROCEDURE — 85018 HEMOGLOBIN: CPT | Performed by: INTERNAL MEDICINE

## 2020-07-06 PROCEDURE — P9016 RBC LEUKOCYTES REDUCED: HCPCS

## 2020-07-06 PROCEDURE — 83690 ASSAY OF LIPASE: CPT | Performed by: INTERNAL MEDICINE

## 2020-07-06 RX ORDER — DIPHENHYDRAMINE HCL 25 MG
12.5 TABLET ORAL ONCE
Status: COMPLETED | OUTPATIENT
Start: 2020-07-06 | End: 2020-07-06

## 2020-07-06 RX ORDER — SODIUM CHLORIDE 1000 MG
1 TABLET, SOLUBLE MISCELLANEOUS 2 TIMES DAILY WITH MEALS
Status: DISCONTINUED | OUTPATIENT
Start: 2020-07-06 | End: 2020-07-06

## 2020-07-06 RX ORDER — ACETAMINOPHEN 325 MG/1
650 TABLET ORAL ONCE
Status: COMPLETED | OUTPATIENT
Start: 2020-07-06 | End: 2020-07-06

## 2020-07-06 RX ADMIN — LEVOTHYROXINE SODIUM 50 MCG: 50 TABLET ORAL at 06:01

## 2020-07-06 RX ADMIN — SODIUM CHLORIDE, SODIUM LACTATE, POTASSIUM CHLORIDE, AND CALCIUM CHLORIDE 75 ML/HR: .6; .31; .03; .02 INJECTION, SOLUTION INTRAVENOUS at 00:27

## 2020-07-06 RX ADMIN — HYDROXYCHLOROQUINE SULFATE 200 MG: 200 TABLET, FILM COATED ORAL at 08:26

## 2020-07-06 RX ADMIN — KETOROLAC TROMETHAMINE 1 DROP: 5 SOLUTION OPHTHALMIC at 08:26

## 2020-07-06 RX ADMIN — Medication 1000 MG: at 17:04

## 2020-07-06 RX ADMIN — Medication 1000 MG: at 08:26

## 2020-07-06 RX ADMIN — OXYBUTYNIN 5 MG: 5 TABLET, FILM COATED, EXTENDED RELEASE ORAL at 08:26

## 2020-07-06 RX ADMIN — ENOXAPARIN SODIUM 60 MG: 60 INJECTION SUBCUTANEOUS at 21:16

## 2020-07-06 RX ADMIN — DIPHENHYDRAMINE HYDROCHLORIDE 12.5 MG: 25 TABLET ORAL at 11:19

## 2020-07-06 RX ADMIN — OXYCODONE HYDROCHLORIDE 2.5 MG: 5 TABLET ORAL at 19:36

## 2020-07-06 RX ADMIN — ACETAMINOPHEN 650 MG: 325 TABLET, FILM COATED ORAL at 11:19

## 2020-07-06 RX ADMIN — KETOROLAC TROMETHAMINE 1 DROP: 5 SOLUTION OPHTHALMIC at 17:04

## 2020-07-06 RX ADMIN — HYDROMORPHONE HYDROCHLORIDE 0.2 MG: 1 INJECTION, SOLUTION INTRAMUSCULAR; INTRAVENOUS; SUBCUTANEOUS at 00:43

## 2020-07-06 RX ADMIN — Medication 1 G: at 08:26

## 2020-07-06 RX ADMIN — ASPIRIN 81 MG 81 MG: 81 TABLET ORAL at 08:26

## 2020-07-06 RX ADMIN — PANTOPRAZOLE SODIUM 40 MG: 40 TABLET, DELAYED RELEASE ORAL at 06:01

## 2020-07-06 RX ADMIN — ENOXAPARIN SODIUM 60 MG: 60 INJECTION SUBCUTANEOUS at 08:26

## 2020-07-06 RX ADMIN — ACETAMINOPHEN 650 MG: 325 TABLET, FILM COATED ORAL at 17:07

## 2020-07-06 NOTE — PROGRESS NOTES
Progress Note - Teresa Jaquez 79 y o  female MRN: [id number]    Unit/Bed#: [address] [address] Encounter: [id number]        Subjective:   Patient reports she has not had a bowel movement for a few days but is passing gas, denies any nausea or vomiting  Objective:     Vitals: Blood pressure 122/65, pulse 69, temperature 97 9 °F (36 6 °C), temperature source Oral, resp  rate 18, height 5' 4" (1 626 m), weight 64 6 kg (142 lb 6 7 oz), SpO2 95 %, not currently breastfeeding  ,Body mass index is 24 45 kg/m²  Intake/Output Summary (Last 24 hours) at 7/6/2020 1544  Last data filed at 7/6/2020 1515  Gross per 24 hour   Intake 5516 25 ml   Output 1040 ml   Net 4476 25 ml       Physical Exam:   General appearance: alert, appears stated age and cooperative  Lungs: clear to auscultation bilaterally, no labored breathing/accessory muscle use  Heart: regular rate and rhythm, S1, S2 normal, no murmur, click, rub or gallop  Abdomen: soft, non-tender; bowel sounds normal; no masses,  no organomegaly  Extremities: no edema    Invasive Devices     Peripheral Intravenous Line            Peripheral IV 07/05/20 Dorsal (posterior); Right Wrist less than 1 day                Lab, Imaging and other studies: I have personally reviewed pertinent reports  No results displayed because visit has over 200 results  Results for Dominic Tran (MRN [id number]) as of 7/6/2020 15:44   Ref   Range 7/5/2020 22:00 7/6/2020 06:25 7/6/2020 12:15   Sodium Latest Ref Range: 136 - 145 mmol/L  134 (L)    Potassium Latest Ref Range: 3 5 - 5 3 mmol/L  3 6    Chloride Latest Ref Range: 100 - 108 mmol/L  101    CO2 Latest Ref Range: 21 - 32 mmol/L  26    Anion Gap Latest Ref Range: 4 - 13 mmol/L  7    BUN Latest Ref Range: 5 - 25 mg/dL  13    Creatinine Latest Ref Range: 0 60 - 1 30 mg/dL  1 14    Glucose, Random Latest Ref Range: 65 - 140 mg/dL  111    Calcium Latest Ref Range: 8 3 - 10 1 mg/dL  8 1 (L)    AST Latest Ref Range: 5 - 45 U/L  26    ALT Latest Ref Range: 12 - 78 U/L  25    Alkaline Phosphatase Latest Ref Range: 46 - 116 U/L  57    Total Protein Latest Ref Range: 6 4 - 8 2 g/dL  5 4 (L)    Albumin Latest Ref Range: 3 5 - 5 0 g/dL  2 7 (L)    TOTAL BILIRUBIN Latest Ref Range: 0 20 - 1 00 mg/dL  0 40    eGFR Latest Units: ml/min/1 73sq m  45    Lipase Latest Ref Range: 73 - 393 u/L  781 (H)    WBC Latest Ref Range: 4 31 - 10 16 Thousand/uL  8 32    Red Blood Cell Count Latest Ref Range: 3 81 - 5 12 Million/uL  2 60 (L)    Hemoglobin Latest Ref Range: 11 5 - 15 4 g/dL  7 0 (L)    HCT Latest Ref Range: 34 8 - 46 1 %  22 0 (L)    MCV Latest Ref Range: 82 - 98 fL  85    MCH Latest Ref Range: 26 8 - 34 3 pg  26 9    MCHC Latest Ref Range: 31 4 - 37 4 g/dL  31 8    RDW Latest Ref Range: 11 6 - 15 1 %  15 6 (H)    Platelet Count Latest Ref Range: 149 - 390 Thousands/uL  233    MPV Latest Ref Range: 8 9 - 12 7 fL  9 3    nRBC Latest Units: /100 WBCs  0    Neutrophils % Latest Ref Range: 43 - 75 %  69    Immat GRANS % Latest Ref Range: 0 - 2 %  1    Lymphocytes Relative Latest Ref Range: 14 - 44 %  20    Monocytes Relative Latest Ref Range: 4 - 12 %  8    Eosinophils Latest Ref Range: 0 - 6 %  2    Basophils Relative Latest Ref Range: 0 - 1 %  0    Immature Grans Absolute Latest Ref Range: 0 00 - 0 20 Thousand/uL  0 04    Absolute Neutrophils Latest Ref Range: 1 85 - 7 62 Thousands/µL  5 70    Lymphocytes Absolute Latest Ref Range: 0 60 - 4 47 Thousands/µL  1 69    Absolute Monocytes Latest Ref Range: 0 17 - 1 22 Thousand/µL  0 67    Absolute Eosinophils Latest Ref Range: 0 00 - 0 61 Thousand/µL  0 19    Basophils Absolute Latest Ref Range: 0 00 - 0 10 Thousands/µL  0 03    Crossmatch Unknown   Compatible   Unit ABO Unknown   B   Unit RH Unknown   NEG   Unit Number Unknown   [id number]   Unit Dispense Status Unknown   Issued   SODIUM URINE Unknown 27           Assessment/Plan:    1   Pancreatic head mass with elevated lipase but no obvious evidence of acute pancreatitis on CT scan  Lipase appears to be trending down    - follow up on CA 19-9  -follow-up on MRI/MRCP findings  -liquid diet for now, see below  -outpatient EUS with biopsy, will need clearance from Cardiology with regards to holding anticoagulation, potentially she could be bridged      2    Anemia, patient noted with significant down trend of hemoglobin since last month when she was started on Eliquis after a stroke, she has had no gross GI bleeding but occult GI bleeding from upper or lower sources should be investigated    - continue with clear liquid diet for now, will discuss with attending about optimal timing for endoscopic evaluations (which would entail both EGD and colonoscopy), can potentially have diagnostic exam with Eliquis having been given within the last 48 hours, although it may be ideal to pursue exam after Eliquis can be held for 48 hours and bridging done if necessary  - monitor hemoglobin, transfuse if needed

## 2020-07-06 NOTE — PROGRESS NOTES
[facility] Progress Note  Patient: Greta Mckeon 79 y o  female   MRN: [id number]  PCP: No primary care provider on file  Unit/Bed#: 2 [place] [address] Encounter: [id number]  Date Of Visit: 07/06/20    Problem List:    Principal Problem:    Pancreatic mass  Active Problems:    Acute pancreatitis    Chronic systolic congestive heart failure (HCC)    Anemia    Other persistent atrial fibrillation (HCC)    CVA (cerebral vascular accident) ([address] )    Right hip pain    CAD (coronary artery disease)    Leukocytosis    Hypertension    CKD (chronic kidney disease) stage 3, GFR 30-59 ml/min (HCC)    Hyponatremia    Hyperlipidemia    Hypothyroid    Urinary dysfunction      Assessment & Plan:    Acute pancreatitis  Assessment & Plan  Likely secondary to above  Lipase level was 3009 at the time of admission  CT scan revealed gallstone but no biliary ductal dilatation, LFT normal  Triglyceride within normal limit  Tolerating clear liquid diet, reports intermittent abdominal discomfort  Lipase is down trended to 700 today  · Clear liquid diet, consider advancement after MRI as per GI  · Continue IV fluid as patient is receiving PRBC  · closely monitor volume status given systolic CHF  · Follow-up labs  · Follow-up MRI  · Pain control  · Monitor abdominal exam  · GI following, input appreciated    * Pancreatic mass  Assessment & Plan  Patient presented with bandlike upper abdominal discomfort gradually worsening over last 2-2 5 weeks  CT scan revealed large pancreatic head mass concerning for pancreatic carcinoma with mass effect on superior mesenteric vein with possible tumor extension with tumor extension in portal vein  Probable periportal adenopathy    Nonspecific subcentimeter hepatic lesions  Likely malignancy, results were discussed with patient  · Follow-up CA 19 9  · GI evaluation appreciated, MRI/MRCP today  · Will eventually need EUS with biopsy    CVA (cerebral vascular accident) [facility])  Assessment & Plan  Recently hospitalized in May 2020 with left MCA CVA involving left frontal cortex  Continue aspirin  Hold Eliquis, transition to Lovenox at present    Other persistent atrial fibrillation [facility])  Assessment & Plan  Controlled  Continue metoprolol  Hold Eliquis, monitor on Lovenox while in hospital      Anemia  Assessment & Plan  Patient's hemoglobin noted to be 8 grams/deciliter compared to 12 5 grams/deciliter last month after patient was started on anticoagulant  Hemoglobin slowly down trended 7 0 grams/deciliter today without any evidence of overt bleeding, possibly dilution component  Normocytic  Iron study noted, iron saturation 11%  B12 and folate elevated  · One PRBC today  · Follow-up stool studies  · Monitor hemoglobin closely on anticoagulation  · Follow-up GI, consider EGD  · Patient is at high risk of thromboembolism due to persistent AFib, recent CVA and now underlying malignancy  Hemoglobin has decreased on anticoagulation but patient does not report any overt bleeding  Chronic systolic congestive heart failure (HCC)  Assessment & Plan  Wt Readings from Last 3 Encounters:   07/06/20 64 6 kg (142 lb 6 7 oz)   06/12/20 63 5 kg (140 lb)   06/05/20 63 9 kg (140 lb 14 oz)     Weight is lower than prior on admission  Appears euvolemic at present  Chest x-ray without any acute abnormality  Discontinue IV fluid, discontinue salt tablet  Monitor daily weight, intake output  Resume spironolactone in a m  If blood pressure and renal function stable    Hyponatremia  Assessment & Plan  Mild, likely secondary to poor solute intake,? 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  Improved with salt tablet  · Will discontinue salt tablets to prevent volume overload  · Anticipate improvement with increase in solute intake  · Monitor    CKD (chronic kidney disease) stage 3, GFR 30-59 ml/min ([facility])  Assessment & Plan  Baseline creatinine appears to be 0 9-1 2, lower than prior?   Decreasing muscle mass  Currently at baseline  Monitor    Hypertension  Assessment & Plan  Patient reports low blood pressure recently with systolic in 491V, also reports symptoms of lightheadedness  Blood pressure remains soft  · Continue metoprolol  · Decreased losartan to 25 mg, hold for systolic less than 410  · Hold Norvasc at present  · Also holding spironolactone, resume in a m   · Monitor    CAD (coronary artery disease)  Assessment & Plan  Status post CABG and subsequent PCI x2  Continue aspirin, metoprolol    Right hip pain  Assessment & Plan  Status post right hip replacement in January 2018  Patient is reporting ongoing right hip discomfort, over loss few which  Right hip and prosthesis appears unremarkable on the CT scan, x-ray unremarkable  Exam is benign  · Continue pain control, trial of local heat  · Ortho follow-up with consideration of further workup i e  MRI  · Discussed with patient  · PT/OT    Leukocytosisresolved as of 7/6/2020  Assessment & Plan  Mild likely reactive to pancreatic mass/pancreatitis  Improved    Urinary dysfunction  Assessment & Plan  On toviaz, will substitute with oxybutynin    Hypothyroid  Assessment & Plan  Continue Synthroid    Hyperlipidemia  Assessment & Plan  Intolerant to statin and Zetia  On Vascepa at home, substitute with facial  Outpatient awaiting P CS K 9 inhibitor        VTE Pharmacologic Prophylaxis:   Pharmacologic: Enoxaparin (Lovenox)  Mechanical VTE Prophylaxis in Place: Yes    Patient Centered Rounds: I have performed bedside rounds with nursing staff today  Discussions with Specialists or Other Care Team Provider:  Gastroenterology    Education and Discussions with Family / Patient:  Yes    Time Spent for Care: 45 minutes  More than 50% of total time spent on counseling and coordination of care as described above      Current Length of Stay: 3 day(s)    Current Patient Status: Inpatient   Certification Statement: The patient will continue to require additional inpatient hospital stay due to Treatment for pancreatitis and pancreatic mass, evaluation of anemia    Discharge Plan:  Home when clinically improved    Code Status: Level 1 - Full Code      Subjective:   Still with intermittent abdominal discomfort and nausea but overall improving  Remains with the right hip dull ache    Denies any chest pain shortness of breath or dizziness    Bowel movement yet  No overt bleeding    Objective:     Vitals:   Temp (24hrs), Av 7 °F (37 1 °C), Min:97 6 °F (36 4 °C), Max:99 1 °F (37 3 °C)    Temp:  [97 6 °F (36 4 °C)-99 1 °F (37 3 °C)] 97 6 °F (36 4 °C)  HR:  [61-75] 66  Resp:  [18] 18  BP: ()/(52-78) 123/78  SpO2:  [94 %-100 %] 98 %  Body mass index is 24 45 kg/m²  Input and Output Summary (last 24 hours): Intake/Output Summary (Last 24 hours) at 2020 1013  Last data filed at 2020 0220  Gross per 24 hour   Intake 5166 25 ml   Output 1340 ml   Net 3826 25 ml       Physical Exam:     Physical Exam   Constitutional: She is oriented to person, place, and time  No distress  HENT:   Head: Normocephalic and atraumatic  Eyes: Pupils are equal, round, and reactive to light  Neck: Neck supple  Cardiovascular: Normal rate  Pulmonary/Chest: Effort normal and breath sounds normal  No respiratory distress  She has no wheezes  She has no rales  Abdominal: Soft  Bowel sounds are normal  She exhibits mass  She exhibits no distension  There is no tenderness  There is no rebound and no guarding  Musculoskeletal: Normal range of motion  She exhibits no edema  Neurological: She is alert and oriented to person, place, and time  No cranial nerve deficit  Skin: Skin is warm and dry  No rash noted  There is pallor  Psychiatric: She has a normal mood and affect         Additional Data:     Labs:    Results from last 7 days   Lab Units 20  0625   WBC Thousand/uL 8 32   HEMOGLOBIN g/dL 7 0*   HEMATOCRIT % 22 0*   PLATELETS Thousands/uL 233   NEUTROS PCT % 69   LYMPHS PCT % 20 MONOS PCT % 8   EOS PCT % 2     Results from last 7 days   Lab Units 07/06/20  0625   POTASSIUM mmol/L 3 6   CHLORIDE mmol/L 101   CO2 mmol/L 26   BUN mg/dL 13   CREATININE mg/dL 1 14   CALCIUM mg/dL 8 1*   ALK PHOS U/L 57   ALT U/L 25   AST U/L 26     Results from last 7 days   Lab Units 07/03/20  0811   INR  1 38*       * I Have Reviewed All Lab Data Listed Above  * Additional Pertinent Lab Tests Reviewed: All Labs Within Last 24 Hours Reviewed      Imaging:  Imaging Reports Reviewed Today Include:  CT scan    Recent Cultures (last 7 days):           Last 24 Hours Medication List:     Current Facility-Administered Medications:  acetaminophen 650 mg Oral Q6H PRN Samanta Wyatt MD   acetaminophen 650 mg Oral Once Samanta Wyatt MD   aspirin 81 mg Oral Daily Samanta Wyatt MD   diphenhydrAMINE 12 5 mg Oral Once Samanta Wyatt MD   enoxaparin 1 mg/kg Subcutaneous Q12H Franci Fuller MD   fish oil 1,000 mg Oral BID Samanta Wyatt MD   HYDROmorphone 0 2 mg Intravenous Q6H PRN Samanta Wyatt MD   hydroxychloroquine 200 mg Oral Daily Samanta Wyatt MD   ketorolac 1 drop Right Eye BID Samanta Wyatt MD   levothyroxine 50 mcg Oral Daily Samanta Wyatt MD   losartan 25 mg Oral Daily Samanta Wyatt MD   metoprolol succinate 100 mg Oral Daily Samanta Wyatt MD   ondansetron 4 mg Intravenous Q6H PRN Samanta Wyatt MD   oxybutynin 5 mg Oral Daily Samanta Wyatt MD   oxyCODONE 2 5 mg Oral Q4H PRN Samanta Wyatt MD   pantoprazole 40 mg Oral Early Morning Samanta Wyatt MD   polyethylene glycol 17 g Oral Daily PRN Samanta Wyatt MD          Today, Patient Was Seen By: Samanta Wyatt MD    ** Please Note: "This note has been constructed using a voice recognition system  Therefore there may be syntax, spelling, and/or grammatical errors   Please call if you have any questions  "**

## 2020-07-06 NOTE — PLAN OF CARE
Problem: Potential for Falls  Goal: Patient will remain free of falls  Description  INTERVENTIONS:  - Assess patient frequently for physical needs  -  Identify cognitive and physical deficits and behaviors that affect risk of falls    -  [place] fall precautions as indicated by assessment   - Educate patient/family on patient safety including physical limitations  - Instruct patient to call for assistance with activity based on assessment  - Modify environment to reduce risk of injury  - Consider OT/PT consult to assist with strengthening/mobility  Outcome: Progressing     Problem: PAIN - ADULT  Goal: Verbalizes/displays adequate comfort level or baseline comfort level  Description  Interventions:  - Encourage patient to monitor pain and request assistance  - Assess pain using appropriate pain scale  - Administer analgesics based on type and severity of pain and evaluate response  - Implement non-pharmacological measures as appropriate and evaluate response  - Consider cultural and social influences on pain and pain management  - Notify physician/advanced practitioner if interventions unsuccessful or patient reports new pain  Outcome: Progressing     Problem: INFECTION - ADULT  Goal: Absence or prevention of progression during hospitalization  Description  INTERVENTIONS:  - Assess and monitor for signs and symptoms of infection  - Monitor lab/diagnostic results  - Monitor all insertion sites, i e  indwelling lines, tubes, and drains  - Monitor endotracheal if appropriate and nasal secretions for changes in amount and color  - [place] appropriate cooling/warming therapies per order  - Administer medications as ordered  - Instruct and encourage patient and family to use good hand hygiene technique  - Identify and instruct in appropriate isolation precautions for identified infection/condition  Outcome: Progressing     Problem: SAFETY ADULT  Goal: Patient will remain free of falls  Description  INTERVENTIONS:  - Assess patient frequently for physical needs  -  Identify cognitive and physical deficits and behaviors that affect risk of falls  -  [place] fall precautions as indicated by assessment   - Educate patient/family on patient safety including physical limitations  - Instruct patient to call for assistance with activity based on assessment  - Modify environment to reduce risk of injury  - Consider OT/PT consult to assist with strengthening/mobility  Outcome: Progressing     Problem: DISCHARGE PLANNING  Goal: Discharge to home or other facility with appropriate resources  Description  INTERVENTIONS:  - Identify barriers to discharge w/patient and caregiver  - Arrange for needed discharge resources and transportation as appropriate  - Identify discharge learning needs (meds, wound care, etc )  - Arrange for interpretive services to assist at discharge as needed  - Refer to Case Management Department for coordinating discharge planning if the patient needs post-hospital services based on physician/advanced practitioner order or complex needs related to functional status, cognitive ability, or social support system  Outcome: Progressing     Problem: Knowledge Deficit  Goal: Patient/family/caregiver demonstrates understanding of disease process, treatment plan, medications, and discharge instructions  Description  Complete learning assessment and assess knowledge base    Interventions:  - Provide teaching at level of understanding  - Provide teaching via preferred learning methods  Outcome: Progressing

## 2020-07-07 ENCOUNTER — PREP FOR PROCEDURE (OUTPATIENT)
Dept: GASTROENTEROLOGY | Facility: CLINIC | Age: 80
End: 2020-07-07

## 2020-07-07 ENCOUNTER — APPOINTMENT (INPATIENT)
Dept: RADIOLOGY | Facility: HOSPITAL | Age: 80
DRG: 435 | End: 2020-07-07
Payer: MEDICARE

## 2020-07-07 DIAGNOSIS — Z20.822 ENCOUNTER FOR LABORATORY TESTING FOR COVID-19 VIRUS: ICD-10-CM

## 2020-07-07 DIAGNOSIS — K86.89 PANCREATIC MASS: Primary | ICD-10-CM

## 2020-07-07 LAB
ABO GROUP BLD BPU: NORMAL
ANION GAP SERPL CALCULATED.3IONS-SCNC: 8 MMOL/L (ref 4–13)
BASOPHILS # BLD AUTO: 0.04 THOUSANDS/ΜL (ref 0–0.1)
BASOPHILS NFR BLD AUTO: 0 % (ref 0–1)
BPU ID: NORMAL
BUN SERPL-MCNC: 12 MG/DL (ref 5–25)
CALCIUM SERPL-MCNC: 8.5 MG/DL (ref 8.3–10.1)
CANCER AG19-9 SERPL-ACNC: 3 U/ML (ref 0–35)
CHLORIDE SERPL-SCNC: 101 MMOL/L (ref 100–108)
CO2 SERPL-SCNC: 27 MMOL/L (ref 21–32)
CREAT SERPL-MCNC: 1.09 MG/DL (ref 0.6–1.3)
CROSSMATCH: NORMAL
EOSINOPHIL # BLD AUTO: 0.3 THOUSAND/ΜL (ref 0–0.61)
EOSINOPHIL NFR BLD AUTO: 3 % (ref 0–6)
ERYTHROCYTE [DISTWIDTH] IN BLOOD BY AUTOMATED COUNT: 15.1 % (ref 11.6–15.1)
GFR SERPL CREATININE-BSD FRML MDRD: 48 ML/MIN/1.73SQ M
GLUCOSE SERPL-MCNC: 94 MG/DL (ref 65–140)
HCT VFR BLD AUTO: 28.1 % (ref 34.8–46.1)
HCT VFR BLD AUTO: 30.1 % (ref 34.8–46.1)
HGB BLD-MCNC: 9 G/DL (ref 11.5–15.4)
HGB BLD-MCNC: 9.6 G/DL (ref 11.5–15.4)
IMM GRANULOCYTES # BLD AUTO: 0.05 THOUSAND/UL (ref 0–0.2)
IMM GRANULOCYTES NFR BLD AUTO: 1 % (ref 0–2)
LIPASE SERPL-CCNC: 636 U/L (ref 73–393)
LYMPHOCYTES # BLD AUTO: 1.92 THOUSANDS/ΜL (ref 0.6–4.47)
LYMPHOCYTES NFR BLD AUTO: 18 % (ref 14–44)
MCH RBC QN AUTO: 27.4 PG (ref 26.8–34.3)
MCHC RBC AUTO-ENTMCNC: 32 G/DL (ref 31.4–37.4)
MCV RBC AUTO: 85 FL (ref 82–98)
MONOCYTES # BLD AUTO: 0.78 THOUSAND/ΜL (ref 0.17–1.22)
MONOCYTES NFR BLD AUTO: 7 % (ref 4–12)
NEUTROPHILS # BLD AUTO: 7.48 THOUSANDS/ΜL (ref 1.85–7.62)
NEUTS SEG NFR BLD AUTO: 71 % (ref 43–75)
NRBC BLD AUTO-RTO: 0 /100 WBCS
PLATELET # BLD AUTO: 282 THOUSANDS/UL (ref 149–390)
PMV BLD AUTO: 9.8 FL (ref 8.9–12.7)
POTASSIUM SERPL-SCNC: 3.6 MMOL/L (ref 3.5–5.3)
RBC # BLD AUTO: 3.29 MILLION/UL (ref 3.81–5.12)
SODIUM SERPL-SCNC: 136 MMOL/L (ref 136–145)
UNIT DISPENSE STATUS: NORMAL
UNIT PRODUCT CODE: NORMAL
UNIT RH: NORMAL
WBC # BLD AUTO: 10.57 THOUSAND/UL (ref 4.31–10.16)

## 2020-07-07 PROCEDURE — 83690 ASSAY OF LIPASE: CPT | Performed by: INTERNAL MEDICINE

## 2020-07-07 PROCEDURE — 99232 SBSQ HOSP IP/OBS MODERATE 35: CPT | Performed by: NURSE PRACTITIONER

## 2020-07-07 PROCEDURE — A9585 GADOBUTROL INJECTION: HCPCS | Performed by: PHYSICIAN ASSISTANT

## 2020-07-07 PROCEDURE — 85018 HEMOGLOBIN: CPT | Performed by: INTERNAL MEDICINE

## 2020-07-07 PROCEDURE — 85025 COMPLETE CBC W/AUTO DIFF WBC: CPT | Performed by: INTERNAL MEDICINE

## 2020-07-07 PROCEDURE — 85014 HEMATOCRIT: CPT | Performed by: INTERNAL MEDICINE

## 2020-07-07 PROCEDURE — 74183 MRI ABD W/O CNTR FLWD CNTR: CPT

## 2020-07-07 PROCEDURE — 80048 BASIC METABOLIC PNL TOTAL CA: CPT | Performed by: INTERNAL MEDICINE

## 2020-07-07 PROCEDURE — 99232 SBSQ HOSP IP/OBS MODERATE 35: CPT | Performed by: INTERNAL MEDICINE

## 2020-07-07 RX ORDER — POTASSIUM CHLORIDE 20MEQ/15ML
40 LIQUID (ML) ORAL ONCE
Status: COMPLETED | OUTPATIENT
Start: 2020-07-07 | End: 2020-07-07

## 2020-07-07 RX ADMIN — ENOXAPARIN SODIUM 60 MG: 60 INJECTION SUBCUTANEOUS at 21:49

## 2020-07-07 RX ADMIN — LOSARTAN POTASSIUM 25 MG: 25 TABLET, FILM COATED ORAL at 09:35

## 2020-07-07 RX ADMIN — POTASSIUM CHLORIDE 40 MEQ: 20 SOLUTION ORAL at 18:43

## 2020-07-07 RX ADMIN — ASPIRIN 81 MG 81 MG: 81 TABLET ORAL at 09:35

## 2020-07-07 RX ADMIN — BISACODYL 10 MG: 5 TABLET, COATED ORAL at 14:57

## 2020-07-07 RX ADMIN — OXYCODONE HYDROCHLORIDE 2.5 MG: 5 TABLET ORAL at 00:31

## 2020-07-07 RX ADMIN — OXYCODONE HYDROCHLORIDE 2.5 MG: 5 TABLET ORAL at 09:34

## 2020-07-07 RX ADMIN — METOPROLOL SUCCINATE 100 MG: 100 TABLET, EXTENDED RELEASE ORAL at 09:35

## 2020-07-07 RX ADMIN — PANTOPRAZOLE SODIUM 40 MG: 40 TABLET, DELAYED RELEASE ORAL at 06:25

## 2020-07-07 RX ADMIN — Medication 1000 MG: at 09:35

## 2020-07-07 RX ADMIN — LEVOTHYROXINE SODIUM 50 MCG: 50 TABLET ORAL at 06:25

## 2020-07-07 RX ADMIN — OXYBUTYNIN 5 MG: 5 TABLET, FILM COATED, EXTENDED RELEASE ORAL at 09:35

## 2020-07-07 RX ADMIN — GADOBUTROL 6 ML: 604.72 INJECTION INTRAVENOUS at 11:21

## 2020-07-07 RX ADMIN — HYDROMORPHONE HYDROCHLORIDE 0.2 MG: 1 INJECTION, SOLUTION INTRAMUSCULAR; INTRAVENOUS; SUBCUTANEOUS at 01:35

## 2020-07-07 RX ADMIN — HYDROXYCHLOROQUINE SULFATE 200 MG: 200 TABLET, FILM COATED ORAL at 09:40

## 2020-07-07 RX ADMIN — OXYCODONE HYDROCHLORIDE 2.5 MG: 5 TABLET ORAL at 21:58

## 2020-07-07 RX ADMIN — ENOXAPARIN SODIUM 60 MG: 60 INJECTION SUBCUTANEOUS at 09:36

## 2020-07-07 RX ADMIN — Medication 1000 MG: at 18:43

## 2020-07-07 RX ADMIN — OXYCODONE HYDROCHLORIDE 2.5 MG: 5 TABLET ORAL at 14:57

## 2020-07-07 RX ADMIN — POLYETHYLENE GLYCOL 3350, SODIUM SULFATE ANHYDROUS, SODIUM BICARBONATE, SODIUM CHLORIDE, POTASSIUM CHLORIDE 4000 ML: 236; 22.74; 6.74; 5.86; 2.97 POWDER, FOR SOLUTION ORAL at 15:46

## 2020-07-07 RX ADMIN — KETOROLAC TROMETHAMINE 1 DROP: 5 SOLUTION OPHTHALMIC at 14:47

## 2020-07-07 NOTE — PROGRESS NOTES
Progress Note - Wiliam Hoyt 1940, 79 y o  female MRN: [id number]    Unit/Bed#: 2 [place] [address] Encounter: [id number]    Primary Care Provider: No primary care provider on file  Date and time admitted to hospital: 7/3/2020  7:14 AM        * Pancreatic mass  Assessment & Plan  Patient presented with bandlike upper abdominal discomfort gradually worsening over last 2-2 5 weeks  CT scan revealed large pancreatic head mass concerning for pancreatic carcinoma with mass effect on superior mesenteric vein with possible tumor extension with tumor extension in portal vein  Probable periportal adenopathy  Nonspecific subcentimeter hepatic lesions  Likely malignancy, results were discussed with patient  MRI abdomen - Complex somewhat heterogeneous pancreatic head mass measuring approximately 5 7 x 4 7 x 7 7 cm, suspicious for pancreatic adenocarcinoma  Small focus of tumor thrombus at the portal confluence extending into the SMV  · CA 19 -9 normal  · GI evaluation appreciated  · Will eventually need EUS with biopsy    Acute pancreatitis  Assessment & Plan  Likely secondary to above  Lipase level was 3009 at the time of admission  CT scan revealed gallstone but no biliary ductal dilatation, LFT normal  Triglyceride within normal limit  Tolerating clear liquid diet, reports intermittent abdominal discomfort  Lipase is down trended to 636 today  · Clear liquid diet, consider advancement after MRI as per GI  ·  Discontinued IV fluid   · closely monitor volume status given systolic CHF  · Pain control  · Monitor abdominal exam  · GI following, input appreciated    Anemia  Assessment & Plan  Patient's hemoglobin noted to be 8 grams/deciliter compared to 12 5 grams/deciliter last month after patient was started on anticoagulant  Hemoglobin slowly down trended 7 0 grams/deciliter  without any evidence of overt bleeding, possibly dilution component on 7/6  Normocytic  Iron study noted, iron saturation 11%    B12 and folate elevated  · Received one unit PRBC yesterday  Hemoglobin 9 0 today  · Follow-up stool studies, pending  · Monitor hemoglobin closely on anticoagulation  · Follow-up GI, for EGD and colonoscopy tomorrow  · Patient is at high risk of thromboembolism due to persistent AFib, recent CVA and now underlying malignancy  Hemoglobin has decreased on anticoagulation but patient does not report any overt bleeding  Hyponatremia  Assessment & Plan  Mild, likely secondary to poor solute intake,? 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   Decreasing muscle mass  Currently at baseline  Monitor    Hypertension  Assessment & Plan  Patient reports low blood pressure recently with systolic in 383X, also reports symptoms of lightheadedness  Blood pressure remains soft  · Continue metoprolol  · Decreased losartan to 25 mg, hold for systolic less than 351  · Hold Norvasc at present  · Also holding spironolactone, resume in a m   · Monitor    CAD (coronary artery disease)  Assessment & Plan  Status post CABG and subsequent PCI x2  Continue aspirin, metoprolol    Chronic systolic congestive heart failure (HCC)  Assessment & Plan  Wt Readings from Last 3 Encounters:   07/06/20 64 6 kg (142 lb 6 7 oz) 06/12/20 63 5 kg (140 lb)   06/05/20 63 9 kg (140 lb 14 oz)     Weight is lower than prior on admission  Appears euvolemic at present  Chest x-ray without any acute abnormality  Discontinued IV fluid, discontinued salt tablet  Monitor daily weight, intake output  Resume spironolactone in a m  If blood pressure and renal function stable    Right hip pain  Assessment & Plan  Status post right hip replacement in January 2018  Patient is reporting ongoing right hip discomfort, over last few months  Right hip and prosthesis appears unremarkable on the CT scan, x-ray unremarkable  Exam is benign  · Continue pain control, trial of local heat  · Ortho follow-up with consideration of further workup i e  MRI  · Discussed with patient  · PT/OT    Hypothyroid  Assessment & Plan  Continue Synthroid    Hyperlipidemia  Assessment & Plan  Intolerant to statin and Zetia  On Vascepa at home, substitute with fish oil  Outpatient awaiting P CS K 9 inhibitor    Leukocytosisresolved as of 7/6/2020  Assessment & Plan  Mild likely reactive to pancreatic mass/pancreatitis  Improved        VTE Pharmacologic Prophylaxis:   Pharmacologic: Enoxaparin (Lovenox)  Mechanical VTE Prophylaxis in Place: Yes    Patient Centered Rounds: I have performed bedside rounds with nursing staff today  Discussions with Specialists or Other Care Team Provider:  GI    Education and Discussions with Family / Patient:  Yes    Time Spent for Care: 20 minutes  More than 50% of total time spent on counseling and coordination of care as described above      Current Length of Stay: 4 day(s)    Current Patient Status: Inpatient   Certification Statement: The patient will continue to require additional inpatient hospital stay due to Pancreatic mass, anemia, abdominal pain    Discharge Plan:  Home with family support once medically cleared    Code Status: Level 1 - Full Code      Subjective:   Patient reports intermittent right-sided abdominal pain and right hip pain   Denies chest pain, headaches, dizziness, SOB, nausea, vomiting, diarrhea, constipation  Objective:     Vitals:   Temp (24hrs), Av 7 °F (37 1 °C), Min:98 2 °F (36 8 °C), Max:99 1 °F (37 3 °C)    Temp:  [98 2 °F (36 8 °C)-99 1 °F (37 3 °C)] 98 5 °F (36 9 °C)  HR:  [59-76] 76  Resp:  [16-18] 17  BP: (138-147)/(59-82) 147/76  SpO2:  [98 %-99 %] 98 %  Body mass index is 24 11 kg/m²  Input and Output Summary (last 24 hours): Intake/Output Summary (Last 24 hours) at 2020 1755  Last data filed at 2020 0530  Gross per 24 hour   Intake    Output 700 ml   Net -700 ml       Physical Exam:     Physical Exam   Constitutional: She is oriented to person, place, and time  She appears well-developed and well-nourished  HENT:   Head: Normocephalic and atraumatic  Neck: Normal range of motion  Neck supple  No JVD present  No tracheal deviation present  No thyromegaly present  Cardiovascular: Normal rate and regular rhythm  No murmur heard  Pulmonary/Chest: Effort normal and breath sounds normal  No respiratory distress  She has no wheezes  She has no rales  Abdominal: Soft  Bowel sounds are normal  She exhibits no distension  There is no tenderness  There is no guarding  Musculoskeletal: She exhibits no edema, tenderness or deformity  Neurological: She is alert and oriented to person, place, and time  Skin: Skin is warm and dry  Psychiatric: She has a normal mood and affect  Judgment normal    Nursing note and vitals reviewed        Additional Data:     Labs:    Results from last 7 days   Lab Units 20  0632   WBC Thousand/uL 10 57*   HEMOGLOBIN g/dL 9 0*   HEMATOCRIT % 28 1*   PLATELETS Thousands/uL 282   NEUTROS PCT % 71   LYMPHS PCT % 18   MONOS PCT % 7   EOS PCT % 3     Results from last 7 days   Lab Units 20  0632 20  0625   POTASSIUM mmol/L 3 6 3 6   CHLORIDE mmol/L 101 101   CO2 mmol/L 27 26   BUN mg/dL 12 13   CREATININE mg/dL 1 09 1 14   CALCIUM mg/dL 8 5 8 1*   ALK PHOS U/L  --  57   ALT U/L  --  25   AST U/L  --  26     Results from last 7 days   Lab Units 07/03/20  0811   INR  1 38*       * I Have Reviewed All Lab Data Listed Above  * Additional Pertinent Lab Tests Reviewed: [address] Admission Reviewed    Imaging:    Imaging Reports Reviewed Today Include:  MRI abdomen  Imaging Personally Reviewed by Myself Includes:  None    Recent Cultures (last 7 days):           Last 24 Hours Medication List:     Current Facility-Administered Medications:  acetaminophen 650 mg Oral Q6H PRN Nicolas Hollins MD   aspirin 81 mg Oral Daily Nicolas Hollins MD   enoxaparin 1 mg/kg Subcutaneous Q12H [address]   fish oil 1,000 mg Oral BID Nicolas Hollins MD   HYDROmorphone 0 2 mg Intravenous Q6H PRN Nicolas Hollins MD   hydroxychloroquine 200 mg Oral Daily Nicolas Hollins MD   ketorolac 1 drop Right Eye BID Nicolas Hollins MD   levothyroxine 50 mcg Oral Daily Nicolas Hollins MD   losartan 25 mg Oral Daily Nicolas Hollins MD   metoprolol succinate 100 mg Oral Daily Nicolas Hollins MD   ondansetron 4 mg Intravenous Q6H PRN Nicolas Hollins MD   oxybutynin 5 mg Oral Daily Nicolas Hollins MD   oxyCODONE 2 5 mg Oral Q4H PRN Nicolas Hollins MD   pantoprazole 40 mg Oral Early Morning Nicolas Hollins MD   polyethylene glycol 17 g Oral Daily PRN Nicolas Hollins MD        Today, Patient Was Seen By: MAYUR Rodriguez    ** Please Note: Dragon 360 Dictation voice to text software may have been used in the creation of this document   **

## 2020-07-07 NOTE — SOCIAL WORK
Day 3, Pt is a Readmission and MB  Explained role of CM to pt  CM obtained the following information from the pt:  Home:ML home w/14 steps, first floor set up  Lives with: alone  DME:has SPC, would like a RW  [place]: Was X[place] Co[place] family and friends provide transport  Pharmacy:uses [facility] for her medications  PCP:has an appointment for August Donovan to establish herself with that practice in one week  HHC Hx: hx w/[facility]  Rehab Hx:yes, went to [facility] after hip sx  POA/LW:yes to both, dtr has them, CM requested copy for chart  Transport at DC: One of pts daughters will transport her home    Pt states: her three daughters live fairly local and assist her as needed  She has life alert, since her stroke and some vision loss in R eye pt no longer drives, dtr food shops for pt, her oldest dtr lives 2 blocks away from her  A post acute care recommendation was made by your care team for Ign[facility] 77  Discussed Freedom of Choice with patient  List of agencies given to patient via in person  Pt aware the list is custom filtered for them by preference  and that [facility] post acute providers are designated  Pt choose [facility] [place], she has had them in the past and was happy with their care  [facility]  Pt denies any other dc needs at this time  CM reviewed discharge planning process including the following: identifying caregivers at home, preference for d/c planning needs,   availability of treatment team to discuss questions or concerns patient and/or family may have regarding diagnosis, plan of care, medications and discharge planning   CM will continue to follow for care coordination and update assessment as appropriate

## 2020-07-07 NOTE — PROGRESS NOTES
Progress Note - Kim Beebe 79 y o  female MRN: [id number]    Unit/Bed#: [address] [address] Encounter: [id number]        Subjective:   Patient said she has a mild discomfort on her right side, no pain at this time, denies any nausea or vomiting, no fevers or chills, she said she is going for MRI later this morning    Objective:     Vitals: Blood pressure 144/75, pulse 71, temperature 99 1 °F (37 3 °C), temperature source Oral, resp  rate 18, height 5' 4" (1 626 m), weight 63 7 kg (140 lb 6 9 oz), SpO2 98 %, not currently breastfeeding  ,Body mass index is 24 11 kg/m²  Intake/Output Summary (Last 24 hours) at 7/7/2020 1026  Last data filed at 7/7/2020 0530  Gross per 24 hour   Intake 350 ml   Output 700 ml   Net -350 ml       Physical Exam:   General appearance: alert, appears stated age and cooperative  Lungs: clear to auscultation bilaterally, no labored breathing/accessory muscle use  Heart: regular rate and rhythm, S1, S2 normal, no murmur, click, rub or gallop  Abdomen: soft, non-tender; bowel sounds normal; no masses,  no organomegaly  Extremities: no edema    Invasive Devices     Peripheral Intravenous Line            Peripheral IV 07/05/20 Dorsal (posterior); Right Wrist 1 day    Peripheral IV 07/06/20 Left Antecubital less than 1 day                Lab, Imaging and other studies: I have personally reviewed pertinent reports  No results displayed because visit has over 200 results  Results for Clark Lopez (MRN [id number]) as of 7/7/2020 10:26   Ref   Range 7/6/2020 18:24 7/7/2020 06:15 7/7/2020 06:32   Sodium Latest Ref Range: 136 - 145 mmol/L   136   Potassium Latest Ref Range: 3 5 - 5 3 mmol/L   3 6   Chloride Latest Ref Range: 100 - 108 mmol/L   101   CO2 Latest Ref Range: 21 - 32 mmol/L   27   Anion Gap Latest Ref Range: 4 - 13 mmol/L   8   BUN Latest Ref Range: 5 - 25 mg/dL   12   Creatinine Latest Ref Range: 0 60 - 1 30 mg/dL   1 09   Glucose, Random Latest Ref Range: 65 - 140 mg/dL   94 Calcium Latest Ref Range: 8 3 - 10 1 mg/dL   8 5   eGFR Latest Units: ml/min/1 73sq m   48   Lipase Latest Ref Range: 73 - 393 u/L   636 (H)   WBC Latest Ref Range: 4 31 - 10 16 Thousand/uL   10 57 (H)   Red Blood Cell Count Latest Ref Range: 3 81 - 5 12 Million/uL   3 29 (L)   Hemoglobin Latest Ref Range: 11 5 - 15 4 g/dL 8 5 (L)  9 0 (L)   HCT Latest Ref Range: 34 8 - 46 1 % 26 7 (L)  28 1 (L)   MCV Latest Ref Range: 82 - 98 fL   85   MCH Latest Ref Range: 26 8 - 34 3 pg   27 4   MCHC Latest Ref Range: 31 4 - 37 4 g/dL   32 0   RDW Latest Ref Range: 11 6 - 15 1 %   15 1   Platelet Count Latest Ref Range: 149 - 390 Thousands/uL   282   MPV Latest Ref Range: 8 9 - 12 7 fL   9 8   nRBC Latest Units: /100 WBCs   0   Neutrophils % Latest Ref Range: 43 - 75 %   71   Immat GRANS % Latest Ref Range: 0 - 2 %   1   Lymphocytes Relative Latest Ref Range: 14 - 44 %   18   Monocytes Relative Latest Ref Range: 4 - 12 %   7   Eosinophils Latest Ref Range: 0 - 6 %   3   Basophils Relative Latest Ref Range: 0 - 1 %   0   Immature Grans Absolute Latest Ref Range: 0 00 - 0 20 Thousand/uL   0 05   Absolute Neutrophils Latest Ref Range: 1 85 - 7 62 Thousands/µL   7 48   Lymphocytes Absolute Latest Ref Range: 0 60 - 4 47 Thousands/µL   1 92   Absolute Monocytes Latest Ref Range: 0 17 - 1 22 Thousand/µL   0 78   Absolute Eosinophils Latest Ref Range: 0 00 - 0 61 Thousand/µL   0 30   Basophils Absolute Latest Ref Range: 0 00 - 0 10 Thousands/µL   0 04   Crossmatch Unknown  Compatible    Unit ABO Unknown  B    Unit RH Unknown  NEG    Unit Number Unknown  [id number]    Unit Dispense Status Unknown  Presumed Trans        Assessment/Plan:    1  Pancreatic mass with elevated lipase, dilatation of the pancreatic duct with presumed periportal adenopathy, mass effect upon the SMV    - follow-up on MRI/MRCP which is scheduled for this morning  - follow up on CA-19-9  - pending MRCP, may need EUS as outpatient for tissue diagnosis      2  Anemia with no gross GI bleeding, appears to have occurred over the last 1-2 months since patient was started on anticoagulation for CVA; rule out chronic/subacute occult GI blood loss    - recommend EGD and colonoscopy for further evaluation of anemia; patient currently being anticoagulated with Lovenox twice daily so this can be held on the morning of the procedure  - will need to be on clear liquid diet for 1 day prior to colonoscopy, for prep  - NPO after midnight prior to EGD/colonoscopy  - continue to monitor hemoglobin closely, transfuse if needed

## 2020-07-07 NOTE — TELEPHONE ENCOUNTER
BALWINDER to contact the office regarding EUS scheduled on 7/14/20 with Dr Lugo  Med Clearance faxed to Dr Anaya

## 2020-07-07 NOTE — PHYSICAL THERAPY NOTE
Pt declined PT services this PM  Pt did report she has been walking with her cane and nurse assist to and from her bathroom today  Upon dc, pt requesting home PT  Will follow    Giancarlo Quinones [id number]

## 2020-07-08 ENCOUNTER — ANESTHESIA (INPATIENT)
Dept: PERIOP | Facility: HOSPITAL | Age: 80
DRG: 435 | End: 2020-07-08
Payer: MEDICARE

## 2020-07-08 ENCOUNTER — APPOINTMENT (INPATIENT)
Dept: PERIOP | Facility: HOSPITAL | Age: 80
DRG: 435 | End: 2020-07-08
Payer: MEDICARE

## 2020-07-08 ENCOUNTER — ANESTHESIA EVENT (INPATIENT)
Dept: PERIOP | Facility: HOSPITAL | Age: 80
DRG: 435 | End: 2020-07-08
Payer: MEDICARE

## 2020-07-08 LAB
ANION GAP SERPL CALCULATED.3IONS-SCNC: 8 MMOL/L (ref 4–13)
BUN SERPL-MCNC: 11 MG/DL (ref 5–25)
CALCIUM SERPL-MCNC: 7.9 MG/DL (ref 8.3–10.1)
CHLORIDE SERPL-SCNC: 99 MMOL/L (ref 100–108)
CO2 SERPL-SCNC: 26 MMOL/L (ref 21–32)
CREAT SERPL-MCNC: 0.96 MG/DL (ref 0.6–1.3)
ERYTHROCYTE [DISTWIDTH] IN BLOOD BY AUTOMATED COUNT: 15.3 % (ref 11.6–15.1)
GFR SERPL CREATININE-BSD FRML MDRD: 56 ML/MIN/1.73SQ M
GLUCOSE SERPL-MCNC: 94 MG/DL (ref 65–140)
HCT VFR BLD AUTO: 25 % (ref 34.8–46.1)
HCT VFR BLD AUTO: 27.1 % (ref 34.8–46.1)
HGB BLD-MCNC: 7.9 G/DL (ref 11.5–15.4)
HGB BLD-MCNC: 8.4 G/DL (ref 11.5–15.4)
LIPASE SERPL-CCNC: 418 U/L (ref 73–393)
MAGNESIUM SERPL-MCNC: 2 MG/DL (ref 1.6–2.6)
MCH RBC QN AUTO: 27.1 PG (ref 26.8–34.3)
MCHC RBC AUTO-ENTMCNC: 31.6 G/DL (ref 31.4–37.4)
MCV RBC AUTO: 86 FL (ref 82–98)
PLATELET # BLD AUTO: 222 THOUSANDS/UL (ref 149–390)
PMV BLD AUTO: 9.6 FL (ref 8.9–12.7)
POTASSIUM SERPL-SCNC: 3.8 MMOL/L (ref 3.5–5.3)
RBC # BLD AUTO: 2.91 MILLION/UL (ref 3.81–5.12)
SODIUM SERPL-SCNC: 133 MMOL/L (ref 136–145)
WBC # BLD AUTO: 9.74 THOUSAND/UL (ref 4.31–10.16)

## 2020-07-08 PROCEDURE — 80048 BASIC METABOLIC PNL TOTAL CA: CPT | Performed by: NURSE PRACTITIONER

## 2020-07-08 PROCEDURE — 88342 IMHCHEM/IMCYTCHM 1ST ANTB: CPT | Performed by: PATHOLOGY

## 2020-07-08 PROCEDURE — NC001 PR NO CHARGE: Performed by: INTERNAL MEDICINE

## 2020-07-08 PROCEDURE — 85027 COMPLETE CBC AUTOMATED: CPT | Performed by: NURSE PRACTITIONER

## 2020-07-08 PROCEDURE — 85014 HEMATOCRIT: CPT | Performed by: INTERNAL MEDICINE

## 2020-07-08 PROCEDURE — 83735 ASSAY OF MAGNESIUM: CPT | Performed by: NURSE PRACTITIONER

## 2020-07-08 PROCEDURE — 99232 SBSQ HOSP IP/OBS MODERATE 35: CPT | Performed by: NURSE PRACTITIONER

## 2020-07-08 PROCEDURE — 88305 TISSUE EXAM BY PATHOLOGIST: CPT | Performed by: PATHOLOGY

## 2020-07-08 PROCEDURE — 43239 EGD BIOPSY SINGLE/MULTIPLE: CPT | Performed by: INTERNAL MEDICINE

## 2020-07-08 PROCEDURE — 0DJD8ZZ INSPECTION OF LOWER INTESTINAL TRACT, VIA NATURAL OR ARTIFICIAL OPENING ENDOSCOPIC: ICD-10-PCS | Performed by: INTERNAL MEDICINE

## 2020-07-08 PROCEDURE — 0DB98ZX EXCISION OF DUODENUM, VIA NATURAL OR ARTIFICIAL OPENING ENDOSCOPIC, DIAGNOSTIC: ICD-10-PCS | Performed by: INTERNAL MEDICINE

## 2020-07-08 PROCEDURE — 85018 HEMOGLOBIN: CPT | Performed by: INTERNAL MEDICINE

## 2020-07-08 PROCEDURE — 83690 ASSAY OF LIPASE: CPT | Performed by: NURSE PRACTITIONER

## 2020-07-08 PROCEDURE — 45378 DIAGNOSTIC COLONOSCOPY: CPT | Performed by: INTERNAL MEDICINE

## 2020-07-08 PROCEDURE — 88341 IMHCHEM/IMCYTCHM EA ADD ANTB: CPT | Performed by: PATHOLOGY

## 2020-07-08 RX ORDER — PROPOFOL 10 MG/ML
INJECTION, EMULSION INTRAVENOUS AS NEEDED
Status: DISCONTINUED | OUTPATIENT
Start: 2020-07-08 | End: 2020-07-08 | Stop reason: SURG

## 2020-07-08 RX ORDER — SODIUM CHLORIDE, SODIUM LACTATE, POTASSIUM CHLORIDE, CALCIUM CHLORIDE 600; 310; 30; 20 MG/100ML; MG/100ML; MG/100ML; MG/100ML
INJECTION, SOLUTION INTRAVENOUS CONTINUOUS PRN
Status: DISCONTINUED | OUTPATIENT
Start: 2020-07-08 | End: 2020-07-08 | Stop reason: SURG

## 2020-07-08 RX ORDER — SODIUM CHLORIDE, SODIUM LACTATE, POTASSIUM CHLORIDE, CALCIUM CHLORIDE 600; 310; 30; 20 MG/100ML; MG/100ML; MG/100ML; MG/100ML
75 INJECTION, SOLUTION INTRAVENOUS CONTINUOUS
Status: CANCELLED | OUTPATIENT
Start: 2020-07-08

## 2020-07-08 RX ORDER — LIDOCAINE HYDROCHLORIDE 10 MG/ML
INJECTION, SOLUTION EPIDURAL; INFILTRATION; INTRACAUDAL; PERINEURAL AS NEEDED
Status: DISCONTINUED | OUTPATIENT
Start: 2020-07-08 | End: 2020-07-08 | Stop reason: SURG

## 2020-07-08 RX ORDER — PROPOFOL 10 MG/ML
INJECTION, EMULSION INTRAVENOUS CONTINUOUS PRN
Status: DISCONTINUED | OUTPATIENT
Start: 2020-07-08 | End: 2020-07-08 | Stop reason: SURG

## 2020-07-08 RX ADMIN — ONDANSETRON 4 MG: 2 INJECTION INTRAMUSCULAR; INTRAVENOUS at 03:26

## 2020-07-08 RX ADMIN — LIDOCAINE HYDROCHLORIDE 50 MG: 10 INJECTION, SOLUTION EPIDURAL; INFILTRATION; INTRACAUDAL; PERINEURAL at 14:46

## 2020-07-08 RX ADMIN — KETOROLAC TROMETHAMINE 1 DROP: 5 SOLUTION OPHTHALMIC at 10:13

## 2020-07-08 RX ADMIN — PANTOPRAZOLE SODIUM 40 MG: 40 TABLET, DELAYED RELEASE ORAL at 06:54

## 2020-07-08 RX ADMIN — PROPOFOL 20 MG: 10 INJECTION, EMULSION INTRAVENOUS at 14:55

## 2020-07-08 RX ADMIN — ASPIRIN 81 MG 81 MG: 81 TABLET ORAL at 10:13

## 2020-07-08 RX ADMIN — METOPROLOL SUCCINATE 100 MG: 100 TABLET, EXTENDED RELEASE ORAL at 10:11

## 2020-07-08 RX ADMIN — OXYCODONE HYDROCHLORIDE 2.5 MG: 5 TABLET ORAL at 16:40

## 2020-07-08 RX ADMIN — KETOROLAC TROMETHAMINE 1 DROP: 5 SOLUTION OPHTHALMIC at 18:00

## 2020-07-08 RX ADMIN — HYDROMORPHONE HYDROCHLORIDE 0.2 MG: 1 INJECTION, SOLUTION INTRAMUSCULAR; INTRAVENOUS; SUBCUTANEOUS at 21:40

## 2020-07-08 RX ADMIN — OXYCODONE HYDROCHLORIDE 2.5 MG: 5 TABLET ORAL at 03:26

## 2020-07-08 RX ADMIN — PROPOFOL 100 MCG/KG/MIN: 10 INJECTION, EMULSION INTRAVENOUS at 14:46

## 2020-07-08 RX ADMIN — PROPOFOL 60 MG: 10 INJECTION, EMULSION INTRAVENOUS at 14:46

## 2020-07-08 RX ADMIN — Medication 1000 MG: at 10:09

## 2020-07-08 RX ADMIN — SODIUM CHLORIDE, SODIUM LACTATE, POTASSIUM CHLORIDE, AND CALCIUM CHLORIDE: .6; .31; .03; .02 INJECTION, SOLUTION INTRAVENOUS at 14:38

## 2020-07-08 RX ADMIN — Medication 1000 MG: at 18:00

## 2020-07-08 RX ADMIN — ONDANSETRON 4 MG: 2 INJECTION INTRAMUSCULAR; INTRAVENOUS at 16:40

## 2020-07-08 RX ADMIN — OXYBUTYNIN 5 MG: 5 TABLET, FILM COATED, EXTENDED RELEASE ORAL at 10:11

## 2020-07-08 RX ADMIN — LEVOTHYROXINE SODIUM 50 MCG: 50 TABLET ORAL at 06:54

## 2020-07-08 RX ADMIN — HYDROXYCHLOROQUINE SULFATE 200 MG: 200 TABLET, FILM COATED ORAL at 10:12

## 2020-07-08 NOTE — ASSESSMENT & PLAN NOTE
Likely secondary to above  Lipase level was 3009 at the time of admission  CT scan revealed gallstone but no biliary ductal dilatation, LFT normal  Triglyceride within normal limit  Tolerating clear liquid diet, reports intermittent abdominal discomfort  Lipase is down trended to 418 today  · Discontinued IV fluid   · closely monitor volume status given systolic CHF  · Pain control  · Monitor abdominal exam  · GI following, input appreciated

## 2020-07-08 NOTE — PROGRESS NOTES
Patient preferred not to wear venodynes last night because of frequent trips to the bathroom since she had bowel prep for colonoscopy  She had such urgency and didn't want to risk having another accident while trying to remove the SCD's before running to the bathroom  SCD's were applied briefly after patient had been without BM for a few hours but patient had to remove them again within the hour because she had to go again  Patient is aware of the risks and complications associated with DVT's and understands the importance of complying with use of SCD's when able to

## 2020-07-08 NOTE — ASSESSMENT & PLAN NOTE
Mild, likely secondary to poor solute intake,?   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

## 2020-07-08 NOTE — PROGRESS NOTES
Patient refusing to finish the rest of her bowel prep  She states she drank six cups of it already and has been having BM's since  Had explosive BM in the bathroom and is not willing to carry out the rest of the prep  Explained to patient why it is important to finish the bowel prep and to really clean out well for the procedure (colonoscopy)  Emphasized that it would be difficult to visualize if bowel prep was not enough and that prep may have to be repeated and the procedure rescheduled  She was adamant about not taking the rest of the golytely and willing to get procedure done as OP if unsuccessful tomorrow  Kaz Gotti PA-C updated via [employer]

## 2020-07-08 NOTE — ASSESSMENT & PLAN NOTE
Patient's hemoglobin noted to be 8 grams/deciliter compared to 12 5 grams/deciliter last month after patient was started on anticoagulant  Hemoglobin slowly down trended 7 0 grams/deciliter  without any evidence of overt bleeding, possibly dilution component on 7/6  Normocytic  Iron study noted, iron saturation 11%  B12 and folate elevated  · Received one unit PRBC 7/6  Hemoglobin 9 0 yesterday,7 9 today  · Follow-up stool studies, pending  · Monitor hemoglobin closely on anticoagulation  · For EGD and colonoscopy today  · Patient is at high risk of thromboembolism due to persistent AFib, recent CVA and now underlying malignancy  Hemoglobin has decreased on anticoagulation but patient does not report any overt bleeding

## 2020-07-08 NOTE — SOCIAL WORK
CM sent In Basket message to OP CM Dayanara WILLOUGHBY  To have pt assigned to a OP CM to follow up with pt and assist pt making an appointment with VM post discharge

## 2020-07-08 NOTE — PROGRESS NOTES
Progress Note - Nabil Bearden 1940, 79 y o  female MRN: [id number]    Unit/Bed#: [address] [address] Encounter: [id number]    Primary Care Provider: No primary care provider on file  Date and time admitted to hospital: 7/3/2020  7:14 AM        * Pancreatic mass  Assessment & Plan  Patient presented with bandlike upper abdominal discomfort gradually worsening over last 2-2 5 weeks  CT scan revealed large pancreatic head mass concerning for pancreatic carcinoma with mass effect on superior mesenteric vein with possible tumor extension with tumor extension in portal vein  Probable periportal adenopathy  Nonspecific subcentimeter hepatic lesions  Likely malignancy, results were discussed with patient  MRI abdomen - Complex somewhat heterogeneous pancreatic head mass measuring approximately 5 7 x 4 7 x 7 7 cm, suspicious for pancreatic adenocarcinoma  Small focus of tumor thrombus at the portal confluence extending into the SMV  · CA 19 -9 normal  · GI evaluation appreciated  · Will eventually need EUS with biopsy    Acute pancreatitis  Assessment & Plan  Likely secondary to above  Lipase level was 3009 at the time of admission  CT scan revealed gallstone but no biliary ductal dilatation, LFT normal  Triglyceride within normal limit  Tolerating clear liquid diet, reports intermittent abdominal discomfort  Lipase is down trended to 418 today  · Discontinued IV fluid   · closely monitor volume status given systolic CHF  · Pain control  · Monitor abdominal exam  · GI following, input appreciated    Anemia  Assessment & Plan  Patient's hemoglobin noted to be 8 grams/deciliter compared to 12 5 grams/deciliter last month after patient was started on anticoagulant  Hemoglobin slowly down trended 7 0 grams/deciliter  without any evidence of overt bleeding, possibly dilution component on 7/6  Normocytic  Iron study noted, iron saturation 11%  B12 and folate elevated  · Received one unit PRBC 7/6    Hemoglobin 9 0 yesterday,7 9 today  · Follow-up stool studies, pending  · Monitor hemoglobin closely on anticoagulation  · For EGD and colonoscopy today  · Patient is at high risk of thromboembolism due to persistent AFib, recent CVA and now underlying malignancy  Hemoglobin has decreased on anticoagulation but patient does not report any overt bleeding  Hyponatremia  Assessment & Plan  Mild, likely secondary to poor solute intake,? 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   Decreasing muscle mass  Currently at baseline  Monitor    Hypertension  Assessment & Plan  Patient reports low blood pressure recently with systolic in 662H, also reports symptoms of lightheadedness  Blood pressure remains soft  · Continue metoprolol  · Decreased losartan to 25 mg, hold for systolic less than 955  · Hold Norvasc at present  · Also holding spironolactone, resume in a m   · Monitor    CAD (coronary artery disease)  Assessment & Plan  Status post CABG and subsequent PCI x2  Continue aspirin, metoprolol    Chronic systolic congestive heart failure [facility])  Assessment & Plan  Wt Readings from Last 3 Encounters:   07/08/20 65 4 kg (144 lb 2 9 oz)   06/12/20 63 5 kg (140 lb)   06/05/20 63 9 kg (140 lb 14 oz) Weight is lower than prior on admission  Appears euvolemic at present  Chest x-ray without any acute abnormality  Discontinued IV fluid, discontinued salt tablet  Monitor daily weight, intake output  Resume spironolactone in a m  If blood pressure and renal function stable    Right hip pain  Assessment & Plan  Status post right hip replacement in 2018  Patient is reporting ongoing right hip discomfort, over last few months  Right hip and prosthesis appears unremarkable on the CT scan, x-ray unremarkable  Exam is benign  · Continue pain control, trial of local heat  · Ortho follow-up with consideration of further workup i e  MRI  · Discussed with patient  · PT/OT    Hypothyroid  Assessment & Plan  Continue Synthroid    Hyperlipidemia  Assessment & Plan  Intolerant to statin and Zetia  On Vascepa at home, substitute with fish oil  Outpatient awaiting P CS K 9 inhibitor    VTE Pharmacologic Prophylaxis:   Pharmacologic: Enoxaparin (Lovenox)  Mechanical VTE Prophylaxis in Place: Yes    Patient Centered Rounds: I have performed bedside rounds with nursing staff today  Discussions with Specialists or Other Care Team Provider:  Yes    Education and Discussions with Family / Patient:  Yes    Time Spent for Care: 20 minutes  More than 50% of total time spent on counseling and coordination of care as described above  Current Length of Stay: 5 day(s)    Current Patient Status: Inpatient   Certification Statement: The patient will continue to require additional inpatient hospital stay due to Pancreatic mass, anemia, acute pancreatitis    Discharge Plan:  Home with VNA once medically cleared    Code Status: Level 1 - Full Code      Subjective:   Patient reports mild right-sided abdominal pain today  Reports right hip pain  Denies nausea, vomiting, chest pain, headaches, dizziness, SOB       Objective:     Vitals:   Temp (24hrs), Av 4 °F (36 9 °C), Min:98 °F (36 7 °C), Max:99 4 °F (37 4 °C)    Temp:  [46 °F (36 7 °C)-99 4 °F (37 4 °C)] 99 4 °F (37 4 °C)  HR:  [62-88] 64  Resp:  [16-20] 18  BP: (115-186)/(65-85) 146/67  SpO2:  [90 %-100 %] 100 %  Body mass index is 24 75 kg/m²  Input and Output Summary (last 24 hours): Intake/Output Summary (Last 24 hours) at 7/8/2020 1536  Last data filed at 7/8/2020 1508  Gross per 24 hour   Intake 300 ml   Output 550 ml   Net -250 ml       Physical Exam:     Physical Exam   Constitutional: She is oriented to person, place, and time  She appears well-developed  HENT:   Head: Normocephalic and atraumatic  Neck: Normal range of motion  Neck supple  No JVD present  No tracheal deviation present  No thyromegaly present  Cardiovascular: Normal rate  No murmur heard  Heart rate slight irregular  Pulmonary/Chest: Effort normal and breath sounds normal  No respiratory distress  She has no wheezes  She has no rales  Abdominal: Soft  Bowel sounds are normal  She exhibits no distension  There is no tenderness  There is no guarding  Musculoskeletal: She exhibits no edema or tenderness  Status post right hip replacement  Neurological: She is alert and oriented to person, place, and time  Skin: Skin is warm and dry  Psychiatric: She has a normal mood and affect  Judgment normal    Nursing note and vitals reviewed  Additional Data:     Labs:    Results from last 7 days   Lab Units 07/08/20  0708  07/07/20  0632   WBC Thousand/uL 9 74  --  10 57*   HEMOGLOBIN g/dL 7 9*   < > 9 0*   HEMATOCRIT % 25 0*   < > 28 1*   PLATELETS Thousands/uL 222  --  282   NEUTROS PCT %  --   --  71   LYMPHS PCT %  --   --  18   MONOS PCT %  --   --  7   EOS PCT %  --   --  3    < > = values in this interval not displayed       Results from last 7 days   Lab Units 07/08/20  0708  07/06/20  0625   POTASSIUM mmol/L 3 8   < > 3 6   CHLORIDE mmol/L 99*   < > 101   CO2 mmol/L 26   < > 26   BUN mg/dL 11   < > 13   CREATININE mg/dL 0 96   < > 1 14   CALCIUM mg/dL 7 9*   < > 8 1*   ALK PHOS U/L  --   --  57   ALT U/L  --   --  25   AST U/L  --   --  26    < > = values in this interval not displayed  Results from last 7 days   Lab Units 07/03/20  0811   INR  1 38*       * I Have Reviewed All Lab Data Listed Above  * Additional Pertinent Lab Tests Reviewed: Saida 65 Admission Reviewed    Imaging:    Imaging Reports Reviewed Today Include:  None  Imaging Personally Reviewed by Myself Includes:  None    Recent Cultures (last 7 days):           Last 24 Hours Medication List:     Current Facility-Administered Medications:  acetaminophen 650 mg Oral Q6H PRN Azeem Oliveira MD   aspirin 81 mg Oral Daily Azeem Oliveira MD   fish oil 1,000 mg Oral BID Azeem Oliveira MD   HYDROmorphone 0 2 mg Intravenous Q6H PRN Azeem Oliveira MD   hydroxychloroquine 200 mg Oral Daily Azeem Oliveira MD   ketorolac 1 drop Right Eye BID Azeem Oliveira MD   levothyroxine 50 mcg Oral Daily Azeem Oliveira, MD   losartan 25 mg Oral Daily Azeem Oliveira MD   metoprolol succinate 100 mg Oral Daily Azeem Oliveira MD   ondansetron 4 mg Intravenous Q6H PRN Azeem Oliveira MD   oxybutynin 5 mg Oral Daily Azeem Oliveira MD   oxyCODONE 2 5 mg Oral Q4H PRN Azeem Oliveira MD   pantoprazole 40 mg Oral Early Morning Azeem Oliveira MD   polyethylene glycol 17 g Oral Daily PRN Azeem Oliveira MD        Today, Patient Was Seen By: MAYUR Argueta    ** Please Note: Dragon 360 Dictation voice to text software may have been used in the creation of this document   **

## 2020-07-08 NOTE — ANESTHESIA POSTPROCEDURE EVALUATION
Post-Op Assessment Note    CV Status:  Stable  Pain Score: 0    Pain management: adequate     Mental Status:  Sleepy   Hydration Status:  Euvolemic and stable   PONV Controlled:  Controlled   Airway Patency:  Patent   Post Op Vitals Reviewed: Yes      Staff: CRNA, Anesthesiologist   Comments: Report given to recovering RN, VSS, Pt resting comfortably          /72 (07/08/20 1512)    Temp      Pulse 75 (07/08/20 1512)   Resp   16   SpO2 100 % (07/08/20 1512)

## 2020-07-08 NOTE — SOCIAL WORK
Physical therapist Cara Carlos  Recommended a RW for pt for home use  CM discussed recommendation with pt two days ago, pt stated she wanted a RW  CM brought RW to pts room today  Pt stated she does not remember stating she wanted a RW  Stated she has a  Bilevel home, does well with her SPC and does not want a RW  CM advised pt to inform CM if she changes her mind and took RW from pts room

## 2020-07-08 NOTE — ASSESSMENT & PLAN NOTE
Wt Readings from Last 3 Encounters:   07/08/20 65 4 kg (144 lb 2 9 oz)   06/12/20 63 5 kg (140 lb)   06/05/20 63 9 kg (140 lb 14 oz)     Weight is lower than prior on admission  Appears euvolemic at present  Chest x-ray without any acute abnormality  Discontinued IV fluid, discontinued salt tablet  Monitor daily weight, intake output  Resume spironolactone in a m   If blood pressure and renal function stable

## 2020-07-08 NOTE — PLAN OF CARE
Problem: Potential for Falls  Goal: Patient will remain free of falls  Description  INTERVENTIONS:  - Assess patient frequently for physical needs  -  Identify cognitive and physical deficits and behaviors that affect risk of falls    -  [place] fall precautions as indicated by assessment   - Educate patient/family on patient safety including physical limitations  - Instruct patient to call for assistance with activity based on assessment  - Modify environment to reduce risk of injury  - Consider OT/PT consult to assist with strengthening/mobility  Outcome: Progressing     Problem: PAIN - ADULT  Goal: Verbalizes/displays adequate comfort level or baseline comfort level  Description  Interventions:  - Encourage patient to monitor pain and request assistance  - Assess pain using appropriate pain scale  - Administer analgesics based on type and severity of pain and evaluate response  - Implement non-pharmacological measures as appropriate and evaluate response  - Consider cultural and social influences on pain and pain management  - Notify physician/advanced practitioner if interventions unsuccessful or patient reports new pain  Outcome: Progressing     Problem: INFECTION - ADULT  Goal: Absence or prevention of progression during hospitalization  Description  INTERVENTIONS:  - Assess and monitor for signs and symptoms of infection  - Monitor lab/diagnostic results  - Monitor all insertion sites, i e  indwelling lines, tubes, and drains  - Monitor endotracheal if appropriate and nasal secretions for changes in amount and color  - [place] appropriate cooling/warming therapies per order  - Administer medications as ordered  - Instruct and encourage patient and family to use good hand hygiene technique  - Identify and instruct in appropriate isolation precautions for identified infection/condition  Outcome: Progressing     Problem: SAFETY ADULT  Goal: Patient will remain free of falls  Description  INTERVENTIONS:  - Assess patient frequently for physical needs  -  Identify cognitive and physical deficits and behaviors that affect risk of falls  -  [place] fall precautions as indicated by assessment   - Educate patient/family on patient safety including physical limitations  - Instruct patient to call for assistance with activity based on assessment  - Modify environment to reduce risk of injury  - Consider OT/PT consult to assist with strengthening/mobility  Outcome: Progressing     Problem: DISCHARGE PLANNING  Goal: Discharge to home or other facility with appropriate resources  Description  INTERVENTIONS:  - Identify barriers to discharge w/patient and caregiver  - Arrange for needed discharge resources and transportation as appropriate  - Identify discharge learning needs (meds, wound care, etc )  - Arrange for interpretive services to assist at discharge as needed  - Refer to Case Management Department for coordinating discharge planning if the patient needs post-hospital services based on physician/advanced practitioner order or complex needs related to functional status, cognitive ability, or social support system  Outcome: Progressing     Problem: Knowledge Deficit  Goal: Patient/family/caregiver demonstrates understanding of disease process, treatment plan, medications, and discharge instructions  Description  Complete learning assessment and assess knowledge base    Interventions:  - Provide teaching at level of understanding  - Provide teaching via preferred learning methods  Outcome: Progressing

## 2020-07-08 NOTE — ASSESSMENT & PLAN NOTE
Patient reports low blood pressure recently with systolic in 209A, also reports symptoms of lightheadedness  Blood pressure remains soft  · Continue metoprolol  · Decreased losartan to 25 mg, hold for systolic less than 926  · Hold Norvasc at present  · Also holding spironolactone, resume in a m   · Monitor

## 2020-07-08 NOTE — ANESTHESIA PREPROCEDURE EVALUATION
Review of Systems/Medical History  Patient summary reviewed  Chart reviewed  No history of anesthetic complications     Cardiovascular  Hyperlipidemia, Hypertension , CAD , History of CABG, Cardiac stents  History of percutaneous transluminal coronary angioplasty, CHF ,    Pulmonary       GI/Hepatic    GI bleeding , Pancreatic problem (mass - possible neoplasm - causing recent pancreatitis),        Chronic kidney disease stage 3,        Endo/Other  History of thyroid disease (s/p partial thyroidectomy) , hypothyroidism,      GYN       Hematology  Anemia (Hg 9 7) acute blood loss anemia,  Coagulation disorder (eliquis therapy) currently taking oral anticoagulants,    Musculoskeletal  Rheumatoid arthritis ,   Comment: S/p right martín-arthoplasty Arthritis     Neurology    CVA , no residual symptoms,    Psychology           Physical Exam    Airway    Mallampati score: II  TM Distance: >3 FB  Neck ROM: full     Dental       Cardiovascular  Rhythm: regular, Rate: normal,     Pulmonary  Breath sounds clear to auscultation,     Other Findings        Anesthesia Plan  ASA Score- 3 Emergent    Anesthesia Type- IV sedation with anesthesia with ASA Monitors  Additional Monitors:   Airway Plan:         Plan Factors-    Induction- intravenous  Postoperative Plan-     Informed Consent- Anesthetic plan and risks discussed with patient  I personally reviewed this patient with the CRNA  Discussed and agreed on the Anesthesia Plan with the CRNA  Rahul Nguyen

## 2020-07-08 NOTE — PLAN OF CARE
IP Medical Nutrition Therapy  once pt can advance to solids, suggest cardiac diet order, if additional nutritional needs are immediately required, please consult RD  Problem: Nutrition/Hydration-ADULT  Goal: Nutrient/Hydration intake appropriate for improving, restoring or maintaining nutritional needs  Description  Monitor and assess patient's nutrition/hydration status for malnutrition  Collaborate with interdisciplinary team and initiate plan and interventions as ordered  Monitor patient's weight and dietary intake as ordered or per policy  Utilize nutrition screening tool and intervene as necessary  Determine patient's food preferences and provide high-protein, high-caloric foods as appropriate       INTERVENTIONS:  - Monitor oral intake, urinary output, labs, and treatment plans  - Assess nutrition and hydration status and recommend course of action  - Evaluate amount of meals eaten  - Assist patient with eating if necessary   - Allow adequate time for meals  - Recommend/ encourage appropriate diets, oral nutritional supplements, and vitamin/mineral supplements  - Order, calculate, and assess calorie counts as needed  - Recommend, monitor, and adjust tube feedings and TPN/PPN based on assessed needs  - Assess need for intravenous fluids  - Provide specific nutrition/hydration education as appropriate  - Include patient/family/caregiver in decisions related to nutrition  Outcome: Progressing

## 2020-07-09 ENCOUNTER — PATIENT OUTREACH (OUTPATIENT)
Dept: CASE MANAGEMENT | Facility: OTHER | Age: 80
End: 2020-07-09

## 2020-07-09 ENCOUNTER — TELEPHONE (OUTPATIENT)
Dept: FAMILY MEDICINE CLINIC | Facility: CLINIC | Age: 80
End: 2020-07-09

## 2020-07-09 ENCOUNTER — TELEPHONE (OUTPATIENT)
Dept: CARDIOLOGY CLINIC | Facility: CLINIC | Age: 80
End: 2020-07-09

## 2020-07-09 DIAGNOSIS — Z71.89 COMPLEX CARE COORDINATION: Primary | ICD-10-CM

## 2020-07-09 LAB
ABO GROUP BLD: NORMAL
ANION GAP SERPL CALCULATED.3IONS-SCNC: 10 MMOL/L (ref 4–13)
BLD GP AB SCN SERPL QL: NEGATIVE
BUN SERPL-MCNC: 11 MG/DL (ref 5–25)
CALCIUM SERPL-MCNC: 7.7 MG/DL (ref 8.3–10.1)
CHLORIDE SERPL-SCNC: 99 MMOL/L (ref 100–108)
CO2 SERPL-SCNC: 23 MMOL/L (ref 21–32)
CREAT SERPL-MCNC: 0.88 MG/DL (ref 0.6–1.3)
ERYTHROCYTE [DISTWIDTH] IN BLOOD BY AUTOMATED COUNT: 15.2 % (ref 11.6–15.1)
GFR SERPL CREATININE-BSD FRML MDRD: 62 ML/MIN/1.73SQ M
GLUCOSE SERPL-MCNC: 81 MG/DL (ref 65–140)
HCT VFR BLD AUTO: 25 % (ref 34.8–46.1)
HCT VFR BLD AUTO: 29.6 % (ref 34.8–46.1)
HGB BLD-MCNC: 7.8 G/DL (ref 11.5–15.4)
HGB BLD-MCNC: 9.2 G/DL (ref 11.5–15.4)
LIPASE SERPL-CCNC: 271 U/L (ref 73–393)
MAGNESIUM SERPL-MCNC: 2 MG/DL (ref 1.6–2.6)
MCH RBC QN AUTO: 26.9 PG (ref 26.8–34.3)
MCHC RBC AUTO-ENTMCNC: 31.2 G/DL (ref 31.4–37.4)
MCV RBC AUTO: 86 FL (ref 82–98)
PLATELET # BLD AUTO: 220 THOUSANDS/UL (ref 149–390)
PMV BLD AUTO: 10 FL (ref 8.9–12.7)
POTASSIUM SERPL-SCNC: 3.7 MMOL/L (ref 3.5–5.3)
RBC # BLD AUTO: 2.9 MILLION/UL (ref 3.81–5.12)
RH BLD: POSITIVE
SODIUM SERPL-SCNC: 132 MMOL/L (ref 136–145)
SPECIMEN EXPIRATION DATE: NORMAL
WBC # BLD AUTO: 9.41 THOUSAND/UL (ref 4.31–10.16)

## 2020-07-09 PROCEDURE — 86850 RBC ANTIBODY SCREEN: CPT | Performed by: NURSE PRACTITIONER

## 2020-07-09 PROCEDURE — 99232 SBSQ HOSP IP/OBS MODERATE 35: CPT | Performed by: INTERNAL MEDICINE

## 2020-07-09 PROCEDURE — 86901 BLOOD TYPING SEROLOGIC RH(D): CPT | Performed by: NURSE PRACTITIONER

## 2020-07-09 PROCEDURE — 83735 ASSAY OF MAGNESIUM: CPT | Performed by: INTERNAL MEDICINE

## 2020-07-09 PROCEDURE — 85014 HEMATOCRIT: CPT | Performed by: INTERNAL MEDICINE

## 2020-07-09 PROCEDURE — 85018 HEMOGLOBIN: CPT | Performed by: INTERNAL MEDICINE

## 2020-07-09 PROCEDURE — 83690 ASSAY OF LIPASE: CPT | Performed by: INTERNAL MEDICINE

## 2020-07-09 PROCEDURE — 99223 1ST HOSP IP/OBS HIGH 75: CPT | Performed by: PHYSICIAN ASSISTANT

## 2020-07-09 PROCEDURE — 86900 BLOOD TYPING SEROLOGIC ABO: CPT | Performed by: NURSE PRACTITIONER

## 2020-07-09 PROCEDURE — 99222 1ST HOSP IP/OBS MODERATE 55: CPT | Performed by: ORTHOPAEDIC SURGERY

## 2020-07-09 PROCEDURE — 80048 BASIC METABOLIC PNL TOTAL CA: CPT | Performed by: INTERNAL MEDICINE

## 2020-07-09 PROCEDURE — 99232 SBSQ HOSP IP/OBS MODERATE 35: CPT | Performed by: NURSE PRACTITIONER

## 2020-07-09 PROCEDURE — 85027 COMPLETE CBC AUTOMATED: CPT | Performed by: INTERNAL MEDICINE

## 2020-07-09 RX ORDER — ASPIRIN 81 MG/1
81 TABLET ORAL DAILY
Status: DISCONTINUED | OUTPATIENT
Start: 2020-07-10 | End: 2020-07-10 | Stop reason: HOSPADM

## 2020-07-09 RX ADMIN — OXYCODONE HYDROCHLORIDE 2.5 MG: 5 TABLET ORAL at 12:24

## 2020-07-09 RX ADMIN — ONDANSETRON 4 MG: 2 INJECTION INTRAMUSCULAR; INTRAVENOUS at 16:29

## 2020-07-09 RX ADMIN — METOPROLOL SUCCINATE 100 MG: 100 TABLET, EXTENDED RELEASE ORAL at 08:49

## 2020-07-09 RX ADMIN — OXYCODONE HYDROCHLORIDE 2.5 MG: 5 TABLET ORAL at 19:23

## 2020-07-09 RX ADMIN — OXYBUTYNIN 5 MG: 5 TABLET, FILM COATED, EXTENDED RELEASE ORAL at 08:49

## 2020-07-09 RX ADMIN — ENOXAPARIN SODIUM 40 MG: 40 INJECTION SUBCUTANEOUS at 08:49

## 2020-07-09 RX ADMIN — HYDROMORPHONE HYDROCHLORIDE 0.2 MG: 1 INJECTION, SOLUTION INTRAMUSCULAR; INTRAVENOUS; SUBCUTANEOUS at 16:26

## 2020-07-09 RX ADMIN — Medication 1000 MG: at 18:05

## 2020-07-09 RX ADMIN — Medication 1000 MG: at 08:49

## 2020-07-09 RX ADMIN — HYDROMORPHONE HYDROCHLORIDE 0.2 MG: 1 INJECTION, SOLUTION INTRAMUSCULAR; INTRAVENOUS; SUBCUTANEOUS at 08:49

## 2020-07-09 RX ADMIN — LEVOTHYROXINE SODIUM 50 MCG: 50 TABLET ORAL at 06:35

## 2020-07-09 RX ADMIN — HYDROMORPHONE HYDROCHLORIDE 0.2 MG: 1 INJECTION, SOLUTION INTRAMUSCULAR; INTRAVENOUS; SUBCUTANEOUS at 22:34

## 2020-07-09 RX ADMIN — PANTOPRAZOLE SODIUM 40 MG: 40 TABLET, DELAYED RELEASE ORAL at 06:35

## 2020-07-09 RX ADMIN — HYDROXYCHLOROQUINE SULFATE 200 MG: 200 TABLET, FILM COATED ORAL at 08:49

## 2020-07-09 RX ADMIN — HYDROMORPHONE HYDROCHLORIDE 0.2 MG: 1 INJECTION, SOLUTION INTRAMUSCULAR; INTRAVENOUS; SUBCUTANEOUS at 02:47

## 2020-07-09 RX ADMIN — ONDANSETRON 4 MG: 2 INJECTION INTRAMUSCULAR; INTRAVENOUS at 22:35

## 2020-07-09 NOTE — PHYSICAL THERAPY NOTE
Pt declining PT services this AM  Pt reports increased right hip pain, 10/10 and states she has requested to see Dr Dennise Schirmer  She states she does not want to participate until she has seen Dr Dennise Schirmer  Pt also states she has been amb with her cane in the hallway last evening  Will follow    Giancarlo Rodriguez [phone]

## 2020-07-09 NOTE — PROGRESS NOTES
In basket message forwarded from [facility] [address] regarding the patient needing to establish care with a PCP & PT/OT with home health  [facility] is the preferred agency  Patient noted to have an appointment with [facility] [place] on 7/13/20 to establish care  Checked with Gavin Barron   She said if the hospitalist would be willing to sign initial orders or if a provider from [facility] [place] would be willing to sign orders prior to the patient establishing care, they can see her within 48 hrs of discharge  Beatrice left a message for inpatient & for the patient's daughter  Beatrice asks that I try reaching [facility] [place]     Message left for Dr Krissy Braswell at [facility] [place]     Beatrice spoke to inpatient & the hospitalist is willing to sign orders initially if there is no other option  Still no discharge date for the patient  Will continue to follow

## 2020-07-09 NOTE — PLAN OF CARE
Problem: Potential for Falls  Goal: Patient will remain free of falls  Description  INTERVENTIONS:  - Assess patient frequently for physical needs  -  Identify cognitive and physical deficits and behaviors that affect risk of falls  -  [place] fall precautions as indicated by assessment   - Educate patient/family on patient safety including physical limitations  - Instruct patient to call for assistance with activity based on assessment  - Modify environment to reduce risk of injury  - Consider OT/PT consult to assist with strengthening/mobility  Outcome: Progressing     Problem: PAIN - ADULT  Goal: Verbalizes/displays adequate comfort level or baseline comfort level  Description  Interventions:  - Encourage patient to monitor pain and request assistance  - Assess pain using numeric pain rating scale  - Administer analgesics based on type and severity of pain and evaluate response  - Implement non-pharmacological measures as appropriate and evaluate response  - Consider cultural and social influences on pain and pain management  - Notify physician/advanced practitioner if interventions unsuccessful or patient reports new pain   Outcome: Progressing     Problem: SAFETY ADULT  Goal: Patient will remain free of falls  Description  INTERVENTIONS:  - Assess patient frequently for physical needs  -  Identify cognitive and physical deficits and behaviors that affect risk of falls    -  [place] fall precautions as indicated by assessment   - Educate patient/family on patient safety including physical limitations  - Instruct patient to call for assistance with activity based on assessment  - Modify environment to reduce risk of injury  - Consider OT/PT consult to assist with strengthening/mobility  Outcome: Progressing     Problem: DISCHARGE PLANNING  Goal: Discharge to home or other facility with appropriate resources  Description  INTERVENTIONS:  - Identify barriers to discharge w/patient and caregiver  - Arrange for needed discharge resources and transportation as appropriate  - Identify discharge learning needs (meds, wound care, etc )  - Arrange for interpretive services to assist at discharge as needed  - Refer to Case Management Department for coordinating discharge planning if the patient needs post-hospital services based on physician/advanced practitioner order or complex needs related to functional status, cognitive ability, or social support system  Outcome: Progressing     Problem: Knowledge Deficit  Goal: Patient/family/caregiver demonstrates understanding of disease process, treatment plan, medications, and discharge instructions  Description  Complete learning assessment and assess knowledge base  Interventions:  - Provide teaching at level of understanding  - Provide teaching via preferred learning methods  Outcome: Progressing     Problem: Nutrition/Hydration-ADULT  Goal: Nutrient/Hydration intake appropriate for improving, restoring or maintaining nutritional needs  Description  Monitor and assess patient's nutrition/hydration status for malnutrition  Collaborate with interdisciplinary team and initiate plan and interventions as ordered  Monitor patient's weight and dietary intake as ordered or per policy  Utilize nutrition screening tool and intervene as necessary  Determine patient's food preferences and provide high-protein, high-caloric foods as appropriate       INTERVENTIONS:  - Monitor oral intake, urinary output, labs, and treatment plans  - Assess nutrition and hydration status and recommend course of action  - Evaluate amount of meals eaten  - Assist patient with eating if necessary   - Allow adequate time for meals  - Recommend/ encourage appropriate diets, oral nutritional supplements, and vitamin/mineral supplements  - Order, calculate, and assess calorie counts as needed  - Assess need for intravenous fluids  - Provide specific nutrition/hydration education as appropriate  - Include patient/family/caregiver in decisions related to nutrition   Outcome: Progressing

## 2020-07-09 NOTE — ASSESSMENT & PLAN NOTE
Mild, likely secondary to poor solute intake,?   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

## 2020-07-09 NOTE — TELEPHONE ENCOUNTER
Spoke to Nena at out patient, [facility] and [facility] will be reaching out to us with any needed documentation/forms  Patient is currently still in hospital and will begin services upon discharge  NFA needed at this time

## 2020-07-09 NOTE — PROGRESS NOTES
1 Florence Burroughs confirmed that Dr Francisco J Lama would be willing to sign home health orders prior to patient establishing care as she is currently scheduled  Beatrice from [facility] made aware  When the patient discharges, she will send orders to Dr Francisco J Lama

## 2020-07-09 NOTE — CONSULTS
Medical Oncology/Hematology Consult Note  Anthony Montelongo, 79 y o , 1940  [address], [id number]  07/09/20    Assessment and Plan:    1  Locally extensive pancreatic mass with duodenal mass, status post biopsy of duodenum/pathology pending  Normal CA 19-9    From the looks of the CT scan of the abdomen, the patient likely has locally advanced pancreatic cancer  Additional imaging would be needed to further stage the patient so CT scan of the chest has been ordered  I believe that given that the mass is friable, this is most likely a malignancy  And I shared my inclination is with the patient and her daughter today at bedside  I discussed that we cannot officially diagnosed her with cancer in till pathology returns  However we briefly discussed options for locally advanced versus metastatic pancreatic cancer  I believe this patient should be evaluated with our GI tumor Board where multidisciplinary meeting can occur between radiation oncologist as well as Surgical Oncology to determine if this patient would be a candidate for surgical intervention depending on the workup above  I described these things to the patient and to her daughter who were in agreement  Patient has a biopsy scheduled at [place] for pancreatic lesion however this may not be necessary if duodenal biopsy comes back positive for pancreatic cancer  The patient and the daughter understand this  Furthermore the duodenal lesion is friable and patient's hemoglobin has been falling  See below  Obviously the patient will require hematologic/oncology follow-up after discharge  This office will arrange  2  Friable duodenal mass, and blood-loss anemia  Iron saturation demonstrates a value of 11%  Ferritin is 114 ng/mL however this is likely falsely elevated due to inflammation  Patient could likely benefit from a few doses of IV iron especially in light of blood-loss anemia    IV iron could be ordered at a dosage of 200 mg daily  I did not explicitly discuss this with the patient today saw I will follow up with her tomorrow before giving any medication  I would recommend transfusion of blood products if hemoglobin is less than 8 grams/deciliter given active bleeding  Possible consideration of embolizing the vessel leading to the duodenal mass could be a consideration especially if the mass continues to bleed and hemostasis is not acquired  3   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  Unfortunately, the patient cannot start on anticoagulation as she has an active GI bleed from duodenal mass  Vena dynes are recommended  Please do not hesitate to contact me if you have any questions or need additional information  Thank you for this consult   ______________________________________________________    Reason for Consultation: pancreatic mass    Chief Complaint   Patient presents with    Hip Pain     pt c/o abdominal pain raidiating to her right artificial hip & constipation  says recent eye surgery & stroke & medication changes with blood thinners that "dont agree with me"   Abdominal Pain    Constipation     History of present illness: This is an 79-year-old female with past medical history of CAD status post CABG, CKD, rheumatoid arthritis, hypertension, hypothyroidism, atrial fibrillation on Eliquis as an outpatient and osteoarthritis status post right hip replacement in 2018 who presented to [facility] Emergency room secondary to abdominal pain, constipation and right hip pain  In the emergency room, the patient had a CT scan of the abdomen and pelvis that demonstrated a pancreatic mass with elevated lipase levels  On 07/07 patient underwent an EGD that demonstrated a duodenal mass that was friable  Pathology was taken from this area and is pending    And endoscopic ultrasound with biopsy has been scheduled for biopsy of pancreatic mass this will occur as an outpatient at the [facility]  CT of the chest has not been performed  Interval history:  Patient was found at her bedside her most concerning issue is her right hip pain  Pain had never been an issue in this area before  She notes that she wants to see her surgeon  I reviewed patient's scans that did not demonstrate a mass/bony lesion in that area  Review of Systems   Constitutional: Negative for appetite change, fatigue, fever and unexpected weight change  HENT: Negative for nosebleeds  Respiratory: Negative for cough, choking and shortness of breath  Negative hemoptysis  Cardiovascular: Negative for chest pain, palpitations and leg swelling  Gastrointestinal: Negative  Negative for abdominal distention, abdominal pain, anal bleeding, blood in stool, constipation, diarrhea, nausea and vomiting  Endocrine: Negative  Negative for cold intolerance  Genitourinary: Negative  Negative for hematuria, menstrual problem, vaginal bleeding, vaginal discharge and vaginal pain  Musculoskeletal: Negative  Negative for arthralgias, myalgias, neck pain and neck stiffness  Skin: Negative  Negative for color change, pallor and rash  Allergic/Immunologic: Negative  Negative for immunocompromised state  Neurological: Negative  Negative for weakness and headaches  Hematological: Negative for adenopathy  Does not bruise/bleed easily  All other systems reviewed and are negative      Past medical history:   Past Medical History:   Diagnosis Date    Anemia     Arthritis     Bladder calculi     CAD (coronary artery disease)     Status post CABG and PTCA to OM1    CKD (chronic kidney disease) stage 3, GFR 30-59 ml/min (HCC)     CKD (chronic kidney disease), stage III (HCC)     Diastolic congestive heart failure (HCC)     Hypertension     Hypothyroidism     Rheumatoid arthritis ([facility] [address] )     Uterine prolapse      Past surgical history:   Past Surgical History:   Procedure Laterality Date    BACK SURGERY      BLADDER SURGERY      CORONARY ANGIOPLASTY WITH STENT PLACEMENT      CORONARY ARTERY BYPASS GRAFT      EYE SURGERY Right 05/2020    OOPHORECTOMY Left     MI PARTIAL HIP REPLACEMENT Right 2/1/2018    Procedure: HEMIARTHROPLASTY HIP (BIPOLAR) (RIGHT); Surgeon: Dustin Hutson MD;  Location: [address] [place] [address];  Service: Orthopedics    THYROIDECTOMY, PARTIAL       Allergies:    Allergies   Allergen Reactions    Atorvastatin Hives    Boniva [Ibandronic Acid] Hives    Codeine      codeine derivatives - tolerates IV Dilaudid    Statins Hives    Zetia [Ezetimibe]      Home medications:   Medications Prior to Admission   Medication    amLODIPine (NORVASC) 5 mg tablet    apixaban (ELIQUIS) 5 mg    Ascorbic Acid (VITAMIN C) 1000 MG tablet    aspirin (ECOTRIN LOW STRENGTH) 81 mg EC tablet    Coenzyme Q10 10 MG capsule    ferrous sulfate 325 (65 Fe) mg tablet    hydroxychloroquine (PLAQUENIL) 200 mg tablet    ketorolac (ACULAR) 0 5 % ophthalmic solution    levothyroxine 50 mcg tablet    losartan (COZAAR) 50 mg tablet    metoprolol succinate (TOPROL-XL) 100 mg 24 hr tablet    Multiple Vitamins-Minerals (CENTRUM SILVER PO)    spironolactone (ALDACTONE) 25 mg tablet    TOVIAZ 4 MG TB24    VASCEPA 1 g CAPS    Vitamin D, Cholecalciferol, 1000 units CAPS    nitroglycerin (Nitrolingual) 0 4 mg/spray spray     Hospital medications:   Current Facility-Administered Medications:     acetaminophen (TYLENOL) tablet 650 mg, 650 mg, Oral, Q6H PRN, Valerie Mccartney MD, 650 mg at 07/06/20 1707    [START ON 7/10/2020] aspirin (ECOTRIN LOW STRENGTH) EC tablet 81 mg, 81 mg, Oral, Daily, MAYUR Rondon    enoxaparin (LOVENOX) subcutaneous injection 40 mg, 40 mg, Subcutaneous, Q24H [address], MAYUR Rondon, 40 mg at 07/09/20 0849    fish oil capsule 1,000 mg, 1,000 mg, Oral, BID, Valerie Mccartney MD, 1,000 mg at 07/09/20 0849    HYDROmorphone (DILAUDID) injection 0 2 mg, 0 2 mg, Intravenous, Q6H PRN, Travis MD Erica, 0 2 mg at 20 1626    hydroxychloroquine (PLAQUENIL) tablet 200 mg, 200 mg, Oral, Daily, Travis Golden MD, 200 mg at 20 0849    ketorolac (ACULAR) 0 5 % ophthalmic solution 1 drop, 1 drop, Right Eye, BID, Travis Golden MD, 1 drop at 20 1800    levothyroxine tablet 50 mcg, 50 mcg, Oral, Daily, Travis Golden MD, 50 mcg at 20 5651    losartan (COZAAR) tablet 25 mg, 25 mg, Oral, Daily, Travis Golden MD, 25 mg at 20 0935    metoprolol succinate (TOPROL-XL) 24 hr tablet 100 mg, 100 mg, Oral, Daily, Travis Golden MD, 100 mg at 20 0849    ondansetron (ZOFRAN) injection 4 mg, 4 mg, Intravenous, Q6H PRN, Travis Golden MD, 4 mg at 20 1629    oxybutynin (DITROPAN-XL) 24 hr tablet 5 mg, 5 mg, Oral, Daily, Travis Golden MD, 5 mg at 20 0849    oxyCODONE (ROXICODONE) IR tablet 2 5 mg, 2 5 mg, Oral, Q4H PRN, Travis Golden MD, 2 5 mg at 20 1224    pantoprazole (PROTONIX) EC tablet 40 mg, 40 mg, Oral, Early Morning, Travis Golden MD, 40 mg at 20 0079    polyethylene glycol (MIRALAX) packet 17 g, 17 g, Oral, Daily PRN, Travis Golden MD, 17 g at 20 1839    Social history:   Social History     Tobacco Use    Smoking status: Never Smoker    Smokeless tobacco: Never Used   Substance Use Topics    Alcohol use: Never     Frequency: Never    Drug use: No       Family history:   Family History   Problem Relation Age of Onset    Heart attack Mother     Heart attack Father     Stroke Sister     Heart attack Brother         3 brothers all        Vitals:  Vitals:    20 1712   BP: 132/50   Pulse: 66   Resp: 18   Temp: 99 9 °F (37 7 °C)   SpO2: 98%     Physical Exam   Constitutional: She appears well-developed  No distress  HENT:   Head: Normocephalic and atraumatic  Eyes: EOM are normal  No scleral icterus  Cardiovascular: Normal rate and regular rhythm     Pulmonary/Chest: Effort normal and breath sounds normal    Musculoskeletal: She exhibits tenderness (right hip)  She exhibits no edema  Psychiatric: She has a normal mood and affect  Judgment normal        Labs and Pertinent Reports Reviewed  Lab Results   Component Value Date    SODIUM 132 (L) 07/09/2020    K 3 7 07/09/2020    CL 99 (L) 07/09/2020    CO2 23 07/09/2020    AGAP 10 07/09/2020    BUN 11 07/09/2020    CREATININE 0 88 07/09/2020    GLUC 81 07/09/2020    GLUF 95 06/11/2020    CALCIUM 7 7 (L) 07/09/2020    AST 26 07/06/2020    ALT 25 07/06/2020    ALKPHOS 57 07/06/2020    TP 5 4 (L) 07/06/2020    TBILI 0 40 07/06/2020    EGFR 62 07/09/2020     Lab Results   Component Value Date    WBC 9 41 07/09/2020    HGB 7 8 (L) 07/09/2020    HCT 25 0 (L) 07/09/2020    MCV 86 07/09/2020     07/09/2020     Component      Latest Ref Rng & Units 7/4/2020   CA 19-9      0 - 35 U/mL 3       Colonoscopy  Narrative: [address] [place] [address] Operating Room  [address] [facility] [address]  [phone]    DATE OF SERVICE:  7/08/20    PHYSICIAN(S):     - Attending Physician        INDICATION:  Anemia  Colonoscopy performed for a diagnostic indication  POST-OP DIAGNOSIS:  See the impression below  HISTORY:  Prior colonoscopy: More than 10 years ago  Colon polyps No   Colon cancer No     PREPROCEDURE:  Informed consent was obtained for the procedure, including sedation  Risks   including but not limited to bleeding, infection, perforation, adverse   drug reaction and aspiration were explained in detail  Also explained   about less than 100% sensitivity with the exam and other alternatives  The   patient was placed in the left lateral decubitus position  DETAILS OF PROCEDURE:  The patient underwent deep sedation, which was administered by an   anesthesia professional  The patient's blood pressure, heart rate, level   of consciousness, oxygen and respirations were monitored throughout the   procedure  A digital rectal exam wasperformed   The scope was introduced   through the anus and advanced to the cecum  Photodocumentation was   obtained at the ileocecal valve and appendiceal orifice  Retroflexion was   performed in the rectum  The quality of bowel preparation was   evaluatedusing the [facility] Bowel Preparation Scale with scores of: right   colon = 1, transverse colon = 1, left colon = 1  The total score was 3  Bowel prep was not adequate  The patient experienced no blood loss  The   procedure was not difficult  The patienttolerated the procedure well  There were no apparent complications  ANESTHESIA INFORMATION:  ASA: III  Anesthesia Type: IV Sedation with Anesthesia    FINDINGS:  Poor prep throughout the colon, liquid brown stool mixed with some blood  Likely from upper GI bleed  EVENTS:  Procedure Events   Event Event Time   ENDO CECUM REACHED 7/8/2020  3:03 PM   ENDO SCOPE OUT TIME 7/8/2020  3:05 PM     SPECIMENS:  ID Type Source Tests Collected by Time Destination   1 : Cold Bx Duodenal Mass Tissue Mass TISSUE EXAM New Streeter MD 7/8/2020    2:51 PM         Impression: Colonoscopy with poor prep, liquid brown stool with some blood mixed in   it  Likely from upper GI source  RECOMMENDATION:    Return flow  Clear liquid diet  Watch for any signs of bleeding         EGD  Narrative: [address] [place] [address] Operating Room  [address] [facility] [address]  [phone]    DATE OF SERVICE:  7/08/20    PHYSICIAN(S):     - Attending Physician        INDICATION:  Anemia    POST-OP DIAGNOSIS:  See the impression below  PREPROCEDURE:  Informed consent was obtained for the procedure, including sedation  Risks of perforation, hemorrhage, adverse drug reaction and aspiration   were discussed  The patient was placed in the left lateral decubitus   position  Patient was explained about the risks and benefits of the procedure  Risks   including but not limited to bleeding, infection, and perforation were   explained in detail   Also explained about less than 100% sensitivity with   the exam and other alternatives  DETAILS OF PROCEDURE:  The patient underwent deep sedation, which was administered by an   anesthesia professional  The patient's blood pressure, heart rate, level   of consciousness, respirations and oxygen were monitored throughout the   procedure  The scope was introducedthrough the mouth and advanced to the   second part of the duodenum  Retroflexion was performed in the fundus  The   patient experienced no blood loss  The procedure was not difficult  The   patient tolerated the procedure well  There were no apparentcomplications  ANESTHESIA INFORMATION:  ASA: III  Anesthesia Type: IV Sedation with Anesthesia    FINDINGS:  Single invasive and ulcerated mass in the duodenal bulb and 1st part of   the duodenum with bleeding after intervention; performed cold biopsy  Large ulcerated mass in the duodenal bulb extending to the 1st portion of   the duodenum with extrinsic  compression  Likely source of anemia  Biopsies were taken  The esophagus appeared normal  Extrinsic compression from mass  Pictures from colonoscopy associated with upper endoscopy  For prep   throughout the colon, some liquid brown stool mixed with old blood was   identified, no source of bleeding  Likely from upper GI source  SPECIMENS:  ID Type Source Tests Collected by Time Destination   1 : Cold Bx Duodenal Mass Tissue Mass TISSUE EXAM Pepper Torres MD 7/8/2020    2:51 PM         Impression: Ulcerated duodenal mass with extrinsic compression on the antrum,   pre-pyloric area  Likely source of bleeding  Biopsies were taken  Suspect prior pancreatic lesion eroding into duodenum  Otherwise normal stomach and esophagus      RECOMMENDATION:  Return to floor  Clear liquid diet  Watch for any signs of bleeding  If patient has continued bleeding would recommend evaluation for   embolization with interventional radiology                Please note:   This report has been generated by voice recognition software system  Therefore, there may be syntax, spelling and/or grammatical errors  Please call if you have any questions

## 2020-07-09 NOTE — PROGRESS NOTES
Progress Note - Leny Domingo 1940, 79 y o  female MRN: [id number]    Unit/Bed#: 2 [place] [address] Encounter: [id number]    Primary Care Provider: No primary care provider on file  Date and time admitted to hospital: 7/3/2020  7:14 AM        * Pancreatic mass  Assessment & Plan  Patient presented with bandlike upper abdominal discomfort gradually worsening over last 2-2 5 weeks  CT scan revealed large pancreatic head mass concerning for pancreatic carcinoma with mass effect on superior mesenteric vein with possible tumor extension with tumor extension in portal vein  Probable periportal adenopathy  Nonspecific subcentimeter hepatic lesions  Likely malignancy, results were discussed with patient  MRI abdomen - Complex somewhat heterogeneous pancreatic head mass measuring approximately 5 7 x 4 7 x 7 7 cm, suspicious for pancreatic adenocarcinoma  Small focus of tumor thrombus at the portal confluence extending into the SMV  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        Acute pancreatitis  Assessment & Plan  Likely secondary to above  Lipase level was 3009 at the time of admission  CT scan revealed gallstone but no biliary ductal dilatation, LFT normal  Triglyceride within normal limit  Lipase is down trended to normal today  · Discontinued IV fluid   · Diet advanced to soft diet today  · closely monitor volume status given systolic CHF  · Pain control  · Monitor abdominal exam  · GI following, input appreciated    Anemia  Assessment & Plan  Patient's hemoglobin noted to be 8 grams/deciliter compared to 12 5 grams/deciliter last month after patient was started on anticoagulant  Hemoglobin slowly down trended 7 0 grams/deciliter  without any evidence of overt bleeding, possibly dilution component on 7/6  Normocytic  Iron study noted, iron saturation 11%  B12 and folate elevated  · Received one unit PRBC 7/6  Hemoglobin 9 0 post transfusion  Down trending  Hb 7 8 today  · Follow-up stool studies, pending  · Patient is at high risk of thromboembolism due to persistent AFib, recent CVA and now underlying malignancy  · Status post EGD yesterday found ulcerated duodenal mass with extrinsic compression on the antrum, pre-pyloric area  Likely source of bleeding  Biopsies taken  Suspect pancreatic lesion eroding into duodenum  · Colonoscopy yesterday - poor preparation, liquid brown stool mixed with some blood  · GI recommend to hold [facility]  Ok to have prophylactic Lovenox  · Spoke to patient's cardiologist Dr Mohsen Stephens to hold Xarelto for now  · Will transfuse one unit of RBC today  · Repeat CBC in a m  Transfuse for hemoglobin close to 7 5  Hyponatremia  Assessment & Plan  Mild, likely secondary to poor solute intake,? 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 Decreasing muscle mass  Currently at baseline  Monitor    Hypertension  Assessment & Plan  Patient reports low blood pressure recently with systolic in 020I, also reports symptoms of lightheadedness  Blood pressure remains soft  · Continue metoprolol  · Decreased losartan to 25 mg, hold for systolic less than 483  · Hold Norvasc at present  · Also holding spironolactone  · Monitor    CAD (coronary artery disease)  Assessment & Plan  Status post CABG and subsequent PCI x2  Continue aspirin, metoprolol    Chronic systolic congestive heart failure (HCC)  Assessment & Plan  Wt Readings from Last 3 Encounters:   07/09/20 68 6 kg (151 lb 3 8 oz)   06/12/20 63 5 kg (140 lb)   06/05/20 63 9 kg (140 lb 14 oz)     Weight is lower than prior on admission  Appears euvolemic at present  Chest x-ray without any acute abnormality  Discontinued IV fluid, discontinued salt tablet  Monitor daily weight, intake output  Noted weight gain but patient appears dry clinically  Will continue to hold spironolactone  Right hip pain  Assessment & Plan  Status post right hip replacement in January 2018  Patient is reporting ongoing right hip discomfort, over last few months  Right hip and prosthesis appears unremarkable on the CT scan, x-ray unremarkable  Exam is benign  · Continue pain control, trial of local heat  · Ortho follow-up with consideration of further workup i e  MRI  · Patient requesting orthopedic eval due to severe pain  Will consult  · PT/OT    Hypothyroid  Assessment & Plan  Continue Synthroid    Hyperlipidemia  Assessment & Plan  Intolerant to statin and Zetia  On Vascepa at home, substitute with fish oil    Outpatient awaiting P CS K 9 inhibitor        VTE Pharmacologic Prophylaxis:   Pharmacologic: Enoxaparin (Lovenox)  Mechanical VTE Prophylaxis in Place: Yes    Patient Centered Rounds: I have performed bedside rounds with nursing staff today  Discussions with Specialists or Other Care Team Provider:  GI, Cardiology    Education and Discussions with Family / Patient:  Yes    Time Spent for Care: 20 minutes  More than 50% of total time spent on counseling and coordination of care as described above  Current Length of Stay: 6 day(s)    Current Patient Status: Inpatient   Certification Statement: The patient will continue to require additional inpatient hospital stay due to Pancreatic mass, duodenal mass, anemia    Discharge Plan:  Home with VNA once medically cleared    Code Status: Level 1 - Full Code      Subjective:   Patient reports severe right hip pain  Reports mild upper abdomen and right-sided abdominal pain  Denies nausea, vomiting  Reports poor appetite  Denies chest pain, headaches, dizziness, SOB  Objective:     Vitals:   Temp (24hrs), Av 8 °F (37 1 °C), Min:98 2 °F (36 8 °C), Max:99 9 °F (37 7 °C)    Temp:  [98 2 °F (36 8 °C)-99 9 °F (37 7 °C)] 99 9 °F (37 7 °C)  HR:  [66-83] 66  Resp:  [14-18] 18  BP: (126-140)/() 132/50  SpO2:  [98 %-99 %] 98 %  Body mass index is 25 96 kg/m²  Input and Output Summary (last 24 hours): Intake/Output Summary (Last 24 hours) at 2020 1730  Last data filed at 2020 1300  Gross per 24 hour   Intake 850 ml   Output 500 ml   Net 350 ml       Physical Exam:     Physical Exam   Constitutional: She is oriented to person, place, and time  She appears well-developed and well-nourished  HENT:   Head: Normocephalic and atraumatic  Neck: Normal range of motion  Neck supple  No JVD present  No tracheal deviation present  No thyromegaly present  Cardiovascular: Normal rate and regular rhythm  No murmur heard  Pulmonary/Chest: Effort normal and breath sounds normal  No respiratory distress  She has no wheezes  She has no rales  Abdominal: Soft  Bowel sounds are normal  She exhibits no distension  There is no tenderness  There is no guarding  Musculoskeletal: She exhibits no edema, tenderness or deformity  Neurological: She is alert and oriented to person, place, and time  Skin: Skin is warm and dry  Psychiatric: She has a normal mood and affect  Judgment normal    Nursing note and vitals reviewed  Additional Data:     Labs:    Results from last 7 days   Lab Units 07/09/20  0645  07/07/20  0632   WBC Thousand/uL 9 41   < > 10 57*   HEMOGLOBIN g/dL 7 8*   < > 9 0*   HEMATOCRIT % 25 0*   < > 28 1*   PLATELETS Thousands/uL 220   < > 282   NEUTROS PCT %  --   --  71   LYMPHS PCT %  --   --  18   MONOS PCT %  --   --  7   EOS PCT %  --   --  3    < > = values in this interval not displayed  Results from last 7 days   Lab Units 07/09/20  0645  07/06/20  0625   POTASSIUM mmol/L 3 7   < > 3 6   CHLORIDE mmol/L 99*   < > 101   CO2 mmol/L 23   < > 26   BUN mg/dL 11   < > 13   CREATININE mg/dL 0 88   < > 1 14   CALCIUM mg/dL 7 7*   < > 8 1*   ALK PHOS U/L  --   --  57   ALT U/L  --   --  25   AST U/L  --   --  26    < > = values in this interval not displayed  Results from last 7 days   Lab Units 07/03/20  0811   INR  1 38*       * I Have Reviewed All Lab Data Listed Above  * Additional Pertinent Lab Tests Reviewed:  [address] Admission Reviewed    Imaging:    Imaging Reports Reviewed Today Include:  None  Imaging Personally Reviewed by Myself Includes:  None    Recent Cultures (last 7 days):           Last 24 Hours Medication List:     Current Facility-Administered Medications:  acetaminophen 650 mg Oral Q6H PRN Mayank Granados MD   aspirin 81 mg Oral Daily Mayank Granados MD   enoxaparin 40 mg Subcutaneous Q24H [address] MAYUR Rondon   fish oil 1,000 mg Oral BID Mayank Granados MD   HYDROmorphone 0 2 mg Intravenous Q6H PRN Mayank Granados MD   hydroxychloroquine 200 mg Oral Daily Mayank Granados MD   ketorolac 1 drop Right Eye BID Mayank Granados MD   levothyroxine 50 mcg Oral Daily Mayank Granados MD   losartan 25 mg Oral Daily Travis Maldonado MD   metoprolol succinate 100 mg Oral Daily Lauryn Justice MD   ondansetron 4 mg Intravenous Q6H PRN Lauryn Justice MD   oxybutynin 5 mg Oral Daily Lauryn Justice MD   oxyCODONE 2 5 mg Oral Q4H PRN Lauryn Justice MD   pantoprazole 40 mg Oral Early Morning Lauryn Justice MD   polyethylene glycol 17 g Oral Daily PRN Lauryn Justice MD        Today, Patient Was Seen By: MAYUR Mccall    ** Please Note: Dragon 360 Dictation voice to text software may have been used in the creation of this document   **

## 2020-07-09 NOTE — ASSESSMENT & PLAN NOTE
Likely secondary to above  Lipase level was 3009 at the time of admission  CT scan revealed gallstone but no biliary ductal dilatation, LFT normal  Triglyceride within normal limit  Lipase is down trended to normal today  · Discontinued IV fluid   · Diet advanced to soft diet today  · closely monitor volume status given systolic CHF  · Pain control  · Monitor abdominal exam  · GI following, input appreciated

## 2020-07-09 NOTE — ASSESSMENT & PLAN NOTE
Status post right hip replacement in January 2018  Patient is reporting ongoing right hip discomfort, over last few months  Right hip and prosthesis appears unremarkable on the CT scan, x-ray unremarkable  Exam is benign  · Continue pain control, trial of local heat  · Ortho follow-up with consideration of further workup i e  MRI  · Patient requesting orthopedic eval due to severe pain  Will consult    · PT/OT

## 2020-07-09 NOTE — ASSESSMENT & PLAN NOTE
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

## 2020-07-09 NOTE — ASSESSMENT & PLAN NOTE
Patient's hemoglobin noted to be 8 grams/deciliter compared to 12 5 grams/deciliter last month after patient was started on anticoagulant  Hemoglobin slowly down trended 7 0 grams/deciliter  without any evidence of overt bleeding, possibly dilution component on 7/6  Normocytic  Iron study noted, iron saturation 11%  B12 and folate elevated  · Received one unit PRBC 7/6  Hemoglobin 9 0 post transfusion  Down trending  Hb 7 8 today  · Follow-up stool studies, pending  · Patient is at high risk of thromboembolism due to persistent AFib, recent CVA and now underlying malignancy  · Status post EGD yesterday found ulcerated duodenal mass with extrinsic compression on the antrum, pre-pyloric area  Likely source of bleeding  Biopsies taken  Suspect pancreatic lesion eroding into duodenum  · Colonoscopy yesterday - poor preparation, liquid brown stool mixed with some blood  · GI recommend to hold [facility]  Ok to have prophylactic Lovenox  · Spoke to patient's cardiologist Dr Easton Vinson to hold Xarelto for now  · Will transfuse one unit of RBC today  · Repeat CBC in a m  Transfuse for hemoglobin close to 7 5

## 2020-07-09 NOTE — ASSESSMENT & PLAN NOTE
Patient reports low blood pressure recently with systolic in 270Y, also reports symptoms of lightheadedness  Blood pressure remains soft  · Continue metoprolol  · Decreased losartan to 25 mg, hold for systolic less than 584  · Hold Norvasc at present  · Also holding spironolactone  · Monitor

## 2020-07-09 NOTE — OCCUPATIONAL THERAPY NOTE
Attempted OT treatment, however pt declined due to hip discomfort and awaiting ortho  Will re-attempt next available treatment day      Tyler Coleman MS OTR/L [id number]

## 2020-07-09 NOTE — PROGRESS NOTES
Progress Note - Saman Ward 79 y o  female MRN: [id number]    Unit/Bed#: 2 [place] [address] Encounter: [id number]    Assessment and Plan:   Principal Problem:    Pancreatic mass  Active Problems:    Hyperlipidemia    Hypothyroid    Right hip pain    Chronic systolic congestive heart failure (HCC)    CAD (coronary artery disease)    Anemia    Hypertension    CKD (chronic kidney disease) stage 3, GFR 30-59 ml/min (HCC)    Urinary dysfunction    Other persistent atrial fibrillation (HCC)    CVA (cerebral vascular accident) ([address] )    Hyponatremia    Acute pancreatitis    #1  Pancreatic mass concerning for pancreatic cancer with ulcerated duodenal mass: suspect extension of pancreatic mass into duodenum as seen on EGD yesterday, mass was ulcerated and friable with oozing, bx taken  hgb relatively stable    -I had a long discussion with the patient in regards to the difficult decisions that need to be made in regards to her anticoagulation and current status with ulcerated duodenal mass  I discussed that the risk of not resuming her anticoagulation would be that of stroke and cardiac issues with her history of stents in the past   I also discussed that the risk of resuming anticoagulation means that she could have significant bleeding from the duodenal mass which would be difficult if not impossible to control via endoscopy  She may be able to have evaluation by interventional Radiology to assess embolize in the vessel however this also comes with the risks of causing ischemia  I did mention that the patient will likely need to have continued hemoglobin monitoring and as needed transfusions with Oncology  Would recommend she continue PPI  -appreciate Oncology evaluation  -patient is currently scheduled for endoscopic ultrasound as an outpatient on 7/14  Pending biopsy results of the duodenal mass, this may be able to be canceled    We will follow up on the biopsies which are still in process  -could advance patient to soft diet with close monitoring for bleeding       ----------------------------------------------------------------------------------------------------------------    Subjective:     Patient has not had a BM since colonoscopy, denies abdominal pain currently but reporting significant right hip pain, no vomiting  Objective:     Vitals: Blood pressure 127/50, pulse 83, temperature 98 2 °F (36 8 °C), temperature source Oral, resp  rate 14, height 5' 4" (1 626 m), weight 68 6 kg (151 lb 3 8 oz), SpO2 99 %, not currently breastfeeding  ,Body mass index is 25 96 kg/m²  Intake/Output Summary (Last 24 hours) at 7/9/2020 1138  Last data filed at 7/9/2020 1101  Gross per 24 hour   Intake 800 ml   Output 700 ml   Net 100 ml       Physical Exam:     General Appearance: Alert, appears stated age and cooperative  Lungs: Clear to auscultation bilaterally, no rales or rhonchi, no labored breathing/accessory muscle use  Heart: Regular rate and rhythm, S1, S2 normal, no murmur, click, rub or gallop  Abdomen: Soft, non-tender, non-distended; bowel sounds normal; no masses or no organomegaly  Extremities: No cyanosis, clubbing, or edema    Invasive Devices     Peripheral Intravenous Line            Peripheral IV 07/05/20 Dorsal (posterior); Right Wrist 3 days    Peripheral IV 07/06/20 Left Antecubital 2 days                Lab Results:  Results from last 7 days   Lab Units 07/09/20  0645  07/07/20  0632   WBC Thousand/uL 9 41   < > 10 57*   HEMOGLOBIN g/dL 7 8*   < > 9 0*   HEMATOCRIT % 25 0*   < > 28 1*   PLATELETS Thousands/uL 220   < > 282   NEUTROS PCT %  --   --  71   LYMPHS PCT %  --   --  18   MONOS PCT %  --   --  7   EOS PCT %  --   --  3    < > = values in this interval not displayed       Results from last 7 days   Lab Units 07/09/20  0645  07/06/20  0625   POTASSIUM mmol/L 3 7   < > 3 6   CHLORIDE mmol/L 99*   < > 101   CO2 mmol/L 23   < > 26   BUN mg/dL 11   < > 13   CREATININE mg/dL 0 88   < > 1 14   CALCIUM mg/dL 7 7*   < > 8 1*   ALK PHOS U/L  --   --  57   ALT U/L  --   --  25   AST U/L  --   --  26    < > = values in this interval not displayed  Invalid input(s): BILI  Results from last 7 days   Lab Units 07/03/20  0811   INR  1 38*     Results from last 7 days   Lab Units 07/09/20  0645   LIPASE u/L 271       Imaging Studies: I have personally reviewed pertinent imaging studies  Xr Hip/pelv 2-3 Vws Right If Performed    Result Date: 7/5/2020  Impression: No acute osseous abnormality  Workstation performed: [id number]     Xr Chest 1 View    Result Date: 7/3/2020  Impression: No acute cardiopulmonary disease  Workstation performed: [id number]     Mri Abdomen W Wo Contrast And Mrcp    Result Date: 7/7/2020  Impression: Complex somewhat heterogeneous pancreatic head mass measuring approximately 5 7 x 4 7 x 7 7 cm, #11/161 and #12/72, suspicious for pancreatic adenocarcinoma  Small focus of tumor thrombus at the portal confluence extending into the SMV  The study was marked in [facility] for immediate notification  Workstation performed: [id number]     Ct Abdomen Pelvis With Contrast    Result Date: 7/3/2020  Impression: 1  Interim development of large pancreatic head mass concerning for pancreatic carcinoma  There is mass effect upon the superior mesenteric vein with possible gland and/or tumor thrombus within it  Splenic, superior mesenteric, and portal venous system remain patent, however  Probable periportal adenopathy  No definite biliary obstruction appreciated  2   Nonspecific subcentimeter low-attenuation hepatic lesions as noted  Metastasis not excluded  3   Gallstones  4   Nonspecific 9 mm splenic low-attenuation noncystic lesion  5   Additional findings as noted    I personally discussed this study with Suzanna Minor on 7/3/2020 at 10:16 AM   Workstation performed: [id number]

## 2020-07-09 NOTE — CONSULTS
Consultation - Eusebia Allred 79 y o  female MRN: [id number]  Unit/Bed#: [address] [place] [address] Encounter: [id number]      Assessment/Plan     Assessment:  Right hip pain - currently, her hip complaints seem to be more related to sacroiliitis  There is no concern for her bipolar hemiarthroplasty as this is stable and she is not having any pain in the groin  There is no concern for infection or failure of the prosthesis  In reviewing the x-rays and CT scan, there are no lytic or blastic lesions in the hip or pelvis  Therefore, we do not believe any further workup is required for her hip pain and she may be treated symptomatic Snyder for sacroiliitis  She may try physical therapy, Voltaren gel if appropriate, Tylenol, and possibly a fluoroscopic guided SI joint injection as an outpatient by [facility]  She may bear weight as tolerated on the right lower extremity  Plan:  - no need for further workup at this time   - weight-bearing as tolerated right lower extremity  - PT/OT  - analgesia p r n , may consider Voltaren gel and Tylenol if appropriate  - DVT prophylaxis per primary medical team  - orthopedics will sign off at this time  If her pain persists, she may follow up with [facility] as an outpatient for consideration of a fluoroscopic guided SI joint injection  History of Present Illness   Physician Requesting Consult: Matt Young MD  Reason for Consult / Principal Problem: right hip pain  HPI: Victor Hugo Ayala is a 79y o  year old female who is currently admitted to the medical service for a pancreatic mass and is undergoing workup for possible metastatic lesion  She underwent a right hip bipolar hemiarthroplasty with Dr Rosa Madsen in February 2018 due to a femoral neck fracture  She reports that she did very well after this surgery and had no problems with her hip    About the same time she began to have abdominal pain 2-3 weeks ago, she began to develop posterior right hip pain   She denies any injuries or falls  She states that the pain is worse with sitting and laying down  She has not tried any specific interventions  She denies any pain in the groin  She has not had any dislocations  She denies any back pain or paresthesias in the right lower extremity  Inpatient consult to Orthopedic Surgery  Consult performed by: Horacio Cardona PA-C  Consult ordered by: MAYUR Rivera        Review of Systems   Constitutional: Positive for activity change  HENT: Negative  Eyes: Negative  Respiratory: Negative  Cardiovascular: Negative  Gastrointestinal: Negative  Endocrine: Negative  Genitourinary: Negative  Musculoskeletal: Positive for arthralgias, gait problem and myalgias  Skin: Negative  Allergic/Immunologic: Negative  Hematological: Negative  Psychiatric/Behavioral: Negative  Historical Information   Past Medical History:   Diagnosis Date    Anemia     Arthritis     Bladder calculi     CAD (coronary artery disease)     Status post CABG and PTCA to OM1    CKD (chronic kidney disease) stage 3, GFR 30-59 ml/min ([facility])     CKD (chronic kidney disease), stage III ([facility])     Diastolic congestive heart failure ([facility])     Hypertension     Hypothyroidism     Rheumatoid arthritis ([address] )     Uterine prolapse      Past Surgical History:   Procedure Laterality Date    BACK SURGERY      BLADDER SURGERY      CORONARY ANGIOPLASTY WITH STENT PLACEMENT      CORONARY ARTERY BYPASS GRAFT      EYE SURGERY Right 05/2020    OOPHORECTOMY Left     NM PARTIAL HIP REPLACEMENT Right 2/1/2018    Procedure: HEMIARTHROPLASTY HIP (BIPOLAR) (RIGHT);   Surgeon: Soto Villela MD;  Location: [address] [place] [address];  Service: Orthopedics    THYROIDECTOMY, PARTIAL       Social History   Social History     Substance and Sexual Activity   Alcohol Use Never    Frequency: Never     Social History     Substance and Sexual Activity   Drug Use No     E-Cigarette/Vaping  E-Cigarette Use Never User      E-Cigarette/Vaping Substances    Nicotine No     THC No     CBD No     Flavoring No     Other No     Unknown No      Social History     Tobacco Use   Smoking Status Never Smoker   Smokeless Tobacco Never Used     Family History:   Family History   Problem Relation Age of Onset    Heart attack Mother     Heart attack Father     Stroke Sister     Heart attack Brother         3 brothers all        Meds/Allergies   all current active meds have been reviewed, current meds:   Current Facility-Administered Medications   Medication Dose Route Frequency    acetaminophen (TYLENOL) tablet 650 mg  650 mg Oral Q6H PRN    [START ON 7/10/2020] aspirin (ECOTRIN LOW STRENGTH) EC tablet 81 mg  81 mg Oral Daily    enoxaparin (LOVENOX) subcutaneous injection 40 mg  40 mg Subcutaneous Q24H [address]    fish oil capsule 1,000 mg  1,000 mg Oral BID    HYDROmorphone (DILAUDID) injection 0 2 mg  0 2 mg Intravenous Q6H PRN    hydroxychloroquine (PLAQUENIL) tablet 200 mg  200 mg Oral Daily    ketorolac (ACULAR) 0 5 % ophthalmic solution 1 drop  1 drop Right Eye BID    levothyroxine tablet 50 mcg  50 mcg Oral Daily    losartan (COZAAR) tablet 25 mg  25 mg Oral Daily    metoprolol succinate (TOPROL-XL) 24 hr tablet 100 mg  100 mg Oral Daily    ondansetron (ZOFRAN) injection 4 mg  4 mg Intravenous Q6H PRN    oxybutynin (DITROPAN-XL) 24 hr tablet 5 mg  5 mg Oral Daily    oxyCODONE (ROXICODONE) IR tablet 2 5 mg  2 5 mg Oral Q4H PRN    pantoprazole (PROTONIX) EC tablet 40 mg  40 mg Oral Early Morning    polyethylene glycol (MIRALAX) packet 17 g  17 g Oral Daily PRN    and PTA meds:   Prior to Admission Medications   Prescriptions Last Dose Informant Patient Reported? Taking?    Ascorbic Acid (VITAMIN C) 1000 MG tablet 2020 at Unknown time Self Yes Yes   Sig: Take 1,000 mg by mouth daily   Coenzyme Q10 10 MG capsule 2020 at Unknown time Self Yes Yes   Sig: Take 10 mg by mouth daily   Multiple Vitamins-Minerals (CENTRUM SILVER PO) 7/2/2020 at Unknown time Self Yes Yes   Sig: Take 1 tablet by mouth daily   TOVIAZ 4 MG TB24 7/2/2020 at Unknown time Self Yes Yes   Sig: Take 1 tablet by mouth daily    VASCEPA 1 g CAPS 7/2/2020 at Unknown time Self No Yes   Sig: ONE CAPSULE TWICE A DAY   Vitamin D, Cholecalciferol, 1000 units CAPS 7/2/2020 at Unknown time Self Yes Yes   Sig: Take 2,000 Units by mouth daily     amLODIPine (NORVASC) 5 mg tablet 7/2/2020 at Unknown time  No Yes   Sig: Take 1 tablet (5 mg total) by mouth daily   apixaban (ELIQUIS) 5 mg 7/2/2020 at Unknown time  No Yes   Sig: Take 1 tablet (5 mg total) by mouth 2 (two) times a day   aspirin (ECOTRIN LOW STRENGTH) 81 mg EC tablet 7/2/2020 at Unknown time Self Yes Yes   Sig: Take 81 mg by mouth daily Indications: Heart Attack  ferrous sulfate 325 (65 Fe) mg tablet 7/2/2020 at Unknown time Self Yes Yes   Sig: Take 325 mg by mouth daily with breakfast   hydroxychloroquine (PLAQUENIL) 200 mg tablet 7/2/2020 at Unknown time Self Yes Yes   Sig: Take 200 mg by mouth daily     ketorolac (ACULAR) 0 5 % ophthalmic solution 7/2/2020 at Unknown time Self Yes Yes   Sig: Administer 1 drop to the right eye 2 (two) times a day    levothyroxine 50 mcg tablet 7/2/2020 at Unknown time Self Yes Yes   Sig: Take 50 mcg by mouth daily     losartan (COZAAR) 50 mg tablet 7/2/2020 at Unknown time Self No Yes   Sig: Take 1 tablet (50 mg total) by mouth daily   metoprolol succinate (TOPROL-XL) 100 mg 24 hr tablet 7/2/2020 at Unknown time Self No Yes   Sig: Take 1 tablet (100 mg total) by mouth daily   nitroglycerin (Nitrolingual) 0 4 mg/spray spray Not Taking at Unknown time Self No No   Sig: Place 1 spray under the tongue every 5 (five) minutes as needed for chest pain   Patient not taking: Reported on 6/12/2020   spironolactone (ALDACTONE) 25 mg tablet 7/2/2020 at Unknown time Self No Yes   Sig: TAKE 1 TABLET (25 MG TOTAL) BY MOUTH DAILY Facility-Administered Medications: None     Allergies   Allergen Reactions    Atorvastatin Hives    Boniva [Ibandronic Acid] Hives    Codeine      codeine derivatives - tolerates IV Dilaudid    Statins Hives    Zetia [Ezetimibe]        Objective   Vitals: Blood pressure 132/50, pulse 66, temperature 99 9 °F (37 7 °C), resp  rate 18, height 5' 4" (1 626 m), weight 68 6 kg (151 lb 3 8 oz), SpO2 98 %, not currently breastfeeding  ,Body mass index is 25 96 kg/m²  Intake/Output Summary (Last 24 hours) at 7/9/2020 1748  Last data filed at 7/9/2020 1300  Gross per 24 hour   Intake 850 ml   Output 500 ml   Net 350 ml     I/O last 24 hours: In: 1150 [P O :850; I V :300]  Out: 700 [Urine:700]    Invasive Devices     Peripheral Intravenous Line            Peripheral IV 07/05/20 Dorsal (posterior); Right Wrist 4 days    Peripheral IV 07/06/20 Left Antecubital 3 days                Physical Exam   Constitutional: She is oriented to person, place, and time  She appears well-developed and well-nourished  HENT:   Head: Normocephalic and atraumatic  Eyes: EOM are normal    Cardiovascular: Normal rate and intact distal pulses  Pulmonary/Chest: Effort normal and breath sounds normal    Abdominal: Soft  Musculoskeletal:   See ortho exam   Neurological: She is alert and oriented to person, place, and time  Skin: Skin is warm and dry  Psychiatric: She has a normal mood and affect  Her behavior is normal  Judgment and thought content normal    Nursing note and vitals reviewed  Right Hip Exam     Tenderness   The patient is experiencing tenderness in the posterior (Tender SI joint, minor tenderness greater trochanter)      Range of Motion   Abduction:  35 normal   Adduction:  15 normal   Extension:  0 normal   Flexion:  120 normal   External rotation:  40 normal   Internal rotation:  20 normal     Muscle Strength   Abduction: 5/5   Adduction: 5/5   Flexion: 5/5     Tests   LETICIA: negative  Lynsey: negative    Other Erythema: absent  Scars: present  Sensation: normal  Pulse: present    Comments:  Well-healed posterolateral incision without erythema, warmth, abrasion, laceration, or ulceration  Negative [place], LETICIA, log roll, Impingement, Lynesy  Thigh and calf soft nontender  Normal sensation to light touch in the L2 through S1 nerve distributions  Tenderness of the right SI joint  Positive axial loading compression test the pelvis in the right SI joint  No tenderness lumbar spine                Lab Results:   I have personally reviewed pertinent lab results  CBC:   Lab Results   Component Value Date    WBC 9 41 07/09/2020    HGB 7 8 (L) 07/09/2020    HCT 25 0 (L) 07/09/2020    MCV 86 07/09/2020     07/09/2020    MCH 26 9 07/09/2020    MCHC 31 2 (L) 07/09/2020    RDW 15 2 (H) 07/09/2020    MPV 10 0 07/09/2020     CMP:   Lab Results   Component Value Date    SODIUM 132 (L) 07/09/2020    CL 99 (L) 07/09/2020    CO2 23 07/09/2020    BUN 11 07/09/2020    CREATININE 0 88 07/09/2020    CALCIUM 7 7 (L) 07/09/2020    EGFR 62 07/09/2020     PT/INR: No results found for: PT, INR  ESR: No results found for: ESR  CRP: No results found for: CRP     Imaging Studies: I have personally reviewed pertinent films in PACS   Xr Hip/pelv 2-3 Vws Right If Performed    Result Date: 7/5/2020  FINDINGS: There is no acute fracture or dislocation  Right hip bipolar arthroplasty; anatomic alignment without hardware complication  Heterotopic bone formation noted  Degenerative left hip joint again noted  No lytic or blastic osseous lesion  Soft tissues are unremarkable  Degenerative lower lumbar spine     Impression: No acute osseous abnormality  Ct Abdomen Pelvis With Contrast    Result Date: 7/3/2020  FINDINGS: ABDOMEN LOWER CHEST:  No clinically significant abnormality identified in the visualized lower chest  LIVER/BILIARY TREE:  Stable 6 mm probable cyst in segment 2    Small 5 mm low-attenuation lesion identified in segment V (series 2 image 21) and 4 mm low-attenuation lesion in segment IVb (series 2 image 27) too small to accurately characterization and could represent a small hemangiomas, cysts, or metastatic lesions  No biliary ductal dilatation is identified  A GALLBLADDER:  There are gallstone(s) within the gallbladder, without pericholecystic inflammatory changes  SPLEEN:  Nonspecific noncystic 9 mm low-attenuation lesion in the spleen, too small for accurate characterization  PANCREAS:  No identified is a large heterogeneous mass which appears to originate from the head of the pancreas measuring 4 3 x 5 2 x 4 8 cm with resultant dilatation of the pancreatic duct which measures 6 mm in diameter  Findings most consistent with pancreatic adenocarcinoma  The splenic and portal venous system appear patent although there is considerable mass effect upon the superior mesenteric vein by this process with possible bland or tumor thrombus extension into the central aspect of the superior mesenteric vein  The proximal celiac and superior mesenteric arteries are patent as is the common hepatic artery  There appears to be encasement of the gastroduodenal artery  Several small cystic lesions noted in the body, neck, and tail of the pancreas, the largest, somewhat increased in size when compared to the prior study in the neck currently measuring 13 mm in greatest dimension (similar to the previous dimension)  Lobulated low-attenuation 2 4 x 2 6 x 3 1 cm structure in the sreedhar hepatis is identified presumably representing periportal adenopathy, possibly necrotic  ADRENAL GLANDS:  Unremarkable  KIDNEYS/URETERS:  No hydronephrosis or urinary tract calculus  One or more sharply circumscribed subcentimeter renal hypodensities are present, too small to accurately characterize, and statistically most likely benign findings  According to recent literature (Radiology 2019) no further workup of these findings is recommended   STOMACH AND BOWEL: Unremarkable  APPENDIX:  No findings to suggest appendicitis  ABDOMINOPELVIC CAVITY:  No ascites  No pneumoperitoneum  No lymphadenopathy  VESSELS:  Atherosclerotic changes are present  No evidence of aneurysm  PELVIS REPRODUCTIVE ORGANS:  Calcified fibroids  URINARY BLADDER:  There is some calcification identified in the left inferior bladder which may represent a bladder calculus versus mural calcification  This should be correlated urologically  ABDOMINAL WALL/INGUINAL REGIONS:  Unremarkable  OSSEOUS STRUCTURES:  Right hip bipolar hemiarthroplasty present with associated artifact  Diffuse mild osteopenia is present  Mild loss of height of the L4 vertebral body is noted new since 6/1/2016 with approximately 30% loss of height superiorly  Grade 1 anterior listhesis of L3 on L4 is felt to be degenerative in nature  No definite lucent or sclerotic osseous lesions are noted  Impression: 1  Interim development of large pancreatic head mass concerning for pancreatic carcinoma  There is mass effect upon the superior mesenteric vein with possible gland and/or tumor thrombus within it  Splenic, superior mesenteric, and portal venous system remain patent, however  Probable periportal adenopathy  No definite biliary obstruction appreciated  2   Nonspecific subcentimeter low-attenuation hepatic lesions as noted  Metastasis not excluded  3   Gallstones  4   Nonspecific 9 mm splenic low-attenuation noncystic lesion  5   Additional findings as noted  Dr Celia Harrison and I reviewed the available images of her pelvis and right hip  She has a bipolar hemiarthroplasty that is unchanged in alignment from previous films  There is no evidence of periprosthetic fracture, dislocation, or lytic or blastic lesion  There is evidence of heterotopic ossification present above the greater trochanter and the inferior acetabulum    There are degenerative changes in the lumbar spine and both SI joints without evidence of fracture or lytic or blastic lesion      VTE Prophylaxis: Sequential compression device (Venodyne) and Eliquis    Code Status: Level 1 - Full Code  Advance Directive and Living Will:      Power of :    POLST:      Elodia Begum PA-C

## 2020-07-09 NOTE — ASSESSMENT & PLAN NOTE
Patient presented with bandlike upper abdominal discomfort gradually worsening over last 2-2 5 weeks  CT scan revealed large pancreatic head mass concerning for pancreatic carcinoma with mass effect on superior mesenteric vein with possible tumor extension with tumor extension in portal vein  Probable periportal adenopathy  Nonspecific subcentimeter hepatic lesions  Likely malignancy, results were discussed with patient  MRI abdomen - Complex somewhat heterogeneous pancreatic head mass measuring approximately 5 7 x 4 7 x 7 7 cm, suspicious for pancreatic adenocarcinoma  Small focus of tumor thrombus at the portal confluence extending into the SMV  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

## 2020-07-09 NOTE — PLAN OF CARE
Problem: Potential for Falls  Goal: Patient will remain free of falls  Description  INTERVENTIONS:  - Assess patient frequently for physical needs  -  Identify cognitive and physical deficits and behaviors that affect risk of falls  -  Alexander fall precautions as indicated by assessment   - Educate patient/family on patient safety including physical limitations  - Instruct patient to call for assistance with activity based on assessment  - Modify environment to reduce risk of injury  - Consider OT/PT consult to assist with strengthening/mobility  Outcome: Progressing     Problem: PAIN - ADULT  Goal: Verbalizes/displays adequate comfort level or baseline comfort level  Description  Interventions:  - Encourage patient to monitor pain and request assistance  - Assess pain using numeric pain rating scale  - Administer analgesics based on type and severity of pain and evaluate response  - Implement non-pharmacological measures as appropriate and evaluate response  - Consider cultural and social influences on pain and pain management  - Notify physician/advanced practitioner if interventions unsuccessful or patient reports new pain   Outcome: Progressing     Problem: SAFETY ADULT  Goal: Patient will remain free of falls  Description  INTERVENTIONS:  - Assess patient frequently for physical needs  -  Identify cognitive and physical deficits and behaviors that affect risk of falls    -  Alexander fall precautions as indicated by assessment   - Educate patient/family on patient safety including physical limitations  - Instruct patient to call for assistance with activity based on assessment  - Modify environment to reduce risk of injury  - Consider OT/PT consult to assist with strengthening/mobility  Outcome: Progressing     Problem: DISCHARGE PLANNING  Goal: Discharge to home or other facility with appropriate resources  Description  INTERVENTIONS:  - Identify barriers to discharge w/patient and caregiver  - Arrange for needed discharge resources and transportation as appropriate  - Identify discharge learning needs (meds, wound care, etc )  - Arrange for interpretive services to assist at discharge as needed  - Refer to Case Management Department for coordinating discharge planning if the patient needs post-hospital services based on physician/advanced practitioner order or complex needs related to functional status, cognitive ability, or social support system  Outcome: Progressing     Problem: Knowledge Deficit  Goal: Patient/family/caregiver demonstrates understanding of disease process, treatment plan, medications, and discharge instructions  Description  Complete learning assessment and assess knowledge base  Interventions:  - Provide teaching at level of understanding  - Provide teaching via preferred learning methods  Outcome: Progressing     Problem: Nutrition/Hydration-ADULT  Goal: Nutrient/Hydration intake appropriate for improving, restoring or maintaining nutritional needs  Description  Monitor and assess patient's nutrition/hydration status for malnutrition  Collaborate with interdisciplinary team and initiate plan and interventions as ordered  Monitor patient's weight and dietary intake as ordered or per policy  Utilize nutrition screening tool and intervene as necessary  Determine patient's food preferences and provide high-protein, high-caloric foods as appropriate       INTERVENTIONS:  - Monitor oral intake, urinary output, labs, and treatment plans  - Assess nutrition and hydration status and recommend course of action  - Evaluate amount of meals eaten  - Assist patient with eating if necessary   - Allow adequate time for meals  - Recommend/ encourage appropriate diets, oral nutritional supplements, and vitamin/mineral supplements  - Order, calculate, and assess calorie counts as needed  - Assess need for intravenous fluids  - Provide specific nutrition/hydration education as appropriate  - Include patient/family/caregiver in decisions related to nutrition   Outcome: Progressing

## 2020-07-09 NOTE — TELEPHONE ENCOUNTER
Pat Gan from out patient is asking if Dr Francisco Javier Ward will sign orders for home care before seeing the pt on 7/14   pls advise     Out patient       [phone]

## 2020-07-09 NOTE — PLAN OF CARE
Problem: Nutrition/Hydration-ADULT  Goal: Nutrient/Hydration intake appropriate for improving, restoring or maintaining nutritional needs  Description  Monitor and assess patient's nutrition/hydration status for malnutrition  Collaborate with interdisciplinary team and initiate plan and interventions as ordered  Monitor patient's weight and dietary intake as ordered or per policy  Utilize nutrition screening tool and intervene as necessary  Determine patient's food preferences and provide high-protein, high-caloric foods as appropriate  INTERVENTIONS:  - Monitor oral intake, urinary output, labs, and treatment plans  - Assess nutrition and hydration status and recommend course of action  - Evaluate amount of meals eaten  - Assist patient with eating if necessary   - Allow adequate time for meals  - Recommend/ encourage appropriate diets, oral nutritional supplements, and vitamin/mineral supplements  - Order, calculate, and assess calorie counts as needed  - Assess need for intravenous fluids  - Provide specific nutrition/hydration education as appropriate  - Include patient/family/caregiver in decisions related to nutrition   7/9/2020 0412 by Jack Quiles RN  Outcome: Progressing  Note:   Patient on clear liquid diet  Advised need for diet modification related to recent finding of ulcerated duodenal mass causing GI bleed  7/9/2020 0409 by Jack Quiles RN  Outcome: Progressing  Note:   Patient on clear liquids  She was advised by MD on diet modification related to findings of ulcerated duodenal mass

## 2020-07-09 NOTE — PLAN OF CARE
Problem: Potential for Falls  Goal: Patient will remain free of falls  Description  INTERVENTIONS:  - Assess patient frequently for physical needs  -  Identify cognitive and physical deficits and behaviors that affect risk of falls  -  [place] fall precautions as indicated by assessment   - Educate patient/family on patient safety including physical limitations  - Instruct patient to call for assistance with activity based on assessment  - Modify environment to reduce risk of injury  - Consider OT/PT consult to assist with strengthening/mobility  Outcome: Adequate for Discharge  Note:   Patient ambulates steadily using her own cane  Problem: PAIN - ADULT  Goal: Verbalizes/displays adequate comfort level or baseline comfort level  Description  Interventions:  - Encourage patient to monitor pain and request assistance  - Assess pain using numeric pain rating scale  - Administer analgesics based on type and severity of pain and evaluate response  - Implement non-pharmacological measures as appropriate and evaluate response  - Consider cultural and social influences on pain and pain management  - Notify physician/advanced practitioner if interventions unsuccessful or patient reports new pain   Outcome: Progressing  Note:   Adequate pain management with oxycodone and dilaudid  Increased right hip pain reported recently       Problem: INFECTION - ADULT  Goal: Absence or prevention of progression during hospitalization  Description  INTERVENTIONS:  - Assess and monitor for signs and symptoms of infection  - Monitor lab/diagnostic results  - Monitor all insertion sites, i e  indwelling lines, tubes, and drains  - Monitor endotracheal if appropriate and nasal secretions for changes in amount and color  - [place] appropriate cooling/warming therapies per order  - Administer medications as ordered  - Instruct and encourage patient and family to use good hand hygiene technique  - Identify and instruct in appropriate isolation precautions for identified infection/condition  Outcome: Completed     Problem: SAFETY ADULT  Goal: Patient will remain free of falls  Description  INTERVENTIONS:  - Assess patient frequently for physical needs  -  Identify cognitive and physical deficits and behaviors that affect risk of falls  -  [place] fall precautions as indicated by assessment   - Educate patient/family on patient safety including physical limitations  - Instruct patient to call for assistance with activity based on assessment  - Modify environment to reduce risk of injury  - Consider OT/PT consult to assist with strengthening/mobility  Outcome: Adequate for Discharge  Note:   Patient ambulates steadily using her own cane

## 2020-07-09 NOTE — ASSESSMENT & PLAN NOTE
Wt Readings from Last 3 Encounters:   07/09/20 68 6 kg (151 lb 3 8 oz)   06/12/20 63 5 kg (140 lb)   06/05/20 63 9 kg (140 lb 14 oz)     Weight is lower than prior on admission  Appears euvolemic at present  Chest x-ray without any acute abnormality  Discontinued IV fluid, discontinued salt tablet  Monitor daily weight, intake output  Noted weight gain but patient appears dry clinically  Will continue to hold spironolactone

## 2020-07-10 ENCOUNTER — TELEPHONE (OUTPATIENT)
Dept: GASTROENTEROLOGY | Facility: CLINIC | Age: 80
End: 2020-07-10

## 2020-07-10 ENCOUNTER — APPOINTMENT (INPATIENT)
Dept: RADIOLOGY | Facility: HOSPITAL | Age: 80
DRG: 435 | End: 2020-07-10
Payer: MEDICARE

## 2020-07-10 VITALS
RESPIRATION RATE: 17 BRPM | HEIGHT: 64 IN | SYSTOLIC BLOOD PRESSURE: 131 MMHG | BODY MASS INDEX: 23.75 KG/M2 | DIASTOLIC BLOOD PRESSURE: 77 MMHG | WEIGHT: 139.11 LBS | HEART RATE: 68 BPM | OXYGEN SATURATION: 99 % | TEMPERATURE: 98.8 F

## 2020-07-10 LAB
ANION GAP SERPL CALCULATED.3IONS-SCNC: 10 MMOL/L (ref 4–13)
BUN SERPL-MCNC: 11 MG/DL (ref 5–25)
CALCIUM SERPL-MCNC: 7.9 MG/DL (ref 8.3–10.1)
CHLORIDE SERPL-SCNC: 97 MMOL/L (ref 100–108)
CO2 SERPL-SCNC: 24 MMOL/L (ref 21–32)
CREAT SERPL-MCNC: 1 MG/DL (ref 0.6–1.3)
ERYTHROCYTE [DISTWIDTH] IN BLOOD BY AUTOMATED COUNT: 15 % (ref 11.6–15.1)
GFR SERPL CREATININE-BSD FRML MDRD: 53 ML/MIN/1.73SQ M
GLUCOSE SERPL-MCNC: 104 MG/DL (ref 65–140)
HCT VFR BLD AUTO: 26.4 % (ref 34.8–46.1)
HGB BLD-MCNC: 8.4 G/DL (ref 11.5–15.4)
MAGNESIUM SERPL-MCNC: 2 MG/DL (ref 1.6–2.6)
MCH RBC QN AUTO: 27.2 PG (ref 26.8–34.3)
MCHC RBC AUTO-ENTMCNC: 31.8 G/DL (ref 31.4–37.4)
MCV RBC AUTO: 85 FL (ref 82–98)
PLATELET # BLD AUTO: 223 THOUSANDS/UL (ref 149–390)
PMV BLD AUTO: 9.2 FL (ref 8.9–12.7)
POTASSIUM SERPL-SCNC: 3.3 MMOL/L (ref 3.5–5.3)
RBC # BLD AUTO: 3.09 MILLION/UL (ref 3.81–5.12)
SODIUM SERPL-SCNC: 131 MMOL/L (ref 136–145)
WBC # BLD AUTO: 8.81 THOUSAND/UL (ref 4.31–10.16)

## 2020-07-10 PROCEDURE — 97116 GAIT TRAINING THERAPY: CPT

## 2020-07-10 PROCEDURE — 85027 COMPLETE CBC AUTOMATED: CPT | Performed by: NURSE PRACTITIONER

## 2020-07-10 PROCEDURE — 99239 HOSP IP/OBS DSCHRG MGMT >30: CPT | Performed by: NURSE PRACTITIONER

## 2020-07-10 PROCEDURE — 71260 CT THORAX DX C+: CPT

## 2020-07-10 PROCEDURE — 80048 BASIC METABOLIC PNL TOTAL CA: CPT | Performed by: NURSE PRACTITIONER

## 2020-07-10 PROCEDURE — 83735 ASSAY OF MAGNESIUM: CPT | Performed by: NURSE PRACTITIONER

## 2020-07-10 RX ORDER — PANTOPRAZOLE SODIUM 40 MG/1
40 TABLET, DELAYED RELEASE ORAL
Qty: 60 TABLET | Refills: 0 | Status: SHIPPED | OUTPATIENT
Start: 2020-07-10 | End: 2020-07-10

## 2020-07-10 RX ORDER — OXYCODONE HYDROCHLORIDE 5 MG/1
2.5 TABLET ORAL EVERY 4 HOURS PRN
Qty: 10 TABLET | Refills: 0 | Status: SHIPPED | OUTPATIENT
Start: 2020-07-10 | End: 2020-07-10

## 2020-07-10 RX ORDER — POLYETHYLENE GLYCOL 3350 17 G/17G
17 POWDER, FOR SOLUTION ORAL DAILY PRN
Qty: 14 EACH | Refills: 0 | Status: SHIPPED | OUTPATIENT
Start: 2020-07-10 | End: 2020-07-23 | Stop reason: HOSPADM

## 2020-07-10 RX ORDER — POTASSIUM CHLORIDE 20 MEQ/1
40 TABLET, EXTENDED RELEASE ORAL
Status: COMPLETED | OUTPATIENT
Start: 2020-07-10 | End: 2020-07-10

## 2020-07-10 RX ORDER — SPIRONOLACTONE 25 MG/1
25 TABLET ORAL DAILY PRN
Qty: 30 TABLET | Refills: 0
Start: 2020-07-10 | End: 2020-07-23 | Stop reason: HOSPADM

## 2020-07-10 RX ORDER — PANTOPRAZOLE SODIUM 40 MG/1
40 TABLET, DELAYED RELEASE ORAL DAILY
Qty: 30 TABLET | Refills: 0 | Status: SHIPPED | OUTPATIENT
Start: 2020-07-10 | End: 2020-07-23 | Stop reason: HOSPADM

## 2020-07-10 RX ORDER — OXYCODONE HYDROCHLORIDE 5 MG/1
2.5 TABLET ORAL EVERY 4 HOURS PRN
Qty: 10 TABLET | Refills: 0 | Status: SHIPPED | OUTPATIENT
Start: 2020-07-10 | End: 2020-07-15 | Stop reason: SDUPTHER

## 2020-07-10 RX ORDER — LOSARTAN POTASSIUM 25 MG/1
25 TABLET ORAL DAILY
Start: 2020-07-10 | End: 2020-07-23 | Stop reason: HOSPADM

## 2020-07-10 RX ORDER — DOCUSATE SODIUM 100 MG/1
100 CAPSULE, LIQUID FILLED ORAL 2 TIMES DAILY
Qty: 10 CAPSULE | Refills: 0
Start: 2020-07-10 | End: 2020-07-23 | Stop reason: HOSPADM

## 2020-07-10 RX ADMIN — HYDROXYCHLOROQUINE SULFATE 200 MG: 200 TABLET, FILM COATED ORAL at 08:07

## 2020-07-10 RX ADMIN — HYDROMORPHONE HYDROCHLORIDE 0.2 MG: 1 INJECTION, SOLUTION INTRAMUSCULAR; INTRAVENOUS; SUBCUTANEOUS at 15:37

## 2020-07-10 RX ADMIN — ONDANSETRON 4 MG: 2 INJECTION INTRAMUSCULAR; INTRAVENOUS at 08:07

## 2020-07-10 RX ADMIN — ONDANSETRON 4 MG: 2 INJECTION INTRAMUSCULAR; INTRAVENOUS at 14:06

## 2020-07-10 RX ADMIN — HYDROMORPHONE HYDROCHLORIDE 0.2 MG: 1 INJECTION, SOLUTION INTRAMUSCULAR; INTRAVENOUS; SUBCUTANEOUS at 08:07

## 2020-07-10 RX ADMIN — POTASSIUM CHLORIDE 40 MEQ: 1500 TABLET, EXTENDED RELEASE ORAL at 08:07

## 2020-07-10 RX ADMIN — OXYBUTYNIN 5 MG: 5 TABLET, FILM COATED, EXTENDED RELEASE ORAL at 08:07

## 2020-07-10 RX ADMIN — IOHEXOL 85 ML: 350 INJECTION, SOLUTION INTRAVENOUS at 10:38

## 2020-07-10 RX ADMIN — ASPIRIN 81 MG: 81 TABLET, DELAYED RELEASE ORAL at 08:07

## 2020-07-10 RX ADMIN — KETOROLAC TROMETHAMINE 1 DROP: 5 SOLUTION OPHTHALMIC at 08:07

## 2020-07-10 RX ADMIN — OXYCODONE HYDROCHLORIDE 2.5 MG: 5 TABLET ORAL at 01:07

## 2020-07-10 RX ADMIN — LEVOTHYROXINE SODIUM 50 MCG: 50 TABLET ORAL at 05:32

## 2020-07-10 RX ADMIN — PANTOPRAZOLE SODIUM 40 MG: 40 TABLET, DELAYED RELEASE ORAL at 05:32

## 2020-07-10 RX ADMIN — METOPROLOL SUCCINATE 100 MG: 100 TABLET, EXTENDED RELEASE ORAL at 08:07

## 2020-07-10 RX ADMIN — ENOXAPARIN SODIUM 40 MG: 40 INJECTION SUBCUTANEOUS at 08:07

## 2020-07-10 RX ADMIN — POTASSIUM CHLORIDE 40 MEQ: 1500 TABLET, EXTENDED RELEASE ORAL at 11:40

## 2020-07-10 RX ADMIN — OXYCODONE HYDROCHLORIDE 2.5 MG: 5 TABLET ORAL at 05:32

## 2020-07-10 RX ADMIN — OXYCODONE HYDROCHLORIDE 2.5 MG: 5 TABLET ORAL at 11:40

## 2020-07-10 RX ADMIN — Medication 1000 MG: at 08:07

## 2020-07-10 RX ADMIN — LOSARTAN POTASSIUM 25 MG: 25 TABLET, FILM COATED ORAL at 08:07

## 2020-07-10 NOTE — PLAN OF CARE
Problem: Potential for Falls  Goal: Patient will remain free of falls  Description  INTERVENTIONS:  - Assess patient frequently for physical needs  -  Identify cognitive and physical deficits and behaviors that affect risk of falls  -  [place] fall precautions as indicated by assessment   - Educate patient/family on patient safety including physical limitations  - Instruct patient to call for assistance with activity based on assessment  - Modify environment to reduce risk of injury  - Consider OT/PT consult to assist with strengthening/mobility  Outcome: Progressing     Problem: PAIN - ADULT  Goal: Verbalizes/displays adequate comfort level or baseline comfort level  Description  Interventions:  - Encourage patient to monitor pain and request assistance  - Assess pain using numeric pain rating scale  - Administer analgesics based on type and severity of pain and evaluate response  - Implement non-pharmacological measures as appropriate and evaluate response  - Consider cultural and social influences on pain and pain management  - Notify physician/advanced practitioner if interventions unsuccessful or patient reports new pain   Outcome: Progressing     Problem: SAFETY ADULT  Goal: Patient will remain free of falls  Description  INTERVENTIONS:  - Assess patient frequently for physical needs  -  Identify cognitive and physical deficits and behaviors that affect risk of falls    -  [place] fall precautions as indicated by assessment   - Educate patient/family on patient safety including physical limitations  - Instruct patient to call for assistance with activity based on assessment  - Modify environment to reduce risk of injury  - Consider OT/PT consult to assist with strengthening/mobility  Outcome: Progressing     Problem: DISCHARGE PLANNING  Goal: Discharge to home or other facility with appropriate resources  Description  INTERVENTIONS:  - Identify barriers to discharge w/patient and caregiver  - Arrange for needed discharge resources and transportation as appropriate  - Identify discharge learning needs (meds, wound care, etc )  - Arrange for interpretive services to assist at discharge as needed  - Refer to Case Management Department for coordinating discharge planning if the patient needs post-hospital services based on physician/advanced practitioner order or complex needs related to functional status, cognitive ability, or social support system  Outcome: Progressing     Problem: Knowledge Deficit  Goal: Patient/family/caregiver demonstrates understanding of disease process, treatment plan, medications, and discharge instructions  Description  Complete learning assessment and assess knowledge base  Interventions:  - Provide teaching at level of understanding  - Provide teaching via preferred learning methods  Outcome: Progressing     Problem: Nutrition/Hydration-ADULT  Goal: Nutrient/Hydration intake appropriate for improving, restoring or maintaining nutritional needs  Description  Monitor and assess patient's nutrition/hydration status for malnutrition  Collaborate with interdisciplinary team and initiate plan and interventions as ordered  Monitor patient's weight and dietary intake as ordered or per policy  Utilize nutrition screening tool and intervene as necessary  Determine patient's food preferences and provide high-protein, high-caloric foods as appropriate       INTERVENTIONS:  - Monitor oral intake, urinary output, labs, and treatment plans  - Assess nutrition and hydration status and recommend course of action  - Evaluate amount of meals eaten  - Assist patient with eating if necessary   - Allow adequate time for meals  - Recommend/ encourage appropriate diets, oral nutritional supplements, and vitamin/mineral supplements  - Order, calculate, and assess calorie counts as needed  - Assess need for intravenous fluids  - Provide specific nutrition/hydration education as appropriate  - Include patient/family/caregiver in decisions related to nutrition   Outcome: Progressing

## 2020-07-10 NOTE — ASSESSMENT & PLAN NOTE
Mild, likely secondary to poor solute intake,? 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

## 2020-07-10 NOTE — PHYSICAL THERAPY NOTE
PT TREATMENT     07/10/20 1115   Pain Assessment   Pain Assessment Tool 0-10   Pain Score 5   Pain Location/Orientation Location: Back; Location: Hip;Orientation: Right   Restrictions/Precautions   Other Precautions Fall Risk;Pain   General   Chart Reviewed Yes   Family/Caregiver Present No   Subjective   Subjective "better"   Bed Mobility   Supine to Sit 7  Independent   Sit to Supine 7  Independent   Transfers   Sit to Stand 7  Independent   Stand to Sit 7  Independent   Ambulation/Elevation   Gait pattern   (mildly unsteady at times)   Gait Assistance 5  Supervision   Additional items Assist x 1;Verbal cues; Tactile cues   Assistive Device SPC   Distance 150 feet with change in direction;pt declined stair training   Balance   Static Sitting Good   Static Standing Fair +   Dynamic Standing Fair   Ambulatory Fair -   Activity Tolerance   Activity Tolerance Patient limited by fatigue;Patient limited by pain; Patient tolerated treatment well   Assessment   Assessment Pt is doing better with bed mob, trans, gait with cane and overall functional mobility  Pt would like to continue to amb with cane and does not want a RW  If pt needs a RW in the future, she will pick one up on her own  Pt will benefit from home PT on dc  Plan   Treatment/Interventions ADL retraining;Functional transfer training;LE strengthening/ROM; Elevations; Therapeutic exercise; Endurance training;Patient/family training;Equipment eval/education; Bed mobility;Gait training; Compensatory technique education   PT Frequency 5x/wk   Recommendation   PT Discharge Recommendation Home with skilled therapy   Equipment Recommended   (pt has a cane)   Licensure   [place] License Number  Giancarlo Tillman [id number]

## 2020-07-10 NOTE — NURSING NOTE
Patient discharged from [address] [place] [address]  Iv removed prior to discharge  Patient left via wheelchair and accompanied by RN  Patient left with all their belongings  One prescription was written and given two patient  Two lab slips were written and given to patient  Discharge instructions gone over with patient  All questions answered

## 2020-07-10 NOTE — ASSESSMENT & PLAN NOTE
Patient reports low blood pressure recently with systolic in 923T, also reports symptoms of lightheadedness  Blood pressure remains soft  · Continue metoprolol  · Decreased losartan to 25 mg, hold for systolic less than 818  · Hold Norvasc at present  · Also holding spironolactone  · Monitor BP at home  Continue losartan 25 mg p o  Daily with holding parameter

## 2020-07-10 NOTE — ASSESSMENT & PLAN NOTE
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

## 2020-07-10 NOTE — TELEPHONE ENCOUNTER
lmom for daughter Ramesh anthony to inform her that her mom is to hold her xarelto 2 days prior to EUS on 07/14/20

## 2020-07-10 NOTE — ASSESSMENT & PLAN NOTE
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

## 2020-07-10 NOTE — DISCHARGE SUMMARY
Discharge- Felix Easton 1940, 79 y o  female MRN: [id number]    Unit/Bed#: [address] [place] [address] Encounter: [id number]    Primary Care Provider: No primary care provider on file  Date and time admitted to hospital: 7/3/2020  7:14 AM        * Pancreatic mass  Assessment & Plan  Patient presented with bandlike upper abdominal discomfort gradually worsening over last 2-2 5 weeks  CT scan revealed large pancreatic head mass concerning for pancreatic carcinoma with mass effect on superior mesenteric vein with possible tumor extension with tumor extension in portal vein  Probable periportal adenopathy  Nonspecific subcentimeter hepatic lesions  Likely malignancy, results were discussed with patient  MRI abdomen - Complex somewhat heterogeneous pancreatic head mass measuring approximately 5 7 x 4 7 x 7 7 cm, suspicious for pancreatic adenocarcinoma  Small focus of tumor thrombus at the portal confluence extending into the SMV  EGD yesterday found ulcerated duodenal mass  Mass was friable with oozing  Biopsies taken  · CA 19 -9 normal  · GI evaluation appreciated  · Suspect extension of pancreatic mass into duodenum  · GI recommend continue PPI  · Oncology evaluation, appreciate input  Patient will be evaluated with GI tumor Board  · Pending biopsy results of the duodenal mass, patient may not need to undergo EUS biopsy as outpatient  · Advance diet to soft diet and monitor for bleeding  No BM past 2 days  · Spoke to cardiologist Dr Anaya regarding holding AC,he is ok to hold Xarelto for now  Continue aspirin  · Patient is cleared for discharge by GI and oncologist   Repeat CBC on Monday in 2 days  Results will be faxed to oncologist    · Follow-up PCP in 1 week  Patient will be following up with [facility] medical post discharge          Acute pancreatitis  Assessment & Plan  Likely secondary to above  Lipase level was 3009 at the time of admission  CT scan revealed gallstone but no biliary ductal dilatation, LFT normal  Triglyceride within normal limit  Lipase is down trended to normal   · Discontinued IV fluid   · Diet advanced to soft diet  Tolerating well  · closely monitor volume status given systolic CHF  · Pain control  · Monitor abdominal exam  · GI following, input appreciated    Anemia  Assessment & Plan  Patient's hemoglobin noted to be 8 grams/deciliter compared to 12 5 grams/deciliter last month after patient was started on anticoagulant  Hemoglobin slowly down trended 7 0 grams/deciliter  without any evidence of overt bleeding, possibly dilution component on 7/6  Normocytic  Iron study noted, iron saturation 11%  B12 and folate elevated  · Received one unit PRBC 7/6  Hemoglobin remains  8-9's post transfusion  Hemoglobin 8 4 this morning  · Patient is at high risk of thromboembolism due to persistent AFib, recent CVA and now underlying malignancy  · Status post EGD found ulcerated duodenal mass with extrinsic compression on the antrum, pre-pyloric area  Likely source of bleeding  Biopsies taken  Suspect pancreatic lesion eroding into duodenum  · Colonoscopy yesterday - poor preparation, liquid brown stool mixed with some blood  · GI recommend to hold [facility]  Ok to have prophylactic Lovenox  · Spoke to patient's cardiologist Dr Bruna Bamberger to hold Xarelto for now  · Discussed with hematology/oncology, will discharge patient home today  Repeat CBC in 2 days  Result will be faxed to their office  · Oncology recommend IV Venofer which can be arrange as outpatient as well  Hyponatremia  Assessment & Plan  Mild, likely secondary to poor solute intake,? 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  Continue aspirin  Hold Xarelto(switched to Eliquis by Cardiology), and therapeutic Lovenox at present due to ulcerated duodenal mass  Risk of recurrent CVA explained to patient and daughter  They verbalized understanding  Other persistent atrial fibrillation [facility])  Assessment & Plan  Controlled  Continue metoprolol  Hold AC due to above  Urinary dysfunction  Assessment & Plan  On toviaz, will substitute with oxybutynin    CKD (chronic kidney disease) stage 3, GFR 30-59 ml/min ([facility])  Assessment & Plan  Baseline creatinine appears to be 0 9-1 2, lower than prior? Decreasing muscle mass  Currently at baseline  Monitor    Hypertension  Assessment & Plan  Patient reports low blood pressure recently with systolic in 056T, also reports symptoms of lightheadedness  Blood pressure remains soft  · Continue metoprolol  · Decreased losartan to 25 mg, hold for systolic less than 589  · Hold Norvasc at present  · Also holding spironolactone  · Monitor BP at home  Continue losartan 25 mg p o  Daily with holding parameter  CAD (coronary artery disease)  Assessment & Plan  Status post CABG and subsequent PCI x2  Continue aspirin, metoprolol    Chronic systolic congestive heart failure (HCC)  Assessment & Plan  Wt Readings from Last 3 Encounters:   07/10/20 63 1 kg (139 lb 1 8 oz)   06/12/20 63 5 kg (140 lb)   06/05/20 63 9 kg (140 lb 14 oz)     Weight is lower than prior on admission  Appears euvolemic at present  Chest x-ray without any acute abnormality  Discontinued IV fluid, discontinued salt tablet  Monitor daily weight, intake output  Noted weight gain but patient appears dry clinically  Will continue to hold spironolactone  Monitor weight at home  May take as needed if weight gain noted or leg edema noted      Right hip pain  Assessment & Plan  Status post right hip replacement in January 2018  Patient is reporting ongoing right hip discomfort, over last few months  Right hip and prosthesis appears unremarkable on the CT scan, x-ray unremarkable  Exam is benign  · Continue pain control, trial of local heat  · Ortho follow-up with consideration of further workup i e  MRI  · Patient was seen by Orthopedic surgery, appreciate input  · Weight-bearing as tolerated  Pain control  Recommend outpatient follow-up with pain management  · PT/OT- recommend home therapy  · Discharge patient home today with pain control and VNA  Refer patient to pain management on discharge  Hypothyroid  Assessment & Plan  Continue Synthroid    Hyperlipidemia  Assessment & Plan  Intolerant to statin and Zetia  On Vascepa at home, substitute with fish oil  Outpatient awaiting P CS K 9 inhibitor        Discharging Physician / Practitioner: MAYUR Davidson  PCP: No primary care provider on file  Admission Date: 7/3/2020  Discharge Date: 07/10/20    Reason for Admission: Hip Pain (pt c/o abdominal pain raidiating to her right artificial hip & constipation  says recent eye surgery & stroke & medication changes with blood thinners that "dont agree with me"  ); Abdominal Pain; and Constipation        Resolved Problems  Date Reviewed: 7/9/2020          Resolved    Leukocytosis 7/6/2020     Resolved by  Anita Baumann MD          Consultations During Hospital Stay:  IP CONSULT TO GASTROENTEROLOGY  IP CONSULT TO ONCOLOGY  IP CONSULT TO ORTHOPEDIC SURGERY    Procedures Performed:     · EGD and colonoscopy    Significant Findings / Test Results:     · As below  Results from last 7 days   Lab Units 07/10/20  0606 07/09/20  1817 07/09/20  0645  07/08/20  0708   WBC Thousand/uL 8 81  --  9 41  --  9 74   HEMOGLOBIN g/dL 8 4* 9 2* 7 8*   < > 7 9*   PLATELETS Thousands/uL 223  --  220  --  222    < > = values in this interval not displayed       Results from last 7 days   Lab Units 07/10/20  0606 07/09/20  0645 07/08/20  0708  07/06/20  0625 07/05/20  0533 07/04/20  0449   SODIUM mmol/L 131* 132* 133*   < > 134* 130* 131* POTASSIUM mmol/L 3 3* 3 7 3 8   < > 3 6 3 9 4 2   CHLORIDE mmol/L 97* 99* 99*   < > 101 98* 99*   CO2 mmol/L 24 23 26   < > 26 26 22   BUN mg/dL 11 11 11   < > 13 14 19   CREATININE mg/dL 1 00 0 88 0 96   < > 1 14 0 89 1 09   CALCIUM mg/dL 7 9* 7 7* 7 9*   < > 8 1* 8 0* 8 0*   TOTAL BILIRUBIN mg/dL  --   --   --   --  0 40 0 50 0 60   ALK PHOS U/L  --   --   --   --  57 66 67   ALT U/L  --   --   --   --  25 22 22   AST U/L  --   --   --   --  26 24 21    < > = values in this interval not displayed  Lab Results   Component Value Date/Time    HGBA1C 6 4 (H) 06/04/2020 05:39 AM             Blood Culture: No results found for: BLOODCX  Urine Culture:   Lab Results   Component Value Date    URINECX (A) 12/11/2018     >100,000 cfu/ml Klebsiella oxytoca Raoultella ornithinolytica    URINECX No Growth <1000 cfu/mL 02/03/2018    URINECX 10,000-19,000 cfu/ml Mixed Contaminants X3 06/01/2016     Sputum Culture: No components found for: SPUTUMCX  Wound Culture: No results found for: WOUNDCULT     CT chest w contrast   Final Result by Lois Smith MD (07/10 1108)   1  Enlarged left thyroid lobe with superior mediastinal extension  2  Nonspecific nodules as described  3  Gastrohepatic nodules as seen on recent CT  Workstation performed: [id number]         MRI abdomen w wo contrast and mrcp   Final Result by Kevin Smith MD (07/07 9761)      Complex somewhat heterogeneous pancreatic head mass measuring approximately 5 7 x 4 7 x 7 7 cm, #11/161 and #12/72, suspicious for pancreatic adenocarcinoma  Small focus of tumor thrombus at the portal confluence extending into the SMV  The study was marked in [facility] for immediate notification  Workstation performed: [id number]         XR hip/pelv 2-3 vws right if performed   Final Result by Debbie Warner MD (07/05 0896)      No acute osseous abnormality        Workstation performed: [id number]         XR chest 1 view   Final Result by Gurwinder Price MD (07/03 2022)      No acute cardiopulmonary disease  Workstation performed: [id number]         CT abdomen pelvis with contrast   Final Result by Pinky Anaya MD (07/03 1019)      1  Interim development of large pancreatic head mass concerning for pancreatic carcinoma  There is mass effect upon the superior mesenteric vein with possible gland and/or tumor thrombus within it  Splenic, superior mesenteric, and portal venous    system remain patent, however  Probable periportal adenopathy  No definite biliary obstruction appreciated  2   Nonspecific subcentimeter low-attenuation hepatic lesions as noted  Metastasis not excluded  3   Gallstones  4   Nonspecific 9 mm splenic low-attenuation noncystic lesion  5   Additional findings as noted  I personally discussed this study with Kirby Ramos on 7/3/2020 at 10:16 AM                    Workstation performed: [id number]                Incidental Findings:   · None     Test Results Pending at Discharge (will require follow up):   · EGD biopsy     Outpatient Tests Requested:  · BMP, CBC in 3 days    Complications:  None    Reason for Admission:   Chief Complaint   Patient presents with    Hip Pain     pt c/o abdominal pain raidiating to her right artificial hip & constipation  says recent eye surgery & stroke & medication changes with blood thinners that "dont agree with me"   Abdominal Pain    Constipation       Hospital Course:     Per HPI: Isaiah Pace is a 79 y o  female patient with a PMH of assistant AFib/a angelitoer, CHF, CKD, CAD, hypertension, hypothyroid, anemia who originally presented to the hospital on 7/3/2020 due to pancreatic mass , anemia and acute pancreatitis  Patient underwent EGD and colonoscopy  EGD found ulcerated friable duodenal mass with oozing, biopsies taken  Suspect pancreatic lesion eroding into duodenum  Patient received 1 unit RBC during her stay for hemoglobin 7 0  Hemoglobin remains around 8-9's post transfusion  Patient was seen by oncologist   Patient will be evaluated with GI tumor Board  GI recommend holding a c  And continue PPI  GI will follow-up on biopsy pathology  Patient may not need EUS if biopsy pathology confirms pancreatic cancer  Discussed with Cardiology regarding holding AC due to anemia and friable ulcerated duodenal ulcer, okay to hold and continue aspirin  Spoke to patient and daughter regarding holding a c ,risk and benefit explained, they verbalized understanding  Patient tolerating soft diet  Remains hemodynamically stable  Will discharge patient home today with VNA  Repeat CBC in 3 days on Monday and result will be faxed to oncology  Discussed discharge with GI, oncology, all agreeable  Acute pancreatitis resolved with IV hydration  Spoke to daughter over the phone, all questions answered  Hospital Course:    Please see above list of diagnoses and related plan for additional information  Condition at Discharge: fair       Discharge Day Visit / Exam:     Subjective:  Patient reports minimal abdominal pain  Reports right hip pain  Denies chest pain, headaches, dizziness, SOB, nausea, vomiting  No BM since bowel prep 2 days ago  Vitals: Blood Pressure: 131/77 (07/10/20 1530)  Pulse: 68 (07/10/20 1530)  Temperature: 98 8 °F (37 1 °C) (07/10/20 1530)  Temp Source: Oral (07/10/20 0809)  Respirations: 17 (07/10/20 1530)  Height: 5' 4" (162 6 cm) (07/04/20 0941)  Weight - Scale: 63 1 kg (139 lb 1 8 oz) (07/10/20 0600)  SpO2: 99 % (07/10/20 1530)  Exam:   Physical Exam   Constitutional: She is oriented to person, place, and time  She appears well-developed  HENT:   Head: Normocephalic and atraumatic  Neck: Normal range of motion  Neck supple  No JVD present  No tracheal deviation present  No thyromegaly present  Cardiovascular: Normal rate and regular rhythm  No murmur heard    Pulmonary/Chest: Effort normal and breath sounds normal  No respiratory distress  She has no wheezes  She has no rales  Abdominal: Soft  Bowel sounds are normal  She exhibits no distension  There is no tenderness  There is no guarding  Musculoskeletal: She exhibits no edema, tenderness or deformity  Neurological: She is alert and oriented to person, place, and time  Skin: Skin is warm and dry  Psychiatric: She has a normal mood and affect  Judgment normal    Nursing note and vitals reviewed  Discharge instructions/Information to patient and family:   See after visit summary for information provided to patient and family  Provisions for Follow-Up Care:  See after visit summary for information related to follow-up care and any pertinent home health orders  Disposition:     Home with VNA Services (Reminder: Complete face to face encounter)    Planned Readmission: no     Discharge Statement:  I spent 40 minutes discharging the patient  This time was spent on the day of discharge  I had direct contact with the patient on the day of discharge  Greater than 50% of the total time was spent examining patient, answering all patient questions, arranging and discussing plan of care with patient as well as directly providing post-discharge instructions  Additional time then spent on discharge activities  Discharge Medications:  See after visit summary for reconciled discharge medications provided to patient and family        ** Please Note: This note has been constructed using a voice recognition system **

## 2020-07-10 NOTE — ASSESSMENT & PLAN NOTE
Likely secondary to above  Lipase level was 3009 at the time of admission  CT scan revealed gallstone but no biliary ductal dilatation, LFT normal  Triglyceride within normal limit  Lipase is down trended to normal   · Discontinued IV fluid   · Diet advanced to soft diet  Tolerating well    · closely monitor volume status given systolic CHF  · Pain control  · Monitor abdominal exam  · GI following, input appreciated

## 2020-07-10 NOTE — ASSESSMENT & PLAN NOTE
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

## 2020-07-10 NOTE — PLAN OF CARE
Problem: Potential for Falls  Goal: Patient will remain free of falls  Description  INTERVENTIONS:  - Assess patient frequently for physical needs  -  Identify cognitive and physical deficits and behaviors that affect risk of falls  -  [place] fall precautions as indicated by assessment   - Educate patient/family on patient safety including physical limitations  - Instruct patient to call for assistance with activity based on assessment  - Modify environment to reduce risk of injury  - Consider OT/PT consult to assist with strengthening/mobility  7/10/2020 1703 by Seferino Gibson RN  Outcome: Adequate for Discharge  7/10/2020 1029 by Seferino Gibson RN  Outcome: Progressing     Problem: PAIN - ADULT  Goal: Verbalizes/displays adequate comfort level or baseline comfort level  Description  Interventions:  - Encourage patient to monitor pain and request assistance  - Assess pain using numeric pain rating scale  - Administer analgesics based on type and severity of pain and evaluate response  - Implement non-pharmacological measures as appropriate and evaluate response  - Consider cultural and social influences on pain and pain management  - Notify physician/advanced practitioner if interventions unsuccessful or patient reports new pain   7/10/2020 1703 by Seferino Gibson RN  Outcome: Adequate for Discharge  7/10/2020 1029 by Seferino Gibson RN  Outcome: Progressing     Problem: SAFETY ADULT  Goal: Patient will remain free of falls  Description  INTERVENTIONS:  - Assess patient frequently for physical needs  -  Identify cognitive and physical deficits and behaviors that affect risk of falls    -  [place] fall precautions as indicated by assessment   - Educate patient/family on patient safety including physical limitations  - Instruct patient to call for assistance with activity based on assessment  - Modify environment to reduce risk of injury  - Consider OT/PT consult to assist with strengthening/mobility  7/10/2020 1703 by Lemont Prader, RN  Outcome: Adequate for Discharge  7/10/2020 1029 by Lemont Prader, RN  Outcome: Progressing     Problem: DISCHARGE PLANNING  Goal: Discharge to home or other facility with appropriate resources  Description  INTERVENTIONS:  - Identify barriers to discharge w/patient and caregiver  - Arrange for needed discharge resources and transportation as appropriate  - Identify discharge learning needs (meds, wound care, etc )  - Arrange for interpretive services to assist at discharge as needed  - Refer to Case Management Department for coordinating discharge planning if the patient needs post-hospital services based on physician/advanced practitioner order or complex needs related to functional status, cognitive ability, or social support system  7/10/2020 1703 by Lemont Prader, RN  Outcome: Adequate for Discharge  7/10/2020 1029 by Lemont Prader, RN  Outcome: Progressing     Problem: Knowledge Deficit  Goal: Patient/family/caregiver demonstrates understanding of disease process, treatment plan, medications, and discharge instructions  Description  Complete learning assessment and assess knowledge base  Interventions:  - Provide teaching at level of understanding  - Provide teaching via preferred learning methods  7/10/2020 1703 by Lemont Prader, RN  Outcome: Adequate for Discharge  7/10/2020 1029 by Lemont Prader, RN  Outcome: Progressing     Problem: Nutrition/Hydration-ADULT  Goal: Nutrient/Hydration intake appropriate for improving, restoring or maintaining nutritional needs  Description  Monitor and assess patient's nutrition/hydration status for malnutrition  Collaborate with interdisciplinary team and initiate plan and interventions as ordered  Monitor patient's weight and dietary intake as ordered or per policy  Utilize nutrition screening tool and intervene as necessary  Determine patient's food preferences and provide high-protein, high-caloric foods as appropriate       INTERVENTIONS:  - Monitor oral intake, urinary output, labs, and treatment plans  - Assess nutrition and hydration status and recommend course of action  - Evaluate amount of meals eaten  - Assist patient with eating if necessary   - Allow adequate time for meals  - Recommend/ encourage appropriate diets, oral nutritional supplements, and vitamin/mineral supplements  - Order, calculate, and assess calorie counts as needed  - Assess need for intravenous fluids  - Provide specific nutrition/hydration education as appropriate  - Include patient/family/caregiver in decisions related to nutrition   7/10/2020 1703 by Eula Ash RN  Outcome: Adequate for Discharge  7/10/2020 1029 by Eula Ash RN  Outcome: Progressing

## 2020-07-10 NOTE — ASSESSMENT & PLAN NOTE
Patient presented with bandlike upper abdominal discomfort gradually worsening over last 2-2 5 weeks  CT scan revealed large pancreatic head mass concerning for pancreatic carcinoma with mass effect on superior mesenteric vein with possible tumor extension with tumor extension in portal vein  Probable periportal adenopathy  Nonspecific subcentimeter hepatic lesions  Likely malignancy, results were discussed with patient  MRI abdomen - Complex somewhat heterogeneous pancreatic head mass measuring approximately 5 7 x 4 7 x 7 7 cm, suspicious for pancreatic adenocarcinoma  Small focus of tumor thrombus at the portal confluence extending into the SMV  EGD yesterday found ulcerated duodenal mass  Mass was friable with oozing  Biopsies taken  · CA 19 -9 normal  · GI evaluation appreciated  · Suspect extension of pancreatic mass into duodenum  · GI recommend continue PPI  · Oncology evaluation, appreciate input  Patient will be evaluated with GI tumor Board  · Pending biopsy results of the duodenal mass, patient may not need to undergo EUS biopsy as outpatient  · Advance diet to soft diet and monitor for bleeding  No BM past 2 days  · Spoke to cardiologist Dr Anaya regarding holding AC,he is ok to hold Xarelto for now  Continue aspirin  · Patient is cleared for discharge by GI and oncologist   Repeat CBC on Monday in 2 days  Results will be faxed to oncologist    · Follow-up PCP in 1 week  Patient will be following up with [facility] medical post discharge

## 2020-07-10 NOTE — ASSESSMENT & PLAN NOTE
Wt Readings from Last 3 Encounters:   07/10/20 63 1 kg (139 lb 1 8 oz)   06/12/20 63 5 kg (140 lb)   06/05/20 63 9 kg (140 lb 14 oz)     Weight is lower than prior on admission  Appears euvolemic at present  Chest x-ray without any acute abnormality  Discontinued IV fluid, discontinued salt tablet  Monitor daily weight, intake output  Noted weight gain but patient appears dry clinically  Will continue to hold spironolactone  Monitor weight at home  May take as needed if weight gain noted or leg edema noted

## 2020-07-10 NOTE — DISCHARGE INSTRUCTIONS
Repeat CBC BMP on Monday  Results will be faxed to oncologist   Please follow-up with oncologist on Monday for result  GI will confirm with you on Monday wether you will need EUS(pancreas biopsy) or not  Continue aspirin  Continue Protonix daily  Monitor BP at home  Home Losartan for SBP < 130  Decreased Losartan dose to half  Goal systolic -155  Please return to ED for dizziness, lightheadedness, chest pain, active GI bleeding/bloody diarrhea or tarry stool  Monitor weight at home  Hold off on Aldactone  Can take prn if weight gain or leg edema noted

## 2020-07-11 NOTE — SOCIAL WORK
Note for 7/11/20: CM faxed over in [place] - pt's AVS/F2F to [facility]  Spoke with Beatrice Rojas, on 7/11/20 -  and she is aware that pt discharged 7/10/20, late afternoon

## 2020-07-13 ENCOUNTER — APPOINTMENT (OUTPATIENT)
Dept: LAB | Facility: CLINIC | Age: 80
End: 2020-07-13
Payer: MEDICARE

## 2020-07-13 ENCOUNTER — OFFICE VISIT (OUTPATIENT)
Dept: FAMILY MEDICINE CLINIC | Facility: CLINIC | Age: 80
End: 2020-07-13
Payer: MEDICARE

## 2020-07-13 VITALS
DIASTOLIC BLOOD PRESSURE: 70 MMHG | SYSTOLIC BLOOD PRESSURE: 142 MMHG | BODY MASS INDEX: 24.07 KG/M2 | RESPIRATION RATE: 16 BRPM | WEIGHT: 141 LBS | HEIGHT: 64 IN | TEMPERATURE: 97.9 F | HEART RATE: 68 BPM

## 2020-07-13 DIAGNOSIS — N18.30 BENIGN HYPERTENSION WITH CHRONIC KIDNEY DISEASE, STAGE III (HCC): Chronic | ICD-10-CM

## 2020-07-13 DIAGNOSIS — I48.19 OTHER PERSISTENT ATRIAL FIBRILLATION (HCC): ICD-10-CM

## 2020-07-13 DIAGNOSIS — E78.00 PURE HYPERCHOLESTEROLEMIA: Chronic | ICD-10-CM

## 2020-07-13 DIAGNOSIS — D64.9 ANEMIA: ICD-10-CM

## 2020-07-13 DIAGNOSIS — I50.22 CHRONIC SYSTOLIC CONGESTIVE HEART FAILURE (HCC): Chronic | ICD-10-CM

## 2020-07-13 DIAGNOSIS — D64.9 ANEMIA, UNSPECIFIED TYPE: ICD-10-CM

## 2020-07-13 DIAGNOSIS — E03.9 HYPOTHYROIDISM, UNSPECIFIED TYPE: ICD-10-CM

## 2020-07-13 DIAGNOSIS — I12.9 BENIGN HYPERTENSION WITH CHRONIC KIDNEY DISEASE, STAGE III (HCC): Chronic | ICD-10-CM

## 2020-07-13 DIAGNOSIS — M25.551 RIGHT HIP PAIN: ICD-10-CM

## 2020-07-13 DIAGNOSIS — E87.1 HYPONATREMIA: ICD-10-CM

## 2020-07-13 DIAGNOSIS — K86.89 PANCREATIC MASS: Primary | ICD-10-CM

## 2020-07-13 DIAGNOSIS — I25.10 CORONARY ARTERY DISEASE INVOLVING NATIVE CORONARY ARTERY OF NATIVE HEART WITHOUT ANGINA PECTORIS: Chronic | ICD-10-CM

## 2020-07-13 DIAGNOSIS — N18.30 CKD (CHRONIC KIDNEY DISEASE) STAGE 3, GFR 30-59 ML/MIN (HCC): ICD-10-CM

## 2020-07-13 DIAGNOSIS — N18.30 CHRONIC KIDNEY DISEASE, STAGE III (MODERATE) (HCC): Chronic | ICD-10-CM

## 2020-07-13 DIAGNOSIS — I63.412 CEREBROVASCULAR ACCIDENT (CVA) DUE TO EMBOLISM OF LEFT MIDDLE CEREBRAL ARTERY (HCC): ICD-10-CM

## 2020-07-13 DIAGNOSIS — E04.9 ENLARGED THYROID: ICD-10-CM

## 2020-07-13 LAB
ANION GAP SERPL CALCULATED.3IONS-SCNC: 8 MMOL/L (ref 4–13)
BUN SERPL-MCNC: 15 MG/DL (ref 5–25)
CALCIUM SERPL-MCNC: 9.6 MG/DL (ref 8.3–10.1)
CHLORIDE SERPL-SCNC: 102 MMOL/L (ref 100–108)
CO2 SERPL-SCNC: 24 MMOL/L (ref 21–32)
CREAT SERPL-MCNC: 1.11 MG/DL (ref 0.6–1.3)
ERYTHROCYTE [DISTWIDTH] IN BLOOD BY AUTOMATED COUNT: 15.1 % (ref 11.6–15.1)
GFR SERPL CREATININE-BSD FRML MDRD: 47 ML/MIN/1.73SQ M
GLUCOSE P FAST SERPL-MCNC: 107 MG/DL (ref 65–99)
HCT VFR BLD AUTO: 28.4 % (ref 34.8–46.1)
HGB BLD-MCNC: 8.8 G/DL (ref 11.5–15.4)
MCH RBC QN AUTO: 26.6 PG (ref 26.8–34.3)
MCHC RBC AUTO-ENTMCNC: 31 G/DL (ref 31.4–37.4)
MCV RBC AUTO: 86 FL (ref 82–98)
PLATELET # BLD AUTO: 285 THOUSANDS/UL (ref 149–390)
PMV BLD AUTO: 10.4 FL (ref 8.9–12.7)
POTASSIUM SERPL-SCNC: 4.1 MMOL/L (ref 3.5–5.3)
RBC # BLD AUTO: 3.31 MILLION/UL (ref 3.81–5.12)
SODIUM SERPL-SCNC: 134 MMOL/L (ref 136–145)
WBC # BLD AUTO: 11 THOUSAND/UL (ref 4.31–10.16)

## 2020-07-13 PROCEDURE — 1036F TOBACCO NON-USER: CPT | Performed by: FAMILY MEDICINE

## 2020-07-13 PROCEDURE — 3078F DIAST BP <80 MM HG: CPT | Performed by: FAMILY MEDICINE

## 2020-07-13 PROCEDURE — 1160F RVW MEDS BY RX/DR IN RCRD: CPT | Performed by: FAMILY MEDICINE

## 2020-07-13 PROCEDURE — 3077F SYST BP >= 140 MM HG: CPT | Performed by: FAMILY MEDICINE

## 2020-07-13 PROCEDURE — 80048 BASIC METABOLIC PNL TOTAL CA: CPT

## 2020-07-13 PROCEDURE — 1111F DSCHRG MED/CURRENT MED MERGE: CPT | Performed by: FAMILY MEDICINE

## 2020-07-13 PROCEDURE — 36415 COLL VENOUS BLD VENIPUNCTURE: CPT

## 2020-07-13 PROCEDURE — 3008F BODY MASS INDEX DOCD: CPT | Performed by: FAMILY MEDICINE

## 2020-07-13 PROCEDURE — 85027 COMPLETE CBC AUTOMATED: CPT

## 2020-07-13 PROCEDURE — 99205 OFFICE O/P NEW HI 60 MIN: CPT | Performed by: FAMILY MEDICINE

## 2020-07-13 NOTE — PROGRESS NOTES
Assessment/Plan:    No problem-specific Assessment & Plan notes found for this encounter  Diagnoses and all orders for this visit:    Pancreatic mass  Comments:  GI tumor board meeting this week to discuss case  Pt will then follow up with oncology  At this time she states that she likely will not want treatment if it will make her feel worse  She does have living will  Enlarged thyroid  Comments:  CT chest showed enlarged left thyroid lobe with superior mediastinal extension  Will obtain US  Orders:  -     US thyroid; Future    Hypothyroidism, unspecified type  Comments:  Continue synthroid  Will obtain repeat TSH at next visit  Benign hypertension with chronic kidney disease, stage III (HCC)  Comments:  Continue current medications  Follow up with nephrology  Chronic systolic congestive heart failure (HCC)  Comments:  Continue current medications  Follow up with cardiology  Coronary artery disease involving native coronary artery of native heart without angina pectoris  Comments:  Continue current medications, follow up with cardiology  Other persistent atrial fibrillation ([facility] [address] )  Comments:  Continue current medications  Anticoagulation held  Follow up with cardiology  Cerebrovascular accident (CVA) due to embolism of left middle cerebral artery (HCC)  Comments:  Anticoagulation held due to anemia  F/u neurology as needed  Chronic kidney disease, stage III (moderate) (HCC)  Comments:  Kidney function has been at baseline  Follow up repeat BMP  Pt follows nephrology  Anemia, unspecified type  Comments:  Repeat CBC pending  Pt underwent EGD/colonoscopy  No recent bleeding noted  Follow up with GI  Right hip pain  Comments:  Pt to start PT/OT  Continue oxycodone  Pure hypercholesterolemia  Comments:  Continue current medications  Hyponatremia  Comments:  Repeat BMP pending  Subjective:      Patient ID: Jo-Ann Patel is a 79 y o  female      SARA Xiao Junior Declan Islas is an 80 yo female with history of CAD, CKD, CHF, CVA, a fib/a flutter, hypertension, hyperlipidemia, hypothyroidism, anemia and newly diagnosed pancreatic mass/pancreatitis who presents today for hospital discharge follow up and to establish care  Henrymaria elena Eric was admitted at [facility] from 7/3/20 to 7/10/20 for c/o abdominal pain which was found to be due to pancreatic mass and acute pancreatitis  Pt was also found to be anemic  She underwent EGD and colonoscopy which found ulcerated friable duodenal mass with oozing  Pt received 1 unit RBC for anemia  She was seen by GI and oncology  Biopsies were taken  A GI tumor board will be meeting this week to discuss case  Given her extensive heart disease, pt was on anticoagulation which is currently held due to anemia  She will schedule follow up with her cardiologist    Today pt states that the only thing bothering her is fatigue and significant right hip/groin pain  She is s/p right hip replacement in June 2018  Orthopedics did see her while she was in the hospital recently  CT and XR of the area were unremarkable  Pt states she has been taking oxycodone which does help the pain  Will be starting home PT/OT  The following portions of the patient's history were reviewed and updated as appropriate: allergies, current medications, past family history, past medical history, past social history, past surgical history and problem list     Review of Systems   Constitutional: Positive for fatigue  Negative for activity change, appetite change, chills, diaphoresis and fever  HENT: Negative  Respiratory: Negative for cough, choking, chest tightness, shortness of breath and wheezing  Cardiovascular: Negative for chest pain, palpitations and leg swelling  Gastrointestinal: Negative for abdominal distention, abdominal pain, constipation, diarrhea, nausea and vomiting     Genitourinary: Negative for difficulty urinating, dyspareunia, dysuria, enuresis, flank pain, frequency, menstrual problem, pelvic pain and urgency  Musculoskeletal: Negative for arthralgias, back pain, gait problem, joint swelling, myalgias, neck pain and neck stiffness  Right hip/groin pain   Skin: Negative  Neurological: Negative for dizziness, tremors, syncope, facial asymmetry, weakness, light-headedness, numbness and headaches  Psychiatric/Behavioral: Negative  Objective:      /70   Pulse 68   Temp 97 9 °F (36 6 °C)   Resp 16   Wt 64 kg (141 lb)   BMI 24 20 kg/m²          Physical Exam   Constitutional: She is oriented to person, place, and time  She appears well-developed and well-nourished  No distress  HENT:   Head: Normocephalic and atraumatic  Neck: Normal range of motion  Neck supple  Cardiovascular: Normal rate and intact distal pulses  An irregularly irregular rhythm present  Exam reveals no gallop and no friction rub  Murmur heard  Pulmonary/Chest: Effort normal and breath sounds normal  No respiratory distress  She has no wheezes  She has no rales  She exhibits no tenderness  Abdominal: Soft  Bowel sounds are normal  She exhibits no distension and no mass  There is no tenderness  There is no rebound and no guarding  Musculoskeletal: Normal range of motion  She exhibits no edema, tenderness or deformity  Neurological: She is alert and oriented to person, place, and time  Skin: Skin is warm and dry  She is not diaphoretic  Psychiatric: She has a normal mood and affect   Her behavior is normal  Judgment and thought content normal

## 2020-07-14 ENCOUNTER — PATIENT OUTREACH (OUTPATIENT)
Dept: CASE MANAGEMENT | Facility: OTHER | Age: 80
End: 2020-07-14

## 2020-07-14 NOTE — PROGRESS NOTES
Spoke to the patient's daughter, Em Victor  She & her sister are taking turns staying with the patient  The patient's grandchildren are also providing assistance & support  Laminmarybeth Kayleigh states the patient has been in moderate-severe lower abdominal pain (some relief from pain meds), has a very poor appetite, & is having difficulty sleeping  The family is encouraging small frequent meals & providing supplemental drinks which she is unable to finish  They will discuss continued pain management with GI tomorrow or contact the patient's PCP  Tess Canchola will be making an initial visit on Thursday  Em Victor states the family's goal is to make the patient as comfortable as possible for now  They are anxious to get more information after the biopsy

## 2020-07-15 ENCOUNTER — HOSPITAL ENCOUNTER (OUTPATIENT)
Dept: GASTROENTEROLOGY | Facility: HOSPITAL | Age: 80
Setting detail: OUTPATIENT SURGERY
Discharge: HOME | End: 2020-07-15
Attending: INTERNAL MEDICINE | Admitting: INTERNAL MEDICINE
Payer: MEDICARE

## 2020-07-15 ENCOUNTER — ANESTHESIA EVENT (OUTPATIENT)
Dept: GASTROENTEROLOGY | Facility: HOSPITAL | Age: 80
End: 2020-07-15

## 2020-07-15 ENCOUNTER — ANESTHESIA (OUTPATIENT)
Dept: GASTROENTEROLOGY | Facility: HOSPITAL | Age: 80
End: 2020-07-15

## 2020-07-15 VITALS
HEART RATE: 73 BPM | TEMPERATURE: 99.3 F | OXYGEN SATURATION: 98 % | WEIGHT: 141 LBS | BODY MASS INDEX: 24.07 KG/M2 | RESPIRATION RATE: 18 BRPM | SYSTOLIC BLOOD PRESSURE: 139 MMHG | DIASTOLIC BLOOD PRESSURE: 68 MMHG | HEIGHT: 64 IN

## 2020-07-15 DIAGNOSIS — K86.89 PANCREATIC MASS: ICD-10-CM

## 2020-07-15 DIAGNOSIS — M25.551 RIGHT HIP PAIN: ICD-10-CM

## 2020-07-15 PROCEDURE — 43238 EGD US FINE NEEDLE BX/ASPIR: CPT | Performed by: INTERNAL MEDICINE

## 2020-07-15 PROCEDURE — 88305 TISSUE EXAM BY PATHOLOGIST: CPT | Performed by: PATHOLOGY

## 2020-07-15 PROCEDURE — 88173 CYTOPATH EVAL FNA REPORT: CPT | Performed by: PATHOLOGY

## 2020-07-15 PROCEDURE — 88172 CYTP DX EVAL FNA 1ST EA SITE: CPT | Performed by: PATHOLOGY

## 2020-07-15 RX ORDER — FENTANYL CITRATE 50 UG/ML
INJECTION, SOLUTION INTRAMUSCULAR; INTRAVENOUS AS NEEDED
Status: DISCONTINUED | OUTPATIENT
Start: 2020-07-15 | End: 2020-07-15 | Stop reason: SURG

## 2020-07-15 RX ORDER — OXYCODONE HYDROCHLORIDE 5 MG/1
2.5 TABLET ORAL EVERY 4 HOURS PRN
Qty: 10 TABLET | Refills: 0 | Status: SHIPPED | OUTPATIENT
Start: 2020-07-15 | End: 2020-07-20 | Stop reason: SDUPTHER

## 2020-07-15 RX ORDER — LIDOCAINE HYDROCHLORIDE 10 MG/ML
INJECTION, SOLUTION EPIDURAL; INFILTRATION; INTRACAUDAL; PERINEURAL AS NEEDED
Status: DISCONTINUED | OUTPATIENT
Start: 2020-07-15 | End: 2020-07-15 | Stop reason: SURG

## 2020-07-15 RX ORDER — PROPOFOL 10 MG/ML
INJECTION, EMULSION INTRAVENOUS CONTINUOUS PRN
Status: DISCONTINUED | OUTPATIENT
Start: 2020-07-15 | End: 2020-07-15 | Stop reason: SURG

## 2020-07-15 RX ORDER — PROPOFOL 10 MG/ML
INJECTION, EMULSION INTRAVENOUS AS NEEDED
Status: DISCONTINUED | OUTPATIENT
Start: 2020-07-15 | End: 2020-07-15 | Stop reason: SURG

## 2020-07-15 RX ORDER — SODIUM CHLORIDE 9 MG/ML
50 INJECTION, SOLUTION INTRAVENOUS CONTINUOUS
Status: DISCONTINUED | OUTPATIENT
Start: 2020-07-15 | End: 2020-07-19 | Stop reason: HOSPADM

## 2020-07-15 RX ORDER — OXYCODONE HYDROCHLORIDE 5 MG/1
2.5 TABLET ORAL EVERY 4 HOURS PRN
Qty: 10 TABLET | Refills: 0 | Status: SHIPPED | OUTPATIENT
Start: 2020-07-15 | End: 2020-07-15 | Stop reason: SDUPTHER

## 2020-07-15 RX ADMIN — PROPOFOL 100 MCG/KG/MIN: 10 INJECTION, EMULSION INTRAVENOUS at 13:14

## 2020-07-15 RX ADMIN — FENTANYL CITRATE 50 MCG: 50 INJECTION, SOLUTION INTRAMUSCULAR; INTRAVENOUS at 13:14

## 2020-07-15 RX ADMIN — LIDOCAINE HYDROCHLORIDE 50 MG: 10 INJECTION, SOLUTION EPIDURAL; INFILTRATION; INTRACAUDAL; PERINEURAL at 13:14

## 2020-07-15 RX ADMIN — SODIUM CHLORIDE: 0.9 INJECTION, SOLUTION INTRAVENOUS at 13:14

## 2020-07-15 RX ADMIN — PROPOFOL 80 MG: 10 INJECTION, EMULSION INTRAVENOUS at 13:14

## 2020-07-15 RX ADMIN — SODIUM CHLORIDE 50 ML/HR: 0.9 INJECTION, SOLUTION INTRAVENOUS at 12:27

## 2020-07-15 NOTE — ANESTHESIA POSTPROCEDURE EVALUATION
Post-Op Assessment Note    CV Status:  Stable    Pain management: adequate     Mental Status:  Alert and awake   Hydration Status:  Euvolemic   PONV Controlled:  Controlled   Airway Patency:  Patent   Post Op Vitals Reviewed:  Yes              BP     Temp      Pulse     Resp      SpO2

## 2020-07-15 NOTE — ANESTHESIA PREPROCEDURE EVALUATION
Review of Systems/Medical History  Patient summary reviewed  Chart reviewed  No history of anesthetic complications     Cardiovascular  Negative cardio ROS Hyperlipidemia, Hypertension , CAD , History of CABG, History of percutaneous transluminal coronary angioplasty, Dysrhythmias , atrial fibrillation, CHF ,    Pulmonary  Negative pulmonary ROS        GI/Hepatic  Negative GI/hepatic ROS          Negative  ROS Chronic kidney disease stage 3,        Endo/Other  Negative endo/other ROS History of thyroid disease , hypothyroidism,      GYN  Negative gynecology ROS          Hematology  Negative hematology ROS Anemia ,     Musculoskeletal  Negative musculoskeletal ROS Rheumatoid arthritis ,   Arthritis     Neurology  Negative neurology ROS   CVA ,    Psychology   Negative psychology ROS   Chronic opioid dependence        EGD/colonoscopy 7/8/20: IV sedation    R hip hemiarthroplasty 2/1/18: GETA, easy MV, grade 2b with MAC 2 (that is what is charted), art line    EKG 7/3/20: a flutter with variable av block    TTE 6/4/20: LVEF 40-45%, severe basal-mid inferoseptal/basal-mid inferior wall hypokinesis, mild concentric LVH, RV fn nl, LA markedly dilated, neg bubble study, mod MR, mild AS, mild TR, PASP 40mmHg    CT chest 7/10/20:  1  Enlarged left thyroid lobe with superior mediastinal extension  2  Nonspecific nodules as described  3  Gastrohepatic nodules as seen on recent CT  MRI abd 7/7/20:  Complex somewhat heterogeneous pancreatic head mass measuring approximately 5 7 x 4 7 x 7 7 cm, #11/161 and #12/72, suspicious for pancreatic adenocarcinoma      Small focus of tumor thrombus at the portal confluence extending into the SMV      Recent Results (from the past 168 hour(s))   Tissue Exam    Collection Time: 07/08/20  2:51 PM   Result Value Ref Range    Case Report       Surgical Pathology Report                         Case: [id number]                                   Authorizing Provider:  New Streeter MD Collected:           07/08/2020 1451              Ordering Location:     [address] [place] [address] Received:            07/08/2020 918 [address]                                                                      Pathologist:           Meek Fuentes MD                                                         Specimen:    Duodenum, Cold Bx Duodenal Mass                                                            Final Diagnosis       A  Duodenal mass, biopsy:  - Single atypical epithelial cells in neutrophil-rich exudate of acute duodenitis, of uncertain significance - see Note  Note       Routine H&E sections demonstrate mostly intact but acutely inflamed duodenal mucosa, without overt morphologic dysplasia or evidence of neoplasia  Separate portions of single atypical epithelioid cells in neutrophil-rich exudate are seen and are of uncertain significance  Immunohistochemical stains show these cells to be mostly pancytokeratinAE-1/AE-3(+) epithelial cells with ample, sometimes vacuolated cytoplasm and enlarged, atypical nuclei  These studies also show the atypical cells to be negative for expression of macrophage (CD68) and hematolymphoid (CD45) antigens  H&E-stained deeper levels (x 6) of the biopsy material contributed no additional diagnostic findings  Complete morphologic & immunophenotypic studies support the diagnosis of single atypical epithelial cells in neutrophil-rich exudate of acute duodenitis, of uncertain significance  While the atypical epithelial cells may represent shed epithelial cells in an acutely inflamed ulcer, I am concerned about the endoscopic impression of a mass  Accordingly, additional tissue sampling should be considered for definitive exclusion of epithelial neoplasia, if clinically warranted  Final report is faxed by Dr Saint Clair to Dr Rafy Alvarado @ 09:00 AM, 7/13/2020      Clinical Data:  - EGD (7/8/2020, excerpts):  INDICATION: Anemia, POST-OP DIAGNOSIS: See the impression below    Lagrange Organ DETAILS OF PROCEDURE: The patient underwent deep sedation, which was administered by an anesthesia professional  The patient's blood pressure, heart rate, level of consciousness, respirations and oxygen were monitored throughout the procedure  The scope was introducedthrough the mouth and advanced to the second part of the duodenum  Retroflexion was performed in the fundus  The patient experienced no blood loss  The procedure was not difficult  The patient tolerated the procedure well  There were no apparentcomplications    FINDINGS:  · Single invasive and ulcerated mass in the duodenal bulb and 1st part of the duodenum with bleeding after intervention; performed cold biopsy  Large ulcerated mass in the duodenal bulb extending to the 1st portion of the duodenum with extrinsic  compression  Likely source of anemia  Biopsies were taken  · The esophagus appeared normal  · Extrinsic compression from mass  Pictures from colonoscopy associated with upper endoscopy  For prep throughout the colon, some liquid brown stool mixed with old blood was identified, no source of bleeding  Likely from upper GI source    Lagrange Organ IMPRESSION:  Ulcerated duodenal mass with extrinsic compression on the antrum, pre-pyloric area  Likely source of bleeding  Biopsies were taken  Suspect prior pancreatic lesion eroding into duodenum  Otherwise normal stomach and esophagus  Additional Information       All reported additional testing was performed with appropriately reactive controls  These tests were developed and their performance characteristics determined by [facility] or appropriate performing facility, though some tests may be performed on tissues which have not been validated for performance characteristics (such as staining performed on alcohol exposed cell blocks and decalcified tissues)    Results should be interpreted with caution and in the context of the patients clinical condition  These tests may not be cleared or approved by the [place]  Food and Drug Administration, though the FDA has determined that such clearance or approval is not necessary  These tests are used for clinical purposes and they should not be regarded as investigational or for research  This laboratory has been approved by [place] [employer], designated as a high-complexity laboratory and is qualified to perform these tests  Murillo Roller Description       A  The specimen is received in formalin, labeled with the patient's name and hospital number, and is designated "cold biopsy duodenal mass  The specimen consists of multiple tan brown friable irregularly-shaped soft tissue fragments measuring in aggregate of 0 8 x 0 3 x 0 2 cm  The specimen is entirely submitted in a screened cassette  Note: The estimated total formalin fixation time based upon information provided by the submitting clinician and the standard processing schedule is under 72 hours    Mahesh     Hemoglobin and hematocrit, blood    Collection Time: 07/08/20  4:40 PM   Result Value Ref Range    Hemoglobin 8 4 (L) 11 5 - 15 4 g/dL    Hematocrit 27 1 (L) 34 8 - 46 1 %   Basic metabolic panel    Collection Time: 07/09/20  6:45 AM   Result Value Ref Range    Sodium 132 (L) 136 - 145 mmol/L    Potassium 3 7 3 5 - 5 3 mmol/L    Chloride 99 (L) 100 - 108 mmol/L    CO2 23 21 - 32 mmol/L    ANION GAP 10 4 - 13 mmol/L    BUN 11 5 - 25 mg/dL    Creatinine 0 88 0 60 - 1 30 mg/dL    Glucose 81 65 - 140 mg/dL    Calcium 7 7 (L) 8 3 - 10 1 mg/dL    eGFR 62 ml/min/1 73sq m   Magnesium    Collection Time: 07/09/20  6:45 AM   Result Value Ref Range    Magnesium 2 0 1 6 - 2 6 mg/dL   CBC    Collection Time: 07/09/20  6:45 AM   Result Value Ref Range    WBC 9 41 4 31 - 10 16 Thousand/uL    RBC 2 90 (L) 3 81 - 5 12 Million/uL    Hemoglobin 7 8 (L) 11 5 - 15 4 g/dL    Hematocrit 25 0 (L) 34 8 - 46 1 %    MCV 86 82 - 98 fL    MCH 26 9 26 8 - 34 3 pg    MCHC 31 2 (L) 31 4 - 37 4 g/dL    RDW 15 2 (H) 11 6 - 15 1 %    Platelets 160 890 - 423 Thousands/uL    MPV 10 0 8 9 - 12 7 fL   Lipase    Collection Time: 07/09/20  6:45 AM   Result Value Ref Range    Lipase 271 73 - 393 u/L   Hemoglobin and hematocrit, blood    Collection Time: 07/09/20  6:17 PM   Result Value Ref Range    Hemoglobin 9 2 (L) 11 5 - 15 4 g/dL    Hematocrit 29 6 (L) 34 8 - 46 1 %   Type and screen    Collection Time: 07/09/20  6:17 PM   Result Value Ref Range    ABO Grouping B     Rh Factor Positive     Antibody Screen Negative     Specimen Expiration Date [id number]    Basic metabolic panel    Collection Time: 07/10/20  6:06 AM   Result Value Ref Range    Sodium 131 (L) 136 - 145 mmol/L    Potassium 3 3 (L) 3 5 - 5 3 mmol/L    Chloride 97 (L) 100 - 108 mmol/L    CO2 24 21 - 32 mmol/L    ANION GAP 10 4 - 13 mmol/L    BUN 11 5 - 25 mg/dL    Creatinine 1 00 0 60 - 1 30 mg/dL    Glucose 104 65 - 140 mg/dL    Calcium 7 9 (L) 8 3 - 10 1 mg/dL    eGFR 53 ml/min/1 73sq m   Magnesium    Collection Time: 07/10/20  6:06 AM   Result Value Ref Range    Magnesium 2 0 1 6 - 2 6 mg/dL   CBC    Collection Time: 07/10/20  6:06 AM   Result Value Ref Range    WBC 8 81 4 31 - 10 16 Thousand/uL    RBC 3 09 (L) 3 81 - 5 12 Million/uL    Hemoglobin 8 4 (L) 11 5 - 15 4 g/dL    Hematocrit 26 4 (L) 34 8 - 46 1 %    MCV 85 82 - 98 fL    MCH 27 2 26 8 - 34 3 pg    MCHC 31 8 31 4 - 37 4 g/dL    RDW 15 0 11 6 - 15 1 %    Platelets 224 253 - 728 Thousands/uL    MPV 9 2 8 9 - 12 7 fL   Basic metabolic panel (BMP)    Collection Time: 07/13/20  8:52 AM   Result Value Ref Range    Sodium 134 (L) 136 - 145 mmol/L    Potassium 4 1 3 5 - 5 3 mmol/L    Chloride 102 100 - 108 mmol/L    CO2 24 21 - 32 mmol/L    ANION GAP 8 4 - 13 mmol/L    BUN 15 5 - 25 mg/dL    Creatinine 1 11 0 60 - 1 30 mg/dL    Glucose, Fasting 107 (H) 65 - 99 mg/dL    Calcium 9 6 8 3 - 10 1 mg/dL    eGFR 47 ml/min/1 73sq m   CBC and Platelet    Collection Time: 07/13/20  8:52 AM   Result Value Ref Range    WBC 11 00 (H) 4 31 - 10 16 Thousand/uL    RBC 3 31 (L) 3 81 - 5 12 Million/uL    Hemoglobin 8 8 (L) 11 5 - 15 4 g/dL    Hematocrit 28 4 (L) 34 8 - 46 1 %    MCV 86 82 - 98 fL    MCH 26 6 (L) 26 8 - 34 3 pg    MCHC 31 0 (L) 31 4 - 37 4 g/dL    RDW 15 1 11 6 - 15 1 %    Platelets 000 388 - 388 Thousands/uL    MPV 10 4 8 9 - 12 7 fL     SARS-CoV-2 Negative 7/3/20    Physical Exam    Airway  Comment: Small MO  Mallampati score: III  TM Distance: >3 FB  Neck ROM: full     Dental   No notable dental hx     Cardiovascular  Comment: Negative ROS, Rhythm: regular, Rate: normal, No murmur,     Pulmonary  Breath sounds clear to auscultation,     Other Findings        Anesthesia Plan  ASA Score- 3     Anesthesia Type- IV sedation with anesthesia with ASA Monitors  Additional Monitors:   Airway Plan:         Plan Factors-    Induction-     Postoperative Plan-     Informed Consent- Anesthetic plan and risks discussed with patient  I personally reviewed this patient with the CRNA  Discussed and agreed on the Anesthesia Plan with the CRNA  Brandon Bee

## 2020-07-15 NOTE — TELEPHONE ENCOUNTER
Pt needs a refill of the medication  There is only 1 and half left  The Dr  In the hospital originally gave her the meds  It is being seen today to see if she has pancreatic cancer  Would we refill this or be able to send over a short supply  Pls send back to me so I can call the daughter   We can leave a voicemail        [phone]- Robby Cesar

## 2020-07-17 ENCOUNTER — PATIENT OUTREACH (OUTPATIENT)
Dept: HEMATOLOGY ONCOLOGY | Facility: CLINIC | Age: 80
End: 2020-07-17

## 2020-07-17 ENCOUNTER — TELEPHONE (OUTPATIENT)
Dept: HEMATOLOGY ONCOLOGY | Facility: CLINIC | Age: 80
End: 2020-07-17

## 2020-07-17 NOTE — PROGRESS NOTES
Set up appointment for Areli Blanco with Dr Hunter Sow at [facility] on 7/23/20 at 1pm at Dr Devin Tidwell request, spoke to Areli Blanco and her daughter Hellen Sotomayor, introduced myself, explained my role and provided my contact information, offered the appointment to them and they were very appreciative and accepted, per Rhett Juan has been doing very well, has been independent and making herself breakfast, she lives alone but family has been taking turns staying with her as most of them only live within blocks of her, she is having some pain and we discussed getting her involved with Palliative care, will work on that once final path is back, she was agreeable, instructed her to call with any other questions or concerns

## 2020-07-17 NOTE — TELEPHONE ENCOUNTER
New GI Patient Encounter    New Patient GI Form   Patient Details:  Larita Severin  1940  [id number]     Background Information:  [address] starts by opening a telephone encounter and gathering the following information   Who is calling to schedule and relationship?  self   Who is the referring provider? To which specialty is the referral?  Medical Oncology   Reason for Visit?  hosp follow   Tumor Type?  pancreas   Is there a confirmed diagnosis from biopsy/tissue reviewed by Pathology?  no   Date of Tissue Diagnosis  (If done outside of [facility] please obtain report and slides)  (If no tissue diagnosis, please stop and discuss with Navigator prior to scheduling)  EUS 7/15/2020   Is patient aware of the diagnosis? yes   Has Imaging been completed? yes   If YES, where was the imaging done? (If outside [place] [address] obtain records)  [facility]   Have any endoscopies been done (colonoscopy, EGD, EUS)  yes   If YES where were they performed? (If outside of [facility] obtain the records)  [facility]   Has blood work been done?  yes   If YES, where was the blood work done? (If outside of [facility] obtain records)  [facility]   Is there a personal history of cancer? (If YES please list type)  no   Is there a family history of cancer? (If YES please list type)  no   Scheduling Information:   [address]   Are there any days the patient cannot be seen? no   Miscellaneous:    After completing the above information, please route to finance, nurse navigation and clinical trials for review

## 2020-07-20 ENCOUNTER — HOSPITAL ENCOUNTER (INPATIENT)
Facility: HOSPITAL | Age: 80
LOS: 3 days | DRG: 064 | End: 2020-07-23
Attending: EMERGENCY MEDICINE | Admitting: FAMILY MEDICINE
Payer: MEDICARE

## 2020-07-20 ENCOUNTER — APPOINTMENT (EMERGENCY)
Dept: RADIOLOGY | Facility: HOSPITAL | Age: 80
DRG: 064 | End: 2020-07-20
Payer: MEDICARE

## 2020-07-20 ENCOUNTER — TELEPHONE (OUTPATIENT)
Dept: HEMATOLOGY ONCOLOGY | Facility: CLINIC | Age: 80
End: 2020-07-20

## 2020-07-20 DIAGNOSIS — F41.9 ANXIETY: ICD-10-CM

## 2020-07-20 DIAGNOSIS — D64.9 ANEMIA: ICD-10-CM

## 2020-07-20 DIAGNOSIS — I48.92 ATRIAL FLUTTER (HCC): ICD-10-CM

## 2020-07-20 DIAGNOSIS — M25.551 RIGHT HIP PAIN: ICD-10-CM

## 2020-07-20 DIAGNOSIS — I26.99 BILATERAL PULMONARY EMBOLISM (HCC): ICD-10-CM

## 2020-07-20 DIAGNOSIS — I50.32 CHRONIC DIASTOLIC CONGESTIVE HEART FAILURE (HCC): ICD-10-CM

## 2020-07-20 DIAGNOSIS — I63.9 STROKE (CEREBRUM) (HCC): Primary | ICD-10-CM

## 2020-07-20 DIAGNOSIS — K92.1 GASTROINTESTINAL HEMORRHAGE WITH MELENA: ICD-10-CM

## 2020-07-20 DIAGNOSIS — K86.89 PANCREATIC MASS: ICD-10-CM

## 2020-07-20 LAB
ABO GROUP BLD: NORMAL
ANION GAP SERPL CALCULATED.3IONS-SCNC: 11 MMOL/L (ref 4–13)
APTT PPP: 29 SECONDS (ref 23–37)
BLD GP AB SCN SERPL QL: NEGATIVE
BUN SERPL-MCNC: 25 MG/DL (ref 5–25)
CALCIUM SERPL-MCNC: 8.5 MG/DL (ref 8.3–10.1)
CHLORIDE SERPL-SCNC: 99 MMOL/L (ref 100–108)
CO2 SERPL-SCNC: 24 MMOL/L (ref 21–32)
CREAT SERPL-MCNC: 1.12 MG/DL (ref 0.6–1.3)
ERYTHROCYTE [DISTWIDTH] IN BLOOD BY AUTOMATED COUNT: 14.7 % (ref 11.6–15.1)
GFR SERPL CREATININE-BSD FRML MDRD: 46 ML/MIN/1.73SQ M
GLUCOSE SERPL-MCNC: 151 MG/DL (ref 65–140)
HCT VFR BLD AUTO: 24.4 % (ref 34.8–46.1)
HGB BLD-MCNC: 7.9 G/DL (ref 11.5–15.4)
INR PPP: 1.08 (ref 0.84–1.19)
MCH RBC QN AUTO: 26.9 PG (ref 26.8–34.3)
MCHC RBC AUTO-ENTMCNC: 32.4 G/DL (ref 31.4–37.4)
MCV RBC AUTO: 83 FL (ref 82–98)
PLATELET # BLD AUTO: 306 THOUSANDS/UL (ref 149–390)
PMV BLD AUTO: 9.5 FL (ref 8.9–12.7)
POTASSIUM SERPL-SCNC: 4 MMOL/L (ref 3.5–5.3)
PROTHROMBIN TIME: 13.9 SECONDS (ref 11.6–14.5)
RBC # BLD AUTO: 2.94 MILLION/UL (ref 3.81–5.12)
RH BLD: POSITIVE
SARS-COV-2 RNA RESP QL NAA+PROBE: NEGATIVE
SODIUM SERPL-SCNC: 134 MMOL/L (ref 136–145)
SPECIMEN EXPIRATION DATE: NORMAL
TROPONIN I SERPL-MCNC: 0.02 NG/ML
WBC # BLD AUTO: 13.53 THOUSAND/UL (ref 4.31–10.16)

## 2020-07-20 PROCEDURE — 86850 RBC ANTIBODY SCREEN: CPT | Performed by: EMERGENCY MEDICINE

## 2020-07-20 PROCEDURE — 85610 PROTHROMBIN TIME: CPT | Performed by: EMERGENCY MEDICINE

## 2020-07-20 PROCEDURE — 96374 THER/PROPH/DIAG INJ IV PUSH: CPT

## 2020-07-20 PROCEDURE — 70498 CT ANGIOGRAPHY NECK: CPT

## 2020-07-20 PROCEDURE — 99285 EMERGENCY DEPT VISIT HI MDM: CPT

## 2020-07-20 PROCEDURE — 70496 CT ANGIOGRAPHY HEAD: CPT

## 2020-07-20 PROCEDURE — 86901 BLOOD TYPING SEROLOGIC RH(D): CPT | Performed by: EMERGENCY MEDICINE

## 2020-07-20 PROCEDURE — 84484 ASSAY OF TROPONIN QUANT: CPT | Performed by: EMERGENCY MEDICINE

## 2020-07-20 PROCEDURE — 85730 THROMBOPLASTIN TIME PARTIAL: CPT | Performed by: EMERGENCY MEDICINE

## 2020-07-20 PROCEDURE — 85027 COMPLETE CBC AUTOMATED: CPT | Performed by: EMERGENCY MEDICINE

## 2020-07-20 PROCEDURE — 86900 BLOOD TYPING SEROLOGIC ABO: CPT | Performed by: EMERGENCY MEDICINE

## 2020-07-20 PROCEDURE — 36415 COLL VENOUS BLD VENIPUNCTURE: CPT | Performed by: EMERGENCY MEDICINE

## 2020-07-20 PROCEDURE — 93005 ELECTROCARDIOGRAM TRACING: CPT

## 2020-07-20 PROCEDURE — 96372 THER/PROPH/DIAG INJ SC/IM: CPT

## 2020-07-20 PROCEDURE — 87635 SARS-COV-2 COVID-19 AMP PRB: CPT | Performed by: EMERGENCY MEDICINE

## 2020-07-20 PROCEDURE — 80048 BASIC METABOLIC PNL TOTAL CA: CPT | Performed by: EMERGENCY MEDICINE

## 2020-07-20 PROCEDURE — 82948 REAGENT STRIP/BLOOD GLUCOSE: CPT

## 2020-07-20 RX ORDER — HYDROMORPHONE HCL/PF 1 MG/ML
1 SYRINGE (ML) INJECTION ONCE
Status: COMPLETED | OUTPATIENT
Start: 2020-07-20 | End: 2020-07-20

## 2020-07-20 RX ORDER — ASPIRIN 300 MG/1
300 SUPPOSITORY RECTAL ONCE
Status: COMPLETED | OUTPATIENT
Start: 2020-07-20 | End: 2020-07-20

## 2020-07-20 RX ORDER — OXYCODONE HYDROCHLORIDE 5 MG/1
2.5 TABLET ORAL EVERY 4 HOURS PRN
Qty: 10 TABLET | Refills: 0 | Status: SHIPPED | OUTPATIENT
Start: 2020-07-20 | End: 2020-07-23 | Stop reason: HOSPADM

## 2020-07-20 RX ORDER — CLOPIDOGREL BISULFATE 75 MG/1
600 TABLET ORAL ONCE
Status: DISCONTINUED | OUTPATIENT
Start: 2020-07-20 | End: 2020-07-23 | Stop reason: HOSPADM

## 2020-07-20 RX ORDER — ASPIRIN 81 MG/1
162 TABLET, CHEWABLE ORAL ONCE
Status: DISCONTINUED | OUTPATIENT
Start: 2020-07-20 | End: 2020-07-20

## 2020-07-20 RX ADMIN — ASPIRIN 300 MG: 300 SUPPOSITORY RECTAL at 23:47

## 2020-07-20 RX ADMIN — IOHEXOL 85 ML: 350 INJECTION, SOLUTION INTRAVENOUS at 22:22

## 2020-07-20 RX ADMIN — HYDROMORPHONE HYDROCHLORIDE 1 MG: 1 INJECTION, SOLUTION INTRAMUSCULAR; INTRAVENOUS; SUBCUTANEOUS at 22:48

## 2020-07-20 RX ADMIN — ENOXAPARIN SODIUM 60 MG: 60 INJECTION SUBCUTANEOUS at 23:05

## 2020-07-20 NOTE — TELEPHONE ENCOUNTER
Patient's daughter Beatrice Cranker called frustrated with pain management  Pain meds renewed through PCP Dr Maegan Edmondson  Patient will be seen by Dr Marlyn Shanks as a new patient this week  Jeff Cole is arranging for palliative care consult once biopsy results back  Palliative care explained to patient's daughter    Emotional support offered

## 2020-07-20 NOTE — TELEPHONE ENCOUNTER
Pt has active medicare part A & b effctive 05/01/04  Pt also has a supplemental plan thru [employer] effective 01/01/19  Called the ins co to find out the plan & they are not in the office  Called the pt to see the plan she has & I spoke to her dghtr  Henri Recinos & she sd that he doesn't know the plan letter but she doesn't pay anything towards the deduct & all the services are covered 100%  Trav Collado

## 2020-07-21 ENCOUNTER — APPOINTMENT (INPATIENT)
Dept: RADIOLOGY | Facility: HOSPITAL | Age: 80
DRG: 064 | End: 2020-07-21
Payer: MEDICARE

## 2020-07-21 ENCOUNTER — PATIENT OUTREACH (OUTPATIENT)
Dept: CASE MANAGEMENT | Facility: OTHER | Age: 80
End: 2020-07-21

## 2020-07-21 ENCOUNTER — APPOINTMENT (INPATIENT)
Dept: NON INVASIVE DIAGNOSTICS | Facility: HOSPITAL | Age: 80
DRG: 064 | End: 2020-07-21
Payer: MEDICARE

## 2020-07-21 PROBLEM — R65.10 SIRS (SYSTEMIC INFLAMMATORY RESPONSE SYNDROME) (HCC): Status: ACTIVE | Noted: 2020-07-21

## 2020-07-21 PROBLEM — K92.2 GI BLEED: Status: ACTIVE | Noted: 2020-07-21

## 2020-07-21 PROBLEM — I26.99 BILATERAL PULMONARY EMBOLISM (HCC): Status: ACTIVE | Noted: 2020-07-21

## 2020-07-21 LAB
ANION GAP SERPL CALCULATED.3IONS-SCNC: 15 MMOL/L (ref 4–13)
APTT PPP: 33 SECONDS (ref 23–37)
APTT PPP: 56 SECONDS (ref 23–37)
ATRIAL RATE: 86 BPM
BASOPHILS # BLD AUTO: 0.06 THOUSANDS/ΜL (ref 0–0.1)
BASOPHILS NFR BLD AUTO: 0 % (ref 0–1)
BUN SERPL-MCNC: 21 MG/DL (ref 5–25)
CALCIUM SERPL-MCNC: 8.4 MG/DL (ref 8.3–10.1)
CHLORIDE SERPL-SCNC: 99 MMOL/L (ref 100–108)
CHOLEST SERPL-MCNC: 160 MG/DL (ref 50–200)
CO2 SERPL-SCNC: 21 MMOL/L (ref 21–32)
CREAT SERPL-MCNC: 0.94 MG/DL (ref 0.6–1.3)
EOSINOPHIL # BLD AUTO: 0.22 THOUSAND/ΜL (ref 0–0.61)
EOSINOPHIL NFR BLD AUTO: 1 % (ref 0–6)
ERYTHROCYTE [DISTWIDTH] IN BLOOD BY AUTOMATED COUNT: 14.6 % (ref 11.6–15.1)
GFR SERPL CREATININE-BSD FRML MDRD: 57 ML/MIN/1.73SQ M
GLUCOSE SERPL-MCNC: 150 MG/DL (ref 65–140)
GLUCOSE SERPL-MCNC: 168 MG/DL (ref 65–140)
HCT VFR BLD AUTO: 26.5 % (ref 34.8–46.1)
HCT VFR BLD AUTO: 28.3 % (ref 34.8–46.1)
HDLC SERPL-MCNC: 48 MG/DL
HGB BLD-MCNC: 8 G/DL (ref 11.5–15.4)
HGB BLD-MCNC: 8.7 G/DL (ref 11.5–15.4)
IMM GRANULOCYTES # BLD AUTO: 0.1 THOUSAND/UL (ref 0–0.2)
IMM GRANULOCYTES NFR BLD AUTO: 1 % (ref 0–2)
INR PPP: 1.11 (ref 0.84–1.19)
LACTATE SERPL-SCNC: 0.9 MMOL/L (ref 0.5–2)
LDLC SERPL CALC-MCNC: 79 MG/DL (ref 0–100)
LYMPHOCYTES # BLD AUTO: 1.31 THOUSANDS/ΜL (ref 0.6–4.47)
LYMPHOCYTES NFR BLD AUTO: 8 % (ref 14–44)
MCH RBC QN AUTO: 26.3 PG (ref 26.8–34.3)
MCHC RBC AUTO-ENTMCNC: 30.2 G/DL (ref 31.4–37.4)
MCV RBC AUTO: 87 FL (ref 82–98)
MONOCYTES # BLD AUTO: 0.98 THOUSAND/ΜL (ref 0.17–1.22)
MONOCYTES NFR BLD AUTO: 6 % (ref 4–12)
NEUTROPHILS # BLD AUTO: 13.14 THOUSANDS/ΜL (ref 1.85–7.62)
NEUTS SEG NFR BLD AUTO: 84 % (ref 43–75)
NRBC BLD AUTO-RTO: 0 /100 WBCS
NT-PROBNP SERPL-MCNC: ABNORMAL PG/ML
PLATELET # BLD AUTO: 322 THOUSANDS/UL (ref 149–390)
PMV BLD AUTO: 10.3 FL (ref 8.9–12.7)
POTASSIUM SERPL-SCNC: 3.7 MMOL/L (ref 3.5–5.3)
PROCALCITONIN SERPL-MCNC: 0.05 NG/ML
PROTHROMBIN TIME: 14.2 SECONDS (ref 11.6–14.5)
QRS AXIS: 81 DEGREES
QRSD INTERVAL: 104 MS
QT INTERVAL: 342 MS
QTC INTERVAL: 420 MS
RBC # BLD AUTO: 3.04 MILLION/UL (ref 3.81–5.12)
SODIUM SERPL-SCNC: 135 MMOL/L (ref 136–145)
T WAVE AXIS: 219 DEGREES
TRIGL SERPL-MCNC: 165 MG/DL
TROPONIN I SERPL-MCNC: 0.06 NG/ML
TROPONIN I SERPL-MCNC: 0.2 NG/ML
VENTRICULAR RATE: 91 BPM
WBC # BLD AUTO: 15.81 THOUSAND/UL (ref 4.31–10.16)

## 2020-07-21 PROCEDURE — 99232 SBSQ HOSP IP/OBS MODERATE 35: CPT | Performed by: PSYCHIATRY & NEUROLOGY

## 2020-07-21 PROCEDURE — 99285 EMERGENCY DEPT VISIT HI MDM: CPT | Performed by: EMERGENCY MEDICINE

## 2020-07-21 PROCEDURE — 99222 1ST HOSP IP/OBS MODERATE 55: CPT | Performed by: PHYSICIAN ASSISTANT

## 2020-07-21 PROCEDURE — 99223 1ST HOSP IP/OBS HIGH 75: CPT | Performed by: INTERNAL MEDICINE

## 2020-07-21 PROCEDURE — 85018 HEMOGLOBIN: CPT | Performed by: PHYSICIAN ASSISTANT

## 2020-07-21 PROCEDURE — 85730 THROMBOPLASTIN TIME PARTIAL: CPT | Performed by: INTERNAL MEDICINE

## 2020-07-21 PROCEDURE — 71045 X-RAY EXAM CHEST 1 VIEW: CPT

## 2020-07-21 PROCEDURE — 97163 PT EVAL HIGH COMPLEX 45 MIN: CPT

## 2020-07-21 PROCEDURE — 87040 BLOOD CULTURE FOR BACTERIA: CPT | Performed by: PHYSICIAN ASSISTANT

## 2020-07-21 PROCEDURE — 83880 ASSAY OF NATRIURETIC PEPTIDE: CPT | Performed by: PHYSICIAN ASSISTANT

## 2020-07-21 PROCEDURE — 94640 AIRWAY INHALATION TREATMENT: CPT

## 2020-07-21 PROCEDURE — 92610 EVALUATE SWALLOWING FUNCTION: CPT

## 2020-07-21 PROCEDURE — 80061 LIPID PANEL: CPT | Performed by: NURSE PRACTITIONER

## 2020-07-21 PROCEDURE — 70551 MRI BRAIN STEM W/O DYE: CPT

## 2020-07-21 PROCEDURE — 97110 THERAPEUTIC EXERCISES: CPT

## 2020-07-21 PROCEDURE — C9113 INJ PANTOPRAZOLE SODIUM, VIA: HCPCS | Performed by: PHYSICIAN ASSISTANT

## 2020-07-21 PROCEDURE — 94668 MNPJ CHEST WALL SBSQ: CPT

## 2020-07-21 PROCEDURE — 83036 HEMOGLOBIN GLYCOSYLATED A1C: CPT | Performed by: INTERNAL MEDICINE

## 2020-07-21 PROCEDURE — 80048 BASIC METABOLIC PNL TOTAL CA: CPT | Performed by: PHYSICIAN ASSISTANT

## 2020-07-21 PROCEDURE — 85014 HEMATOCRIT: CPT | Performed by: PHYSICIAN ASSISTANT

## 2020-07-21 PROCEDURE — 83605 ASSAY OF LACTIC ACID: CPT | Performed by: PHYSICIAN ASSISTANT

## 2020-07-21 PROCEDURE — 84484 ASSAY OF TROPONIN QUANT: CPT | Performed by: PHYSICIAN ASSISTANT

## 2020-07-21 PROCEDURE — 94760 N-INVAS EAR/PLS OXIMETRY 1: CPT

## 2020-07-21 PROCEDURE — 97167 OT EVAL HIGH COMPLEX 60 MIN: CPT

## 2020-07-21 PROCEDURE — 85025 COMPLETE CBC W/AUTO DIFF WBC: CPT | Performed by: PHYSICIAN ASSISTANT

## 2020-07-21 PROCEDURE — 93010 ELECTROCARDIOGRAM REPORT: CPT | Performed by: INTERNAL MEDICINE

## 2020-07-21 PROCEDURE — 99233 SBSQ HOSP IP/OBS HIGH 50: CPT | Performed by: INTERNAL MEDICINE

## 2020-07-21 PROCEDURE — 93306 TTE W/DOPPLER COMPLETE: CPT

## 2020-07-21 PROCEDURE — 92523 SPEECH SOUND LANG COMPREHEN: CPT

## 2020-07-21 PROCEDURE — 94664 DEMO&/EVAL PT USE INHALER: CPT

## 2020-07-21 PROCEDURE — 85730 THROMBOPLASTIN TIME PARTIAL: CPT | Performed by: PHYSICIAN ASSISTANT

## 2020-07-21 PROCEDURE — 84145 PROCALCITONIN (PCT): CPT | Performed by: PHYSICIAN ASSISTANT

## 2020-07-21 PROCEDURE — 85610 PROTHROMBIN TIME: CPT | Performed by: PHYSICIAN ASSISTANT

## 2020-07-21 PROCEDURE — 93306 TTE W/DOPPLER COMPLETE: CPT | Performed by: INTERNAL MEDICINE

## 2020-07-21 RX ORDER — PANTOPRAZOLE SODIUM 40 MG/1
40 TABLET, DELAYED RELEASE ORAL
Status: DISCONTINUED | OUTPATIENT
Start: 2020-07-21 | End: 2020-07-21

## 2020-07-21 RX ORDER — LEVOTHYROXINE SODIUM 0.05 MG/1
50 TABLET ORAL
Status: DISCONTINUED | OUTPATIENT
Start: 2020-07-21 | End: 2020-07-22

## 2020-07-21 RX ORDER — KETOROLAC TROMETHAMINE 5 MG/ML
1 SOLUTION OPHTHALMIC 2 TIMES DAILY
Status: DISCONTINUED | OUTPATIENT
Start: 2020-07-21 | End: 2020-07-23 | Stop reason: HOSPADM

## 2020-07-21 RX ORDER — PANTOPRAZOLE SODIUM 40 MG/1
40 INJECTION, POWDER, FOR SOLUTION INTRAVENOUS EVERY 12 HOURS SCHEDULED
Status: DISCONTINUED | OUTPATIENT
Start: 2020-07-21 | End: 2020-07-23

## 2020-07-21 RX ORDER — FERROUS SULFATE 325(65) MG
325 TABLET ORAL
Status: DISCONTINUED | OUTPATIENT
Start: 2020-07-21 | End: 2020-07-22

## 2020-07-21 RX ORDER — ALBUTEROL SULFATE 2.5 MG/3ML
2.5 SOLUTION RESPIRATORY (INHALATION) EVERY 4 HOURS PRN
Status: DISCONTINUED | OUTPATIENT
Start: 2020-07-21 | End: 2020-07-23 | Stop reason: HOSPADM

## 2020-07-21 RX ORDER — HYDROMORPHONE HCL/PF 1 MG/ML
0.5 SYRINGE (ML) INJECTION EVERY 4 HOURS PRN
Status: DISCONTINUED | OUTPATIENT
Start: 2020-07-21 | End: 2020-07-22

## 2020-07-21 RX ORDER — HEPARIN SODIUM 1000 [USP'U]/ML
2400 INJECTION, SOLUTION INTRAVENOUS; SUBCUTANEOUS
Status: DISCONTINUED | OUTPATIENT
Start: 2020-07-21 | End: 2020-07-23

## 2020-07-21 RX ORDER — ASPIRIN 81 MG/1
81 TABLET, CHEWABLE ORAL DAILY
Status: DISCONTINUED | OUTPATIENT
Start: 2020-07-21 | End: 2020-07-23

## 2020-07-21 RX ORDER — OXYCODONE HYDROCHLORIDE 5 MG/1
2.5 TABLET ORAL EVERY 4 HOURS PRN
Status: DISCONTINUED | OUTPATIENT
Start: 2020-07-21 | End: 2020-07-22

## 2020-07-21 RX ORDER — HEPARIN SODIUM 1000 [USP'U]/ML
4800 INJECTION, SOLUTION INTRAVENOUS; SUBCUTANEOUS
Status: DISCONTINUED | OUTPATIENT
Start: 2020-07-21 | End: 2020-07-23

## 2020-07-21 RX ORDER — HEPARIN SODIUM 10000 [USP'U]/100ML
3-30 INJECTION, SOLUTION INTRAVENOUS
Status: DISCONTINUED | OUTPATIENT
Start: 2020-07-21 | End: 2020-07-23

## 2020-07-21 RX ORDER — UBIDECARENONE 10 MG
10 CAPSULE ORAL DAILY
Status: DISCONTINUED | OUTPATIENT
Start: 2020-07-21 | End: 2020-07-21 | Stop reason: RX

## 2020-07-21 RX ADMIN — KETOROLAC TROMETHAMINE 1 DROP: 5 SOLUTION OPHTHALMIC at 12:16

## 2020-07-21 RX ADMIN — HEPARIN SODIUM 18 UNITS/KG/HR: 10000 INJECTION, SOLUTION INTRAVENOUS at 09:34

## 2020-07-21 RX ADMIN — ASPIRIN 81 MG 81 MG: 81 TABLET ORAL at 12:16

## 2020-07-21 RX ADMIN — PANTOPRAZOLE SODIUM 40 MG: 40 INJECTION, POWDER, FOR SOLUTION INTRAVENOUS at 21:14

## 2020-07-21 RX ADMIN — PANTOPRAZOLE SODIUM 40 MG: 40 INJECTION, POWDER, FOR SOLUTION INTRAVENOUS at 09:34

## 2020-07-21 RX ADMIN — Medication 30 MG: at 12:16

## 2020-07-21 RX ADMIN — HEPARIN SODIUM 2400 UNITS: 1000 INJECTION INTRAVENOUS; SUBCUTANEOUS at 17:26

## 2020-07-21 RX ADMIN — FERROUS SULFATE TAB 325 MG (65 MG ELEMENTAL FE) 325 MG: 325 (65 FE) TAB at 12:16

## 2020-07-21 NOTE — ASSESSMENT & PLAN NOTE
CTA showed bilateral lower lobe pulmonary emboli with no evidence of R heart strain on CT  Pt received lovenox dose and  suppository in ED  Appreciate hematology/oncology recommendations on further dosing of lovenox given patient's GI bleed and drop in Hg from 8 8 --> 7 9 from 1 week ago  Continue to monitor on telemetry  Pt maintaining adequate SpO2 on room air, continue to monitor O2 requirements   Consider repeat echo in AM

## 2020-07-21 NOTE — ASSESSMENT & PLAN NOTE
Wt Readings from Last 3 Encounters:   07/20/20 61 4 kg (135 lb 5 8 oz)   07/15/20 64 kg (141 lb)   07/13/20 64 kg (141 lb)     Weight is again down compared to previous admission  Euvolemic on presentation to ED   CXR showed mild to moderate pulmonary vascular congestion and mild cardiomegaly  Avoid Lasix at this time to maintain SBP  Continue to monitor I/Os, daily weights   Holding spironolactone, anti-HTN meds to maintain SBP > 160  Appreciate cardiology recommendations

## 2020-07-21 NOTE — ED PROVIDER NOTES
History  Chief Complaint   Patient presents with    Altered Mental Status     last known well by daughter at 5;40, brought medicine to her and she could not hold cup, hx of tia/cva in past   this time was alert but aphasic could not communicate with daughter  alert on arrival  thinks she is downstairs  speaking but remains aphasic     HPI    This is a 80 yo F with hx of abdominal mass who presents today with aphasia  According to medics patient was found aroune 930pm with aphasia, right sided weakness  Daughter called EMS, but upon their arrival symptoms resolved  On arrival patient still presented with aphasia, stroke alert was called  According to daughter 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  Her AC was held which she took for Atrial flutter  Patient had EGD preformed and was given a unit of blood in the hospital due to anemia  She still have melanotic stools  Patient also had a stroke in May 2020 [facility] [place]  When she left the hospital her symptoms resolved  Spoke with Neurology patient is not TPA candidate  Aspirin given rectally  Patient was also found to have bilateral PE which lovenox was given  patietn is not on supplemental oxygen and no shortness of breath or increase work of breathing  Will admit pateint    hgb dropped in 1 week, will hold on transfusion nut ordered type and screen     Prior to Admission Medications   Prescriptions Last Dose Informant Patient Reported? Taking?    Ascorbic Acid (VITAMIN C) 1000 MG tablet Unknown at Unknown time Self Yes No   Sig: Take 1,000 mg by mouth daily   Coenzyme Q10 10 MG capsule 7/20/2020 at Unknown time Self Yes Yes   Sig: Take 10 mg by mouth daily   Multiple Vitamins-Minerals (CENTRUM SILVER PO) Unknown at Unknown time Self Yes No   Sig: Take 1 tablet by mouth daily   TOVIAZ 4 MG TB24 7/20/2020 at Unknown time Self Yes Yes   Sig: Take 1 tablet by mouth daily    VASCEPA 1 g CAPS 7/20/2020 at Unknown time Self No Yes   Sig: ONE CAPSULE TWICE A DAY   Vitamin D, Cholecalciferol, 1000 units CAPS 7/20/2020 at Unknown time Self Yes Yes   Sig: Take 2,000 Units by mouth daily     aspirin (ECOTRIN LOW STRENGTH) 81 mg EC tablet 7/20/2020 at Unknown time Self Yes Yes   Sig: Take 81 mg by mouth daily Indications: Heart Attack  docusate sodium (COLACE) 100 mg capsule 7/20/2020 at Unknown time  No Yes   Sig: Take 1 capsule (100 mg total) by mouth 2 (two) times a day   ferrous sulfate 325 (65 Fe) mg tablet 7/20/2020 at Unknown time Self Yes Yes   Sig: Take 325 mg by mouth daily with breakfast   hydroxychloroquine (PLAQUENIL) 200 mg tablet 7/20/2020 at Unknown time Self Yes Yes   Sig: Take 200 mg by mouth daily     ketorolac (ACULAR) 0 5 % ophthalmic solution 7/20/2020 at Unknown time Self Yes Yes   Sig: Administer 1 drop to the right eye 2 (two) times a day    levothyroxine 50 mcg tablet 7/20/2020 at Unknown time Self Yes Yes   Sig: Take 50 mcg by mouth daily     losartan (COZAAR) 25 mg tablet 7/20/2020 at Unknown time  No Yes   Sig: Take 1 tablet (25 mg total) by mouth daily Hold for SBP < 130   metoprolol succinate (TOPROL-XL) 100 mg 24 hr tablet 7/20/2020 at Unknown time Self No Yes   Sig: Take 1 tablet (100 mg total) by mouth daily   oxyCODONE (ROXICODONE) 5 mg immediate release tablet 7/20/2020 at Unknown time  No Yes   Sig: Take 0 5 tablets (2 5 mg total) by mouth every 4 (four) hours as needed for severe pain for up to 10 daysMax Daily Amount: 15 mg   pantoprazole (PROTONIX) 40 mg tablet 7/20/2020 at Unknown time  No Yes   Sig: Take 1 tablet (40 mg total) by mouth daily   polyethylene glycol (MIRALAX) 17 g packet Unknown at Unknown time  No No   Sig: Take 17 g by mouth daily as needed (for constipation)   spironolactone (ALDACTONE) 25 mg tablet Unknown at Unknown time  No No   Sig: Take 1 tablet (25 mg total) by mouth daily as needed (for weight gain or leg edema )      Facility-Administered Medications: None       Past Medical History: Diagnosis Date    Anemia     Arthritis     Bladder calculi     CAD (coronary artery disease)     Status post CABG and PTCA to OM1    Chronic kidney disease     Pt  states Stage 3     CKD (chronic kidney disease) stage 3, GFR 30-59 ml/min ([facility])     CKD (chronic kidney disease), stage III ([facility])     Diastolic congestive heart failure ([facility] )     Hypertension     Hypothyroidism     Myocardial infarction ([facility] )     x 4 MI's and x2 stents  Last MI in 2018    Rheumatoid arthritis ([facility] )     Stroke ([place] [address] ) 2020    weakness, aphasia( resolved)    Uterine prolapse        Past Surgical History:   Procedure Laterality Date    BACK SURGERY      BLADDER SURGERY      CORONARY ANGIOPLASTY WITH STENT PLACEMENT      CORONARY ARTERY BYPASS GRAFT      EYE SURGERY Right 2020    JOINT REPLACEMENT Right 2018    OOPHORECTOMY Left     NE PARTIAL HIP REPLACEMENT Right 2018    Procedure: HEMIARTHROPLASTY HIP (BIPOLAR) (RIGHT); Surgeon: Chantel Collins MD;  Location: [facility];  Service: Orthopedics    THYROIDECTOMY, PARTIAL         Family History   Problem Relation Age of Onset    Heart attack Mother     Heart attack Father     Stroke Sister     Heart attack Brother         3 brothers all      I have reviewed and agree with the history as documented  E-Cigarette/Vaping    E-Cigarette Use Never User      E-Cigarette/Vaping Substances    Nicotine No     THC No     CBD No     Flavoring No     Other No     Unknown No      Social History     Tobacco Use    Smoking status: Never Smoker    Smokeless tobacco: Never Used   Substance Use Topics    Alcohol use: Never     Frequency: Never    Drug use: No       Review of Systems   Constitutional: Negative  Negative for diaphoresis and fever  HENT: Negative  Respiratory: Negative  Negative for cough, shortness of breath and wheezing  Cardiovascular: Negative  Negative for chest pain, palpitations and leg swelling  Gastrointestinal: Positive for abdominal pain  Negative for abdominal distention, nausea and vomiting  Genitourinary: Negative  Musculoskeletal: Negative  Skin: Negative  Neurological: Positive for speech difficulty and weakness  Psychiatric/Behavioral: Negative  All other systems reviewed and are negative  Physical Exam  Physical Exam   Constitutional: She is oriented to person, place, and time  She appears well-developed  HENT:   Head: Normocephalic and atraumatic  Eyes: Pupils are equal, round, and reactive to light  EOM are normal    Neck: Normal range of motion  Neck supple  Cardiovascular: Normal rate, regular rhythm and normal heart sounds  No murmur heard  Pulmonary/Chest: Effort normal and breath sounds normal    Abdominal: Soft  Bowel sounds are normal  She exhibits no distension  There is tenderness  There is no rebound and no guarding  Musculoskeletal: Normal range of motion  Aphasia expressive   Normal motor exam normal sensory exam no facial droop   Neurological: She is alert and oriented to person, place, and time  Skin: Skin is warm and dry  Psychiatric: She has a normal mood and affect         Vital Signs  ED Triage Vitals   Temperature Pulse Respirations Blood Pressure SpO2   07/20/20 2213 07/20/20 2210 07/20/20 2210 07/20/20 2210 07/20/20 2213   97 8 °F (36 6 °C) 87 20 (!) 177/84 97 %      Temp Source Heart Rate Source Patient Position - Orthostatic VS BP Location FiO2 (%)   07/20/20 2213 07/20/20 2210 07/20/20 2210 07/20/20 2210 --   Tympanic Monitor Lying Left arm       Pain Score       07/21/20 0023       No Pain           Vitals:    07/21/20 0421 07/21/20 0425 07/21/20 0426 07/21/20 0557   BP:  168/88 168/88 140/88   Pulse: 84 94 97 91   Patient Position - Orthostatic VS:             Visual Acuity  Visual Acuity      Most Recent Value   L Pupil Size (mm)  2   R Pupil Size (mm)  2   L Pupil Shape  Round   R Pupil Shape  Round          ED Medications  Medications   clopidogrel (PLAVIX) tablet 600 mg (has no administration in time range)   ferrous sulfate tablet 325 mg (has no administration in time range)   levothyroxine tablet 50 mcg (50 mcg Oral Not Given 7/21/20 0611)   ketorolac (ACULAR) 0 5 % ophthalmic solution 1 drop (has no administration in time range)   oxyCODONE (ROXICODONE) IR tablet 2 5 mg (has no administration in time range)   aspirin chewable tablet 81 mg (has no administration in time range)   HYDROmorphone (DILAUDID) injection 0 5 mg (has no administration in time range)   co-enzyme Q-10 capsule 30 mg (has no administration in time range)   pantoprazole (PROTONIX) injection 40 mg (has no administration in time range)   heparin (porcine) 25,000 units in 250 mL infusion (premix) (has no administration in time range)   heparin (porcine) injection 4,800 Units (has no administration in time range)   heparin (porcine) injection 2,400 Units (has no administration in time range)   iohexol (OMNIPAQUE) 350 MG/ML injection (MULTI-DOSE) 85 mL (85 mL Intravenous Given 7/20/20 2222)   HYDROmorphone (DILAUDID) injection 1 mg (1 mg Intravenous Given 7/20/20 2248)   enoxaparin (LOVENOX) subcutaneous injection 60 mg (60 mg Subcutaneous Given 7/20/20 2305)   aspirin rectal suppository 300 mg (300 mg Rectal Given 7/20/20 2347)       Diagnostic Studies  Results Reviewed     Procedure Component Value Units Date/Time    Novel Coronavirus [facility] [[id number]]  (Normal) Collected:  07/20/20 2216    Lab Status:  Final result Specimen:  Nares from Nose Updated:  07/20/20 2339     SARS-CoV-2 Negative    Narrative: The specimen collection materials, transport medium, and/or testing methodology utilized in the production of these test results have been proven to be reliable in a limited validation with an abbreviated program under the Emergency Utilization Authorization provided by the FDA    Testing reported as "Presumptive positive" will be confirmed with secondary testing with a reference laboratory to ensure result accuracy  Clinical caution and judgement should be used with the interpretation of these results with consideration of the clinical impression and other laboratory testing  Testing reported as "Positive" or "Negative" has been proven to be accurate according to standard laboratory validation requirements  All testing is performed with control materials showing appropriate reactivity at standard intervals        Troponin I [[id number]]  (Normal) Collected:  07/20/20 2238    Lab Status:  Final result Specimen:  Blood from Arm, Left Updated:  07/20/20 2300     Troponin I 0 02 ng/mL     Basic metabolic panel [[id number]]  (Abnormal) Collected:  07/20/20 2216    Lab Status:  Final result Specimen:  Blood from Arm, Right Updated:  07/20/20 2237     Sodium 134 mmol/L      Potassium 4 0 mmol/L      Chloride 99 mmol/L      CO2 24 mmol/L      ANION GAP 11 mmol/L      BUN 25 mg/dL      Creatinine 1 12 mg/dL      Glucose 151 mg/dL      Calcium 8 5 mg/dL      eGFR 46 ml/min/1 73sq m     Narrative:       [place] guidelines for Chronic Kidney Disease (CKD):     Stage 1 with normal or high GFR (GFR > 90 mL/min/1 73 square meters)    Stage 2 Mild CKD (GFR = 60-89 mL/min/1 73 square meters)    Stage 3A Moderate CKD (GFR = 45-59 mL/min/1 73 square meters)    Stage 3B Moderate CKD (GFR = 30-44 mL/min/1 73 square meters)    Stage 4 Severe CKD (GFR = 15-29 mL/min/1 73 square meters)    Stage 5 End Stage CKD (GFR <15 mL/min/1 73 square meters)  Note: GFR calculation is accurate only with a steady state creatinine    Protime-INR [[id number]]  (Normal) Collected:  07/20/20 2216    Lab Status:  Final result Specimen:  Blood from Arm, Right Updated:  07/20/20 2235     Protime 13 9 seconds      INR 1 08    APTT [[id number]]  (Normal) Collected:  07/20/20 2216    Lab Status:  Final result Specimen:  Blood from Arm, Right Updated: 07/20/20 2235     PTT 29 seconds     CBC and Platelet [[id number]]  (Abnormal) Collected:  07/20/20 2216    Lab Status:  Final result Specimen:  Blood from Arm, Left Updated:  07/20/20 2223     WBC 13 53 Thousand/uL      RBC 2 94 Million/uL      Hemoglobin 7 9 g/dL      Hematocrit 24 4 %      MCV 83 fL      MCH 26 9 pg      MCHC 32 4 g/dL      RDW 14 7 %      Platelets 837 Thousands/uL      MPV 9 5 fL                  XR chest 1 view   Final Result by Liliane Arias MD (07/21 8899)      Mild to moderate pulmonary vascular congestion  Mild cardiomegaly  Workstation performed: [id number]         CTA stroke alert (head/neck)   Final Result by Carol Gallardo MD (07/20 2240)      Left M2 segment occlusion  Severe near occlusive left V4 segment intracranial vertebral artery atherosclerotic plaque narrowing  Moderate cardiomegaly with small left pleural effusion  Bilateral lower lobe pulmonary emboli  Findings were directly discussed with Jocelyn Donnelly on 7/20/2020 10:28 PM                      Workstation performed: [id number]         CT stroke alert brain   Final Result by Carol Gallardo MD (07/20 2222)      No acute intracranial abnormality  Microangiopathic changes  Findings were directly discussed with Jocelyn Donnelly on 7/20/2020 10:20 PM       Workstation performed: [id number]                    Procedures  Procedures         ED Course  ED Course as of Jul 21 0653 Mon Jul 20, 2020 2208 Stroke alert was called secondary to dysarthria expressive aphasia      2243 Stroke alert was called I spoke with Dr Leon Ballard neurology who recommended no tPA  Patient has history of a bleeding duodenal mass she is already holding her anticoagulation  She has history of bleeding that required transfusions  Decision made no tPA according to Dr Princess Morales and patient does not need to go to IR for intervention  Dr Leon Ballard looked at the [address] himself and does not believe the patient needs to go to IR    I did tell the neurologist the radiologist recommended speaking with IR but the neurologist stated the occlusion is too distal to intervene      [phone] Radiologist also stated that patient has bilateral PE  Patient is oxygenating normally does not require any supplemental oxygen  Patient is not tachypneic or tachycardic  Patient has no increased work of breathing  Daughter states she has not been short of breath  Patient has been off her anticoagulation which he takes for atrial flutter due to the bleeding mass  I will hold on heparin and give patient 1 dose of Lovenox  Will admit patient to the hospital and hospice can discuss about filter  Patient's hemoglobin is 7 9 according to daughter she still having some melanotic stool      2247 According to neurologist keep the pressure is above 466 systolic          US AUDIT      Most Recent Value   Initial Alcohol Screen: US AUDIT-C    1  How often do you have a drink containing alcohol?  0 Filed at: 07/20/2020 2211   Audit-C Score  0 Filed at: 07/20/2020 2211                  MICHEL/DAST-10      Most Recent Value   How many times in the past year have you    Used an illegal drug or used a prescription medication for non-medical reasons? Never Filed at: 07/20/2020 2211                                MDM  Number of Diagnoses or Management Options  Anemia:   Atrial flutter [facility]):   Bilateral pulmonary embolism [facility]):   Stroke (cerebrum) [facility]):   Diagnosis management comments: 79-year-old female who presents today with a stroke alert  Patient is not a tPA candidate  Discussion with Neurology multiple times regarding management  Patient was also found to have bilateral pulmonary embolism  Decision made to give 1 dose of Lovenox  Patient has abdominal pain which was already evaluated  States her pain is not worse than before  She is currently being worked up for a duodenal mass  Patient will be admitted to the medicine floor  Patient is hemodynamically stable  Patient did have an episode with right upper and lower extremity weakness along with facial droop  I called Neurology and spoke with them again  No change in management currently  Disposition  Final diagnoses:   Stroke (cerebrum) ([facility] )   Bilateral pulmonary embolism (HCC)   Anemia   [facility] )     Time reflects when diagnosis was documented in both MDM as applicable and the Disposition within this note     Time User Action Codes Description Comment    7/20/2020 11:21 PM Hildegarde Bear, [address]  8  [I63 9] Stroke (cerebrum) ([facility] )     7/20/2020 11:22 PM Hildegarde Bear, [address]  8  [I26 99] Bilateral pulmonary embolism ([facility] )     7/20/2020 11:22 PM Hildegarde Bear, [address]  8  [D64 9] Anemia     7/20/2020 11:22 PM Hildegarde Bear, [address]  8  [I48 92] Atrial flutter ([facility] )     7/21/2020  1:32 AM Ji Marsh Add [K86 89] Pancreatic mass     7/21/2020  4:10 AM Ji Marsh Add [K92 1] Gastrointestinal hemorrhage with melena       ED Disposition     ED Disposition Condition Date/Time Comment    Admit Stable Mon Jul 20, 2020 11:21 PM Case was discussed with medicibe and the patient's admission status was agreed to be Admission Status: inpatient status to the service of Dr Cookie العراقي    Follow-up Information    None         Current Discharge Medication List      CONTINUE these medications which have NOT CHANGED    Details   aspirin (ECOTRIN LOW STRENGTH) 81 mg EC tablet Take 81 mg by mouth daily Indications: Heart Attack        Coenzyme Q10 10 MG capsule Take 10 mg by mouth daily      docusate sodium (COLACE) 100 mg capsule Take 1 capsule (100 mg total) by mouth 2 (two) times a day  Qty: 10 capsule, Refills: 0    Associated Diagnoses: Right hip pain      ferrous sulfate 325 (65 Fe) mg tablet Take 325 mg by mouth daily with breakfast      hydroxychloroquine (PLAQUENIL) 200 mg tablet Take 200 mg by mouth daily        ketorolac (ACULAR) 0 5 % ophthalmic solution Administer 1 drop to the right eye 2 (two) times a day   Refills: 3 levothyroxine 50 mcg tablet Take 50 mcg by mouth daily  losartan (COZAAR) 25 mg tablet Take 1 tablet (25 mg total) by mouth daily Hold for SBP < 130    Associated Diagnoses: Benign hypertension with chronic kidney disease, stage III ([facility] ); Chronic kidney disease, stage III (moderate) [facility] ); Anemia of chronic renal failure, stage 3 (moderate) ([facility] ); Vitamin D deficiency; Iron deficiency; Parenchymal renal hypertension, stage 1 through stage 4 or unspecified chronic kidney disease      metoprolol succinate (TOPROL-XL) 100 mg 24 hr tablet Take 1 tablet (100 mg total) by mouth daily  Qty: 180 tablet, Refills: 0    Associated Diagnoses: Hypertensive chronic kidney disease with stage 1 through stage 4 chronic kidney disease, or unspecified chronic kidney disease; Chronic kidney disease, stage III (moderate) ([facility] ); Anemia of chronic renal failure, stage 3 (moderate) ([facility] ); Dyslipidemia; Iron deficiency; Vitamin D deficiency      oxyCODONE (ROXICODONE) 5 mg immediate release tablet Take 0 5 tablets (2 5 mg total) by mouth every 4 (four) hours as needed for severe pain for up to 10 daysMax Daily Amount: 15 mg  Qty: 10 tablet, Refills: 0    Associated Diagnoses: Right hip pain      pantoprazole (PROTONIX) 40 mg tablet Take 1 tablet (40 mg total) by mouth daily  Qty: 30 tablet, Refills: 0    Associated Diagnoses: Anemia      TOVIAZ 4 MG TB24 Take 1 tablet by mouth daily       VASCEPA 1 g CAPS ONE CAPSULE TWICE A DAY  Qty: 60 capsule, Refills: 5    Associated Diagnoses: Hypertensive chronic kidney disease with stage 1 through stage 4 chronic kidney disease, or unspecified chronic kidney disease; Chronic kidney disease, stage III (moderate) ([facility] ); Anemia of chronic renal failure, stage 3 (moderate) ([facility] ); Dyslipidemia; Iron deficiency; Vitamin D deficiency;  Hypertriglyceridemia      Vitamin D, Cholecalciferol, 1000 units CAPS Take 2,000 Units by mouth daily        Ascorbic Acid (VITAMIN C) 1000 MG tablet Take 1,000 mg by mouth daily      Multiple Vitamins-Minerals (CENTRUM SILVER PO) Take 1 tablet by mouth daily      polyethylene glycol (MIRALAX) 17 g packet Take 17 g by mouth daily as needed (for constipation)  Qty: 14 each, Refills: 0    Associated Diagnoses: Right hip pain      spironolactone (ALDACTONE) 25 mg tablet Take 1 tablet (25 mg total) by mouth daily as needed (for weight gain or leg edema )  Qty: 30 tablet, Refills: 0    Associated Diagnoses: Benign hypertension with chronic kidney disease, stage III (HCC)           No discharge procedures on file      PDMP Review     None          ED Provider  Electronically Signed by           William Lr MD  07/21/20 5230

## 2020-07-21 NOTE — PROGRESS NOTES
Progress Note - Kim Beebe 1940, 79 y o  female MRN: [id number]    Unit/Bed#: [address] [place] [address] Encounter: [id number]    Primary Care Provider: Luisa Chan MD   Date and time admitted to hospital: 7/20/2020 10:08 PM        * CVA (cerebral vascular accident) [facility])  Assessment & Plan  Patient started with aphasia and R sided weakness at home, noticed by her daughter at approximately 8:45pm  CTA completed in ED showed left M2 segment occlusion and redemonstration of severe near-occlusive left V4 segment intracranial vertebral artery plaque narrowing  Given GI bleed in the setting of known duodenal mass with melanotic stools, patient is not a candidate for tPA  Her home dose of Eliquis was being held outpatient for the same reason  ED discussed with neurology and radiology and neurology expressed that the patient is ultimately not a candidate for tPA or IR intervention at this time  She did receive a weight-based dose of Lovenox in ED as well as  suppository  Pt with ongoing aphasia and poor motor coordination    - After extensive discussions with patient and her daughters, Jasmin Kearns and Mana Tellez, all agree that they would like to keep Level 3 code status until they are able to discuss the results of the biopsies from last week and what the results mean in terms of further treatment/workup  We discussed the prognosis of the ongoing ischemia and bilateral PE's and the differences between level 3 DNR/DNI status and comfort care and all are in agreement that they would like to keep Level 3 code status but would strongly consider transitioning to comfort care  Patient does have ongoing aphasia but is responding to questions with "yes" and "no", and clarified with daughters who state that she has expressed before that she likely would not want any invasive measures or procedures in the setting of her current diagnoses   They have her living will at home and plan on bringing it in    - continue to monitor on telemetry  - Received lovenox and  in ED - patient failed bedside swallow eval therefore no Plavix  - Updated at 5:30AM: Discussed again with neurology given ongoing symptoms and recommendations for [facility] who recommend starting heparin drip without bolus  Updated Connell Heimlich, patient's daughter and POA and reviewed risks vs benefit of AC and she wants to proceed with heparin drip  - Continue to monitor with serial neuro evaluations  - Neuro recommend maintaining SBP > 160  - Pt has history of statin intolerance, continue CoQ10  - Consider echo in AM  Cleared by Speech to Dysphagia Diet with Nectar thick liquids  Had a prolonged discussion with the patient's daughter at the bedside Miss Estrella Rondon  Patient has poor prognosis including acute CVA, advanced pancreatic cancer, GI bleed, PE  Currently on heparin drip  Continue the same  Patient's family is inclining towards comfort care or hospice  However they will discuss amongst themselves and will get back to us  Meanwhile she wants to know about the patient's prognosis from the oncologist   Patient is DNR      Pancreatic mass  Assessment & Plan  MRI during previous admission showed ocmplex pancreatic head mass measuring 5 7x4 7x7 7cm, biopsies from 7/15 consistent with pancreatic adenocarcinoma  - At that time, suspecting extension of pancreatic mass into duodenum  - Patient and her daughters have agreed since learning of the pancreatic mass that they likely would not pursue invasive measures, and are considering comfort care after discussing with oncology  - Appreciate GI and oncology consult     SIRS (systemic inflammatory response syndrome) ([facility] [address] )  Assessment & Plan  POA with leukocytosis and HR > 90 on admission  No signs of infection at this time  CXR read with pulmonary vascular congestion  UA pending     Bilateral PE likely contributing to tachycardia  Lactic 0 9  Blood cultures pending   Procal pending   Repeat CBC in AM, monitor off Abx at this time    GI bleed  Assessment & Plan  Patient recently admitted from 7/3 to 7/10 and CT at that time showed large pancreatic head mass concerning for pancreatic carcinoma with mass effect on superior mesenteric vein and possible tumor extension with tumor extension in portal vein  Pt underwent EGD/colonoscopy on 7/7 with biopsy on 7/15 which showed invasive and ulcerated duodenal mass; biopsy preliminary cytology consistent with malignant cells  Patient's daughter reports that she continues to have melanotic stools  Hg down from 8 9 --> 7 9 from 1 week ago  - FNA results consistent with malignancy, adenocarcinoma  - Appreciate oncology consult; patient's family very understanding of prognosis but very much want to discuss with oncology, and would like to move ahead with transfusion as needed in the meantime  - Continue to closely monitor H+H, low threshold to transfuse  - Start IV Protonix Q12  - Appreciate GI recommendations    Bilateral pulmonary embolism (HCC)  Assessment & Plan  CTA showed bilateral lower lobe pulmonary emboli with no evidence of R heart strain on CT  Pt received lovenox dose and  suppository in ED  Appreciate hematology/oncology recommendations on further dosing of lovenox given patient's GI bleed and drop in Hg from 8 8 --> 7 9 from 1 week ago  Continue to monitor on telemetry  Pt maintaining adequate SpO2 on room air, continue to monitor O2 requirements   Consider repeat echo in AM        Anemia  Assessment & Plan  Hg down from 8 8 --> 7 9 from 1 week ago  Patient has known duodenal ulcer which was suspected to be the source of anemia during previous admission  Patient continues with dark, melanotic stools according to patient's daughter     With CVA, bilateral PE's, patient received 1 dose of Lovenox and ASA suppository in ED   Continue to closely monitor H+H, low threshold to transfuse  Previously GI and cardiology recommending holding home dose of AC; recommended IV venofer outpatient  Appreciate recommendations of oncology, GI, neurology regarding anemia and further [facility]    Hyponatremia  Assessment & Plan  Sodium of 134 on admission  Patient previously required salt tabs, possibly related to poor solute intake versus 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 during previous admission  Repeat BMP in a m  Other persistent atrial fibrillation [facility])  Assessment & Plan  Patient has a history of AFib/a flutter which was noted in the outpatient setting before admission from 6/3-6/5 when patient presented with CVA   In discussions with cardiology, GI it was recommended to hold [facility] due to risk with duodenal ulcer and downtrending Hg during previous admission   - EKG showed AFib at 91 BPM  - Received 1x dose Lovenox in ED  - Appreciate cardiology recommendations     S/P CABG x 4  Assessment & Plan  S/p CABG in subsequent PCI x2  Patient received  in the ED  Holding antihypertensives in setting of acute CVA     CKD (chronic kidney disease), stage III ([address] )  Assessment & Plan  Baseline creatinine in between 0 9-1 2  Creatinine on admission of 1 2  Continue to monitor with daily BMP    Hypertension  Assessment & Plan  Hold all antihypertensive medications at this time  Neuro recommending SBP over 160  Continue to closely monitor blood pressure    Hypothyroidism  Assessment & Plan  Continue levothyroxine 50 mcg daily    Diastolic congestive heart failure (HCC)  Assessment & Plan  Wt Readings from Last 3 Encounters:   07/21/20 62 8 kg (138 lb 7 2 oz)   07/15/20 64 kg (141 lb)   07/13/20 64 kg (141 lb)     Weight is again down compared to previous admission  Euvolemic on presentation to ED   CXR showed mild to moderate pulmonary vascular congestion and mild cardiomegaly  Avoid Lasix at this time to maintain SBP  Continue to monitor I/Os, daily weights   Holding spironolactone, anti-HTN meds to maintain SBP > 160  Appreciate cardiology recommendations           Iron deficiency  Assessment & Plan  Patient was recommended to follow up with oncology and schedule Venofer infusions  Appreciate oncology recommendations of initiating therapy inpatient       Subjective:   I have seen and Examined the patient at the bedside  No CP or Sob as per the RN  No fevers or chills, No nausea or vomiting as per the RN  Overnight events reviewed with the RN  No Other complains  Patient was cleared by speech therapist to have a dysphagia diet with nectar thickened liquids  Patient is not able to express  She has expressive aphasia  She also now has a facial droop  Objective: Wt Readings from Last 3 Encounters:   07/21/20 62 8 kg (138 lb 7 2 oz)   07/15/20 64 kg (141 lb)   07/13/20 64 kg (141 lb)     Temp Readings from Last 3 Encounters:   07/21/20 98 8 °F (37 1 °C) (Oral)   07/15/20 99 3 °F (37 4 °C) (Tympanic)   07/13/20 97 9 °F (36 6 °C)     BP Readings from Last 3 Encounters:   07/21/20 140/90   07/15/20 139/68   07/13/20 142/70     Pulse Readings from Last 3 Encounters:   07/21/20 105   07/15/20 73   07/13/20 68        Review of System:   Denies any CP or SOB  Denies any Cough or Cold  Denies any Fevers or chills  Denies any focal tingling numbness or weakness in any extremities  Denies any abdominal pain, Nausea or vomiting  Physical Exam:   General : Alert, Awake and oriented x 2-3, NAD  Confused, cachectic  Neck : Supple  Eyes:  BLANCA, EOMI  CVS : S1, S2, irregularly irregular  R/S : Clear to auscultate anteriorly  Abd: Soft, mildly tender to palpate diffusely more in the epigastric region, ND  Bs+ve  Extremity: Trace pedal edema noted  Skin: No acute Rash noted  CNS:  Right-sided facial droop, right upper extremity strength 0 x 5, left upper extremity strength 3+ by 5, bilateral lower extremity strength 3+ by 5  Confused, expressive aphasia noted  However able to understand but not able to communicate seems like        Labs and Imaging Data Reviewed by me:   Lab Results   Component Value Date WBC 15 81 (H) 07/21/2020    HGB 8 0 (L) 07/21/2020    HCT 26 5 (L) 07/21/2020    MCV 87 07/21/2020     07/21/2020      Lab Results   Component Value Date    SODIUM 135 (L) 07/21/2020    K 3 7 07/21/2020    CL 99 (L) 07/21/2020    CO2 21 07/21/2020    BUN 21 07/21/2020    CREATININE 0 94 07/21/2020    GLUC 168 (H) 07/21/2020    CALCIUM 8 4 07/21/2020     No results found for: BNP   Lab Results   Component Value Date    INR 1 11 07/21/2020    INR 1 08 07/20/2020    INR 1 38 (H) 07/03/2020    PROTIME 14 2 07/21/2020    PROTIME 13 9 07/20/2020    PROTIME 16 9 (H) 07/03/2020     Lab Results   Component Value Date    ALT 25 07/06/2020    AST 26 07/06/2020    ALKPHOS 57 07/06/2020     Lab Results   Component Value Date    HGBA1C 6 4 (H) 06/04/2020      Lab Results   Component Value Date    CHOLESTEROL 160 07/21/2020    CHOLESTEROL 177 06/04/2020    CHOLESTEROL 195 03/05/2020     Lab Results   Component Value Date    HDL 48 07/21/2020    HDL 52 06/04/2020    HDL 50 03/05/2020     Lab Results   Component Value Date    TRIG 165 (H) 07/21/2020    TRIG 81 07/04/2020    TRIG 90 06/04/2020     No results found for: Nasir

## 2020-07-21 NOTE — ASSESSMENT & PLAN NOTE
Patient started with aphasia and R sided weakness at home, noticed by her daughter at approximately 8:45pm  CTA completed in ED showed left M2 segment occlusion and redemonstration of severe near-occlusive left V4 segment intracranial vertebral artery plaque narrowing  Given GI bleed in the setting of known duodenal mass with melanotic stools, patient is not a candidate for tPA  Her home dose of Eliquis was being held outpatient for the same reason  ED discussed with neurology and radiology and neurology expressed that the patient is ultimately not a candidate for tPA or IR intervention at this time  She did receive a weight-based dose of Lovenox in ED as well as  suppository  Pt with ongoing aphasia and poor motor coordination    - After extensive discussions with patient and her daughters, Hardik and Andrew Parada, all agree that they would like to keep Level 3 code status until they are able to discuss the results of the biopsies from last week and what the results mean in terms of further treatment/workup  We discussed the prognosis of the ongoing ischemia and bilateral PE's and the differences between level 3 DNR/DNI status and comfort care and all are in agreement that they would like to keep Level 3 code status but would strongly consider transitioning to comfort care  Patient does have ongoing aphasia but is responding to questions with "yes" and "no", and clarified with daughters who state that she has expressed before that she likely would not want any invasive measures or procedures in the setting of her current diagnoses  They have her living will at home and plan on bringing it in    - continue to monitor on telemetry  - Received lovenox and  in ED - patient failed bedside swallow eval therefore no Plavix  - Updated at 5:30AM: Discussed again with neurology given ongoing symptoms and recommendations for [facility] who recommend starting heparin drip without bolus   Updated Andrew Parada, patient's daughter and POA and reviewed risks vs benefit of AC and she wants to proceed with heparin drip  - Continue to monitor with serial neuro evaluations  - Neuro recommend maintaining SBP > 160  - Pt has history of statin intolerance, continue CoQ10  - Consider echo in AM  Cleared by Speech to Dysphagia Diet with Nectar thick liquids  Had a prolonged discussion with the patient's daughter at the bedside Miss Theresa Encarnacion  Patient has poor prognosis including acute CVA, advanced pancreatic cancer, GI bleed, PE  Currently on heparin drip  Continue the same  Patient's family is inclining towards comfort care or hospice  However they will discuss amongst themselves and will get back to us    Meanwhile she wants to know about the patient's prognosis from the oncologist   Patient is DNR

## 2020-07-21 NOTE — ASSESSMENT & PLAN NOTE
Patient was recommended to follow up with oncology and schedule Venofer infusions  Appreciate oncology recommendations of initiating therapy inpatient

## 2020-07-21 NOTE — ASSESSMENT & PLAN NOTE
S/p CABG in subsequent PCI x2  Patient received  in the ED  Holding antihypertensives in setting of acute CVA

## 2020-07-21 NOTE — ASSESSMENT & PLAN NOTE
MRI during previous admission showed ocmplex pancreatic head mass measuring 5 7x4 7x7 7cm, biopsies from 7/15 consistent with pancreatic adenocarcinoma     - At that time, suspecting extension of pancreatic mass into duodenum  - Patient and her daughters have agreed since learning of the pancreatic mass that they likely would not pursue invasive measures, and are considering comfort care after discussing with oncology  - Appreciate GI and oncology consult

## 2020-07-21 NOTE — ASSESSMENT & PLAN NOTE
POA with leukocytosis and HR > 90 on admission  No signs of infection at this time  CXR read with pulmonary vascular congestion  UA pending     Bilateral PE likely contributing to tachycardia  Lactic 0 9  Blood cultures pending   Procal pending   Repeat CBC in AM, monitor off Abx at this time

## 2020-07-21 NOTE — ASSESSMENT & PLAN NOTE
Wt Readings from Last 3 Encounters:   07/21/20 62 8 kg (138 lb 7 2 oz)   07/15/20 64 kg (141 lb)   07/13/20 64 kg (141 lb)     Weight is again down compared to previous admission  Euvolemic on presentation to ED   CXR showed mild to moderate pulmonary vascular congestion and mild cardiomegaly  Avoid Lasix at this time to maintain SBP  Continue to monitor I/Os, daily weights   Holding spironolactone, anti-HTN meds to maintain SBP > 160  Appreciate cardiology recommendations

## 2020-07-21 NOTE — ASSESSMENT & PLAN NOTE
Hg down from 8 8 --> 7 9 from 1 week ago  Patient has known duodenal ulcer which was suspected to be the source of anemia during previous admission  Patient continues with dark, melanotic stools according to patient's daughter     With CVA, bilateral PE's, patient received 1 dose of Lovenox and ASA suppository in ED   Continue to closely monitor H+H, low threshold to transfuse  Previously GI and cardiology recommending holding home dose of AC; recommended IV venofer outpatient  Appreciate recommendations of oncology, GI, neurology regarding anemia and further [facility]

## 2020-07-21 NOTE — ASSESSMENT & PLAN NOTE
Baseline creatinine in between 0 9-1 2  Creatinine on admission of 1 2  Continue to monitor with daily BMP

## 2020-07-21 NOTE — SPEECH THERAPY NOTE
Speech Language/Pathology  Speech/Language Pathology  Assessment    Patient Name: Teresa Jaquez  YOXHE'K Date: 7/21/2020     Problem List  Principal Problem:    CVA (cerebral vascular accident) [facility])  Active Problems:    S/P CABG x 4    Anemia    Iron deficiency    Diastolic congestive heart failure (HCC)    Hypothyroidism    Hypertension    CKD (chronic kidney disease), stage III ([address] )    Other persistent atrial fibrillation (HCC)    Pancreatic mass    Hyponatremia    Bilateral pulmonary embolism (HCC)    GI bleed    SIRS (systemic inflammatory response syndrome) ([facility] [address] )    Past Medical History  Past Medical History:   Diagnosis Date    Anemia     Arthritis     Bladder calculi     CAD (coronary artery disease)     Status post CABG and PTCA to OM1    Chronic kidney disease     Pt  states Stage 3     CKD (chronic kidney disease) stage 3, GFR 30-59 ml/min (HCC)     CKD (chronic kidney disease), stage III (HCC)     Diastolic congestive heart failure ([facility] [address] )     Hypertension     Hypothyroidism     Myocardial infarction ([facility] [address] )     x 4 MI's and x2 stents  Last MI in Feb 2018    Rheumatoid arthritis ([facility] [address] )     Stroke ([facility] [address] ) 06/2020    weakness, aphasia( resolved)    Uterine prolapse      Past Surgical History  Past Surgical History:   Procedure Laterality Date    BACK SURGERY      BLADDER SURGERY      CORONARY ANGIOPLASTY WITH STENT PLACEMENT      CORONARY ARTERY BYPASS GRAFT      EYE SURGERY Right 05/2020    JOINT REPLACEMENT Right 02/2018    OOPHORECTOMY Left     FL PARTIAL HIP REPLACEMENT Right 2/1/2018    Procedure: HEMIARTHROPLASTY HIP (BIPOLAR) (RIGHT);   Surgeon: Diane Yousif MD;  Location: [address] [place] [address];  Service: Orthopedics    THYROIDECTOMY, PARTIAL       Speech-Language Evaluation    Impression:  Pt presented w/ significant receptive  And expressive language deficits- not able to follow commands, answer yes/no questions, identify objects, only some vocalizations elicited, no verbalizations  Recommendation:  Cont speech tx to attempt to establish means of basic communication  Therapy Prognosis: fair/poor  Prognosis considerations:severity of deficits, overall medical status, age, prior level of functioning  Frequency:        Patient's goal:  None stated       Current Medical Status: pt is an 78 yo female admitted to [facility]  dx: CVA  See bedside swallow eval for further background info  Language Evaluation:    Auditory Comprehension:  Significant  R/L discrim:  Body part ID: 0%  One step commands:25%  Picture ID: 0%  Word ID:NA  Letter ID: NA  Basic y/n ?'s:50%- no response at times      Reading Comprehension:  NA  Name recognition:  Single word comprehension/Match pic w/ a choice of 2 words:  Simple direction following:  Sentence comprehension:  Paragraph comprehension:  Functional comprehension:    Verbal Expression:  Auto Sequences:   YUMIKO: NA   YUNG: NA   Counting to 5: not able to recite, repeat, or attempt to produce   NISA:  Pt smiled but did not engage or elicit any verbalizations w/ NISA  Word Repetition: 0%  Phrase Completion:  Responsive Naming:  Picture Namin%  Object Naming:  Divergent Naming:  Picture Description:  Conversation:  Able to make basic needs known? no    Written Expression:  NA  Name:  Address:  Phone #:  Words to dictation:  Sentence to dictation:  Sentence formulation:      Motor Speech:  NO verbalizations elicited; R labial weakness noted   Dysarthria:   Imprecise artic:   Rate:   Nasality:   Breath support:   Volume:  Apraxia:   Oral:   Verbal:    Other Behaviors: pt was attentive, made eye contact  Minimal attempts to speak-only vocalization, pt would nod head yes/no on occasion       April Liv Hurtado MA CCC-SLP  Speech Patholgist  [place] license # [place] [id number]  [place] license # [id number]  Available via [employer]

## 2020-07-21 NOTE — PLAN OF CARE
Problem: Potential for Falls  Goal: Patient will remain free of falls  Description  INTERVENTIONS:  - Assess patient frequently for physical needs  -  Identify cognitive and physical deficits and behaviors that affect risk of falls    -  [place] fall precautions as indicated by assessment   - Educate patient/family on patient safety including physical limitations  - Instruct patient to call for assistance with activity based on assessment  - Modify environment to reduce risk of injury  - Consider OT/PT consult to assist with strengthening/mobility  Outcome: Progressing     Problem: Prexisting or High Potential for Compromised Skin Integrity  Goal: Skin integrity is maintained or improved  Description  INTERVENTIONS:  - Identify patients at risk for skin breakdown  - Assess and monitor skin integrity  - Assess and monitor nutrition and hydration status  - Monitor labs   - Assess for incontinence   - Turn and reposition patient  - Assist with mobility/ambulation  - Relieve pressure over bony prominences  - Avoid friction and shearing  - Provide appropriate hygiene as needed including keeping skin clean and dry  - Evaluate need for skin moisturizer/barrier cream  - Collaborate with interdisciplinary team   - Patient/family teaching  - Consider wound care consult   Outcome: Progressing     Problem: NEUROSENSORY - ADULT  Goal: Achieves stable or improved neurological status  Description  INTERVENTIONS  - Monitor and report changes in neurological status  - Monitor vital signs such as temperature, blood pressure, glucose, and any other labs ordered   - Initiate measures to prevent increased intracranial pressure  - Monitor for seizure activity and implement precautions if appropriate      Outcome: Progressing  Goal: Achieves maximal functionality and self care  Description  INTERVENTIONS  - Monitor swallowing and airway patency with patient fatigue and changes in neurological status  - Encourage and assist patient to increase activity and self care     - Encourage visually impaired, hearing impaired and aphasic patients to use assistive/communication devices  Outcome: Progressing     Problem: CARDIOVASCULAR - ADULT  Goal: Absence of cardiac dysrhythmias or at baseline rhythm  Description  INTERVENTIONS:  - Continuous cardiac monitoring, vital signs, obtain 12 lead EKG if ordered  - Administer antiarrhythmic and heart rate control medications as ordered  - Monitor electrolytes and administer replacement therapy as ordered  Outcome: Progressing     Problem: PAIN - ADULT  Goal: Verbalizes/displays adequate comfort level or baseline comfort level  Description  Interventions:  - Encourage patient to monitor pain and request assistance  - Assess pain using appropriate pain scale  - Administer analgesics based on type and severity of pain and evaluate response  - Implement non-pharmacological measures as appropriate and evaluate response  - Consider cultural and social influences on pain and pain management  - Notify physician/advanced practitioner if interventions unsuccessful or patient reports new pain  Outcome: Progressing     Problem: INFECTION - ADULT  Goal: Absence or prevention of progression during hospitalization  Description  INTERVENTIONS:  - Assess and monitor for signs and symptoms of infection  - Monitor lab/diagnostic results  - Monitor all insertion sites, i e  indwelling lines, tubes, and drains  - Monitor endotracheal if appropriate and nasal secretions for changes in amount and color  - [place] appropriate cooling/warming therapies per order  - Administer medications as ordered  - Instruct and encourage patient and family to use good hand hygiene technique  - Identify and instruct in appropriate isolation precautions for identified infection/condition  Outcome: Progressing

## 2020-07-21 NOTE — PROGRESS NOTES
Laura 38   Neurology Initial Consult    Edith Giordano is a 79 y o  female  [address] [facility] 86-*          Information obtained from:   Chief Complaint   Patient presents with    Altered Mental Status     last known well by daughter at 5;40, brought medicine to her and she could not hold cup, hx of tia/cva in past   this time was alert but aphasic could not communicate with daughter  alert on arrival  thinks she is downstairs  speaking but remains aphasic         Assessment/Plan:  1  Acute left MCA distribution infarction involving the frontal lobe, centrum semiovale and corona radiata as well as frontal operculum and insular ribbon   2  Complete occlusion of the M2 distribution/severe near occlusive stenosis of the left V4 segment  3  Pulmonary embolism  4  Pancreatic cancer  5  Atrial fibrillation    -monitor on telemetry  -neuro assessments  -maintain systolic blood pressure approximately 160  -maintain adequate hydration  -IV heparin drip per medical management  -has been 81 mg daily  -MRI of the brain completed  -the labs:  Lipid profile, A1c  -PT/OT  -speech therapy  -goals of care conversation with patient's family regarding ongoing treatment plans with regards to pancreatic cancer and stroke therapies  Patient is 76-year-old female with recent acute stroke followed by GI bleed 2nd to a duodenal mass resulting in to pancreatic cancer  Patient was at home last night developed aphasia and right-sided weakness  Was brought to the ER, stroke alert commenced and patient taken to CT scan  CT scan indicative of complete occlusion of the left M2 region was well severe stenosis with narrowing in the left V4 region  Patient sent for MRI today which does show ongoing acute infarcts in the left watershed region indicative of ongoing hypertension  Will need to keep patient blood pressure systolic lay at approximately 160 in maintain hydration at this time    Patient is limited with regards to anticoagulation, was on Eliquis however this was on hold 2nd to the duodenal mass that was bleeding  Patient continues to have black stools with ongoing anemia upon admission  It is noted that patient also has bilateral pulmonary embolism  Therefore patient is currently on IV heparin drip at this time until further treatment recommendations and decisions have been made  Patient continues on aspirin 81 mg daily now  Lipid profile in A1c repeating per stroke protocol  Patient to have PT/OT when medically stable to do so  At this point time recommend goals of care conversation with patient's family  HPI:  Patricia Otero is an 25-year-old female with comorbid history consistent with atrial fibrillation, GI bleed, duodenal mass, anemia, chronic kidney disease, coronary artery disease status post MI with stents and CABG, heart failure, hypertension, hypothyroidism, rheumatoid arthritis CVA with left MCA territory infarcts grand most recently determined pancreatic cancer  Had CVA May 2020 in the left MCA territory with no residual deficits upon discharge from the hospital   Was sent home on baby aspirin and and Eliquis for her atrial fibrillation however in June of 2020 patient developed a GI bleed  Was evaluated and found to have a duodenal mass which was biopsied  As of today was determined patient with positive pancreatic cancer  It was reported that yesterday patient had complained of a lot of pain to her hip status post hip replacement and 8:45 p m , however the daughter said she was not due for her pain medication until about 930 so she had went to give her mother her pain pills around 920 she was awake but she could not reach for her pills, she kept dropping her drinks and the daughter reported that she was leaning but she could not remember exactly which side she was leading to    According to the daughter she tried to stand her mother up but she could not stand, she was not verbally responding and noted that she kept trying to push against her  Daughter called EMS at which point they arrived and brought the patient to the ER  According to the patient's daughter, EMS had indicated that she has some verbal responses to them in the squad on the way to the hospital however upon arrival to the ER patient was continue to being nonverbal   Stroke alert was called patient taken to CT scan  CT of the head with no acute intracranial abnormalities, microangiopathic changes noted  CTA of the head and neck with left M2 segment occlusion, severe near occlusive left V4 segment intracranial vertebral artery atherosclerotic plaque narrowing noted  Patient also noted to have bilateral lower lobe pulmonary embolism  Due to patient's recent history of duodenal mass with GI bleeding and anemia, patient's Eliquis was on hold for approximately 1 month  Patient continues to be at risk for bleeding therefore was not a tPA candidate  Patient was placed on IV heparin drip at this time for anticoagulation for PE and CVA  Patient was admitted to telemetry for further workup and treatment of possible extension of stroke and pulmonary embolism management  With patient today for consult, is awake and alert  Unable to follow any commands or speak  Patient does have reflexes intact, has a gaze preference to the left however unable to fully examine patient at this time given her inability to follow commands or respond verbally  Patient does have spontaneous movement of the lower extremities bilaterally, however is flaccid to the right upper extremity  Both arms held up in the air was able to continue to hold the left arm however the right arm just drop to the bed    Patient sent for MRI today noting interval development of foci of restricted diffusion involving the left frontal lobe, centrum semiovale and corona radiata as well as the frontal operculum and insular ribbon, predominantly involving the left MCA distribution in the region of the M2 occlusion  Patient also with small focus of diffusion hyperintensities within the right centrum semiovale without clear corresponding low ADC signal   Patient also with more subacute peer ink lacunar ischemia noted in the right centrum semiovale  Did have discussion with the patient's daughter away from the bedside, they are advised today patient is positive findings for pancreatic cancer  Per the daughter they would like information with regards to the extent of her current MRI findings as they are going to be addressing the possibility of hospice care going forward given the patient's desire not to treat aggressively for any cancer treatments at this time  In review of the MRI does appear as though patient has a series of acute findings in the left MCA distribution which fall within the watershed diffusion area  Recommend at this time patient to have increased blood pressure of approximately 000 systolicly with adequate hydration going forward  Patient remains on IV heparin drip at this time for anticoagulation as well as aspirin  Goals of care discussion we had between family and PCP going forward  Past Medical History:   Diagnosis Date    Anemia     Arthritis     Bladder calculi     CAD (coronary artery disease)     Status post CABG and PTCA to OM1    Chronic kidney disease     Pt  states Stage 3     CKD (chronic kidney disease) stage 3, GFR 30-59 ml/min (HCC)     CKD (chronic kidney disease), stage III ([facility])     Diastolic congestive heart failure ([address] )     Hypertension     Hypothyroidism     Myocardial infarction ([address] )     x 4 MI's and x2 stents    Last MI in Feb 2018    Rheumatoid arthritis ([address] )     Stroke ([address] ) 06/2020    weakness, aphasia( resolved)    Uterine prolapse        Past Surgical History:   Procedure Laterality Date    BACK SURGERY      BLADDER SURGERY      CORONARY ANGIOPLASTY WITH STENT PLACEMENT      CORONARY ARTERY BYPASS GRAFT      EYE SURGERY Right 05/2020    JOINT REPLACEMENT Right 02/2018    OOPHORECTOMY Left     RI PARTIAL HIP REPLACEMENT Right 2/1/2018    Procedure: HEMIARTHROPLASTY HIP (BIPOLAR) (RIGHT); Surgeon: Mervin Gonzalez MD;  Location: [address] [place] [address];  Service: Orthopedics    THYROIDECTOMY, PARTIAL         Allergies   Allergen Reactions    Atorvastatin Hives    Boniva [Ibandronic Acid] Hives    Codeine      codeine derivatives - tolerates IV Dilaudid    Statins Hives    Zetia [Ezetimibe]          Current Facility-Administered Medications:     aspirin chewable tablet 81 mg, 81 mg, Oral, Daily, Fleurette Fabry, PA-C, 81 mg at 07/21/20 1216    clopidogrel (PLAVIX) tablet 600 mg, 600 mg, Oral, Once, Flejesuse Fabmatthew, PA-C    co-enzyme Q-10 capsule 30 mg, 30 mg, Oral, Daily, Fleurette Fabry, PA-C, 30 mg at 07/21/20 1216    ferrous sulfate tablet 325 mg, 325 mg, Oral, Daily With Breakfast, Flejesuse Fabry, PA-C, 325 mg at 07/21/20 1216    heparin (porcine) 25,000 units in 250 mL infusion (premix), 3-30 Units/kg/hr (Order-Specific), Intravenous, Titrated, Flejesuse Fabry, PA-C, Last Rate: 10 8 mL/hr at 07/21/20 1533, 18 Units/kg/hr at 07/21/20 1533    heparin (porcine) injection 2,400 Units, 2,400 Units, Intravenous, Q1H PRN, Fleurette Fabry, PA-C    heparin (porcine) injection 4,800 Units, 4,800 Units, Intravenous, Q1H PRN, Fleurette Fabry, PA-C    HYDROmorphone (DILAUDID) injection 0 5 mg, 0 5 mg, Intravenous, Q4H PRN, Flejesuse Fabry, PA-C    ketorolac (ACULAR) 0 5 % ophthalmic solution 1 drop, 1 drop, Right Eye, BID, Flejesuse Fabmatthew, PA-C, 1 drop at 07/21/20 1216    levothyroxine tablet 50 mcg, 50 mcg, Oral, Early Morning, Fleurette Fabry, PA-C    oxyCODONE (ROXICODONE) IR tablet 2 5 mg, 2 5 mg, Oral, Q4H PRN, Fleurette Fabry, PA-C    pantoprazole (PROTONIX) injection 40 mg, 40 mg, Intravenous, Q12H [facility], Fleurette Fabry, PA-C, 40 mg at 07/21/20 7456    Social History     Socioeconomic History    Marital status:       Spouse name: Not on file    Number of children: Not on file    Years of education: Not on file    Highest education level: Not on file   Occupational History    Occupation: RETIRED   Social Needs    Financial resource strain: Not on file    Food insecurity:     Worry: Not on file     Inability: Not on file    Transportation needs:     Medical: Not on file     Non-medical: Not on file   Tobacco Use    Smoking status: Never Smoker    Smokeless tobacco: Never Used   Substance and Sexual Activity    Alcohol use: Never     Frequency: Never    Drug use: No    Sexual activity: Not Currently   Lifestyle    Physical activity:     Days per week: Not on file     Minutes per session: Not on file    Stress: Not on file   Relationships    Social connections:     Talks on phone: Not on file     Gets together: Not on file     Attends [employer] service: Not on file     Active member of club or organization: Not on file     Attends meetings of clubs or organizations: Not on file     Relationship status: Not on file    Intimate partner violence:     Fear of current or ex partner: Not on file     Emotionally abused: Not on file     Physically abused: Not on file     Forced sexual activity: Not on file   Other Topics Concern    Not on file   Social History Narrative    Not on file       Family History   Problem Relation Age of Onset    Heart attack Mother     Heart attack Father     Stroke Sister     Heart attack Brother         3 brothers all          Review of systems:  Unable to assess ROS due to patient being nonverbal with receptive aphasia  Physical examination:  /78   Pulse 103   Temp 97 9 °F (36 6 °C)   Resp 18   Wt 62 8 kg (138 lb 7 2 oz)   SpO2 (!) 85%   BMI 24 14 kg/m²     GENERAL APPEARANCE:  The patient is alert,   HEENT:  Head is normocephalic  Pupils are equal and reactive  NECK:  Supple without lymphadenopathy  HEART:  Regular rate and rhythm  LUNGS:  clear to auscultation   No crackles or wheezes are heard  ABDOMEN:  Soft, nontender, nondistended with good bowel sounds heard  EXTREMITIES:  Without cyanosis, clubbing or edema  Mental status: The patient is currently nonverbal   Cranial nerves:  CN II:  Unable to assess visual fields  Fundoscopic exam is normal with sharp discs and no vascular changes  Pupils are 3 mm and briskly reactive to light  CN III, IV, VI:  Patient with gaze preference to the left  Unable to assess EOM  CN V: Corneal responses are intact  CN VII: Face with right-sided facial drooping  with normal eye closure   CN VIII: Hearing unable to be assessed at this time  CN IX, X:  Unable to assess as patient is not able to follow commands is currently nonverbal  CN XI: Head turning and shoulder shrug are intact  CN XII: Tongue appears to be midline with normal movements and no atrophy  Motor: There is no pronator drift of out-stretched arms  Muscle bulk and tone with full [place] to the right upper extremity, has spontaneous movement of the left upper extremity and bilateral lower extremities  Neurological exam limited due to patient's inability to follow commands 2nd to receptive aphasia    Patient also with expressive aphasia and is currently nonverbal    Muscle exam  Arm Right Left Leg Right Left   Deltoid 0/5 4/5 Iliopsoas 4/5 4/5   Biceps 0/5 4/5 Quads 4/5 4/5   Triceps 0/5 4/5 Hamstrings 4/5 4/5   Wrist Extension   Ankle Dorsi Flexion     Wrist Flexion   Ankle Plantar Flexion     Interossei   Ankle Eversion       APB   Ankle Inversion            Reflexes    RJ BJ TJ KJ AJ Plantars Farias's   Right 2+ 2+ 2+ 2+ 2+ Downgoing Not present   Left 2+ 2+ 2+ 2+ 2+ Downgoing Not present      Sensory:  Unable to assess due to patient with receptive aphasia and unable to follow commands  Coordination:  Unable to test coordination, patient with receptive aphasia unable to follow commands  Gait/Stance:  Deferred, patient not able to follow commands    Lab Results Component Value Date    WBC 15 81 (H) 07/21/2020    HGB 8 0 (L) 07/21/2020    HCT 26 5 (L) 07/21/2020    MCV 87 07/21/2020     07/21/2020     Lab Results   Component Value Date    HGBA1C 6 4 (H) 06/04/2020     Lab Results   Component Value Date    ALT 25 07/06/2020    AST 26 07/06/2020    ALKPHOS 57 07/06/2020     Lab Results   Component Value Date    CALCIUM 8 4 07/21/2020    K 3 7 07/21/2020    CO2 21 07/21/2020    CL 99 (L) 07/21/2020    BUN 21 07/21/2020    CREATININE 0 94 07/21/2020         Review of reports and notes reveal:  Independent Interpretation of images or specimens:  Mri Brain Wo Contrast Result Date: 7/21/2020  Small foci of acute/recent infarction primarily involving the left MCA distribution, as described above  Small focus of more subacute appearing lacunar ischemia is noted within the right centrum semiovale  Scattered chronic microvascular ischemic change  The study was marked in [facility] for immediate notification  Workstation performed: [id number]     Xr Chest 1 View Result Date: 7/21/2020  Mild to moderate pulmonary vascular congestion  Mild cardiomegaly  Workstation performed: [id number]     Ct Stroke Alert Brain Result Date: 7/20/2020  No acute intracranial abnormality  Microangiopathic changes  Findings were directly discussed with Cheryl Moon on 7/20/2020 10:20 PM  Workstation performed: [id number]     Cta Stroke Alert (head/neck) Result Date: 7/20/2020  Left M2 segment occlusion  Severe near occlusive left V4 segment intracranial vertebral artery atherosclerotic plaque narrowing  Moderate cardiomegaly with small left pleural effusion  Bilateral lower lobe pulmonary emboli  Findings were directly discussed with Cheryl Moon on 7/20/2020 10:28 PM  Workstation performed: [id number]       Thank you for this consult  Total time of encounter: 70 Minutes  More than 50% of time was spent in counseling and coordination of care of patient

## 2020-07-21 NOTE — ASSESSMENT & PLAN NOTE
Patient has a history of AFib/a flutter which was noted in the outpatient setting before admission from 6/3-6/5 when patient presented with CVA   In discussions with cardiology, GI it was recommended to hold [facility] due to risk with duodenal ulcer and downtrending Hg during previous admission   - EKG showed AFib at 91 BPM  - Received 1x dose Lovenox in ED  - Appreciate cardiology recommendations

## 2020-07-21 NOTE — ASSESSMENT & PLAN NOTE
Patient started with aphasia and R sided weakness at home, noticed by her daughter at approximately 8:45pm  CTA completed in ED showed left M2 segment occlusion and redemonstration of severe near-occlusive left V4 segment intracranial vertebral artery plaque narrowing  Given GI bleed in the setting of known duodenal mass with melanotic stools, patient is not a candidate for tPA  Her home dose of Eliquis was being held outpatient for the same reason  ED discussed with neurology and radiology and neurology expressed that the patient is ultimately not a candidate for tPA or IR intervention at this time  She did receive a weight-based dose of Lovenox in ED as well as  suppository  Pt with ongoing aphasia and poor motor coordination    - After extensive discussions with patient and her daughters, Maranda Butt and Baldemar, all agree that they would like to keep Level 3 code status until they are able to discuss the results of the biopsies from last week and what the results mean in terms of further treatment/workup  We discussed the prognosis of the ongoing ischemia and bilateral PE's and the differences between level 3 DNR/DNI status and comfort care and all are in agreement that they would like to keep Level 3 code status but would strongly consider transitioning to comfort care  Patient does have ongoing aphasia but is responding to questions with "yes" and "no", and clarified with daughters who state that she has expressed before that she likely would not want any invasive measures or procedures in the setting of her current diagnoses  They have her living will at home and plan on bringing it in    - continue to monitor on telemetry  - Received lovenox and  in ED - patient failed bedside swallow eval therefore no Plavix  - Updated at 5:30AM: Discussed again with neurology given ongoing symptoms and recommendations for [facility] who recommend starting heparin drip without bolus   Updated Baldemar, patient's daughter and POA and reviewed risks vs benefit of AC and she wants to proceed with heparin drip    - Continue to monitor with serial neuro evaluations  - Neuro recommend maintaining SBP > 160  - Pt has history of statin intolerance, continue CoQ10  - Consider echo in AM  - NPO until speech eval  - PT/OT

## 2020-07-21 NOTE — H&P
[address] Internal Medicine  H&P- [facility] Gillian 1940, 79 y o  female MRN: [id number]  Unit/Bed#: [address] [place] [address] Encounter: [id number]  Primary Care Provider: Jacquelyn Barrientos MD   Date and time admitted to hospital: 7/20/2020 10:08 PM    * CVA (cerebral vascular accident) [facility])  Assessment & Plan  Patient started with aphasia and R sided weakness at home, noticed by her daughter at approximately 8:45pm  CTA completed in ED showed left M2 segment occlusion and redemonstration of severe near-occlusive left V4 segment intracranial vertebral artery plaque narrowing  Given GI bleed in the setting of known duodenal mass with melanotic stools, patient is not a candidate for tPA  Her home dose of Eliquis was being held outpatient for the same reason  ED discussed with neurology and radiology and neurology expressed that the patient is ultimately not a candidate for tPA or IR intervention at this time  She did receive a weight-based dose of Lovenox in ED as well as  suppository  Pt with ongoing aphasia and poor motor coordination    - After extensive discussions with patient and her daughters, Amira Elias and Amilcar Alfaro, all agree that they would like to keep Level 3 code status until they are able to discuss the results of the biopsies from last week and what the results mean in terms of further treatment/workup  We discussed the prognosis of the ongoing ischemia and bilateral PE's and the differences between level 3 DNR/DNI status and comfort care and all are in agreement that they would like to keep Level 3 code status but would strongly consider transitioning to comfort care  Patient does have ongoing aphasia but is responding to questions with "yes" and "no", and clarified with daughters who state that she has expressed before that she likely would not want any invasive measures or procedures in the setting of her current diagnoses   They have her living will at home and plan on bringing it in    - continue to monitor on telemetry  - Received lovenox and  in ED - patient failed bedside swallow eval therefore no Plavix  - Updated at 5:30AM: Discussed again with neurology given ongoing symptoms and recommendations for [facility] who recommend starting heparin drip without bolus  Updated Piedad Kemp, patient's daughter and POA and reviewed risks vs benefit of AC and she wants to proceed with heparin drip  - Continue to monitor with serial neuro evaluations  - Neuro recommend maintaining SBP > 160  - Pt has history of statin intolerance, continue CoQ10  - Consider echo in AM  - NPO until speech eval  - PT/OT      Bilateral pulmonary embolism [facility])  Assessment & Plan  CTA showed bilateral lower lobe pulmonary emboli with no evidence of R heart strain on CT  Pt received lovenox dose and  suppository in ED  Appreciate hematology/oncology recommendations on further dosing of lovenox given patient's GI bleed and drop in Hg from 8 8 --> 7 9 from 1 week ago  Continue to monitor on telemetry  Pt maintaining adequate SpO2 on room air, continue to monitor O2 requirements   Consider repeat echo in AM        GI bleed  Assessment & Plan  Patient recently admitted from 7/3 to 7/10 and CT at that time showed large pancreatic head mass concerning for pancreatic carcinoma with mass effect on superior mesenteric vein and possible tumor extension with tumor extension in portal vein  Pt underwent EGD/colonoscopy on 7/7 with biopsy on 7/15 which showed invasive and ulcerated duodenal mass; biopsy preliminary cytology consistent with malignant cells  Patient's daughter reports that she continues to have melanotic stools  Hg down from 8 9 --> 7 9 from 1 week ago    - FNA results consistent with malignancy, adenocarcinoma  - Appreciate oncology consult; patient's family very understanding of prognosis but very much want to discuss with oncology, and would like to move ahead with transfusion as needed in the meantime  - Continue to closely monitor H+H, low threshold to transfuse  - Start IV Protonix Q12  - Appreciate GI recommendations    Pancreatic mass  Assessment & Plan  MRI during previous admission showed ocmplex pancreatic head mass measuring 5 7x4 7x7 7cm, biopsies from 7/15 consistent with pancreatic adenocarcinoma  - At that time, suspecting extension of pancreatic mass into duodenum  - Patient and her daughters have agreed since learning of the pancreatic mass that they likely would not pursue invasive measures, and are considering comfort care after discussing with oncology  - Appreciate GI and oncology consult     Anemia  Assessment & Plan  Hg down from 8 8 --> 7 9 from 1 week ago  Patient has known duodenal ulcer which was suspected to be the source of anemia during previous admission  Patient continues with dark, melanotic stools according to patient's daughter  With CVA, bilateral PE's, patient received 1 dose of Lovenox and ASA suppository in ED   Continue to closely monitor H+H, low threshold to transfuse  Previously GI and cardiology recommending holding home dose of AC; recommended IV venofer outpatient  Appreciate recommendations of oncology, GI, neurology regarding anemia and further [facility]    Diastolic congestive heart failure ([facility] [address] )  Assessment & Plan  Wt Readings from Last 3 Encounters:   07/21/20 62 8 kg (138 lb 7 2 oz)   07/15/20 64 kg (141 lb)   07/13/20 64 kg (141 lb)     Weight is again down compared to previous admission  Euvolemic on presentation to ED   CXR showed mild to moderate pulmonary vascular congestion and mild cardiomegaly  Avoid Lasix at this time to maintain SBP  Continue to monitor I/Os, daily weights   Holding spironolactone, anti-HTN meds to maintain SBP > 160  Appreciate cardiology recommendations           Other persistent atrial fibrillation [facility])  Assessment & Plan  Patient has a history of AFib/a flutter which was noted in the outpatient setting before admission from 6/3-6/5 when patient presented with CVA  In discussions with cardiology, GI it was recommended to hold [facility] due to risk with duodenal ulcer and downtrending Hg during previous admission   - EKG showed AFib at 91 BPM  - Received 1x dose Lovenox in ED  - Appreciate cardiology recommendations     SIRS (systemic inflammatory response syndrome) ([address] )  Assessment & Plan  POA with leukocytosis and HR > 90 on admission  No signs of infection at this time  CXR read with pulmonary vascular congestion  UA pending  Bilateral PE likely contributing to tachycardia  Lactic 0 9  Blood cultures pending   Procal pending   Repeat CBC in AM, monitor off Abx at this time    Hyponatremia  Assessment & Plan  Sodium of 134 on admission  Patient previously required salt tabs, possibly related to poor solute intake versus 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 during previous admission  Repeat BMP in a m  CKD (chronic kidney disease), stage III (HCC)  Assessment & Plan  Baseline creatinine in between 0 9-1 2  Creatinine on admission of 1 2  Continue to monitor with daily BMP    Hypertension  Assessment & Plan  Hold all antihypertensive medications at this time  Neuro recommending SBP over 160  Continue to closely monitor blood pressure    Hypothyroidism  Assessment & Plan  Continue levothyroxine 50 mcg daily    Iron deficiency  Assessment & Plan  Patient was recommended to follow up with oncology and schedule Venofer infusions  Appreciate oncology recommendations of initiating therapy inpatient     S/P CABG x 4  Assessment & Plan  S/p CABG in subsequent PCI x2  Patient received  in the ED  Holding antihypertensives in setting of acute CVA    VTE Prophylaxis: Heparin Drip  / sequential compression device   Code Status: Level 3, DNR/DNI  POLST: There is no POLST form on file for this patient (pre-hospital)  Discussion with family: Discussed at length with patient's daughters as above, Marco Antonio French and Henri Recinos, who were present in the ED       Anticipated Length of Stay:  Patient will be admitted on an Inpatient basis with an anticipated length of stay of  > 2 midnights  Justification for Hospital Stay: CVA, bilateral pulmonary embolism, GI bleed, anemia, CHF    Total Time for Visit, including Counseling / Coordination of Care: 1 hour  Greater than 50% of this total time spent on direct patient counseling and coordination of care  Chief Complaint:   Altered mental status    History of Present Illness:    [place] Chivo is a 79 y o  female who presents with PMH of HTN, HLD with statin intolerance CAD s/p CABG and PCI x2, chronic systolic CHF, AFib, recent CVA in 06/2020, hypothyroidism, CKD stage 3, pancreatic mass with suspicion for pancreatic adenocarcinoma  Presented to the ED today via EMS after patient's daughter, who is staying with her at home, found her to have right-sided weakness, and aphasia  This was noted at approximately 8:45 p m  By the time EMS had arrived, it seems as though the patient's symptoms had resolved, however on arrival to the ED aphasia was again noted and stroke alert was called  In the ED, patient continued with expressive aphasia,     Review of Systems:    Review of Systems   Constitutional: Negative for chills and fever  Eyes: Negative for visual disturbance  Respiratory: Negative for cough, chest tightness and shortness of breath  Cardiovascular: Negative for chest pain, palpitations and leg swelling  Gastrointestinal: Positive for blood in stool (melanotic stool)  Negative for abdominal distention, abdominal pain, diarrhea, nausea and vomiting  Genitourinary: Negative for dysuria, frequency, hematuria and urgency  Musculoskeletal: Positive for gait problem  Skin: Positive for pallor  Neurological: Positive for facial asymmetry (R sided weakness), speech difficulty (expressive aphasia) and weakness         Past Medical and Surgical History:     Past Medical History:   Diagnosis Date    Anemia     Arthritis     Bladder calculi     CAD (coronary artery disease)     Status post CABG and PTCA to OM1    Chronic kidney disease     Pt  states Stage 3     CKD (chronic kidney disease) stage 3, GFR 30-59 ml/min ([facility]     CKD (chronic kidney disease), stage III ([facility])     Diastolic congestive heart failure ([facility] [address] )     Hypertension     Hypothyroidism     Myocardial infarction ([facility] [address] )     x 4 MI's and x2 stents  Last MI in Feb 2018    Rheumatoid arthritis ([facility] [address] )     Stroke ([facility] [address] ) 06/2020    weakness, aphasia( resolved)    Uterine prolapse        Past Surgical History:   Procedure Laterality Date    BACK SURGERY      BLADDER SURGERY      CORONARY ANGIOPLASTY WITH STENT PLACEMENT      CORONARY ARTERY BYPASS GRAFT      EYE SURGERY Right 05/2020    JOINT REPLACEMENT Right 02/2018    OOPHORECTOMY Left     VT PARTIAL HIP REPLACEMENT Right 2/1/2018    Procedure: HEMIARTHROPLASTY HIP (BIPOLAR) (RIGHT); Surgeon: Debo Islas MD;  Location: [address] [place] [address];  Service: Orthopedics    THYROIDECTOMY, PARTIAL         Meds/Allergies:    Prior to Admission medications    Medication Sig Start Date End Date Taking? Authorizing Provider   aspirin (ECOTRIN LOW STRENGTH) 81 mg EC tablet Take 81 mg by mouth daily Indications: Heart Attack  Yes Historical Provider, MD   Coenzyme Q10 10 MG capsule Take 10 mg by mouth daily   Yes Historical Provider, MD   docusate sodium (COLACE) 100 mg capsule Take 1 capsule (100 mg total) by mouth 2 (two) times a day 7/10/20  Yes MAYUR Rondon   ferrous sulfate 325 (65 Fe) mg tablet Take 325 mg by mouth daily with breakfast   Yes Historical Provider, MD   hydroxychloroquine (PLAQUENIL) 200 mg tablet Take 200 mg by mouth daily     Yes Historical Provider, MD   ketorolac (ACULAR) 0 5 % ophthalmic solution Administer 1 drop to the right eye 2 (two) times a day  11/30/19  Yes Historical Provider, MD   levothyroxine 50 mcg tablet Take 50 mcg by mouth daily     Yes Historical Provider, MD   losartan (COZAAR) 25 mg tablet Take 1 tablet (25 mg total) by mouth daily Hold for SBP < 130 7/10/20  Yes MAYUR Rondon   metoprolol succinate (TOPROL-XL) 100 mg 24 hr tablet Take 1 tablet (100 mg total) by mouth daily 6/5/20  Yes Mar De La Fuente MD   oxyCODONE (ROXICODONE) 5 mg immediate release tablet Take 0 5 tablets (2 5 mg total) by mouth every 4 (four) hours as needed for severe pain for up to 10 daysMax Daily Amount: 15 mg 7/20/20 7/30/20 Yes Jaguar Verdin MD   pantoprazole (PROTONIX) 40 mg tablet Take 1 tablet (40 mg total) by mouth daily 7/10/20 8/9/20 Yes MAYUR Rondon   TOVIAZ 4 MG TB24 Take 1 tablet by mouth daily  2/3/20  Yes Historical Provider, MD   VASCEPA 1 g CAPS ONE CAPSULE TWICE A DAY 3/9/20  Yes Vance Gamboa MD   Vitamin D, Cholecalciferol, 1000 units CAPS Take 2,000 Units by mouth daily     Yes Historical Provider, MD   Ascorbic Acid (VITAMIN C) 1000 MG tablet Take 1,000 mg by mouth daily    Historical Provider, MD   Multiple Vitamins-Minerals (CENTRUM SILVER PO) Take 1 tablet by mouth daily    Historical Provider, MD   polyethylene glycol (MIRALAX) 17 g packet Take 17 g by mouth daily as needed (for constipation) 7/10/20   MAYUR Rondon   spironolactone (ALDACTONE) 25 mg tablet Take 1 tablet (25 mg total) by mouth daily as needed (for weight gain or leg edema ) 7/10/20   MAYUR Ramos     I have reviewed home medications with patient family member  Allergies:    Allergies   Allergen Reactions    Atorvastatin Hives    Boniva [Ibandronic Acid] Hives    Codeine      codeine derivatives - tolerates IV Dilaudid    Statins Hives    Zetia [Ezetimibe]        Social History:    Substance Use History:   Social History     Substance and Sexual Activity   Alcohol Use Never    Frequency: Never     Social History     Tobacco Use   Smoking Status Never Smoker   Smokeless Tobacco Never Used     Social History     Substance and Sexual Activity   Drug Use No       Family History:    Family History   Problem Relation Age of Onset    Heart attack Mother     Heart attack Father     Stroke Sister     Heart attack Brother         3 brothers all        Physical Exam:     Vitals:   Blood Pressure: 140/88 (20)  Pulse: 91 (20)  Temperature: 98 5 °F (36 9 °C) (20)  Temp Source: Tympanic (20 0022)  Respirations: 18 (20)  Weight - Scale: 62 8 kg (138 lb 7 2 oz) (20)  SpO2: (!) 86 % (20)    Physical Exam   Constitutional: She appears well-developed and well-nourished  No distress  HENT:   Head: Normocephalic and atraumatic  Eyes: Pupils are equal, round, and reactive to light  EOM are normal    Neck: Normal range of motion  Cardiovascular: Normal rate, normal heart sounds and intact distal pulses  No murmur heard  Irregular rhythm     Pulmonary/Chest: Effort normal and breath sounds normal  No respiratory distress  She has no wheezes  She has no rales  Abdominal: Soft  Bowel sounds are normal  She exhibits no distension  There is no tenderness  There is no guarding  Musculoskeletal: Normal range of motion  She exhibits no edema or tenderness  Neurological: She is alert  No sensory deficit  She exhibits normal muscle tone  Coordination abnormal    Patient is able to get a few words out but has difficulty with full sentences, answering questions with "yes" and "no" appropriately, difficulty getting her words out therefore cannot assess orientation, patient following only some commands appropriately, poor coordination with finger-to-nose with R and L; no facial droop appreciated  Generalized 4/5 strength but no unilateral weakness appreciated on exam     Skin: She is not diaphoretic  Additional Data:     Lab Results: I have personally reviewed pertinent reports        Results from last 7 days   Lab Units 20  0608   WBC Thousand/uL 15 81*   HEMOGLOBIN g/dL 8 0*   HEMATOCRIT % 26 5*   PLATELETS Thousands/uL 322   NEUTROS PCT % 84* LYMPHS PCT % 8*   MONOS PCT % 6   EOS PCT % 1     Results from last 7 days   Lab Units 07/20/20  2216   SODIUM mmol/L 134*   POTASSIUM mmol/L 4 0   CHLORIDE mmol/L 99*   CO2 mmol/L 24   BUN mg/dL 25   CREATININE mg/dL 1 12   ANION GAP mmol/L 11   CALCIUM mg/dL 8 5   GLUCOSE RANDOM mg/dL 151*     Results from last 7 days   Lab Units 07/21/20  0650   INR  1 11             Results from last 7 days   Lab Units 07/21/20  0153   LACTIC ACID mmol/L 0 9       Imaging: I have personally reviewed pertinent reports  XR chest 1 view   Final Result by Olya Wyatt MD (07/21 0819)      Mild to moderate pulmonary vascular congestion  Mild cardiomegaly  Workstation performed: [id number]         CTA stroke alert (head/neck)   Final Result by Florina Becker MD (07/20 2240)      Left M2 segment occlusion  Severe near occlusive left V4 segment intracranial vertebral artery atherosclerotic plaque narrowing  Moderate cardiomegaly with small left pleural effusion  Bilateral lower lobe pulmonary emboli  Findings were directly discussed with Debora Disla on 7/20/2020 10:28 PM                      Workstation performed: [id number]         CT stroke alert brain   Final Result by Florina Becker MD (07/20 2222)      No acute intracranial abnormality  Microangiopathic changes  Findings were directly discussed with Debora Disla on 7/20/2020 10:20 PM       Workstation performed: [id number]             EKG, Pathology, and Other Studies Reviewed on Admission:   · EKG:  AFib, rate of 91 BPM, T wave inversion in V4-V6    Allscripts / Epic Records Reviewed: Yes     ** Please Note: This note has been constructed using a voice recognition system   **

## 2020-07-21 NOTE — PHYSICAL THERAPY NOTE
PT EVALUATION       07/21/20 0940   Note Type   Note type Eval/Treat   Pain Assessment   Pain Assessment Tool FLACC   Pain Rating: FLACC (Rest) - Face 0   Pain Rating: FLACC (Rest) - Legs 0   Pain Rating: FLACC (Rest) - Activity 0   Pain Rating: FLACC (Rest) - Cry 0   Pain Rating: FLACC (Rest) - Consolability 0   Score: FLACC (Rest) 0   Home Living   Type of Home House   Home Layout Two level  (split level 7+7 steps to main living level)   Home Equipment Cane   Prior Function   Level of [place] Independent with ADLs and functional mobility   Lives With Alone   Receives Help From Family   ADL Assistance Independent   Restrictions/Precautions   Other Precautions Fall Risk; Chair Alarm; Bed Alarm  (global aphasia)   General   Additional Pertinent History Pt admitted with R sided weakness and aphasia with likely diagnosis of CVA  B PEs also noted on CT scan  Pt has history of recent GI bleed, CVA, and new pancreatic mass  Cognition   Arousal/Participation Cooperative   Attention Difficulty attending to directions   Orientation Level Unable to assess   Following Commands   (follows visual cues only, no language recognition)   RLE Assessment   RLE Assessment   (ROM WFL, MMT at least 3-/5; unable to follow MMT commands   LLE Assessment   LLE Assessment   (ROM WFL, MMT at least 3+/5; unable to follow MMT commands   Coordination   Movements are Fluid and Coordinated 0   Coordination and Movement Description R hemibody incoordination; R UE >LE weakness, R sided neglect   Bed Mobility   Supine to Sit 2  Maximal assistance   Sit to Supine 2  Maximal assistance   Transfers   Sit to Stand 3  Moderate assistance   Additional items   (R UE support and R knee blocked)   Stand to Sit 3  Moderate assistance   Balance   Static Sitting Fair   Dynamic Sitting Poor   Static Standing Poor   Dynamic Standing Poor   Assessment   Prognosis Good   Problem List Decreased strength;Decreased endurance; Impaired balance;Decreased mobility; Decreased cognition;Decreased coordination   Assessment Patient seen for Physical Therapy evaluation  Patient admitted with CVA (cerebral vascular accident) ([facility] [address] )  Comorbidities affecting patient's physical performance include: CVA, anemia, CHF, afib, GI bleed  Personal factors affecting patient at time of initial evaluation include: lives in 2 story house, stairs to enter home, inability to ambulate household distances, decreased initiation and engagement and inability to perform ADLS  Prior to admission, patient was independent with functional mobility with cane  Please find objective findings from Physical Therapy assessment regarding body systems outlined above with impairments and limitations including weakness, impaired balance, decreased activity tolerance, decreased functional mobility tolerance, decreased safety awareness, impaired judgement, fall risk and decreased cognition  The Barthel Index was used as a functional outcome tool presenting with a score of 5 today indicating marked limitations of functional mobility and ADLS  Patient's clinical presentation is currently unstable/unpredictable as seen in patient's presentation of varying levels of cognitive performance, increased fall risk, new onset of impairment of functional mobility, decreased endurance and new onset of weakness  Pt would benefit from continued Physical Therapy treatment to address deficits as defined above and maximize level of functional mobility  As demonstrated by objective findings, the assigned level of complexity for this evaluation is high     Goals   Patient Goals unable to state due to aphasia   STG Expiration Date 07/28/20   Short Term Goal #1 improve bed mobility to mod assist, improve transfers bed<>chair to max assist 1-2, improve sitting static balalnce to at least fair+, pt will sit OOB 2 ours   LTG Expiration Date 08/04/20   Long Term Goal #1 improve bed mobility to min assist, improve transfers to min assist bed to chair, pt will ambulate with a walker 15 feet with mod assist x 2   Plan   Treatment/Interventions ADL retraining;Functional transfer training;LE strengthening/ROM; Therapeutic exercise; Endurance training;Gait training;Bed mobility; Equipment eval/education;Patient/family training   PT Frequency Once a day   Recommendation   PT Discharge Recommendation Post-Acute Rehabilitation Services  (STR)   Modified Samara Scale   Modified [place]a Scale 5   Barthel Index   Feeding 0   Bathing 0   Grooming Score 0   Dressing Score 0   Bladder Score 0   Bowels Score 0   Toilet Use Score 0   Transfers (Bed/Chair) Score 5   Mobility (Level Surface) Score 0   Stairs Score 0   Barthel Index Score 5   Licensure   [place] License Number  Vearl Jennifer PT [id number]       Time LH:[id number]  Time IOV:3530  Total Time: 10      S:  No verbalization  O:  Pt stood with R UE support, R knee blocked with mod assist   Pt stood x 1 minute with tactile cues to keep knees extended  X 10 each B knee extension sitting at the edge of the bed  X 10 AAROM R UE reaching and push/pull MRE   A:  Pt tolerated initial activity well but with fatigue  SP02 94% with activity  P:  Plan to assess SPT transfers to chair next session      Darrius Haynes, PT

## 2020-07-21 NOTE — ASSESSMENT & PLAN NOTE
Patient has a history of AFib/a flutter which was noted in the outpatient setting before admission from 6/3-6/5 when patient presented with CVA   In discussions with cardiology, GI it was recommended to hold [facility] due to risk with duodenal ulcer and downtrending Hg during previous admission   - EKG showed AFib at 91 BPM  - Received 1x dose Lovenox in ED  - Appreciate cardiology recommendations

## 2020-07-21 NOTE — ASSESSMENT & PLAN NOTE
Sodium of 134 on admission  Patient previously required salt tabs, possibly related to poor solute intake versus 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 during previous admission  Repeat BMP in a ulises

## 2020-07-21 NOTE — ASSESSMENT & PLAN NOTE
Hg down from 8 8 --> 7 9 from 1 week ago  Patient has known duodenal ulcer which was suspected to be the source of anemia during previous admission  Patient continues with dark, melanotic stools according to patient's daughter     With CVA, bilateral PE's, patient received 1 dose of Lovenox and ASA suppository in ED   Continue to closely monitor H+H, low threshold to transfuse  Previously GI and cardiology recommending holding home dose of AC; recommended IV venofer outpatient  Appreciate recommendations of oncology, GI, neurology regarding anemia and further [facility]

## 2020-07-21 NOTE — OCCUPATIONAL THERAPY NOTE
OT EVALUATION       07/21/20 1345   Note Type   Note type Eval only   Restrictions/Precautions   Other Precautions Chair Alarm; Bed Alarm;Cognitive; Fall Risk  (global aphasia )   Pain Assessment   Pain Assessment Tool FLACC   Pain Rating: FLACC (Rest) - Face 0   Pain Rating: FLACC (Rest) - Legs 0   Pain Rating: FLACC (Rest) - Activity 0   Pain Rating: FLACC (Rest) - Cry 0   Pain Rating: FLACC (Rest) - Consolability 0   Score: FLACC (Rest) 0   Home Living   Type of Home House   Home Layout Two level  (split level 7+7 steps to main living area)   Home Equipment Cane   Prior Function   Level of [place] Independent with ADLs and functional mobility   Lives With Alone   Receives Help From Family   ADL Assistance Independent   ADL   Eating Assistance 3  Moderate Assistance   Grooming Assistance 2  Maximal Assistance   UB Bathing Assistance 2  Maximal Assistance   LB Bathing Assistance 2  Maximal Assistance   UB Dressing Assistance 2  Maximal Assistance   LB Dressing Assistance 2  Maximal Assistance   Toileting Assistance  2  Maximal Assistance   Bed Mobility   Supine to Sit 2  Maximal assistance   Sit to Supine 2  Maximal assistance   Transfers   Sit to Stand 3  Moderate assistance   Stand to Sit 3  Moderate assistance   Balance   Static Sitting Fair   Dynamic Sitting Poor   Static Standing Poor   Dynamic Standing Poor   Activity Tolerance   Activity Tolerance Treatment limited secondary to medical complications (Comment)  (aphasia)   Nurse Made Aware yes   RUE Assessment   RUE Assessment   (PROM WFL, some active motion but unable to assess due to cog)   LUE Assessment   LUE Assessment WFL   Hand Function   Fine Motor Coordination   (limited assessment due to cognition)   Cognition   Overall Cognitive Status Impaired   Arousal/Participation Alert   Attention Difficulty attending to directions   Orientation Level Unable to assess   Following Commands Follows one step commands inconsistently   Comments no verbalizations during session, benefits from visual demonstration/cues   Assessment   Limitation Decreased ADL status; Decreased UE strength;Decreased Safe judgement during ADL;Decreased endurance;Decreased cognition;Decreased UE ROM; Decreased self-care trans;Decreased high-level ADLs  (decreased balance and mobility )   Prognosis Good   Assessment Patient evaluated by Occupational Therapy  Patient admitted with CVA (cerebral vascular accident) ([facility] [address] )  The patients occupational profile, medical and therapy history includes a extensive additional review of physical, cognitive, or psychosocial history related to current functional performance  Comorbidities affecting functional mobility and ADLS include: CVA, anemia, CHF, afib, GI bleed  Prior to admission, patient was independent with functional mobility with cane and independent with ADLS  The evaluation identifies the following performance deficits: weakness, decreased ROM, impaired balance, decreased endurance, increased fall risk, new onset of impairment of functional mobility, decreased ADLS, decreased IADLS, decreased activity tolerance, decreased safety awareness, impaired judgement, decreased cognition and decreased strength, that result in activity limitations and/or participation restrictions  This evaluation requires clinical decision making of high complexity, because the patient presents with comorbidites that affect occupational performance and required significant modification of tasks or assistance with consideration of multiple treatment options  The Barthel Index was used as a functional outcome tool presenting with a score of 5, indicating marked limitations of functional mobility and ADLS  Patient will benefit from skilled Occupational Therapy services to address above deficits and facilitate a safe return to prior level of function     Goals   Patient Goals unable to state due to aphasia   STG Time Frame   (1-7 days)   Short Term Goal  Patient will increase standing tolerance to 2 minutes during ADL task to decrease assistance level and decrease fall risk; Patient will increase bed mobility to mod assist in preparation for ADLS and transfers; Patient will increase functional mobility to and from bathroom with rolling walker with mod assist to increase performance with ADLS and to use a toilet; Patient will tolerate 10 minutes of UE ROM/strengthening to increase general activity tolerance and performance in ADLS/IADLS; Patient will improve functional activity tolerance to 10 minutes of sustained functional tasks to increase participation in basic self-care and decrease assistance level;   Patient will increase dynamic sitting balance to poor+ to improve the ability to sit at edge of bed or on a chair for ADLS;  Patient will increase dynamic standing balance to poor+ to improve postural stability and decrease fall risk during standing ADLS and transfers  LTG Time Frame   (8-14 days)   Long Term Goal Patient will increase standing tolerance to 4 minutes during ADL task to decrease assistance level and decrease fall risk; Patient will increase bed mobility to min assist in preparation for ADLS and transfers; Patient will increase functional mobility to and from bathroom with rolling walker with min assist to increase performance with ADLS and to use a toilet; Patient will tolerate 20 minutes of UE ROM/strengthening to increase general activity tolerance and performance in ADLS/IADLS; Patient will improve functional activity tolerance to 20 minutes of sustained functional tasks to increase participation in basic self-care and decrease assistance level;   Patient will increase dynamic sitting balance to fair- to improve the ability to sit at edge of bed or on a chair for ADLS;  Patient will increase dynamic standing balance to fair- to improve postural stability and decrease fall risk during standing ADLS and transfers       Functional Transfer Goals   Pt Will Perform All Functional Transfers   (STG min assist LTG supervision )   ADL Goals   Pt Will Perform Eating   (STG min assist LTG supervision )   Pt Will Perform Grooming   (STG mod assist LTG min assist )   Pt Will Perform Bathing   (STG mod assist LTG min assist )   Pt Will Perform UE Dressing   (STG mod assist LTG min assist )   Pt Will Perform LE Dressing   (STG mod assist LTG min assist )   Pt Will Perform Toileting   (STG mod assist LTG min assist )   Plan   Treatment Interventions ADL retraining;Functional transfer training;UE strengthening/ROM; Endurance training;Patient/family training;Equipment evaluation/education; Activityengagement; Compensatory technique education;Cognitive reorientation   Goal Expiration Date 08/04/20   OT Frequency 5x/wk   Recommendation   OT Discharge Recommendation Post-Acute Rehabilitation Services   Barthel Index   Feeding 0   Bathing 0   Grooming Score 0   Dressing Score 0   Bladder Score 0   Bowels Score 0   Toilet Use Score 0   Transfers (Bed/Chair) Score 5   Mobility (Level Surface) Score 0   Stairs Score 0   Barthel Index Score 5   Modified Long Scale   Modified Long Scale 5   Licensure   [place] License Number  Deidre Squires Ryan 87 OTR/L [id number]

## 2020-07-21 NOTE — ASSESSMENT & PLAN NOTE
Hold all antihypertensive medications at this time  Neuro recommending SBP over 160  Continue to closely monitor blood pressure

## 2020-07-21 NOTE — CONSULTS
Medical Oncology/Hematology Consult Note  Katty Astorga, 79 y o , 1940  [address] [place]-*, [phone]  07/21/20      I called the patient's sister, Chandu Rea at 1200 hours  I am awaiting her call back  This note will likely be addended after I speak with her directly  I called the patient's sister door three at [phone] today  We discussed her prognosis at length  The patient had discussed with her children that she did not want any cancer treatment including surgical intervention or chemotherapy  The patient's daughter would like me to put in a consult for hospice  I agree with this plan  I notified the attending physician and placed in patient consult for hospice at [phone]  Assessment and Plan:    1  Locally advanced Adenocarcinoma of the Pancreas, newly diagnosed in 7/2020  Patient's pathology did demonstrate positive cancer of the pancreas and lymph nodes surrounding pancreas  Typically, neoadjuvant chemotherapy versus adjuvant chemotherapy and surgical oncology evaluation to discuss Whipple procedure  I discussed the above with the patient and her daughter during the previous hospitalization  At that time patient was reserved in whether not she would want to undergo a surgical procedure versus chemotherapy if pathology demonstrated cancer  The patient's performance status has acutely changed secondary to atherosclerotic CVA  I am not sure the patient would be considered a surgical candidate, considering new medical events  I discussed this case with the attending physician, evaluation with Surgical Oncology would be our next step however, I will await phone call from patient's daughter to discuss  Palliative care or hospice may be the best option with acute performance status change  2   Acute CVA, aphasic  CTA demonstrated atherosclerotic lesions  No evidence of metastatic disease or hemorrhage    However, the patient is undergoing MRI of the brain which be you would be more definitive regarding metastatic lesion  Patient received therapeutic dose of Lovenox at 2300 hours on 7/20/20  Patient is also on aspirin and Plavix  Neurology consultation is pending  ?  In the setting of acute CVA could Lovenox without platelet agents would be beneficial; balancing risk of GI bleeding verses CVA evolution, new pulmonary emboli  3   Pulmonary emboli, asymptomatic  New pulmonary emboli were discovered on most recent admission  Patient had been off of Eliquis secondary to concern for GI bleed from the duodenal mass  Patient has multiple reasons for coagulopathy including history of atrial fibrillation and underlying malignancy  Pt has received once dose of lovenox in the ED and Hemoglobin as has remained stable  During hospitalization use of Lovenox would be helpful in preventing new acute pulmonary emboli, while having short half life  Observation of hemoglobin twice a day plus symptomatic observation regarding GI bleeding would be needed  One could consider using half dose of Eliquis, 2 5 mg twice a day to prevent additional pulmonary emboli, and close observation with hemoglobin if discharged  4   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)  GI has been consult regarding interventions to stop GI bleeding  Observation of the patient's hemoglobin is necessary at this time  I would transfuse as needed  Transfusion requirements would be at 7 5 grams/deciliter considering active bleeding from GI tract  Please do not hesitate to contact me if you have any questions or need additional information   Thank you for this consult   ______________________________________________________________________________    Reason for Consultation: stoke; pulmonary embolism and Pancreatic mass     Chief Complaint   Patient presents with    Altered Mental Status     last known well by daughter at 5;40, brought medicine to her and she could not hold cup, hx of tia/cva in past   this time was alert but aphasic could not communicate with daughter  alert on arrival  thinks she is downstairs  speaking but remains aphasic     History of present illness: This is an 50-year-old female with past medical history of CAD status post CABG, CKD, rheumatoid arthritis, hypertension, hypothyroidism, atrial fibrillation on Eliquis as an outpatient and osteoarthritis status post right hip replacement in 2018 and new pancreatic mass found in early July 2020 bx to prove adenocarcinoma  Patient was admitted yesterday, last evening secondary to acute mental status change and was found to have abnormal CT a of the brain demonstrating occlusion  Patient was also found to have a pulmonary embolism  Patient was off of anticoagulation secondary to concern for GI bleed  Patient was to be seen in [address] [place] [address] on 07/23/20  Interval history:  Patient is aphasic, not following directions  Speech therapist was available today stating similar concerns  Patient is resting comfortably not appearing in pain  Review of Systems   Unable to perform ROS: Mental status change     Past medical history:   Past Medical History:   Diagnosis Date    Anemia     Arthritis     Bladder calculi     CAD (coronary artery disease)     Status post CABG and PTCA to OM1    Chronic kidney disease     Pt  states Stage 3     CKD (chronic kidney disease) stage 3, GFR 30-59 ml/min (HCC)     CKD [facility])     Diastolic congestive heart failure (HCC)     Hypertension     Hypothyroidism     Myocardial infarction ([address] )     x 4 MI's and x2 stents    Last MI in Feb 2018    Rheumatoid arthritis ([address] )     Stroke ([facility] [address] ) 06/2020    weakness, aphasia( resolved)    Uterine prolapse      Past surgical history:   Past Surgical History:   Procedure Laterality Date    BACK SURGERY      BLADDER SURGERY      CORONARY ANGIOPLASTY WITH STENT PLACEMENT      CORONARY ARTERY BYPASS GRAFT      EYE SURGERY Right 05/2020    JOINT REPLACEMENT Right 02/2018    OOPHORECTOMY Left     [place] PARTIAL HIP REPLACEMENT Right 2/1/2018    Procedure: HEMIARTHROPLASTY HIP (BIPOLAR) (RIGHT); Surgeon: Vivienne Soliz MD;  Location: [address] [place] [address];  Service: Orthopedics    THYROIDECTOMY, PARTIAL       Allergies:    Allergies   Allergen Reactions    Atorvastatin Hives    Boniva [Ibandronic Acid] Hives    Codeine      codeine derivatives - tolerates IV Dilaudid    Statins Hives    Zetia [Ezetimibe]      Home medications:   Medications Prior to Admission   Medication    aspirin (ECOTRIN LOW STRENGTH) 81 mg EC tablet    Coenzyme Q10 10 MG capsule    docusate sodium (COLACE) 100 mg capsule    ferrous sulfate 325 (65 Fe) mg tablet    hydroxychloroquine (PLAQUENIL) 200 mg tablet    ketorolac (ACULAR) 0 5 % ophthalmic solution    levothyroxine 50 mcg tablet    losartan (COZAAR) 25 mg tablet    metoprolol succinate (TOPROL-XL) 100 mg 24 hr tablet    oxyCODONE (ROXICODONE) 5 mg immediate release tablet    pantoprazole (PROTONIX) 40 mg tablet    TOVIAZ 4 MG TB24    VASCEPA 1 g CAPS    Vitamin D, Cholecalciferol, 1000 units CAPS    Ascorbic Acid (VITAMIN C) 1000 MG tablet    Multiple Vitamins-Minerals (CENTRUM SILVER PO)    polyethylene glycol (MIRALAX) 17 g packet    spironolactone (ALDACTONE) 25 mg tablet     Hospital medications:   Current Facility-Administered Medications:     aspirin chewable tablet 81 mg, 81 mg, Oral, Daily, Kevon Malhotra PA-C    clopidogrel (PLAVIX) tablet 600 mg, 600 mg, Oral, Once, Kevon Malhotra PA-C    co-enzyme Q-10 capsule 30 mg, 30 mg, Oral, Daily, Kevon Malhotra PA-C    ferrous sulfate tablet 325 mg, 325 mg, Oral, Daily With Breakfast, Kevon Malhotra PA-C    heparin (porcine) 25,000 units in 250 mL infusion (premix), 3-30 Units/kg/hr (Order-Specific), Intravenous, Titrated, Kevon Malhotra PA-C, Last Rate: 10 8 mL/hr at 07/21/20 0934, 18 Units/kg/hr at 20 0934    heparin (porcine) injection 2,400 Units, 2,400 Units, Intravenous, Q1H PRN, Javon Servin PA-C    heparin (porcine) injection 4,800 Units, 4,800 Units, Intravenous, Q1H PRN, Javon Servin PA-C    HYDROmorphone (DILAUDID) injection 0 5 mg, 0 5 mg, Intravenous, Q4H PRN, Javon Servin PA-C    ketorolac (ACULAR) 0 5 % ophthalmic solution 1 drop, 1 drop, Right Eye, BID, Javon Servin PA-C    levothyroxine tablet 50 mcg, 50 mcg, Oral, Early Morning, Javon Servin PA-C    oxyCODONE (ROXICODONE) IR tablet 2 5 mg, 2 5 mg, Oral, Q4H PRN, Javon Servin PA-C    pantoprazole (PROTONIX) injection 40 mg, 40 mg, Intravenous, Q12H [address], Javon Servin PA-C, 40 mg at 20 3111    Social history:   Social History     Tobacco Use    Smoking status: Never Smoker    Smokeless tobacco: Never Used   Substance Use Topics    Alcohol use: Never     Frequency: Never    Drug use: No     Family history:   Family History   Problem Relation Age of Onset    Heart attack Mother     Heart attack Father     Stroke Sister     Heart attack Brother         3 brothers all      Vitals:  Vitals:    20 0924   BP: 140/90   Pulse: 105   Resp: 20   Temp: 98 8 °F (37 1 °C)   SpO2:      Physical Exam   Constitutional: She appears well-developed  No distress  HENT:   Head: Normocephalic and atraumatic  Neck: No neck rigidity  Cardiovascular: Tachycardia present  Pulmonary/Chest: Effort normal and breath sounds normal    Abdominal: Soft  Bowel sounds are normal  She exhibits no distension  Musculoskeletal: She exhibits no edema  Neurological: She is alert  Skin: No erythema  Labs and Pertinent Reports Reviewed    CTA Brain 20  IMPRESSION:  Left M2 segment occlusion  Severe near occlusive left V4 segment intracranial vertebral artery atherosclerotic plaque narrowing  Moderate cardiomegaly with small left pleural effusion  Bilateral lower lobe pulmonary emboli    Findings were directly discussed with Hima Draper on 7/20/2020 10:28 PM     Lab Results   Component Value Date    SODIUM 135 (L) 07/21/2020    K 3 7 07/21/2020    CL 99 (L) 07/21/2020    CO2 21 07/21/2020    AGAP 15 (H) 07/21/2020    BUN 21 07/21/2020    CREATININE 0 94 07/21/2020    GLUC 168 (H) 07/21/2020    GLUF 107 (H) 07/13/2020    CALCIUM 8 4 07/21/2020    AST 26 07/06/2020    ALT 25 07/06/2020    ALKPHOS 57 07/06/2020    TP 5 4 (L) 07/06/2020    TBILI 0 40 07/06/2020    EGFR 57 07/21/2020       Lab Results   Component Value Date    WBC 15 81 (H) 07/21/2020    HGB 8 0 (L) 07/21/2020    HCT 26 5 (L) 07/21/2020    MCV 87 07/21/2020     07/21/2020     XR chest 1 view  Narrative: CHEST     INDICATION:   SIRS  COMPARISON:  Chest x-ray dated July 3, 2020  EXAM PERFORMED/VIEWS:  XR CHEST 1 VIEW    FINDINGS:    Heart shadow is enlarged but unchanged from prior exam     There is prominence of the bilateral vascular markings suggestive of mild heart failure/fluid overload  No infiltrate or pleural effusion  No pneumothorax  Postoperative changes of prior median sternotomy are present  Impression: Mild to moderate pulmonary vascular congestion  Mild cardiomegaly  Workstation performed: [id number]    Please note: This report has been generated by voice recognition software system  Therefore, there may be syntax, spelling and/or grammatical errors  Please call if you have any questions

## 2020-07-21 NOTE — SPEECH THERAPY NOTE
Speech-Language Pathology Bedside Swallow Evaluation        Patient Name: Kim OLIVEIRA Date: 7/21/2020     Problem List  Principal Problem:    CVA (cerebral vascular accident) [facility])  Active Problems:    S/P CABG x 4    Anemia    Iron deficiency    Diastolic congestive heart failure (HCC)    Hypothyroidism    Hypertension    CKD (chronic kidney disease), stage III ([facility] )    Other persistent atrial fibrillation (HCC)    Pancreatic mass    Hyponatremia    Bilateral pulmonary embolism (HCC)    GI bleed    SIRS (systemic inflammatory response syndrome) ([facility] )         Past Medical History  Past Medical History:   Diagnosis Date    Anemia     Arthritis     Bladder calculi     CAD (coronary artery disease)     Status post CABG and PTCA to OM1    Chronic kidney disease     Pt  states Stage 3     CKD (chronic kidney disease) stage 3, GFR 30-59 ml/min (HCC)     CKD (chronic kidney disease), stage III (HCC)     Diastolic congestive heart failure ([facility] )     Hypertension     Hypothyroidism     Myocardial infarction ([facility] )     x 4 MI's and x2 stents  Last MI in Feb 2018    Rheumatoid arthritis ([facility] )     Stroke ([facility] ) 06/2020    weakness, aphasia( resolved)    Uterine prolapse        Past Surgical History  Past Surgical History:   Procedure Laterality Date    BACK SURGERY      BLADDER SURGERY      CORONARY ANGIOPLASTY WITH STENT PLACEMENT      CORONARY ARTERY BYPASS GRAFT      EYE SURGERY Right 05/2020    JOINT REPLACEMENT Right 02/2018    OOPHORECTOMY Left     NH PARTIAL HIP REPLACEMENT Right 2/1/2018    Procedure: HEMIARTHROPLASTY HIP (BIPOLAR) (RIGHT); Surgeon: Bayron Portillo MD;  Location[facility];  Service: Orthopedics    THYROIDECTOMY, PARTIAL         Summary    Pt presents with Moderate oropharyngeal dysphagia due to decreased mastication/manipulation, pocketing, labial leakage on R, difficulty sucking from straw        Recommendations:   Diet: puree/level 1 diet and nectar thick liquids by cup or tsp  Meds: whole with puree and crushed with puree   Frequent Oral care: 2x/day  Aspiration precautions-watch for pocketing after meals  Other Recommendations/ considerations: will cont to follow for diet tolerance and potential to advance diet  Current Medical Status  Pt is a 79 y o  female who presented to [address] [place] [address]  with CVA  Presented to the ED today via EMS after patient's daughter, who is staying with her at home, found her to have right-sided weakness, and aphasia  This was noted at approximately 8:45 p m  By the time EMS had arrived, it seems as though the patient's symptoms had resolved, however on arrival to the ED aphasia was again noted and stroke alert was called  In the ED, patient continued with expressive aphasia  CTA completed in ED showed left M2 segment occlusion and redemonstration of severe near-occlusive left V4 segment intracranial vertebral artery plaque narrowing  Given GI bleed in the setting of known duodenal mass with melanotic stools, patient is not a candidate for tPA  Her home dose of Eliquis was being held outpatient for the same reason  ED discussed with neurology and radiology and neurology expressed that the patient is ultimately not a candidate for tPA or IR intervention at this time  After extensive discussions with patient and her daughters, Demi Montenegro and Alyce Villa, all agree that they would like to keep Level 3 code status until they are able to discuss the results of the biopsies from last week and what the results mean in terms of further treatment/workup  We discussed the prognosis of the ongoing ischemia and bilateral PE's and the differences between level 3 DNR/DNI status and comfort care and all are in agreement that they would like to keep Level 3 code status but would strongly consider transitioning to comfort care   Patient does have ongoing aphasia but is responding to questions with "yes" and "no", and clarified with daughters who state that she has expressed before that she likely would not want any invasive measures or procedures in the setting of her current diagnoses  They have her living will at home and plan on bringing it in  Past medical history:   Please see H&P for details    Special Studies:  MRI: pending  CT-head: 7/20/20 No acute intracranial abnormality  Microangiopathic changes  CXR: 7/202/20 Mild to moderate pulmonary vascular congestion  Social/Education/Vocational Hx:  Pt lives with family    Swallow Information   Current Risks for Dysphagia & Aspiration: CVA  Current Symptoms/Concerns: new neuro symptoms   Current Diet: NPO   Baseline Diet: regular diet and thin liquids  Takes pills- unknown    Baseline Assessment   Behavior/Cognition: alert  Speech/Language Status: appears globally aphasic- does not follow commands, no verblizations  Patient Positioning: upright in bed     Swallow Mechanism Exam   Facial: right facial droop  Labial: decreased labial retraction  Lingual: unable to test 2/2 limited command following  Velum: unable to visualize  Mandible: adequate ROM  Dentition: adequate  Vocal quality:clear/adequate   Volitional Cough: unable to initiate volitional cough   Respiratory: NC    Consistencies Assessed and Performance   Consistencies Administered: nectar thick, puree and soft solids    Oral Stage: pt w/ labial leakage on R noted w/ puree and NTL  Unable to suck from straw  Slow, decreased mastication w/ solids  Decreased transfer  Pocketing noted on R solids  Pharyngeal Stage: swallows were timely and complete w/ no coughing, throat clearing noted  Minimal voicing elicited       Esophageal Concerns: none reported      Results Reviewed with: patient, RN and MD   Dysphagia Goals: pt will tolerate puree with nectar thick liquids without s/s of aspiration x 48 hours      April Reentta Lopez MA CCC-SLP  Speech Patholgist  [place] license # [employer] [facility] ([facility]) [id number]  [place] license # [id number]  Available via [employer]

## 2020-07-21 NOTE — ASSESSMENT & PLAN NOTE
Patient recently admitted from 7/3 to 7/10 and CT at that time showed large pancreatic head mass concerning for pancreatic carcinoma with mass effect on superior mesenteric vein and possible tumor extension with tumor extension in portal vein  Pt underwent EGD/colonoscopy on 7/7 with biopsy on 7/15 which showed invasive and ulcerated duodenal mass; biopsy preliminary cytology consistent with malignant cells  Patient's daughter reports that she continues to have melanotic stools  Hg down from 8 9 --> 7 9 from 1 week ago    - FNA results consistent with malignancy, adenocarcinoma  - Appreciate oncology consult; patient's family very understanding of prognosis but very much want to discuss with oncology, and would like to move ahead with transfusion as needed in the meantime  - Continue to closely monitor H+H, low threshold to transfuse  - Start IV Protonix Q12  - Appreciate GI recommendations

## 2020-07-21 NOTE — CONSULTS
Consultation - [facility]  Leny Domingo 79 y o  female MRN: [id number]  Unit/Bed#: [address] [place] [address] Encounter: [id number]        Consults    Reason for Consult / Principal Problem: melena, anemia, duodenal mass, pancreatic cancer  ASSESSMENT and PLAN:    Principal Problem:    CVA (cerebral vascular accident) ([facility] )  Active Problems:    S/P CABG x 4    Anemia    Iron deficiency    Diastolic congestive heart failure ([facility] )    Hypothyroidism    Hypertension    CKD (chronic kidney disease), stage III ([facility] )    Other persistent atrial fibrillation (HCC)    Pancreatic mass    Hyponatremia    Bilateral pulmonary embolism (HCC)    GI bleed    SIRS (systemic inflammatory response syndrome) ([place] [address] )    #1  Melena and upper GI bleeding from duodenal mass secondary to pancreatic cancer: patient is s/p EGD last admission with friable duodenal mass noted as well as EUS last week with positive bx for pancreatic cancer  Due to mass, she was sent home off anticoagulation and is now back with CVA and bilateral PE's  Hgb 8 8 1 week ago, now 8 0  Having melena  -spoke with patient's daughter princess extensively  Reviewed positive bx results indicating pancreatic adenocarcinoma  She advised patient's mother had voiced not wanting treatment prior to her stroke  She now is unable to speak    -from a Gi standpoint, there is no utility in EGD as we will not be able to effectively treat the bleeding from a friable duodenal mass, would likely cause more bleeding, advised daughter of this and they would not want invasive measures regardless  -monitor hgb, transfuse as necessary  -patient's daughter is leaning toward comfort care and wants her daughters who are under 18 to be able to visit  Discussed with SONA, patient's nurse as well as nursing supervisor   SLIM with follow up with patient's daughter in regards to changing code status   -unfortunately there is no good solution to the current situation as she has had a stroke as well as b/l PE's but anticoagulation or antiplatelet meds would increase risk of bleeding from duodenal mass  Would agree with comfort care at this time  -------------------------------------------------------------------------------------------------------------------    HPI: This is an 79year old female with a history of CAD, CKD, CHF, HTN, who recently was admitted and was diagnosed with pancreatic adenocarcinoma and eroding duodenal mass who presented to the ED due to aphasia and right sided weakness  She was diagnosed with CVA and patient currently cannot verbalize  She has been having melena at home, hgb was 8 8 one week ago and it is now 8 0  She has known bleeding from her duodenal mass which is friable  She had EUS last week with FNA of pancreatic mass which is positive for adenocarcinoma  She is unable to give me any history  I spoke with the patient's daughter who voiced that her mother did not want any invasive measures or treatment for cancer if the biopsies did come back positive for cancer  REVIEW OF SYSTEMS:    CONSTITUTIONAL: Denies any fever, chills, or rigors  decreased appetite, and no recent weight loss  HEENT: No earache or tinnitus  Denies hearing loss or visual disturbances  CARDIOVASCULAR: No chest pain or palpitations  RESPIRATORY: Denies any cough, hemoptysis, shortness of breath or dyspnea on exertion  GASTROINTESTINAL: As noted in the History of Present Illness  GENITOURINARY: No problems with urination  Denies any hematuria or dysuria  NEUROLOGIC: No dizziness or vertigo, denies headaches  Aphasia, right sided weakness  MUSCULOSKELETAL: Denies any muscle or joint pain  SKIN: Denies skin rashes or itching  ENDOCRINE: Denies excessive thirst  Denies intolerance to heat or cold  PSYCHOSOCIAL: Denies depression or anxiety  Denies any recent memory loss         Historical Information   Past Medical History:   Diagnosis Date    Anemia     Arthritis     Bladder calculi  CAD (coronary artery disease)     Status post CABG and PTCA to OM1    Chronic kidney disease     Pt  states Stage 3     CKD (chronic kidney disease) stage 3, GFR 30-59 ml/min (HCC)     CKD (chronic kidney disease), stage III ([facility])     Diastolic congestive heart failure ([facility])     Hypertension     Hypothyroidism     Myocardial infarction ([facility] )     x 4 MI's and x2 stents  Last MI in 2018    Rheumatoid arthritis ([facility] )     Stroke ([facility] ) 2020    weakness, aphasia( resolved)    Uterine prolapse      Past Surgical History:   Procedure Laterality Date    BACK SURGERY      BLADDER SURGERY      CORONARY ANGIOPLASTY WITH STENT PLACEMENT      CORONARY ARTERY BYPASS GRAFT      EYE SURGERY Right 2020    JOINT REPLACEMENT Right 2018    OOPHORECTOMY Left     NE PARTIAL HIP REPLACEMENT Right 2018    Procedure: HEMIARTHROPLASTY HIP (BIPOLAR) (RIGHT);   Surgeon: Jose Willard MD;  [facility];  Service: Orthopedics    THYROIDECTOMY, PARTIAL       Social History   Social History     Substance and Sexual Activity   Alcohol Use Never    Frequency: Never     Social History     Substance and Sexual Activity   Drug Use No     Social History     Tobacco Use   Smoking Status Never Smoker   Smokeless Tobacco Never Used     Family History   Problem Relation Age of Onset    Heart attack Mother     Heart attack Father     Stroke Sister     Heart attack Brother         3 brothers all        Meds/Allergies     Medications Prior to Admission   Medication    aspirin (ECOTRIN LOW STRENGTH) 81 mg EC tablet    Coenzyme Q10 10 MG capsule    docusate sodium (COLACE) 100 mg capsule    ferrous sulfate 325 (65 Fe) mg tablet    hydroxychloroquine (PLAQUENIL) 200 mg tablet    ketorolac (ACULAR) 0 5 % ophthalmic solution    levothyroxine 50 mcg tablet    losartan (COZAAR) 25 mg tablet    metoprolol succinate (TOPROL-XL) 100 mg 24 hr tablet    oxyCODONE (ROXICODONE) 5 mg immediate release tablet    pantoprazole (PROTONIX) 40 mg tablet    TOVIAZ 4 MG TB24    VASCEPA 1 g CAPS    Vitamin D, Cholecalciferol, 1000 units CAPS    Ascorbic Acid (VITAMIN C) 1000 MG tablet    Multiple Vitamins-Minerals (CENTRUM SILVER PO)    polyethylene glycol (MIRALAX) 17 g packet    spironolactone (ALDACTONE) 25 mg tablet     Current Facility-Administered Medications   Medication Dose Route Frequency    aspirin chewable tablet 81 mg  81 mg Oral Daily    clopidogrel (PLAVIX) tablet 600 mg  600 mg Oral Once    co-enzyme Q-10 capsule 30 mg  30 mg Oral Daily    ferrous sulfate tablet 325 mg  325 mg Oral Daily With Breakfast    heparin (porcine) 25,000 units in 250 mL infusion (premix)  3-30 Units/kg/hr (Order-Specific) Intravenous Titrated    heparin (porcine) injection 2,400 Units  2,400 Units Intravenous Q1H PRN    heparin (porcine) injection 4,800 Units  4,800 Units Intravenous Q1H PRN    HYDROmorphone (DILAUDID) injection 0 5 mg  0 5 mg Intravenous Q4H PRN    ketorolac (ACULAR) 0 5 % ophthalmic solution 1 drop  1 drop Right Eye BID    levothyroxine tablet 50 mcg  50 mcg Oral Early Morning    oxyCODONE (ROXICODONE) IR tablet 2 5 mg  2 5 mg Oral Q4H PRN    pantoprazole (PROTONIX) injection 40 mg  40 mg Intravenous Q12H [address]       Allergies   Allergen Reactions    Atorvastatin Hives    Boniva [Ibandronic Acid] Hives    Codeine      codeine derivatives - tolerates IV Dilaudid    Statins Hives    Zetia [Ezetimibe]            Objective     Blood pressure 140/90, pulse 105, temperature 98 8 °F (37 1 °C), temperature source Oral, resp  rate 20, weight 62 8 kg (138 lb 7 2 oz), SpO2 (!) 86 %, not currently breastfeeding      No intake or output data in the 24 hours ending 07/21/20 1047      PHYSICAL EXAM:      General Appearance:   Alert, aphasic, right sided weakness, cooperative, no distress, appears stated age    HEENT:   Normocephalic, atraumatic, anicteric, no oropharyngeal thrush present   right sided facial droop   Neck:  Supple, symmetrical, trachea midline, no adenopathy;    thyroid: no enlargement/tenderness/nodules; no carotid  bruit or JVD    Lungs:   Clear to auscultation bilaterally; no rales, rhonchi or wheezing; respirations unlabored    Heart[de-identified]   S1 and S2 normal; regular rate and rhythm; no murmur, rub, or gallop  Abdomen:   Soft, non-tender, non-distended; normal bowel sounds; no masses, no organomegaly    Genitalia:   Deferred    Rectal:   Deferred    Extremities:  No cyanosis, clubbing or edema    Pulses:  2+ and symmetric all extremities    Skin:  Skin texture, turgor normal, no rashes or lesions; pale    Lymph nodes:  No palpable cervical, axillary or inguinal lymphadenopathy        Lab Results:   Results from last 7 days   Lab Units 07/21/20  0608   WBC Thousand/uL 15 81*   HEMOGLOBIN g/dL 8 0*   HEMATOCRIT % 26 5*   PLATELETS Thousands/uL 322   NEUTROS PCT % 84*   LYMPHS PCT % 8*   MONOS PCT % 6   EOS PCT % 1     Results from last 7 days   Lab Units 07/21/20  0608   POTASSIUM mmol/L 3 7   CHLORIDE mmol/L 99*   CO2 mmol/L 21   BUN mg/dL 21   CREATININE mg/dL 0 94   CALCIUM mg/dL 8 4     Results from last 7 days   Lab Units 07/21/20  0650   INR  1 11           Imaging Studies: I have personally reviewed pertinent imaging studies  Xr Chest 1 View    Result Date: 7/21/2020  Impression: Mild to moderate pulmonary vascular congestion  Mild cardiomegaly  Workstation performed: [id number]     Ct Stroke Alert Brain    Result Date: 7/20/2020  Impression: No acute intracranial abnormality  Microangiopathic changes  Findings were directly discussed with Adriana Gilbert on 7/20/2020 10:20 PM  Workstation performed: [id number]     Cta Stroke Alert (head/neck)    Result Date: 7/20/2020  Impression: Left M2 segment occlusion  Severe near occlusive left V4 segment intracranial vertebral artery atherosclerotic plaque narrowing  Moderate cardiomegaly with small left pleural effusion   Bilateral lower lobe pulmonary emboli  Findings were directly discussed with Abdulaziz Dale on 7/20/2020 10:28 PM  Workstation performed: [id number]           Patient was seen and examined by Dr Portillo Kilpatrick  All key medical decisions were made by Dr Portillo Kilpatrick  Thank you for allowing us to participate in the care of this present patient  We will follow-up with you closely

## 2020-07-21 NOTE — PROGRESS NOTES
ADT notification received that patient was admitted to [facility] with CVA and bilateral PE  [facility] called to confirm the admission   Their nurse went for a visit this morning & family informed her she was at the hospital

## 2020-07-22 PROBLEM — R09.02 HYPOXIA: Status: ACTIVE | Noted: 2020-07-22

## 2020-07-22 LAB
APTT PPP: 198 SECONDS (ref 23–37)
APTT PPP: 61 SECONDS (ref 23–37)
APTT PPP: 94 SECONDS (ref 23–37)
APTT PPP: >210 SECONDS (ref 23–37)
EST. AVERAGE GLUCOSE BLD GHB EST-MCNC: 105 MG/DL
HBA1C MFR BLD: 5.3 %

## 2020-07-22 PROCEDURE — 94660 CPAP INITIATION&MGMT: CPT

## 2020-07-22 PROCEDURE — 94760 N-INVAS EAR/PLS OXIMETRY 1: CPT

## 2020-07-22 PROCEDURE — 85730 THROMBOPLASTIN TIME PARTIAL: CPT | Performed by: INTERNAL MEDICINE

## 2020-07-22 PROCEDURE — 92526 ORAL FUNCTION THERAPY: CPT

## 2020-07-22 PROCEDURE — 94640 AIRWAY INHALATION TREATMENT: CPT

## 2020-07-22 PROCEDURE — 94668 MNPJ CHEST WALL SBSQ: CPT

## 2020-07-22 PROCEDURE — 99233 SBSQ HOSP IP/OBS HIGH 50: CPT | Performed by: INTERNAL MEDICINE

## 2020-07-22 PROCEDURE — 94669 MECHANICAL CHEST WALL OSCILL: CPT

## 2020-07-22 PROCEDURE — C9113 INJ PANTOPRAZOLE SODIUM, VIA: HCPCS | Performed by: PHYSICIAN ASSISTANT

## 2020-07-22 RX ORDER — DEXTROSE AND SODIUM CHLORIDE 5; .45 G/100ML; G/100ML
100 INJECTION, SOLUTION INTRAVENOUS CONTINUOUS
Status: DISCONTINUED | OUTPATIENT
Start: 2020-07-22 | End: 2020-07-23 | Stop reason: HOSPADM

## 2020-07-22 RX ORDER — LORAZEPAM 2 MG/ML
1 INJECTION INTRAMUSCULAR EVERY 4 HOURS PRN
Status: DISCONTINUED | OUTPATIENT
Start: 2020-07-22 | End: 2020-07-23

## 2020-07-22 RX ADMIN — PANTOPRAZOLE SODIUM 40 MG: 40 INJECTION, POWDER, FOR SOLUTION INTRAVENOUS at 20:24

## 2020-07-22 RX ADMIN — MORPHINE SULFATE 2 MG: 2 INJECTION, SOLUTION INTRAMUSCULAR; INTRAVENOUS at 11:44

## 2020-07-22 RX ADMIN — ALBUTEROL SULFATE 2.5 MG: 2.5 SOLUTION RESPIRATORY (INHALATION) at 07:41

## 2020-07-22 RX ADMIN — HEPARIN SODIUM 14 UNITS/KG/HR: 10000 INJECTION, SOLUTION INTRAVENOUS at 11:59

## 2020-07-22 RX ADMIN — DEXTROSE AND SODIUM CHLORIDE 100 ML/HR: 5; .45 INJECTION, SOLUTION INTRAVENOUS at 14:39

## 2020-07-22 RX ADMIN — ASPIRIN 81 MG 81 MG: 81 TABLET ORAL at 10:00

## 2020-07-22 RX ADMIN — SODIUM CHLORIDE, SODIUM LACTATE, POTASSIUM CHLORIDE, AND CALCIUM CHLORIDE 500 ML: .6; .31; .03; .02 INJECTION, SOLUTION INTRAVENOUS at 12:00

## 2020-07-22 RX ADMIN — KETOROLAC TROMETHAMINE 1 DROP: 5 SOLUTION OPHTHALMIC at 10:00

## 2020-07-22 RX ADMIN — MORPHINE SULFATE 2 MG: 2 INJECTION, SOLUTION INTRAMUSCULAR; INTRAVENOUS at 02:15

## 2020-07-22 RX ADMIN — PANTOPRAZOLE SODIUM 40 MG: 40 INJECTION, POWDER, FOR SOLUTION INTRAVENOUS at 09:58

## 2020-07-22 RX ADMIN — KETOROLAC TROMETHAMINE 1 DROP: 5 SOLUTION OPHTHALMIC at 20:00

## 2020-07-22 NOTE — ASSESSMENT & PLAN NOTE
Acute MCA Teritory CVA: Possibly thrombotic  Nerology on board  Poor prognosis  Family possibly planning on Hospice care  Cleared by Speech to Nectar full liq diet  Had a prolonged discussion with the patient's daughter at the bedside Miss Nathaly Howard  Patient has poor prognosis including acute CVA, advanced pancreatic cancer, GI bleed, PE  Currently on Heparin drip per protocol  Continue the same  Patient's family is inclining towards comfort care or hospice  However they will discuss amongst themselves and will get back to us    Meanwhile she wants to know about the patient's prognosis from the oncologist   Patient is DNR

## 2020-07-22 NOTE — ASSESSMENT & PLAN NOTE
CTA showed bilateral lower lobe pulmonary emboli with no evidence of R heart strain on CT  On Heparin drip   O2

## 2020-07-22 NOTE — SOCIAL WORK
Explained role of CM to Daughter    CM obtained the following information from Daughter    Current LOS: 2 days  GMLOS: 3 days  Medicare Bundle: Yes  Readmission: Yes- related  Home: multilevel home with 14 steps, lives on the first floor  Lives with: alone  DME: rolling walker  Ambulation: independent PTA however is declining  ADL's: Independent PTA- declining  Drives: No  Pharmacy: [facility]  Prescription insurance: Yes  PCP: Dr Cleo Elaine  [facility]  POA/LW: Daughter is POA  Transport at d/c: Family vs BLS depending on medical condition  Discharge needs: Referral made for [facility] hospice inpatient- will follow if patient qualifies  : Daughter  D&A: Denies  MH hx: Denies    Referrals: Referral made to [facility] hospice to evaluate patient for inpatient hospice  Will follow with Liaison David Turner on hospice recommendations  CM reviewed d/c planning process including the following: identifying help at home, patient preference for d/c planning needs, and availability of the treatment team to discuss questions or concerns patient and/or family may have regarding understanding of medications and recognizing signs and symptoms once discharged  CM also encouraged patient to follow up with all recommended appointments after discharge  Patient advised of importance for patient and family to participate in managing patient's medical well being

## 2020-07-22 NOTE — ASSESSMENT & PLAN NOTE
Patient recently admitted from 7/3 to 7/10 and CT at that time showed large pancreatic head mass concerning for pancreatic carcinoma with mass effect on superior mesenteric vein and possible tumor extension with tumor extension in portal vein  Pt underwent EGD/colonoscopy on 7/7 with biopsy on 7/15 which showed invasive and ulcerated duodenal mass; biopsy preliminary cytology consistent with malignant cells  Patient's daughter reports that she continues to have melanotic stools  Hg down from 8 9 --> 7 9 from 1 week ago  - FNA results consistent with malignancy, adenocarcinoma  - Appreciate oncology consult; patient's family very understanding of prognosis but very much want to discuss with oncology, and would like to move ahead with transfusion as needed in the meantime  - Continue to closely monitor H+H, low threshold to transfuse  - Start IV Protonix Q12  - Appreciate GI recommendations    Not a candidate of any procedure  Possibly hospice care soon

## 2020-07-22 NOTE — RESPIRATORY THERAPY NOTE
RT Protocol Note  Zen Hall 79 y o  female MRN: [id number]  Unit/Bed#: [address] [place] [address] Encounter: [id number]    Assessment    Principal Problem:    CVA (cerebral vascular accident) [facility])  Active Problems:    S/P CABG x 4    Anemia    Iron deficiency    Diastolic congestive heart failure (HCC)    Hypothyroidism    Hypertension    CKD (chronic kidney disease), stage III ([facility] )    Other persistent atrial fibrillation (HCC)    Pancreatic mass    Hyponatremia    Bilateral pulmonary embolism ([facility])    GI bleed    SIRS (systemic inflammatory response syndrome) ([facility])      Home Pulmonary Medications:  none       Past Medical History:   Diagnosis Date    Anemia     Arthritis     Bladder calculi     CAD (coronary artery disease)     Status post CABG and PTCA to OM1    Chronic kidney disease     Pt  states Stage 3     CKD (chronic kidney disease) stage 3, GFR 30-59 ml/min ([facility])     CKD (chronic kidney disease), stage III ([facility])     Diastolic congestive heart failure ([facility] )     Hypertension     Hypothyroidism     Myocardial infarction ([facility] )     x 4 MI's and x2 stents  Last MI in Feb 2018    Rheumatoid arthritis ([facility] )     Stroke ([facility] ) 06/2020    weakness, aphasia( resolved)    Uterine prolapse      Social History     Socioeconomic History    Marital status:       Spouse name: None    Number of children: None    Years of education: None    Highest education level: None   Occupational History    Occupation: RETIRED   Social Needs    Financial resource strain: None    Food insecurity:     Worry: None     Inability: None    Transportation needs:     Medical: None     Non-medical: None   Tobacco Use    Smoking status: Never Smoker    Smokeless tobacco: Never Used   Substance and Sexual Activity    Alcohol use: Never     Frequency: Never    Drug use: No    Sexual activity: Not Currently   Lifestyle    Physical activity:     Days per week: None     Minutes per session: None    Stress: None Relationships    Social connections:     Talks on phone: None     Gets together: None     Attends Hoahaoism service: None     Active member of club or organization: None     Attends meetings of clubs or organizations: None     Relationship status: None    Intimate partner violence:     Fear of current or ex partner: None     Emotionally abused: None     Physically abused: None     Forced sexual activity: None   Other Topics Concern    None   Social History Narrative    None       Subjective  Patient  On 12 liter nasal cannual SpO2 92%  Resp rate 22           Objective      Physical Exam: Coarse breath sounds on the right side no distress  Well aerated on the left side  Vitals:  Blood pressure 149/79, pulse (!) 116, temperature 99 2 °F (37 3 °C), resp  rate (!) 26, weight 62 8 kg (138 lb 7 2 oz), SpO2 90 %, not currently breastfeeding            Imaging and other studies:           Plan    HFOV TID for retained secretions     Albuterol Q4prn for bronchospasm

## 2020-07-22 NOTE — PROGRESS NOTES
Progress Note - Sally Merritt 1940, 79 y o  female MRN: [id number]    Unit/Bed#: [address] [place] [address] Encounter: [id number]    Primary Care Provider: Arnoldo Valentin MD   Date and time admitted to hospital: 7/20/2020 10:08 PM        * CVA (cerebral vascular accident) [facility])  Assessment & Plan  Acute MCA Teritory CVA: Possibly thrombotic  Nerology on board  Poor prognosis  Family possibly planning on Hospice care  Cleared by Speech to Nectar full liq diet  Had a prolonged discussion with the patient's daughter at the bedside Miss Baruch Cheadle  Patient has poor prognosis including acute CVA, advanced pancreatic cancer, GI bleed, PE  Currently on Heparin drip per protocol  Continue the same  Patient's family is inclining towards comfort care or hospice  However they will discuss amongst themselves and will get back to us  Meanwhile she wants to know about the patient's prognosis from the oncologist   Patient is DNR           Hypoxia  Assessment & Plan  2/2 Pulm embolism or Aspiration Pneumonitis/PNA present on Admission  Family dont want abx, Cont  Iv fluids, O2  Pancreatic mass  Assessment & Plan  MRI during previous admission showed ocmplex pancreatic head mass measuring 5 7x4 7x7 7cm, biopsies from 7/15 consistent with pancreatic adenocarcinoma  - At that time, suspecting extension of pancreatic mass into duodenum  - Patient and her daughters have agreed since learning of the pancreatic mass that they likely would not pursue invasive measures, and are considering comfort care after discussing with oncology  - Appreciate GI and oncology consult     SIRS (systemic inflammatory response syndrome) ([facility] )  Assessment & Plan  POA with leukocytosis and HR > 90 on admission  No signs of infection at this time  CXR read with pulmonary vascular congestion  UA pending  Bilateral PE likely contributing to tachycardia  Lactic 0 9  Blood cultures pending   Procal pending   Family dont want abx       GI bleed  Assessment & Plan  Patient recently admitted from 7/3 to 7/10 and CT at that time showed large pancreatic head mass concerning for pancreatic carcinoma with mass effect on superior mesenteric vein and possible tumor extension with tumor extension in portal vein  Pt underwent EGD/colonoscopy on 7/7 with biopsy on 7/15 which showed invasive and ulcerated duodenal mass; biopsy preliminary cytology consistent with malignant cells  Patient's daughter reports that she continues to have melanotic stools  Hg down from 8 9 --> 7 9 from 1 week ago  - FNA results consistent with malignancy, adenocarcinoma  - Appreciate oncology consult; patient's family very understanding of prognosis but very much want to discuss with oncology, and would like to move ahead with transfusion as needed in the meantime  - Continue to closely monitor H+H, low threshold to transfuse  - Start IV Protonix Q12  - Appreciate GI recommendations    Not a candidate of any procedure  Possibly hospice care soon  Bilateral pulmonary embolism [facility])  Assessment & Plan  CTA showed bilateral lower lobe pulmonary emboli with no evidence of R heart strain on CT  On Heparin drip  O2  Anemia  Assessment & Plan  Hg down from 8 8 --> 7 9 from 1 week ago  Patient has known duodenal ulcer which was suspected to be the source of anemia during previous admission  Patient continues with dark, melanotic stools according to patient's daughter  With CVA, bilateral PE's, patient received 1 dose of Lovenox and ASA suppository in ED   Continue to closely monitor H+H, low threshold to transfuse  Previously GI and cardiology recommending holding home dose of AC; recommended IV venofer outpatient  Appreciate recommendations of oncology, GI, neurology regarding anemia and further AC        Subjective:   I have seen and Examined the patient at the bedside    Overnight patient had episodes of hypoxia for which she was put on oxygen high-flow, on and off IV Ativan required due to restlessness  Also had fever overnight  Objective: Wt Readings from Last 3 Encounters:   07/21/20 62 8 kg (138 lb 7 2 oz)   07/15/20 64 kg (141 lb)   07/13/20 64 kg (141 lb)     Temp Readings from Last 3 Encounters:   07/22/20 98 3 °F (36 8 °C)   07/15/20 99 3 °F (37 4 °C) (Tympanic)   07/13/20 97 9 °F (36 6 °C)     BP Readings from Last 3 Encounters:   07/22/20 118/59   07/15/20 139/68   07/13/20 142/70     Pulse Readings from Last 3 Encounters:   07/22/20 (!) 110   07/15/20 73   07/13/20 68        Review of System:   Unable to obtain secondary to lethargy and weakness, most of the review of system was obtained from patient's nurse which mention that the patient is restless and required some IV Ativan  Physical Exam:   General : Alert, Awake and oriented x 2-3, NAD  Confused, cachectic  Neck : Supple  Eyes:  BLANCA, EOMI  CVS : S1, S2, irregularly irregular  R/S : Clear to auscultate anteriorly  Abd: Soft, mildly tender to palpate diffusely more in the epigastric region, ND  Bs+ve  Extremity: Trace pedal edema noted  Skin: No acute Rash noted  CNS:  Right-sided facial droop, right upper extremity strength 0 x 5, left upper extremity strength 3+ by 5, bilateral lower extremity strength 3+ by 5  Confused, expressive aphasia noted  However able to understand but not able to communicate seems like         Labs and Imaging Data Reviewed by me:   Lab Results   Component Value Date    WBC 15 81 (H) 07/21/2020    HGB 8 7 (L) 07/21/2020    HCT 28 3 (L) 07/21/2020    MCV 87 07/21/2020     07/21/2020      Lab Results   Component Value Date    SODIUM 135 (L) 07/21/2020    K 3 7 07/21/2020    CL 99 (L) 07/21/2020    CO2 21 07/21/2020    BUN 21 07/21/2020    CREATININE 0 94 07/21/2020    GLUC 168 (H) 07/21/2020    CALCIUM 8 4 07/21/2020     No results found for: BNP   Lab Results   Component Value Date    INR 1 11 07/21/2020    INR 1 08 07/20/2020    INR 1 38 (H) 07/03/2020 PROTIME 14 2 07/21/2020    PROTIME 13 9 07/20/2020    PROTIME 16 9 (H) 07/03/2020     Lab Results   Component Value Date    ALT 25 07/06/2020    AST 26 07/06/2020    ALKPHOS 57 07/06/2020     Lab Results   Component Value Date    HGBA1C 5 3 07/21/2020      Lab Results   Component Value Date    CHOLESTEROL 160 07/21/2020    CHOLESTEROL 177 06/04/2020    CHOLESTEROL 195 03/05/2020     Lab Results   Component Value Date    HDL 48 07/21/2020    HDL 52 06/04/2020    HDL 50 03/05/2020     Lab Results   Component Value Date    TRIG 165 (H) 07/21/2020    TRIG 81 07/04/2020    TRIG 90 06/04/2020     No results found for: NONHDLC    Results from last 7 days   Lab Units 07/21/20  0608 07/20/20  2216   BUN mg/dL 21 25   CREATININE mg/dL 0 94 1 12

## 2020-07-22 NOTE — ASSESSMENT & PLAN NOTE
2/2 Pulm embolism or Aspiration Pneumonitis/PNA present on Admission  Family dont want abx, Cont   Iv fluids, O2

## 2020-07-22 NOTE — PLAN OF CARE
Problem: RESPIRATORY - ADULT  Goal: Achieves optimal ventilation and oxygenation  Description  INTERVENTIONS:  - Assess for changes in respiratory status  - Assess for changes in mentation and behavior  - Position to facilitate oxygenation and minimize respiratory effort  - Oxygen administered by appropriate delivery if ordered  - Initiate smoking cessation education as indicated  - Encourage broncho-pulmonary hygiene including cough, deep breathe, Incentive Spirometry  - Assess the need for suctioning and aspirate as needed  - Assess and instruct to report SOB or any respiratory difficulty  - Respiratory Therapy support as indicated  -HFOV therapy  Outcome: Not Progressing

## 2020-07-22 NOTE — SOCIAL WORK
FLORIDALMA spoke with Umm Mackay from [facility] who evaluated patient for GIP  Patient does qualify for GIP  Umm Mackay spoke with patient daughters who state that they want to discuss with other family members and will let us know tomorrow for a definite answer  Will continue to follow with family

## 2020-07-22 NOTE — RESPIRATORY THERAPY NOTE
Vest therapy is slight contraindications with PE  Patient  Has new oxygenation requirements, level 3 status, high oxygen requirement, and change in breath sounds  Vest therapy was discussed as best option

## 2020-07-22 NOTE — ASSESSMENT & PLAN NOTE
POA with leukocytosis and HR > 90 on admission  No signs of infection at this time  CXR read with pulmonary vascular congestion  UA pending  Bilateral PE likely contributing to tachycardia  Lactic 0 9  Blood cultures pending   Procal pending   Family dont want abx

## 2020-07-22 NOTE — QUICK NOTE
Called by patient's nurse who reported SpO2 of 81% on 6L NC O2  RT placed the patient on 10L high-flow NC and O2 saturations maintained at 90-91%  Nurse mentioned concern for possible aspiration event, no witnessed difficulties/choking events with new puree/level 1 diet with nectar thick liquids though certainly possible  Discussed with RT and can try vest as well  Coarse lung sounds in RLL field  Patient is not in any acute respiratory distress  Will check CXR  Discussed with both Krystal Callahan and Jenny Vick

## 2020-07-22 NOTE — QUICK NOTE
Late Entry from Consult eval of 7/21/2020    NIHSS:  1a Level of Consciousness: 0 = Alert   1b  LOC Questions: 2 = Answers neither correctly   1c  LOC Commands: 2 = Obeys neither correctly   2  Best Gaze: 1 = Partial Gaze Palsy   3  Visual: PORSCHE   4  Facial Palsy: 1=Minor paralysis (flattened nasolabial fold, asymmetric on smiling)   5a  Motor Right Arm: 0=No drift, limb holds 90 (or 45) degrees for full 10 seconds   5b  Motor Left Arm: 0=No drift, limb holds 90 (or 45) degrees for full 10 seconds   6a  Motor Right Leg: UN = Untestable (amputation, fused joint)   6b  Motor Left Leg: UN = Untestable (amputation, fused joint)   7  Limb Ataxia:  UN = Untestable (amputation, fused joint)   8  Sensory: PORSCHE   9  Best Language:  3=Mute, global aphasia; no usable speech or auditory comprehension   10  Dysarthria: 2=Severe; patient speech is mute/anarthric   11  Extinction and Inattention (formerly Neglect): PORSCHE   Total Score: 11     MRI with findings significant for acute infarct along the Left MCA territory  Pt with expressive/receptive aphasia, not able to follow commands or speak for majority of this assessment  As of Today 7/22/2020  Pt has had deterioration in her pulmonary status over night  Family has been in discussion with PCP and Intensivist overnight and is slated for Hospice evaluation today

## 2020-07-22 NOTE — ACP (ADVANCE CARE PLANNING)
Called to patient bedside at 23:55 for increasing supplemental o2 demands  Currently patient listed as level 3 DNR/DNI  Called POA daughter Connell Heimlich and explained current clinical status, patient does not appear to be in distress but spo2 continues to decline despite increasing o2 support  The family would like to visit in AM and transition to full comfort care at that time but understand that clinically she is declining  They would like not to escalated care any further but continue to give the patient the support she needs until morning, however if patients status should deteriorate any further giving comfort medication like morphine and ativan while maintaining current support  Should the patient continue to deteriorate then transition to full comfort care and provide Connell Heimlich a phone update

## 2020-07-23 ENCOUNTER — HOSPITAL ENCOUNTER (OUTPATIENT)
Facility: HOSPITAL | Age: 80
Discharge: NURSING, REHAB OR LONG-TERM CARE | End: 2020-07-27
Attending: INTERNAL MEDICINE | Admitting: INTERNAL MEDICINE
Payer: MEDICARE

## 2020-07-23 ENCOUNTER — EPISODE CHANGES (OUTPATIENT)
Dept: CASE MANAGEMENT | Facility: OTHER | Age: 80
End: 2020-07-23

## 2020-07-23 VITALS
WEIGHT: 138.67 LBS | DIASTOLIC BLOOD PRESSURE: 60 MMHG | HEART RATE: 81 BPM | BODY MASS INDEX: 24.18 KG/M2 | OXYGEN SATURATION: 92 % | SYSTOLIC BLOOD PRESSURE: 118 MMHG | RESPIRATION RATE: 24 BRPM | TEMPERATURE: 98.8 F

## 2020-07-23 DIAGNOSIS — K86.89 PANCREATIC MASS: Primary | ICD-10-CM

## 2020-07-23 DIAGNOSIS — R65.10 SIRS (SYSTEMIC INFLAMMATORY RESPONSE SYNDROME) (HCC): ICD-10-CM

## 2020-07-23 LAB
APTT PPP: 53 SECONDS (ref 23–37)
APTT PPP: 86 SECONDS (ref 23–37)

## 2020-07-23 PROCEDURE — 94660 CPAP INITIATION&MGMT: CPT

## 2020-07-23 PROCEDURE — 1123F ACP DISCUSS/DSCN MKR DOCD: CPT | Performed by: INTERNAL MEDICINE

## 2020-07-23 PROCEDURE — 85730 THROMBOPLASTIN TIME PARTIAL: CPT | Performed by: INTERNAL MEDICINE

## 2020-07-23 PROCEDURE — C9113 INJ PANTOPRAZOLE SODIUM, VIA: HCPCS | Performed by: PHYSICIAN ASSISTANT

## 2020-07-23 PROCEDURE — 94668 MNPJ CHEST WALL SBSQ: CPT

## 2020-07-23 PROCEDURE — 99239 HOSP IP/OBS DSCHRG MGMT >30: CPT | Performed by: INTERNAL MEDICINE

## 2020-07-23 PROCEDURE — 94760 N-INVAS EAR/PLS OXIMETRY 1: CPT

## 2020-07-23 PROCEDURE — 94669 MECHANICAL CHEST WALL OSCILL: CPT

## 2020-07-23 PROCEDURE — 94664 DEMO&/EVAL PT USE INHALER: CPT

## 2020-07-23 RX ORDER — LORAZEPAM 2 MG/ML
0.5 INJECTION INTRAMUSCULAR EVERY 2 HOUR PRN
Refills: 0
Start: 2020-07-23 | End: 2020-07-27 | Stop reason: HOSPADM

## 2020-07-23 RX ORDER — ALBUTEROL SULFATE 2.5 MG/3ML
2.5 SOLUTION RESPIRATORY (INHALATION) EVERY 4 HOURS PRN
Refills: 0
Start: 2020-07-23

## 2020-07-23 RX ORDER — ALBUTEROL SULFATE 2.5 MG/3ML
2.5 SOLUTION RESPIRATORY (INHALATION) EVERY 4 HOURS PRN
Status: DISCONTINUED | OUTPATIENT
Start: 2020-07-23 | End: 2020-07-27 | Stop reason: HOSPADM

## 2020-07-23 RX ORDER — LORAZEPAM 2 MG/ML
0.5 INJECTION INTRAMUSCULAR
Status: DISCONTINUED | OUTPATIENT
Start: 2020-07-23 | End: 2020-07-25

## 2020-07-23 RX ORDER — LORAZEPAM 2 MG/ML
0.5 INJECTION INTRAMUSCULAR EVERY 2 HOUR PRN
Status: DISCONTINUED | OUTPATIENT
Start: 2020-07-23 | End: 2020-07-23 | Stop reason: HOSPADM

## 2020-07-23 RX ADMIN — KETOROLAC TROMETHAMINE 1 DROP: 5 SOLUTION OPHTHALMIC at 08:59

## 2020-07-23 RX ADMIN — MORPHINE SULFATE 2 MG: 2 INJECTION, SOLUTION INTRAMUSCULAR; INTRAVENOUS at 12:17

## 2020-07-23 RX ADMIN — MORPHINE SULFATE 2 MG: 2 INJECTION, SOLUTION INTRAMUSCULAR; INTRAVENOUS at 22:47

## 2020-07-23 RX ADMIN — PANTOPRAZOLE SODIUM 40 MG: 40 INJECTION, POWDER, FOR SOLUTION INTRAVENOUS at 08:59

## 2020-07-23 RX ADMIN — HEPARIN SODIUM 2400 UNITS: 1000 INJECTION INTRAVENOUS; SUBCUTANEOUS at 10:42

## 2020-07-23 RX ADMIN — LORAZEPAM 1 MG: 2 INJECTION INTRAMUSCULAR; INTRAVENOUS at 07:19

## 2020-07-23 RX ADMIN — MORPHINE SULFATE 2 MG: 2 INJECTION, SOLUTION INTRAMUSCULAR; INTRAVENOUS at 18:54

## 2020-07-23 RX ADMIN — LORAZEPAM 0.5 MG: 2 INJECTION INTRAMUSCULAR; INTRAVENOUS at 23:47

## 2020-07-23 RX ADMIN — MORPHINE SULFATE 2 MG: 2 INJECTION, SOLUTION INTRAMUSCULAR; INTRAVENOUS at 03:00

## 2020-07-23 NOTE — [place] UNIVERSAL TRANSFER FORM
[place] UNIVERSAL TRANSFER FORM  (ALL ITEMS MUST BE COMPLETED)    1  TRANSFER FROM: [address]      TRANSFER TO:Hospice    2  DATE OF TRANSFER: 7/23/2020                        TIME OF TRANSFER: 1400    3  PATIENT NAME: DUSTY Berrios      YOB: 1940                             GENDER: female    4  LANGUAGE:   English    5  PHYSICIAN NAME:  No att  providers found                   PHONE: [phone]  CODE STATUS: Prior        Out of Hospital DNR Attached: No    7  :                                      :  Extended Emergency Contact Information  Primary Emergency Contact: Lorie Clark   [place]  Mobile Phone: [phone]  Relation: Daughter  Secondary Emergency Contact: Alex [place]ef[place]  Mobile Phone: [phone]  Relation: Daughter           David Durga Representative/Proxy:  No           Legal Guardian:  No             NAME OF:           HEALTH CARE REPRESENTATIVE/PROXY:                                         OR           LEGAL GUARDIAN, IF NOT :                                               PHONE:  (Day)           (Night)                        (Cell)    8  REASON FOR TRANSFER: (Must include brief medical history and recent changes in physical function or cognition )             V/S: /60   Pulse 81   Temp 98 8 °F (37 1 °C)   Resp (!) 24 Comment: labored and irregular  Wt 62 9 kg (138 lb 10 7 oz)   SpO2 92%   BMI 24 18 kg/m²           PAIN: None    9  PRIMARY DIAGNOSIS: [facility]      Secondary Diagnosis:         Pacemaker: No      Internal Defib: No          Mental Health Diagnosis (if Applicable):    10  RESTRAINTS: No     11  RESPIRATORY NEEDS: Oxygen Device 14 L NC,    12  ISOLATION/PRECAUTION: None    13  ALLERGY: Atorvastatin; Boniva [ibandronic acid]; Codeine; Statins; and Zetia [ezetimibe]    14  SENSORY:       Vision Poor and Hearing Poor    15  SKIN CONDITION: No Wounds    16  DIET: Special (describe)Clear liquid diet    17  IV ACCESS: Saline Lock    18  PERSONAL ITEMS SENT WITH PATIENT: None    19  ATTACHED DOCUMENTS: MUST ATTACH CURRENT MEDICATION INFORMATION Face Sheet and Discharge Summary    20  AT RISK ALERTS:Pressure Ulcer and Aspiration        HARM TO: N/A    21  WEIGHT BEARING STATUS:         Left Leg: None        Right Leg: None    22  MENTAL STATUS:Unresponsive    23  FUNCTION:        Walk: Not Able        Transfer: Not Able        Toilet: Not Able        Feed: Not Able    24  IMMUNIZATIONS/SCREENING:     Immunization History   Administered Date(s) Administered    influenza, trivalent, adjuvanted 10/19/2019       25  BOWEL: Incontinent     26  BLADDER: Incontinent    27   SENDING FACILITY CONTACT: [address] [place] [address]                  Title:RN        Unit: 4N        Phone: [phone] [address] (if known):        Title:        Unit:         Phone:         FORM PREFILLED BY (if applicable)       Title:       Unit:        Phone:         FORM COMPLETED BY Tasha Ruiz RN      Title: RN      Phone: [phone]

## 2020-07-23 NOTE — PLAN OF CARE
Problem: Potential for Falls  Goal: Patient will remain free of falls  Description  INTERVENTIONS:  - Assess patient frequently for physical needs  -  Identify cognitive and physical deficits and behaviors that affect risk of falls    -  [place] fall precautions as indicated by assessment   - Educate patient/family on patient safety including physical limitations  - Instruct patient to call for assistance with activity based on assessment  - Modify environment to reduce risk of injury  - Consider OT/PT consult to assist with strengthening/mobility  Outcome: Progressing     Problem: Prexisting or High Potential for Compromised Skin Integrity  Goal: Skin integrity is maintained or improved  Description  INTERVENTIONS:  - Identify patients at risk for skin breakdown  - Assess and monitor skin integrity  - Assess and monitor nutrition and hydration status  - Monitor labs   - Assess for incontinence   - Turn and reposition patient  - Assist with mobility/ambulation  - Relieve pressure over bony prominences  - Avoid friction and shearing  - Provide appropriate hygiene as needed including keeping skin clean and dry  - Evaluate need for skin moisturizer/barrier cream  - Collaborate with interdisciplinary team   - Patient/family teaching  - Consider wound care consult   Outcome: Progressing     Problem: NEUROSENSORY - ADULT  Goal: Achieves stable or improved neurological status  Description  INTERVENTIONS  - Monitor and report changes in neurological status  - Monitor vital signs such as temperature, blood pressure, glucose, and any other labs ordered   - Initiate measures to prevent increased intracranial pressure  - Monitor for seizure activity and implement precautions if appropriate      Outcome: Not Progressing  Goal: Achieves maximal functionality and self care  Description  INTERVENTIONS  - Monitor swallowing and airway patency with patient fatigue and changes in neurological status  - Encourage and assist patient to increase activity and self care     - Encourage visually impaired, hearing impaired and aphasic patients to use assistive/communication devices  Outcome: Not Progressing     Problem: CARDIOVASCULAR - ADULT  Goal: Absence of cardiac dysrhythmias or at baseline rhythm  Description  INTERVENTIONS:  - Continuous cardiac monitoring, vital signs, obtain 12 lead EKG if ordered  - Administer antiarrhythmic and heart rate control medications as ordered  - Monitor electrolytes and administer replacement therapy as ordered  Outcome: Progressing     Problem: RESPIRATORY - ADULT  Goal: Achieves optimal ventilation and oxygenation  Description  INTERVENTIONS:  - Assess for changes in respiratory status  - Assess for changes in mentation and behavior  - Position to facilitate oxygenation and minimize respiratory effort  - Oxygen administered by appropriate delivery if ordered  - Initiate smoking cessation education as indicated  - Encourage broncho-pulmonary hygiene including cough, deep breathe, Incentive Spirometry  - Assess the need for suctioning and aspirate as needed  - Assess and instruct to report SOB or any respiratory difficulty  - Respiratory Therapy support as indicated  Outcome: Not Progressing     Problem: PAIN - ADULT  Goal: Verbalizes/displays adequate comfort level or baseline comfort level  Description  Interventions:  - Encourage patient to monitor pain and request assistance  - Assess pain using appropriate pain scale  - Administer analgesics based on type and severity of pain and evaluate response  - Implement non-pharmacological measures as appropriate and evaluate response  - Consider cultural and social influences on pain and pain management  - Notify physician/advanced practitioner if interventions unsuccessful or patient reports new pain  Outcome: Progressing     Problem: INFECTION - ADULT  Goal: Absence or prevention of progression during hospitalization  Description  INTERVENTIONS:  - Assess and monitor for signs and symptoms of infection  - Monitor lab/diagnostic results  - Monitor all insertion sites, i e  indwelling lines, tubes, and drains  - Monitor endotracheal if appropriate and nasal secretions for changes in amount and color  - [place] appropriate cooling/warming therapies per order  - Administer medications as ordered  - Instruct and encourage patient and family to use good hand hygiene technique  - Identify and instruct in appropriate isolation precautions for identified infection/condition  Outcome: Progressing     Problem: Nutrition/Hydration-ADULT  Goal: Nutrient/Hydration intake appropriate for improving, restoring or maintaining nutritional needs  Description  Monitor and assess patient's nutrition/hydration status for malnutrition  Collaborate with interdisciplinary team and initiate plan and interventions as ordered  Monitor patient's weight and dietary intake as ordered or per policy  Utilize nutrition screening tool and intervene as necessary  Determine patient's food preferences and provide high-protein, high-caloric foods as appropriate       INTERVENTIONS:  - Monitor oral intake, urinary output, labs, and treatment plans  - Assess nutrition and hydration status and recommend course of action  - Evaluate amount of meals eaten  - Assist patient with eating if necessary   - Allow adequate time for meals  - Recommend/ encourage appropriate diets, oral nutritional supplements, and vitamin/mineral supplements  - Order, calculate, and assess calorie counts as needed  - Assess need for intravenous fluids  - Provide specific nutrition/hydration education as appropriate  - Include patient/family/caregiver in decisions related to nutrition   Outcome: Not Progressing     Problem: Neurological Deficit  Goal: Neurological status is stable or improving  Description  Interventions:  - Monitor and assess patient's level of consciousness, motor function, sensory function, and level of assistance needed for ADLs  - Monitor and report changes from baseline  Collaborate with interdisciplinary team to initiate plan and implement interventions as ordered  - Provide and maintain a safe environment  - Consider seizure precautions  - Consider fall precautions  - Consider aspiration precautions  - Consider bleeding precautions  Outcome: Not Progressing     Problem: Activity Intolerance/Impaired Mobility  Goal: Mobility/activity is maintained at optimum level for patient  Description  Interventions:  - Assess and monitor patient  barriers to mobility and need for assistive/adaptive devices  - Assess patient's emotional response to limitations  - Collaborate with interdisciplinary team and initiate plans and interventions as ordered  - Encourage independent activity per ability   - Maintain proper body alignment  - Perform active/passive rom as tolerated/ordered  - Plan activities to conserve energy   - Turn patient as appropriate  Outcome: Not Progressing     Problem: Communication Impairment  Goal: Ability to express needs and understand communication  Description  Assess patient's communication skills and ability to understand information  Patient will demonstrate use of effective communication techniques, alternative methods of communication and understanding even if not able to speak  - Encourage communication and provide alternate methods of communication as needed  - Collaborate with case management/ for discharge needs  - Include patient/family/caregiver in decisions related to communication  Outcome: Progressing     Problem: Potential for Aspiration  Goal: Non-ventilated patient's risk of aspiration is minimized  Description  Assess and monitor vital signs, respiratory status, and labs (WBC)  Monitor for signs of aspiration (tachypnea, cough, rales, wheezing, cyanosis, fever)  - Assess and monitor patient's ability to swallow    - Place patient up in chair to eat if possible  - HOB up at 90 degrees to eat if unable to get patient up into chair   - Supervise patient during oral intake  - Instruct patient/ family to take small bites  - Instruct patient/ family to take small single sips when taking liquids  - Follow patient-specific strategies generated by speech pathologist   Outcome: Not Progressing     Problem: Nutrition  Goal: Nutrition/Hydration status is improving  Description  Monitor and assess patient's nutrition/hydration status for malnutrition (ex- brittle hair, bruises, dry skin, pale skin and conjunctiva, muscle wasting, smooth red tongue, and disorientation)  Collaborate with interdisciplinary team and initiate plan and interventions as ordered  Monitor patient's weight and dietary intake as ordered or per policy  Utilize nutrition screening tool and intervene per policy  Determine patient's food preferences and provide high-protein, high-caloric foods as appropriate  - Assist patient with eating   - Allow adequate time for meals   - Encourage patient to take dietary supplement as ordered  - Collaborate with clinical nutritionist   - Include patient/family/caregiver in decisions related to nutrition    Outcome: Not Progressing

## 2020-07-23 NOTE — H&P
H&P- Chrissie Fernandez 1940, 79 y o  female MRN: [id number]    Unit/Bed#: [address] [place] [address] Encounter: [id number]    Primary Care Provider: Milton Sahu MD   Date and time admitted to hospital: 7/23/2020  1:58 PM        * Hypoxia  Assessment & Plan  2/2 B/l PE and Susected Aspiration Pneumonitis, On Hospcie care  Cont  O2, prn morphine for SOB  GI bleed  Assessment & Plan  2/2 Pancreatic cancer causing Duodenal Ulcer  No Rx  Cont  Iv morphine prn pain  Pancreatic mass  Assessment & Plan  Invading Pancreatic cancer, On Hospice care  Cont  Iv Morphine prn pain  SIRS (systemic inflammatory response syndrome) (HCC)  Assessment & Plan  2/2 Aspiration Pneumonitis  Cont  Supportive care, O2 prn morphine  Pt  On hospice  Care  Bilateral pulmonary embolism (HCC)  Assessment & Plan  O2, No Anticoag 2/2 Hospice care and GI Bleed  VTE Prophylaxis: None 2/2 Hospice  Code Status: DNR  POLST: There is no POLST form on file for this patient (pre-hospital)  Discussion with family: Yes    Anticipated Length of Stay:  Patient will be admitted on an Hospice basis with an anticipated length of stay of  2 midnights  Justification for Hospital Stay: Hypoxia 2/2 PE and Pancreatic cancer    Total Time for Visit, including Counseling / Coordination of Care: 20 minutes  Greater than 50% of this total time spent on direct patient counseling and coordination of care  Chief Complaint:   Shortness of Breath    History of Present Illness:    Chrissie Fernandez is a 79 y o  female patient who originally presented to the hospital on 7/20/2020 due to stroke like symptoms    34-year-old female with past medical history of rheumatoid arthritis, hypothyroidism, chronic diastolic congestive heart failure and CKD as well as CAD status post CABG in the past, was recently diagnosed with adenocarcinoma of the pancreas and had a workup of the same as an outpatient presented to the ED on 07/21/2020 with chief complaints of right-sided weakness and aphasia  Patient was then called in for stroke alert  She underwent CT of the head which did not show any bleed, patient was not deemed to be a good candidate for tPA secondary to having recent GI bleed, and major risk factors including recent biopsy  Additionally patient also incidentally detected to have PE      Patient's prognosis was poor, patient's mental status declined and her stroke symptoms also worsened with aphasia as well as right-sided weakness  Patient was seen by Neurology, Oncology and was deemed to be a poor candidate for any further treatment including tPA or any dual antiplatelet it secondary to patient being high risk for bleeding  Patient initially was on heparin drip and continued on the same  Family then decided that the patient never wanted any treatment for her cancer and opted for hospice  Patient was monitored on the floors for oxygen due to hypoxia from PE, as well as morphine and Ativan for restlessness and agitation  Patient did have an episode of severe hypoxia for which IC was involved and antibiotics were discussed with the patient's family and they refused the patient to be on any antibiotics because possibly considering comfort care  On 07/23/2020 patient was transition to hospice in the hospital - inpatient hospice  Review of Systems:    Review of Systems:  Review of systems can not be obtained secondary to patient's mental status  Past Medical and Surgical History:     Past Medical History:   Diagnosis Date    Anemia     Arthritis     Bladder calculi     CAD (coronary artery disease)     Status post CABG and PTCA to OM1    Chronic kidney disease     Pt  states Stage 3     CKD (chronic kidney disease) stage 3, GFR 30-59 ml/min (HCC)     CKD (chronic kidney disease), stage III (HCC)     Diastolic congestive heart failure (HCC)     Hypertension     Hypothyroidism     Myocardial infarction ([facility] [address] )     x 4 MI's and x2 stents    Last MI in Feb 2018    Rheumatoid arthritis ([facility] [place] [address] [facility]     Stroke ([facility] [place] [address] ) 06/2020    weakness, aphasia( resolved)    Uterine prolapse        Past Surgical History:   Procedure Laterality Date    BACK SURGERY      BLADDER SURGERY      CORONARY ANGIOPLASTY WITH STENT PLACEMENT      CORONARY ARTERY BYPASS GRAFT      EYE SURGERY Right 05/2020    JOINT REPLACEMENT Right 02/2018    OOPHORECTOMY Left     UT PARTIAL HIP REPLACEMENT Right 2/1/2018    Procedure: HEMIARTHROPLASTY HIP (BIPOLAR) (RIGHT); Surgeon: Dustin Hutson MD;  Location: [address] [place] [address];  Service: Orthopedics    THYROIDECTOMY, PARTIAL         Meds/Allergies:    Prior to Admission medications    Medication Sig Start Date End Date Taking? Authorizing Provider   albuterol (2 5 mg/3 mL) 0 083 % nebulizer solution Take 1 vial (2 5 mg total) by nebulization every 4 (four) hours as needed for wheezing 7/23/20  Yes Francisco Chahal MD   LORazepam (ATIVAN) 2 mg/mL Infuse 0 25 mL (0 5 mg total) into a venous catheter every 2 (two) hours as needed for anxiety for up to 10 days 7/23/20 8/2/20 Yes Francisco Chahal MD   Ascorbic Acid (VITAMIN C) 1000 MG tablet Take 1,000 mg by mouth daily  7/23/20  Historical Provider, MD   aspirin (ECOTRIN LOW STRENGTH) 81 mg EC tablet Take 81 mg by mouth daily Indications: Heart Attack    7/23/20  Historical Provider, MD   Coenzyme Q10 10 MG capsule Take 10 mg by mouth daily  7/23/20  Historical Provider, MD   docusate sodium (COLACE) 100 mg capsule Take 1 capsule (100 mg total) by mouth 2 (two) times a day 7/10/20 7/23/20  MAYUR Rondon   ferrous sulfate 325 (65 Fe) mg tablet Take 325 mg by mouth daily with breakfast  7/23/20  Historical Provider, MD   hydroxychloroquine (PLAQUENIL) 200 mg tablet Take 200 mg by mouth daily    7/23/20  Historical Provider, MD   ketorolac (ACULAR) 0 5 % ophthalmic solution Administer 1 drop to the right eye 2 (two) times a day  11/30/19 7/23/20  Historical Provider, MD   levothyroxine 50 mcg tablet Take 50 mcg by mouth daily  7/23/20  Historical Provider, MD   losartan (COZAAR) 25 mg tablet Take 1 tablet (25 mg total) by mouth daily Hold for SBP < 130 7/10/20 7/23/20  MAYUR Rondon   metoprolol succinate (TOPROL-XL) 100 mg 24 hr tablet Take 1 tablet (100 mg total) by mouth daily 6/5/20 7/23/20  Lilibeth Sterling MD   Multiple Vitamins-Minerals (CENTRUM SILVER PO) Take 1 tablet by mouth daily  7/23/20  Historical Provider, MD   oxyCODONE (ROXICODONE) 5 mg immediate release tablet Take 0 5 tablets (2 5 mg total) by mouth every 4 (four) hours as needed for severe pain for up to 10 daysMax Daily Amount: 15 mg 7/20/20 7/23/20  Ishmael Abad MD   pantoprazole (PROTONIX) 40 mg tablet Take 1 tablet (40 mg total) by mouth daily 7/10/20 7/23/20  MAYUR Rondon   polyethylene glycol (MIRALAX) 17 g packet Take 17 g by mouth daily as needed (for constipation) 7/10/20 7/23/20  MAYUR Rondon   spironolactone (ALDACTONE) 25 mg tablet Take 1 tablet (25 mg total) by mouth daily as needed (for weight gain or leg edema ) 7/10/20 7/23/20  MAYUR Rondon   TOVIAZ 4 MG TB24 Take 1 tablet by mouth daily  2/3/20 7/23/20  Historical Provider, MD   VASCEPA 1 g CAPS ONE CAPSULE TWICE A DAY 3/9/20 7/23/20  Анна Crawford MD   Vitamin D, Cholecalciferol, 1000 units CAPS Take 2,000 Units by mouth daily    7/23/20  Historical Provider, MD     I have reviewed home medications with patient personally  Allergies: Allergies   Allergen Reactions    Atorvastatin Hives    Boniva [Ibandronic Acid] Hives    Codeine      codeine derivatives - tolerates IV Dilaudid    Statins Hives    Zetia [Ezetimibe]        Social History:     Marital Status:     Occupation:   Patient Pre-hospital Living Situation:   Patient Pre-hospital Level of Mobility:   Patient Pre-hospital Diet Restrictions:   Substance Use History:   Social History     Substance and Sexual Activity   Alcohol Use Never    Frequency: Never     Social History     Tobacco Use   Smoking Status Never Smoker   Smokeless Tobacco Never Used     Social History     Substance and Sexual Activity   Drug Use No       Family History:    non-contributory    Physical Exam:     Vitals: There were no vitals filed for this visit  Physical Exam    (  General : lethargic and sleepy, responds to pain stimuli  Cachectic  Neck : Supple  Eyes:  BLANCA, EOMI  CVS : S1, S2, irregularly irregular    R/S : Clear to auscultate anteriorly  Abd: Soft, mildly tender to palpate diffusely more in the epigastric region, ND  Bs+ve  Extremity: Trace pedal edema noted  Skin: No acute Rash noted  CNS:  Right-sided facial droop, right upper extremity strength 0 x 5, left upper extremity strength 3+ by 5, bilateral lower extremity strength 3+ by 5  Confused, expressive aphasia noted  Eran  able to understand but not able to communicate seems like        Additional Data:     Lab Results: I have personally reviewed pertinent reports  Results from last 7 days   Lab Units 07/21/20  2356 07/21/20  0608   WBC Thousand/uL  --  15 81*   HEMOGLOBIN g/dL 8 7* 8 0*   HEMATOCRIT % 28 3* 26 5*   PLATELETS Thousands/uL  --  322   NEUTROS PCT %  --  84*   LYMPHS PCT %  --  8*   MONOS PCT %  --  6   EOS PCT %  --  1     Results from last 7 days   Lab Units 07/21/20  0608   SODIUM mmol/L 135*   POTASSIUM mmol/L 3 7   CHLORIDE mmol/L 99*   CO2 mmol/L 21   BUN mg/dL 21   CREATININE mg/dL 0 94   ANION GAP mmol/L 15*   CALCIUM mg/dL 8 4   GLUCOSE RANDOM mg/dL 168*     Results from last 7 days   Lab Units 07/21/20  0650   INR  1 11     Results from last 7 days   Lab Units 07/20/20  2220   POC GLUCOSE mg/dl 150*     Results from last 7 days   Lab Units 07/21/20  2356   HEMOGLOBIN A1C % 5 3     Results from last 7 days   Lab Units 07/21/20  0945 07/21/20  0153   LACTIC ACID mmol/L  --  0 9   PROCALCITONIN ng/ml 0 05  --        Imaging: I have personally reviewed pertinent reports        No orders to display       EKG, Pathology, and Other Studies Reviewed on Admission:     Allscripts / [facility] Records Reviewed: Yes     ** Please Note: This note has been constructed using a voice recognition system   **

## 2020-07-23 NOTE — INCIDENTAL FINDINGS
The following findings require follow up:  Radiographic finding   Finding: Pulmonary Embolism   Follow up required: No   Follow up should be done within not required  Patient on Hospice

## 2020-07-23 NOTE — DISCHARGE SUMMARY
Discharging Physician / Practitioner: Messi Rosado MD  PCP: Ishmael Abad MD  Admission Date:   Admission Orders (From admission, onward)     Ordered        07/20/20 2323  Inpatient Admission (expected length of stay for this patient Order details is greater than two midnights)  Once                   Discharge Date: 07/23/20    Resolved Problems  Date Reviewed: 7/22/2020    None          Consultations During Hospital Stay:  · Neurology  · Gasteroenterology  · Hospice  Oncology  Cardiology    Procedures Performed:   · None    Significant Findings / Test Results:   · MRI Brain showed Acute CVA MCA Territory  · CT A/P: Pancreatic mass invading the duodenum with some invasion into blood vsl surrounding  Incidental Findings:   Pulmonary Embolism  Test Results Pending at Discharge (will require follow up): · None     Outpatient Tests Requested:  · None    Complications:  None    Reason for Admission: facial droop, and altered mental status    Hospital Course:     Irene Felix is a 79 y o  female patient who originally presented to the hospital on 7/20/2020 due to stroke like symptoms  79-year-old female with past medical history of rheumatoid arthritis, hypothyroidism, chronic diastolic congestive heart failure and CKD as well as CAD status post CABG in the past, was recently diagnosed with adenocarcinoma of the pancreas and had a workup of the same as an outpatient presented to the ED on 07/21/2020 with chief complaints of right-sided weakness and aphasia  Patient was then called in for stroke alert  She underwent CT of the head which did not show any bleed, patient was not deemed to be a good candidate for tPA secondary to having recent GI bleed, and major risk factors including recent biopsy  Additionally patient also incidentally detected to have PE  Patient's prognosis was poor, patient's mental status declined and her stroke symptoms also worsened with aphasia as well as right-sided weakness  Patient was seen by Neurology, Oncology and was deemed to be a poor candidate for any further treatment including tPA or any dual antiplatelet it secondary to patient being high risk for bleeding  Patient initially was on heparin drip and continued on the same  Family then decided that the patient never wanted any treatment for her cancer and opted for hospice  Patient was monitored on the floors for oxygen due to hypoxia from PE, as well as morphine and Ativan for restlessness and agitation  Patient did have an episode of severe hypoxia for which IC was involved and antibiotics were discussed with the patient's family and they refused the patient to be on any antibiotics because possibly considering comfort care  On 07/23/2020 patient was transition to hospice in the hospital - inpatient hospice  Condition at Discharge: poor     Discharge Day Visit / Exam:     Subjective:   I have seen and Examined the patient at the bedside  Overnight events reviewed with the RN  Patient was restless and received iv ativan, and iv morphine  Vitals: Blood Pressure: 118/60 (07/23/20 0750)  Pulse: 81 (07/23/20 0750)  Temperature: 98 8 °F (37 1 °C) (07/23/20 0750)  Temp Source: Oral (07/21/20 0924)  Respirations: (!) 24(labored and irregular) (07/23/20 1109)  Weight - Scale: 62 9 kg (138 lb 10 7 oz) (07/23/20 0600)  SpO2: 92 % (07/23/20 1015)  Exam:   Physical Exam  (   Vitals:    07/23/20 1109   BP:    Pulse:    Resp: (!) 24   Temp:    SpO2:    General : lethargic and sleepy, responds to pain stimuli  Cachectic  Neck : Supple  Eyes:  BLANCA, EOMI  CVS : S1, S2, irregularly irregular    R/S : Clear to auscultate anteriorly  Abd: Soft, mildly tender to palpate diffusely more in the epigastric region, ND  Bs+ve  Extremity: Trace pedal edema noted  Skin: No acute Rash noted     CNS:  Right-sided facial droop, right upper extremity strength 0 x 5, left upper extremity strength 3+ by 5, bilateral lower extremity strength 3+ by 5  Confused, expressive aphasia noted  Eran  able to understand but not able to communicate seems like         Discussion with Family: Yes    Discharge instructions/Information to patient and family:   See after visit summary for information provided to patient and family  Provisions for Follow-Up Care:  See after visit summary for information related to follow-up care and any pertinent home health orders  Disposition:     Other: Inpatient Hospice  Planned Readmission: No     Discharge Statement:  I spent 35 minutes discharging the patient  This time was spent on the day of discharge  I had direct contact with the patient on the day of discharge  Greater than 50% of the total time was spent examining patient, answering all patient questions, arranging and discussing plan of care with patient as well as directly providing post-discharge instructions  Additional time then spent on discharge activities  Discharge Medications:  See after visit summary for reconciled discharge medications provided to patient and family        ** Please Note: This note has been constructed using a voice recognition system **

## 2020-07-23 NOTE — ASSESSMENT & PLAN NOTE
O2, No Anticoag 2/2 Hospice care and GI Bleed  Imp:  1) Hematemesis with drop in H/H and esophagitis on CT  2) Fecal impaction    Rec:  Cont miralax and enemas  F/U abdominal x-ray report  Unable to reach brother or mother so cannot proceed without consent for non-emergent procedure  Asked staff to notify if/when family arrives

## 2020-07-23 NOTE — PROGRESS NOTES
Dr Nicole Lancaster instructed to administer morphine for respiratory distress and to titrate O2 to a goal of 10 L NC

## 2020-07-24 PROCEDURE — 94760 N-INVAS EAR/PLS OXIMETRY 1: CPT

## 2020-07-24 PROCEDURE — 99231 SBSQ HOSP IP/OBS SF/LOW 25: CPT | Performed by: INTERNAL MEDICINE

## 2020-07-24 PROCEDURE — 94660 CPAP INITIATION&MGMT: CPT

## 2020-07-24 RX ADMIN — MORPHINE SULFATE 2 MG: 2 INJECTION, SOLUTION INTRAMUSCULAR; INTRAVENOUS at 10:11

## 2020-07-24 RX ADMIN — LORAZEPAM 0.5 MG: 2 INJECTION INTRAMUSCULAR; INTRAVENOUS at 07:33

## 2020-07-24 RX ADMIN — MORPHINE SULFATE 2 MG: 2 INJECTION, SOLUTION INTRAMUSCULAR; INTRAVENOUS at 01:55

## 2020-07-24 RX ADMIN — LORAZEPAM 0.5 MG: 2 INJECTION INTRAMUSCULAR; INTRAVENOUS at 04:29

## 2020-07-24 RX ADMIN — MORPHINE SULFATE 2 MG: 2 INJECTION, SOLUTION INTRAMUSCULAR; INTRAVENOUS at 06:34

## 2020-07-24 RX ADMIN — MORPHINE SULFATE 2 MG: 2 INJECTION, SOLUTION INTRAMUSCULAR; INTRAVENOUS at 23:22

## 2020-07-24 RX ADMIN — MORPHINE SULFATE 2 MG: 2 INJECTION, SOLUTION INTRAMUSCULAR; INTRAVENOUS at 16:11

## 2020-07-24 RX ADMIN — LORAZEPAM 0.5 MG: 2 INJECTION INTRAMUSCULAR; INTRAVENOUS at 13:48

## 2020-07-24 NOTE — PLAN OF CARE
Problem: Potential for Falls  Goal: Patient will remain free of falls  Description  INTERVENTIONS:  - Assess patient frequently for physical needs  -  Identify cognitive and physical deficits and behaviors that affect risk of falls    -  [place] fall precautions as indicated by assessment   - Educate patient/family on patient safety including physical limitations  - Instruct patient to call for assistance with activity based on assessment  - Modify environment to reduce risk of injury  - Consider OT/PT consult to assist with strengthening/mobility  Outcome: Progressing     Problem: Prexisting or High Potential for Compromised Skin Integrity  Goal: Skin integrity is maintained or improved  Description  INTERVENTIONS:  - Identify patients at risk for skin breakdown  - Assess and monitor skin integrity  - Assess and monitor nutrition and hydration status  - Monitor labs   - Assess for incontinence   - Turn and reposition patient  - Assist with mobility/ambulation  - Relieve pressure over bony prominences  - Avoid friction and shearing  - Provide appropriate hygiene as needed including keeping skin clean and dry  - Evaluate need for skin moisturizer/barrier cream  - Collaborate with interdisciplinary team   - Patient/family teaching  - Consider wound care consult   Outcome: Progressing     Problem: PAIN - ADULT  Goal: Verbalizes/displays adequate comfort level or baseline comfort level  Description  Interventions:  - Assess pain using appropriate pain scale  - Administer analgesics based on type and severity of pain and evaluate response  - Implement non-pharmacological measures as appropriate and evaluate response  - Consider cultural and social influences on pain and pain management  - Notify physician/advanced practitioner if interventions unsuccessful or patient reports new pain   Outcome: Progressing

## 2020-07-24 NOTE — SOCIAL WORK
FLORIDALMA spoke with Collin Rios, Liaison at [facility] who states that patient continues to decline and remains appropriate for inpatient hospice care  Collin Rios also states that a "plan B" would be [address] vs Home hospice with [facility] however feels that family would prefer [facility] if there would need to be a new plan  SW will continue to follow through patients hospital stay and monitor needs

## 2020-07-24 NOTE — RESPIRATORY THERAPY NOTE
RT Protocol Note  Castro Reece 79 y o  female MRN: [id number]  Unit/Bed#: [address] [place] [address] Encounter: [id number]    Assessment    Principal Problem:    Hypoxia  Active Problems:    Pancreatic mass    Bilateral pulmonary embolism (HCC)    GI bleed    SIRS (systemic inflammatory response syndrome) (HCC)      Home Pulmonary Medications:  Nebs at home       Past Medical History:   Diagnosis Date    Anemia     Arthritis     Bladder calculi     CAD (coronary artery disease)     Status post CABG and PTCA to OM1    Chronic kidney disease     Pt  states Stage 3     CKD (chronic kidney disease) stage 3, GFR 30-59 ml/min (HCC)     CKD (chronic kidney disease), stage III (HCC)     Diastolic congestive heart failure (HCC)     Hypertension     Hypothyroidism     Myocardial infarction ([facility] )     x 4 MI's and x2 stents  Last MI in Feb 2018    [facility]     Stroke ([facility] ) 06/2020    weakness, aphasia( resolved)    Uterine prolapse      Social History     Socioeconomic History    Marital status:       Spouse name: Not on file    Number of children: Not on file    Years of education: Not on file    Highest education level: Not on file   Occupational History    Occupation: RETIRED   Social Needs    Financial resource strain: Not on file    Food insecurity:     Worry: Not on file     Inability: Not on file    Transportation needs:     Medical: Not on file     Non-medical: Not on file   Tobacco Use    Smoking status: Never Smoker    Smokeless tobacco: Never Used   Substance and Sexual Activity    Alcohol use: Never     Frequency: Never    Drug use: No    Sexual activity: Not Currently   Lifestyle    Physical activity:     Days per week: Not on file     Minutes per session: Not on file    Stress: Not on file   Relationships    Social connections:     Talks on phone: Not on file     Gets together: Not on file     Attends [employer] service: Not on file     Active member of club or organization: Not on file     Attends meetings of clubs or organizations: Not on file     Relationship status: Not on file    Intimate partner violence:     Fear of current or ex partner: Not on file     Emotionally abused: Not on file     Physically abused: Not on file     Forced sexual activity: Not on file   Other Topics Concern    Not on file   Social History Narrative    Not on file       Subjective    Subjective Data: no apparent resp distress noted    Objective    Physical Exam:   Assessment Type: Assess only  General Appearance: Awake  Respiratory Pattern: Normal  Chest Assessment: Chest expansion symmetrical  Bilateral Breath Sounds: Diminished, Rhonchi  R Breath Sounds: Diminished, Rhonchi  L Breath Sounds: Diminished, Rhonchi  Location Specific: No  Cough: None  O2 Device: nc mid flow 10 lplm bottle full    Vitals:  Blood pressure 115/62, pulse 99, temperature 98 7 °F (37 1 °C), resp  rate 16, height 5' 3 5" (1 613 m), weight 61 8 kg (136 lb 3 9 oz), SpO2 97 %, not currently breastfeeding  Imaging and other studies: I have personally reviewed pertinent reports        O2 Device: nc mid flow 10 lplm bottle full     Plan    Respiratory Plan: No distress/Pulmonary history        Resp Comments: no resp distress noted pt awake

## 2020-07-24 NOTE — PROGRESS NOTES
Progress Note - Saman Ward 1940, 79 y o  female MRN: [id number]    Unit/Bed#: [address] [place] [address] Encounter: [id number]    Primary Care Provider: Jos Diaz MD   Date and time admitted to hospital: 7/23/2020  1:58 PM        * Hypoxia  Assessment & Plan  2/2 B/l PE and Susected Aspiration Pneumonitis, On Hospcie care  Cont  O2, prn morphine for SOB  Will try to wean off the oxygen to 5 L and increase the morphine as needed for shortness of breath if worsens  GI bleed  Assessment & Plan  2/2 Pancreatic cancer causing Duodenal Ulcer  No Rx  Cont  Iv morphine prn pain  Pancreatic mass  Assessment & Plan  Invading Pancreatic cancer, On Hospice care  Cont  Iv Morphine prn pain  SIRS (systemic inflammatory response syndrome) (HCC)  Assessment & Plan  2/2 Aspiration Pneumonitis  Cont  Supportive care, O2 prn morphine  Pt  On hospice  Care  Bilateral pulmonary embolism (HCC)  Assessment & Plan  O2, No Anticoag 2/2 Hospice care and GI Bleed  Subjective:   I have seen and Examined the patient at the bedside  Patient's overnight events were reviewed with the nurse, patient is comfortable  Got p r n  IV morphine and IV Ativan  Patient seems to be comfortable  Patient is on oxygen 10 L via nasal cannula,  Objective: Wt Readings from Last 3 Encounters:   07/24/20 61 8 kg (136 lb 3 9 oz)   07/23/20 62 9 kg (138 lb 10 7 oz)   07/15/20 64 kg (141 lb)     Temp Readings from Last 3 Encounters:   07/24/20 98 7 °F (37 1 °C)   07/23/20 98 8 °F (37 1 °C)   07/15/20 99 3 °F (37 4 °C) (Tympanic)     BP Readings from Last 3 Encounters:   07/24/20 115/62   07/23/20 118/60   07/15/20 139/68     Pulse Readings from Last 3 Encounters:   07/24/20 99   07/23/20 81   07/15/20 73        Review of System:   Unable to be obtained secondary to his mental status  Physical Exam:   General :  Lethargic and drowsy, not in any distress  Eyes:  BLANCA, EOMI  CVS : S1, S2, RRR    R/S : Clear to auscultate anteriorly  Decreased at the bases  Abd: Soft, NT, ND  Bs+ve  Extremity: Trace pedal edema noted  Skin: No acute Rash noted       Labs and Imaging Data Reviewed by me:   Lab Results   Component Value Date    WBC 15 81 (H) 07/21/2020    HGB 8 7 (L) 07/21/2020    HCT 28 3 (L) 07/21/2020    MCV 87 07/21/2020     07/21/2020      Lab Results   Component Value Date    SODIUM 135 (L) 07/21/2020    K 3 7 07/21/2020    CL 99 (L) 07/21/2020    CO2 21 07/21/2020    BUN 21 07/21/2020    CREATININE 0 94 07/21/2020    GLUC 168 (H) 07/21/2020    CALCIUM 8 4 07/21/2020     No results found for: BNP   Lab Results   Component Value Date    INR 1 11 07/21/2020    INR 1 08 07/20/2020    INR 1 38 (H) 07/03/2020    PROTIME 14 2 07/21/2020    PROTIME 13 9 07/20/2020    PROTIME 16 9 (H) 07/03/2020     Lab Results   Component Value Date    ALT 25 07/06/2020    AST 26 07/06/2020    ALKPHOS 57 07/06/2020     Lab Results   Component Value Date    HGBA1C 5 3 07/21/2020      Lab Results   Component Value Date    CHOLESTEROL 160 07/21/2020    CHOLESTEROL 177 06/04/2020    CHOLESTEROL 195 03/05/2020     Lab Results   Component Value Date    HDL 48 07/21/2020    HDL 52 06/04/2020    HDL 50 03/05/2020     Lab Results   Component Value Date    TRIG 165 (H) 07/21/2020    TRIG 81 07/04/2020    TRIG 90 06/04/2020

## 2020-07-24 NOTE — PLAN OF CARE
Patient on comfort care measures  Care plan devoted to providing patient comfort at this time  Will continue to monitor

## 2020-07-24 NOTE — ASSESSMENT & PLAN NOTE
2/2 B/l PE and Susected Aspiration Pneumonitis, On Hospcie care  Cont  O2, prn morphine for SOB  Will try to wean off the oxygen to 5 L and increase the morphine as needed for shortness of breath if worsens

## 2020-07-25 PROBLEM — R45.1 RESTLESSNESS: Status: ACTIVE | Noted: 2020-07-25

## 2020-07-25 PROCEDURE — 99233 SBSQ HOSP IP/OBS HIGH 50: CPT | Performed by: INTERNAL MEDICINE

## 2020-07-25 PROCEDURE — 94760 N-INVAS EAR/PLS OXIMETRY 1: CPT

## 2020-07-25 RX ORDER — LORAZEPAM 2 MG/ML
0.5 INJECTION INTRAMUSCULAR EVERY 2 HOUR PRN
Status: DISCONTINUED | OUTPATIENT
Start: 2020-07-25 | End: 2020-07-27 | Stop reason: HOSPADM

## 2020-07-25 RX ORDER — LORAZEPAM 2 MG/ML
0.5 INJECTION INTRAMUSCULAR EVERY 2 HOUR PRN
Status: DISCONTINUED | OUTPATIENT
Start: 2020-07-25 | End: 2020-07-25

## 2020-07-25 RX ORDER — ACETAMINOPHEN 650 MG/1
325 SUPPOSITORY RECTAL EVERY 4 HOURS PRN
Status: DISCONTINUED | OUTPATIENT
Start: 2020-07-25 | End: 2020-07-27 | Stop reason: HOSPADM

## 2020-07-25 RX ORDER — LORAZEPAM 1 MG/1
1 TABLET ORAL
Status: DISCONTINUED | OUTPATIENT
Start: 2020-07-25 | End: 2020-07-25

## 2020-07-25 RX ORDER — MORPHINE SULFATE 100 MG/5ML
2.5 SOLUTION ORAL
Status: DISCONTINUED | OUTPATIENT
Start: 2020-07-25 | End: 2020-07-25

## 2020-07-25 RX ADMIN — LORAZEPAM 0.5 MG: 2 INJECTION INTRAMUSCULAR; INTRAVENOUS at 02:56

## 2020-07-25 RX ADMIN — MORPHINE SULFATE 2 MG: 2 INJECTION, SOLUTION INTRAMUSCULAR; INTRAVENOUS at 09:52

## 2020-07-25 RX ADMIN — LORAZEPAM 0.5 MG: 2 INJECTION INTRAMUSCULAR; INTRAVENOUS at 11:15

## 2020-07-25 RX ADMIN — LORAZEPAM 0.5 MG: 2 INJECTION INTRAMUSCULAR; INTRAVENOUS at 14:20

## 2020-07-25 NOTE — ASSESSMENT & PLAN NOTE
2/2 B/l PE and Susected Aspiration Pneumonitis, On Hospcie care  Cont  O2, prn morphine for SOB  Will try to wean off the oxygen to 5 L  On Morphine iv, will try oral conc  Solution of morphine SL prn if controls the sob

## 2020-07-26 LAB
BACTERIA BLD CULT: NORMAL
BACTERIA BLD CULT: NORMAL

## 2020-07-26 PROCEDURE — 99225 PR SBSQ OBSERVATION CARE/DAY 25 MINUTES: CPT | Performed by: INTERNAL MEDICINE

## 2020-07-26 PROCEDURE — 94760 N-INVAS EAR/PLS OXIMETRY 1: CPT

## 2020-07-26 PROCEDURE — 94660 CPAP INITIATION&MGMT: CPT

## 2020-07-26 RX ADMIN — MORPHINE SULFATE 2 MG: 2 INJECTION, SOLUTION INTRAMUSCULAR; INTRAVENOUS at 21:52

## 2020-07-26 RX ADMIN — LORAZEPAM 0.5 MG: 2 INJECTION INTRAMUSCULAR; INTRAVENOUS at 10:28

## 2020-07-26 RX ADMIN — MORPHINE SULFATE 2 MG: 2 INJECTION, SOLUTION INTRAMUSCULAR; INTRAVENOUS at 15:38

## 2020-07-26 RX ADMIN — MORPHINE SULFATE 2 MG: 2 INJECTION, SOLUTION INTRAMUSCULAR; INTRAVENOUS at 08:23

## 2020-07-26 RX ADMIN — LORAZEPAM 0.5 MG: 2 INJECTION INTRAMUSCULAR; INTRAVENOUS at 23:09

## 2020-07-26 NOTE — ASSESSMENT & PLAN NOTE
Invading Pancreatic cancer, On Hospice care  Cont  Morphine prn pain  Try oral solution tomorrow instead of iv  Please advise on urine culture.  is out until next week. Pt is symptomatic.

## 2020-07-26 NOTE — PROGRESS NOTES
Progress Note - Victor Hugo Ayala 1940, 79 y o  female MRN: [id number]    Unit/Bed#: [address] [place] [address] Encounter: [id number]    Primary Care Provider: Emmanuel Lopez MD   Date and time admitted to hospital: 2020  1:58 PM        * Hypoxia  Assessment & Plan  2/2 B/l PE and Susected Aspiration Pneumonitis, On Hospcie care  Cont  O2, prn morphine for SOB  Will try to wean off the oxygen to 5 L  On iv morphine again, may consider oral morphine tomorrow  GI bleed  Assessment & Plan  2/2 Pancreatic cancer causing Duodenal Ulcer  No Rx  morphine prn pain  Pancreatic mass  Assessment & Plan  Invading Pancreatic cancer, On Hospice care  Cont  Morphine prn pain  Try oral solution tomorrow instead of iv  Restlessness  Assessment & Plan  Prn ativan  Currently on iv 0 5 mg ativan, Try sublinual 1mg q3hrs prn tomorrow onwards  Subjective:   I have seen and Examined the patient at the bedside  Patient's overnight events were reviewed with the nurse, patient is comfortable  Got p r n  IV morphine and IV Ativan  Patient seems to be comfortable  Objective:   Temp (24hrs), Av 5 °F (36 9 °C), Min:98 1 °F (36 7 °C), Max:98 8 °F (37 1 °C)    Temp:  [98 1 °F (36 7 °C)-98 8 °F (37 1 °C)] 98 8 °F (37 1 °C)  HR:  [114-118] 118  Resp:  [20] 20  BP: (126-152)/(66-88) 152/88  SpO2:  [94 %-99 %] 99 %  Body mass index is 23 14 kg/m²  General :  Lethargic and drowsy, not in any distress  Eyes:  BLANCA, EOMI  CVS : S1, S2, RRR    R/S : Clear to auscultate anteriorly  Decreased at the bases  Abd: Soft, NT, ND  Bs+ve  Extremity: Trace pedal edema noted  Skin: No acute Rash noted  CNS Exam: Right-sided facial droop, right upper extremity strength 0 x 5, left upper extremity strength 3+ by 5, bilateral lower extremity strength 3+ by 5   Confused, expressive aphasia noted  Ramos Aguiar able to understand but not able to communicate seems like        Review of System:   Unable to obtain 2/2 lethargy and mental status  Input and Output Summary (last 24 hours): Intake/Output Summary (Last 24 hours) at 7/26/2020 1246  Last data filed at 7/26/2020 0500  Gross per 24 hour   Intake --   Output 1 ml   Net -1 ml     I/O       07/24 0701 - 07/25 0700 07/25 0701 - 07/26 0700 07/26 0701 - 07/27 0700    P  O  Total Intake(mL/kg)       Urine (mL/kg/hr)  1 (0)     Stool  0     Total Output  1     Net  -1            Unmeasured Urine Occurrence 4 x      Unmeasured Stool Occurrence  1 x           Physical Exam:   General : Alert, Awake and oriented x 2-3, NAD  Neck : Supple  Eyes:  BLANCA, EOMI  CVS : S1, S2, RRR    R/S : Clear to auscultate anteriorly  Abd: Soft, NT, ND  Bs+ve  Extremity: Trace pedal edema noted  Skin: No acute Rash noted  CNS: No acute FND  Additional Data:     Labs & Imaging Data Reviewed :    Results from last 7 days   Lab Units 07/21/20  2356 07/21/20  0608   WBC Thousand/uL  --  15 81*   HEMOGLOBIN g/dL 8 7* 8 0*   HEMATOCRIT % 28 3* 26 5*   PLATELETS Thousands/uL  --  322   NEUTROS PCT %  --  84*   LYMPHS PCT %  --  8*   MONOS PCT %  --  6   EOS PCT %  --  1     Results from last 7 days   Lab Units 07/21/20  0608   POTASSIUM mmol/L 3 7   CHLORIDE mmol/L 99*   CO2 mmol/L 21   BUN mg/dL 21   CREATININE mg/dL 0 94   CALCIUM mg/dL 8 4     Results from last 7 days   Lab Units 07/21/20  0650   INR  1 11     Lab Results   Component Value Date    HGBA1C 5 3 07/21/2020        Cultures:   Blood Culture:   Lab Results   Component Value Date    BLOODCX No Growth After 5 Days  07/21/2020    BLOODCX No Growth After 5 Days   07/21/2020     Urine Culture:   Lab Results   Component Value Date    URINECX (A) 12/11/2018     >100,000 cfu/ml Klebsiella oxytoca Raoultella ornithinolytica    URINECX No Growth <1000 cfu/mL 02/03/2018    URINECX 10,000-19,000 cfu/ml Mixed Contaminants X3 06/01/2016     Sputum Culture: No components found for: SPUTUMCX  Wound Culture: No results found for: WOUNDCULT    Last 24 Hours Medication List:     Current Facility-Administered Medications:  acetaminophen 325 mg Rectal Q4H PRN Sara Brown MD   albuterol 2 5 mg Nebulization Q4H PRN Sara Brown MD   LORazepam 0 5 mg Intravenous Q2H PRN Sara Brown MD   morphine injection 2 mg Intravenous Q4H PRN Sara Brown MD       Patient is at moderate risk for morbidity and mortality due to above mentioned illness and comorbidities

## 2020-07-26 NOTE — ASSESSMENT & PLAN NOTE
2/2 B/l PE and Susected Aspiration Pneumonitis, On Hospcie care  Cont  O2, prn morphine for SOB  Will try to wean off the oxygen to 5 L  On iv morphine again, may consider oral morphine tomorrow

## 2020-07-26 NOTE — PLAN OF CARE
Problem: Potential for Falls  Goal: Patient will remain free of falls  Description  INTERVENTIONS:  - Assess patient frequently for physical needs  -  Identify cognitive and physical deficits and behaviors that affect risk of falls    -  [place] fall precautions as indicated by assessment   - Educate patient/family on patient safety including physical limitations  - Instruct patient to call for assistance with activity based on assessment  - Modify environment to reduce risk of injury  - Consider OT/PT consult to assist with strengthening/mobility  Outcome: Progressing     Problem: Prexisting or High Potential for Compromised Skin Integrity  Goal: Skin integrity is maintained or improved  Description  INTERVENTIONS:  - Identify patients at risk for skin breakdown  - Assess and monitor skin integrity  - Assess and monitor nutrition and hydration status  - Monitor labs   - Assess for incontinence   - Turn and reposition patient  - Assist with mobility/ambulation  - Relieve pressure over bony prominences  - Avoid friction and shearing  - Provide appropriate hygiene as needed including keeping skin clean and dry  - Evaluate need for skin moisturizer/barrier cream  - Collaborate with interdisciplinary team   - Patient/family teaching  - Consider wound care consult   Outcome: Progressing     Problem: PAIN - ADULT  Goal: Verbalizes/displays adequate comfort level or baseline comfort level  Description  Interventions:  - Assess pain using appropriate pain scale  - Administer analgesics based on type and severity of pain and evaluate response  - Implement non-pharmacological measures as appropriate and evaluate response  - Consider cultural and social influences on pain and pain management  - Notify physician/advanced practitioner if interventions unsuccessful or patient reports new pain   Outcome: Progressing     Problem: RESPIRATORY - ADULT  Goal: Achieves optimal ventilation and oxygenation  Description  INTERVENTIONS:  - Assess for changes in respiratory status  - Assess for changes in mentation and behavior  - Position to facilitate oxygenation and minimize respiratory effort  - Oxygen administered by appropriate delivery if ordered  - Initiate smoking cessation education as indicated  - Encourage broncho-pulmonary hygiene including cough, deep breathe, Incentive Spirometry  - Assess the need for suctioning and aspirate as needed  - Assess and instruct to report SOB or any respiratory difficulty  - Respiratory Therapy support as indicated  Outcome: Progressing

## 2020-07-27 ENCOUNTER — EPISODE CHANGES (OUTPATIENT)
Dept: CASE MANAGEMENT | Facility: HOSPITAL | Age: 80
End: 2020-07-27

## 2020-07-27 VITALS
BODY MASS INDEX: 22.21 KG/M2 | TEMPERATURE: 98.8 F | HEIGHT: 64 IN | OXYGEN SATURATION: 99 % | SYSTOLIC BLOOD PRESSURE: 150 MMHG | HEART RATE: 118 BPM | RESPIRATION RATE: 20 BRPM | DIASTOLIC BLOOD PRESSURE: 79 MMHG | WEIGHT: 130.1 LBS

## 2020-07-27 PROCEDURE — 94760 N-INVAS EAR/PLS OXIMETRY 1: CPT

## 2020-07-27 PROCEDURE — 99238 HOSP IP/OBS DSCHRG MGMT 30/<: CPT | Performed by: INTERNAL MEDICINE

## 2020-07-27 RX ORDER — MORPHINE SULFATE 100 MG/5ML
5 SOLUTION ORAL EVERY 2 HOUR PRN
Qty: 60 ML | Refills: 0 | Status: SHIPPED | OUTPATIENT
Start: 2020-07-27 | End: 2020-08-06

## 2020-07-27 RX ORDER — LORAZEPAM 0.5 MG/1
1 TABLET ORAL EVERY 4 HOURS PRN
Qty: 30 TABLET | Refills: 0 | Status: SHIPPED | OUTPATIENT
Start: 2020-07-27 | End: 2020-08-06

## 2020-07-27 RX ADMIN — LORAZEPAM 0.5 MG: 2 INJECTION INTRAMUSCULAR; INTRAVENOUS at 13:04

## 2020-07-27 RX ADMIN — LORAZEPAM 0.5 MG: 2 INJECTION INTRAMUSCULAR; INTRAVENOUS at 11:02

## 2020-07-27 RX ADMIN — MORPHINE SULFATE 2 MG: 2 INJECTION, SOLUTION INTRAMUSCULAR; INTRAVENOUS at 11:02

## 2020-07-27 RX ADMIN — LORAZEPAM 0.5 MG: 2 INJECTION INTRAMUSCULAR; INTRAVENOUS at 08:44

## 2020-07-27 RX ADMIN — MORPHINE SULFATE 2 MG: 2 INJECTION, SOLUTION INTRAMUSCULAR; INTRAVENOUS at 13:04

## 2020-07-27 NOTE — PLAN OF CARE
Problem: RESPIRATORY - ADULT  Goal: Achieves optimal ventilation and oxygenation  Description  INTERVENTIONS:  - Assess for changes in respiratory status  - Assess for changes in mentation and behavior  - Position to facilitate oxygenation and minimize respiratory effort  - Oxygen administered by appropriate delivery if ordered  - Initiate smoking cessation education as indicated  - Encourage broncho-pulmonary hygiene including cough, deep breathe, Incentive Spirometry  - Assess the need for suctioning and aspirate as needed  - Assess and instruct to report SOB or any respiratory difficulty  - Respiratory Therapy support as indicated  Outcome: Adequate for Discharge

## 2020-07-27 NOTE — DISCHARGE SUMMARY
Discharge Summary - [address] [facility]    Patient Information: Ema Clemens 79 y o  female MRN: [id number]  Unit/Bed#: [address] [place] [address] Encounter: [id number]    Discharging Physician / Practitioner: Justen Arthur MD  PCP: Jaguar Verdin MD  Admission Date: 7/23/2020  Discharge Date: 07/27/20    Reason for Admission: No chief complaint on file  Discharge Diagnoses:     Principal Problem:    Hypoxia  Active Problems:    Pancreatic mass    GI bleed    Bilateral pulmonary embolism (HCC)    SIRS (systemic inflammatory response syndrome) (HCC)    Restlessness  Resolved Problems:    * No resolved hospital problems  *        No new Assessment & Plan notes have been filed under this hospital service since the last note was generated    Service: Hospitalist      Consultations During Hospital Stay:  None    Procedures Performed:     · None    Significant Findings:     · Refer to hospital course and above listed diagnosis related plan for details    Imaging while in hospital:      Incidental Findings:   · None     Test Results Pending at Discharge (will require follow up):   · As per After Visit Summary     Outpatient Tests Requested:  · None    Complications:  Refer to hospital course and above listed diagnosis related plan, if any    Hospital Course:     Zeferino Corley a 79 y o  female patient who originally presented to the hospital on 7/20/2020 due to stroke like symptoms   79year-old female with past medical history of rheumatoid arthritis, hypothyroidism, chronic diastolic congestive heart failure and CKD as well as CAD status post CABG in the past, was recently diagnosed with adenocarcinoma of the pancreas and had a workup of the same as an outpatient presented to the ED on 07/21/2020 with chief complaints of right-sided weakness and aphasia   Patient was then called in for stroke alert   She underwent CT of the head which did not show any bleed, patient was not deemed to be a good candidate for tPA secondary to having recent GI bleed, and major risk factors including recent biopsy   Additionally patient also incidentally detected to have PE      Patient's prognosis was poor, patient's mental status declined and her stroke symptoms also worsened with aphasia as well as right-sided weakness   Patient was seen by Neurology, Oncology and was deemed to be a poor candidate for any further treatment including tPA or any dual antiplatelet it secondary to patient being high risk for bleeding   Patient initially was on heparin drip and continued on the same  Family then decided that the patient never wanted any treatment for her cancer and opted for hospice   Patient was monitored on the floors for oxygen due to hypoxia from PE, as well as morphine and Ativan for restlessness and agitation  Patient did have an episode of severe hypoxia for which IC was involved and antibiotics were discussed with the patient's family and they refused the patient to be on any antibiotics because possibly considering comfort care  On 07/23/2020 patient was transition to hospice in the hospital - inpatient hospice  Patient remined comfortable on iv morphine and iv ativan, she was then transitioned to oral meds and dcd to inpatient hospice  Please see above list of diagnoses and related plan for additional information  Condition at Discharge: poor     Discharge Day Visit / Exam:     Subjective:  I have seen and examined the patient at bedside  Overnight events reviewed with the RN       Vitals: Blood Pressure: 150/79 (07/27/20 0801)  Pulse: (!) 118 (07/26/20 0830)  Temperature: 98 8 °F (37 1 °C) (07/26/20 0830)  Temp Source: Temporal (07/25/20 0749)  Respirations: 20 (07/26/20 0830)  Height: 5' 3 5" (161 3 cm) (07/24/20 1601)  Weight - Scale: 59 kg (130 lb 1 6 oz) (07/27/20 0600)  SpO2: 99 % (07/26/20 0830)  Exam:   Physical Exam    Discharge instructions/Information to patient and family:(Discharge Medications and Follow up):   See after visit summary for information provided to patient and family  Provisions for Follow-Up Care:  See after visit summary for information related to follow-up care and any pertinent home health orders  Disposition: Inpatient Hospice  Planned Readmission:  No     Discharge Statement:  I spent 20 minutes discharging the patient  This time was spent on the day of discharge  I had direct contact with the patient on the day of discharge  Greater than 50% of the total time was spent examining patient, answering all patient questions, arranging and discussing plan of care with patient as well as directly providing post-discharge instructions  Additional time then spent on discharge activities  Discharge Medications:  See after visit summary for reconciled discharge medications provided to patient and family  ** Please Note: "This note has been constructed using a voice recognition system  Therefore there may be syntax, spelling, and/or grammatical errors   Please call if you have any questions  "**

## 2020-07-27 NOTE — [place] UNIVERSAL TRANSFER FORM
[place] UNIV[place] FOR[place]  (ALL ITEMS MUST BE COMPLETED)    1  TRANSFER FROM: [address] [place] [address] Street: [address] [facility]    2  DATE OF TRANSFER: 7/27/2020                        TIME OF TRANSFER: TBD    3  PATIENT NAME: DUSTY Marr      YOB: 1940                             GENDER: female    4  LANGUAGE:   English    5  PHYSICIAN NAME:  Sugar Nova MD                   PHONE: [phone]    6  CODE STATUS: Level 4 - [address] [place] DNR Attached: Yes    7  :                                      :  Extended Emergency Contact Information  Primary Emergency Contact: Lorie Clark   [place]  Mobile Phone: [phone]  Relation: Daughter  Secondary Emergency Contact: [facility]  Mobile Phone: [phone]  Relation: Daughter           Health Care Representative/Proxy:  Yes           Legal Guardian:  No             NAME OF:           HEALTH CARE REPRESENTATIVE/PROXY:                                         OR           LEGAL GUARDIAN, IF NOT :                                               PHONE:  (Day)           (Night)                        (Cell)    8  REASON FOR TRANSFER: (Must include brief medical history and recent changes in physical function or cognition ) Stable for hospice care outside of acute care setting  V/S: /79   Pulse (!) 118   Temp 98 8 °F (37 1 °C)   Resp 20   Ht 5' 3 5" (1 613 m)   Wt 59 kg (130 lb 1 6 oz)   SpO2 99%   BMI 22 68 kg/m²           PAIN: None    9  PRIMARY DIAGNOSIS: Hypoxia      Secondary Diagnosis:         Pacemaker: No      Internal Defib: No          Mental Health Diagnosis (if Applicable):    10  RESTRAINTS: No     11  RESPIRATORY NEEDS: Oxygen Device NC high flow at 6 L/min,    12  ISOLATION/PRECAUTION: None    13  ALLERGY: Atorvastatin; Boniva [ibandronic acid];  Codeine; Statins; and Zetia [ezetimibe]    14  SENSORY:       Speech Aphasia    15  SKIN CONDITION: No Wounds    16  DIET: Special (describe)Pleasure feeds    17  IV ACCESS: None    18  PERSONAL ITEMS SENT WITH PATIENT: Glasses    19  ATTACHED DOCUMENTS: MUST ATTACH CURRENT MEDICATION INFORMATION Face Sheet, MAR, Medication Reconciliation, Code Status and Discharge Summary    20  AT RISK ALERTS:Pressure Ulcer        HARM TO: N/A    21  WEIGHT BEARING STATUS:         Left Leg: None        Right Leg: None    22  MENTAL STATUS:Unresponsive    23  FUNCTION:        Walk: Not Able        Transfer: Not Able        Toilet: Not Able        Feed: With Help    24  IMMUNIZATIONS/SCREENING:     Immunization History   Administered Date(s) Administered    influenza, trivalent, adjuvanted 10/19/2019       25  BOWEL: Incontinent     26  BLADDER: Incontinent    27   SENDING FACILITY CONTACT: Fransico Brito                  Title: RN BSN        Unit: [address] [facility]        Phone: [phone] [address] (if known):        Title:        Unit:         Phone:         FORM PREFILLED BY (if applicable)       Title:       Unit:        Phone:         FORM COMPLETED BY Fransico Brito RN      Title: RN BSN      Phone: [phone]

## 2020-07-29 ENCOUNTER — PATIENT OUTREACH (OUTPATIENT)
Dept: CASE MANAGEMENT | Facility: OTHER | Age: 80
End: 2020-07-29

## 2020-07-29 NOTE — PROGRESS NOTES
Per Bina Soni with [facility], the patient is still admitted to their inpatient hospice unit  The patient can remain on this service indefinitely while she is on hospice as it is private pay for room & board

## 2020-08-12 ENCOUNTER — EPISODE CHANGES (OUTPATIENT)
Dept: CASE MANAGEMENT | Facility: OTHER | Age: 80
End: 2020-08-12

## 2020-08-13 ENCOUNTER — EPISODE CHANGES (OUTPATIENT)
Dept: CASE MANAGEMENT | Facility: HOSPITAL | Age: 80
End: 2020-08-13

## 2021-05-09 NOTE — ASSESSMENT & PLAN NOTE
2/2 B/l PE and Susected Aspiration Pneumonitis, On Hospcie care  Cont  O2, prn morphine for SOB  98.2

## 2021-07-10 NOTE — ASSESSMENT & PLAN NOTE
2/2 Pancreatic cancer causing Duodenal Ulcer  No Rx  morphine prn pain  .  > Dilaudid PCA to: 0.5/hr, 1mg DD, lockout 15mins  > D/w patient and she agreed with the plan

## 2022-02-04 NOTE — ASSESSMENT & PLAN NOTE
On toviaz, will substitute with oxybutynin [Chaperone Present] : A chaperone was present in the examining room during all aspects of the physical examination [Appropriately responsive] : appropriately responsive [Alert] : alert [No Acute Distress] : no acute distress [Oriented x3] : oriented x3 [Labia Majora] : normal [Labia Minora] : normal [Discharge] : a  ~M vaginal discharge was present [Scant] : scant [Clear] : clear [No Bleeding] : There was no active vaginal bleeding [Normal] : normal [FreeTextEntry1] : LINDA NEVES [Foul Smelling] : not foul smelling

## 2022-06-16 NOTE — PROGRESS NOTES
Progress Note - Cardiology   Brian Bradley 65 y o  female MRN: [id number]  Unit/Bed#: ICU 03 Encounter: [id number]    Assessment and Plan:   1  Status post right hip arthroplasty for fractured hip  2  History of coronary artery disease status post CABG x4 in 1998 status post lost of 2 grafts in 1999 and had recent in July of 2017 MI involving SVG vein graft to obtuse marginal status post successful stenting  No active signs of angina clinically  3  History of previous infarction and ischemic cardiomyopathy EF around 40-45 percent in last echo  Will repeat echo  4  History of chronic systolic and diastolic heart failure currently compensated     Patient on Aldactone and diuretics and beta-blockers  5  Hypertension  Blood pressure is well controlled  6  Dyslipidemia on statin  7  Chronic stable angina on Ranexa  Patient known to have 100% occluded RCA and LAD and circumflex with RCA area supplied by collaterals from left side  8  History of severe DJD  9  Recently finished cardiac rehabilitation with good baseline activity level  10  Patient on dual platelet therapy since she had angioplasty in July of 2017  Plan  Continue current cardiac Rx  Monitor for postoperative anemia  Resume aspirin and Brilinta when okay with Orthopedics  Discussed with ICU team       Subjective / Objective:   Patient seen and evaluated  No new complaints  She had surgery done yesterday without any problems  No chest pain no shortness of breath      Vitals:    02/02/18 1600   BP: 112/58   Pulse: 80   Resp: (!) 27   Temp: 99 7 °F (37 6 °C)   SpO2: 96%     Vitals:    02/01/18 2359 02/02/18 0600   Weight: 72 8 kg (160 lb 7 9 oz) 73 kg (160 lb 15 oz)     Orthostatic Blood Pressures    Flowsheet Row Most Recent Value   Blood Pressure  112/58 filed at 02/02/2018 1600   Patient Position - Orthostatic VS  Lying filed at 02/02/2018 1600        I/O       01/31 0701 - 02/01 0700 02/01 0701 - 02/02 0700 02/02 0701 - 02/03 0700 P O  240  680    I V  (mL/kg)  3543 3 (48 5) 0 (0)    Blood  700     IV Piggyback  1000 100    Total Intake(mL/kg) 240 (3 5) 5243 3 (71 8) 780 (10 7)    Urine (mL/kg/hr) 3750 (2 3) 555 (0 3) 1825 (2 5)    Emesis/NG output   0 (0)    Blood  900 (0 5)     Total Output 3750 1455 1825    Net -3510 +3788 3 -1045           Unmeasured Emesis Occurrence   1 x        Invasive Devices     Peripheral Intravenous Line            Peripheral IV 01/30/18 Left Antecubital 3 days    Peripheral IV 02/01/18 Left Hand 1 day              Body mass index is 27 62 kg/m²  Physical Exam:   Physical Exam    Neurologic:  Alert & oriented x 3,  no focal deficits noted   Constitutional:  Well developed, well nourished,  With no acute distress  Eyes:  PERRL, conjunctiva normal   HENT:  Atraumatic, external ears normal, nose normal,   NECK: Normal range of motion, no tenderness, neck is supple , No JVP  Respiratory:  Bilateral air entry mostly clear to auscultation  Cardiovascular: S1-S2 regular with a 3/6 ejection systolic murmur  GI:  Soft, nondistended, normal bowel sounds, nontender, no hepatosplenomegaly appreciated  Musculoskeletal:  No tenderness, no deformities      Extremities:  No  edema and distal pulses are present  Psychiatric:  Speech and behavior appropriate             Medications/ Allergies:     Current Facility-Administered Medications:  acetaminophen 650 mg Oral Q6H PRN [place]way Maye, CRNP    amLODIPine 2 5 mg Oral Daily Medway Maye, CRNP    vitamin C 1,000 mg Oral Daily Medway Maye, CRNP    cefazolin 2,000 mg Intravenous Q8H Stefan Lunsford PA-C Last Rate: Stopped (02/02/18 1248)   cholecalciferol 2,000 Units Oral Daily Medway Maye, CRNP    co-enzyme Q-10 30 mg Oral Daily Medway Maye, CRNP    docusate sodium 100 mg Oral BID Medway Maye, DAVIDNP    enoxaparin 40 mg Subcutaneous Daily Maribel Ojeda PA-C    ezetimibe 10 mg Oral HS Medway Maye, DAVIDNP    HYDROmorphone 0 2 mg Intravenous Q3H PRN Becca Tejada MD    hydroxychloroquine 200 mg Oral Daily Malakoff Spindle, CRNP    levothyroxine 50 mcg Oral Early Morning Stanley Spindle, CRNP    methocarbamol 500 mg Oral 4x Daily Stanley Spindle, CRNP    metoprolol succinate 100 mg Oral QAM Stanley Spindle, CRNP    metoprolol succinate 50 mg Oral QPM Malakoff Spindle, CRNP    nitroglycerin 0 4 mg Sublingual Q5 Min PRN Stanley Spindle, CRNP    ondansetron 4 mg Intravenous Q6H PRN Bernell , PA-C    oxyCODONE 5 mg Oral Q4H PRN Bernell , PA-C    raloxifene 60 mg Oral Daily Stanley Spindle, CRNP    ranolazine 500 mg Oral Q12H [address] Stanley Spindle, CRNP    senna 1 tablet Oral HS Duongdrake Vallejo PA-C    tolterodine 2 mg Oral Daily Stanley Spindle, CRNP    [START ON 2/3/2018] torsemide 10 mg Oral Daily Becca Tejada MD        acetaminophen 650 mg Q6H PRN   HYDROmorphone 0 2 mg Q3H PRN   nitroglycerin 0 4 mg Q5 Min PRN   ondansetron 4 mg Q6H PRN   oxyCODONE 5 mg Q4H PRN     Allergies   Allergen Reactions    Atorvastatin Hives    Boniva [Ibandronic Acid] Hives    Codeine      codeine derivatives - tolerates IV Dilaudid    Statins Hives       VTE Pharmacologic Prophylaxis:   Lovenox    Labs:   Troponins:      CBC with diff:  Results from last 7 days  Lab Units 02/02/18  0533 02/01/18  2328 02/01/18  0514 01/31/18  0444 01/30/18  1700   WBC Thousand/uL 13 10*  --  9 40 7 20 10 70   HEMOGLOBIN g/dL 10 5* 11 8* 10 9* 10 2* 12 2   HEMATOCRIT % 32 2* 36 7* 33 5* 31 6* 37 8   MCV fL 84  --  83 83 83   PLATELETS Thousands/uL 152  --  178 171 215   MCH pg 27 2  --  27 0 26 6* 26 8*   MCHC g/dL 32 6  --  32 5 32 3 32 4   RDW % 14 4  --  14 8 15 0 14 8   MPV fL 7 5*  --  8 1* 8 1* 8 6*   NRBC AUTO /100 WBCs 0  --  0  --  0       CMP:  Results from last 7 days  Lab Units 02/02/18  0533 02/01/18  0514 01/31/18  0444 01/30/18  1700   SODIUM mmol/L 141 140 144 140   POTASSIUM mmol/L 3 9 4 1 3 6 3 4*   CHLORIDE mmol/L 111* 106 109* 102   CO2 mmol/L 21 26 28 24   ANION GAP mmol/L 9 8 7 14*   BUN mg/dL 19 23 23 31*   CREATININE mg/dL 0 77 1 15 1 21 1 69*   GLUCOSE RANDOM mg/dL 127 107 103 127   CALCIUM mg/dL 7 4* 8 5 8 3 9 2   AST U/L  --   --   --  24   ALT U/L  --   --   --  27   ALK PHOS U/L  --   --   --  68   TOTAL PROTEIN g/dL  --   --   --  7 2   BILIRUBIN TOTAL mg/dL  --   --   --  0 60   EGFR ml/min/1 73sq m 74 46 43 29       Magnesium:  Results from last 7 days  Lab Units 02/02/18  0533 02/01/18  0514 01/31/18  0444   MAGNESIUM mg/dL 1 8 2 2 2 2     Coags:  Results from last 7 days  Lab Units 02/01/18  0514   PTT seconds 26   INR  0 95       Imaging & Testing   I have personally reviewed pertinent reports  Xr Hip/pelv 2-3 Vws Right If Performed    Result Date: 2/2/2018  Narrative: RIGHT HIP INDICATION:  Right hip arthroplasty COMPARISON: 01/31/2018 VIEWS: AP pelvis and 1 coned down views of the hip IMAGES:  2 FINDINGS: Bipolar right hip prostheses in good position  Degenerative changes in the left hip  No lytic or blastic lesions are seen  Soft tissues are unremarkable  Impression: Right hip prostheses in good position  Workstation performed: [id number]     Xr Hip/pelv 2-3 Vws Right If Performed    Result Date: 1/31/2018  Narrative: RIGHT HIP INDICATION:  Right hip pain  COMPARISON: None VIEWS: AP pelvis and 2 coned down views of the hip IMAGES:  4 FINDINGS: Impacted subcapital fracture of the right hip  Degenerative changes in both hip joints  No lytic or blastic lesions are seen  Soft tissues are unremarkable  Impression: Impacted subcapital fracture of the right hip  Findings discussed with Dr Shannon Pappas at [phone] hours on 01/31/2018  Workstation performed: [id number]     Xr Hip/pelv 2-3 Vws Right    Result Date: 1/28/2018  Narrative: RIGHT HIP INDICATION:  Right hip pain   COMPARISON: Right hip series of 2/25/2015 VIEWS: AP pelvis and 2 coned down views of the hip IMAGES:  3 FINDINGS: There is no acute fracture or dislocation  Mild narrowing noted of the bilateral hip joints with prominent marginal osteophyte formation similar to the prior exam   Bones appear demineralized  No lytic or blastic lesions are seen  Dense vascular calcifications are seen  There are surgical clips in the left groin  Impression: No acute osseous abnormality  Degenerative arthritic changes  Workstation performed: [id number]     Ct Hip Right Without Contrast    Result Date: 1/27/2018  Narrative: CT right hip without IV contrast INDICATION: 69-year-old female, post fall right hip pain COMPARISON: 1/27/2018 x-rays TECHNIQUE: CT examination of the right hip was performed  This examination, like all CT scans performed in the [facility], was performed utilizing techniques to minimize radiation dose exposure, including the use of iterative reconstruction and automated exposure control software  Sagittal and coronal two dimensional reconstructed images were also submitted for interpretation  IV Contrast: IV contrast was not given  Rad dose  383 44 mGy-cm FINDINGS: Mild degenerative arthritis right hip OSSEOUS STRUCTURES:  No fracture, dislocation or destructive osseous lesion  VISUALIZED MUSCULATURE:  Unremarkable  SOFT TISSUES:  Unremarkable  OTHER PERTINENT FINDINGS:  None  Impression: Mild degenerative arthritis right hip  No evidence of hip fracture Consistent with x-rays    Workstation performed: [id number]        EKG / Monitor: Personally reviewed  Normal sinus rhythm with no significant tachy-marcial arrhythmias    Cardiac testing:     Cardiac testing:   Cardiac catheterization done July of 2017 shows  Proximal %  Proximal circumflex 100%  Proximal %  Graft to obtuse marginal 1 was occluded with a 99% stenosis and patient underwent successful stenting with a LEE 2 flow  LIMA to LAD was patent, and RPDA had collaterals coming from the left      Results for orders placed during the hospital encounter of 18   Echo complete with contrast if indicated    Narrative [address]  [address] [facility] [phone]    Transthoracic Echocardiogram  2D, M-mode, Doppler, and Color Doppler    Study date:  2018    Patient: Divya Nichole  MR number: [id number]  Account number: [de-identified]  : 1940  Age: 65 years  Gender: Female  Status: Routine  Location: Bedside  Height: 61 in  Weight: 151 6 lb  BP: 132/ 80 mmHg    Indications: CAD    Diagnoses: I25 83 - Coronary atherosclerosis due to lipid rich plaque    Sonographer:  OCTAVIO Seals  Primary Physician:  Peter Coffman MD  Referring Physician:  Dalton Rahman MD  Group:  [facility]  Interpreting Physician:  Dalton Rahman MD    SUMMARY    LEFT VENTRICLE:  Systolic function was mildly reduced  Ejection fraction was estimated in the range of 45 % to 50 % to be 50 %  There was mild diffuse hypokinesis  Wall thickness was at the upper limits of normal   Doppler parameters were consistent with abnormal left ventricular relaxation (grade 1 diastolic dysfunction)  LEFT ATRIUM:  The atrium was mildly to moderately dilated  MITRAL VALVE:  There was mild annular calcification  There was mild regurgitation  AORTIC VALVE:  There was mild to moderate stenosis  ANTONIO 1 4 cm2, peak velocity 2 5 m/se, peak gradient 25 and mean 11 mm of hg     TRICUSPID VALVE:  There was mild regurgitation  Estimated peak PA pressure was 35 mmHg  PULMONIC VALVE:  There was no significant regurgitation  HISTORY: PRIOR HISTORY: HTN, CAD, CABG, CHF, CKD, Hypothyroidism    PROCEDURE: The procedure was performed at the bedside  This was a routine study  The transthoracic approach was used  The study included complete 2D imaging, M-mode, complete spectral Doppler, and color Doppler  The heart rate was 72 bpm,  at the start of the study  Image quality was adequate      LEFT VENTRICLE: Size was normal  Systolic function was mildly reduced  Ejection fraction was estimated in the range of 45 % to 50 % to be 50 %  There was mild diffuse hypokinesis  Wall thickness was at the upper limits of normal  DOPPLER:  Doppler parameters were consistent with abnormal left ventricular relaxation (grade 1 diastolic dysfunction)  RIGHT VENTRICLE: The size was normal  Systolic function was normal     LEFT ATRIUM: The atrium was mildly to moderately dilated  RIGHT ATRIUM: Size was at the upper limits of normal     MITRAL VALVE: There was mild annular calcification  There was mild thickening  DOPPLER: There was no evidence for stenosis  There was mild regurgitation  AORTIC VALVE: The valve was trileaflet  Leaflets exhibited mildly to moderately increased thickness and mild calcification  DOPPLER: There was mild to moderate stenosis  ANTONIO 1 4 cm2, peak velocity 2 5 m/se, peak gradient 25 and mean 11 mm  of hg  There was no significant regurgitation  TRICUSPID VALVE: DOPPLER: There was mild regurgitation  Estimated peak PA pressure was 35 mmHg  PULMONIC VALVE: DOPPLER: There was no significant regurgitation  PERICARDIUM: There was no thickening or calcification  There was no pericardial effusion  AORTA: The root exhibited normal size  SYSTEMIC VEINS: IVC: The inferior vena cava was normal in size   Respirophasic changes were normal     SYSTEM MEASUREMENT TABLES    2D mode  AoR Diam 2D: 3 cm  LA Diam (2D): 3 9 cm  LA/Ao (2D): 1 3  FS (2D Teich): 22 9 %  IVSd (2D): 1 15 cm  LVDEV: 115 cm³  LVESV: 62 3 cm³  LVIDd(2D): 4 94 cm  LVISd (2D): 3 81 cm  LVOT Area 2D: 2 84 cm squared  LVPWd (2D): 1 08 cm  SV (Teich): 52 7 cm³    Apical four chamber  LVEF A4C: 47 %    Unspecified Scan Mode  ANTONIO Cont Eq (Peak Jonah): 1 28 cm squared  ANTONIO Cont Eq (VTI): 1 37 cm squared  LVOT (VTI): 24 cm  LVOT Diam : 1 9 cm  LVOT Vmax: 1130 mm/s  LVOT Vmax; Mean: 1130 mm/s  Peak Grad ; Mean: 5 mm[Hg]  SV (LVOT): 68 cm³  VTI;Mean: 3 mm[Hg]  MV Peak A Jonah: 918 mm/s  MV Peak E Jonah  Mean: 681 mm/s  MVA (PHT): 3 93 cm squared  PHT: 56 ms  Max P mm[Hg]  V Max: 2790 mm/s  Vmax: 2790 mm/s  RA Area: 14 3 cm squared  RA Volume: 30 5 cm³  TAPSE: 1 9 cm    [employer] Accredited Echocardiography Laboratory    Prepared and electronically signed by    Kellen Joaquin MD  Signed 2018 62:60:09         Dr Kellen Joaquin MD [facility]      "This note has been constructed using a voice recognition system  Therefore there may be syntax, spelling, and/or grammatical errors   Please call if you have any questions  " Tremfya Counseling: I discussed with the patient the risks of guselkumab including but not limited to immunosuppression, serious infections, and drug reactions.  The patient understands that monitoring is required including a PPD at baseline and must alert us or the primary physician if symptoms of infection or other concerning signs are noted.

## 2023-08-18 NOTE — PLAN OF CARE
Problem: Potential for Falls  Goal: Patient will remain free of falls  Description  INTERVENTIONS:  - Assess patient frequently for physical needs  -  Identify cognitive and physical deficits and behaviors that affect risk of falls    -  [place] fall precautions as indicated by assessment   - Educate patient/family on patient safety including physical limitations  - Instruct patient to call for assistance with activity based on assessment  - Modify environment to reduce risk of injury  - Consider OT/PT consult to assist with strengthening/mobility  Outcome: Progressing     Problem: PAIN - ADULT  Goal: Verbalizes/displays adequate comfort level or baseline comfort level  Description  Interventions:  - Encourage patient to monitor pain and request assistance  - Assess pain using appropriate pain scale  - Administer analgesics based on type and severity of pain and evaluate response  - Implement non-pharmacological measures as appropriate and evaluate response  - Consider cultural and social influences on pain and pain management  - Notify physician/advanced practitioner if interventions unsuccessful or patient reports new pain  Outcome: Progressing     Problem: INFECTION - ADULT  Goal: Absence or prevention of progression during hospitalization  Description  INTERVENTIONS:  - Assess and monitor for signs and symptoms of infection  - Monitor lab/diagnostic results  - Monitor all insertion sites, i e  indwelling lines, tubes, and drains  - Monitor endotracheal if appropriate and nasal secretions for changes in amount and color  - [place] appropriate cooling/warming therapies per order  - Administer medications as ordered  - Instruct and encourage patient and family to use good hand hygiene technique  - Identify and instruct in appropriate isolation precautions for identified infection/condition  Outcome: Progressing     Problem: SAFETY ADULT  Goal: Patient will remain free of falls  Description  INTERVENTIONS:  - Assess patient frequently for physical needs  -  Identify cognitive and physical deficits and behaviors that affect risk of falls  -  [place] fall precautions as indicated by assessment   - Educate patient/family on patient safety including physical limitations  - Instruct patient to call for assistance with activity based on assessment  - Modify environment to reduce risk of injury  - Consider OT/PT consult to assist with strengthening/mobility  Outcome: Progressing     Problem: DISCHARGE PLANNING  Goal: Discharge to home or other facility with appropriate resources  Description  INTERVENTIONS:  - Identify barriers to discharge w/patient and caregiver  - Arrange for needed discharge resources and transportation as appropriate  - Identify discharge learning needs (meds, wound care, etc )  - Arrange for interpretive services to assist at discharge as needed  - Refer to Case Management Department for coordinating discharge planning if the patient needs post-hospital services based on physician/advanced practitioner order or complex needs related to functional status, cognitive ability, or social support system  Outcome: Progressing     Problem: Knowledge Deficit  Goal: Patient/family/caregiver demonstrates understanding of disease process, treatment plan, medications, and discharge instructions  Description  Complete learning assessment and assess knowledge base    Interventions:  - Provide teaching at level of understanding  - Provide teaching via preferred learning methods  Outcome: Progressing Attending with

## 2023-12-04 NOTE — PROGRESS NOTES
Patient's measurements today were:    Neck = 15 in    Waist = 48 in    Hips = 50 in     Progress Note - Putnam Chivo 1940, 79 y o  female MRN: [id number]    Unit/Bed#: [address] [place] [address] Encounter: [id number]    Primary Care Provider: Delroy Hoyt MD   Date and time admitted to hospital: 2020  1:58 PM        * Hypoxia  Assessment & Plan  2/2 B/l PE and Susected Aspiration Pneumonitis, On Hospcie care  Cont  O2, prn morphine for SOB  Will try to wean off the oxygen to 5 L  On Morphine iv, will try oral conc  Solution of morphine SL prn if controls the sob  GI bleed  Assessment & Plan  2/2 Pancreatic cancer causing Duodenal Ulcer  No Rx  morphine prn pain  Pancreatic mass  Assessment & Plan  Invading Pancreatic cancer, On Hospice care  Cont  Morphine prn pain  Try oral solution    Bilateral pulmonary embolism (HCC)  Assessment & Plan  O2, No Anticoag 2/2 Hospice care and GI Bleed  Restlessness  Assessment & Plan  Prn ativan  Try sublinual 1mg q3hrs prn  Subjective:   I have seen and Examined the patient at the bedside  Looks comfortable  NAD  No acute events  No fevers or chills, No nausea or vomiting  Overnight events reviewed with the RN  No Other complains  Objective:   Temp (24hrs), Av °F (37 2 °C), Min:98 7 °F (37 1 °C), Max:99 2 °F (37 3 °C)    Temp:  [98 7 °F (37 1 °C)-99 2 °F (37 3 °C)] 98 7 °F (37 1 °C)  HR:  [113-116] 116  Resp:  [19-20] 20  BP: ()/(54-59) 70/54  SpO2:  [88 %-96 %] 95 %  Body mass index is 23 76 kg/m²  Review of System:   Unable to be obtained secondary to his mental status  Input and Output Summary (last 24 hours):     No intake or output data in the 24 hours ending 20 1513    Physical Exam:   General:  Lethargic and drowsy, NAD, But tried to open eyes today  Non communicative, does not follow commands  Eyes:  BLANCA, EOMI  CVS : S1, S2, RRR    R/S : Clear to auscultate anteriorly  Decreased at the bases  Abd: Soft, NT, ND  Bs+ve  Extremity: Trace pedal edema noted  Skin: No acute Rash noted     CNS: Rt Sided Facial Droop    Additional Data:     Labs:    Results from last 7 days   Lab Units 07/21/20  2356 07/21/20  0608   WBC Thousand/uL  --  15 81*   HEMOGLOBIN g/dL 8 7* 8 0*   HEMATOCRIT % 28 3* 26 5*   PLATELETS Thousands/uL  --  322   NEUTROS PCT %  --  84*   LYMPHS PCT %  --  8*   MONOS PCT %  --  6   EOS PCT %  --  1     Results from last 7 days   Lab Units 07/21/20  0608   POTASSIUM mmol/L 3 7   CHLORIDE mmol/L 99*   CO2 mmol/L 21   BUN mg/dL 21   CREATININE mg/dL 0 94   CALCIUM mg/dL 8 4     Results from last 7 days   Lab Units 07/21/20  0650   INR  1 11     Lab Results   Component Value Date    HGBA1C 5 3 07/21/2020        Cultures:   Blood Culture:   Lab Results   Component Value Date    BLOODCX No Growth After 4 Days  07/21/2020    BLOODCX No Growth After 4 Days  07/21/2020     Urine Culture:   Lab Results   Component Value Date    URINECX (A) 12/11/2018     >100,000 cfu/ml Klebsiella oxytoca Raoultella ornithinolytica    URINECX No Growth <1000 cfu/mL 02/03/2018    URINECX 10,000-19,000 cfu/ml Mixed Contaminants X3 06/01/2016     Sputum Culture: No components found for: SPUTUMCX  Wound Culture: No results found for: WOUNDCULT    Last 24 Hours Medication List:     Current Facility-Administered Medications:  albuterol 2 5 mg Nebulization Q4H PRN Elodia Philippe MD   LORazepam 1 mg Sublingual Q3H PRN Elodia Philippe MD   morphine 0 4 mg/mL oral solution 2 5 mg Oral Q3H PRN Elodia Philippe MD       Patient is at moderate risk for morbidity and mortality due to above mentioned illness and comorbidities

## 2025-02-10 NOTE — ASSESSMENT & PLAN NOTE
Patient Seen in: [facility] Emergency Department      History     Chief Complaint   Patient presents with    Back Pain     Stated Complaint: back pain , urinary inc    Subjective:   HPI      44-year-old female presented to the ER with left low back pain radiating to her left lower extremity with associated bladder incontinence, left buttock and groin paresthesias over the past 2 days.  Patient reports a fall while at work ([employer]) in 2024, has been off since then, going through [facility] for her medical care, says she has had an MRI that showed \"some herniated disks\" and ?  Calcification in her left hip.  Patient taking tramadol and cyclobenzaprine since the incident without improvement.     Objective:     Past Medical History:    Bipolar affective (HCC)    Crohn disease (HCC)    Diverticulitis    Esophageal reflux    Hiatal hernia    Kidney stones    Ulcerative colitis (HCC)              Past Surgical History:   Procedure Laterality Date    Appendectomy      Appendectomy         &     Colonoscopy  2017    Colonoscopy N/A 2020    Procedure: COLONOSCOPY;  Surgeon: Lane Cole MD;  Location:  ENDOSCOPY    D & c      Lithotripsy  2008    nephrostomy tube removed    Other      benign bilateral breast biopsy    Other  2007    Nephrostomy tube insertion left kidney-removed     Other surgical history  2007    nephroscopy with tube insertion    Other surgical history  2008    kidney tube insertion removed    Other surgical history  2008    lithotripsy    Other surgical history      ashlie breast bxs \"neg\"    Other surgical history Left 2015    Patellofemoral ligament reconstruction and tibial tubercle transfer    Removal gallbladder      Spine surgery procedure unlisted      cervical spine discectomy                Social History     Socioeconomic History    Marital status:    Tobacco Use    Smoking status: Former     Current packs/day: 0.00      Patient presented to ED on 1/27 s/p fall resulting in right hip injury and pain   Workup in the ED included right hip xray and CT which were negative for any acute osseous injury and patient sent home   However, patient's pain and ambulatory dysfunction worsened at home and she presented back in the ED on 1/30   · Repeat x-rays of the right hip revealed an impacted subcapital fracture of the right hip  · Orthopedics following, appreciate input  · Plan for right hip hemiarthroplasty on 2/1/18 by Dr Atiya Taylor   · Discontinued Aspirin  · Holding Lovenox   · Heller catheter inserted  · NPO after midnight   · Cardiology consultation for preoperative clearance in the setting of prior quadruple bypass surgery, hypertension, CAD, and recent stent placement in August of 2017  · Type and screen  · PT/OT eval and treat  · Pain control with Tylenol, Tramadol, and Dilaudid IV plus Robaxin Average packs/day: 0.5 packs/day for 10.0 years (5.0 ttl pk-yrs)     Types: Cigarettes     Start date: 1991     Quit date: 2001     Years since quittin.2     Passive exposure: Past    Smokeless tobacco: Former     Quit date: 1994   Vaping Use    Vaping status: Former   Substance and Sexual Activity    Alcohol use: Not Currently     Comment: glass of wine once per month    Drug use: Yes     Frequency: 3.0 times per week     Types: Cannabis   Other Topics Concern    Caffeine Concern No    Exercise Yes     Comment: work     Social Drivers of Health     Food Insecurity: No Food Insecurity (2024)    Received from [facility]    Food Insecurity     Currently or in the past 3 months, have you worried your food would run out before you had money to buy more?: No     In the past 12 months, have you run out of food or been unable to get more?: No   Transportation Needs: No Transportation Needs (2024)    Received from [facility]    Transportation Needs     Currently or in the past 3 months, has lack of transportation kept you from medical appointments, getting food or medicine, or providing care to a family member?: Unrecognized value     Medical Transportation Needs?: No   Housing Stability: Low Risk  (2024)    Housing Stability     Housing Instability: No                  Physical Exam     ED Triage Vitals [02/10/25 1153]   BP (!) 134/94   Pulse 87   Resp 18   Temp 98.1 °F (36.7 °C)   Temp src Temporal   SpO2 100 %   O2 Device None (Room air)       Current Vitals:   Vital Signs  BP: 120/70  Pulse: 72  Resp: 18  Temp: 98.1 °F (36.7 °C)  Temp src: Temporal  MAP (mmHg): 94    Oxygen Therapy  SpO2: 100 %  O2 Device: None (Room air)        Physical Exam  Vitals and nursing note reviewed.   Constitutional:       General: She is in acute distress.      Comments: Apparent distress and discomfort from pain, tearful, restless   HENT:      Head: Normocephalic and  atraumatic.   Cardiovascular:      Rate and Rhythm: Normal rate.   Pulmonary:      Effort: Pulmonary effort is normal.   Skin:     General: Skin is warm and dry.   Neurological:      Mental Status: She is alert and oriented to person, place, and time.      Comments: Moving all extremities, normal speech.  4 out of 5 weakness of left great toe dorsiflexion and plantarflexion.  Decreased sensation in the S1 and L5 distribution on the left foot.   Psychiatric:         Mood and Affect: Mood normal.         Behavior: Behavior normal.             ED Course     Labs Reviewed   CBC WITH DIFFERENTIAL WITH PLATELET - Abnormal; Notable for the following components:       Result Value    RBC 3.62 (*)     HGB 11.7 (*)     HCT 33.4 (*)     All other components within normal limits   BASIC METABOLIC PANEL (8) - Normal   SED RATE, WESTERGREN (AUTOMATED) - Normal   C-REACTIVE PROTEIN - Normal   URINALYSIS WITH CULTURE REFLEX   RAINBOW DRAW LAVENDER   RAINBOW DRAW LIGHT GREEN   RAINBOW DRAW BLUE   RAINBOW DRAW GOLD       MRI SPINE LUMBAR (W+WO) (CPT=72158)    Result Date: 2/10/2025  CONCLUSION:  No sign of acute injury.  No central canal stenosis.  Disc protrusions L4-L5, without central canal or foraminal stenosis.  No compression fracture.  No paraspinal mass   LOCATION:  [facility]      Dictated by (CST): Denis Box MD on 2/10/2025 at 6:46 PM     Finalized by (CST): Denis Box MD on 2/10/2025 at 6:52 PM        ED Course as of 02/10/25 2113  ------------------------------------------------------------  Time: 02/10 1643  Comment: Patient's pain is  minimally improved after Toradol, hydromorphone, Robaxin.  ------------------------------------------------------------  Time: 02/10 2043  Comment: I discussed patient's MRI findings with spinal surgery, no acute intervention.    Given hydromorphone IV x 3, difficult to control patient's pain at this point so plan for admission for pain  control.  ------------------------------------------------------------  Time: 02/10 2043  Comment: Independently interpreted labs, unremarkable, normal inflammatory markers              MDM          Admission disposition: 2/10/2025  9:13 PM           Medical Decision Making  Subacute on chronic abdominal pain after work-related injury with motor and sensory deficits in the L4, L5, S1 distribution, patient has essentially failed outpatient management of pain control at this point  Differential diagnosis includes lumbar radiculopathy, sciatica, lumbar disc herniation, lumbar discitis, lumbar osteomyelitis, cauda equina, epidural abscess  MRI confirms left-sided lumbar disc herniation with compression of ventral sac, no signs of cauda equina or surgical emergency at this time  Patient required multiple doses of IV opiates for pain control, therefore plan to admit for pain control and PT evaluation    Amount and/or Complexity of Data Reviewed  Labs: ordered. Decision-making details documented in ED Course.  Radiology: ordered.     Details: Reviewed radiology report for MRI    Risk  Parenteral controlled substances.  Decision regarding hospitalization.  Diagnosis or treatment significantly limited by social determinants of health.        Disposition and Plan     Clinical Impression:  1. Sciatica of left side    2. Lumbar disc herniation         Disposition:  Admit  2/10/2025  9:13 pm    Follow-up:  No follow-up provider specified.        Medications Prescribed:  Current Discharge Medication List              Supplementary Documentation:         Hospital Problems       Present on Admission  Date Reviewed: 11/7/2023            ICD-10-CM Noted POA    * (Principal) Sciatica of left side M54.32 2/10/2025 Unknown

## (undated) DEVICE — 3M™ IOBAN™ 2 ANTIMICROBIAL INCISE DRAPE 6651EZ: Brand: IOBAN™ 2

## (undated) DEVICE — CAPIT HIP BIPOLAR HEAD POR PRIMARY

## (undated) DEVICE — CAPIT HIP STEM POR PRIMARY

## (undated) DEVICE — GLOVE SRG BIOGEL 7.5

## (undated) DEVICE — BLADE SAW OSCILLATING 13 X 90MM

## (undated) DEVICE — ABDUCTION PILLOW FOAM POSITIONER: Brand: CARDINAL HEALTH

## (undated) DEVICE — ASTOUND STANDARD SURGICAL GOWN, XL: Brand: CONVERTORS

## (undated) DEVICE — SUT VICRYL 2-0 CT-1 27 IN J259H

## (undated) DEVICE — GROUNDING PAD UNIVERSAL SLW

## (undated) DEVICE — TUBE MINI KAMVAC SUCTION 7310 7 LATEX FREE

## (undated) DEVICE — TIBURON SPLIT SHEET: Brand: CONVERTORS

## (undated) DEVICE — PREP IM ENCHANCED TOTAL HIP BONE                                    PREPARATION KIT: Brand: PREP-IM

## (undated) DEVICE — 3M™ STERI-STRIP™ REINFORCED ADHESIVE SKIN CLOSURES, R1547, 1/2 IN X 4 IN (12 MM X 100 MM), 6 STRIPS/ENVELOPE: Brand: 3M™ STERI-STRIP™

## (undated) DEVICE — SKIN MARKER DUAL TIP WITH RULER CAP, FLEXIBLE RULER AND LABELS: Brand: DEVON

## (undated) DEVICE — ORTHOPEDIC PACK: Brand: CARDINAL HEALTH

## (undated) DEVICE — LABEL MEDICATION STERILE 2 YELLOW LIDOCAINE 2 BLUE MARCAINE 2 ORANGE HEPARIN

## (undated) DEVICE — HANDPIECE SET WITH HIGH FLOW TIP AND SUCTION TUBE: Brand: INTERPULSE

## (undated) DEVICE — BASIC DOUBLE BASIN 2-LF: Brand: MEDLINE INDUSTRIES, INC.

## (undated) DEVICE — 3M™ STERI-DRAPE™ U-DRAPE 1015: Brand: STERI-DRAPE™

## (undated) DEVICE — DRESSING MEPILEX AG BORDER 4 X 10 IN

## (undated) DEVICE — DRESSING MEPILEX AG BORDER 4 X 8 IN

## (undated) DEVICE — GLOVE INDICATOR PI UNDERGLOVE SZ 7.5 BLUE

## (undated) DEVICE — BANDAGE, ESMARK LF STR 6"X9' (20/CS): Brand: CYPRESS

## (undated) DEVICE — NEEDLE 20 G X 1 1/2

## (undated) DEVICE — SUT ETHIBOND 5 V-37 30 IN MB66G

## (undated) DEVICE — CHLORAPREP HI-LITE 26ML ORANGE

## (undated) DEVICE — SYRINGE 10ML LL CONTROL TOP